# Patient Record
Sex: FEMALE | Race: BLACK OR AFRICAN AMERICAN | NOT HISPANIC OR LATINO | Employment: OTHER | URBAN - METROPOLITAN AREA
[De-identification: names, ages, dates, MRNs, and addresses within clinical notes are randomized per-mention and may not be internally consistent; named-entity substitution may affect disease eponyms.]

---

## 2017-07-13 PROBLEM — E78.5 HYPERLIPIDEMIA: Status: ACTIVE | Noted: 2017-07-13

## 2017-07-13 PROBLEM — Z87.09 HISTORY OF ASTHMA: Status: ACTIVE | Noted: 2017-07-13

## 2017-11-29 PROBLEM — I48.91 NEW ONSET ATRIAL FIBRILLATION: Status: ACTIVE | Noted: 2017-11-29

## 2017-12-01 PROBLEM — I48.91 NEW ONSET ATRIAL FIBRILLATION: Status: ACTIVE | Noted: 2017-12-01

## 2018-03-15 PROBLEM — D64.9 ANEMIA: Status: ACTIVE | Noted: 2018-03-15

## 2018-03-15 PROBLEM — K59.00 CONSTIPATION: Status: ACTIVE | Noted: 2018-03-15

## 2018-03-15 PROBLEM — R10.33 PERIUMBILICAL PAIN: Status: ACTIVE | Noted: 2018-03-15

## 2018-04-17 ENCOUNTER — TELEPHONE (OUTPATIENT)
Dept: ADMINISTRATIVE | Facility: HOSPITAL | Age: 53
End: 2018-04-17

## 2018-04-19 PROBLEM — F41.9 ANXIETY: Status: ACTIVE | Noted: 2018-04-19

## 2018-08-30 ENCOUNTER — TELEPHONE (OUTPATIENT)
Dept: ADMINISTRATIVE | Facility: OTHER | Age: 53
End: 2018-08-30

## 2018-08-30 NOTE — TELEPHONE ENCOUNTER
Results forwarded to Hipolito Mendenhall per patient's request.  Contacted patient to inform.  Verb understanding      ---- Message from Bree Limon sent at 8/30/2018  8:38 AM CDT -----  Contact: self   Pt states she is currently at Dr Sohan Mendenhall's office in [place] and would like to see of a copy of the left foot xray results can be faxed to [phone]  Pt contact [phone]

## 2018-11-10 ENCOUNTER — HOSPITAL ENCOUNTER (EMERGENCY)
Facility: HOSPITAL | Age: 53
Discharge: HOME | End: 2018-11-10
Attending: EMERGENCY MEDICINE
Payer: MEDICAID

## 2018-11-10 VITALS
TEMPERATURE: 98 F | OXYGEN SATURATION: 98 % | DIASTOLIC BLOOD PRESSURE: 86 MMHG | BODY MASS INDEX: 33.55 KG/M2 | WEIGHT: 208.75 LBS | SYSTOLIC BLOOD PRESSURE: 124 MMHG | RESPIRATION RATE: 18 BRPM | HEIGHT: 66 IN | HEART RATE: 18 BPM

## 2018-11-10 DIAGNOSIS — J02.9 PHARYNGITIS, UNSPECIFIED ETIOLOGY: Primary | ICD-10-CM

## 2018-11-10 DIAGNOSIS — M25.50 POLYARTHRALGIA: ICD-10-CM

## 2018-11-10 PROCEDURE — 99284 EMERGENCY DEPT VISIT MOD MDM: CPT | Mod: 25

## 2018-11-10 PROCEDURE — 96372 THER/PROPH/DIAG INJ SC/IM: CPT

## 2018-11-10 PROCEDURE — 63600175 PHARM REV CODE 636 W HCPCS: Performed by: PHYSICIAN ASSISTANT

## 2018-11-10 RX ORDER — DEXAMETHASONE SODIUM PHOSPHATE 4 MG/ML
12 INJECTION, SOLUTION INTRA-ARTICULAR; INTRALESIONAL; INTRAMUSCULAR; INTRAVENOUS; SOFT TISSUE
Status: COMPLETED | OUTPATIENT
Start: 2018-11-10 | End: 2018-11-10

## 2018-11-10 RX ORDER — AZITHROMYCIN 250 MG/1
250 TABLET, FILM COATED ORAL DAILY
Qty: 6 TABLET | Refills: 0 | Status: SHIPPED | OUTPATIENT
Start: 2018-11-10 | End: 2019-05-21

## 2018-11-10 RX ADMIN — DEXAMETHASONE SODIUM PHOSPHATE 12 MG: 4 INJECTION, SOLUTION INTRA-ARTICULAR; INTRALESIONAL; INTRAMUSCULAR; INTRAVENOUS; SOFT TISSUE at 11:11

## 2018-11-10 NOTE — ED PROVIDER NOTES
Encounter Date: 11/10/2018       History     Chief Complaint   Patient presents with    Lupus     shoulder, knee and hip pain, hx lupus    Sore Throat     Pt also c/o polyarthralgia secondary to lupus.  Pt denies CP/SOB, n/v/d, chills, fever.      The history is provided by the patient.   Sore Throat   This is a new problem. The current episode started more than 2 days ago. The problem occurs constantly. The problem has not changed since onset.Pertinent negatives include no chest pain, no abdominal pain, no headaches and no shortness of breath. The symptoms are aggravated by swallowing. Nothing relieves the symptoms. She has tried nothing for the symptoms.     Review of patient's allergies indicates:   Allergen Reactions    Penicillins Hives    Percocet [oxycodone-acetaminophen] Hives and Itching    Tramadol Hives     Past Medical History:   Diagnosis Date    Arthritis     knees    Asthma     Atrial fibrillation     Chronic constipation     Diabetes mellitus     Encounter for blood transfusion 10/2014    GERD (gastroesophageal reflux disease)     Gout     Hypertension     Lupus 2004    SLE    Renal disorder     SLE (systemic lupus erythematosus)      Past Surgical History:   Procedure Laterality Date    APPENDECTOMY      CHOLECYSTECTOMY      COLONOSCOPY N/A 4/19/2018    Procedure: COLONOSCOPY;  Surgeon: Minerva Porras MD;  Location: [facility];  Service: Endoscopy;  Laterality: N/A;    COLONOSCOPY N/A 4/19/2018    Performed by Minerva Porras MD at [facility]    ESOPHAGOGASTRODUODENOSCOPY (EGD) N/A 4/19/2018    Performed by Minerva Porras [facility] at [facility]    HYSTERECTOMY      JOINT REPLACEMENT Left 08/07/2015    Knee    LYMPH NODE BIOPSY Left 2014    MASTECTOMY      Left arm- NO BP    PARTIAL HYSTERECTOMY      REPLACEMENT-KNEE-TOTAL Right 8/7/2015    Performed by Neeraj Garza MD at [facility] OR     Family History   Problem Relation Age of Onset    Heart block Mother      Heart disease Father     COPD Neg Hx      Social History     Tobacco Use    Smoking status: Former Smoker     Packs/day: 0.25     Years: 15.00     Pack years: 3.75     Types: Cigarettes    Smokeless tobacco: Never Used   Substance Use Topics    Alcohol use: No    Drug use: No     Review of Systems   Constitutional: Negative for chills and fever.   HENT: Positive for sore throat.    Eyes: Negative for photophobia and redness.   Respiratory: Negative for cough and shortness of breath.    Cardiovascular: Negative for chest pain.   Gastrointestinal: Negative for abdominal pain, diarrhea and nausea.   Endocrine: Negative for polydipsia and polyphagia.   Genitourinary: Negative for dysuria.   Musculoskeletal: Positive for arthralgias. Negative for back pain and myalgias.   Skin: Negative for rash.   Neurological: Negative for weakness and headaches.   Hematological: Does not bruise/bleed easily.   Psychiatric/Behavioral: The patient is not nervous/anxious.    All other systems reviewed and are negative.      Physical Exam     Initial Vitals [11/10/18 1038]   BP Pulse Resp Temp SpO2   120/77 77 19 99 °F (37.2 °C) 98 %      MAP       --         Physical Exam    Nursing note and vitals reviewed.  Constitutional: Vital signs are normal. She appears well-developed and well-nourished. No distress.   HENT:   Head: Normocephalic and atraumatic.   Right Ear: External ear normal.   Left Ear: External ear normal.   Nose: Nose normal.   Mouth/Throat: Posterior oropharyngeal erythema present. No oropharyngeal exudate, posterior oropharyngeal edema or tonsillar abscesses.   Eyes: Conjunctivae, EOM and lids are normal. Pupils are equal, round, and reactive to light.   Neck: Normal range of motion and full passive range of motion without pain. Neck supple.   Cardiovascular: Normal rate, regular rhythm, S1 normal, S2 normal, normal heart sounds, intact distal pulses and normal pulses.   Pulmonary/Chest: Breath sounds normal. No  respiratory distress. She has no wheezes. She has no rales.   Abdominal: Soft. Normal appearance and bowel sounds are normal. She exhibits no distension. There is no tenderness.   Musculoskeletal: Normal range of motion.   Lymphadenopathy:     She has no cervical adenopathy.   Neurological: She is alert and oriented to person, place, and time. She has normal strength. No cranial nerve deficit or sensory deficit. Coordination and gait normal.   Skin: Skin is warm, dry and intact.   Psychiatric: She has a normal mood and affect. Her speech is normal and behavior is normal. Judgment and thought content normal. Cognition and memory are normal.         ED Course   Procedures  Labs Reviewed - No data to display       Imaging Results    None                               Clinical Impression:   The primary encounter diagnosis was Pharyngitis, unspecified etiology. A diagnosis of Polyarthralgia was also pertinent to this visit.      Disposition:   Disposition: Discharged  Condition: Stable                        JOE Mercado  11/10/18 1115       JOE Mercado  11/10/18 1115

## 2018-11-10 NOTE — ED NOTES
Pt in NAD, AAOx3, resp e/u; pt understood discharge instructions; pt ambulated with no difficulties.

## 2019-05-07 PROBLEM — E66.811 CLASS 1 OBESITY WITH SERIOUS COMORBIDITY AND BODY MASS INDEX (BMI) OF 32.0 TO 32.9 IN ADULT: Status: ACTIVE | Noted: 2019-05-07

## 2019-05-07 PROBLEM — E66.9 CLASS 1 OBESITY WITH SERIOUS COMORBIDITY AND BODY MASS INDEX (BMI) OF 32.0 TO 32.9 IN ADULT: Status: ACTIVE | Noted: 2019-05-07

## 2020-03-03 PROBLEM — N30.01 ACUTE CYSTITIS WITH HEMATURIA: Status: ACTIVE | Noted: 2020-03-03

## 2020-03-03 PROBLEM — N12 PYELONEPHRITIS: Status: ACTIVE | Noted: 2020-03-03

## 2020-03-03 PROBLEM — N17.9 AKI (ACUTE KIDNEY INJURY): Status: ACTIVE | Noted: 2020-03-03

## 2020-03-04 PROBLEM — E63.9 INADEQUATE DIETARY ENERGY INTAKE: Status: ACTIVE | Noted: 2020-03-04

## 2022-02-09 ENCOUNTER — HISTORICAL (OUTPATIENT)
Dept: ADMINISTRATIVE | Facility: HOSPITAL | Age: 57
End: 2022-02-09

## 2022-02-23 DIAGNOSIS — D84.9 IMMUNOSUPPRESSED STATUS: ICD-10-CM

## 2022-05-12 DIAGNOSIS — R10.84 GENERALIZED ABDOMINAL PAIN: Primary | ICD-10-CM

## 2022-05-12 DIAGNOSIS — K59.04 CHRONIC IDIOPATHIC CONSTIPATION: ICD-10-CM

## 2022-05-27 ENCOUNTER — HOSPITAL ENCOUNTER (OUTPATIENT)
Facility: HOSPITAL | Age: 57
Discharge: LEFT BEFORE DISCHARGE | DRG: 690 | End: 2022-05-28
Attending: STUDENT IN AN ORGANIZED HEALTH CARE EDUCATION/TRAINING PROGRAM | Admitting: INTERNAL MEDICINE
Payer: MEDICAID

## 2022-05-27 DIAGNOSIS — N17.9 AKI (ACUTE KIDNEY INJURY): ICD-10-CM

## 2022-05-27 DIAGNOSIS — R06.02 SOB (SHORTNESS OF BREATH): Primary | ICD-10-CM

## 2022-05-27 DIAGNOSIS — L03.90 CELLULITIS, UNSPECIFIED CELLULITIS SITE: ICD-10-CM

## 2022-05-27 DIAGNOSIS — N12 PYELONEPHRITIS: ICD-10-CM

## 2022-05-27 DIAGNOSIS — R11.0 NAUSEA: ICD-10-CM

## 2022-05-27 DIAGNOSIS — R10.9 ABDOMINAL PAIN, UNSPECIFIED ABDOMINAL LOCATION: ICD-10-CM

## 2022-05-27 DIAGNOSIS — R07.9 CHEST PAIN: ICD-10-CM

## 2022-05-27 LAB
ABS NEUT (OLG): 5.28 X10(3)/MCL (ref 2.1–9.2)
ALBUMIN SERPL-MCNC: 2.4 GM/DL (ref 3.5–5)
ALBUMIN/GLOB SERPL: 0.5 RATIO (ref 1.1–2)
ALP SERPL-CCNC: 97 UNIT/L (ref 40–150)
ALT SERPL-CCNC: 13 UNIT/L (ref 0–55)
APPEARANCE UR: ABNORMAL
AST SERPL-CCNC: 20 UNIT/L (ref 5–34)
BACTERIA #/AREA URNS AUTO: ABNORMAL /HPF
BILIRUB UR QL STRIP.AUTO: ABNORMAL MG/DL
BILIRUBIN DIRECT+TOT PNL SERPL-MCNC: 0.4 MG/DL
BUN SERPL-MCNC: 19.6 MG/DL (ref 9.8–20.1)
CALCIUM SERPL-MCNC: 8.2 MG/DL (ref 8.4–10.2)
CHLORIDE SERPL-SCNC: 101 MMOL/L (ref 98–107)
CO2 SERPL-SCNC: 22 MMOL/L (ref 22–29)
COLOR UR AUTO: ABNORMAL
CREAT SERPL-MCNC: 1.4 MG/DL (ref 0.55–1.02)
ERYTHROCYTE [DISTWIDTH] IN BLOOD BY AUTOMATED COUNT: 16.8 % (ref 11.5–17)
GLOBULIN SER-MCNC: 4.4 GM/DL (ref 2.4–3.5)
GLUCOSE SERPL-MCNC: 105 MG/DL (ref 70–110)
GLUCOSE SERPL-MCNC: 59 MG/DL (ref 74–100)
GLUCOSE UR QL STRIP.AUTO: NEGATIVE MG/DL
HCT VFR BLD AUTO: 30.3 % (ref 37–47)
HGB BLD-MCNC: 10.2 GM/DL (ref 12–16)
IMM GRANULOCYTES # BLD AUTO: 0.03 X10(3)/MCL (ref 0–0.02)
IMM GRANULOCYTES NFR BLD AUTO: 0.5 % (ref 0–0.43)
INSTRUMENT WBC (OLG): 6.6 X10(3)/MCL
KETONES UR QL STRIP.AUTO: ABNORMAL MG/DL
LEUKOCYTE ESTERASE UR QL STRIP.AUTO: ABNORMAL UNIT/L
LIPASE SERPL-CCNC: 10 U/L
LYMPHOCYTES NFR BLD MANUAL: 1.12 X10(3)/MCL
LYMPHOCYTES NFR BLD MANUAL: 17 %
MCH RBC QN AUTO: 28 PG (ref 27–31)
MCHC RBC AUTO-ENTMCNC: 33.7 MG/DL (ref 33–36)
MCV RBC AUTO: 83.2 FL (ref 80–94)
MONOCYTES NFR BLD MANUAL: 0.2 X10(3)/MCL (ref 0.1–1.3)
MONOCYTES NFR BLD MANUAL: 3 %
NEUTROPHILS NFR BLD MANUAL: 80 %
NITRITE UR QL STRIP.AUTO: NEGATIVE
NRBC BLD AUTO-RTO: 0 %
PH UR STRIP.AUTO: 5.5 [PH]
PLATELET # BLD AUTO: 436 X10(3)/MCL (ref 130–400)
PLATELET # BLD EST: ABNORMAL 10*3/UL
PMV BLD AUTO: 10 FL (ref 9.4–12.4)
POCT GLUCOSE: 105 MG/DL (ref 70–110)
POCT GLUCOSE: 174 MG/DL (ref 70–110)
POCT GLUCOSE: 21 MG/DL (ref 70–110)
POCT GLUCOSE: 23 MG/DL (ref 70–110)
POIKILOCYTOSIS BLD QL SMEAR: ABNORMAL
POTASSIUM SERPL-SCNC: 4.1 MMOL/L (ref 3.5–5.1)
PROT SERPL-MCNC: 6.8 GM/DL (ref 6.4–8.3)
PROT UR QL STRIP.AUTO: ABNORMAL MG/DL
RBC # BLD AUTO: 3.64 X10(6)/MCL (ref 4.2–5.4)
RBC #/AREA URNS AUTO: 17 /HPF
RBC UR QL AUTO: ABNORMAL UNIT/L
SODIUM SERPL-SCNC: 133 MMOL/L (ref 136–145)
SP GR UR STRIP.AUTO: 1.02 (ref 1–1.03)
SQUAMOUS #/AREA URNS AUTO: <4 /LPF
TARGETS BLD QL SMEAR: ABNORMAL
UROBILINOGEN UR STRIP-ACNC: 2 MG/DL
WBC # SPEC AUTO: 6.6 X10(3)/MCL (ref 4.5–11.5)
WBC #/AREA URNS AUTO: 317 /HPF

## 2022-05-27 PROCEDURE — 63600175 PHARM REV CODE 636 W HCPCS: Performed by: PHYSICIAN ASSISTANT

## 2022-05-27 PROCEDURE — 25000003 PHARM REV CODE 250: Performed by: INTERNAL MEDICINE

## 2022-05-27 PROCEDURE — 99285 EMERGENCY DEPT VISIT HI MDM: CPT | Mod: 25

## 2022-05-27 PROCEDURE — G0378 HOSPITAL OBSERVATION PER HR: HCPCS

## 2022-05-27 PROCEDURE — 81001 URINALYSIS AUTO W/SCOPE: CPT | Performed by: EMERGENCY MEDICINE

## 2022-05-27 PROCEDURE — 25000003 PHARM REV CODE 250: Performed by: PHYSICIAN ASSISTANT

## 2022-05-27 PROCEDURE — 11000001 HC ACUTE MED/SURG PRIVATE ROOM

## 2022-05-27 PROCEDURE — 96361 HYDRATE IV INFUSION ADD-ON: CPT

## 2022-05-27 PROCEDURE — 63600175 PHARM REV CODE 636 W HCPCS: Performed by: STUDENT IN AN ORGANIZED HEALTH CARE EDUCATION/TRAINING PROGRAM

## 2022-05-27 PROCEDURE — 85007 BL SMEAR W/DIFF WBC COUNT: CPT | Performed by: EMERGENCY MEDICINE

## 2022-05-27 PROCEDURE — 96365 THER/PROPH/DIAG IV INF INIT: CPT

## 2022-05-27 PROCEDURE — 96376 TX/PRO/DX INJ SAME DRUG ADON: CPT

## 2022-05-27 PROCEDURE — 96372 THER/PROPH/DIAG INJ SC/IM: CPT | Mod: 59 | Performed by: INTERNAL MEDICINE

## 2022-05-27 PROCEDURE — 93005 ELECTROCARDIOGRAM TRACING: CPT

## 2022-05-27 PROCEDURE — 96367 TX/PROPH/DG ADDL SEQ IV INF: CPT

## 2022-05-27 PROCEDURE — 96374 THER/PROPH/DIAG INJ IV PUSH: CPT

## 2022-05-27 PROCEDURE — 83690 ASSAY OF LIPASE: CPT | Performed by: EMERGENCY MEDICINE

## 2022-05-27 PROCEDURE — 36415 COLL VENOUS BLD VENIPUNCTURE: CPT | Performed by: EMERGENCY MEDICINE

## 2022-05-27 PROCEDURE — 85025 COMPLETE CBC W/AUTO DIFF WBC: CPT | Performed by: EMERGENCY MEDICINE

## 2022-05-27 PROCEDURE — 63600175 PHARM REV CODE 636 W HCPCS: Performed by: INTERNAL MEDICINE

## 2022-05-27 PROCEDURE — 94761 N-INVAS EAR/PLS OXIMETRY MLT: CPT

## 2022-05-27 PROCEDURE — 96375 TX/PRO/DX INJ NEW DRUG ADDON: CPT

## 2022-05-27 PROCEDURE — 80053 COMPREHEN METABOLIC PANEL: CPT | Performed by: EMERGENCY MEDICINE

## 2022-05-27 PROCEDURE — 82962 GLUCOSE BLOOD TEST: CPT

## 2022-05-27 PROCEDURE — 25000003 PHARM REV CODE 250: Performed by: STUDENT IN AN ORGANIZED HEALTH CARE EDUCATION/TRAINING PROGRAM

## 2022-05-27 RX ORDER — MORPHINE SULFATE 4 MG/ML
4 INJECTION, SOLUTION INTRAMUSCULAR; INTRAVENOUS
Status: COMPLETED | OUTPATIENT
Start: 2022-05-27 | End: 2022-05-27

## 2022-05-27 RX ORDER — CLINDAMYCIN PHOSPHATE 600 MG/50ML
600 INJECTION, SOLUTION INTRAVENOUS
Status: DISCONTINUED | OUTPATIENT
Start: 2022-05-27 | End: 2022-05-27

## 2022-05-27 RX ORDER — SODIUM CHLORIDE 0.9 % (FLUSH) 0.9 %
10 SYRINGE (ML) INJECTION
Status: DISCONTINUED | OUTPATIENT
Start: 2022-05-27 | End: 2022-05-28 | Stop reason: HOSPADM

## 2022-05-27 RX ORDER — GLUCAGON 1 MG
1 KIT INJECTION
Status: DISCONTINUED | OUTPATIENT
Start: 2022-05-27 | End: 2022-05-28 | Stop reason: HOSPADM

## 2022-05-27 RX ORDER — PROMETHAZINE HYDROCHLORIDE 25 MG/ML
12.5 INJECTION, SOLUTION INTRAMUSCULAR; INTRAVENOUS
Status: COMPLETED | OUTPATIENT
Start: 2022-05-27 | End: 2022-05-27

## 2022-05-27 RX ORDER — ERGOCALCIFEROL 1.25 MG/1
50000 CAPSULE ORAL
COMMUNITY
Start: 2022-05-10 | End: 2022-06-14 | Stop reason: SDUPTHER

## 2022-05-27 RX ORDER — HYDROXYCHLOROQUINE SULFATE 200 MG/1
200 TABLET, FILM COATED ORAL 2 TIMES DAILY
Status: DISCONTINUED | OUTPATIENT
Start: 2022-05-27 | End: 2022-05-28 | Stop reason: HOSPADM

## 2022-05-27 RX ORDER — PREDNISONE 10 MG/1
10 TABLET ORAL DAILY
Status: DISCONTINUED | OUTPATIENT
Start: 2022-05-27 | End: 2022-05-28 | Stop reason: HOSPADM

## 2022-05-27 RX ORDER — CIPROFLOXACIN 2 MG/ML
400 INJECTION, SOLUTION INTRAVENOUS
Status: DISCONTINUED | OUTPATIENT
Start: 2022-05-27 | End: 2022-05-28 | Stop reason: HOSPADM

## 2022-05-27 RX ORDER — ATORVASTATIN CALCIUM 10 MG/1
10 TABLET, FILM COATED ORAL DAILY
COMMUNITY
Start: 2022-05-12 | End: 2024-03-07 | Stop reason: SDUPTHER

## 2022-05-27 RX ORDER — IBUPROFEN 200 MG
16 TABLET ORAL
Status: DISCONTINUED | OUTPATIENT
Start: 2022-05-27 | End: 2022-05-28 | Stop reason: HOSPADM

## 2022-05-27 RX ORDER — IBUPROFEN 200 MG
24 TABLET ORAL
Status: DISCONTINUED | OUTPATIENT
Start: 2022-05-27 | End: 2022-05-28 | Stop reason: HOSPADM

## 2022-05-27 RX ORDER — METHYLPREDNISOLONE 4 MG/1
TABLET ORAL
Status: ON HOLD | COMMUNITY
Start: 2022-03-07 | End: 2022-06-03 | Stop reason: HOSPADM

## 2022-05-27 RX ORDER — SODIUM CHLORIDE 0.9 % (FLUSH) 0.9 %
10 SYRINGE (ML) INJECTION EVERY 12 HOURS PRN
Status: DISCONTINUED | OUTPATIENT
Start: 2022-05-27 | End: 2022-05-28 | Stop reason: HOSPADM

## 2022-05-27 RX ORDER — ONDANSETRON 2 MG/ML
4 INJECTION INTRAMUSCULAR; INTRAVENOUS
Status: COMPLETED | OUTPATIENT
Start: 2022-05-27 | End: 2022-05-27

## 2022-05-27 RX ORDER — HYDROMORPHONE HYDROCHLORIDE 2 MG/ML
0.5 INJECTION, SOLUTION INTRAMUSCULAR; INTRAVENOUS; SUBCUTANEOUS EVERY 6 HOURS PRN
Status: DISCONTINUED | OUTPATIENT
Start: 2022-05-27 | End: 2022-05-28 | Stop reason: HOSPADM

## 2022-05-27 RX ORDER — ATORVASTATIN CALCIUM 10 MG/1
20 TABLET, FILM COATED ORAL DAILY
Status: DISCONTINUED | OUTPATIENT
Start: 2022-05-27 | End: 2022-05-28 | Stop reason: HOSPADM

## 2022-05-27 RX ORDER — ONDANSETRON 2 MG/ML
4 INJECTION INTRAMUSCULAR; INTRAVENOUS EVERY 4 HOURS PRN
Status: DISCONTINUED | OUTPATIENT
Start: 2022-05-27 | End: 2022-05-28 | Stop reason: HOSPADM

## 2022-05-27 RX ORDER — ENOXAPARIN SODIUM 100 MG/ML
40 INJECTION SUBCUTANEOUS EVERY 24 HOURS
Status: DISCONTINUED | OUTPATIENT
Start: 2022-05-27 | End: 2022-05-28 | Stop reason: HOSPADM

## 2022-05-27 RX ORDER — SODIUM CHLORIDE 9 MG/ML
INJECTION, SOLUTION INTRAVENOUS CONTINUOUS
Status: DISCONTINUED | OUTPATIENT
Start: 2022-05-27 | End: 2022-05-28

## 2022-05-27 RX ADMIN — HYDROXYCHLOROQUINE SULFATE 200 MG: 200 TABLET, FILM COATED ORAL at 08:05

## 2022-05-27 RX ADMIN — PROMETHAZINE HYDROCHLORIDE 12.5 MG: 25 INJECTION, SOLUTION INTRAMUSCULAR; INTRAVENOUS at 10:05

## 2022-05-27 RX ADMIN — ENOXAPARIN SODIUM 40 MG: 40 INJECTION SUBCUTANEOUS at 04:05

## 2022-05-27 RX ADMIN — VANCOMYCIN HYDROCHLORIDE 1250 MG: 1.25 INJECTION, POWDER, LYOPHILIZED, FOR SOLUTION INTRAVENOUS at 06:05

## 2022-05-27 RX ADMIN — SODIUM CHLORIDE, POTASSIUM CHLORIDE, SODIUM LACTATE AND CALCIUM CHLORIDE 1000 ML: 600; 310; 30; 20 INJECTION, SOLUTION INTRAVENOUS at 06:05

## 2022-05-27 RX ADMIN — SODIUM CHLORIDE: 9 INJECTION, SOLUTION INTRAVENOUS at 02:05

## 2022-05-27 RX ADMIN — CEFTRIAXONE SODIUM 1 G: 1 INJECTION, POWDER, FOR SOLUTION INTRAMUSCULAR; INTRAVENOUS at 10:05

## 2022-05-27 RX ADMIN — HYDROMORPHONE HYDROCHLORIDE 0.5 MG: 2 INJECTION, SOLUTION INTRAMUSCULAR; INTRAVENOUS; SUBCUTANEOUS at 07:05

## 2022-05-27 RX ADMIN — ATORVASTATIN CALCIUM 20 MG: 10 TABLET, FILM COATED ORAL at 03:05

## 2022-05-27 RX ADMIN — ONDANSETRON 4 MG: 2 INJECTION INTRAMUSCULAR; INTRAVENOUS at 06:05

## 2022-05-27 RX ADMIN — DEXTROSE MONOHYDRATE 250 ML: 100 INJECTION, SOLUTION INTRAVENOUS at 05:05

## 2022-05-27 RX ADMIN — SODIUM CHLORIDE, POTASSIUM CHLORIDE, SODIUM LACTATE AND CALCIUM CHLORIDE 1000 ML: 600; 310; 30; 20 INJECTION, SOLUTION INTRAVENOUS at 08:05

## 2022-05-27 RX ADMIN — ONDANSETRON 4 MG: 2 INJECTION INTRAMUSCULAR; INTRAVENOUS at 02:05

## 2022-05-27 RX ADMIN — PREDNISONE 10 MG: 10 TABLET ORAL at 04:05

## 2022-05-27 RX ADMIN — MORPHINE SULFATE 4 MG: 4 INJECTION INTRAVENOUS at 06:05

## 2022-05-27 RX ADMIN — CIPROFLOXACIN 400 MG: 2 INJECTION, SOLUTION INTRAVENOUS at 02:05

## 2022-05-27 RX ADMIN — MORPHINE SULFATE 4 MG: 4 INJECTION INTRAVENOUS at 10:05

## 2022-05-27 NOTE — PROGRESS NOTES
Pharmacokinetic Initial Assessment: IV Vancomycin    Assessment/Plan:    Begin pt on a dosing regimen of 1250 mg q18h  Desired empiric serum trough concentration is 10 to 20 mcg/mL  Draw vancomycin trough level 60 min prior to third dose on 5/29 at approximately 0300  Pharmacy will continue to follow and monitor vancomycin.      Please contact pharmacy at extension [phone] with any questions regarding this assessment.     Thank you for the consult,   Adrian LeJeune, PharmD       Patient brief summary:  Vi Ulrich is a 56 y.o. female initiated on antimicrobial therapy with IV Vancomycin for treatment of suspected skin & soft tissue infection    Drug Allergies:   Review of patient's allergies indicates:   Allergen Reactions    Penicillins Hives    Percocet [oxycodone-acetaminophen] Hives and Itching    Tramadol Hives       Actual Body Weight:   95.3 kg    Renal Function:   Estimated Creatinine Clearance: 53.4 mL/min (A) (based on SCr of 1.4 mg/dL (H)).,     Dialysis Method (if applicable):  N/A    CBC (last 72 hours):  Recent Labs   Lab Result Units 05/27/22  0614   WBC x10(3)/mcL 6.6   Hgb gm/dL 10.2*   Hct % 30.3*   Platelet x10(3)/mcL 436*   Monocyte Man % 3       Metabolic Panel (last 72 hours):  Recent Labs   Lab Result Units 05/27/22  0614 05/27/22  0813   Sodium Level mmol/L 133*  --    Potassium Level mmol/L 4.1  --    Chloride mmol/L 101  --    Carbon Dioxide mmol/L 22  --    Glucose Level mg/dL 59*  --    Glucose, UA mg/dL  --  Negative   Blood Urea Nitrogen mg/dL 19.6  --    Creatinine mg/dL 1.40*  --    Albumin Level gm/dL 2.4*  --    Bilirubin Total mg/dL 0.4  --    Alkaline Phosphatase unit/L 97  --    Aspartate Aminotransferase unit/L 20  --    Alanine Aminotransferase unit/L 13  --        Drug levels (last 3 results):  No results for input(s): VANCOMYCINRA, VANCORANDOM, VANCOMYCINPE, VANCOPEAK, VANCOMYCINTR, VANCOTROUGH in the last 72 hours.    Microbiologic Results:  Microbiology Results (last 7 days)        Procedure Component Value Units Date/Time    Urine culture [[id number]] Collected: 05/27/22 0813    Order Status: Sent Specimen: Urine Updated: 05/27/22 1035

## 2022-05-27 NOTE — CONSULTS
Trauma/Acute Care Surgery   Consult Note     HD#0  POD#* No surgery found *    HPI  Patient is a 55yo F with PMHx of Afib on eliquis, HTN, DM, Lupus on steroids who was admitted to the hospital following complaints of abdominal pain and subsequent diagnosis of pyelonephritis. Patient started to complain of pain and swelling to her left clavicle. The pain and swelling has been present for the past week. Patient believes it has slightly improved without intervention. She denies fever, chills, nausea, vomiting, chest pain, SOB. Reports dysuria.     Scheduled Meds:   atorvastatin  20 mg Oral Daily    ciprofloxacin  400 mg Intravenous Q12H    enoxaparin  40 mg Subcutaneous Daily    hydrOXYchloroQUINE  200 mg Oral BID    predniSONE  10 mg Oral Daily       Continuous Infusions:   sodium chloride 0.9% 75 mL/hr at 05/27/22 1413       PRN Meds:dextrose 10%, dextrose 10%, glucagon (human recombinant), glucose, glucose, HYDROmorphone, ondansetron, sodium chloride 0.9%, sodium chloride 0.9%     Objective  Temp:  [99.7 °F (37.6 °C)] 99.7 °F (37.6 °C)  Pulse:  [] 78  Resp:  [16-18] 16  SpO2:  [96 %-100 %] 98 %  BP: (101-127)/(56-89) 125/83     No intake/output data recorded.  I/O this shift:  In: 0.5 [I.V.:0.5]  Out: -      GEN: NAD  NEURO: AAOx3  RESP: No increased WOB  CV: Irregular rate  ABD: Soft, NT, ND, no guarding/rebound  : Rm none  EXT: moving all spontaneously  MSK: swollen tender left supraclavicular lesion, erythematous, indurated, no fluctuance. Diffuse axillary lymphadenopathy, TTP.     Labs  Recent Labs     05/27/22  0614   WBC 6.6   HGB 10.2*   HCT 30.3*   *     Recent Labs     05/27/22  0614   *   K 4.1   CO2 22   BUN 19.6   CREATININE 1.40*   CALCIUM 8.2*   ALBUMIN 2.4*   BILITOT 0.4   AST 20   ALKPHOS 97   ALT 13       Imaging  CT Chest     1. Redemonstrated widespread bilateral axillary adenopathy, mildly worsened in comparison but remains of otherwise nonspecific CT  appearance.  2. Diffuse edema and fat stranding of the left supraclavicular and axillary regions fall, nonspecific and could reflect element of acute localized inflammatory/infectious process.  Associated complication or regional adenopathy is not excluded.  3. Mildly worsened ascending aortic aneurysm.  4. Additional chronic secondary details discussed above.    CT A/P  1. Bilateral prominent perinephric strandings combined with fluid.  Findings may be related pyelonephritis.  No renal calculi or acute obstructive uropathy.  2. Possible stone within contracted gallbladder.     Assessment/Plan  57yo F with above PMHx admitted for UTI/pyelonephritis with left supraclavicular swelling.    Patient is afebrile with normal WBC. Lesion in left supraclavicular region likely cellulitic with possible underlying reactive lymph node. Bilateral axillary lymphadenopathy which patient states is chronic.    - No surgical intervention at this time  - Recommend treatment of cellulitis with IV antibiotics  - Please contact surgery team with any further questions or concerns    Herson Guillermo MD  [employer] General Surgery      5/27/2022  2:21 PM

## 2022-05-27 NOTE — ED PROVIDER NOTES
Encounter Date: 5/27/2022    SCRIBE #1 NOTE: I, Allie James, am scribing for, and in the presence of,  Abel Mauricio MD. I have scribed the following portions of the note - Other sections scribed: HPI, ROS, PE.       History     Chief Complaint   Patient presents with    Abdominal Pain     C/o n/v/d for last 4 days and at some point got possible insect bite to lt trap area which is red and swollen.  States bilateral lower abd pains to mid abd     57 y/o female with hx of GERD, DM, and lupus presents to the ED complaining of generalized abdominal pain onset 4 days ago. Associated symptoms include nausea, vomiting and diarrhea. The pt notes she thinks she was bit by a bug and has pain and swelling to the left clavicle area. She states it hurts to swallow. Associated SOB and chest pain. Denies hx of abdominal sx.    The history is provided by the patient. No  was used.   Abdominal Pain  Illness onset: 4 days ago. The onset of the illness was abrupt. The problem has not changed since onset.The abdominal pain is generalized. The abdominal pain does not radiate. The other symptoms of the illness include shortness of breath, nausea, vomiting and diarrhea. The other symptoms of the illness do not include fever or dysuria.   Additional symptoms associated with the illness include chills. Significant associated medical issues include GERD and diabetes.     Review of patient's allergies indicates:   Allergen Reactions    Penicillins Hives    Percocet [oxycodone-acetaminophen] Hives and Itching    Tramadol Hives     Past Medical History:   Diagnosis Date    Arthritis     knees    Asthma     Atrial fibrillation     Chronic constipation     Diabetes mellitus     Encounter for blood transfusion 10/2014    GERD (gastroesophageal reflux disease)     Gout     Hypertension     Lupus 2004    SLE    Renal disorder     SLE (systemic lupus erythematosus)      Past Surgical History:   Procedure  Laterality Date    APPENDECTOMY      CHOLECYSTECTOMY      COLONOSCOPY N/A 4/19/2018    Procedure: COLONOSCOPY;  Surgeon: Minerva Porras MD;  Location: [facility];  Service: Endoscopy;  Laterality: N/A;    ESOPHAGOGASTRODUODENOSCOPY N/A 4/19/2018    Procedure: ESOPHAGOGASTRODUODENOSCOPY (EGD);  Surgeon: Minerva Porras MD;  Location: [facility];  Service: Endoscopy;  Laterality: N/A;    HYSTERECTOMY      JOINT REPLACEMENT Left 08/07/2015    Knee    LYMPH NODE BIOPSY Left 2014    MASTECTOMY      Left arm- NO BP    PARTIAL HYSTERECTOMY       Family History   Problem Relation Age of Onset    Heart block Mother     Heart disease Father      Social History     Tobacco Use    Smoking status: Current Every Day Smoker     Packs/day: 1.00     Years: 15.00     Pack years: 15.00     Types: Cigarettes    Smokeless tobacco: Never Used   Substance Use Topics    Alcohol use: No    Drug use: No     Review of Systems   Constitutional: Positive for chills. Negative for fever.   HENT: Negative for sore throat.    Eyes: Negative for visual disturbance.   Respiratory: Positive for shortness of breath.    Cardiovascular: Negative for chest pain.   Gastrointestinal: Positive for abdominal pain (generalized), diarrhea, nausea and vomiting.   Genitourinary: Negative for dysuria.   Musculoskeletal: Negative for joint swelling.        Pain and swelling to left clavicle area   Skin: Negative for rash.   Neurological: Negative for weakness.   Psychiatric/Behavioral: Negative for confusion.   All other systems reviewed and are negative.      Physical Exam     Initial Vitals [05/27/22 0545]   BP Pulse Resp Temp SpO2   106/83 102 16 99.7 °F (37.6 °C) 98 %      MAP       --         Physical Exam    Nursing note and vitals reviewed.  Constitutional: She appears well-developed and well-nourished.   HENT:   Head: Normocephalic and atraumatic.   Eyes: EOM are normal. Pupils are equal, round, and reactive to light.   Neck:    Normal range of motion.  Cardiovascular: Normal rate, regular rhythm, normal heart sounds and intact distal pulses.   No murmur heard.  Pulmonary/Chest: Breath sounds normal. No respiratory distress. She has no wheezes. She has no rales.   Abdominal: Abdomen is soft. She exhibits no distension. There is generalized abdominal tenderness (with voluntary guarding). There is no rebound.   Musculoskeletal:         General: No tenderness or edema. Normal range of motion.      Cervical back: Normal range of motion.     Neurological: She is alert. She has normal strength. No cranial nerve deficit. GCS score is 15. GCS eye subscore is 4. GCS verbal subscore is 5. GCS motor subscore is 6.   Skin: Skin is warm and dry. Capillary refill takes less than 2 seconds. No rash noted.   Erythema and edema to left shoulder overlying the clavicle   Psychiatric: She has a normal mood and affect.         ED Course   Procedures  Labs Reviewed   URINALYSIS, REFLEX TO URINE CULTURE - Abnormal; Notable for the following components:       Result Value    Color, UA Dark Yellow (*)     Appearance, UA Cloudy (*)     Protein, UA 3+ (*)     Ketones, UA 1+ (*)     Blood, UA 2+ (*)     Bilirubin, UA 2+ (*)     Urobilinogen, UA 2.0 (*)     Leukocyte Esterase, UA 3+ (*)     All other components within normal limits   COMPREHENSIVE METABOLIC PANEL - Abnormal; Notable for the following components:    Sodium Level 133 (*)     Glucose Level 59 (*)     Creatinine 1.40 (*)     Calcium Level Total 8.2 (*)     Albumin Level 2.4 (*)     Globulin 4.4 (*)     Albumin/Globulin Ratio 0.5 (*)     All other components within normal limits   CBC WITH DIFFERENTIAL - Abnormal; Notable for the following components:    RBC 3.64 (*)     Hgb 10.2 (*)     Hct 30.3 (*)     Platelet 436 (*)     IG# 0.03 (*)     IG% 0.5 (*)     All other components within normal limits   MANUAL DIFFERENTIAL - Abnormal; Notable for the following components:    Abs Lymp 1.122 (*)     Platelet  Est Increased (*)     Poik 2+ (*)     Target Cell 2+ (*)     All other components within normal limits   URINALYSIS, MICROSCOPIC - Abnormal; Notable for the following components:    RBC, UA 17 (*)     WBC,  (*)     All other components within normal limits   LIPASE - Normal   CULTURE, URINE   CBC W/ AUTO DIFFERENTIAL    Narrative:     The following orders were created for panel order CBC auto differential.  Procedure                               Abnormality         Status                     ---------                               -----------         ------                     CBC with Differential[[id number]]        Abnormal            Final result               Manual Differential[[id number]]          Abnormal            Final result                 Please view results for these tests on the individual orders.          Imaging Results          CT Chest Without Contrast (Final result)  Result time 05/27/22 10:20:40    Final result by Octaviano Michelle MD (05/27/22 10:20:40)                 Impression:        1. Redemonstrated widespread bilateral axillary adenopathy, mildly worsened in comparison but remains of otherwise nonspecific CT appearance.  2. Diffuse edema and fat stranding of the left supraclavicular and axillary regions fall, nonspecific and could reflect element of acute localized inflammatory/infectious process.  Associated complication or regional adenopathy is not excluded.  3. Mildly worsened ascending aortic aneurysm.  4. Additional chronic secondary details discussed above.      Electronically signed by: Octaviano Michelle  Date:    05/27/2022  Time:    10:20             Narrative:    EXAMINATION:  CT CHEST WITHOUT CONTRAST    CLINICAL HISTORY:  ; Lymphadenopathy, chest or axilla;Soft tissue mass, chest, US/xray nondiagnostic;Lymphadenopathy L supraclavicular, TTP, markedly edematous;    TECHNIQUE:  Helical-acquisition CT images were obtained without the intravenous administration of iodine-based  contrast media.  Multiplanar reformats were accomplished by a CT technologist at a separate workstation and pushed to PACS for physician review.    Automated tube current modulation, weight-based exposure dosing, and/or iterative reconstruction technique utilized to reach lowest reasonably achievable exposure rate.    DLP: 133 mGy*cm    COMPARISON:  29 November 2017    FINDINGS:  Images were reviewed in lung, soft tissue, and bone windows.    Exam quality: adequate for evaluation    Lines/tubes: none visualized    Cardiovascular: There is similar mild prominence of the cardiac chamber volumes, with no evidence of significant pericardial effusion.  Scattered vascular and/or pericardial calcification incidentally noted.  There are also areas of calcification involving the coronary vessels, visualized aorta, and the arch branches.  As before, the ascending aorta is of prominent caliber and measures approximately 4 cm x 3.9 cm (AP x Tx), previously 3.8 cm x 3.7 cm.    Lungs/Pleura: Central airways are widely patent. No acute airspace consolidation or suspicious focal lung lesion is identified.  Heterogeneous ground-glass attenuation throughout both lung bases and chronic parenchymal changes are similar to the prior.  No pleural thickening or significant fluid. No pneumothorax identified.    Lymph Nodes: The as before, there are widespread enlarged bilateral axillary lymph nodes, as well as appearance of left axillary surgical clips suggestive of prior excision.  Dominant left subpectoral node measures 1.4 cm short axis (series 2, image 24), with representative right axillary node measuring 1.3 cm (image 6); the nodes previously measured 1.1 cm and 1 cm, respectively.  No mediastinal, hilar, or or upper abdominal pathologic anam enlargement is appreciated.  There is no evidence of necrotic adenopathy.    Thyroid: Unremarkable.    Esophagus: No convincing focal mural irregularity.  Patulous appearance of the esophageal  lumen is appreciated throughout its course, similar to the prior study.    Abdomen: No acute or focal abnormality through the visualized upper portion. There is similar diffuse bilateral perinephric fat stranding and disorganized fluid, without evidence of drainable collection or other focal component.    Musculoskeletal: No evidence of acute osseous displacement or destructive skeletal lesion.  There is extensive edematous fluid and regional fat stranding through the left supraclavicular and axillary region, without evidence of discrete collection or other associated focal fluid-filled component.  The remaining regional thoracic wall subcutaneous tissues and underlying musculature are without evidence of acute or focal abnormality.                                 CT Abdomen Pelvis  Without Contrast (Final result)  Result time 05/27/22 06:38:51    Final result by Oscar Duncan MD (05/27/22 06:38:51)                 Impression:      1. Bilateral prominent perinephric strandings combined with fluid.  Findings may be related pyelonephritis.  No renal calculi or acute obstructive uropathy.    2. Possible stone within contracted gallbladder.      Electronically signed by: Oscar Duncan  Date:    05/27/2022  Time:    06:38             Narrative:    EXAMINATION:  CT ABDOMEN PELVIS WITHOUT CONTRAST    CLINICAL HISTORY:  Abdominal pain, acute, nonlocalized;    TECHNIQUE:  Multidetector axial images were obtained from the  diaphragms to below symphysis pubis without the administration of IV contrast. Oral contrast was not administered.    Dose length product of 258 mGycm. Automated exposure control was utilized to minimize radiation dose.    COMPARISON:  None available    FINDINGS:  Included lungs show ground-glass opacities which may be due to hypoinflation versus mild congestive process versus small airway disease.  No focally confluent airspace opacity or fluid within the pleural spaces.    Within limitations of  noncontrast technique, no acute findings of the liver, pancreas and spleen identified.  There is right upper quadrant small hypERdensity on image 25 series 2 which may be within the contracted gallbladder lumen adjacent to the cystic duct.  No apparent biliary dilation.    The adrenal glands noncontrast evaluation is unremarkable.  There are prominent perinephric standings combined with small amount of fluid.  There is no hydronephrosis or nephrolithiasis. The ureters appear normal in course and diameter without intra ureteral stone.    The stomach is decompressed.  Small bowel is normal in caliber. There is no free air or free fluid. The appendix is not identified.  Pericolonic fat is preserved without acute inflammatory strandings. There is no evidence for bowel obstruction.    Urinary bladder is decompressed.  No intravesical stone identified. There is no pelvic free fluid.                                 Medications   sodium chloride 0.9% flush 10 mL (has no administration in time range)   promethazine injection 12.5 mg (has no administration in time range)   cefTRIAXone (ROCEPHIN) 1 g in dextrose 5 % in water (D5W) 5 % 50 mL IVPB (MB+) (has no administration in time range)   morphine injection 4 mg (has no administration in time range)   lactated ringers bolus 1,000 mL (0 mLs Intravenous Stopped 5/27/22 0825)   morphine injection 4 mg (4 mg Intravenous Given 5/27/22 0615)   ondansetron injection 4 mg (4 mg Intravenous Given 5/27/22 0615)   lactated ringers bolus 1,000 mL (0 mLs Intravenous Stopped 5/27/22 0914)         Patient is a 55 y/o female presents for abdominal pain, N/V, left clavicular swelling.  See HPI/exam.  Initially refused imaging; ultimately agreed. Labs/imaging as noted.  Started on abx.  Cultures obtained.  Discussed with medicine for admission.           Scribe Attestation:   Scribe #1: I performed the above scribed service and the documentation accurately describes the services I performed.  I attest to the accuracy of the note.                    I, Abel Mauricio MD, personally performed the services described in the documentation as documented by the scribe in my presence and is both accurate and complete.       Clinical Impression:   Final diagnoses:  [R06.02] SOB (shortness of breath)  [R10.9] Abdominal pain, unspecified abdominal location (Primary)  [R11.0] Nausea  [N12] Pyelonephritis  [L03.90] Cellulitis, unspecified cellulitis site                 Abel Mauricio MD  06/13/22 1021

## 2022-05-27 NOTE — H&P
[facility] Medicine History & Physical Examination       Patient Name: Vi Ulrich  MRN: [id number]  Patient Class: IP- Inpatient   Admission Date: 5/27/2022   Admitting Physician: Rodolfo Silverman MD   Length of Stay: 0  Attending Physician: Rodolfo Silverman MD   Primary Care Provider: ANNAMARIA Palmer  Face-to-Face encounter date: 05/27/2022  Code Status: Full Code  Chief Complaint: Abdominal Pain (C/o n/v/d for last 4 days and at some point got possible insect bite to lt trap area which is red and swollen.  States bilateral lower abd pains to mid abd)        Patient information was obtained from patient, patient's family, past medical records and ER records.     HISTORY OF PRESENT ILLNESS:   Vi Ulrich is a 56 y.o. Black or  female with a past medical history of atrial fibrillation on Eliquis, hypertension, diabetes mellitus type 2, GERD, gout, lupus on Plaquenil and steroids. The patient presented to [facility] on 5/27/2022 with a primary complaint of abdominal pain, nausea, vomiting diarrhea for the last 4 days. She reports abdomdinal pain is generalized. She is having 3 episodes of bilious vomiting a day and 4 episodes of diarrhea a day resulting in her being unable to tolerate oral intake. In addition she complains of right flank pain and pain and swelling to the left clavicle region which has progressive worsened over the last 4 day. She denies fever, chest pain, shortness of breath, burning with urination, urinary frequency, bite/scratch to the clavicle region and exposure to sick contacts. She is a pack per day smoker. She does not have a cardiologist.    Upon presentation to the ED, temperature 99.7° F, tachycardic at 102, normotensive.  Labs notable for H&H 10.2/30.3, MCV 83, sodium 133, BUN/creatinine 19.6/1.40, glucose 59. UA dark yellow and cloudy with 317 WBC, 17 RBCs, 3+ leukocyte esterase, 2+ occult blood, 1+ ketones 3+ protein.   CT of the abdomen and pelvis revealed bilateral prominent perinephric stranding combined with fluid related to pyelonephritis, no renal stone or acute obstructive uropathy and possible stone within the contracted gallbladder.  CT of the chest revealed widespread bilateral axillary adenopathy mildly worsening, diffuse edema, nonspecific fat stranding of the left supraclavicular and axillary region reflecting inflammation/infectious process, mildly worsening ascending aortic aneurysm increasing to 4 cm x 3.9 cm from 3.8 cm x 3.7 cm.  In the ED patient received morphine, Zofran, Phenergan and Rocephin.  She is admitted to hospital medicine services for further medical management.      PAST MEDICAL HISTORY:     Past Medical History:   Diagnosis Date    Arthritis     knees    Asthma     Atrial fibrillation     Chronic constipation     Diabetes mellitus     Encounter for blood transfusion 10/2014    GERD (gastroesophageal reflux disease)     Gout     Hypertension     Lupus 2004    SLE    Renal disorder     SLE (systemic lupus erythematosus)        PAST SURGICAL HISTORY:     Past Surgical History:   Procedure Laterality Date    APPENDECTOMY      CHOLECYSTECTOMY      COLONOSCOPY N/A 4/19/2018    Procedure: COLONOSCOPY;  Surgeon: Minerva Porras MD;  Location: [facility];  Service: Endoscopy;  Laterality: N/A;    ESOPHAGOGASTRODUODENOSCOPY N/A 4/19/2018    Procedure: ESOPHAGOGASTRODUODENOSCOPY (EGD);  Surgeon: Minerva Porras MD;  Location: [facility];  Service: Endoscopy;  Laterality: N/A;    HYSTERECTOMY      JOINT REPLACEMENT Left 08/07/2015    Knee    LYMPH NODE BIOPSY Left 2014    MASTECTOMY      Left arm- NO BP    PARTIAL HYSTERECTOMY         ALLERGIES:   Penicillins, Percocet [oxycodone-acetaminophen], and Tramadol    FAMILY HISTORY:   Sister with Diabetes mellitus  Sister with cancer (type unknown)    SOCIAL HISTORY:     Social History     Tobacco Use    Smoking status: Current  Every Day Smoker     Packs/day: 1.00     Years: 15.00     Pack years: 15.00     Types: Cigarettes    Smokeless tobacco: Never Used   Substance Use Topics    Alcohol use: No        HOME MEDICATIONS:     Prior to Admission medications    Medication Sig Start Date End Date Taking? Authorizing Provider   albuterol sulfate (PROAIR RESPICLICK) 90 mcg/actuation inhaler Inhale 1 puff into the lungs every 4 (four) hours as needed for Wheezing. Rescue    Historical Provider   blood sugar diagnostic (ONETOUCH VERIO) Strp 1 each by Misc.(Non-Drug; Combo Route) route 4 (four) times daily. 12/15/17   Aurea High MD   blood sugar diagnostic Strp 1 each by Misc.(Non-Drug; Combo Route) route 4 (four) times daily. 12/15/17   Aurea High MD   blood-glucose meter (ONETOUCH VERIO SYNC) kit Use as instructed 12/15/17   Aurea High MD   ELIQUIS 5 mg Tab TAKE 1 TABLET BY MOUTH TWICE DAILY  Patient taking differently: Take 5 mg by mouth 2 (two) times daily.  12/31/18   Ekta Velarde MD   hydrocodone-acetaminophen (HYCET) solution 7.5-325 mg/15mL Take 10 to 15 ml every 4-6 hours as needed for pain and/or coughing. 6/22/21   Erlin Harley MD   HYDROcodone-acetaminophen (NORCO) 5-325 mg per tablet Take 1 tablet by mouth 2 (two) times daily as needed for Pain.    Historical Provider   hydrOXYchloroQUINE (PLAQUENIL) 200 mg tablet Take 1 tablet (200 mg total) by mouth 2 (two) times daily. 10/4/21   Reji Gutierrez MD   lancets 33 gauge Misc 1 lancet by Misc.(Non-Drug; Combo Route) route 4 (four) times daily. 12/15/17   Aurea High MD   predniSONE (DELTASONE) 10 MG tablet Take 1 tablet (10 mg total) by mouth once daily. 10/4/21   Reji Gutierrez MD   ARIPiprazole (ABILIFY) 2 MG Tab Take 2 mg by mouth once daily.  11/9/19 6/22/21  Historical Provider   atorvastatin (LIPITOR) 40 MG tablet Take 40 mg by mouth once daily.  6/22/21  Historical Provider   divalproex (DEPAKOTE) 250 MG EC tablet Take 250 mg by mouth.  10/22/19 6/22/21  Historical  Provider   escitalopram oxalate (LEXAPRO) 10 MG tablet Take 10 mg by mouth once daily.  6/22/21  Historical Provider   insulin glargine (LANTUS) 100 unit/mL injection Inject 80 Units into the skin every evening.  6/22/21  Historical Provider   losartan (COZAAR) 100 MG tablet Take 100 mg by mouth once daily.  6/22/21  Historical Provider   metoprolol tartrate (LOPRESSOR) 50 MG tablet Take 50 mg by mouth 2 (two) times daily.  6/22/21  Historical Provider       REVIEW OF SYSTEMS:   Except as documented, all other systems reviewed and negative     PHYSICAL EXAM:     VITAL SIGNS: 24 HRS MIN & MAX LAST   Temp  Min: 99.7 °F (37.6 °C)  Max: 99.7 °F (37.6 °C) 99.7 °F (37.6 °C)   BP  Min: 101/60  Max: 127/70 122/68   Pulse  Min: 77  Max: 102  77   Resp  Min: 16  Max: 18 16   SpO2  Min: 96 %  Max: 100 % 100 %       General appearance: Well-developed, well-nourished female in no apparent distress. Appearing ill. No family at bedside.  HEENT: Atraumatic head. Dry mucous membranes of oral cavity.  Lungs: Clear to auscultation bilaterally.   Heart: Regular rate and rhythm.   Abdomen: Obese, soft, non-distended. Bowel sounds are hypoactive. Generalized tenderness.   Extremities: No cyanosis, clubbing. No deformities.  Skin: Warm and dry. Tenderness, erythema and swelling to the left supraclavicular region  Neuro: Awake, alert and oriented. Motor and sensory exams grossly intact.  Psych/mental status: Appropriate mood and affect. Cooperative. Responds appropriately to questions.       LABS AND IMAGING:     Recent Labs   Lab 05/27/22  0614   WBC 6.6   RBC 3.64*   HGB 10.2*   HCT 30.3*   MCV 83.2   MCH 28.0   MCHC 33.7   RDW 16.8   *   MPV 10.0       Recent Labs   Lab 05/27/22 0614   *   K 4.1   CO2 22   BUN 19.6   CREATININE 1.40*   CALCIUM 8.2*   ALBUMIN 2.4*   ALKPHOS 97   ALT 13   AST 20   BILITOT 0.4       Microbiology Results (last 7 days)     Procedure Component Value Units Date/Time    Urine culture [[id number]]  Collected: 05/27/22 0813    Order Status: Sent Specimen: Urine Updated: 05/27/22 1035           CT Chest Without Contrast  Narrative: EXAMINATION:  CT CHEST WITHOUT CONTRAST    CLINICAL HISTORY:  ; Lymphadenopathy, chest or axilla;Soft tissue mass, chest, US/xray nondiagnostic;Lymphadenopathy L supraclavicular, TTP, markedly edematous;    TECHNIQUE:  Helical-acquisition CT images were obtained without the intravenous administration of iodine-based contrast media.  Multiplanar reformats were accomplished by a CT technologist at a separate workstation and pushed to PACS for physician review.    Automated tube current modulation, weight-based exposure dosing, and/or iterative reconstruction technique utilized to reach lowest reasonably achievable exposure rate.    DLP: 133 mGy*cm    COMPARISON:  29 November 2017    FINDINGS:  Images were reviewed in lung, soft tissue, and bone windows.    Exam quality: adequate for evaluation    Lines/tubes: none visualized    Cardiovascular: There is similar mild prominence of the cardiac chamber volumes, with no evidence of significant pericardial effusion.  Scattered vascular and/or pericardial calcification incidentally noted.  There are also areas of calcification involving the coronary vessels, visualized aorta, and the arch branches.  As before, the ascending aorta is of prominent caliber and measures approximately 4 cm x 3.9 cm (AP x Tx), previously 3.8 cm x 3.7 cm.    Lungs/Pleura: Central airways are widely patent. No acute airspace consolidation or suspicious focal lung lesion is identified.  Heterogeneous ground-glass attenuation throughout both lung bases and chronic parenchymal changes are similar to the prior.  No pleural thickening or significant fluid. No pneumothorax identified.    Lymph Nodes: The as before, there are widespread enlarged bilateral axillary lymph nodes, as well as appearance of left axillary surgical clips suggestive of prior excision.  Dominant left  subpectoral node measures 1.4 cm short axis (series 2, image 24), with representative right axillary node measuring 1.3 cm (image 6); the nodes previously measured 1.1 cm and 1 cm, respectively.  No mediastinal, hilar, or or upper abdominal pathologic anam enlargement is appreciated.  There is no evidence of necrotic adenopathy.    Thyroid: Unremarkable.    Esophagus: No convincing focal mural irregularity.  Patulous appearance of the esophageal lumen is appreciated throughout its course, similar to the prior study.    Abdomen: No acute or focal abnormality through the visualized upper portion. There is similar diffuse bilateral perinephric fat stranding and disorganized fluid, without evidence of drainable collection or other focal component.    Musculoskeletal: No evidence of acute osseous displacement or destructive skeletal lesion.  There is extensive edematous fluid and regional fat stranding through the left supraclavicular and axillary region, without evidence of discrete collection or other associated focal fluid-filled component.  The remaining regional thoracic wall subcutaneous tissues and underlying musculature are without evidence of acute or focal abnormality.  Impression: 1. Redemonstrated widespread bilateral axillary adenopathy, mildly worsened in comparison but remains of otherwise nonspecific CT appearance.  2. Diffuse edema and fat stranding of the left supraclavicular and axillary regions fall, nonspecific and could reflect element of acute localized inflammatory/infectious process.  Associated complication or regional adenopathy is not excluded.  3. Mildly worsened ascending aortic aneurysm.  4. Additional chronic secondary details discussed above.    Electronically signed by: Octaviano Michelle  Date:    05/27/2022  Time:    10:20  CT Abdomen Pelvis  Without Contrast  Narrative: EXAMINATION:  CT ABDOMEN PELVIS WITHOUT CONTRAST    CLINICAL HISTORY:  Abdominal pain, acute,  nonlocalized;    TECHNIQUE:  Multidetector axial images were obtained from the  diaphragms to below symphysis pubis without the administration of IV contrast. Oral contrast was not administered.    Dose length product of 258 mGycm. Automated exposure control was utilized to minimize radiation dose.    COMPARISON:  None available    FINDINGS:  Included lungs show ground-glass opacities which may be due to hypoinflation versus mild congestive process versus small airway disease.  No focally confluent airspace opacity or fluid within the pleural spaces.    Within limitations of noncontrast technique, no acute findings of the liver, pancreas and spleen identified.  There is right upper quadrant small hypERdensity on image 25 series 2 which may be within the contracted gallbladder lumen adjacent to the cystic duct.  No apparent biliary dilation.    The adrenal glands noncontrast evaluation is unremarkable.  There are prominent perinephric standings combined with small amount of fluid.  There is no hydronephrosis or nephrolithiasis. The ureters appear normal in course and diameter without intra ureteral stone.    The stomach is decompressed.  Small bowel is normal in caliber. There is no free air or free fluid. The appendix is not identified.  Pericolonic fat is preserved without acute inflammatory strandings. There is no evidence for bowel obstruction.    Urinary bladder is decompressed.  No intravesical stone identified. There is no pelvic free fluid.  Impression: 1. Bilateral prominent perinephric strandings combined with fluid.  Findings may be related pyelonephritis.  No renal calculi or acute obstructive uropathy.    2. Possible stone within contracted gallbladder.    Electronically signed by: Oscar Duncan  Date:    05/27/2022  Time:    06:38        ASSESSMENT & PLAN:   Assessment:  Acute gastroenteritis  Pyelonephritis Acute bacterial cystitis  Left supraclavicular painful adenopathy ? Infection vs Neoplasia    Chronic Abdominal aortic aneurysm - 4cm  Acute kidney injury  Essential hypertension  Atrial fibrillation on Eliquis  Diabetes mellitus type 2 insulin dependent  Lupus on Plaquenil and steroids  Tobacco Use    Plan:  - IVF hydration. Clear liquids   - Discontinue Rocephin and begin IV Ciprofloxacin 400mg BID and Vancomycin  - Consult placed to general surgery for evaluation of left supraclavicular swelling   - Accu-checks and sliding scale  - Resume appropriate home medications  - Labs in AM      VTE Prophylaxis: will be placed on SCD for DVT prophylaxis and will be advised to be as mobile as possible and sit in a chair as tolerated      __________________________________________________________________________  INPATIENT LIST OF MEDICATIONS     Current Facility-Administered Medications:     0.9%  NaCl infusion, , Intravenous, Continuous, Rose Walters PA-C    cefTRIAXone (ROCEPHIN) 1 g in dextrose 5 % in water (D5W) 5 % 50 mL IVPB (MB+), 1 g, Intravenous, ED 1 Time, Abel Mauricio MD    dextrose 10% bolus 125 mL, 12.5 g, Intravenous, PRN, Rose Walters PA-C    dextrose 10% bolus 250 mL, 25 g, Intravenous, PRN, Rose Walters PA-C    glucagon (human recombinant) injection 1 mg, 1 mg, Intramuscular, PRN, Rose Walters PA-C    glucose chewable tablet 16 g, 16 g, Oral, PRN, Rose Walters PA-C    glucose chewable tablet 24 g, 24 g, Oral, PRN, Rose Walters PA-C    morphine injection 4 mg, 4 mg, Intravenous, ED 1 Time, Abel Mauricio MD    ondansetron injection 4 mg, 4 mg, Intravenous, Q4H PRN, Rose Walters PA-C    promethazine injection 12.5 mg, 12.5 mg, Intramuscular, ED 1 Time, Abel Mauricio MD    sodium chloride 0.9% flush 10 mL, 10 mL, Intravenous, PRN, Gisele South MD    sodium chloride 0.9% flush 10 mL, 10 mL, Intravenous, Q12H PRN, Rose Walters PA-C    Current Outpatient Medications:     albuterol sulfate (PROAIR RESPICLICK) 90 mcg/actuation inhaler,  Inhale 1 puff into the lungs every 4 (four) hours as needed for Wheezing. Rescue, Disp: , Rfl:     blood sugar diagnostic (ONETOUCH VERIO) Strp, 1 each by Misc.(Non-Drug; Combo Route) route 4 (four) times daily., Disp: 50 each, Rfl: 11    blood sugar diagnostic Strp, 1 each by Misc.(Non-Drug; Combo Route) route 4 (four) times daily., Disp: 50 each, Rfl: 11    blood-glucose meter (ONETOUCH VERIO SYNC) kit, Use as instructed, Disp: 1 each, Rfl: 0    ELIQUIS 5 mg Tab, TAKE 1 TABLET BY MOUTH TWICE DAILY (Patient taking differently: Take 5 mg by mouth 2 (two) times daily. ), Disp: 60 tablet, Rfl: 0    hydrocodone-acetaminophen (HYCET) solution 7.5-325 mg/15mL, Take 10 to 15 ml every 4-6 hours as needed for pain and/or coughing., Disp: 120 mL, Rfl: 0    HYDROcodone-acetaminophen (NORCO) 5-325 mg per tablet, Take 1 tablet by mouth 2 (two) times daily as needed for Pain., Disp: , Rfl:     hydrOXYchloroQUINE (PLAQUENIL) 200 mg tablet, Take 1 tablet (200 mg total) by mouth 2 (two) times daily., Disp: 180 tablet, Rfl: 3    lancets 33 gauge Misc, 1 lancet by Misc.(Non-Drug; Combo Route) route 4 (four) times daily., Disp: 100 each, Rfl: 11    predniSONE (DELTASONE) 10 MG tablet, Take 1 tablet (10 mg total) by mouth once daily., Disp: 30 tablet, Rfl: 5      Scheduled Meds:   cefTRIAXone (ROCEPHIN) IVPB  1 g Intravenous ED 1 Time    morphine  4 mg Intravenous ED 1 Time    promethazine  12.5 mg Intramuscular ED 1 Time     Continuous Infusions:   sodium chloride 0.9%       PRN Meds:.dextrose 10%, dextrose 10%, glucagon (human recombinant), glucose, glucose, ondansetron, sodium chloride 0.9%, sodium chloride 0.9%      Discharge Planning and Disposition: Anticipated discharge to be determined.    I, JOE Mota, have reviewed and discussed the case with Dr. Rodolfo Silverman MD    Please see the following addendum for further assessment and plan from there attending MD.    Rose Walters,  PA-C  05/27/2022    ________________________________________________________________________________    MD Addendum:  I, Dr. Rodolfo Silverman MD assumed care of this patient today at 1 pm  For the patient encounter, I performed the substantive portion of the visit, I reviewed the NP/PA documentation, treatment plan, and medical decision making.  I had face to face time with this patient     A. History:  Patient came in with feeling weak. Having nausea, vomiting and loose stools past 4 days  Also noticed painful swelling in the left upper chest area for the same duration  She denies any fever, chills, cough or SOB  She is a current smoker    B. Physical exam:  Heart RRR  Lungs clear   + firm tender 4x4 cm swelling on the left supraclavicular area   Abdomen soft and + Tenderness on all quadrants   No FND     C. Medical decision making:  Pts labs, vitals and CT abdomen and chest results reviewed  She has gastroenteritis and is weak/dehydrated   Will cont iv fluids  Check stool WBC and cultures   Her left supra clavicular swelling is worrisome. Will get surgical hospitalist to evaluate it   Cont IV VAnc + cipro for now, f/u on blood cultures   Home meds reviewed and started   She will need close f/u with [facility] team   She will need a new PCP   Cont supportive care     Will hold home anticoagulation for now. If no biopsy of the left subclavicular note is recommended then will restart home OAC    Labs in am    Lovenox for DVT prophylaxis     All diagnosis and differential diagnosis have been reviewed; assessment and plan has been documented; I have personally reviewed the labs and test results that are presently available; I have reviewed the patients medication list; I have reviewed the consulting providers response and recommendations. I have reviewed or attempted to review medical records based upon their availability.    All of the patient and family questions have been addressed and answered. Patient's is agreeable  to the above stated plan. I will continue to monitor closely and make adjustments to medical management as needed.      Rodolfo Silverman MD  05/27/2022

## 2022-05-28 ENCOUNTER — HOSPITAL ENCOUNTER (INPATIENT)
Facility: HOSPITAL | Age: 57
LOS: 6 days | Discharge: HOME | DRG: 546 | End: 2022-06-03
Attending: STUDENT IN AN ORGANIZED HEALTH CARE EDUCATION/TRAINING PROGRAM | Admitting: INTERNAL MEDICINE
Payer: MEDICAID

## 2022-05-28 VITALS
OXYGEN SATURATION: 98 % | HEIGHT: 66 IN | SYSTOLIC BLOOD PRESSURE: 131 MMHG | HEART RATE: 62 BPM | WEIGHT: 209.88 LBS | TEMPERATURE: 97 F | DIASTOLIC BLOOD PRESSURE: 78 MMHG | RESPIRATION RATE: 18 BRPM | BODY MASS INDEX: 33.73 KG/M2

## 2022-05-28 DIAGNOSIS — N12 PYELONEPHRITIS: Primary | ICD-10-CM

## 2022-05-28 DIAGNOSIS — M32.9 LUPUS: ICD-10-CM

## 2022-05-28 DIAGNOSIS — K52.9 GASTROENTERITIS: ICD-10-CM

## 2022-05-28 DIAGNOSIS — R10.9 ABDOMINAL PAIN, UNSPECIFIED ABDOMINAL LOCATION: ICD-10-CM

## 2022-05-28 DIAGNOSIS — R10.9 ABDOMINAL PAIN: ICD-10-CM

## 2022-05-28 DIAGNOSIS — L03.114 CELLULITIS OF LEFT SHOULDER: ICD-10-CM

## 2022-05-28 LAB
ALBUMIN SERPL-MCNC: 2.1 GM/DL (ref 3.5–5)
ALBUMIN/GLOB SERPL: 0.6 RATIO (ref 1.1–2)
ALP SERPL-CCNC: 97 UNIT/L (ref 40–150)
ALT SERPL-CCNC: 9 UNIT/L (ref 0–55)
AST SERPL-CCNC: 16 UNIT/L (ref 5–34)
BASOPHILS # BLD AUTO: 0.01 X10(3)/MCL (ref 0–0.2)
BASOPHILS NFR BLD AUTO: 0.2 %
BILIRUBIN DIRECT+TOT PNL SERPL-MCNC: 0.2 MG/DL
BUN SERPL-MCNC: 13 MG/DL (ref 9.8–20.1)
CALCIUM SERPL-MCNC: 8.1 MG/DL (ref 8.4–10.2)
CHLORIDE SERPL-SCNC: 106 MMOL/L (ref 98–107)
CO2 SERPL-SCNC: 24 MMOL/L (ref 22–29)
CREAT SERPL-MCNC: 0.97 MG/DL (ref 0.55–1.02)
EOSINOPHIL # BLD AUTO: 0.02 X10(3)/MCL (ref 0–0.9)
EOSINOPHIL NFR BLD AUTO: 0.4 %
ERYTHROCYTE [DISTWIDTH] IN BLOOD BY AUTOMATED COUNT: 16.9 % (ref 11.5–17)
EST. AVERAGE GLUCOSE BLD GHB EST-MCNC: 128.4 MG/DL
GLOBULIN SER-MCNC: 3.8 GM/DL (ref 2.4–3.5)
GLUCOSE SERPL-MCNC: 133 MG/DL (ref 70–110)
GLUCOSE SERPL-MCNC: 91 MG/DL (ref 74–100)
HBA1C MFR BLD: 6.1 %
HCT VFR BLD AUTO: 27.2 % (ref 37–47)
HGB BLD-MCNC: 8.9 GM/DL (ref 12–16)
IMM GRANULOCYTES # BLD AUTO: 0.02 X10(3)/MCL (ref 0–0.02)
IMM GRANULOCYTES NFR BLD AUTO: 0.4 % (ref 0–0.43)
LYMPHOCYTES # BLD AUTO: 1.16 X10(3)/MCL (ref 0.6–4.6)
LYMPHOCYTES NFR BLD AUTO: 23.7 %
MCH RBC QN AUTO: 27.6 PG (ref 27–31)
MCHC RBC AUTO-ENTMCNC: 32.7 MG/DL (ref 33–36)
MCV RBC AUTO: 84.2 FL (ref 80–94)
MONOCYTES # BLD AUTO: 0.23 X10(3)/MCL (ref 0.1–1.3)
MONOCYTES NFR BLD AUTO: 4.7 %
NEUTROPHILS # BLD AUTO: 3.5 X10(3)/MCL (ref 2.1–9.2)
NEUTROPHILS NFR BLD AUTO: 70.6 %
NRBC BLD AUTO-RTO: 0 %
PLATELET # BLD AUTO: 373 X10(3)/MCL (ref 130–400)
PMV BLD AUTO: 9.7 FL (ref 9.4–12.4)
POCT GLUCOSE: 133 MG/DL (ref 70–110)
POCT GLUCOSE: 255 MG/DL (ref 70–110)
POTASSIUM SERPL-SCNC: 4.5 MMOL/L (ref 3.5–5.1)
PROT SERPL-MCNC: 5.9 GM/DL (ref 6.4–8.3)
RBC # BLD AUTO: 3.23 X10(6)/MCL (ref 4.2–5.4)
SODIUM SERPL-SCNC: 137 MMOL/L (ref 136–145)
WBC # SPEC AUTO: 4.9 X10(3)/MCL (ref 4.5–11.5)

## 2022-05-28 PROCEDURE — 80053 COMPREHEN METABOLIC PANEL: CPT | Performed by: PHYSICIAN ASSISTANT

## 2022-05-28 PROCEDURE — 96374 THER/PROPH/DIAG INJ IV PUSH: CPT | Mod: 59

## 2022-05-28 PROCEDURE — 99285 EMERGENCY DEPT VISIT HI MDM: CPT | Mod: 25

## 2022-05-28 PROCEDURE — G0378 HOSPITAL OBSERVATION PER HR: HCPCS

## 2022-05-28 PROCEDURE — 96366 THER/PROPH/DIAG IV INF ADDON: CPT

## 2022-05-28 PROCEDURE — 96375 TX/PRO/DX INJ NEW DRUG ADDON: CPT

## 2022-05-28 PROCEDURE — 83036 HEMOGLOBIN GLYCOSYLATED A1C: CPT | Performed by: INTERNAL MEDICINE

## 2022-05-28 PROCEDURE — 25000003 PHARM REV CODE 250: Performed by: INTERNAL MEDICINE

## 2022-05-28 PROCEDURE — 63600175 PHARM REV CODE 636 W HCPCS: Performed by: INTERNAL MEDICINE

## 2022-05-28 PROCEDURE — 85025 COMPLETE CBC W/AUTO DIFF WBC: CPT | Performed by: PHYSICIAN ASSISTANT

## 2022-05-28 PROCEDURE — 96361 HYDRATE IV INFUSION ADD-ON: CPT

## 2022-05-28 PROCEDURE — 96374 THER/PROPH/DIAG INJ IV PUSH: CPT

## 2022-05-28 PROCEDURE — 93005 ELECTROCARDIOGRAM TRACING: CPT

## 2022-05-28 PROCEDURE — 63600175 PHARM REV CODE 636 W HCPCS: Performed by: PHYSICIAN ASSISTANT

## 2022-05-28 PROCEDURE — 36415 COLL VENOUS BLD VENIPUNCTURE: CPT | Performed by: PHYSICIAN ASSISTANT

## 2022-05-28 PROCEDURE — 11000001 HC ACUTE MED/SURG PRIVATE ROOM

## 2022-05-28 PROCEDURE — 96372 THER/PROPH/DIAG INJ SC/IM: CPT | Performed by: INTERNAL MEDICINE

## 2022-05-28 RX ORDER — GLUCAGON 1 MG
1 KIT INJECTION
Status: DISCONTINUED | OUTPATIENT
Start: 2022-05-28 | End: 2022-05-28 | Stop reason: HOSPADM

## 2022-05-28 RX ORDER — IBUPROFEN 200 MG
16 TABLET ORAL
Status: DISCONTINUED | OUTPATIENT
Start: 2022-05-28 | End: 2022-05-28 | Stop reason: HOSPADM

## 2022-05-28 RX ORDER — IBUPROFEN 200 MG
24 TABLET ORAL
Status: DISCONTINUED | OUTPATIENT
Start: 2022-05-28 | End: 2022-05-28 | Stop reason: HOSPADM

## 2022-05-28 RX ORDER — DICYCLOMINE HYDROCHLORIDE 10 MG/1
10 CAPSULE ORAL 2 TIMES DAILY PRN
Status: DISCONTINUED | OUTPATIENT
Start: 2022-05-28 | End: 2022-05-28 | Stop reason: HOSPADM

## 2022-05-28 RX ORDER — INSULIN ASPART 100 [IU]/ML
1-10 INJECTION, SOLUTION INTRAVENOUS; SUBCUTANEOUS
Status: DISCONTINUED | OUTPATIENT
Start: 2022-05-28 | End: 2022-05-28 | Stop reason: HOSPADM

## 2022-05-28 RX ORDER — OMEPRAZOLE 20 MG/1
20 CAPSULE, DELAYED RELEASE ORAL DAILY
COMMUNITY
End: 2022-06-14 | Stop reason: SDUPTHER

## 2022-05-28 RX ADMIN — ENOXAPARIN SODIUM 40 MG: 40 INJECTION SUBCUTANEOUS at 04:05

## 2022-05-28 RX ADMIN — INSULIN ASPART 6 UNITS: 100 INJECTION, SOLUTION INTRAVENOUS; SUBCUTANEOUS at 05:05

## 2022-05-28 RX ADMIN — CIPROFLOXACIN 400 MG: 2 INJECTION, SOLUTION INTRAVENOUS at 02:05

## 2022-05-28 RX ADMIN — HYDROXYCHLOROQUINE SULFATE 200 MG: 200 TABLET, FILM COATED ORAL at 09:05

## 2022-05-28 RX ADMIN — HYDROMORPHONE HYDROCHLORIDE 0.5 MG: 2 INJECTION, SOLUTION INTRAMUSCULAR; INTRAVENOUS; SUBCUTANEOUS at 05:05

## 2022-05-28 RX ADMIN — PREDNISONE 10 MG: 10 TABLET ORAL at 09:05

## 2022-05-28 RX ADMIN — CIPROFLOXACIN 400 MG: 2 INJECTION, SOLUTION INTRAVENOUS at 04:05

## 2022-05-28 RX ADMIN — DICYCLOMINE HYDROCHLORIDE 10 MG: 10 CAPSULE ORAL at 01:05

## 2022-05-28 RX ADMIN — VANCOMYCIN HYDROCHLORIDE 1250 MG: 1.25 INJECTION, POWDER, LYOPHILIZED, FOR SOLUTION INTRAVENOUS at 03:05

## 2022-05-28 RX ADMIN — ATORVASTATIN CALCIUM 20 MG: 10 TABLET, FILM COATED ORAL at 09:05

## 2022-05-28 NOTE — PROGRESS NOTES
Pharmacokinetic Assessment Follow Up: IV Vancomycin      Vancomycin Regimen Plan:  Patient was on 1250 mg vancomycin every 18 hours, but has had significant renal improvement   Change regimen to Vancomycin 1250 mg IV every 12 hours with next serum trough concentration measured at 1000 prior to fourth dose on 5/29    Drug levels (last 3 results):  No results for input(s): VANCOMYCINRA, VANCORANDOM, VANCOMYCINPE, VANCOPEAK, VANCOMYCINTR, VANCOTROUGH in the last 72 hours.    Pharmacy will continue to follow and monitor vancomycin.    Please contact pharmacy at extension [phone] for questions regarding this assessment.    Thank you for the consult,   Nj Lema       Patient brief summary:  Vi Ulrich is a 56 y.o. female initiated on antimicrobial therapy with IV Vancomycin for treatment of urinary tract infection    The patient's current regimen is 1250 mg IV vancomycin every 12 hours    Drug Allergies:   Review of patient's allergies indicates:   Allergen Reactions    Ketorolac (pf) Swelling    Penicillins Hives    Percocet [oxycodone-acetaminophen] Hives and Itching    Tramadol Hives       Actual Body Weight:   95 kg    Renal Function:   Estimated Creatinine Clearance: 77.1 mL/min (based on SCr of 0.97 mg/dL).,     Dialysis Method (if applicable):  N/A    CBC (last 72 hours):  Recent Labs   Lab Result Units 05/27/22  0614 05/28/22  0812   WBC x10(3)/mcL 6.6 4.9   Hgb gm/dL 10.2* 8.9*   Hemoglobin A1c %  --  6.1   Hct % 30.3* 27.2*   Platelet x10(3)/mcL 436* 373   Mono % %  --  4.7   Monocyte Man % 3  --    Eos % %  --  0.4   Basophil % %  --  0.2       Metabolic Panel (last 72 hours):  Recent Labs   Lab Result Units 05/27/22  0614 05/27/22  0813 05/28/22  0812   Sodium Level mmol/L 133*  --  137   Potassium Level mmol/L 4.1  --  4.5   Chloride mmol/L 101  --  106   Carbon Dioxide mmol/L 22  --  24   Glucose Level mg/dL 59*  --  91   Glucose, UA mg/dL  --  Negative  --    Blood Urea Nitrogen mg/dL 19.6  --  13.0    Creatinine mg/dL 1.40*  --  0.97   Albumin Level gm/dL 2.4*  --  2.1*   Bilirubin Total mg/dL 0.4  --  0.2   Alkaline Phosphatase unit/L 97  --  97   Aspartate Aminotransferase unit/L 20  --  16   Alanine Aminotransferase unit/L 13  --  9       Vancomycin Administrations:  vancomycin given in the last 96 hours                     vancomycin 1.25 g in dextrose 5% 250 mL IVPB (ready to mix) (mg) 1,250 mg New Bag 05/27/22 1856                    Microbiologic Results:  Microbiology Results (last 7 days)       Procedure Component Value Units Date/Time    Urine culture [[id number]] Collected: 05/27/22 0813    Order Status: Completed Specimen: Urine Updated: 05/28/22 0633     Urine Culture No Growth At 24 Hours

## 2022-05-28 NOTE — PROGRESS NOTES
[facility] Hospital Medicine Progress Note        Chief Complaint: Inpatient Follow-up for Gastroenteritis     HPI:      Vi Ulrich is a 56 y.o. Black or  female with a past medical history of atrial fibrillation on Eliquis, hypertension, diabetes mellitus type 2, GERD, gout, lupus on Plaquenil and steroids. The patient presented to [facility] on 5/27/2022 with a primary complaint of abdominal pain, nausea, vomiting diarrhea for the last 4 days. She reports abdomdinal pain is generalized. She is having 3 episodes of bilious vomiting a day and 4 episodes of diarrhea a day resulting in her being unable to tolerate oral intake. In addition she complains of right flank pain and pain and swelling to the left clavicle region which has progressive worsened over the last 4 day. She denies fever, chest pain, shortness of breath, burning with urination, urinary frequency, bite/scratch to the clavicle region and exposure to sick contacts. She is a pack per day smoker. She does not have a cardiologist.     Upon presentation to the ED, temperature 99.7° F, tachycardic at 102, normotensive.  Labs notable for H&H 10.2/30.3, MCV 83, sodium 133, BUN/creatinine 19.6/1.40, glucose 59. UA dark yellow and cloudy with 317 WBC, 17 RBCs, 3+ leukocyte esterase, 2+ occult blood, 1+ ketones 3+ protein.  CT of the abdomen and pelvis revealed bilateral prominent perinephric stranding combined with fluid related to pyelonephritis, no renal stone or acute obstructive uropathy and possible stone within the contracted gallbladder.  CT of the chest revealed widespread bilateral axillary adenopathy mildly worsening, diffuse edema, nonspecific fat stranding of the left supraclavicular and axillary region reflecting inflammation/infectious process, mildly worsening ascending aortic aneurysm increasing to 4 cm x 3.9 cm from 3.8 cm x 3.7 cm.  In the ED patient received morphine, Zofran, Phenergan and Rocephin.   She is admitted to hospital medicine services for further medical management.    Interval Hx:     Patient today awake and comfortable, Has mild abdominal pain. But no nausea, vomiting or diarrhea. Her left neck swelling has also improved. She has been afebrile.   Tolerating po liquids.     Objective/physical exam:  General: In no acute distress, afebrile  Chest: Clear to auscultation bilaterally  Heart: RRR, +S1, S2, no appreciable murmur  Abdomen: Soft, Mild tenderness in all quadrants , BS +  MSK: Warm, no lower extremity edema, no clubbing or cyanosis. Left supraclavicular area + swelling with minimal tenderness   Neurologic: Alert and oriented x4, Cranial nerve II-XII intact, Strength 5/5 in all 4 extremities    VITAL SIGNS: 24 HRS MIN & MAX LAST   Temp  Min: 98 °F (36.7 °C)  Max: 98.7 °F (37.1 °C) 98.7 °F (37.1 °C)   BP  Min: 101/65  Max: 129/66 101/65   Pulse  Min: 62  Max: 77  67   Resp  Min: 16  Max: 18 16   SpO2  Min: 97 %  Max: 100 % 98 %       Recent Labs   Lab 05/27/22  0614 05/28/22  0812   WBC 6.6 4.9   RBC 3.64* 3.23*   HGB 10.2* 8.9*   HCT 30.3* 27.2*   MCV 83.2 84.2   MCH 28.0 27.6   MCHC 33.7 32.7*   RDW 16.8 16.9   * 373   MPV 10.0 9.7       Recent Labs   Lab 05/27/22  0614 05/28/22  0812   * 137   K 4.1 4.5   CO2 22 24   BUN 19.6 13.0   CREATININE 1.40* 0.97   CALCIUM 8.2* 8.1*   ALBUMIN 2.4* 2.1*   ALKPHOS 97 97   ALT 13 9   AST 20 16   BILITOT 0.4 0.2          Microbiology Results (last 7 days)     Procedure Component Value Units Date/Time    Urine culture [[id number]] Collected: 05/27/22 0813    Order Status: Completed Specimen: Urine Updated: 05/28/22 0633     Urine Culture No Growth At 24 Hours           See below for Radiology    Scheduled Med:   atorvastatin  20 mg Oral Daily    ciprofloxacin  400 mg Intravenous Q12H    enoxaparin  40 mg Subcutaneous Daily    hydrOXYchloroQUINE  200 mg Oral BID    predniSONE  10 mg Oral Daily    vancomycin (VANCOCIN) IVPB  1,250 mg  Intravenous Q12H        Continuous Infusions:   sodium chloride 0.9% 75 mL/hr at 05/27/22 1413        PRN Meds:  dextrose 10%, dextrose 10%, dextrose 10%, dextrose 10%, glucagon (human recombinant), glucagon (human recombinant), glucose, glucose, glucose, glucose, HYDROmorphone, ondansetron, sodium chloride 0.9%, sodium chloride 0.9%       Assessment/Plan:  Acute gastroenteritis  Pyelonephritis Acute bacterial cystitis  Left supraclavicular painful adenopathy ? Infection vs Neoplasia   Chronic Abdominal aortic aneurysm - 4cm  Acute kidney injury  Essential hypertension  Atrial fibrillation on Eliquis  Diabetes mellitus type 2 insulin dependent  Lupus on Plaquenil and steroids  Tobacco Use     Plan:  Patient doing well today. Will advance diet   Cont IV VAnc + Cipro for today   Her left neck swelling is now better, seen by surgery team and recommended to continue antibiotics   Renal function has improved   Cont supportive care   F/U on cultures     Labs in am    Patient condition:  Fair    Anticipated discharge and Disposition:      All diagnosis and differential diagnosis have been reviewed; assessment and plan has been documented; I have personally reviewed the labs and test results that are presently available; I have reviewed the patients medication list; I have reviewed the consulting providers response and recommendations. I have reviewed or attempted to review medical records based upon their availability    All of the patient's questions have been  addressed and answered. Patient's is agreeable to the above stated plan. I will continue to monitor closely and make adjustments to medical management as needed.  _____________________________________________________________________    Nutrition Status:    Radiology:  CT Chest Without Contrast  Narrative: EXAMINATION:  CT CHEST WITHOUT CONTRAST    CLINICAL HISTORY:  ; Lymphadenopathy, chest or axilla;Soft tissue mass, chest, US/xray nondiagnostic;Lymphadenopathy L  supraclavicular, TTP, markedly edematous;    TECHNIQUE:  Helical-acquisition CT images were obtained without the intravenous administration of iodine-based contrast media.  Multiplanar reformats were accomplished by a CT technologist at a separate workstation and pushed to PACS for physician review.    Automated tube current modulation, weight-based exposure dosing, and/or iterative reconstruction technique utilized to reach lowest reasonably achievable exposure rate.    DLP: 133 mGy*cm    COMPARISON:  29 November 2017    FINDINGS:  Images were reviewed in lung, soft tissue, and bone windows.    Exam quality: adequate for evaluation    Lines/tubes: none visualized    Cardiovascular: There is similar mild prominence of the cardiac chamber volumes, with no evidence of significant pericardial effusion.  Scattered vascular and/or pericardial calcification incidentally noted.  There are also areas of calcification involving the coronary vessels, visualized aorta, and the arch branches.  As before, the ascending aorta is of prominent caliber and measures approximately 4 cm x 3.9 cm (AP x Tx), previously 3.8 cm x 3.7 cm.    Lungs/Pleura: Central airways are widely patent. No acute airspace consolidation or suspicious focal lung lesion is identified.  Heterogeneous ground-glass attenuation throughout both lung bases and chronic parenchymal changes are similar to the prior.  No pleural thickening or significant fluid. No pneumothorax identified.    Lymph Nodes: The as before, there are widespread enlarged bilateral axillary lymph nodes, as well as appearance of left axillary surgical clips suggestive of prior excision.  Dominant left subpectoral node measures 1.4 cm short axis (series 2, image 24), with representative right axillary node measuring 1.3 cm (image 6); the nodes previously measured 1.1 cm and 1 cm, respectively.  No mediastinal, hilar, or or upper abdominal pathologic anam enlargement is appreciated.  There is  no evidence of necrotic adenopathy.    Thyroid: Unremarkable.    Esophagus: No convincing focal mural irregularity.  Patulous appearance of the esophageal lumen is appreciated throughout its course, similar to the prior study.    Abdomen: No acute or focal abnormality through the visualized upper portion. There is similar diffuse bilateral perinephric fat stranding and disorganized fluid, without evidence of drainable collection or other focal component.    Musculoskeletal: No evidence of acute osseous displacement or destructive skeletal lesion.  There is extensive edematous fluid and regional fat stranding through the left supraclavicular and axillary region, without evidence of discrete collection or other associated focal fluid-filled component.  The remaining regional thoracic wall subcutaneous tissues and underlying musculature are without evidence of acute or focal abnormality.  Impression: 1. Redemonstrated widespread bilateral axillary adenopathy, mildly worsened in comparison but remains of otherwise nonspecific CT appearance.  2. Diffuse edema and fat stranding of the left supraclavicular and axillary regions fall, nonspecific and could reflect element of acute localized inflammatory/infectious process.  Associated complication or regional adenopathy is not excluded.  3. Mildly worsened ascending aortic aneurysm.  4. Additional chronic secondary details discussed above.    Electronically signed by: Octaviano Michelle  Date:    05/27/2022  Time:    10:20  CT Abdomen Pelvis  Without Contrast  Narrative: EXAMINATION:  CT ABDOMEN PELVIS WITHOUT CONTRAST    CLINICAL HISTORY:  Abdominal pain, acute, nonlocalized;    TECHNIQUE:  Multidetector axial images were obtained from the  diaphragms to below symphysis pubis without the administration of IV contrast. Oral contrast was not administered.    Dose length product of 258 mGycm. Automated exposure control was utilized to minimize radiation dose.    COMPARISON:  None  available    FINDINGS:  Included lungs show ground-glass opacities which may be due to hypoinflation versus mild congestive process versus small airway disease.  No focally confluent airspace opacity or fluid within the pleural spaces.    Within limitations of noncontrast technique, no acute findings of the liver, pancreas and spleen identified.  There is right upper quadrant small hypERdensity on image 25 series 2 which may be within the contracted gallbladder lumen adjacent to the cystic duct.  No apparent biliary dilation.    The adrenal glands noncontrast evaluation is unremarkable.  There are prominent perinephric standings combined with small amount of fluid.  There is no hydronephrosis or nephrolithiasis. The ureters appear normal in course and diameter without intra ureteral stone.    The stomach is decompressed.  Small bowel is normal in caliber. There is no free air or free fluid. The appendix is not identified.  Pericolonic fat is preserved without acute inflammatory strandings. There is no evidence for bowel obstruction.    Urinary bladder is decompressed.  No intravesical stone identified. There is no pelvic free fluid.  Impression: 1. Bilateral prominent perinephric strandings combined with fluid.  Findings may be related pyelonephritis.  No renal calculi or acute obstructive uropathy.    2. Possible stone within contracted gallbladder.    Electronically signed by: Oscar Duncan  Date:    05/27/2022  Time:    06:38      Rodolfo Silverman MD   05/28/2022        Patient was counseled on smoking cessation for 6 minutes. Rx for nicotine patch was offered. Patient understands the tx plan.

## 2022-05-29 LAB
ALBUMIN SERPL-MCNC: 2.7 GM/DL (ref 3.5–5)
ALBUMIN SERPL-MCNC: 2.7 GM/DL (ref 3.5–5)
ALBUMIN/GLOB SERPL: 0.5 RATIO (ref 1.1–2)
ALBUMIN/GLOB SERPL: 0.5 RATIO (ref 1.1–2)
ALP SERPL-CCNC: 106 UNIT/L (ref 40–150)
ALP SERPL-CCNC: 108 UNIT/L (ref 40–150)
ALT SERPL-CCNC: 10 UNIT/L (ref 0–55)
ALT SERPL-CCNC: 11 UNIT/L (ref 0–55)
APPEARANCE UR: ABNORMAL
AST SERPL-CCNC: 21 UNIT/L (ref 5–34)
AST SERPL-CCNC: 24 UNIT/L (ref 5–34)
BACTERIA #/AREA URNS AUTO: ABNORMAL /HPF
BACTERIA UR CULT: NO GROWTH
BASOPHILS # BLD AUTO: 0.01 X10(3)/MCL (ref 0–0.2)
BASOPHILS # BLD AUTO: 0.01 X10(3)/MCL (ref 0–0.2)
BASOPHILS NFR BLD AUTO: 0.2 %
BASOPHILS NFR BLD AUTO: 0.2 %
BILIRUB UR QL STRIP.AUTO: NEGATIVE MG/DL
BILIRUBIN DIRECT+TOT PNL SERPL-MCNC: 0.2 MG/DL
BILIRUBIN DIRECT+TOT PNL SERPL-MCNC: 0.2 MG/DL
BUN SERPL-MCNC: 16.4 MG/DL (ref 9.8–20.1)
BUN SERPL-MCNC: 17.2 MG/DL (ref 9.8–20.1)
CALCIUM SERPL-MCNC: 8.8 MG/DL (ref 8.4–10.2)
CALCIUM SERPL-MCNC: 8.9 MG/DL (ref 8.4–10.2)
CHLORIDE SERPL-SCNC: 101 MMOL/L (ref 98–107)
CHLORIDE SERPL-SCNC: 103 MMOL/L (ref 98–107)
CO2 SERPL-SCNC: 21 MMOL/L (ref 22–29)
CO2 SERPL-SCNC: 27 MMOL/L (ref 22–29)
COLOR UR AUTO: YELLOW
CREAT SERPL-MCNC: 1.15 MG/DL (ref 0.55–1.02)
CREAT SERPL-MCNC: 1.22 MG/DL (ref 0.55–1.02)
EOSINOPHIL # BLD AUTO: 0.02 X10(3)/MCL (ref 0–0.9)
EOSINOPHIL # BLD AUTO: 0.05 X10(3)/MCL (ref 0–0.9)
EOSINOPHIL NFR BLD AUTO: 0.4 %
EOSINOPHIL NFR BLD AUTO: 1.2 %
ERYTHROCYTE [DISTWIDTH] IN BLOOD BY AUTOMATED COUNT: 16.9 % (ref 11.5–17)
ERYTHROCYTE [DISTWIDTH] IN BLOOD BY AUTOMATED COUNT: 16.9 % (ref 11.5–17)
EST. AVERAGE GLUCOSE BLD GHB EST-MCNC: 125.5 MG/DL
GLOBULIN SER-MCNC: 5.2 GM/DL (ref 2.4–3.5)
GLOBULIN SER-MCNC: 5.3 GM/DL (ref 2.4–3.5)
GLUCOSE SERPL-MCNC: 106 MG/DL (ref 70–110)
GLUCOSE SERPL-MCNC: 70 MG/DL (ref 74–100)
GLUCOSE SERPL-MCNC: 74 MG/DL (ref 74–100)
GLUCOSE UR QL STRIP.AUTO: NEGATIVE MG/DL
HBA1C MFR BLD: 6 %
HCT VFR BLD AUTO: 31.4 % (ref 37–47)
HCT VFR BLD AUTO: 32.6 % (ref 37–47)
HGB BLD-MCNC: 10.7 GM/DL (ref 12–16)
HGB BLD-MCNC: 9.8 GM/DL (ref 12–16)
IMM GRANULOCYTES # BLD AUTO: 0.03 X10(3)/MCL (ref 0–0.02)
IMM GRANULOCYTES # BLD AUTO: 0.03 X10(3)/MCL (ref 0–0.02)
IMM GRANULOCYTES NFR BLD AUTO: 0.6 % (ref 0–0.43)
IMM GRANULOCYTES NFR BLD AUTO: 0.7 % (ref 0–0.43)
KETONES UR QL STRIP.AUTO: NEGATIVE MG/DL
LEUKOCYTE ESTERASE UR QL STRIP.AUTO: ABNORMAL UNIT/L
LIPASE SERPL-CCNC: 15 U/L
LYMPHOCYTES # BLD AUTO: 0.85 X10(3)/MCL (ref 0.6–4.6)
LYMPHOCYTES # BLD AUTO: 1.3 X10(3)/MCL (ref 0.6–4.6)
LYMPHOCYTES NFR BLD AUTO: 18.2 %
LYMPHOCYTES NFR BLD AUTO: 31.6 %
MCH RBC QN AUTO: 27.3 PG (ref 27–31)
MCH RBC QN AUTO: 27.8 PG (ref 27–31)
MCHC RBC AUTO-ENTMCNC: 31.2 MG/DL (ref 33–36)
MCHC RBC AUTO-ENTMCNC: 32.8 MG/DL (ref 33–36)
MCV RBC AUTO: 84.7 FL (ref 80–94)
MCV RBC AUTO: 87.5 FL (ref 80–94)
MONOCYTES # BLD AUTO: 0.19 X10(3)/MCL (ref 0.1–1.3)
MONOCYTES # BLD AUTO: 0.21 X10(3)/MCL (ref 0.1–1.3)
MONOCYTES NFR BLD AUTO: 4.5 %
MONOCYTES NFR BLD AUTO: 4.6 %
NEUTROPHILS # BLD AUTO: 2.5 X10(3)/MCL (ref 2.1–9.2)
NEUTROPHILS # BLD AUTO: 3.6 X10(3)/MCL (ref 2.1–9.2)
NEUTROPHILS NFR BLD AUTO: 61.7 %
NEUTROPHILS NFR BLD AUTO: 76.1 %
NITRITE UR QL STRIP.AUTO: NEGATIVE
NRBC BLD AUTO-RTO: 0 %
NRBC BLD AUTO-RTO: 0 %
PH UR STRIP.AUTO: 5.5 [PH]
PLATELET # BLD AUTO: 381 X10(3)/MCL (ref 130–400)
PLATELET # BLD AUTO: 453 X10(3)/MCL (ref 130–400)
PMV BLD AUTO: 9.6 FL (ref 9.4–12.4)
PMV BLD AUTO: 9.9 FL (ref 9.4–12.4)
POCT GLUCOSE: 106 MG/DL (ref 70–110)
POCT GLUCOSE: 111 MG/DL (ref 70–110)
POCT GLUCOSE: 80 MG/DL (ref 70–110)
POTASSIUM SERPL-SCNC: 4.1 MMOL/L (ref 3.5–5.1)
POTASSIUM SERPL-SCNC: 4.2 MMOL/L (ref 3.5–5.1)
PROT SERPL-MCNC: 7.9 GM/DL (ref 6.4–8.3)
PROT SERPL-MCNC: 8 GM/DL (ref 6.4–8.3)
PROT UR QL STRIP.AUTO: ABNORMAL MG/DL
RBC # BLD AUTO: 3.59 X10(6)/MCL (ref 4.2–5.4)
RBC # BLD AUTO: 3.85 X10(6)/MCL (ref 4.2–5.4)
RBC #/AREA URNS AUTO: 6 /HPF
RBC UR QL AUTO: ABNORMAL UNIT/L
SODIUM SERPL-SCNC: 134 MMOL/L (ref 136–145)
SODIUM SERPL-SCNC: 135 MMOL/L (ref 136–145)
SP GR UR STRIP.AUTO: 1.02 (ref 1–1.03)
SQUAMOUS #/AREA URNS AUTO: <4 /LPF
TROPONIN I SERPL-MCNC: <0.01 NG/ML (ref 0–0.04)
UROBILINOGEN UR STRIP-ACNC: 1 MG/DL
WBC # SPEC AUTO: 4.1 X10(3)/MCL (ref 4.5–11.5)
WBC # SPEC AUTO: 4.7 X10(3)/MCL (ref 4.5–11.5)
WBC #/AREA URNS AUTO: 12 /HPF

## 2022-05-29 PROCEDURE — 63600175 PHARM REV CODE 636 W HCPCS: Performed by: STUDENT IN AN ORGANIZED HEALTH CARE EDUCATION/TRAINING PROGRAM

## 2022-05-29 PROCEDURE — 85025 COMPLETE CBC W/AUTO DIFF WBC: CPT | Performed by: STUDENT IN AN ORGANIZED HEALTH CARE EDUCATION/TRAINING PROGRAM

## 2022-05-29 PROCEDURE — 85025 COMPLETE CBC W/AUTO DIFF WBC: CPT | Performed by: INTERNAL MEDICINE

## 2022-05-29 PROCEDURE — 93005 ELECTROCARDIOGRAM TRACING: CPT

## 2022-05-29 PROCEDURE — 83036 HEMOGLOBIN GLYCOSYLATED A1C: CPT | Performed by: INTERNAL MEDICINE

## 2022-05-29 PROCEDURE — 63600175 PHARM REV CODE 636 W HCPCS: Performed by: INTERNAL MEDICINE

## 2022-05-29 PROCEDURE — 80053 COMPREHEN METABOLIC PANEL: CPT | Performed by: STUDENT IN AN ORGANIZED HEALTH CARE EDUCATION/TRAINING PROGRAM

## 2022-05-29 PROCEDURE — 83690 ASSAY OF LIPASE: CPT | Performed by: STUDENT IN AN ORGANIZED HEALTH CARE EDUCATION/TRAINING PROGRAM

## 2022-05-29 PROCEDURE — 36415 COLL VENOUS BLD VENIPUNCTURE: CPT | Performed by: STUDENT IN AN ORGANIZED HEALTH CARE EDUCATION/TRAINING PROGRAM

## 2022-05-29 PROCEDURE — 25000003 PHARM REV CODE 250: Performed by: INTERNAL MEDICINE

## 2022-05-29 PROCEDURE — 87040 BLOOD CULTURE FOR BACTERIA: CPT | Performed by: STUDENT IN AN ORGANIZED HEALTH CARE EDUCATION/TRAINING PROGRAM

## 2022-05-29 PROCEDURE — 81001 URINALYSIS AUTO W/SCOPE: CPT | Performed by: STUDENT IN AN ORGANIZED HEALTH CARE EDUCATION/TRAINING PROGRAM

## 2022-05-29 PROCEDURE — 80053 COMPREHEN METABOLIC PANEL: CPT | Performed by: INTERNAL MEDICINE

## 2022-05-29 PROCEDURE — 11000001 HC ACUTE MED/SURG PRIVATE ROOM

## 2022-05-29 PROCEDURE — 84484 ASSAY OF TROPONIN QUANT: CPT | Performed by: STUDENT IN AN ORGANIZED HEALTH CARE EDUCATION/TRAINING PROGRAM

## 2022-05-29 RX ORDER — SODIUM CHLORIDE 0.9 % (FLUSH) 0.9 %
10 SYRINGE (ML) INJECTION
Status: DISCONTINUED | OUTPATIENT
Start: 2022-05-29 | End: 2022-06-03 | Stop reason: HOSPADM

## 2022-05-29 RX ORDER — IBUPROFEN 200 MG
16 TABLET ORAL
Status: DISCONTINUED | OUTPATIENT
Start: 2022-05-29 | End: 2022-06-03 | Stop reason: HOSPADM

## 2022-05-29 RX ORDER — MORPHINE SULFATE 4 MG/ML
4 INJECTION, SOLUTION INTRAMUSCULAR; INTRAVENOUS
Status: COMPLETED | OUTPATIENT
Start: 2022-05-29 | End: 2022-05-29

## 2022-05-29 RX ORDER — HYDROXYCHLOROQUINE SULFATE 200 MG/1
200 TABLET, FILM COATED ORAL 2 TIMES DAILY
Status: DISCONTINUED | OUTPATIENT
Start: 2022-05-29 | End: 2022-06-03 | Stop reason: HOSPADM

## 2022-05-29 RX ORDER — PREDNISONE 10 MG/1
10 TABLET ORAL DAILY
Status: DISCONTINUED | OUTPATIENT
Start: 2022-05-29 | End: 2022-06-02

## 2022-05-29 RX ORDER — ATORVASTATIN CALCIUM 10 MG/1
10 TABLET, FILM COATED ORAL NIGHTLY
Status: DISCONTINUED | OUTPATIENT
Start: 2022-05-29 | End: 2022-05-31

## 2022-05-29 RX ORDER — ATORVASTATIN CALCIUM 10 MG/1
20 TABLET, FILM COATED ORAL DAILY
Status: DISCONTINUED | OUTPATIENT
Start: 2022-05-29 | End: 2022-06-03 | Stop reason: HOSPADM

## 2022-05-29 RX ORDER — GLUCAGON 1 MG
1 KIT INJECTION
Status: DISCONTINUED | OUTPATIENT
Start: 2022-05-29 | End: 2022-06-03 | Stop reason: HOSPADM

## 2022-05-29 RX ORDER — ACETAMINOPHEN 325 MG/1
650 TABLET ORAL EVERY 8 HOURS PRN
Status: DISCONTINUED | OUTPATIENT
Start: 2022-05-29 | End: 2022-06-03 | Stop reason: HOSPADM

## 2022-05-29 RX ORDER — TALC
6 POWDER (GRAM) TOPICAL NIGHTLY PRN
Status: DISCONTINUED | OUTPATIENT
Start: 2022-05-29 | End: 2022-06-03 | Stop reason: HOSPADM

## 2022-05-29 RX ORDER — PANTOPRAZOLE SODIUM 40 MG/1
40 TABLET, DELAYED RELEASE ORAL DAILY
Status: DISCONTINUED | OUTPATIENT
Start: 2022-05-29 | End: 2022-06-03 | Stop reason: HOSPADM

## 2022-05-29 RX ORDER — HYDROCODONE BITARTRATE AND ACETAMINOPHEN 5; 325 MG/1; MG/1
1 TABLET ORAL EVERY 4 HOURS PRN
Status: DISCONTINUED | OUTPATIENT
Start: 2022-05-29 | End: 2022-06-03 | Stop reason: HOSPADM

## 2022-05-29 RX ORDER — HYDROCODONE BITARTRATE AND ACETAMINOPHEN 5; 325 MG/1; MG/1
1 TABLET ORAL EVERY 6 HOURS PRN
Status: DISCONTINUED | OUTPATIENT
Start: 2022-05-29 | End: 2022-06-03 | Stop reason: HOSPADM

## 2022-05-29 RX ORDER — ALPRAZOLAM 0.5 MG/1
0.5 TABLET ORAL 3 TIMES DAILY PRN
Status: DISCONTINUED | OUTPATIENT
Start: 2022-05-29 | End: 2022-06-03 | Stop reason: HOSPADM

## 2022-05-29 RX ORDER — CIPROFLOXACIN 2 MG/ML
400 INJECTION, SOLUTION INTRAVENOUS
Status: DISCONTINUED | OUTPATIENT
Start: 2022-05-29 | End: 2022-05-30

## 2022-05-29 RX ORDER — HYDROMORPHONE HYDROCHLORIDE 2 MG/ML
0.5 INJECTION, SOLUTION INTRAMUSCULAR; INTRAVENOUS; SUBCUTANEOUS
Status: COMPLETED | OUTPATIENT
Start: 2022-05-29 | End: 2022-05-29

## 2022-05-29 RX ORDER — IBUPROFEN 200 MG
24 TABLET ORAL
Status: DISCONTINUED | OUTPATIENT
Start: 2022-05-29 | End: 2022-06-03 | Stop reason: HOSPADM

## 2022-05-29 RX ADMIN — HYDROXYCHLOROQUINE SULFATE 200 MG: 200 TABLET, FILM COATED ORAL at 08:05

## 2022-05-29 RX ADMIN — APIXABAN 5 MG: 5 TABLET, FILM COATED ORAL at 09:05

## 2022-05-29 RX ADMIN — CIPROFLOXACIN 400 MG: 2 INJECTION, SOLUTION INTRAVENOUS at 07:05

## 2022-05-29 RX ADMIN — HYDROXYCHLOROQUINE SULFATE 200 MG: 200 TABLET, FILM COATED ORAL at 09:05

## 2022-05-29 RX ADMIN — HYDROMORPHONE HYDROCHLORIDE 0.5 MG: 2 INJECTION INTRAMUSCULAR; INTRAVENOUS; SUBCUTANEOUS at 01:05

## 2022-05-29 RX ADMIN — MORPHINE SULFATE 4 MG: 4 INJECTION INTRAVENOUS at 12:05

## 2022-05-29 RX ADMIN — PREDNISONE 10 MG: 10 TABLET ORAL at 08:05

## 2022-05-29 RX ADMIN — MELATONIN TAB 3 MG 6 MG: 3 TAB at 09:05

## 2022-05-29 RX ADMIN — ATORVASTATIN CALCIUM 20 MG: 10 TABLET, FILM COATED ORAL at 08:05

## 2022-05-29 RX ADMIN — PANTOPRAZOLE SODIUM 40 MG: 40 TABLET, DELAYED RELEASE ORAL at 08:05

## 2022-05-29 RX ADMIN — CIPROFLOXACIN 400 MG: 2 INJECTION, SOLUTION INTRAVENOUS at 03:05

## 2022-05-29 RX ADMIN — ALPRAZOLAM 0.5 MG: 0.5 TABLET ORAL at 04:05

## 2022-05-29 RX ADMIN — VANCOMYCIN HYDROCHLORIDE 1250 MG: 1.25 INJECTION, POWDER, LYOPHILIZED, FOR SOLUTION INTRAVENOUS at 04:05

## 2022-05-29 RX ADMIN — CIPROFLOXACIN 400 MG: 2 INJECTION, SOLUTION INTRAVENOUS at 11:05

## 2022-05-29 RX ADMIN — APIXABAN 5 MG: 5 TABLET, FILM COATED ORAL at 08:05

## 2022-05-29 RX ADMIN — VANCOMYCIN HYDROCHLORIDE 1250 MG: 1.25 INJECTION, POWDER, LYOPHILIZED, FOR SOLUTION INTRAVENOUS at 06:05

## 2022-05-29 RX ADMIN — HYDROCODONE BITARTRATE AND ACETAMINOPHEN 1 TABLET: 5; 325 TABLET ORAL at 08:05

## 2022-05-29 NOTE — ED PROVIDER NOTES
Encounter Date: 5/28/2022    SCRIBE #1 NOTE: Amanda JESUS, am scribing for, and in the presence of, Frankie Moralez.       History     Chief Complaint   Patient presents with    Abdominal Pain    Shoulder Pain     Patient states left AMA from hospital to take care of autistic daughter.  Complaint of abdominal pain, left shoulder pain.      57 y/o female with history lupus on plaquenil, afib on elaquis presents to the ED due to abdominal pain and L shoulder pain. She reports that her abdominal pain has been worsening. She also has gastroesophageal reflux. Pt was previously admitted to the hospital due to pyelonephritis and cellulitis to her L shoulder - left against medical advice to take care of her daughter.     The history is provided by the patient.   Abdominal Pain  The current episode started several days ago. The onset of the illness was gradual. The problem has been gradually worsening. The abdominal pain is generalized. The abdominal pain does not radiate. The abdominal pain is relieved by nothing. The other symptoms of the illness include vomiting and diarrhea. The other symptoms of the illness do not include fever, shortness of breath, nausea, dysuria, vaginal discharge or vaginal bleeding.   Symptoms associated with the illness do not include chills or hematuria.     Review of patient's allergies indicates:   Allergen Reactions    Ketorolac (pf) Swelling    Penicillins Hives    Percocet [oxycodone-acetaminophen] Hives and Itching    Tramadol Hives     Past Medical History:   Diagnosis Date    Arthritis     knees    Asthma     Atrial fibrillation     Chronic constipation     Diabetes mellitus     Encounter for blood transfusion 10/2014    GERD (gastroesophageal reflux disease)     Gout     Hypertension     Lupus 2004    SLE    Renal disorder     SLE (systemic lupus erythematosus)      Past Surgical History:   Procedure Laterality Date    APPENDECTOMY      CHOLECYSTECTOMY       COLONOSCOPY N/A 4/19/2018    Procedure: COLONOSCOPY;  Surgeon: Minerva Porras MD;  Location: [facility];  Service: Endoscopy;  Laterality: N/A;    ESOPHAGOGASTRODUODENOSCOPY N/A 4/19/2018    Procedure: ESOPHAGOGASTRODUODENOSCOPY (EGD);  Surgeon: Minerva Porras MD;  Location: [facility];  Service: Endoscopy;  Laterality: N/A;    HYSTERECTOMY      JOINT REPLACEMENT Left 08/07/2015    Knee    LYMPH NODE BIOPSY Left 2014    MASTECTOMY      Left arm- NO BP    PARTIAL HYSTERECTOMY       Family History   Problem Relation Age of Onset    Heart block Mother     Heart disease Father      Social History     Tobacco Use    Smoking status: Current Every Day Smoker     Packs/day: 1.00     Years: 15.00     Pack years: 15.00     Types: Cigarettes    Smokeless tobacco: Never Used   Substance Use Topics    Alcohol use: No    Drug use: No     Review of Systems   Constitutional: Negative for chills and fever.   HENT: Negative for congestion, rhinorrhea and sore throat.    Eyes: Negative for visual disturbance.   Respiratory: Negative for cough and shortness of breath.    Cardiovascular: Negative for chest pain and leg swelling.   Gastrointestinal: Positive for abdominal pain, diarrhea and vomiting. Negative for nausea.        Acid reflux   Genitourinary: Negative for dysuria, hematuria, vaginal bleeding and vaginal discharge.   Musculoskeletal: Negative for joint swelling.        Left shoulder pain   Skin: Negative for rash.   Neurological: Negative for weakness.   Psychiatric/Behavioral: Negative for confusion.       Physical Exam     Initial Vitals [05/28/22 2346]   BP Pulse Resp Temp SpO2   136/67 71 18 98.8 °F (37.1 °C) 98 %      MAP       --         Physical Exam    Nursing note and vitals reviewed.  Constitutional: She is not diaphoretic. No distress.   Chronically ill appearing    HENT:   Head: Normocephalic and atraumatic.   Eyes: EOM are normal. Pupils are equal, round, and reactive to light.   Neck:  Neck supple.   Normal range of motion.  Cardiovascular: Normal rate and regular rhythm.   No murmur heard.  Pulmonary/Chest: Breath sounds normal. No respiratory distress. She has no wheezes. She has no rales.   Abdominal: Abdomen is soft. She exhibits no distension. There is abdominal tenderness.   Diffuse abdominal tenderness  Voluntary guarding   Musculoskeletal:         General: No edema. Normal range of motion.      Cervical back: Normal range of motion and neck supple.     Neurological: She is alert and oriented to person, place, and time. She has normal strength.   Skin: Skin is warm. No rash noted.   Mild L shoulder erythema with minimal swelling   Psychiatric: She has a normal mood and affect.         ED Course   Procedures  Labs Reviewed   COMPREHENSIVE METABOLIC PANEL - Abnormal; Notable for the following components:       Result Value    Sodium Level 134 (*)     Creatinine 1.15 (*)     Albumin Level 2.7 (*)     Globulin 5.2 (*)     Albumin/Globulin Ratio 0.5 (*)     All other components within normal limits   CBC WITH DIFFERENTIAL - Abnormal; Notable for the following components:    RBC 3.59 (*)     Hgb 9.8 (*)     Hct 31.4 (*)     MCHC 31.2 (*)     Platelet 453 (*)     IG# 0.03 (*)     IG% 0.6 (*)     All other components within normal limits   LIPASE - Normal   TROPONIN I - Normal   BLOOD CULTURE OLG   BLOOD CULTURE OLG   CBC W/ AUTO DIFFERENTIAL    Narrative:     The following orders were created for panel order CBC auto differential.  Procedure                               Abnormality         Status                     ---------                               -----------         ------                     CBC with Differential[[id number]]        Abnormal            Final result                 Please view results for these tests on the individual orders.   URINALYSIS, REFLEX TO URINE CULTURE   SARS-COV-2 RNA AMPLIFICATION, QUAL   URINALYSIS, MICROSCOPIC     EKG Readings: (Independently Interpreted)    Rhythm: Normal Sinus Rhythm. Heart Rate: 67. Ectopy: No Ectopy. Conduction: Normal. ST Segments: Normal ST Segments. T Waves: Normal. Clinical Impression: Normal Sinus Rhythm   0030         Imaging Results          CT Abdomen Pelvis  Without Contrast (Preliminary result)  Result time 05/29/22 01:04:30    Preliminary result by Interface, Rad Results In (05/29/22 01:04:30)                 Narrative:    START OF REPORT:  Technique: CT of the abdomen and pelvis was performed with axial images as well as sagittal and coronal reconstruction images without intravenous contrast.    Comparison: None available.    Clinical History: General abd pain all over.    Dosage Information: Automated Exposure Control was utilized.    Findings:  Thorax:  Lungs: The lung bases demonstrate heterogeneous attenuation. This may be due to small airways disease. The possibility of an atypical infectious component is not entirely excluded.  Pleura: No effusions or thickening.  Heart: Mild cardiomegaly is seen.  Abdomen:  Abdominal Wall: There is a subtle uncomplicated supraumbilical hernia which contains mesenteric fat (series 2; image 50).  Liver: The liver appears unremarkable.  Biliary System: No extrahepatic biliary duct dilatation is seen.  Gallbladder: The gallbladder appears surgically absent.  Pancreas: The pancreas appears unremarkable.  Spleen: The spleen appears unremarkable.  Adrenals: The adrenal glands appear unremarkable.  Kidneys: Moderate nonspecific perinephric fat stranding is noted bilaterally. An element of pyelonephritis or medical renal disease cannot be excluded. The kidneys otherwise appear unremarkable.  Aorta: There is mild calcification of the abdominal aorta.  IVC: Unremarkable.  Bowel:  Esophagus: The visualized esophagus appears unremarkable.  Stomach: The stomach appears unremarkable.  Duodenum: Unremarkable appearing duodenum.  Small Bowel: The small bowel appears unremarkable.  Colon: Multiple diverticula are  seen in the transverse through to the sigmoid colon.  Appendix: No appendix is identified and a suture line is seen at the base of the cecum consistent with history of appendectomy.  Peritoneum: No intraperitoneal free air or ascites is seen.    Pelvis:  Bladder: The bladder is nondistended and cannot be definitively evaluated.  Female:  Uterus: The uterus is not identified consistent with history of hysterectomy.  Ovaries: No adnexal masses are seen.    Bony structures:  Dorsal Spine: There is mild spondylosis of the visualized dorsal spine.      Impression:  1. Moderate nonspecific perinephric fat stranding is noted bilaterally. An element of pyelonephritis or medical renal disease cannot be excluded. The kidneys otherwise appear unremarkable. Correlate with clinical and laboratory findings and followup to full resolution.  2. The lung bases demonstrate heterogeneous attenuation. This may be due to small airways disease. The possibility of an atypical infectious component is not entirely excluded.  3. Details and other findings as discussed above.                      Preliminary result by John Davis MD (05/29/22 01:04:30)                 Narrative:    START OF REPORT:  Technique: CT of the abdomen and pelvis was performed with axial images as well as sagittal and coronal reconstruction images without intravenous contrast.    Comparison: None available.    Clinical History: General abd pain all over.    Dosage Information: Automated Exposure Control was utilized.    Findings:  Thorax:  Lungs: The lung bases demonstrate heterogeneous attenuation. This may be due to small airway disease.  Pleura: No effusions or thickening.  Heart: Mild cardiomegaly is seen.  Abdomen:  Abdominal Wall: There is a subtle uncomplicated supraumbilical hernia which contains mesenteric fat (series 2; image 50).  Liver: The liver appears unremarkable.  Biliary System: No extrahepatic biliary duct dilatation is seen.  Gallbladder: The  gallbladder appears surgically absent.  Pancreas: The pancreas appears unremarkable.  Spleen: The spleen appears unremarkable.  Adrenals: The adrenal glands appear unremarkable.  Kidneys: Moderate nonspecific perinephric fat stranding is noted bilaterally. An element of pyelonephritis or medical renal disease cannot be excluded. The kidneys otherwise appear unremarkable.  Aorta: There is mild calcification of the abdominal aorta.  IVC: Unremarkable.  Bowel:  Esophagus: The visualized esophagus appears unremarkable.  Stomach: The stomach appears unremarkable.  Duodenum: Unremarkable appearing duodenum.  Small Bowel: The small bowel appears unremarkable.  Colon: Multiple diverticula are seen in the transverse through to the sigmoid colon.  Appendix: No appendix is identified and a suture line is seen at the base of the cecum consistent with history of appendectomy.  Peritoneum: No intraperitoneal free air or ascites is seen.    Pelvis:  Bladder: The bladder is nondistended and cannot be definitively evaluated.  Female:  Uterus: The uterus is not identified consistent with history of hysterectomy.  Ovaries: No adnexal masses are seen.    Bony structures:  Dorsal Spine: There is mild spondylosis of the visualized dorsal spine.      Impression:  1. Mild cardiomegaly is seen.  2. Moderate nonspecific perinephric fat stranding is noted bilaterally. An element of pyelonephritis or medical renal disease cannot be excluded. The kidneys otherwise appear unremarkable. Correlate with clinical and laboratory findings and followup to full resolution.  3. The lung bases demonstrate heterogeneous attenuation. This may be due to small airway disease.  4. Details as above.                                 X-Ray Chest AP Portable (Preliminary result)  Result time 05/29/22 01:53:11    ED Interpretation by Frankie Moralez IV, MD (05/29/22 01:53:11, [facility] - Emergency Dept, Emergency Medicine)    Negative                  "                Medications   vancomycin - pharmacy to dose (has no administration in time range)   ciprofloxacin (CIPRO)400mg/200ml D5W IVPB 400 mg (has no administration in time range)   vancomycin (VANCOCIN) 750 mg in dextrose 5 % 250 mL IVPB (has no administration in time range)   morphine injection 4 mg (4 mg Intravenous Given 5/29/22 0032)   HYDROmorphone (PF) injection 0.5 mg (0.5 mg Intravenous Given 5/29/22 0135)     Medical Decision Making:   History:   Old Medical Records: I decided to obtain old medical records.  Old Records Summarized: records from previous admission(s).       <> Summary of Records: Admitted for gastroenteritis, L shoulder cellulitis, pyelonephritis   Initial Assessment:   56-year-old female history of lupus on Plaquenil diabetes AFib on Eliquis presenting for abdominal pain shoulder pain - patient was just admitted for pyelonephritis, L shoulder cellulitis but had to leave to take care of her daughter. C/o "acid reflux", diffuse abd pain here. Vitals wnl - patient with some diffuse abd tenderness. WIll obtain labs, CT abdomen pelvis, anticipate admission back to hospitalist service for continuation of treatment.   Differential Diagnosis:   Uti, pyelo, cellulitis, lupus flare  Independently Interpreted Test(s):   I have ordered and independently interpreted X-rays - see prior notes.  I have ordered and independently interpreted EKG Reading(s) - see prior notes  Clinical Tests:   Lab Tests: Ordered and Reviewed  Radiological Study: Ordered and Reviewed  ED Management:  IV abx   IV opioids              ED Course as of 05/29/22 0233   Sun May 29, 2022   0201 Admitted to Dr. Walters (hospitalist) [SS]   0202 Dr. Walters will admit  [AC]      ED Course User Index  [AC] Frankie Moralez IV, MD  [SS] Amanda Armenta             Clinical Impression:   Final diagnoses:  [R10.9] Abdominal pain  [M32.9] Lupus  [L03.114] Cellulitis of left shoulder  [R10.9] Abdominal pain, unspecified abdominal " location  [K52.9] Gastroenteritis  [N12] Pyelonephritis (Primary)          ED Disposition Condition    Admit       I, Frankie Moralez MD personally performed the history, PE, MDM, and procedures as documented above and agree with the scribe's documentation.           Frankie Moralez IV, MD  05/29/22 2485

## 2022-05-29 NOTE — H&P
[facility] Hospital Medicine History & Physical Examination       Patient Name: Vi Ulrich  MRN: [id number]  Patient Class: IP- Inpatient   Admission Date: 5/28/2022 11:49 PM  Length of Stay: 0  Attending Physician: Seven Walters MD   Primary Care Provider: ANNAMARIA Palmer  Chief Complaint: abdominal and shoulder pain     Patient information was obtained from patient, patient's family, past medical records and ER records.     HISTORY OF PRESENT ILLNESS:   Patient is a 56-year-old female with past medical history of AFib on Eliquis, HTN, dm 2, GERD, gout, lupus on Plaquenil and steroids he was just admitted to this facility from 05/27/2022 until yesterday, when she presented with complaints of abdominal pain nausea and vomiting as well as left clavicular swelling.  She had a CT that showed bilateral prominent perinephric stranding consistent with pyelonephritis, as well as bilateral axillary adenopathy and left  clavicular fat stranding.  An ascending aortic aneurysm was noted as well to be slightly larger now measuring 4 x 3.9 cm.  She was diagnosed with left supraclavicular region cellulitis and was seen by General surgery were recommendations for no need for biopsies or further intervention of her left axillary and supraclavicular lymphadenopathy, and was placed on Cipro and vancomycin for cellulitis and pyelonephritis.  She had to leave against medical advice yesterday evening to take care of her daughter so she presents back tonight to the ER for resumption of care.    PAST MEDICAL HISTORY:   Lupus on prednisone/plaquenil  Afib on Eliquis  HTN  DM2  GERD   Gout   Constipation, chronic     PAST SURGICAL HISTORY:   Appendectomy   Cholecystectomy   Hysterectomy   Mastectomy     ALLERGIES:   Ketorolac (pf), Penicillins, Percocet [oxycodone-acetaminophen], and Tramadol    FAMILY HISTORY:   Reviewed and non-contributory     SOCIAL HISTORY:   Smokes  tobacco  Denies drug use or EtOH use     HOME MEDICATIONS:     albuterol sulfate (PROAIR RESPICLICK) 90 mcg/actuation inhaler Inhale 1 puff into the lungs every 4 (four) hours as needed for Wheezing. Rescue    Historical Provider   atorvastatin (LIPITOR) 10 MG tablet  5/12/22   Historical Provider   blood sugar diagnostic (ONETOUCH VERIO) Strp 1 each by Misc.(Non-Drug; Combo Route) route 4 (four) times daily. 12/15/17   Aurea High MD   blood sugar diagnostic Strp 1 each by Misc.(Non-Drug; Combo Route) route 4 (four) times daily. 12/15/17   Aurea High MD   blood-glucose meter (ONETOUCH VERIO SYNC) kit Use as instructed 12/15/17   Aurea High MD   ELIQUIS 5 mg Tab TAKE 1 TABLET BY MOUTH TWICE DAILY  Patient taking differently: Take 5 mg by mouth 2 (two) times daily.  12/31/18   Ekta Velarde MD   ergocalciferol (ERGOCALCIFEROL) 50,000 unit Cap  5/10/22   Historical Provider   hydrocodone-acetaminophen (HYCET) solution 7.5-325 mg/15mL Take 10 to 15 ml every 4-6 hours as needed for pain and/or coughing. 6/22/21   Erlin Harley MD   HYDROcodone-acetaminophen (NORCO) 5-325 mg per tablet Take 1 tablet by mouth 2 (two) times daily as needed for Pain.    Historical Provider   hydrOXYchloroQUINE (PLAQUENIL) 200 mg tablet Take 1 tablet (200 mg total) by mouth 2 (two) times daily. 10/4/21   Reji Gutierrez MD   lancets 33 gauge Misc 1 lancet by Misc.(Non-Drug; Combo Route) route 4 (four) times daily. 12/15/17   Aurea High MD   methylPREDNISolone (MEDROL DOSEPACK) 4 mg tablet FOLLOW PACKAGE DIRECTIONS 3/7/22   Historical Provider   omeprazole (PRILOSEC) 20 MG capsule Take 20 mg by mouth once daily.    Historical Provider   predniSONE (DELTASONE) 10 MG tablet Take 1 tablet (10 mg total) by mouth once daily. 10/4/21   Reji Gutierrez MD   ARIPiprazole (ABILIFY) 2 MG Tab Take 2 mg by mouth once daily.  11/9/19 6/22/21  Historical Provider   divalproex (DEPAKOTE) 250 MG EC tablet Take 250 mg by mouth.  10/22/19 6/22/21   Historical Provider   escitalopram oxalate (LEXAPRO) 10 MG tablet Take 10 mg by mouth once daily.  6/22/21  Historical Provider   insulin glargine (LANTUS) 100 unit/mL injection Inject 80 Units into the skin every evening.  6/22/21  Historical Provider   losartan (COZAAR) 100 MG tablet Take 100 mg by mouth once daily.  6/22/21  Historical Provider   metoprolol tartrate (LOPRESSOR) 50 MG tablet Take 50 mg by mouth 2 (two) times daily.  6/22/21  Historical Provider       REVIEW OF SYSTEMS:   Except as documented, all other systems reviewed and negative     PHYSICAL EXAM:   T 98.8 °F (37.1 °C)   /64   P 70   RR 19   O2 97 %  GENERAL: awake, alert, oriented and in no acute distress, non-toxic appearing   HEENT: normocephalic atraumatic   NECK: supple   LUNGS: Clear bilaterally, no wheezing or rales, no accessory muscle use   CVS: Regular rate and rhythm, normal peripheral perfusion  ABD: Soft, non-tender, non-distended, bowel sounds present  EXTREMITIES: no clubbing or cyanosis  SKIN: Warm, dry. Mild erythema in the left anterior chest region   NEURO: alert and oriented, grossly without focal deficits   PSYCHIATRIC: Cooperative    LABS AND IMAGING:     Recent Labs   Lab 05/27/22  0614 05/28/22  0812 05/29/22  0018   WBC 6.6 4.9 4.7   RBC 3.64* 3.23* 3.59*   HGB 10.2* 8.9* 9.8*   HCT 30.3* 27.2* 31.4*   MCV 83.2 84.2 87.5   MCH 28.0 27.6 27.3   MCHC 33.7 32.7* 31.2*   RDW 16.8 16.9 16.9   * 373 453*   MPV 10.0 9.7 9.9     Recent Labs   Lab 05/27/22  0614 05/28/22  0812 05/29/22  0018   * 137 134*   K 4.1 4.5 4.1   CO2 22 24 27   BUN 19.6 13.0 17.2   CREATININE 1.40* 0.97 1.15*   CALCIUM 8.2* 8.1* 8.9   ALBUMIN 2.4* 2.1* 2.7*   ALKPHOS 97 97 106   ALT 13 9 11   AST 20 16 21   BILITOT 0.4 0.2 0.2     CT Abdomen Pelvis  Without Contrast  Impression:  1. Moderate nonspecific perinephric fat stranding is noted bilaterally. An element of pyelonephritis or medical renal disease cannot be excluded. The kidneys  otherwise appear unremarkable. Correlate with clinical and laboratory findings and followup to full resolution.  2. The lung bases demonstrate heterogeneous attenuation. This may be due to small airways disease. The possibility of an atypical infectious component is not entirely excluded.  3. Details and other findings as discussed above.      ASSESSMENT & PLAN:   Pyelonephritis and acute bacterial cystitis present on arrival  Axillary and left supraclavicular lymphadenopathy likely from cellulitis  Cellulitis of the left supraclavicular/anterior chest region  Lupus on Plaquenil and steroids  History of DM 2, AFib on Eliquis, HTN, thoracic aortic aneurysm    - resume IV vanc and cipro   - she is having improvement in shoulder swelling/erythema  - continue another 24 hours of IV abx, if she does well can switch to oral   - resume home meds as appropriate    DVT prophylaxis: continue Eliquis  Code status: full   Condition: Stable  Discharge disposition: To be determined in next 24 hours     If patient was admitted under observational status it is with my approval/permission.     At least 55 min was spent on this history and physical.  Time seen: 2 AM     Seven Walters,       Looks like Damion went AMA and then came back   She looks stable now  Has been afebrile  Will cont iv antibiotics for 24 hrs   Possible dc to home in am if stable     Govind Silverman

## 2022-05-29 NOTE — PROGRESS NOTES
"Nutrition   Progress Note      Recommendations:  - Continue diabetic diet as appropriate.   - Monitor labs, weights, appetite.       Reason for Evaluation:  Identified at risk by screening criteria    Diagnosis:    1. Pyelonephritis    2. Abdominal pain    3. Lupus    4. Cellulitis of left shoulder    5. Abdominal pain, unspecified abdominal location    6. Gastroenteritis        Relevant Medical History:    Past Medical History:   Diagnosis Date    Arthritis     knees    Asthma     Atrial fibrillation     Chronic constipation     Diabetes mellitus     Encounter for blood transfusion 10/2014    GERD (gastroesophageal reflux disease)     Gout     Hypertension     Lupus 2004    SLE    Renal disorder     SLE (systemic lupus erythematosus)          Nutrition Diet History:    Factors affecting nutritional intake: gastroenteritis    Food / Tenriism / Culture Preferences:      Nutrition Prescription Ordered:    Current Diet Order: Diabetic    Appetite:  good    PO intake: 75 - 100 %      Labs / Medications / Procedures:    Nutrition Related Medications: Prednisone, Protonix, vancomycin    Nutrition Related Labs:  5/29: Na 135, Creat 1.22      Anthropometrics:  Height: 5' 6.14" (1.68 m)  Admit Weight:  Weight: 81.6 kg (179 lb 14.3 oz)   Latest Weight:  81.6 kg (179 lb 14.3 oz)    Wt Readings from Last 5 Encounters:   05/29/22 81.6 kg (179 lb 14.3 oz)   05/27/22 95.2 kg (209 lb 14.1 oz)   08/23/21 92.3 kg (203 lb 6 oz)   05/25/21 97.1 kg (214 lb)   02/22/21 87.4 kg (192 lb 10.9 oz)     IBW: 130lb  %IBW: 137%  UBW: 209lb  %Weight Change: 11%  Body mass index is 28.91 kg/m².  BMI classification:  Overweight (BMI 25 - 29.9)      Nutrition Narrative:  5/29: Pt on the phone at time of visit- does not hang up; family at bedside. Pt seen for weight loss. Reports UBW of ~209#. Noted weight fluctuations per EMR documentation - did note ~11% wt loss. Observed 100% of lunch consumed on tray. Will monitor weight changes. " Offered ONS, pt not interested at this time.     Monitoring and Evaluation:    Nutrition Monitoring and Evaluation:  food and beverage intake and weight change    Nutrition Risk:  Level of Nutrition Risk:  Low  Frequency of Follow up:  Dietitian will f/up within 7 days.

## 2022-05-29 NOTE — PROGRESS NOTES
Pharmacokinetic Assessment Follow Up: IV Vancomycin    Vancomycin Regimen Plan:  Patient was discharged and came back the same night. Previous regimen was restarted.  Continue regimen to Vancomycin 1250 mg IV every 12 hours with next serum trough concentration measured at 1600 prior to 4th dose on 5/30    Drug levels (last 3 results):  No results for input(s): VANCOMYCINRA, VANCORANDOM, VANCOMYCINPE, VANCOPEAK, VANCOMYCINTR, VANCOTROUGH in the last 72 hours.    Pharmacy will continue to follow and monitor vancomycin.    Please contact pharmacy at extension [phone] for questions regarding this assessment.    Thank you for the consult,   Nj Lema       Patient brief summary:  Vi Ulrich is a 56 y.o. female initiated on antimicrobial therapy with IV Vancomycin for treatment of skin & soft tissue infection    The patient's current regimen is 1250 mg IV vancomycin every 12 hours    Drug Allergies:   Review of patient's allergies indicates:   Allergen Reactions    Ketorolac (pf) Swelling    Penicillins Hives    Percocet [oxycodone-acetaminophen] Hives and Itching    Tramadol Hives       Actual Body Weight:   95.3 kg    Renal Function:   Estimated Creatinine Clearance: 60.4 mL/min (A) (based on SCr of 1.15 mg/dL (H)).,     Dialysis Method (if applicable):  N/A    CBC (last 72 hours):  Recent Labs   Lab Result Units 05/27/22  0614 05/28/22  0812 05/29/22 0018 05/29/22  0247   WBC x10(3)/mcL 6.6 4.9 4.7  --    Hgb gm/dL 10.2* 8.9* 9.8*  --    Hemoglobin A1c %  --  6.1  --  6.0   Hct % 30.3* 27.2* 31.4*  --    Platelet x10(3)/mcL 436* 373 453*  --    Mono % %  --  4.7 4.5  --    Monocyte Man % 3  --   --   --    Eos % %  --  0.4 0.4  --    Basophil % %  --  0.2 0.2  --        Metabolic Panel (last 72 hours):  Recent Labs   Lab Result Units 05/27/22  0614 05/27/22  0813 05/28/22  0812 05/29/22  0018 05/29/22  0206   Sodium Level mmol/L 133*  --  137 134*  --    Potassium Level mmol/L 4.1  --  4.5 4.1  --    Chloride  mmol/L 101  --  106 101  --    Carbon Dioxide mmol/L 22  --  24 27  --    Glucose Level mg/dL 59*  --  91 74  --    Glucose, UA mg/dL  --  Negative  --   --  Negative   Blood Urea Nitrogen mg/dL 19.6  --  13.0 17.2  --    Creatinine mg/dL 1.40*  --  0.97 1.15*  --    Albumin Level gm/dL 2.4*  --  2.1* 2.7*  --    Bilirubin Total mg/dL 0.4  --  0.2 0.2  --    Alkaline Phosphatase unit/L 97  --  97 106  --    Aspartate Aminotransferase unit/L 20  --  16 21  --    Alanine Aminotransferase unit/L 13  --  9 11  --        Vancomycin Administrations:  vancomycin given in the last 96 hours                     vancomycin 1.25 g in dextrose 5% 250 mL IVPB (ready to mix) (mg) 1,250 mg New Bag 05/29/22 0430    vancomycin 1.25 g in dextrose 5% 250 mL IVPB (ready to mix) (mg) 1,250 mg New Bag 05/28/22 1501    vancomycin 1.25 g in dextrose 5% 250 mL IVPB (ready to mix) (mg) 1,250 mg New Bag 05/27/22 1851                    Microbiologic Results:  Microbiology Results (last 7 days)       Procedure Component Value Units Date/Time    Blood culture #2 **CANNOT BE ORDERED STAT** [[id number]] Collected: 05/29/22 0018    Order Status: Resulted Specimen: Blood Updated: 05/29/22 0225    Blood culture #1 **CANNOT BE ORDERED STAT** [[id number]] Collected: 05/29/22 0224    Order Status: Resulted Specimen: Blood Updated: 05/29/22 0224

## 2022-05-30 LAB
APPEARANCE UR: CLEAR
BACTERIA #/AREA URNS AUTO: NORMAL /HPF
BILIRUB UR QL STRIP.AUTO: NEGATIVE MG/DL
C3 SERPL-MCNC: 55 MG/DL (ref 80–173)
C4 SERPL-MCNC: 10.7 MG/DL (ref 13–46)
COLOR UR AUTO: YELLOW
GLUCOSE SERPL-MCNC: 219 MG/DL (ref 70–110)
GLUCOSE UR QL STRIP.AUTO: NEGATIVE MG/DL
KETONES UR QL STRIP.AUTO: NEGATIVE MG/DL
LEUKOCYTE ESTERASE UR QL STRIP.AUTO: ABNORMAL UNIT/L
NITRITE UR QL STRIP.AUTO: NEGATIVE
PH UR STRIP.AUTO: 6 [PH]
POCT GLUCOSE: 128 MG/DL (ref 70–110)
POCT GLUCOSE: 84 MG/DL (ref 70–110)
PROT UR QL STRIP.AUTO: ABNORMAL MG/DL
RBC #/AREA URNS AUTO: <5 /HPF
RBC UR QL AUTO: ABNORMAL UNIT/L
SP GR UR STRIP.AUTO: 1.01 (ref 1–1.03)
SQUAMOUS #/AREA URNS AUTO: <4 /LPF
UROBILINOGEN UR STRIP-ACNC: 1 MG/DL
WBC #/AREA URNS AUTO: <5 /HPF

## 2022-05-30 PROCEDURE — 63600175 PHARM REV CODE 636 W HCPCS: Performed by: INTERNAL MEDICINE

## 2022-05-30 PROCEDURE — 36415 COLL VENOUS BLD VENIPUNCTURE: CPT | Performed by: INTERNAL MEDICINE

## 2022-05-30 PROCEDURE — 11000001 HC ACUTE MED/SURG PRIVATE ROOM

## 2022-05-30 PROCEDURE — 86235 NUCLEAR ANTIGEN ANTIBODY: CPT | Performed by: INTERNAL MEDICINE

## 2022-05-30 PROCEDURE — 25000003 PHARM REV CODE 250: Performed by: INTERNAL MEDICINE

## 2022-05-30 PROCEDURE — 25000242 PHARM REV CODE 250 ALT 637 W/ HCPCS: Performed by: INTERNAL MEDICINE

## 2022-05-30 PROCEDURE — 86160 COMPLEMENT ANTIGEN: CPT | Performed by: INTERNAL MEDICINE

## 2022-05-30 PROCEDURE — 99223 PR INITIAL HOSPITAL CARE,LEVL III: ICD-10-PCS | Mod: ,,, | Performed by: INTERNAL MEDICINE

## 2022-05-30 PROCEDURE — 99223 1ST HOSP IP/OBS HIGH 75: CPT | Mod: ,,, | Performed by: INTERNAL MEDICINE

## 2022-05-30 PROCEDURE — 81001 URINALYSIS AUTO W/SCOPE: CPT | Performed by: INTERNAL MEDICINE

## 2022-05-30 PROCEDURE — 82570 ASSAY OF URINE CREATININE: CPT | Performed by: INTERNAL MEDICINE

## 2022-05-30 PROCEDURE — 96372 THER/PROPH/DIAG INJ SC/IM: CPT | Performed by: INTERNAL MEDICINE

## 2022-05-30 PROCEDURE — G0378 HOSPITAL OBSERVATION PER HR: HCPCS

## 2022-05-30 RX ORDER — CALCIUM CARBONATE 200(500)MG
1000 TABLET,CHEWABLE ORAL 3 TIMES DAILY PRN
Status: DISCONTINUED | OUTPATIENT
Start: 2022-05-30 | End: 2022-06-03 | Stop reason: HOSPADM

## 2022-05-30 RX ORDER — CIPROFLOXACIN 2 MG/ML
400 INJECTION, SOLUTION INTRAVENOUS
Status: DISCONTINUED | OUTPATIENT
Start: 2022-05-30 | End: 2022-06-01

## 2022-05-30 RX ORDER — INSULIN ASPART 100 [IU]/ML
1-10 INJECTION, SOLUTION INTRAVENOUS; SUBCUTANEOUS
Status: DISCONTINUED | OUTPATIENT
Start: 2022-05-30 | End: 2022-06-03 | Stop reason: HOSPADM

## 2022-05-30 RX ORDER — ALBUTEROL SULFATE 90 UG/1
2 AEROSOL, METERED RESPIRATORY (INHALATION) EVERY 4 HOURS PRN
Status: DISCONTINUED | OUTPATIENT
Start: 2022-05-30 | End: 2022-06-03 | Stop reason: HOSPADM

## 2022-05-30 RX ADMIN — CALCIUM CARBONATE (ANTACID) CHEW TAB 500 MG 1000 MG: 500 CHEW TAB at 04:05

## 2022-05-30 RX ADMIN — APIXABAN 5 MG: 5 TABLET, FILM COATED ORAL at 09:05

## 2022-05-30 RX ADMIN — PANTOPRAZOLE SODIUM 40 MG: 40 TABLET, DELAYED RELEASE ORAL at 09:05

## 2022-05-30 RX ADMIN — VANCOMYCIN HYDROCHLORIDE 1250 MG: 1.25 INJECTION, POWDER, LYOPHILIZED, FOR SOLUTION INTRAVENOUS at 05:05

## 2022-05-30 RX ADMIN — INSULIN ASPART 4 UNITS: 100 INJECTION, SOLUTION INTRAVENOUS; SUBCUTANEOUS at 05:05

## 2022-05-30 RX ADMIN — PREDNISONE 10 MG: 10 TABLET ORAL at 09:05

## 2022-05-30 RX ADMIN — HYDROXYCHLOROQUINE SULFATE 200 MG: 200 TABLET, FILM COATED ORAL at 09:05

## 2022-05-30 RX ADMIN — ATORVASTATIN CALCIUM 20 MG: 10 TABLET, FILM COATED ORAL at 09:05

## 2022-05-30 RX ADMIN — CIPROFLOXACIN 400 MG: 2 INJECTION, SOLUTION INTRAVENOUS at 04:05

## 2022-05-30 RX ADMIN — ALBUTEROL SULFATE 2 PUFF: 90 AEROSOL, METERED RESPIRATORY (INHALATION) at 12:05

## 2022-05-30 RX ADMIN — CIPROFLOXACIN 400 MG: 2 INJECTION, SOLUTION INTRAVENOUS at 02:05

## 2022-05-30 RX ADMIN — ATORVASTATIN CALCIUM 10 MG: 10 TABLET, FILM COATED ORAL at 09:05

## 2022-05-30 NOTE — CONSULTS
[facility] New Consult Note    HPI:    Vi Ulrich 56 y.o. female with a history of  has a past medical history of Arthritis, Asthma, Atrial fibrillation, Chronic constipation, Diabetes mellitus, Encounter for blood transfusion (10/2014), GERD (gastroesophageal reflux disease), Gout, Hypertension, Lupus (2004), Renal disorder, and SLE (systemic lupus erythematosus). presents for had concerns including Abdominal Pain and Shoulder Pain (Patient states left AMA from hospital to take care of autistic daughter.  Complaint of abdominal pain, left shoulder pain. ). on 5/28/2022.     Patient is a 56-year-old female with past medical history of AFib on Eliquis, HTN, dm 2, GERD, gout, lupus on Plaquenil and steroids he was just admitted to this facility from 05/27/2022 until yesterday, when she presented with complaints of abdominal pain nausea and vomiting as well as left clavicular swelling.  She had a CT that showed bilateral prominent perinephric stranding consistent with pyelonephritis, as well as bilateral axillary adenopathy and left  clavicular fat stranding.  An ascending aortic aneurysm was noted as well to be slightly larger now measuring 4 x 3.9 cm.  She was diagnosed with left supraclavicular region cellulitis and was seen by General surgery were recommendations for no need for biopsies or further intervention of her left axillary and supraclavicular lymphadenopathy, and was placed on Cipro and vancomycin for cellulitis and pyelonephritis.  She had to leave against medical advice yesterday evening to take care of her daughter so she presents back tonight to the ER for resumption of care.     Today  Continues to complain of lower abdominal pain both sides especially on the left.  She has moved to this area and does not see any rheumatologist yet.  She is again worried about her daughter who she states is very sick and wheezing with asthma but she was at the bedside and she noted to be comfortable,  daughter does have autism    We have been consulted for renal workup and management and to rule out lupus nephritis  Pt denies hematuria or foamy urine  No dysuria  No leg edema        Medical History:   Past Medical History:   Diagnosis Date    Arthritis     knees    Asthma     Atrial fibrillation     Chronic constipation     Diabetes mellitus     Encounter for blood transfusion 10/2014    GERD (gastroesophageal reflux disease)     Gout     Hypertension     Lupus 2004    SLE    Renal disorder     SLE (systemic lupus erythematosus)        Surgical History:   Past Surgical History:   Procedure Laterality Date    APPENDECTOMY      CHOLECYSTECTOMY      COLONOSCOPY N/A 4/19/2018    Procedure: COLONOSCOPY;  Surgeon: Minerva Porras MD;  Location: [facility];  Service: Endoscopy;  Laterality: N/A;    ESOPHAGOGASTRODUODENOSCOPY N/A 4/19/2018    Procedure: ESOPHAGOGASTRODUODENOSCOPY (EGD);  Surgeon: Minerva Porras MD;  Location: [facility];  Service: Endoscopy;  Laterality: N/A;    HYSTERECTOMY      JOINT REPLACEMENT Left 08/07/2015    Knee    LYMPH NODE BIOPSY Left 2014    MASTECTOMY      Left arm- NO BP    PARTIAL HYSTERECTOMY         Family History:   Family History   Problem Relation Age of Onset    Heart block Mother     Heart disease Father    .     Social History:   Social History     Tobacco Use    Smoking status: Current Every Day Smoker     Packs/day: 1.00     Years: 15.00     Pack years: 15.00     Types: Cigarettes    Smokeless tobacco: Never Used   Substance Use Topics    Alcohol use: No       Allergies:  Review of patient's allergies indicates:   Allergen Reactions    Ketorolac (pf) Swelling    Penicillins Hives    Percocet [oxycodone-acetaminophen] Hives and Itching    Tramadol Hives         Review of Systems:  Constitutional: ++generalized weakness, occ arthralgias  Skin: Denies wounds, rashes, no skin lesions or itching  EENT: Denies acute hearing/vision changes,  tinnitus, or dysphagia  Respiratory:  occ dyspnea and wheezing  Cardiovascular: Denies chest pain, palpitations, or swelling  Gastrointestional:intermittent  abdominal discomfort, no nausea, vomiting, diarrhea or constipation  Genitourinary: Denies dysuria, hematuria, or incontinence; able to empty bladder  Musculoskeletal: occ  Myalgias, occ back pain, no ROM or focal weakness  Neurological: Denies headaches, seizures, ++occ paresthesias L lower ext, occ dizziness no focal weakness  Hematological: Denies unusual bruising or bleeding  Psychiatric: Denies psychiatric concerns, hallucinations, or depression; no confusion    Medications:    Current Facility-Administered Medications:     acetaminophen tablet 650 mg, 650 mg, Oral, Q8H PRN, Seven Walters MD    albuterol inhaler 2 puff, 2 puff, Inhalation, Q4H PRN, Peewee Mauricio MD    ALPRAZolam tablet 0.5 mg, 0.5 mg, Oral, TID PRN, Seven Walters MD, 0.5 mg at 05/29/22 1634    apixaban tablet 5 mg, 5 mg, Oral, BID, Seven Walters MD, 5 mg at 05/30/22 0902    atorvastatin tablet 10 mg, 10 mg, Oral, QHS, Seven Walters MD    atorvastatin tablet 20 mg, 20 mg, Oral, Daily, Seven Walters MD, 20 mg at 05/30/22 0902    calcium carbonate 200 mg calcium (500 mg) chewable tablet 1,000 mg, 1,000 mg, Oral, TID PRN, Seven Walters MD, 1,000 mg at 05/30/22 0418    ciprofloxacin (CIPRO)400mg/200ml D5W IVPB 400 mg, 400 mg, Intravenous, Q12H, Peewee Mauricio MD    dextrose 10% bolus 125 mL, 12.5 g, Intravenous, PRN, Seven Walters MD    dextrose 10% bolus 250 mL, 25 g, Intravenous, PRN, Seven Walters MD    glucagon (human recombinant) injection 1 mg, 1 mg, Intramuscular, PRN, Seven Walters MD    glucose chewable tablet 16 g, 16 g, Oral, PRN, Seven Walters MD    glucose chewable tablet 24 g, 24 g, Oral, PRN, Seven Walters MD    HYDROcodone-acetaminophen 5-325 mg per tablet 1 tablet, 1 tablet, Oral,  "Q6H PRN, Seven Walters MD    HYDROcodone-acetaminophen 5-325 mg per tablet 1 tablet, 1 tablet, Oral, Q4H PRN, Seven Walters MD, 1 tablet at 05/29/22 0836    hydrOXYchloroQUINE tablet 200 mg, 200 mg, Oral, BID, Seven Walters MD, 200 mg at 05/30/22 0902    melatonin tablet 6 mg, 6 mg, Oral, Nightly PRN, Seven Walters MD, 6 mg at 05/29/22 2123    pantoprazole EC tablet 40 mg, 40 mg, Oral, Daily, Seven Walters MD, 40 mg at 05/30/22 0902    predniSONE tablet 10 mg, 10 mg, Oral, Daily, Seven Walters MD, 10 mg at 05/30/22 0902    sodium chloride 0.9% flush 10 mL, 10 mL, Intravenous, PRN, Seven Walters MD     Scheduled Meds:   apixaban  5 mg Oral BID    atorvastatin  10 mg Oral QHS    atorvastatin  20 mg Oral Daily    ciprofloxacin  400 mg Intravenous Q12H    hydrOXYchloroQUINE  200 mg Oral BID    pantoprazole  40 mg Oral Daily    predniSONE  10 mg Oral Daily     Continuous Infusions:      Objective:  BP (!) 145/91   Pulse 64   Temp 98.6 °F (37 °C)   Resp 20   Ht 5' 6.14" (1.68 m)   Wt 81.6 kg (179 lb 14.3 oz)   LMP  (LMP Unknown)   SpO2 100%   Breastfeeding No   BMI 28.91 kg/m²  Body mass index is 28.91 kg/m².    I/O last 3 completed shifts:  In: 720 [P.O.:720]  Out: -   No intake/output data recorded.        Physical Exam:  General: no acute distress, awake, alert, chronically ill, some alopecia  Eyes: PERRLA, EOMI, conjunctiva clear, eyelids without swelling  HENT: atraumatic, oropharynx and nasal mucosa patent, no difficulty hearing  Neck: full ROM, no JVD, no thyromegaly or lymphadenopathy  Respiratory: equal, unlabored, rhonchi and end exp wheezing  Cardiovascular: RRR without  rub; BL radial and pedal pulses felt  Edema: none  Gastrointestinal: soft, non-tender, non-distended; positive bowel sounds; no masses to palpation  Genitourinary: no CVA tenderness upon palpation  Musculoskeletal: full ROM without limitation or discomfort. +++L 3x4 " cm supraclavicular mass, not tender  Integumentary: warm, dry; no rashes, wounds, or skin lesions  Neurological: oriented, appropriate, no acute deficits       Labs:  Chemistries:   Recent Labs   Lab 05/28/22  0812 05/29/22 0018 05/29/22 0719    134* 135*   K 4.5 4.1 4.2   CO2 24 27 21*   BUN 13.0 17.2 16.4   CREATININE 0.97 1.15* 1.22*   CALCIUM 8.1* 8.9 8.8   BILITOT 0.2 0.2 0.2   ALKPHOS 97 106 108   ALT 9 11 10   AST 16 21 24   GLUCOSE 91 74 70*        CBC/Anemia Labs: Coags:    Recent Labs   Lab 05/28/22  0812 05/29/22 0018 05/29/22 0719   WBC 4.9 4.7 4.1*   HGB 8.9* 9.8* 10.7*   HCT 27.2* 31.4* 32.6*    453* 381   MCV 84.2 87.5 84.7   RDW 16.9 16.9 16.9    No results for input(s): PT, INR, APTT in the last 168 hours.     POCT Glucose: HbA1c:    Recent Labs   Lab 05/27/22  2025 05/28/22  0217 05/28/22  1651 05/29/22  0336 05/29/22  1643 05/29/22  2131   POCTGLUCOSE 105 133* 255* 106 111* 80    Hemoglobin A1C   Date Value Ref Range Status   03/04/2020 6.6 (H) 4.0 - 6.0 % Final   07/31/2018 6.5 (H) 4.0 - 5.6 % Final     Comment:     ADA Screening Guidelines:  5.7-6.4%  Consistent with prediabetes  >or=6.5%  Consistent with diabetes  High levels of fetal hemoglobin interfere with the HbA1C  assay. Heterozygous hemoglobin variants (HbS, HgC, etc)do  not significantly interfere with this assay.   However, presence of multiple variants may affect accuracy.     04/12/2018 7.2 (H) 4.5 - 6.2 % Final     Comment:     According to ADA guidelines, hemoglobin A1C <7.0% represents  optimal control in non-pregnant diabetic patients.  Different  metrics may apply to specific populations.   Standards of Medical Care in Diabetes - 2016.  For the purpose of screening for the presence of diabetes:  <5.7%     Consistent with the absence of diabetes  5.7-6.4%  Consistent with increasing risk for diabetes   (prediabetes)  >or=6.5%  Consistent with diabetes  Currently no consensus exists for use of hemoglobin A1C  for  diagnosis of diabetes for children.       Hemoglobin A1c   Date Value Ref Range Status   05/29/2022 6.0 <=7.0 % Final   05/28/2022 6.1 <=7.0 % Final        No results for input(s): COLORU, CLARITYU, SPECGRAV, PHUR, PROTEINUA, GLUCOSEU, BILIRUBINCON, BLOODU, WBCU, RBCU, BACTERIA, MUCUS, NITRITE, LEUKOCYTESUR, UROBILINOGEN, HYALINECASTS in the last 24 hours.      Impression:    Patient Active Problem List   Diagnosis    Primary osteoarthritis of left knee    Painful total knee replacement, right    Essential hypertension    Diabetes mellitus, type 2    Lupus (systemic lupus erythematosus)    Insomnia    Hyperlipidemia    History of asthma    New onset atrial fibrillation    Periumbilical pain    Constipation    Anemia    Anxiety    Class 1 obesity with serious comorbidity and body mass index (BMI) of 32.0 to 32.9 in adult    Acute cystitis with hematuria    STIVEN (acute kidney injury)    Pyelonephritis    Inadequate dietary energy intake     Patient with significant history of lupus  Agree with concern for lupus nephritis, but no suggestion of active progressive disease yet  Obviously best way to diagnose and stage is via renal biopsy, but due ?concern for bacteremia and her anticoagulation status, risk of biopsy outweighs benefit    Plan:  Serologies  Pt will benefit from rheum eval  Agree with antibiotics ( since blood cx suggests gm-ve rods)  Will also check urine  Will follow    Gabriel Ken

## 2022-05-30 NOTE — PROGRESS NOTES
"[facility]ICINE   PROGRESS NOTE      CHIEF COMPLAINT  Hospital follow up [facility]    HOSPITAL COURSE  Patient is a 56-year-old female with past medical history of AFib on Eliquis, HTN, dm 2, GERD, gout, lupus on Plaquenil and steroids he was just admitted to this facility from 05/27/2022 until yesterday, when she presented with complaints of abdominal pain nausea and vomiting as well as left clavicular swelling.  She had a CT that showed bilateral prominent perinephric stranding consistent with pyelonephritis, as well as bilateral axillary adenopathy and left  clavicular fat stranding.  An ascending aortic aneurysm was noted as well to be slightly larger now measuring 4 x 3.9 cm.  She was diagnosed with left supraclavicular region cellulitis and was seen by General surgery were recommendations for no need for biopsies or further intervention of her left axillary and supraclavicular lymphadenopathy, and was placed on Cipro and vancomycin for cellulitis and pyelonephritis.  She had to leave against medical advice yesterday evening to take care of her daughter so she presents back tonight to the ER for resumption of care.    Today  Continues to complain of lower abdominal pain both sides especially on the left.  She has moved to this area and does not see any rheumatologist yet.  She is again worried about her daughter who she states is very sick and wheezing with asthma but she was at the bedside and she noted to be comfortable, daughter does have autism.        OBJECTIVE/PHYSICAL EXAM  BP (!) 145/91 (BP Location: Right arm, Patient Position: Sitting)   Pulse 64   Temp 98.6 °F (37 °C)   Resp 20   Ht 5' 6.14" (1.68 m)   Wt 81.6 kg (179 lb 14.3 oz)   LMP  (LMP Unknown)   SpO2 100%   Breastfeeding No   BMI 28.91 kg/m²   General: In no acute distress, afebrile  Chest: Clear to auscultation bilaterally  Heart: S1, S2, no appreciable murmur  Abdomen: Soft, lower " abdominal tenderness bilaterally, BS +  MSK: Warm, no lower extremity edema, no clubbing or cyanosis  Neurologic: Alert and oriented x4, moving all extremities with good strength         ASSESSMENT/PLAN  Acute complicated urinary tract infection  Bilateral pyelonephritis  GNR bacteremia 1/2 blood cultures +  Acute kidney injury  Left upper anterior chest small focus cellulitis    History of:  SLE on Plaquenil and steroids, dm 2, atrial fibrillation on Eliquis, HTN, thoracic aortic aneurysm      Continue ciprofloxacin.  Follow blood cultures.  Discontinue vancomycin.  Left upper chest wall area of erythema streak is resolved suspect likely started from folliculitis.  Repeat UA and protein creatinine ratio.  Given chronic proteinuria with some RBC and WBC with negative urine culture, possible lupus nephritis?  Will ask Nephrology to evaluate.  Still waiting to see rheumatologist outpatient in [place].    DVT prophylaxis:  Eliquis    Anticipated discharge and disposition:   __________________________________________________________________________    LABS/MICROBIOLOGY/MEDICATIONS/DIAGNOSTICS    LABS  Recent Labs     05/29/22  0719   *   K 4.2   CHLORIDE 103   CO2 21*   BUN 16.4   CREATININE 1.22*   GLUCOSE 70*   CALCIUM 8.8   ALKPHOS 108   AST 24   ALT 10   ALBUMIN 2.7*     Recent Labs     05/29/22  0719   WBC 4.1*   RBC 3.85*   HCT 32.6*   MCV 84.7          MICROBIOLOGY  Microbiology Results (last 7 days)     Procedure Component Value Units Date/Time    Blood culture #1 **CANNOT BE ORDERED STAT** [[id number]]  (Normal) Collected: 05/29/22 0224    Order Status: Completed Specimen: Blood Updated: 05/30/22 0302     CULTURE, BLOOD ([facility]) No Growth At 24 Hours    Blood culture #2 **CANNOT BE ORDERED STAT** [[id number]]  (Abnormal) Collected: 05/29/22 0018    Order Status: Completed Specimen: Blood Updated: 05/29/22 1413     GRAM STAIN Gram Negative Rods      Seen in gram stain of broth only      1 of 2 Aerobic  bottles positive     Narrative:      Positive Blood Culture called to Fany Bowie 5/29/2022 @ 14:12          MEDICATIONS   apixaban  5 mg Oral BID    atorvastatin  10 mg Oral QHS    atorvastatin  20 mg Oral Daily    ciprofloxacin  400 mg Intravenous Q12H    hydrOXYchloroQUINE  200 mg Oral BID    pantoprazole  40 mg Oral Daily    predniSONE  10 mg Oral Daily    vancomycin (VANCOCIN) IVPB  1,250 mg Intravenous Q12H      INFUSIONS      DIAGNOSTIC TESTS  CT Abdomen Pelvis  Without Contrast   Final Result      1. No detrimental change compared to prior exam   2. Persistent nonspecific symmetric perinephric stranding.   3. Small fat containing supraumbilical hernia with mild capsular thickening.   4. Atelectasis and air trapping at the lung bases   5. Diverticulosis without evidence of acute diverticulitis   6.  The preliminary and final reports are concordant.         Electronically signed by: Katja Malik   Date:    05/29/2022   Time:    08:29      X-Ray Chest AP Portable   ED Interpretation   Negative        Final Result      No acute chest disease is identified.         Electronically signed by: Tavo Edwards   Date:    05/29/2022   Time:    08:41               All diagnosis and differential diagnosis have been reviewed; assessment and plan has been documented. I have personally reviewed the labs and test results that are presently available; I have reviewed the patients medication list. I have reviewed the consulting providers response and recommendations. I have reviewed or attempted to review medical records based upon their availability.  All of the patient's questions have been addressed and answered. Patient's is agreeable to the above stated plan. I will continue to monitor closely and make adjustments to medical management as needed.  This document was created using M*Modal Fluency Direct.  Transcription errors may have been made.  Please contact me if any questions may rise regarding  documentation to clarify verbiage.        Peewee Mauricio MD   05/30/2022   Internal Medicine

## 2022-05-31 PROBLEM — M79.7 FIBROMYALGIA: Status: ACTIVE | Noted: 2022-05-31

## 2022-05-31 PROBLEM — M32.9 SYSTEMIC LUPUS ERYTHEMATOSUS: Status: ACTIVE | Noted: 2022-05-31

## 2022-05-31 LAB
ALBUMIN SERPL-MCNC: 2.2 GM/DL (ref 3.5–5)
ALBUMIN/GLOB SERPL: 0.6 RATIO (ref 1.1–2)
ALP SERPL-CCNC: 82 UNIT/L (ref 40–150)
ALT SERPL-CCNC: 9 UNIT/L (ref 0–55)
AST SERPL-CCNC: 16 UNIT/L (ref 5–34)
BACTERIA BLD CULT: NO GROWTH
BASOPHILS # BLD AUTO: 0.02 X10(3)/MCL (ref 0–0.2)
BASOPHILS NFR BLD AUTO: 0.3 %
BILIRUBIN DIRECT+TOT PNL SERPL-MCNC: 0.2 MG/DL
BUN SERPL-MCNC: 17.7 MG/DL (ref 9.8–20.1)
CALCIUM SERPL-MCNC: 8.6 MG/DL (ref 8.4–10.2)
CHLORIDE SERPL-SCNC: 107 MMOL/L (ref 98–107)
CO2 SERPL-SCNC: 26 MMOL/L (ref 22–29)
CREAT SERPL-MCNC: 1.56 MG/DL (ref 0.55–1.02)
CREAT UR-MCNC: 45.6 MG/DL (ref 47–110)
EOSINOPHIL # BLD AUTO: 0.07 X10(3)/MCL (ref 0–0.9)
EOSINOPHIL NFR BLD AUTO: 1.2 %
ERYTHROCYTE [DISTWIDTH] IN BLOOD BY AUTOMATED COUNT: 17.2 % (ref 11.5–17)
GLOBULIN SER-MCNC: 3.6 GM/DL (ref 2.4–3.5)
GLUCOSE SERPL-MCNC: 94 MG/DL (ref 74–100)
HCT VFR BLD AUTO: 30 % (ref 37–47)
HGB BLD-MCNC: 9.3 GM/DL (ref 12–16)
IMM GRANULOCYTES # BLD AUTO: 0.05 X10(3)/MCL (ref 0–0.02)
IMM GRANULOCYTES NFR BLD AUTO: 0.8 % (ref 0–0.43)
LYMPHOCYTES # BLD AUTO: 1.25 X10(3)/MCL (ref 0.6–4.6)
LYMPHOCYTES NFR BLD AUTO: 20.8 %
MCH RBC QN AUTO: 27.7 PG (ref 27–31)
MCHC RBC AUTO-ENTMCNC: 31 MG/DL (ref 33–36)
MCV RBC AUTO: 89.3 FL (ref 80–94)
MONOCYTES # BLD AUTO: 0.34 X10(3)/MCL (ref 0.1–1.3)
MONOCYTES NFR BLD AUTO: 5.7 %
NEUTROPHILS # BLD AUTO: 4.3 X10(3)/MCL (ref 2.1–9.2)
NEUTROPHILS NFR BLD AUTO: 71.2 %
NRBC BLD AUTO-RTO: 0 %
PLATELET # BLD AUTO: 474 X10(3)/MCL (ref 130–400)
PMV BLD AUTO: 9.9 FL (ref 9.4–12.4)
POCT GLUCOSE: 108 MG/DL (ref 70–110)
POCT GLUCOSE: 126 MG/DL (ref 70–110)
POCT GLUCOSE: 219 MG/DL (ref 70–110)
POCT GLUCOSE: 231 MG/DL (ref 70–110)
POTASSIUM SERPL-SCNC: 4.5 MMOL/L (ref 3.5–5.1)
PROT SERPL-MCNC: 5.8 GM/DL (ref 6.4–8.3)
PROT UR STRIP-MCNC: 66.4 MG/DL
PROT/CREAT UR-RTO: 1456.1 MG/GM CR
RBC # BLD AUTO: 3.36 X10(6)/MCL (ref 4.2–5.4)
SODIUM SERPL-SCNC: 140 MMOL/L (ref 136–145)
WBC # SPEC AUTO: 6 X10(3)/MCL (ref 4.5–11.5)

## 2022-05-31 PROCEDURE — 63600175 PHARM REV CODE 636 W HCPCS: Performed by: INTERNAL MEDICINE

## 2022-05-31 PROCEDURE — 99223 PR INITIAL HOSPITAL CARE,LEVL III: ICD-10-PCS | Mod: ,,, | Performed by: INTERNAL MEDICINE

## 2022-05-31 PROCEDURE — 11000001 HC ACUTE MED/SURG PRIVATE ROOM

## 2022-05-31 PROCEDURE — 80053 COMPREHEN METABOLIC PANEL: CPT | Performed by: INTERNAL MEDICINE

## 2022-05-31 PROCEDURE — 99233 SBSQ HOSP IP/OBS HIGH 50: CPT | Mod: ,,, | Performed by: INTERNAL MEDICINE

## 2022-05-31 PROCEDURE — 36415 COLL VENOUS BLD VENIPUNCTURE: CPT | Performed by: INTERNAL MEDICINE

## 2022-05-31 PROCEDURE — 25000003 PHARM REV CODE 250: Performed by: INTERNAL MEDICINE

## 2022-05-31 PROCEDURE — 36410 VNPNXR 3YR/> PHY/QHP DX/THER: CPT

## 2022-05-31 PROCEDURE — 85025 COMPLETE CBC W/AUTO DIFF WBC: CPT | Performed by: INTERNAL MEDICINE

## 2022-05-31 PROCEDURE — C1751 CATH, INF, PER/CENT/MIDLINE: HCPCS

## 2022-05-31 PROCEDURE — 99233 PR SUBSEQUENT HOSPITAL CARE,LEVL III: ICD-10-PCS | Mod: ,,, | Performed by: INTERNAL MEDICINE

## 2022-05-31 PROCEDURE — 99223 1ST HOSP IP/OBS HIGH 75: CPT | Mod: ,,, | Performed by: INTERNAL MEDICINE

## 2022-05-31 PROCEDURE — 76937 US GUIDE VASCULAR ACCESS: CPT

## 2022-05-31 RX ORDER — MYCOPHENOLATE MOFETIL 250 MG/1
1000 CAPSULE ORAL
Status: DISCONTINUED | OUTPATIENT
Start: 2022-05-31 | End: 2022-06-03 | Stop reason: HOSPADM

## 2022-05-31 RX ORDER — TIZANIDINE 4 MG/1
4 TABLET ORAL NIGHTLY
Status: DISCONTINUED | OUTPATIENT
Start: 2022-05-31 | End: 2022-06-03 | Stop reason: HOSPADM

## 2022-05-31 RX ADMIN — ALPRAZOLAM 0.5 MG: 0.5 TABLET ORAL at 10:05

## 2022-05-31 RX ADMIN — MYCOPHENOLATE MOFETIL 1000 MG: 250 CAPSULE ORAL at 06:05

## 2022-05-31 RX ADMIN — MELATONIN TAB 3 MG 6 MG: 3 TAB at 10:05

## 2022-05-31 RX ADMIN — HYDROCODONE BITARTRATE AND ACETAMINOPHEN 1 TABLET: 5; 325 TABLET ORAL at 08:05

## 2022-05-31 RX ADMIN — PREDNISONE 10 MG: 10 TABLET ORAL at 08:05

## 2022-05-31 RX ADMIN — CIPROFLOXACIN 400 MG: 2 INJECTION, SOLUTION INTRAVENOUS at 10:05

## 2022-05-31 RX ADMIN — HYDROXYCHLOROQUINE SULFATE 200 MG: 200 TABLET, FILM COATED ORAL at 10:05

## 2022-05-31 RX ADMIN — APIXABAN 5 MG: 5 TABLET, FILM COATED ORAL at 08:05

## 2022-05-31 RX ADMIN — ATORVASTATIN CALCIUM 20 MG: 10 TABLET, FILM COATED ORAL at 08:05

## 2022-05-31 RX ADMIN — CIPROFLOXACIN 400 MG: 2 INJECTION, SOLUTION INTRAVENOUS at 02:05

## 2022-05-31 RX ADMIN — PANTOPRAZOLE SODIUM 40 MG: 40 TABLET, DELAYED RELEASE ORAL at 08:05

## 2022-05-31 RX ADMIN — CIPROFLOXACIN 400 MG: 2 INJECTION, SOLUTION INTRAVENOUS at 03:05

## 2022-05-31 RX ADMIN — HYDROXYCHLOROQUINE SULFATE 200 MG: 200 TABLET, FILM COATED ORAL at 08:05

## 2022-05-31 RX ADMIN — TIZANIDINE 4 MG: 4 TABLET ORAL at 10:05

## 2022-05-31 NOTE — CONSULTS
[facility] - 5th Floor Med Surg  Rheumatology  Consult Note    Patient Name: Vi Ulrich  MRN: [id number]  Admission Date: 5/28/2022  Hospital Length of Stay: 1 days  Code Status: Full Code   Attending Provider: Seven Walters MD  Primary Care Physician: ANNAMARIA Palmer  Principal Problem:Pyelonephritis    Consults  Subjective:     HPI: This is a 56-year-old lady who used to live in [facility], was diagnosed with lupus years ago but then lost her insurance and was not following with any rheumatologist.  She was kept on hydroxychloroquine.  She was recently admitted to another hospital with pyelonephritis and currently admitted with abdominal pain most likely secondary to pyelonephritis as well.  I am being asked if the patient has lupus nephritis but the urinalysis is in favor of urine infection but not in favor of  glomerular nephritis.  Pt  is complaining of joint pain involving his MCP PIP wrist elbow shoulders hips knees and ankles bilaterally.  Is 7/10 in intensity dull in quality and continuous.  It is associated with a morning stiffness lasting for more than 60 minutes. Pt reports having difficulty maintaining a good night of sleep and this has been associated with myalgia of 8/10 in intensity.  This pain is dull continuous and gets worse mainly at night.  It is associated with fatigue.  No fever no chills no others.        Past Medical History:   Diagnosis Date    Arthritis     knees    Asthma     Atrial fibrillation     Chronic constipation     Diabetes mellitus     Encounter for blood transfusion 10/2014    GERD (gastroesophageal reflux disease)     Gout     Hypertension     Lupus 2004    SLE    Renal disorder     SLE (systemic lupus erythematosus)        Past Surgical History:   Procedure Laterality Date    APPENDECTOMY      CHOLECYSTECTOMY      COLONOSCOPY N/A 4/19/2018    Procedure: COLONOSCOPY;  Surgeon: Minerva Porras MD;  Location: [facility];  Service:  Endoscopy;  Laterality: N/A;    ESOPHAGOGASTRODUODENOSCOPY N/A 4/19/2018    Procedure: ESOPHAGOGASTRODUODENOSCOPY (EGD);  Surgeon: Minerva Porras MD;  Location: [facility];  Service: Endoscopy;  Laterality: N/A;    HYSTERECTOMY      JOINT REPLACEMENT Left 08/07/2015    Knee    LYMPH NODE BIOPSY Left 2014    MASTECTOMY      Left arm- NO BP    PARTIAL HYSTERECTOMY         Immunization History   Administered Date(s) Administered    Influenza - Quadrivalent 01/30/2019    Td - PF (ADULT) 11/22/2016       Review of patient's allergies indicates:   Allergen Reactions    Ketorolac (pf) Swelling    Penicillins Hives    Percocet [oxycodone-acetaminophen] Hives and Itching    Tramadol Hives     Current Facility-Administered Medications   Medication Frequency    acetaminophen tablet 650 mg Q8H PRN    albuterol inhaler 2 puff Q4H PRN    ALPRAZolam tablet 0.5 mg TID PRN    apixaban tablet 5 mg BID    atorvastatin tablet 10 mg QHS    atorvastatin tablet 20 mg Daily    calcium carbonate 200 mg calcium (500 mg) chewable tablet 1,000 mg TID PRN    ciprofloxacin (CIPRO)400mg/200ml D5W IVPB 400 mg Q12H    dextrose 10% bolus 125 mL PRN    dextrose 10% bolus 250 mL PRN    glucagon (human recombinant) injection 1 mg PRN    glucose chewable tablet 16 g PRN    glucose chewable tablet 24 g PRN    HYDROcodone-acetaminophen 5-325 mg per tablet 1 tablet Q6H PRN    HYDROcodone-acetaminophen 5-325 mg per tablet 1 tablet Q4H PRN    hydrOXYchloroQUINE tablet 200 mg BID    insulin aspart U-100 injection 1-10 Units QID (AC + HS) PRN    melatonin tablet 6 mg Nightly PRN    mycophenolate capsule 1,000 mg After dinner    pantoprazole EC tablet 40 mg Daily    predniSONE tablet 10 mg Daily    sodium chloride 0.9% flush 10 mL PRN    tiZANidine tablet 4 mg QHS     Family History       Problem Relation (Age of Onset)    Heart block Mother    Heart disease Father          Tobacco Use    Smoking status: Current Every  Day Smoker     Packs/day: 1.00     Years: 15.00     Pack years: 15.00     Types: Cigarettes    Smokeless tobacco: Never Used   Substance and Sexual Activity    Alcohol use: No    Drug use: No    Sexual activity: Not Currently     Birth control/protection: See Surgical Hx     Review of Systems   Constitutional:  Positive for fatigue. Negative for fever.   HENT:  Positive for postnasal drip and rhinorrhea.    Eyes:  Negative for discharge and visual disturbance.   Respiratory:  Positive for cough. Negative for apnea, choking, chest tightness, shortness of breath, wheezing and stridor.    Cardiovascular:  Negative for chest pain, palpitations and leg swelling.   Gastrointestinal:  Positive for abdominal pain.   Endocrine: Negative.    Genitourinary:  Positive for dysuria.   Skin: Negative.    Neurological: Negative.    Hematological: Negative.    Psychiatric/Behavioral: Negative.     Objective:     Vital Signs (Most Recent):  Temp: 98.3 °F (36.8 °C) (05/31/22 0300)  Pulse: 75 (05/31/22 0300)  Resp: 18 (05/31/22 0300)  BP: (!) 161/83 (05/31/22 0300)  SpO2: 95 % (05/31/22 0300)  O2 Device (Oxygen Therapy): room air (05/28/22 2346)   Vital Signs (24h Range):  Temp:  [98.1 °F (36.7 °C)-98.6 °F (37 °C)] 98.3 °F (36.8 °C)  Pulse:  [64-83] 75  Resp:  [18-20] 18  SpO2:  [95 %-100 %] 95 %  BP: (113-161)/(64-91) 161/83     Weight: 81.6 kg (179 lb 14.3 oz) (05/29/22 1633)  Body mass index is 28.91 kg/m².  Body surface area is 1.95 meters squared.      Intake/Output Summary (Last 24 hours) at 5/31/2022 0756  Last data filed at 5/31/2022 0352  Gross per 24 hour   Intake 960 ml   Output --   Net 960 ml       Physical Exam   Constitutional: She is oriented to person, place, and time. She appears well-developed and well-nourished. No distress.   HENT:   Head: Normocephalic and atraumatic.   Right Ear: External ear normal.   Left Ear: External ear normal.   Eyes: Pupils are equal, round, and reactive to light.   Cardiovascular:  Normal rate, regular rhythm and normal heart sounds.   Pulmonary/Chest: Breath sounds normal.   Abdominal: Soft. There is no abdominal tenderness.   Musculoskeletal:      Right shoulder: Tenderness present.      Left shoulder: Tenderness present.      Right elbow: Tenderness present.      Left elbow: Tenderness present.      Right wrist: Tenderness present.      Left wrist: Tenderness present.      Cervical back: Neck supple.      Right hip: Tenderness present.      Left hip: Tenderness present.      Right knee: Tenderness present.      Left knee: Tenderness present.      Right ankle: Tenderness present.      Left ankle: Tenderness present.   Lymphadenopathy:     She has no cervical adenopathy.   Neurological: She is alert and oriented to person, place, and time. She displays normal reflexes. No cranial nerve deficit or sensory deficit. She exhibits normal muscle tone. Coordination normal.   Skin: No rash noted. No erythema.   Vitals reviewed.      Right Side Rheumatological Exam     The patient is tender to palpation of the shoulder, elbow, wrist, knee, 1st PIP, 1st MCP, 2nd PIP, 2nd MCP, 3rd PIP, 3rd MCP, 4th PIP, 4th MCP, 5th PIP, hip, ankle, 1st MTP, 2nd MTP, 3rd MTP, 4th MTP, 5th MTP, 1st toe IP, 2nd toe IP, 3rd toe IP, 4th toe IP and 5th toe IP    Left Side Rheumatological Exam     The patient is tender to palpation of the shoulder, elbow, wrist, knee, 1st PIP, 1st MCP, 2nd PIP, 2nd MCP, 3rd PIP, 3rd MCP, 4th PIP, 4th MCP, 5th PIP, 5th MCP, hip, ankle, 1st MTP, 2nd MTP, 3rd MTP, 4th MTP, 5th MTP, 1st toe IP, 2nd toe IP, 3rd toe IP, 4th toe IP and 5th toe IP.    Fibromyalgia points 18/18 tender bilaterally.    Significant Labs:  All pertinent lab results from the last 24 hours have been reviewed.    Significant Imaging:  Imaging results within the past 24 hours have been reviewed.    Assessment/Plan:   1. Systemic lupus erythematosus:  This condition has not been evaluated for more than a year.  It is under  treated with hydroxychloroquine.  Please continue hydroxychloroquine and start CellCept 1000 mg p.o. Q supper.    2. Pyelonephritis:  The patient's urinalysis is not in favor of glomerulonephritis but is in favor of pyelonephritis.  Continue antibiotics.    3. Fibromyalgia:  Start tizanidine 4 mg p.o. q.h.s.         Thank you for your consult. I will follow-up with patient. Please contact us if you have any additional questions.    Po Mcfarlane MD  Rheumatology  [facility] - 5th Floor Med Surg

## 2022-05-31 NOTE — CLINICAL REVIEW
56-year-old female with A. fib on Eliquis, hypertension, admitted mellitus type II, lupus on immunosuppressive therapy, recent admission for questionable pyelonephritis versus enteritis.  She had to leave AGAINST MEDICAL ADVICE, however, presented a day later for resumption of care.  The patient has been diagnosed with bilateral pyelonephritis.  She is now bacteremic with gram-negative rods.  She remains on broad-spectrum anti-infective therapy.  In the setting of bacteremia, immunosuppressed state, the patient is appropriate for an IP LOC    MD HORACIO  , Physician Advisor

## 2022-05-31 NOTE — ASSESSMENT & PLAN NOTE
The patient has systemic lupus has been left without evaluation for more than a year.  It is under treated with hydroxychloroquine.  I will add CellCept 1000 mg p.o. q.p.m.

## 2022-05-31 NOTE — HPI
THE PATIENT STATES THAT SHE IS STILL IN PAIN ALTHOUGH LESS THAN YESTERDAY.   SHE IS STILL COMPLAINING OF JOINT PAIN INVOLVING THE MCP PIP WRISTS ELBOWS SHOULDERS HIPS KNEES AND ANKLES BILATERALLY.  THE PAIN IS 5/10 IN INTENSITY DULL IN QUALITY AND CONTINUOUS.  IT IS ASSOCIATED WITH A MORNING STIFFNESS LASTING FOR ENTIRE DAY.  HER BIGGEST PAIN IS IN THE MUSCLES OF THE UPPER AND LOWER BACK.  THIS PAIN IS 8/10 INTENSITY DULL IN QUALITY AND CONTINUOUS

## 2022-05-31 NOTE — SUBJECTIVE & OBJECTIVE
Past Medical History:   Diagnosis Date    Arthritis     knees    Asthma     Atrial fibrillation     Chronic constipation     Diabetes mellitus     Encounter for blood transfusion 10/2014    GERD (gastroesophageal reflux disease)     Gout     Hypertension     Lupus 2004    SLE    Renal disorder     SLE (systemic lupus erythematosus)        Past Surgical History:   Procedure Laterality Date    APPENDECTOMY      CHOLECYSTECTOMY      COLONOSCOPY N/A 4/19/2018    Procedure: COLONOSCOPY;  Surgeon: Minerva Porras MD;  Location: [facility];  Service: Endoscopy;  Laterality: N/A;    ESOPHAGOGASTRODUODENOSCOPY N/A 4/19/2018    Procedure: ESOPHAGOGASTRODUODENOSCOPY (EGD);  Surgeon: Minerva Porras MD;  Location: [facility];  Service: Endoscopy;  Laterality: N/A;    HYSTERECTOMY      JOINT REPLACEMENT Left 08/07/2015    Knee    LYMPH NODE BIOPSY Left 2014    MASTECTOMY      Left arm- NO BP    PARTIAL HYSTERECTOMY         Immunization History   Administered Date(s) Administered    Influenza - Quadrivalent 01/30/2019    Td - PF (ADULT) 11/22/2016       Review of patient's allergies indicates:   Allergen Reactions    Ketorolac (pf) Swelling    Penicillins Hives    Percocet [oxycodone-acetaminophen] Hives and Itching    Tramadol Hives     Current Facility-Administered Medications   Medication Frequency    acetaminophen tablet 650 mg Q8H PRN    albuterol inhaler 2 puff Q4H PRN    ALPRAZolam tablet 0.5 mg TID PRN    apixaban tablet 5 mg BID    atorvastatin tablet 10 mg QHS    atorvastatin tablet 20 mg Daily    calcium carbonate 200 mg calcium (500 mg) chewable tablet 1,000 mg TID PRN    ciprofloxacin (CIPRO)400mg/200ml D5W IVPB 400 mg Q12H    dextrose 10% bolus 125 mL PRN    dextrose 10% bolus 250 mL PRN    glucagon (human recombinant) injection 1 mg PRN    glucose chewable tablet 16 g PRN    glucose chewable tablet 24 g PRN    HYDROcodone-acetaminophen 5-325 mg per tablet 1 tablet Q6H PRN    HYDROcodone-acetaminophen  5-325 mg per tablet 1 tablet Q4H PRN    hydrOXYchloroQUINE tablet 200 mg BID    insulin aspart U-100 injection 1-10 Units QID (AC + HS) PRN    melatonin tablet 6 mg Nightly PRN    mycophenolate capsule 1,000 mg After dinner    pantoprazole EC tablet 40 mg Daily    predniSONE tablet 10 mg Daily    sodium chloride 0.9% flush 10 mL PRN    tiZANidine tablet 4 mg QHS     Family History       Problem Relation (Age of Onset)    Heart block Mother    Heart disease Father          Tobacco Use    Smoking status: Current Every Day Smoker     Packs/day: 1.00     Years: 15.00     Pack years: 15.00     Types: Cigarettes    Smokeless tobacco: Never Used   Substance and Sexual Activity    Alcohol use: No    Drug use: No    Sexual activity: Not Currently     Birth control/protection: See Surgical Hx     Review of Systems   Constitutional:  Positive for fatigue. Negative for fever.   HENT:  Positive for postnasal drip and rhinorrhea.    Eyes:  Negative for discharge and visual disturbance.   Respiratory:  Positive for cough. Negative for apnea, choking, chest tightness, shortness of breath, wheezing and stridor.    Cardiovascular:  Negative for chest pain, palpitations and leg swelling.   Gastrointestinal:  Positive for abdominal pain.   Endocrine: Negative.    Genitourinary:  Positive for dysuria.   Skin: Negative.    Neurological: Negative.    Hematological: Negative.    Psychiatric/Behavioral: Negative.     Objective:     Vital Signs (Most Recent):  Temp: 98.3 °F (36.8 °C) (05/31/22 0300)  Pulse: 75 (05/31/22 0300)  Resp: 18 (05/31/22 0300)  BP: (!) 161/83 (05/31/22 0300)  SpO2: 95 % (05/31/22 0300)  O2 Device (Oxygen Therapy): room air (05/28/22 2346)   Vital Signs (24h Range):  Temp:  [98.1 °F (36.7 °C)-98.6 °F (37 °C)] 98.3 °F (36.8 °C)  Pulse:  [64-83] 75  Resp:  [18-20] 18  SpO2:  [95 %-100 %] 95 %  BP: (113-161)/(64-91) 161/83     Weight: 81.6 kg (179 lb 14.3 oz) (05/29/22 1633)  Body mass index is 28.91 kg/m².  Body surface  area is 1.95 meters squared.      Intake/Output Summary (Last 24 hours) at 5/31/2022 0756  Last data filed at 5/31/2022 0352  Gross per 24 hour   Intake 960 ml   Output --   Net 960 ml       Physical Exam   Constitutional: She is oriented to person, place, and time. She appears well-developed and well-nourished. No distress.   HENT:   Head: Normocephalic and atraumatic.   Right Ear: External ear normal.   Left Ear: External ear normal.   Eyes: Pupils are equal, round, and reactive to light.   Cardiovascular: Normal rate, regular rhythm and normal heart sounds.   Pulmonary/Chest: Breath sounds normal.   Abdominal: Soft. There is no abdominal tenderness.   Musculoskeletal:      Right shoulder: Tenderness present.      Left shoulder: Tenderness present.      Right elbow: Tenderness present.      Left elbow: Tenderness present.      Right wrist: Tenderness present.      Left wrist: Tenderness present.      Cervical back: Neck supple.      Right hip: Tenderness present.      Left hip: Tenderness present.      Right knee: Tenderness present.      Left knee: Tenderness present.      Right ankle: Tenderness present.      Left ankle: Tenderness present.   Lymphadenopathy:     She has no cervical adenopathy.   Neurological: She is alert and oriented to person, place, and time. She displays normal reflexes. No cranial nerve deficit or sensory deficit. She exhibits normal muscle tone. Coordination normal.   Skin: No rash noted. No erythema.   Vitals reviewed.      Right Side Rheumatological Exam     The patient is tender to palpation of the shoulder, elbow, wrist, knee, 1st PIP, 1st MCP, 2nd PIP, 2nd MCP, 3rd PIP, 3rd MCP, 4th PIP, 4th MCP, 5th PIP, hip, ankle, 1st MTP, 2nd MTP, 3rd MTP, 4th MTP, 5th MTP, 1st toe IP, 2nd toe IP, 3rd toe IP, 4th toe IP and 5th toe IP    Left Side Rheumatological Exam     The patient is tender to palpation of the shoulder, elbow, wrist, knee, 1st PIP, 1st MCP, 2nd PIP, 2nd MCP, 3rd PIP, 3rd MCP,  4th PIP, 4th MCP, 5th PIP, 5th MCP, hip, ankle, 1st MTP, 2nd MTP, 3rd MTP, 4th MTP, 5th MTP, 1st toe IP, 2nd toe IP, 3rd toe IP, 4th toe IP and 5th toe IP.    Fibromyalgia points 18/18 tender bilaterally.    Significant Labs:  All pertinent lab results from the last 24 hours have been reviewed.    Significant Imaging:  Imaging results within the past 24 hours have been reviewed.

## 2022-05-31 NOTE — PROGRESS NOTES
"[facility] MEDICINE   PROGRESS NOTE      CHIEF COMPLAINT  [facility]    HOSPITAL COURSE  Patient is a 56-year-old female with past medical history of AFib on Eliquis, HTN, dm 2, GERD, gout, lupus on Plaquenil and steroids he was just admitted to this facility from 05/27/2022 until yesterday, when she presented with complaints of abdominal pain nausea and vomiting as well as left clavicular swelling.  She had a CT that showed bilateral prominent perinephric stranding consistent with pyelonephritis, as well as bilateral axillary adenopathy and left  clavicular fat stranding.  An ascending aortic aneurysm was noted as well to be slightly larger now measuring 4 x 3.9 cm.  She was diagnosed with left supraclavicular region cellulitis and was seen by General surgery were recommendations for no need for biopsies or further intervention of her left axillary and supraclavicular lymphadenopathy, and was placed on Cipro and vancomycin for cellulitis and pyelonephritis.  She had to leave against medical advice yesterday evening to take care of her daughter so she presents back tonight to the ER for resumption of care.    Today  Complaining bilateral MCP joint pain today.  Still having lower abdominal pain.  Two family members at bedside.  Spoke with lab this morning and the plate is not having any growth, she will recheck to make sure g stain was showing GNR.        OBJECTIVE/PHYSICAL EXAM  /74 (BP Location: Right arm, Patient Position: Lying)   Pulse 76   Temp 99 °F (37.2 °C)   Resp 16   Ht 5' 6.14" (1.68 m)   Wt 81.6 kg (179 lb 14.3 oz)   LMP  (LMP Unknown)   SpO2 98%   Breastfeeding No   BMI 28.91 kg/m²   General: In no acute distress, afebrile  Chest: Clear to auscultation bilaterally  Heart: S1, S2, no appreciable murmur  Abdomen: Soft, lower abdominal tenderness bilaterally, BS +  MSK: Warm, no lower extremity edema, no clubbing or " cyanosis  Neurologic: Alert and oriented x4, moving all extremities with good strength         ASSESSMENT/PLAN  Acute complicated urinary tract infection   Bilateral pyelonephritis versus lupus nephritis  Acute kidney injury  Left upper anterior chest small focus cellulitis    History of:  SLE on Plaquenil and steroids, dm 2, atrial fibrillation on Eliquis, HTN, thoracic aortic aneurysm      Continue ciprofloxacin.  Follow blood cultures, urine culture NGTD.  Discontinue vancomycin.  Lab called and stated GNR was artifact.  Left upper chest wall area of erythema streak is resolved suspect likely started from folliculitis.  Nephrology following.  Rheumatology following.  Started on CellCept.  We can hold Eliquis if need to for kidney biopsy.    DVT prophylaxis:  Holding Eliquis in case biopsy is needed    Anticipated discharge and disposition:   __________________________________________________________________________    LABS/MICROBIOLOGY/MEDICATIONS/DIAGNOSTICS    LABS  Recent Labs     05/31/22  0402      K 4.5   CHLORIDE 107   CO2 26   BUN 17.7   CREATININE 1.56*   GLUCOSE 94   CALCIUM 8.6   ALKPHOS 82   AST 16   ALT 9   ALBUMIN 2.2*     Recent Labs     05/31/22  0402   WBC 6.0   RBC 3.36*   HCT 30.0*   MCV 89.3   *       MICROBIOLOGY  Microbiology Results (last 7 days)     Procedure Component Value Units Date/Time    Blood culture #1 **CANNOT BE ORDERED STAT** [[id number]]  (Normal) Collected: 05/29/22 0224    Order Status: Completed Specimen: Blood Updated: 05/31/22 0304     CULTURE, BLOOD ([facility]) No Growth At 48 Hours    Blood culture #2 **CANNOT BE ORDERED STAT** [[id number]]  (Abnormal) Collected: 05/29/22 0018    Order Status: Completed Specimen: Blood Updated: 05/29/22 1413     GRAM STAIN Gram Negative Rods      Seen in gram stain of broth only      1 of 2 Aerobic bottles positive     Narrative:      Positive Blood Culture called to Fany Bowie 5/29/2022 @ 14:12          MEDICATIONS   apixaban   5 mg Oral BID    atorvastatin  10 mg Oral QHS    atorvastatin  20 mg Oral Daily    ciprofloxacin  400 mg Intravenous Q12H    hydrOXYchloroQUINE  200 mg Oral BID    mycophenolate  1,000 mg Oral After dinner    pantoprazole  40 mg Oral Daily    predniSONE  10 mg Oral Daily    tiZANidine  4 mg Oral QHS      INFUSIONS      DIAGNOSTIC TESTS  CT Abdomen Pelvis  Without Contrast   Final Result      1. No detrimental change compared to prior exam   2. Persistent nonspecific symmetric perinephric stranding.   3. Small fat containing supraumbilical hernia with mild capsular thickening.   4. Atelectasis and air trapping at the lung bases   5. Diverticulosis without evidence of acute diverticulitis   6.  The preliminary and final reports are concordant.         Electronically signed by: Katja Malik   Date:    05/29/2022   Time:    08:29      X-Ray Chest AP Portable   ED Interpretation   Negative        Final Result      No acute chest disease is identified.         Electronically signed by: Tavo Edwards   Date:    05/29/2022   Time:    08:41               All diagnosis and differential diagnosis have been reviewed; assessment and plan has been documented. I have personally reviewed the labs and test results that are presently available; I have reviewed the patients medication list. I have reviewed the consulting providers response and recommendations. I have reviewed or attempted to review medical records based upon their availability.  All of the patient's questions have been addressed and answered. Patient's is agreeable to the above stated plan. I will continue to monitor closely and make adjustments to medical management as needed.  This document was created using M*Modal Fluency Direct.  Transcription errors may have been made.  Please contact me if any questions may rise regarding documentation to clarify verbiage.        Peewee Mauricio MD   05/31/2022   Internal Medicine

## 2022-05-31 NOTE — PROGRESS NOTES
[facility] [place] Nephrology Inpatient Progress Note      HPI:     Patient Name: Vi Ulrich  Admission Date: 5/28/2022  Hospital Length of Stay: 1 days  Code Status: Full Code   Attending Physician: Seven Walters MD   Primary Care Physician: ANNAMARIA Palmer  Principal Problem:Pyelonephritis  Pt with Hx of SLE  Hx of paroxysmal Afib  Had ??pyleo , all cultures negative  Main complaints related to R wrist pain  occ abdominal pain  No cough , sputum  No dysuria or fresh hematuria      Today pt seen and examined  Labs, events, imaging and meds reviewed for this hospital stay  Pt seen by rheum and started on cellcept      Review of Systems:   Constitutional: Denies  fever, fatigue, generalized weakness, night sweats, or acute weight change  Skin: Denies wounds, no rashes, no itching, no new skin lesions  HEENT: Denies acute change in hearing or vision, tinnitus, or dysphagia  Respiratory:  Denies cough, shortness of breath, or wheezing  Cardiovascular: Denies chest pain, palpitations, or swelling  Gastrointestional: ++ abdominal pain, no nausea, no vomiting, diarrhea, or constipation  Genitourinary: Denies dysuria, hematuria, or incontinence; reports able to empty bladder  Musculoskeletal: ++artharlgias, +R wrist pain  Neurological: Denies headaches, seizures, dizziness, paresthesias or weakness  Hematological: Denies unusual bruising or bleeding  Psychiatric: Denies hallucinations, depression, or confusion      Medications:   Scheduled Meds:   apixaban  5 mg Oral BID    atorvastatin  20 mg Oral Daily    ciprofloxacin  400 mg Intravenous Q12H    hydrOXYchloroQUINE  200 mg Oral BID    mycophenolate  1,000 mg Oral After dinner    pantoprazole  40 mg Oral Daily    predniSONE  10 mg Oral Daily    tiZANidine  4 mg Oral QHS     Continuous Infusions:      Vitals:     Vitals:    05/31/22 0700 05/31/22 0752 05/31/22 0836 05/31/22 1141   BP: 115/74 115/74  122/81   BP Location: Right arm   Right arm   Patient  Position: Lying   Lying   Pulse: 76 76  66   Resp: 18 18 16 20   Temp: 99 °F (37.2 °C) 98.9 °F (37.2 °C)  98.5 °F (36.9 °C)   TempSrc:  Oral  Oral   SpO2: 98%   99%   Weight:       Height:             I/O last 3 completed shifts:  In: 1680 [P.O.:1680]  Out: -     Intake/Output Summary (Last 24 hours) at 5/31/2022 1423  Last data filed at 5/31/2022 1400  Gross per 24 hour   Intake 840 ml   Output --   Net 840 ml       Physical Exam:   General: no acute distress, awake, alert  Eyes: PERRLA, EOMI, conjunctiva clear, eyelids without swelling  HENT: atraumatic, oropharynx and nasal mucosa patent  Neck: full ROM, no JVD, no thyromegaly or lymphadenopathy  Respiratory: equal, unlabored, clear to auscultation A/P  Cardiovascular: RRR without  rub; BL radial and pedal pulses felt  Edema: none  Gastrointestinal: soft, mild periumbilical tenderness,  non-distended; positive bowel sounds; no masses to palpation  Genitourinary: no CVA tenderness upon palpation  Musculoskeletal: ROM without major limitation or discomfort  Integumentary: warm, dry; no rashes, wounds, or skin lesions  Neurological: oriented, appropriate, no acute deficits        Labs:     Cardiac Enzymes: Ejection Fractions:    Recent Labs     05/29/22 0018   TROPONINI <0.010    No results found for: EF     Chemistries:   Recent Labs   Lab 05/29/22 0018 05/29/22  0719 05/31/22  0402   * 135* 140   K 4.1 4.2 4.5   CO2 27 21* 26   BUN 17.2 16.4 17.7   CREATININE 1.15* 1.22* 1.56*   CALCIUM 8.9 8.8 8.6   BILITOT 0.2 0.2 0.2   ALKPHOS 106 108 82   ALT 11 10 9   AST 21 24 16   GLUCOSE 74 70* 94        CBC/Anemia Labs: Coags:    Recent Labs   Lab 05/29/22 0018 05/29/22  0719 05/31/22  0402   WBC 4.7 4.1* 6.0   HGB 9.8* 10.7* 9.3*   HCT 31.4* 32.6* 30.0*   * 381 474*   MCV 87.5 84.7 89.3   RDW 16.9 16.9 17.2*    No results for input(s): PT, INR, APTT in the last 168 hours.     POCT Glucose: HbA1c:    Recent Labs   Lab 05/29/22  0336 05/29/22  1644  05/29/22  2131 05/30/22  1226 05/30/22  1635 05/30/22 2049   POCTGLUCOSE 106 111* 80 128* 219* 84    Hemoglobin A1C   Date Value Ref Range Status   03/04/2020 6.6 (H) 4.0 - 6.0 % Final     Hemoglobin A1c   Date Value Ref Range Status   05/29/2022 6.0 <=7.0 % Final          Impression:       Patient Active Problem List   Diagnosis    Essential hypertension    Diabetes mellitus, type 2    Lupus (systemic lupus erythematosus)    Insomnia    Hyperlipidemia    History of asthma    New onset atrial fibrillation    Periumbilical pain    Constipation    Anemia    Anxiety    Class 1 obesity with serious comorbidity and body mass index (BMI) of 32.0 to 32.9 in adult    Acute cystitis with hematuria    STIVEN (acute kidney injury)    Pyelonephritis    Inadequate dietary energy intake    Systemic lupus erythematosus    Fibromyalgia     Urine spot protein/creatine : 1.4 gm  All cultures negative  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

## 2022-05-31 NOTE — ASSESSMENT & PLAN NOTE
The patient's urinalysis is not in favor of glomerulonephritis but it is in favor of pyelonephritis.  I recommend continuing antibiotics.

## 2022-05-31 NOTE — PLAN OF CARE
Problem: Diabetes Comorbidity  Goal: Blood Glucose Level Within Targeted Range  Outcome: Ongoing, Progressing  Intervention: Monitor and Manage Glycemia  Flowsheets (Taken 5/31/2022 0033)  Glycemic Management:   blood glucose monitored   carbohydrate replacement provided     Problem: Fluid and Electrolyte Imbalance (Acute Kidney Injury/Impairment)  Goal: Fluid and Electrolyte Balance  Intervention: Monitor and Manage Fluid and Electrolyte Balance  Flowsheets (Taken 5/31/2022 0033)  Fluid/Electrolyte Management: fluids provided     Problem: Oral Intake Inadequate (Acute Kidney Injury/Impairment)  Goal: Optimal Nutrition Intake  Intervention: Promote and Optimize Nutrition  Flowsheets (Taken 5/31/2022 0033)  Oral Nutrition Promotion:   rest periods promoted   social interaction promoted   physical activity promoted     Problem: Renal Function Impairment (Acute Kidney Injury/Impairment)  Goal: Effective Renal Function  Intervention: Monitor and Support Renal Function  Flowsheets (Taken 5/31/2022 0033)  Medication Review/Management:   medications reviewed   dosing adjusted   high-risk medications identified     Problem: Fall Injury Risk  Goal: Absence of Fall and Fall-Related Injury  Intervention: Identify and Manage Contributors  Flowsheets (Taken 5/31/2022 0033)  Medication Review/Management:   medications reviewed   dosing adjusted   high-risk medications identified

## 2022-06-01 LAB
ANION GAP SERPL CALC-SCNC: 9 MEQ/L
APPEARANCE UR: CLEAR
BACTERIA #/AREA URNS AUTO: ABNORMAL /HPF
BASOPHILS # BLD AUTO: 0.02 X10(3)/MCL (ref 0–0.2)
BASOPHILS NFR BLD AUTO: 0.3 %
BILIRUB UR QL STRIP.AUTO: NEGATIVE MG/DL
BUN SERPL-MCNC: 20.2 MG/DL (ref 9.8–20.1)
CALCIUM SERPL-MCNC: 8.4 MG/DL (ref 8.4–10.2)
CHLORIDE SERPL-SCNC: 109 MMOL/L (ref 98–107)
CO2 SERPL-SCNC: 24 MMOL/L (ref 22–29)
COLOR UR AUTO: YELLOW
CREAT SERPL-MCNC: 1.7 MG/DL (ref 0.55–1.02)
CREAT/UREA NIT SERPL: 12
EOSINOPHIL # BLD AUTO: 0.08 X10(3)/MCL (ref 0–0.9)
EOSINOPHIL NFR BLD AUTO: 1.2 %
ERYTHROCYTE [DISTWIDTH] IN BLOOD BY AUTOMATED COUNT: 17 % (ref 11.5–17)
GLUCOSE SERPL-MCNC: 100 MG/DL (ref 74–100)
GLUCOSE UR QL STRIP.AUTO: NEGATIVE MG/DL
HCT VFR BLD AUTO: 29 % (ref 37–47)
HGB BLD-MCNC: 9.1 GM/DL (ref 12–16)
IMM GRANULOCYTES # BLD AUTO: 0.06 X10(3)/MCL (ref 0–0.02)
IMM GRANULOCYTES NFR BLD AUTO: 0.9 % (ref 0–0.43)
INR BLD: 1.22 (ref 0–1.3)
KETONES UR QL STRIP.AUTO: NEGATIVE MG/DL
LEUKOCYTE ESTERASE UR QL STRIP.AUTO: ABNORMAL UNIT/L
LYMPHOCYTES # BLD AUTO: 1.26 X10(3)/MCL (ref 0.6–4.6)
LYMPHOCYTES NFR BLD AUTO: 18.2 %
MCH RBC QN AUTO: 27.6 PG (ref 27–31)
MCHC RBC AUTO-ENTMCNC: 31.4 MG/DL (ref 33–36)
MCV RBC AUTO: 87.9 FL (ref 80–94)
MONOCYTES # BLD AUTO: 0.33 X10(3)/MCL (ref 0.1–1.3)
MONOCYTES NFR BLD AUTO: 4.8 %
NEUTROPHILS # BLD AUTO: 5.2 X10(3)/MCL (ref 2.1–9.2)
NEUTROPHILS NFR BLD AUTO: 74.6 %
NITRITE UR QL STRIP.AUTO: NEGATIVE
NRBC BLD AUTO-RTO: 0 %
PH UR STRIP.AUTO: 6.5 [PH]
PLATELET # BLD AUTO: 522 X10(3)/MCL (ref 130–400)
PMV BLD AUTO: 10 FL (ref 9.4–12.4)
POCT GLUCOSE: 259 MG/DL (ref 70–110)
POCT GLUCOSE: 90 MG/DL (ref 70–110)
POCT GLUCOSE: 99 MG/DL (ref 70–110)
POTASSIUM SERPL-SCNC: 4.3 MMOL/L (ref 3.5–5.1)
PROT UR QL STRIP.AUTO: ABNORMAL MG/DL
PROTHROMBIN TIME: 15.1 SECONDS (ref 12.5–14.5)
RBC # BLD AUTO: 3.3 X10(6)/MCL (ref 4.2–5.4)
RBC #/AREA URNS AUTO: <5 /HPF
RBC UR QL AUTO: NEGATIVE UNIT/L
SODIUM SERPL-SCNC: 142 MMOL/L (ref 136–145)
SP GR UR STRIP.AUTO: 1.01 (ref 1–1.03)
SQUAMOUS #/AREA URNS AUTO: <4 /LPF
UROBILINOGEN UR STRIP-ACNC: 1 MG/DL
WBC # SPEC AUTO: 6.9 X10(3)/MCL (ref 4.5–11.5)
WBC #/AREA URNS AUTO: 10 /HPF

## 2022-06-01 PROCEDURE — 63600175 PHARM REV CODE 636 W HCPCS: Performed by: INTERNAL MEDICINE

## 2022-06-01 PROCEDURE — 36415 COLL VENOUS BLD VENIPUNCTURE: CPT | Performed by: RADIOLOGY

## 2022-06-01 PROCEDURE — 81001 URINALYSIS AUTO W/SCOPE: CPT

## 2022-06-01 PROCEDURE — 85610 PROTHROMBIN TIME: CPT | Performed by: RADIOLOGY

## 2022-06-01 PROCEDURE — 11000001 HC ACUTE MED/SURG PRIVATE ROOM

## 2022-06-01 PROCEDURE — 36415 COLL VENOUS BLD VENIPUNCTURE: CPT | Performed by: INTERNAL MEDICINE

## 2022-06-01 PROCEDURE — 25000003 PHARM REV CODE 250: Performed by: INTERNAL MEDICINE

## 2022-06-01 PROCEDURE — 85025 COMPLETE CBC W/AUTO DIFF WBC: CPT | Performed by: INTERNAL MEDICINE

## 2022-06-01 PROCEDURE — 80048 BASIC METABOLIC PNL TOTAL CA: CPT | Performed by: INTERNAL MEDICINE

## 2022-06-01 PROCEDURE — 99232 SBSQ HOSP IP/OBS MODERATE 35: CPT | Mod: ,,, | Performed by: INTERNAL MEDICINE

## 2022-06-01 PROCEDURE — 99232 PR SUBSEQUENT HOSPITAL CARE,LEVL II: ICD-10-PCS | Mod: ,,, | Performed by: INTERNAL MEDICINE

## 2022-06-01 RX ADMIN — HYDROXYCHLOROQUINE SULFATE 200 MG: 200 TABLET, FILM COATED ORAL at 09:06

## 2022-06-01 RX ADMIN — MELATONIN TAB 3 MG 6 MG: 3 TAB at 10:06

## 2022-06-01 RX ADMIN — INSULIN ASPART 3 UNITS: 100 INJECTION, SOLUTION INTRAVENOUS; SUBCUTANEOUS at 09:06

## 2022-06-01 RX ADMIN — ATORVASTATIN CALCIUM 20 MG: 10 TABLET, FILM COATED ORAL at 09:06

## 2022-06-01 RX ADMIN — ALPRAZOLAM 0.5 MG: 0.5 TABLET ORAL at 10:06

## 2022-06-01 RX ADMIN — MYCOPHENOLATE MOFETIL 1000 MG: 250 CAPSULE ORAL at 06:06

## 2022-06-01 RX ADMIN — PANTOPRAZOLE SODIUM 40 MG: 40 TABLET, DELAYED RELEASE ORAL at 09:06

## 2022-06-01 RX ADMIN — PREDNISONE 10 MG: 10 TABLET ORAL at 09:06

## 2022-06-01 RX ADMIN — TIZANIDINE 4 MG: 4 TABLET ORAL at 09:06

## 2022-06-01 RX ADMIN — HYDROCODONE BITARTRATE AND ACETAMINOPHEN 1 TABLET: 5; 325 TABLET ORAL at 04:06

## 2022-06-01 NOTE — PROGRESS NOTES
[facility] [place] Nephrology Inpatient Progress Note      HPI:     Patient Name: Vi Ulrich  Admission Date: 5/28/2022  Hospital Length of Stay: 2 days  Code Status: Full Code   Attending Physician: Seven Walters MD   Primary Care Physician: ANNAMARIA Palmer  Principal Problem:Pyelonephritis  Pt with Hx of SLE. Proteinuria, for renal biopsy Friday  Was treated for presumed pyelonephritis  Pt seen and evaluated by rheum and started on cellcept and plaquenil  All cultures negative      Today pt seen and examined  Labs, events, imaging and meds reviewed for this hospital stay      Review of Systems:   Constitutional: Denies  fever, fatigue, generalized weakness, night sweats, or acute weight change  Skin: Denies wounds, no rashes, no itching, no new skin lesions  HEENT: Denies acute change in hearing or vision, tinnitus, or dysphagia  Respiratory:  Denies cough, shortness of breath, or wheezing  Cardiovascular: Denies chest pain, palpitations, or swelling  Gastrointestional: Denies abdominal pain, nausea, vomiting, diarrhea, or constipation  Genitourinary: Denies dysuria, hematuria, or incontinence; reports able to empty bladder  Musculoskeletal: wrist pain R, better  Neurological: Denies headaches, seizures, dizziness, paresthesias or weakness  Hematological: Denies unusual bruising or bleeding  Psychiatric: Denies hallucinations, depression, or confusion      Medications:   Scheduled Meds:   atorvastatin  20 mg Oral Daily    hydrOXYchloroQUINE  200 mg Oral BID    mycophenolate  1,000 mg Oral After dinner    pantoprazole  40 mg Oral Daily    predniSONE  10 mg Oral Daily    tiZANidine  4 mg Oral QHS     Continuous Infusions:      Vitals:     Vitals:    06/01/22 0343 06/01/22 0402 06/01/22 0700 06/01/22 1124   BP: (!) 146/83  126/84 125/77   BP Location:   Right arm Right arm   Patient Position:   Sitting Sitting   Pulse: 75  64 64   Resp: 17 18 18 18   Temp: 98.6 °F (37 °C)  98.2 °F (36.8 °C) 98.1 °F (36.7  °C)   TempSrc: Oral  Oral Oral   SpO2: 99%  100% 99%   Weight:       Height:             I/O last 3 completed shifts:  In: 1470 [P.O.:1470]  Out: 150 [Urine:150]    Intake/Output Summary (Last 24 hours) at 6/1/2022 1339  Last data filed at 6/1/2022 0600  Gross per 24 hour   Intake 870 ml   Output 150 ml   Net 720 ml       Physical Exam:   General: no acute distress, awake, alert  Eyes: PERRLA, EOMI, conjunctiva clear, eyelids without swelling  HENT: atraumatic, oropharynx and nasal mucosa patent  Neck: full ROM, no JVD, no thyromegaly or lymphadenopathy  Respiratory: equal, unlabored, clear to auscultation A/P  Cardiovascular: RRR without murmur or rub; BL radial and pedal pulses felt  Edema: none  Gastrointestinal: soft, non-tender, non-distended; positive bowel sounds; no masses to palpation  Genitourinary: no CVA tenderness upon palpation  Musculoskeletal: full ROM without limitation or discomfort  Integumentary: warm, dry; no rashes, wounds, or skin lesions  Neurological: oriented, appropriate, no acute deficits         Labs:     Cardiac Enzymes: Ejection Fractions:    No results for input(s): CPK, CPKMB, MB, TROPONINI in the last 72 hours. No results found for: EF     Chemistries:   Recent Labs   Lab 05/29/22 0018 05/29/22 0719 05/31/22 0402 06/01/22 0417   * 135* 140 142   K 4.1 4.2 4.5 4.3   CO2 27 21* 26 24   BUN 17.2 16.4 17.7 20.2*   CREATININE 1.15* 1.22* 1.56* 1.70*   CALCIUM 8.9 8.8 8.6 8.4   BILITOT 0.2 0.2 0.2  --    ALKPHOS 106 108 82  --    ALT 11 10 9  --    AST 21 24 16  --    GLUCOSE 74 70* 94 100        CBC/Anemia Labs: Coags:    Recent Labs   Lab 05/29/22 0719 05/31/22 0402 06/01/22  0417   WBC 4.1* 6.0 6.9   HGB 10.7* 9.3* 9.1*   HCT 32.6* 30.0* 29.0*    474* 522*   MCV 84.7 89.3 87.9   RDW 16.9 17.2* 17.0    Recent Labs   Lab 06/01/22  0832   INR 1.22        POCT Glucose: HbA1c:    Recent Labs   Lab 05/30/22  2049 05/31/22  0611 05/31/22  1104 05/31/22  1619 05/31/22  2214  06/01/22  0401   POCTGLUCOSE 84 90 126* 231* 108 99    Hemoglobin A1C   Date Value Ref Range Status   03/04/2020 6.6 (H) 4.0 - 6.0 % Final     Hemoglobin A1c   Date Value Ref Range Status   05/29/2022 6.0 <=7.0 % Final        I looked at urine sediment  A few WBC, scattered hyaline cast  No active cast or sediment  Impression:       Patient Active Problem List   Diagnosis    Essential hypertension    Diabetes mellitus, type 2    Lupus (systemic lupus erythematosus)    Insomnia    Hyperlipidemia    History of asthma    New onset atrial fibrillation    Periumbilical pain    Constipation    Anemia    Anxiety    Class 1 obesity with serious comorbidity and body mass index (BMI) of 32.0 to 32.9 in adult    Acute cystitis with hematuria    STIVEN (acute kidney injury)    Pyelonephritis    Inadequate dietary energy intake    Systemic lupus erythematosus    Fibromyalgia   1- possible stage 2-3 LN  2- STIVEN, no suggestion clinically or by urine Microscope of rapidly progressive GN , possible IN from antibiotics        Plan:     JASWANT cipro  If cr worsening in am, will discuss with rheum possible steroid pulse therapy  Pt for renal bx on friday       Gabriel Ken

## 2022-06-01 NOTE — PLAN OF CARE
Problem: Adult Inpatient Plan of Care  Goal: Plan of Care Review  Outcome: Ongoing, Progressing  Goal: Patient-Specific Goal (Individualized)  Outcome: Ongoing, Progressing  Goal: Absence of Hospital-Acquired Illness or Injury  Outcome: Ongoing, Progressing  Goal: Optimal Comfort and Wellbeing  Outcome: Ongoing, Progressing  Goal: Readiness for Transition of Care  Outcome: Ongoing, Progressing     Problem: Diabetes Comorbidity  Goal: Blood Glucose Level Within Targeted Range  Outcome: Ongoing, Progressing     Problem: Fluid and Electrolyte Imbalance (Acute Kidney Injury/Impairment)  Goal: Fluid and Electrolyte Balance  Outcome: Ongoing, Progressing     Problem: Oral Intake Inadequate (Acute Kidney Injury/Impairment)  Goal: Optimal Nutrition Intake  Outcome: Ongoing, Progressing     Problem: Renal Function Impairment (Acute Kidney Injury/Impairment)  Goal: Effective Renal Function  Outcome: Ongoing, Progressing     Problem: Fall Injury Risk  Goal: Absence of Fall and Fall-Related Injury  Outcome: Ongoing, Progressing     Problem: Infection  Goal: Absence of Infection Signs and Symptoms  Outcome: Ongoing, Progressing

## 2022-06-01 NOTE — PROGRESS NOTES
"[facility] MEDICINE   PROGRESS NOTE      CHIEF COMPLAINT  Hospital follow up pyo    HOSPITAL COURSE  Patient is a 56-year-old female with past medical history of AFib on Eliquis, HTN, dm 2, GERD, gout, lupus on Plaquenil and steroids he was just admitted to this facility from 05/27/2022 until yesterday, when she presented with complaints of abdominal pain nausea and vomiting as well as left clavicular swelling.  She had a CT that showed bilateral prominent perinephric stranding consistent with pyelonephritis, as well as bilateral axillary adenopathy and left  clavicular fat stranding.  An ascending aortic aneurysm was noted as well to be slightly larger now measuring 4 x 3.9 cm.  She was diagnosed with left supraclavicular region cellulitis and was seen by General surgery were recommendations for no need for biopsies or further intervention of her left axillary and supraclavicular lymphadenopathy, and was placed on Cipro and vancomycin for cellulitis and pyelonephritis.  She had to leave against medical advice yesterday evening to take care of her daughter so she presents back tonight to the ER for resumption of care.  Left supraclavicular cellulitis seemed to have quickly resolved with vancomycin and Cipro.  Initially blood cultures worse positive 1 out of the 2 for GNR however later lab called and stated that it was lab error and are actually negative.  Urine culture has also remained negative.  This is making diagnosis of pyelonephritis less likely and more so concerning for lupus nephritis and Nephrology as well as rheumatology had been consulted.    Today  She feels fine this morning.  No issues.  She is aware that nephrology is planning on biopsy possibly tomorrow.        OBJECTIVE/PHYSICAL EXAM  /84 (BP Location: Right arm, Patient Position: Sitting)   Pulse 64   Temp 98.2 °F (36.8 °C) (Oral)   Resp 18   Ht 5' 6.14" (1.68 m)   Wt 81.6 kg (179 lb " 14.3 oz)   LMP  (LMP Unknown)   SpO2 100%   Breastfeeding No   BMI 28.91 kg/m²   General: In no acute distress, afebrile  Chest: Clear to auscultation bilaterally  Heart: S1, S2, no appreciable murmur  Abdomen: Soft, lower abdominal tenderness bilaterally, BS +  MSK: Warm, no lower extremity edema, no clubbing or cyanosis, right MCP joints synovitis  Neurologic: Alert and oriented x4, moving all extremities with good strength         ASSESSMENT/PLAN  Bilateral kidney stranding-possible lupus nephritis  Acute kidney injury  Left upper anterior chest small focus cellulitis    History of:  SLE on Plaquenil and steroids, dm 2, atrial fibrillation on Eliquis, HTN, thoracic aortic aneurysm      Blood and urine culture remain negative.  Making infectious pyelonephritis very less likely.  Continue Cipro for now and discontinue post biopsy.  Left upper chest wall area of erythema streak is resolved suspect likely started from folliculitis.  Nephrology following.  Planning for biopsy probably tomorrow.  Eliquis on hold  Rheumatology following.  Started on CellCept.      DVT prophylaxis:  Holding Eliquis in case biopsy is needed    Anticipated discharge and disposition:   __________________________________________________________________________    LABS/MICROBIOLOGY/MEDICATIONS/DIAGNOSTICS    LABS  Recent Labs     05/31/22  0402 06/01/22  0417    142   K 4.5 4.3   CHLORIDE 107 109*   CO2 26 24   BUN 17.7 20.2*   CREATININE 1.56* 1.70*   GLUCOSE 94 100   CALCIUM 8.6 8.4   ALKPHOS 82  --    AST 16  --    ALT 9  --    ALBUMIN 2.2*  --      Recent Labs     06/01/22  0417   WBC 6.9   RBC 3.30*   HCT 29.0*   MCV 87.9   *       MICROBIOLOGY  Microbiology Results (last 7 days)     Procedure Component Value Units Date/Time    Blood culture #1 **CANNOT BE ORDERED STAT** [[id number]]  (Normal) Collected: 05/29/22 0224    Order Status: Completed Specimen: Blood Updated: 06/01/22 0305     CULTURE, BLOOD ([facility]) No Growth At  72 Hours    Blood culture #2 **CANNOT BE ORDERED STAT** [[id number]]  (Normal) Collected: 05/29/22 0018    Order Status: Completed Specimen: Blood Updated: 05/31/22 0925     CULTURE, BLOOD ([facility]) No Growth    Narrative:      Blood culture is negative. No gram negative rods as previously reported. Called Zelda Amador 5/31/22 @ 0925          MEDICATIONS   atorvastatin  20 mg Oral Daily    ciprofloxacin  400 mg Intravenous Q12H    hydrOXYchloroQUINE  200 mg Oral BID    mycophenolate  1,000 mg Oral After dinner    pantoprazole  40 mg Oral Daily    predniSONE  10 mg Oral Daily    tiZANidine  4 mg Oral QHS      INFUSIONS      DIAGNOSTIC TESTS  CT Abdomen Pelvis  Without Contrast   Final Result      1. No detrimental change compared to prior exam   2. Persistent nonspecific symmetric perinephric stranding.   3. Small fat containing supraumbilical hernia with mild capsular thickening.   4. Atelectasis and air trapping at the lung bases   5. Diverticulosis without evidence of acute diverticulitis   6.  The preliminary and final reports are concordant.         Electronically signed by: Katja Malik   Date:    05/29/2022   Time:    08:29      X-Ray Chest AP Portable   ED Interpretation   Negative        Final Result      No acute chest disease is identified.         Electronically signed by: Tavo Edwards   Date:    05/29/2022   Time:    08:41               All diagnosis and differential diagnosis have been reviewed; assessment and plan has been documented. I have personally reviewed the labs and test results that are presently available; I have reviewed the patients medication list. I have reviewed the consulting providers response and recommendations. I have reviewed or attempted to review medical records based upon their availability.  All of the patient's questions have been addressed and answered. Patient's is agreeable to the above stated plan. I will continue to monitor closely and make adjustments to  medical management as needed.  This document was created using [employer]*Net[employer]er Fluency Direct.  Transcription errors may have been made.  Please contact me if any questions may rise regarding documentation to clarify verbiage.        Peewee Mauricio MD   06/01/2022   Internal Medicine

## 2022-06-01 NOTE — PROCEDURES
"Vi Ulrich is a 56 y.o. female patient.    Temp: 98.4 °F (36.9 °C) (05/31/22 2043)  Pulse: 64 (05/31/22 2043)  Resp: 16 (05/31/22 1645)  BP: (!) 150/74 (05/31/22 2043)  SpO2: 100 % (05/31/22 2043)  Weight: 81.6 kg (179 lb 14.3 oz) (05/29/22 1633)  Height: 5' 6.14" (168 cm) (05/29/22 1633)    PICC  Date/Time: 5/31/2022 9:04 PM  Performed by: Ja Gonsalez RN  Consent Done: Yes  Time out: Immediately prior to procedure a time out was called to verify the correct patient, procedure, equipment, support staff and site/side marked as required  Indications: med administration and vascular access  Anesthesia: local infiltration  Local anesthetic: lidocaine 1% without epinephrine  Anesthetic Total (mL): 5  Preparation: skin prepped with ChloraPrep  Skin prep agent dried: skin prep agent completely dried prior to procedure  Sterile barriers: all five maximum sterile barriers used - cap, mask, sterile gown, sterile gloves, and large sterile sheet  Hand hygiene: hand hygiene performed prior to central venous catheter insertion  Location details: right basilic  Catheter type: single lumen  Catheter size: 4 Fr  Catheter Length: 16cm    Ultrasound guidance: yes  Vessel Caliber: medium and patent, compressibility normal  Needle advanced into vessel with real time Ultrasound guidance.  Image recorded and saved.  Sterile sheath used.  no esophageal manometryNumber of attempts: 1  Post-procedure: blood return through all ports, chlorhexidine patch and sterile dressing applied  Technical procedures used: modified seldinger    Assessment: successful placement          Ja Gonsalez RN  5/31/2022  "

## 2022-06-02 LAB
ALBUMIN SERPL-MCNC: 2.3 GM/DL (ref 3.5–5)
ALBUMIN/GLOB SERPL: 0.6 RATIO (ref 1.1–2)
ALP SERPL-CCNC: 75 UNIT/L (ref 40–150)
ALT SERPL-CCNC: 9 UNIT/L (ref 0–55)
APTT PPP: 32.9 SECONDS (ref 23.2–33.7)
AST SERPL-CCNC: 12 UNIT/L (ref 5–34)
BASOPHILS # BLD AUTO: 0.01 X10(3)/MCL (ref 0–0.2)
BASOPHILS NFR BLD AUTO: 0.2 %
BILIRUBIN DIRECT+TOT PNL SERPL-MCNC: 0.2 MG/DL
BUN SERPL-MCNC: 22.8 MG/DL (ref 9.8–20.1)
CALCIUM SERPL-MCNC: 8.1 MG/DL (ref 8.4–10.2)
CHLORIDE SERPL-SCNC: 106 MMOL/L (ref 98–107)
CO2 SERPL-SCNC: 24 MMOL/L (ref 22–29)
CREAT SERPL-MCNC: 1.5 MG/DL (ref 0.55–1.02)
EOSINOPHIL # BLD AUTO: 0.08 X10(3)/MCL (ref 0–0.9)
EOSINOPHIL NFR BLD AUTO: 1.2 %
ERYTHROCYTE [DISTWIDTH] IN BLOOD BY AUTOMATED COUNT: 17.1 % (ref 11.5–17)
GLOBULIN SER-MCNC: 3.9 GM/DL (ref 2.4–3.5)
GLUCOSE SERPL-MCNC: 88 MG/DL (ref 74–100)
HCT VFR BLD AUTO: 27.3 % (ref 37–47)
HGB BLD-MCNC: 9 GM/DL (ref 12–16)
IMM GRANULOCYTES # BLD AUTO: 0.04 X10(3)/MCL (ref 0–0.02)
IMM GRANULOCYTES NFR BLD AUTO: 0.6 % (ref 0–0.43)
INR BLD: 1.08 (ref 0–1.3)
LYMPHOCYTES # BLD AUTO: 1.21 X10(3)/MCL (ref 0.6–4.6)
LYMPHOCYTES NFR BLD AUTO: 18.6 %
MCH RBC QN AUTO: 28 PG (ref 27–31)
MCHC RBC AUTO-ENTMCNC: 33 MG/DL (ref 33–36)
MCV RBC AUTO: 85 FL (ref 80–94)
MONOCYTES # BLD AUTO: 0.3 X10(3)/MCL (ref 0.1–1.3)
MONOCYTES NFR BLD AUTO: 4.6 %
NEUTROPHILS # BLD AUTO: 4.9 X10(3)/MCL (ref 2.1–9.2)
NEUTROPHILS NFR BLD AUTO: 74.8 %
NRBC BLD AUTO-RTO: 0 %
PLATELET # BLD AUTO: 421 X10(3)/MCL (ref 130–400)
PMV BLD AUTO: 9.6 FL (ref 9.4–12.4)
POCT GLUCOSE: 283 MG/DL (ref 70–110)
POTASSIUM SERPL-SCNC: 4 MMOL/L (ref 3.5–5.1)
PROT SERPL-MCNC: 6.2 GM/DL (ref 6.4–8.3)
PROTHROMBIN TIME: 13.7 SECONDS (ref 12.5–14.5)
RBC # BLD AUTO: 3.21 X10(6)/MCL (ref 4.2–5.4)
SODIUM SERPL-SCNC: 139 MMOL/L (ref 136–145)
WBC # SPEC AUTO: 6.5 X10(3)/MCL (ref 4.5–11.5)

## 2022-06-02 PROCEDURE — 63600175 PHARM REV CODE 636 W HCPCS: Performed by: INTERNAL MEDICINE

## 2022-06-02 PROCEDURE — 99232 PR SUBSEQUENT HOSPITAL CARE,LEVL II: ICD-10-PCS | Mod: ,,, | Performed by: INTERNAL MEDICINE

## 2022-06-02 PROCEDURE — 25000003 PHARM REV CODE 250: Performed by: INTERNAL MEDICINE

## 2022-06-02 PROCEDURE — 11000001 HC ACUTE MED/SURG PRIVATE ROOM

## 2022-06-02 PROCEDURE — 99232 SBSQ HOSP IP/OBS MODERATE 35: CPT | Mod: ,,, | Performed by: INTERNAL MEDICINE

## 2022-06-02 PROCEDURE — 85610 PROTHROMBIN TIME: CPT | Performed by: INTERNAL MEDICINE

## 2022-06-02 PROCEDURE — 85025 COMPLETE CBC W/AUTO DIFF WBC: CPT | Performed by: INTERNAL MEDICINE

## 2022-06-02 PROCEDURE — 85730 THROMBOPLASTIN TIME PARTIAL: CPT | Performed by: INTERNAL MEDICINE

## 2022-06-02 PROCEDURE — 80053 COMPREHEN METABOLIC PANEL: CPT | Performed by: INTERNAL MEDICINE

## 2022-06-02 PROCEDURE — 84075 ASSAY ALKALINE PHOSPHATASE: CPT | Performed by: INTERNAL MEDICINE

## 2022-06-02 PROCEDURE — 36415 COLL VENOUS BLD VENIPUNCTURE: CPT | Performed by: INTERNAL MEDICINE

## 2022-06-02 RX ORDER — PREDNISONE 5 MG/1
5 TABLET ORAL 3 TIMES DAILY
Status: DISCONTINUED | OUTPATIENT
Start: 2022-06-02 | End: 2022-06-03 | Stop reason: HOSPADM

## 2022-06-02 RX ORDER — AMITRIPTYLINE HYDROCHLORIDE 25 MG/1
25 TABLET, FILM COATED ORAL NIGHTLY
Status: DISCONTINUED | OUTPATIENT
Start: 2022-06-02 | End: 2022-06-03 | Stop reason: HOSPADM

## 2022-06-02 RX ADMIN — MYCOPHENOLATE MOFETIL 1000 MG: 250 CAPSULE ORAL at 06:06

## 2022-06-02 RX ADMIN — PREDNISONE 5 MG: 5 TABLET ORAL at 03:06

## 2022-06-02 RX ADMIN — MELATONIN TAB 3 MG 6 MG: 3 TAB at 08:06

## 2022-06-02 RX ADMIN — PREDNISONE 5 MG: 5 TABLET ORAL at 08:06

## 2022-06-02 RX ADMIN — INSULIN ASPART 6 UNITS: 100 INJECTION, SOLUTION INTRAVENOUS; SUBCUTANEOUS at 08:06

## 2022-06-02 RX ADMIN — ALPRAZOLAM 0.5 MG: 0.5 TABLET ORAL at 08:06

## 2022-06-02 RX ADMIN — AMITRIPTYLINE HYDROCHLORIDE 25 MG: 25 TABLET, FILM COATED ORAL at 08:06

## 2022-06-02 RX ADMIN — TIZANIDINE 4 MG: 4 TABLET ORAL at 08:06

## 2022-06-02 RX ADMIN — PANTOPRAZOLE SODIUM 40 MG: 40 TABLET, DELAYED RELEASE ORAL at 09:06

## 2022-06-02 RX ADMIN — ATORVASTATIN CALCIUM 20 MG: 10 TABLET, FILM COATED ORAL at 09:06

## 2022-06-02 RX ADMIN — PREDNISONE 10 MG: 10 TABLET ORAL at 09:06

## 2022-06-02 RX ADMIN — HYDROXYCHLOROQUINE SULFATE 200 MG: 200 TABLET, FILM COATED ORAL at 08:06

## 2022-06-02 RX ADMIN — HYDROXYCHLOROQUINE SULFATE 200 MG: 200 TABLET, FILM COATED ORAL at 09:06

## 2022-06-02 RX ADMIN — HYDROCODONE BITARTRATE AND ACETAMINOPHEN 1 TABLET: 5; 325 TABLET ORAL at 03:06

## 2022-06-02 NOTE — PROGRESS NOTES
"[facility] MEDICINE   PROGRESS NOTE      CHIEF COMPLAINT  Hospital follow up pyo    HOSPITAL COURSE  Patient is a 56-year-old female with past medical history of AFib on Eliquis, HTN, dm 2, GERD, gout, lupus on Plaquenil and steroids he was just admitted to this facility from 05/27/2022 until yesterday, when she presented with complaints of abdominal pain nausea and vomiting as well as left clavicular swelling.  She had a CT that showed bilateral prominent perinephric stranding consistent with pyelonephritis, as well as bilateral axillary adenopathy and left  clavicular fat stranding.  An ascending aortic aneurysm was noted as well to be slightly larger now measuring 4 x 3.9 cm.  She was diagnosed with left supraclavicular region cellulitis and was seen by General surgery were recommendations for no need for biopsies or further intervention of her left axillary and supraclavicular lymphadenopathy, and was placed on Cipro and vancomycin for cellulitis and pyelonephritis.  She had to leave against medical advice yesterday evening to take care of her daughter so she presents back tonight to the ER for resumption of care.  Left supraclavicular cellulitis seemed to have quickly resolved with vancomycin and Cipro.  Initially blood cultures worse positive 1 out of the 2 for GNR however later lab called and stated that it was lab error and are actually negative.  Urine culture has also remained negative.  This is making diagnosis of pyelonephritis less likely and more so concerning for lupus nephritis and Nephrology as well as rheumatology had been consulted.    Today  Seen and examined the patient and her relative stable hemodynamically stable, no discomfort.  Holding Eliquis waiting for kidney biopsy      OBJECTIVE/PHYSICAL EXAM  /83 (BP Location: Right arm, Patient Position: Sitting)   Pulse 70   Temp 98.2 °F (36.8 °C) (Oral)   Resp 18   Ht 5' 6.14" (1.68 m)   " Wt 69.1 kg (152 lb 6.4 oz)   LMP  (LMP Unknown)   SpO2 100%   Breastfeeding No   BMI 24.49 kg/m²   General: In no acute distress, afebrile  Chest: Clear to auscultation bilaterally  Heart: S1, S2, no appreciable murmur  Abdomen: Soft, lower abdominal tenderness bilaterally, BS +  MSK: Warm, no lower extremity edema, no clubbing or cyanosis, right MCP joints synovitis  Neurologic: Alert and oriented x4, moving all extremities with good strength         ASSESSMENT/PLAN  Bilateral kidney stranding-possible lupus nephritis  Acute kidney injury  Left upper anterior chest small focus cellulitis    History of:  SLE on Plaquenil and steroids, dm 2, atrial fibrillation on Eliquis, HTN, thoracic aortic aneurysm    - cellulitis resolved.   Blood and urine culture remain negative.  Making infectious pyelonephritis very less likely.    Holding Eliquis for biopsy.  Rheumatology following.  Started on CellCept.    - possible DC after the biopsy.     DVT prophylaxis:  Holding Eliquis in case biopsy is needed    Anticipated discharge and disposition:   __________________________________________________________________________    LABS/MICROBIOLOGY/MEDICATIONS/DIAGNOSTICS    LABS  Recent Labs     06/02/22  0418      K 4.0   CHLORIDE 106   CO2 24   BUN 22.8*   CREATININE 1.50*   GLUCOSE 88   CALCIUM 8.1*   ALKPHOS 75   AST 12   ALT 9   ALBUMIN 2.3*     Recent Labs     06/02/22  0418   WBC 6.5   RBC 3.21*   HCT 27.3*   MCV 85.0   *       MICROBIOLOGY  Microbiology Results (last 7 days)     Procedure Component Value Units Date/Time    Blood culture #1 **CANNOT BE ORDERED STAT** [[id number]]  (Normal) Collected: 05/29/22 0224    Order Status: Completed Specimen: Blood Updated: 06/02/22 0305     CULTURE, BLOOD ([facility]) No Growth At 96 Hours    Blood culture #2 **CANNOT BE ORDERED STAT** [[id number]]  (Normal) Collected: 05/29/22 0018    Order Status: Completed Specimen: Blood Updated: 05/31/22 0925     CULTURE, BLOOD ([facility]) No  Growth    Narrative:      Blood culture is negative. No gram negative rods as previously reported. Called Zelda Amador 5/31/22 @ 9224          MEDICATIONS   amitriptyline  25 mg Oral QHS    atorvastatin  20 mg Oral Daily    hydrOXYchloroQUINE  200 mg Oral BID    mycophenolate  1,000 mg Oral After dinner    pantoprazole  40 mg Oral Daily    predniSONE  5 mg Oral TID    tiZANidine  4 mg Oral QHS      INFUSIONS      DIAGNOSTIC TESTS  CT Abdomen Pelvis  Without Contrast   Final Result      1. No detrimental change compared to prior exam   2. Persistent nonspecific symmetric perinephric stranding.   3. Small fat containing supraumbilical hernia with mild capsular thickening.   4. Atelectasis and air trapping at the lung bases   5. Diverticulosis without evidence of acute diverticulitis   6.  The preliminary and final reports are concordant.         Electronically signed by: Katja Malik   Date:    05/29/2022   Time:    08:29      X-Ray Chest AP Portable   ED Interpretation   Negative        Final Result      No acute chest disease is identified.         Electronically signed by: Tavo Edwards   Date:    05/29/2022   Time:    08:41      IR Biopsy Kidney    (Results Pending)            All diagnosis and differential diagnosis have been reviewed; assessment and plan has been documented. I have personally reviewed the labs and test results that are presently available; I have reviewed the patients medication list. I have reviewed the consulting providers response and recommendations. I have reviewed or attempted to review medical records based upon their availability.  All of the patient's questions have been addressed and answered. Patient's is agreeable to the above stated plan. I will continue to monitor closely and make adjustments to medical management as needed.  This document was created using M*Modal Fluency Direct.  Transcription errors may have been made.  Please contact me if any questions may rise  regarding documentation to clarify verbiage.        Chino Ribeiro MD   06/02/2022   Internal Medicine

## 2022-06-02 NOTE — SUBJECTIVE & OBJECTIVE
Interval History:    Current Facility-Administered Medications   Medication Frequency    acetaminophen tablet 650 mg Q8H PRN    albuterol inhaler 2 puff Q4H PRN    ALPRAZolam tablet 0.5 mg TID PRN    amitriptyline tablet 25 mg QHS    atorvastatin tablet 20 mg Daily    calcium carbonate 200 mg calcium (500 mg) chewable tablet 1,000 mg TID PRN    dextrose 10% bolus 125 mL PRN    dextrose 10% bolus 250 mL PRN    glucagon (human recombinant) injection 1 mg PRN    glucose chewable tablet 16 g PRN    glucose chewable tablet 24 g PRN    HYDROcodone-acetaminophen 5-325 mg per tablet 1 tablet Q6H PRN    HYDROcodone-acetaminophen 5-325 mg per tablet 1 tablet Q4H PRN    hydrOXYchloroQUINE tablet 200 mg BID    insulin aspart U-100 injection 1-10 Units QID (AC + HS) PRN    melatonin tablet 6 mg Nightly PRN    mycophenolate capsule 1,000 mg After dinner    pantoprazole EC tablet 40 mg Daily    predniSONE tablet 5 mg TID    sodium chloride 0.9% flush 10 mL PRN    tiZANidine tablet 4 mg QHS     Objective:     Vital Signs (Most Recent):  Temp: 98.2 °F (36.8 °C) (06/02/22 1121)  Pulse: 70 (06/02/22 1121)  Resp: 18 (06/02/22 1121)  BP: 130/83 (06/02/22 1121)  SpO2: 100 % (06/02/22 1121)  O2 Device (Oxygen Therapy): room air (06/01/22 0343) Vital Signs (24h Range):  Temp:  [97.6 °F (36.4 °C)-98.7 °F (37.1 °C)] 98.2 °F (36.8 °C)  Pulse:  [64-72] 70  Resp:  [17-20] 18  SpO2:  [98 %-100 %] 100 %  BP: (130-153)/(76-83) 130/83     Weight: 69.1 kg (152 lb 6.4 oz) (06/02/22 0600)  Body mass index is 24.49 kg/m².  Body surface area is 1.8 meters squared.      Intake/Output Summary (Last 24 hours) at 6/2/2022 1329  Last data filed at 6/1/2022 1900  Gross per 24 hour   Intake 840 ml   Output --   Net 840 ml       Physical Exam   Constitutional: She is oriented to person, place, and time. She appears well-developed and well-nourished. No distress.   HENT:   Head: Normocephalic and atraumatic.   Right Ear: External ear normal.   Left Ear: External  ear normal.   Eyes: Pupils are equal, round, and reactive to light.   Cardiovascular: Normal rate, regular rhythm and normal heart sounds.   Pulmonary/Chest: Breath sounds normal.   Abdominal: Soft. There is no abdominal tenderness.   Musculoskeletal:      Right shoulder: Tenderness present.      Left shoulder: Tenderness present.      Right elbow: Tenderness present.      Left elbow: Tenderness present.      Right wrist: Tenderness present.      Left wrist: Tenderness present.      Cervical back: Neck supple.      Right hip: Tenderness present.      Left hip: Tenderness present.      Right knee: Tenderness present.      Left knee: Tenderness present.      Right ankle: Tenderness present.      Left ankle: Tenderness present.   Lymphadenopathy:     She has no cervical adenopathy.   Neurological: She is alert and oriented to person, place, and time. She displays normal reflexes. No cranial nerve deficit or sensory deficit. She exhibits normal muscle tone. Coordination normal.   Skin: No rash noted. No erythema.   Vitals reviewed.      Right Side Rheumatological Exam     The patient is tender to palpation of the shoulder, elbow, wrist, knee, 1st PIP, 1st MCP, 2nd PIP, 2nd MCP, 3rd PIP, 3rd MCP, 4th PIP, 4th MCP, 5th PIP, hip, ankle, 1st MTP, 2nd MTP, 3rd MTP, 4th MTP, 5th MTP, 1st toe IP, 2nd toe IP, 3rd toe IP, 4th toe IP and 5th toe IP    Left Side Rheumatological Exam     The patient is tender to palpation of the shoulder, elbow, wrist, knee, 1st PIP, 1st MCP, 2nd PIP, 2nd MCP, 3rd PIP, 3rd MCP, 4th PIP, 4th MCP, 5th PIP, 5th MCP, hip, ankle, 1st MTP, 2nd MTP, 3rd MTP, 4th MTP, 5th MTP, 1st toe IP, 2nd toe IP, 3rd toe IP, 4th toe IP and 5th toe IP.        Significant Labs:  All pertinent lab results from the last 24 hours have been reviewed.    Significant Imaging:  Imaging results within the past 24 hours have been reviewed.

## 2022-06-02 NOTE — ASSESSMENT & PLAN NOTE
hydroxychloroquine  200 MG P.O. B.I.D,  CellCept 1000 mg p.o. q.p.m., PREDNISONE 5 MG P.O. T.I.D..  SHE WAS ON PREDNISONE 10 MG P.O. Q.A.M. AND SHE REPORTED THIS CAUSING POLYURIA.  SHE REQUESTED THAT WE SPLIT THE DOSE.

## 2022-06-02 NOTE — ASSESSMENT & PLAN NOTE
tizanidine 4 mg p.o. q.h.s., START AMITRIPTYLINE 25 MG P.O. Q.H.S. SINCE SHE IS ALSO REPORTING RECURRENT HEADACHES

## 2022-06-02 NOTE — PROGRESS NOTES
[facility] Nephrology Inpatient Progress Note      HPI:     Patient Name: Vi Ulrich  Admission Date: 5/28/2022  Hospital Length of Stay: 3 days  Code Status: Full Code   Attending Physician: Seven Walters MD   Primary Care Physician: ANNAMARIA Palmer  Principal Problem:Pyelonephritis  Feels ok  Occ cough  No dyspnea  Good UO    Today pt seen and examined  Labs, events, imaging and meds reviewed for this hospital stay      Review of Systems:   Constitutional: Denies  fever, fatigue, generalized weakness, night sweats, or acute weight change  Skin: Denies wounds, no rashes, no itching, no new skin lesions  HEENT: Denies acute change in hearing or vision, tinnitus, or dysphagia  Respiratory:  occ cough, no shortness of breath, or wheezing  Cardiovascular: Denies chest pain, palpitations, or swelling  Gastrointestional: Denies abdominal pain, nausea, vomiting, diarrhea, or constipation  Genitourinary: Denies dysuria, hematuria, or incontinence; reports able to empty bladder  Musculoskeletal: Denies myalgias, back pain, decreased ROM or focal weakness, swelling and pain R wrist better  Neurological: Denies headaches, seizures, dizziness, paresthesias or weakness  Hematological: Denies unusual bruising or bleeding  Psychiatric: Denies hallucinations, depression, or confusion      Medications:   Scheduled Meds:   atorvastatin  20 mg Oral Daily    hydrOXYchloroQUINE  200 mg Oral BID    mycophenolate  1,000 mg Oral After dinner    pantoprazole  40 mg Oral Daily    predniSONE  10 mg Oral Daily    tiZANidine  4 mg Oral QHS     Continuous Infusions:      Vitals:     Vitals:    06/02/22 0321 06/02/22 0400 06/02/22 0600 06/02/22 0752   BP:  (!) 153/77  134/83   BP Location:    Right arm   Patient Position:    Sitting   Pulse:  64  68   Resp: 18 18  18   Temp:  98.2 °F (36.8 °C)  98 °F (36.7 °C)   TempSrc:    Oral   SpO2:  98%  99%   Weight:   69.1 kg (152 lb 6.4 oz)    Height:             I/O last 3 completed  shifts:  In: 1470 [P.O.:1470]  Out: 150 [Urine:150]    Intake/Output Summary (Last 24 hours) at 6/2/2022 0957  Last data filed at 6/1/2022 1900  Gross per 24 hour   Intake 840 ml   Output --   Net 840 ml       Physical Exam:   General: no acute distress, awake, alert  Eyes: PERRLA, EOMI, conjunctiva clear, eyelids without swelling  HENT: atraumatic, oropharynx and nasal mucosa patent  Neck: full ROM, no JVD, no thyromegaly or lymphadenopathy  Respiratory: equal, unlabored, fine rhonchi at bases  Cardiovascular: RRR without murmur or rub; BL radial and pedal pulses felt  Edema: none  Gastrointestinal: soft, non-tender, non-distended; positive bowel sounds; no masses to palpation  Genitourinary: no CVA tenderness upon palpation  Musculoskeletal: full ROM without limitation or discomfort, R wrist swelling less  Integumentary: warm, dry; no rashes, wounds, or skin lesions  Neurological: oriented, appropriate, no acute deficits        Labs:     Cardiac Enzymes: Ejection Fractions:    No results for input(s): CPK, CPKMB, MB, TROPONINI in the last 72 hours. No results found for: EF     Chemistries:   Recent Labs   Lab 05/29/22  0719 05/31/22 0402 06/01/22 0417 06/02/22 0418   * 140 142 139   K 4.2 4.5 4.3 4.0   CO2 21* 26 24 24   BUN 16.4 17.7 20.2* 22.8*   CREATININE 1.22* 1.56* 1.70* 1.50*   CALCIUM 8.8 8.6 8.4 8.1*   BILITOT 0.2 0.2  --  0.2   ALKPHOS 108 82  --  75   ALT 10 9  --  9   AST 24 16  --  12   GLUCOSE 70* 94 100 88        CBC/Anemia Labs: Coags:    Recent Labs   Lab 05/31/22 0402 06/01/22 0417 06/02/22 0418   WBC 6.0 6.9 6.5   HGB 9.3* 9.1* 9.0*   HCT 30.0* 29.0* 27.3*   * 522* 421*   MCV 89.3 87.9 85.0   RDW 17.2* 17.0 17.1*    Recent Labs   Lab 06/01/22  0832 06/02/22  0418   INR 1.22 1.08        POCT Glucose: HbA1c:    Recent Labs   Lab 05/31/22  0611 05/31/22  1104 05/31/22  1619 05/31/22  2214 06/01/22  0401 06/01/22  2047   POCTGLUCOSE 90 126* 231* 108 99 259*    Hemoglobin A1C    Date Value Ref Range Status   03/04/2020 6.6 (H) 4.0 - 6.0 % Final     Hemoglobin A1c   Date Value Ref Range Status   05/29/2022 6.0 <=7.0 % Final          Impression:       Patient Active Problem List   Diagnosis    Essential hypertension    Diabetes mellitus, type 2    Lupus (systemic lupus erythematosus)    Insomnia    Hyperlipidemia    History of asthma    New onset atrial fibrillation    Periumbilical pain    Constipation    Anemia    Anxiety    Class 1 obesity with serious comorbidity and body mass index (BMI) of 32.0 to 32.9 in adult    Acute cystitis with hematuria    STIVEN (acute kidney injury)    Pyelonephritis    Inadequate dietary energy intake    Systemic lupus erythematosus    Fibromyalgia     STIVEN better since off cipro  Pt for renal biopsy in am      Plan:     Renal bx in am  If stable can FU as outpt within 1 -2 weeks of biopsy       Gabriel Ken

## 2022-06-03 VITALS
OXYGEN SATURATION: 98 % | WEIGHT: 152.38 LBS | DIASTOLIC BLOOD PRESSURE: 94 MMHG | HEIGHT: 66 IN | SYSTOLIC BLOOD PRESSURE: 155 MMHG | HEART RATE: 71 BPM | TEMPERATURE: 98 F | BODY MASS INDEX: 24.49 KG/M2 | RESPIRATION RATE: 18 BRPM

## 2022-06-03 LAB
ANION GAP SERPL CALC-SCNC: 10 MEQ/L
ANTINUCLEAR ANTIBODY SCREEN (OHS): POSITIVE
BACTERIA BLD CULT: NORMAL
BASOPHILS # BLD AUTO: 0.01 X10(3)/MCL (ref 0–0.2)
BASOPHILS NFR BLD AUTO: 0.1 %
BUN SERPL-MCNC: 21.6 MG/DL (ref 9.8–20.1)
CALCIUM SERPL-MCNC: 8.1 MG/DL (ref 8.4–10.2)
CENTROMERE PROTEIN ANTIBODY (OHS): NEGATIVE
CHLORIDE SERPL-SCNC: 107 MMOL/L (ref 98–107)
CO2 SERPL-SCNC: 23 MMOL/L (ref 22–29)
CREAT SERPL-MCNC: 1.32 MG/DL (ref 0.55–1.02)
CREAT/UREA NIT SERPL: 16
DSDNA ANTIBODY (OHS): POSITIVE
EOSINOPHIL # BLD AUTO: 0.04 X10(3)/MCL (ref 0–0.9)
EOSINOPHIL NFR BLD AUTO: 0.5 %
ERYTHROCYTE [DISTWIDTH] IN BLOOD BY AUTOMATED COUNT: 17.2 % (ref 11.5–17)
GLUCOSE SERPL-MCNC: 108 MG/DL (ref 70–110)
GLUCOSE SERPL-MCNC: 124 MG/DL (ref 74–100)
HCT VFR BLD AUTO: 32.1 % (ref 37–47)
HGB BLD-MCNC: 9.7 GM/DL (ref 12–16)
IMM GRANULOCYTES # BLD AUTO: 0.06 X10(3)/MCL (ref 0–0.02)
IMM GRANULOCYTES NFR BLD AUTO: 0.8 % (ref 0–0.43)
JO-1 ANTIBODY (OHS): NEGATIVE
LYMPHOCYTES # BLD AUTO: 1.19 X10(3)/MCL (ref 0.6–4.6)
LYMPHOCYTES NFR BLD AUTO: 15.4 %
MCH RBC QN AUTO: 27.1 PG (ref 27–31)
MCHC RBC AUTO-ENTMCNC: 30.2 MG/DL (ref 33–36)
MCV RBC AUTO: 89.7 FL (ref 80–94)
MONOCYTES # BLD AUTO: 0.37 X10(3)/MCL (ref 0.1–1.3)
MONOCYTES NFR BLD AUTO: 4.8 %
NEUTROPHILS # BLD AUTO: 6.1 X10(3)/MCL (ref 2.1–9.2)
NEUTROPHILS NFR BLD AUTO: 78.4 %
NRBC BLD AUTO-RTO: 0 %
PLATELET # BLD AUTO: 527 X10(3)/MCL (ref 130–400)
PMV BLD AUTO: 9.8 FL (ref 9.4–12.4)
POCT GLUCOSE: 108 MG/DL (ref 70–110)
POCT GLUCOSE: 150 MG/DL (ref 70–110)
POTASSIUM SERPL-SCNC: 4.8 MMOL/L (ref 3.5–5.1)
RBC # BLD AUTO: 3.58 X10(6)/MCL (ref 4.2–5.4)
RNP70 ANTIBODY (OHS): POSITIVE
SCLERODERMA (SCL-70S) ANTIBODY (OHS): NEGATIVE
SMITH DP IGG (OHS): POSITIVE
SODIUM SERPL-SCNC: 140 MMOL/L (ref 136–145)
SSA(RO) ANTIBODY (OHS): NEGATIVE
SSB(LA) ANTIBODY (OHS): NEGATIVE
U1RNP ANTIBODY (OHS): POSITIVE
WBC # SPEC AUTO: 7.8 X10(3)/MCL (ref 4.5–11.5)

## 2022-06-03 PROCEDURE — 25000003 PHARM REV CODE 250: Performed by: INTERNAL MEDICINE

## 2022-06-03 PROCEDURE — 85025 COMPLETE CBC W/AUTO DIFF WBC: CPT | Performed by: STUDENT IN AN ORGANIZED HEALTH CARE EDUCATION/TRAINING PROGRAM

## 2022-06-03 PROCEDURE — 63600175 PHARM REV CODE 636 W HCPCS: Performed by: RADIOLOGY

## 2022-06-03 PROCEDURE — 99231 PR SUBSEQUENT HOSPITAL CARE,LEVL I: ICD-10-PCS | Mod: ,,, | Performed by: INTERNAL MEDICINE

## 2022-06-03 PROCEDURE — 99233 PR SUBSEQUENT HOSPITAL CARE,LEVL III: ICD-10-PCS | Mod: ,,, | Performed by: INTERNAL MEDICINE

## 2022-06-03 PROCEDURE — 94761 N-INVAS EAR/PLS OXIMETRY MLT: CPT

## 2022-06-03 PROCEDURE — 36406 VNPNXR<3YRS PHY/QHP OTHER VN: CPT

## 2022-06-03 PROCEDURE — 25000003 PHARM REV CODE 250: Performed by: RADIOLOGY

## 2022-06-03 PROCEDURE — 80048 BASIC METABOLIC PNL TOTAL CA: CPT | Performed by: STUDENT IN AN ORGANIZED HEALTH CARE EDUCATION/TRAINING PROGRAM

## 2022-06-03 PROCEDURE — C1751 CATH, INF, PER/CENT/MIDLINE: HCPCS

## 2022-06-03 PROCEDURE — 63600175 PHARM REV CODE 636 W HCPCS: Performed by: INTERNAL MEDICINE

## 2022-06-03 PROCEDURE — 99231 SBSQ HOSP IP/OBS SF/LOW 25: CPT | Mod: ,,, | Performed by: INTERNAL MEDICINE

## 2022-06-03 PROCEDURE — 36415 COLL VENOUS BLD VENIPUNCTURE: CPT | Performed by: STUDENT IN AN ORGANIZED HEALTH CARE EDUCATION/TRAINING PROGRAM

## 2022-06-03 PROCEDURE — 99233 SBSQ HOSP IP/OBS HIGH 50: CPT | Mod: ,,, | Performed by: INTERNAL MEDICINE

## 2022-06-03 RX ORDER — CALCIUM CARBONATE 200(500)MG
1000 TABLET,CHEWABLE ORAL 3 TIMES DAILY
Qty: 180 TABLET | Refills: 0 | Status: SHIPPED | OUTPATIENT
Start: 2022-06-03 | End: 2022-06-14

## 2022-06-03 RX ORDER — MIDAZOLAM HYDROCHLORIDE 1 MG/ML
INJECTION INTRAMUSCULAR; INTRAVENOUS CODE/TRAUMA/SEDATION MEDICATION
Status: COMPLETED | OUTPATIENT
Start: 2022-06-03 | End: 2022-06-03

## 2022-06-03 RX ORDER — MIDAZOLAM HYDROCHLORIDE 1 MG/ML
INJECTION INTRAMUSCULAR; INTRAVENOUS
Status: DISCONTINUED
Start: 2022-06-03 | End: 2022-06-03 | Stop reason: HOSPADM

## 2022-06-03 RX ORDER — PREDNISONE 5 MG/1
5 TABLET ORAL 3 TIMES DAILY
Qty: 90 TABLET | Refills: 0 | Status: SHIPPED | OUTPATIENT
Start: 2022-06-03 | End: 2022-06-14 | Stop reason: DRUGHIGH

## 2022-06-03 RX ORDER — HYDRALAZINE HYDROCHLORIDE 20 MG/ML
INJECTION INTRAMUSCULAR; INTRAVENOUS
Status: DISCONTINUED
Start: 2022-06-03 | End: 2022-06-03 | Stop reason: HOSPADM

## 2022-06-03 RX ORDER — MYCOPHENOLATE MOFETIL 250 MG/1
1000 CAPSULE ORAL
Qty: 120 CAPSULE | Refills: 11 | Status: SHIPPED | OUTPATIENT
Start: 2022-06-03 | End: 2022-12-12

## 2022-06-03 RX ORDER — TIZANIDINE 4 MG/1
4 TABLET ORAL NIGHTLY
Qty: 10 TABLET | Refills: 0 | Status: SHIPPED | OUTPATIENT
Start: 2022-06-03 | End: 2022-06-14 | Stop reason: SDUPTHER

## 2022-06-03 RX ORDER — LIDOCAINE HYDROCHLORIDE 10 MG/ML
INJECTION INFILTRATION; PERINEURAL CODE/TRAUMA/SEDATION MEDICATION
Status: COMPLETED | OUTPATIENT
Start: 2022-06-03 | End: 2022-06-03

## 2022-06-03 RX ORDER — FENTANYL CITRATE 50 UG/ML
INJECTION, SOLUTION INTRAMUSCULAR; INTRAVENOUS CODE/TRAUMA/SEDATION MEDICATION
Status: COMPLETED | OUTPATIENT
Start: 2022-06-03 | End: 2022-06-03

## 2022-06-03 RX ORDER — FENTANYL CITRATE 50 UG/ML
INJECTION, SOLUTION INTRAMUSCULAR; INTRAVENOUS
Status: DISCONTINUED
Start: 2022-06-03 | End: 2022-06-03 | Stop reason: HOSPADM

## 2022-06-03 RX ORDER — HYDRALAZINE HYDROCHLORIDE 20 MG/ML
INJECTION INTRAMUSCULAR; INTRAVENOUS CODE/TRAUMA/SEDATION MEDICATION
Status: COMPLETED | OUTPATIENT
Start: 2022-06-03 | End: 2022-06-03

## 2022-06-03 RX ORDER — LIDOCAINE HYDROCHLORIDE 10 MG/ML
INJECTION INFILTRATION; PERINEURAL
Status: DISCONTINUED
Start: 2022-06-03 | End: 2022-06-03 | Stop reason: HOSPADM

## 2022-06-03 RX ADMIN — MIDAZOLAM HYDROCHLORIDE 1 MG: 1 INJECTION, SOLUTION INTRAMUSCULAR; INTRAVENOUS at 09:06

## 2022-06-03 RX ADMIN — PANTOPRAZOLE SODIUM 40 MG: 40 TABLET, DELAYED RELEASE ORAL at 10:06

## 2022-06-03 RX ADMIN — HYDRALAZINE HYDROCHLORIDE 10 MG: 20 INJECTION, SOLUTION INTRAMUSCULAR; INTRAVENOUS at 09:06

## 2022-06-03 RX ADMIN — PREDNISONE 5 MG: 5 TABLET ORAL at 10:06

## 2022-06-03 RX ADMIN — HYDROXYCHLOROQUINE SULFATE 200 MG: 200 TABLET, FILM COATED ORAL at 10:06

## 2022-06-03 RX ADMIN — LIDOCAINE HYDROCHLORIDE 5 ML: 10 INJECTION, SOLUTION INFILTRATION; PERINEURAL at 09:06

## 2022-06-03 RX ADMIN — FENTANYL CITRATE 50 MCG: 50 INJECTION, SOLUTION INTRAMUSCULAR; INTRAVENOUS at 09:06

## 2022-06-03 RX ADMIN — HYDROCODONE BITARTRATE AND ACETAMINOPHEN 1 TABLET: 5; 325 TABLET ORAL at 03:06

## 2022-06-03 RX ADMIN — ATORVASTATIN CALCIUM 20 MG: 10 TABLET, FILM COATED ORAL at 10:06

## 2022-06-03 NOTE — OP NOTE
Brief Operative Note     SUMMARY     Procedure Performed by: Jean Paul Gomez MD    Assistant:  None    Procedure:  CT-guided percutaneous random core renal biopsy    Pre-op Diagnosis:  Nephritis    Post-op Diagnosis:  Same as above    Findings:  See dictated procedure note in PACS    Estimated Blood Loss: minimal         Specimen/Tissue removed: Two 20G cores obtained and placed in formalin for send-out.      Date:  06/03/2022 Time: 9:44 AM

## 2022-06-03 NOTE — PROGRESS NOTES
Patient educated on admit diagnosis and provided printed educational material. Notified patient about follow up appointment. Notified patient about medications being called into preferred pharamacy. Removed DAMION midline no complicationssssssssssssssssssssssssss

## 2022-06-03 NOTE — PROGRESS NOTES
[facility] - 5th [facility] Med Surg  Rheumatology  Progress Note    Patient Name: Vi Ulrich  MRN: [id number]  Admission Date: 5/28/2022  Hospital Length of Stay: 4 days  Code Status: Full Code   Attending Provider: Seven Walters MD  Primary Care Physician: ANNAMARIA Palmer  Principal Problem: Pyelonephritis    Subjective:     HPI:    THE PATIENT STATES THAT SHE IS STILL IN PAIN ALTHOUGH LESS THAN YESTERDAY.   SHE IS STILL COMPLAINING OF JOINT PAIN INVOLVING THE MCP PIP WRISTS ELBOWS SHOULDERS HIPS KNEES AND ANKLES BILATERALLY.  THE PAIN IS 5/10 IN INTENSITY DULL IN QUALITY AND CONTINUOUS.  IT IS ASSOCIATED WITH A MORNING STIFFNESS LASTING FOR ENTIRE DAY.  HER BIGGEST PAIN IS IN THE MUSCLES OF THE UPPER AND LOWER BACK.  THIS PAIN IS 8/10 INTENSITY DULL IN QUALITY AND CONTINUOUS      Interval History:    Current Facility-Administered Medications   Medication Frequency    acetaminophen tablet 650 mg Q8H PRN    albuterol inhaler 2 puff Q4H PRN    ALPRAZolam tablet 0.5 mg TID PRN    amitriptyline tablet 25 mg QHS    atorvastatin tablet 20 mg Daily    calcium carbonate 200 mg calcium (500 mg) chewable tablet 1,000 mg TID PRN    dextrose 10% bolus 125 mL PRN    dextrose 10% bolus 250 mL PRN    glucagon (human recombinant) injection 1 mg PRN    glucose chewable tablet 16 g PRN    glucose chewable tablet 24 g PRN    HYDROcodone-acetaminophen 5-325 mg per tablet 1 tablet Q6H PRN    HYDROcodone-acetaminophen 5-325 mg per tablet 1 tablet Q4H PRN    hydrOXYchloroQUINE tablet 200 mg BID    insulin aspart U-100 injection 1-10 Units QID (AC + HS) PRN    melatonin tablet 6 mg Nightly PRN    mycophenolate capsule 1,000 mg After dinner    pantoprazole EC tablet 40 mg Daily    predniSONE tablet 5 mg TID    sodium chloride 0.9% flush 10 mL PRN    tiZANidine tablet 4 mg QHS     Objective:     Vital Signs (Most Recent):  Temp: 98.2 °F (36.8 °C) (06/02/22 1121)  Pulse: 70 (06/02/22  1121)  Resp: 18 (06/02/22 1121)  BP: 130/83 (06/02/22 1121)  SpO2: 100 % (06/02/22 1121)  O2 Device (Oxygen Therapy): room air (06/01/22 0343) Vital Signs (24h Range):  Temp:  [97.6 °F (36.4 °C)-98.7 °F (37.1 °C)] 98.2 °F (36.8 °C)  Pulse:  [64-72] 70  Resp:  [17-20] 18  SpO2:  [98 %-100 %] 100 %  BP: (130-153)/(76-83) 130/83     Weight: 69.1 kg (152 lb 6.4 oz) (06/02/22 0600)  Body mass index is 24.49 kg/m².  Body surface area is 1.8 meters squared.      Intake/Output Summary (Last 24 hours) at 6/2/2022 1329  Last data filed at 6/1/2022 1900  Gross per 24 hour   Intake 840 ml   Output --   Net 840 ml       Physical Exam   Constitutional: She is oriented to person, place, and time. She appears well-developed and well-nourished. No distress.   HENT:   Head: Normocephalic and atraumatic.   Right Ear: External ear normal.   Left Ear: External ear normal.   Eyes: Pupils are equal, round, and reactive to light.   Cardiovascular: Normal rate, regular rhythm and normal heart sounds.   Pulmonary/Chest: Breath sounds normal.   Abdominal: Soft. There is no abdominal tenderness.   Musculoskeletal:      Right shoulder: Tenderness present.      Left shoulder: Tenderness present.      Right elbow: Tenderness present.      Left elbow: Tenderness present.      Right wrist: Tenderness present.      Left wrist: Tenderness present.      Cervical back: Neck supple.      Right hip: Tenderness present.      Left hip: Tenderness present.      Right knee: Tenderness present.      Left knee: Tenderness present.      Right ankle: Tenderness present.      Left ankle: Tenderness present.   Lymphadenopathy:     She has no cervical adenopathy.   Neurological: She is alert and oriented to person, place, and time. She displays normal reflexes. No cranial nerve deficit or sensory deficit. She exhibits normal muscle tone. Coordination normal.   Skin: No rash noted. No erythema.   Vitals reviewed.      Right Side Rheumatological Exam     The patient  is tender to palpation of the shoulder, elbow, wrist, knee, 1st PIP, 1st MCP, 2nd PIP, 2nd MCP, 3rd PIP, 3rd MCP, 4th PIP, 4th MCP, 5th PIP, hip, ankle, 1st MTP, 2nd MTP, 3rd MTP, 4th MTP, 5th MTP, 1st toe IP, 2nd toe IP, 3rd toe IP, 4th toe IP and 5th toe IP    Left Side Rheumatological Exam     The patient is tender to palpation of the shoulder, elbow, wrist, knee, 1st PIP, 1st MCP, 2nd PIP, 2nd MCP, 3rd PIP, 3rd MCP, 4th PIP, 4th MCP, 5th PIP, 5th MCP, hip, ankle, 1st MTP, 2nd MTP, 3rd MTP, 4th MTP, 5th MTP, 1st toe IP, 2nd toe IP, 3rd toe IP, 4th toe IP and 5th toe IP.        Significant Labs:  All pertinent lab results from the last 24 hours have been reviewed.    Significant Imaging:  Imaging results within the past 24 hours have been reviewed.    Assessment/Plan:     * Pyelonephritis   antibiotics.    Fibromyalgia  tizanidine 4 mg p.o. q.h.s., START AMITRIPTYLINE 25 MG P.O. Q.H.S. SINCE SHE IS ALSO REPORTING RECURRENT HEADACHES    Lupus (systemic lupus erythematosus)   hydroxychloroquine  200 MG P.O. B.I.D,  CellCept 1000 mg p.o. q.p.m., PREDNISONE 5 MG P.O. T.I.D..  SHE WAS ON PREDNISONE 10 MG P.O. Q.A.M. AND SHE REPORTED THIS CAUSING POLYURIA.  SHE REQUESTED THAT WE SPLIT THE DOSE.          Po Mcfarlane MD  Rheumatology  [facility] - 5th Floor Med Surg

## 2022-06-03 NOTE — DISCHARGE SUMMARY
[facility] Hospital Medicine Discharge Summary    Admit Date: 5/28/2022  Discharge Date and Time: 6/3/698063:44 AM  Discharging Physician: Chino Ribeiro MD.  Primary Care Physician: ANNAMARIA Palmer  Consults: rheumatology, nephrology     Discharge Diagnoses:  Bilateral kidney stranding-possible lupus nephritis  Acute kidney injury  Left upper anterior chest small focus cellulitis     History of:  SLE on Plaquenil and steroids, dm 2, atrial fibrillation on Eliquis, HTN, thoracic aortic aneurysm         Hospital Course:   Patient is a 56-year-old female with past medical history of AFib on Eliquis, HTN, dm 2, GERD, gout, lupus on Plaquenil and steroids he was just admitted to this facility from 05/27/2022 until yesterday, when she presented with complaints of abdominal pain nausea and vomiting as well as left clavicular swelling.  She had a CT that showed bilateral prominent perinephric stranding consistent with pyelonephritis, as well as bilateral axillary adenopathy and left  clavicular fat stranding.  An ascending aortic aneurysm was noted as well to be slightly larger now measuring 4 x 3.9 cm.  She was diagnosed with left supraclavicular region cellulitis and was seen by General surgery were recommendations for no need for biopsies or further intervention of her left axillary and supraclavicular lymphadenopathy, and was placed on Cipro and vancomycin for cellulitis and pyelonephritis.  She had to leave against medical advice yesterday evening to take care of her daughter so she presents back tonight to the ER for resumption of care.  Left supraclavicular cellulitis seemed to have quickly resolved with vancomycin and Cipro.  Initially blood cultures worse positive 1 out of the 2 for GNR however later lab called and stated that it was lab error and are actually negative.  Urine culture has also remained negative.  This is making diagnosis of pyelonephritis less likely  and more so concerning for lupus nephritis and Nephrology as well as rheumatology had been consulted.     Cellulitis resolved.   Blood and urine culture remain negative.  Making infectious pyelonephritis very less likely.    Patient had kidney biopsy, nephrology will see the pt in 2 weeks.   Rheumatology following.  CellCept, will also continue to plaquenil and prednisone, follow with rheumatology in 1-2 weeks   Discharge today after biopsy done.      Vitals:  VITAL SIGNS: 24 HRS MIN & MAX LAST   Temp  Min: 97.9 °F (36.6 °C)  Max: 98.1 °F (36.7 °C) 97.9 °F (36.6 °C)   BP  Min: 131/71  Max: 195/92 (!) 155/94   Pulse  Min: 57  Max: 78  71   Resp  Min: 17  Max: 24 18   SpO2  Min: 97 %  Max: 99 % 98 %       Physical Exam:  General: In no acute distress, afebrile  Chest: Clear to auscultation bilaterally  Heart: S1, S2, no appreciable murmur  Abdomen: Soft, lower abdominal tenderness bilaterally, BS +  MSK: Warm, no lower extremity edema, no clubbing or cyanosis, right MCP joints synovitis  Neurologic: Alert and oriented x4, moving all extremities with good strength     Procedures Performed: No admission procedures for hospital encounter.     Significant Diagnostic Studies: See Full reports for all details    Recent Labs   Lab 06/01/22 0417 06/02/22 0418 06/03/22 0427   WBC 6.9 6.5 7.8   RBC 3.30* 3.21* 3.58*   HGB 9.1* 9.0* 9.7*   HCT 29.0* 27.3* 32.1*   MCV 87.9 85.0 89.7   MCH 27.6 28.0 27.1   MCHC 31.4* 33.0 30.2*   RDW 17.0 17.1* 17.2*   * 421* 527*   MPV 10.0 9.6 9.8       Recent Labs   Lab 05/29/22  0719 05/31/22  0402 06/01/22 0417 06/02/22 0418 06/03/22 0427   * 140 142 139 140   K 4.2 4.5 4.3 4.0 4.8   CO2 21* 26 24 24 23   BUN 16.4 17.7 20.2* 22.8* 21.6*   CREATININE 1.22* 1.56* 1.70* 1.50* 1.32*   CALCIUM 8.8 8.6 8.4 8.1* 8.1*   ALBUMIN 2.7* 2.2*  --  2.3*  --    ALKPHOS 108 82  --  75  --    ALT 10 9  --  9  --    AST 24 16  --  12  --    BILITOT 0.2 0.2  --  0.2  --         Microbiology  Results (last 7 days)     Procedure Component Value Units Date/Time    Blood culture #1 **CANNOT BE ORDERED STAT** [[id number]]  (Normal) Collected: 05/29/22 0224    Order Status: Completed Specimen: Blood Updated: 06/03/22 0305     CULTURE, BLOOD ([facility]) No Growth at 5 days    Blood culture #2 **CANNOT BE ORDERED STAT** [[id number]]  (Normal) Collected: 05/29/22 0018    Order Status: Completed Specimen: Blood Updated: 05/31/22 0925     CULTURE, BLOOD ([facility]) No Growth    Narrative:      Blood culture is negative. No gram negative rods as previously reported. Called Zelda Amador 5/31/22 @ 0925           X-Ray Chest AP Portable  Narrative: EXAMINATION:  XR CHEST AP PORTABLE    CLINICAL HISTORY:  Shoudler pain;, .    COMPARISON:  June 22, 2021    FINDINGS:  No alveolar consolidation, effusion, or pneumothorax is seen.   The thoracic aorta is ectatic and calcified, but otherwise the  cardiac silhouette, central pulmonary vessels and mediastinum are normal in size and are grossly unremarkable. Except for degenerative changes, the  visualized osseous structures are grossly unremarkable.  Impression: No acute chest disease is identified.    Electronically signed by: Tavo Edwards  Date:    05/29/2022  Time:    08:41  CT Abdomen Pelvis  Without Contrast  Narrative: EXAMINATION:  CT ABDOMEN PELVIS WITHOUT CONTRAST    CLINICAL HISTORY:  Abdominal pain, acute, nonlocalized;    TECHNIQUE:  Helically acquired axial images, sagittal and coronal reformations were obtained from the lung bases to the pubic symphysis without the IV administration of contrast.    Automated tube current modulation, weight-based exposure dosing, and/or iterative reconstruction technique utilized to reach lowest reasonably achievable exposure rate.    DLP: 694 mGy*cm    COMPARISON:  CT abdomen pelvis 05/27/2022    FINDINGS:  HEART: There are calcifications in the region the mitral valve annulus and pericardium.    LUNG BASES: Basilar atelectasis with air  trapping. Jennifer fissural nodule at the right middle lobe compatible with benign intrapulmonary lymph node, requiring no imaging follow-up per Fleischner criteria.    LIVER: Normal attenuation. No appreciable focal hepatic lesion.    BILIARY: Gallbladder is surgically absent.    PANCREAS: No inflammatory change.    SPLEEN: Normal in size    ADRENALS: No mass.    KIDNEYS/URETERS: No renal or ureteral calculus. No hydronephrosis.  Persistent symmetric nonspecific perinephric stranding.  No significant perinephric fluid collection.    GI TRACT/MESENTERY: Evaluation of the bowel is limited without contrast.  No evidence of bowel obstruction or inflammation. Suspect prior appendectomy.  There is what appears to be suture material at the base of the cecum.  Colonic diverticulosis without acute inflammatory change.    PERITONEUM: No free fluid.No free air.    LYMPH NODES: No enlarged lymph nodes by size criteria.    VASCULATURE: Aortic atherosclerosis.    BLADDER: The bladder is poorly distended which limits evaluation.    REPRODUCTIVE ORGANS: Uterus is surgically absent.    ABDOMINAL WALL: Foci of subcutaneous air with stranding most compatible with medicinal injection.  Tiny fat containing supraumbilical hernia to the right of midline (2, 50).  The capsule of the hernia appears mildly thickened.    BONES: No acute osseous abnormality.  Impression: 1. No detrimental change compared to prior exam  2. Persistent nonspecific symmetric perinephric stranding.  3. Small fat containing supraumbilical hernia with mild capsular thickening.  4. Atelectasis and air trapping at the lung bases  5. Diverticulosis without evidence of acute diverticulitis  6.  The preliminary and final reports are concordant.    Electronically signed by: Katja Malik  Date:    05/29/2022  Time:    08:29         Medication List      START taking these medications    calcium carbonate 200 mg calcium (500 mg) chewable tablet  Commonly known as: TUMS  Take  2 tablets (1,000 mg total) by mouth 3 (three) times daily.     mycophenolate 250 mg Cap  Commonly known as: CELLCEPT  Take 4 capsules (1,000 mg total) by mouth after dinner.     tiZANidine 4 MG tablet  Commonly known as: ZANAFLEX  Take 1 tablet (4 mg total) by mouth every evening. for 10 days        CHANGE how you take these medications    ELIQUIS 5 mg Tab  Generic drug: apixaban  TAKE 1 TABLET BY MOUTH TWICE DAILY  What changed: how much to take     HYDROcodone-acetaminophen 5-325 mg per tablet  Commonly known as: NORCO  What changed: Another medication with the same name was removed. Continue taking this medication, and follow the directions you see here.     predniSONE 5 MG tablet  Commonly known as: DELTASONE  Take 1 tablet (5 mg total) by mouth 3 (three) times daily.  What changed:   · medication strength  · how much to take  · when to take this        CONTINUE taking these medications    atorvastatin 10 MG tablet  Commonly known as: LIPITOR     * blood sugar diagnostic Strp  1 each by Misc.(Non-Drug; Combo Route) route 4 (four) times daily.     * blood sugar diagnostic Strp  Commonly known as: ONETOUCH VERIO TEST STRIPS  1 each by Misc.(Non-Drug; Combo Route) route 4 (four) times daily.     blood-glucose meter kit  Commonly known as: ONETOUCH VERIO SYNC  Use as instructed     ergocalciferol 50,000 unit Cap  Commonly known as: ERGOCALCIFEROL     hydrOXYchloroQUINE 200 mg tablet  Commonly known as: PLAQUENIL  Take 1 tablet (200 mg total) by mouth 2 (two) times daily.     lancets 33 gauge Misc  1 lancet by Misc.(Non-Drug; Combo Route) route 4 (four) times daily.     omeprazole 20 MG capsule  Commonly known as: PRILOSEC     PROAIR RESPICLICK 90 mcg/actuation inhaler  Generic drug: albuterol sulfate         * This list has 2 medication(s) that are the same as other medications prescribed for you. Read the directions carefully, and ask your doctor or other care provider to review them with you.            STOP  taking these medications    ARIPiprazole 2 MG Tab  Commonly known as: ABILIFY     divalproex 250 MG EC tablet  Commonly known as: DEPAKOTE     EScitalopram oxalate 10 MG tablet  Commonly known as: LEXAPRO     insulin glargine 100 unit/mL injection  Commonly known as: Lantus     losartan 100 MG tablet  Commonly known as: COZAAR     methylPREDNISolone 4 mg tablet  Commonly known as: MEDROL DOSEPACK     metoprolol tartrate 50 MG tablet  Commonly known as: LOPRESSOR           Where to Get Your Medications      These medications were sent to [id number] - [place] - [address] AT [place] [address] [place] [address]    Phone: [phone]   · calcium carbonate 200 mg calcium (500 mg) chewable tablet  · mycophenolate 250 mg Cap  · predniSONE 5 MG tablet  · tiZANidine 4 MG tablet          Explained in detail to the patient about the discharge plan, medications, and follow-up visits. Pt understands and agrees with the treatment plan  Discharge Disposition: Left Against Medical Advice   Discharged Condition: stable  Diet-   Dietary Orders (From admission, onward)     Start     Ordered    06/03/22 1024  Diet Adult Regular  Diet effective now         06/03/22 1024               Medications Per DC med rec  Activities as tolerated    For further questions contact hospitalist office    Discharge time 33 minutes    For worsening symptoms, chest pain, shortness of breath, increased abdominal pain, high grade fever, stroke or stroke like symptoms, immediately go to the nearest Emergency Room or call 911 as soon as possible.      Chino Maya M.D on 6/3/2022. at 11:44 AM.

## 2022-06-03 NOTE — CONSULTS
Interventional Radiology Consultation      SUBJECTIVE:     Chief Complaint: concern for lupus nephritis    History of Present Illness:  Vi Ulrich is a 56 y.o. female who presents for CT-guided random renal core biopsy. Initially thought to have pyelonephritis, now concern for lupus nephritis per nephrology, for which renal biopsy has been requested.    Past Medical History:   Diagnosis Date    Arthritis     knees    Asthma     Atrial fibrillation     Chronic constipation     Diabetes mellitus     Encounter for blood transfusion 10/2014    GERD (gastroesophageal reflux disease)     Gout     Hypertension     Lupus 2004    SLE    Renal disorder     SLE (systemic lupus erythematosus)      Past Surgical History:   Procedure Laterality Date    APPENDECTOMY      CHOLECYSTECTOMY      COLONOSCOPY N/A 4/19/2018    Procedure: COLONOSCOPY;  Surgeon: Minerva Porras MD;  Location: [facility];  Service: Endoscopy;  Laterality: N/A;    ESOPHAGOGASTRODUODENOSCOPY N/A 4/19/2018    Procedure: ESOPHAGOGASTRODUODENOSCOPY (EGD);  Surgeon: Minerva Porras MD;  Location: [facility];  Service: Endoscopy;  Laterality: N/A;    HYSTERECTOMY      JOINT REPLACEMENT Left 08/07/2015    Knee    LYMPH NODE BIOPSY Left 2014    MASTECTOMY      Left arm- NO BP    PARTIAL HYSTERECTOMY         Current Meds:    amitriptyline  25 mg Oral QHS    atorvastatin  20 mg Oral Daily    fentaNYL        hydrOXYchloroQUINE  200 mg Oral BID    LIDOcaine HCL 10 mg/ml (1%)        midazolam        mycophenolate  1,000 mg Oral After dinner    pantoprazole  40 mg Oral Daily    predniSONE  5 mg Oral TID    tiZANidine  4 mg Oral QHS       Infusion Meds:    Allergies:   Review of patient's allergies indicates:   Allergen Reactions    Ketorolac (pf) Swelling    Penicillins Hives    Percocet [oxycodone-acetaminophen] Hives and Itching    Tramadol Hives     Sedation History:  No adverse reacations    OBJECTIVE:     Vital Signs  (Most Recent)  Temp:  [97.9 °F (36.6 °C)-98.2 °F (36.8 °C)] 97.9 °F (36.6 °C)  Pulse:  [57-78] 63  Resp:  [17-24] 17  SpO2:  [97 %-100 %] 97 %  BP: (130-195)/(71-99) 131/71    Physical Exam:  ASA: 2    General: no acute distress  Chest: unlabored breathing  Heart: regular heart rate  Abdomen: no rebound/guarding    Laboratory  Lab Results   Component Value Date    INR 1.08 06/02/2022       Lab Results   Component Value Date    WBC 7.8 06/03/2022    HGB 9.7 (L) 06/03/2022    HCT 32.1 (L) 06/03/2022      Lab Results   Component Value Date     (H) 08/23/2021     06/03/2022    K 4.8 06/03/2022     08/23/2021    CO2 23 06/03/2022    BUN 21.6 (H) 06/03/2022    CREATININE 1.32 (H) 06/03/2022    CALCIUM 8.1 (L) 06/03/2022    MG 1.9 11/30/2017    ALT 9 06/02/2022    AST 12 06/02/2022    ALBUMIN 2.3 (L) 06/02/2022    BILITOT 0.2 06/02/2022    BILIDIR 0.1 03/28/2022       ASSESSMENT/PLAN:     Sedation Plan: Moderate sedation with fentanyl and versed  Patient will undergo CT-guided random core renal biopsy.    Jean Paul Gomez MD  06/03/2022 9:10 AM

## 2022-06-05 ENCOUNTER — PATIENT OUTREACH (OUTPATIENT)
Dept: ADMINISTRATIVE | Facility: CLINIC | Age: 57
End: 2022-06-05
Payer: MEDICAID

## 2022-06-05 NOTE — PROGRESS NOTES
C3 nurse spoke with Vi Ulrich for a TCC post hospital discharge follow up call. The patient does not have a scheduled HOSFU appointment with ANNAMARIA Palmer   within 7 days post hospital discharge date 6/4/22. C3 nurse was unable to schedule HOSFU appointment in [facility].  Patient states she will call on Monday to schedule an appt.

## 2022-06-07 ENCOUNTER — HOSPITAL ENCOUNTER (EMERGENCY)
Facility: HOSPITAL | Age: 57
Discharge: HOME | End: 2022-06-08
Attending: EMERGENCY MEDICINE
Payer: MEDICAID

## 2022-06-07 DIAGNOSIS — T78.40XA ALLERGIC REACTION, INITIAL ENCOUNTER: Primary | ICD-10-CM

## 2022-06-07 LAB
ALBUMIN SERPL-MCNC: 3 GM/DL (ref 3.5–5)
ALBUMIN/GLOB SERPL: 0.7 RATIO (ref 1.1–2)
ALP SERPL-CCNC: 80 UNIT/L (ref 40–150)
ALT SERPL-CCNC: 8 UNIT/L (ref 0–55)
AST SERPL-CCNC: 15 UNIT/L (ref 5–34)
BASOPHILS # BLD AUTO: 0.02 X10(3)/MCL (ref 0–0.2)
BASOPHILS NFR BLD AUTO: 0.4 %
BILIRUBIN DIRECT+TOT PNL SERPL-MCNC: 0.2 MG/DL
BUN SERPL-MCNC: 22 MG/DL (ref 9.8–20.1)
CALCIUM SERPL-MCNC: 9 MG/DL (ref 8.4–10.2)
CHLORIDE SERPL-SCNC: 108 MMOL/L (ref 98–107)
CO2 SERPL-SCNC: 25 MMOL/L (ref 22–29)
CREAT SERPL-MCNC: 1.92 MG/DL (ref 0.55–1.02)
EOSINOPHIL # BLD AUTO: 0.07 X10(3)/MCL (ref 0–0.9)
EOSINOPHIL NFR BLD AUTO: 1.5 %
ERYTHROCYTE [DISTWIDTH] IN BLOOD BY AUTOMATED COUNT: 17.4 % (ref 11.5–17)
GLOBULIN SER-MCNC: 4.4 GM/DL (ref 2.4–3.5)
GLUCOSE SERPL-MCNC: 93 MG/DL (ref 74–100)
HCT VFR BLD AUTO: 32.7 % (ref 37–47)
HGB BLD-MCNC: 10.2 GM/DL (ref 12–16)
IMM GRANULOCYTES # BLD AUTO: 0.02 X10(3)/MCL (ref 0–0.02)
IMM GRANULOCYTES NFR BLD AUTO: 0.4 % (ref 0–0.43)
LYMPHOCYTES # BLD AUTO: 1.55 X10(3)/MCL (ref 0.6–4.6)
LYMPHOCYTES NFR BLD AUTO: 33.4 %
MCH RBC QN AUTO: 27.3 PG (ref 27–31)
MCHC RBC AUTO-ENTMCNC: 31.2 MG/DL (ref 33–36)
MCV RBC AUTO: 87.7 FL (ref 80–94)
MONOCYTES # BLD AUTO: 0.11 X10(3)/MCL (ref 0.1–1.3)
MONOCYTES NFR BLD AUTO: 2.4 %
NEUTROPHILS # BLD AUTO: 2.9 X10(3)/MCL (ref 2.1–9.2)
NEUTROPHILS NFR BLD AUTO: 61.9 %
NRBC BLD AUTO-RTO: 0 %
PLATELET # BLD AUTO: 625 X10(3)/MCL (ref 130–400)
PMV BLD AUTO: 10 FL (ref 9.4–12.4)
POTASSIUM SERPL-SCNC: 4.2 MMOL/L (ref 3.5–5.1)
PROT SERPL-MCNC: 7.4 GM/DL (ref 6.4–8.3)
RBC # BLD AUTO: 3.73 X10(6)/MCL (ref 4.2–5.4)
SODIUM SERPL-SCNC: 142 MMOL/L (ref 136–145)
WBC # SPEC AUTO: 4.6 X10(3)/MCL (ref 4.5–11.5)

## 2022-06-07 PROCEDURE — 63600175 PHARM REV CODE 636 W HCPCS: Performed by: EMERGENCY MEDICINE

## 2022-06-07 PROCEDURE — 96375 TX/PRO/DX INJ NEW DRUG ADDON: CPT

## 2022-06-07 PROCEDURE — 25000003 PHARM REV CODE 250: Performed by: EMERGENCY MEDICINE

## 2022-06-07 PROCEDURE — 36415 COLL VENOUS BLD VENIPUNCTURE: CPT | Performed by: EMERGENCY MEDICINE

## 2022-06-07 PROCEDURE — 96374 THER/PROPH/DIAG INJ IV PUSH: CPT

## 2022-06-07 PROCEDURE — 85025 COMPLETE CBC W/AUTO DIFF WBC: CPT | Performed by: EMERGENCY MEDICINE

## 2022-06-07 PROCEDURE — 80053 COMPREHEN METABOLIC PANEL: CPT | Performed by: EMERGENCY MEDICINE

## 2022-06-07 PROCEDURE — 99284 EMERGENCY DEPT VISIT MOD MDM: CPT | Mod: 25

## 2022-06-07 RX ORDER — DIPHENHYDRAMINE HYDROCHLORIDE 50 MG/ML
25 INJECTION INTRAMUSCULAR; INTRAVENOUS
Status: COMPLETED | OUTPATIENT
Start: 2022-06-07 | End: 2022-06-07

## 2022-06-07 RX ORDER — METHYLPREDNISOLONE SOD SUCC 125 MG
125 VIAL (EA) INJECTION
Status: COMPLETED | OUTPATIENT
Start: 2022-06-07 | End: 2022-06-07

## 2022-06-07 RX ORDER — FAMOTIDINE 20 MG/1
20 TABLET, FILM COATED ORAL
Status: COMPLETED | OUTPATIENT
Start: 2022-06-07 | End: 2022-06-07

## 2022-06-07 RX ADMIN — METHYLPREDNISOLONE SODIUM SUCCINATE 125 MG: 125 INJECTION, POWDER, FOR SOLUTION INTRAMUSCULAR; INTRAVENOUS at 10:06

## 2022-06-07 RX ADMIN — DIPHENHYDRAMINE HYDROCHLORIDE 25 MG: 50 INJECTION, SOLUTION INTRAMUSCULAR; INTRAVENOUS at 11:06

## 2022-06-07 RX ADMIN — FAMOTIDINE 20 MG: 20 TABLET, FILM COATED ORAL at 10:06

## 2022-06-08 VITALS
RESPIRATION RATE: 15 BRPM | HEIGHT: 66 IN | BODY MASS INDEX: 24.43 KG/M2 | DIASTOLIC BLOOD PRESSURE: 83 MMHG | HEART RATE: 67 BPM | WEIGHT: 152 LBS | SYSTOLIC BLOOD PRESSURE: 151 MMHG | OXYGEN SATURATION: 96 % | TEMPERATURE: 98 F

## 2022-06-08 RX ORDER — PREDNISONE 20 MG/1
40 TABLET ORAL DAILY
Qty: 10 TABLET | Refills: 0 | Status: SHIPPED | OUTPATIENT
Start: 2022-06-08 | End: 2022-06-08 | Stop reason: SDUPTHER

## 2022-06-08 RX ORDER — PREDNISONE 20 MG/1
20 TABLET ORAL DAILY
Qty: 5 TABLET | Refills: 0 | Status: SHIPPED | OUTPATIENT
Start: 2022-06-08 | End: 2022-06-13

## 2022-06-08 NOTE — DISCHARGE INSTRUCTIONS
Follow-up with your primary care physician in the next few days if symptoms are not improving.  Stop Mycophenolate as discussed.

## 2022-06-08 NOTE — ED NOTES
Pt with burning and redness to IV site after injection of Benadryl. Line flushed prior to Benadryl administration and after admi. Pt unable to tolerate IV at this time, no swelling noted.

## 2022-06-08 NOTE — ED PROVIDER NOTES
Encounter Date: 6/7/2022    SCRIBE #1 NOTE: I, Haroldo Pleaez, am scribing for, and in the presence of,  Dr. Nair. I have scribed the entire note.       History     Chief Complaint   Patient presents with    Facial Swelling     STATES TOOK 1ST DOSE OF MYCOPHENOLATE TODAY, NOTICED FACIAL SWELLING NOT LONG AFTERWARDS. SWELLING TO LOWER LIP AND LOWER FACE NOTED. CHANGE IN SPEECH.      56 year old female with a hx of Lupus, DM, A fib, HTN, and asthma presents to the ED for facial swelling. Pt states she took her first dose of Mycophenolate today and approximately 1 hour after her first dose she began noticing symptoms of facial swelling. Pt states her throat started to feel dry, then experienced jaw tightness, and lastly noticed her lip began to swell. Pt states she is not experiencing any SOB. Pt was recently taken off of her blood thinner. Pt is currently prescribed Prednisone 5 mg 3x per day.     The history is provided by the patient. No  was used.   Allergic Reaction  The primary symptoms do not include wheezing, shortness of breath, cough, abdominal pain, nausea, vomiting, diarrhea, dizziness, palpitations or rash. The current episode started 2 to 3 hours ago. The problem has been gradually worsening. This is a new problem.   The onset of the reaction was associated with a new medication.     Review of patient's allergies indicates:   Allergen Reactions    Ketorolac (pf) Swelling    Mycophenolate mofetil Swelling    Penicillins Hives    Percocet [oxycodone-acetaminophen] Hives and Itching    Tramadol Hives     Past Medical History:   Diagnosis Date    Arthritis     knees    Asthma     Atrial fibrillation     Chronic constipation     Diabetes mellitus     Encounter for blood transfusion 10/2014    GERD (gastroesophageal reflux disease)     Gout     Hypertension     Lupus 2004    SLE    Renal disorder     SLE (systemic lupus erythematosus)      Past Surgical History:   Procedure  Laterality Date    APPENDECTOMY      CHOLECYSTECTOMY      COLONOSCOPY N/A 4/19/2018    Procedure: COLONOSCOPY;  Surgeon: Minerva Porras MD;  Location: [facility];  Service: Endoscopy;  Laterality: N/A;    ESOPHAGOGASTRODUODENOSCOPY N/A 4/19/2018    Procedure: ESOPHAGOGASTRODUODENOSCOPY (EGD);  Surgeon: Minerva Porras MD;  Location: [facility];  Service: Endoscopy;  Laterality: N/A;    HYSTERECTOMY      JOINT REPLACEMENT Left 08/07/2015    Knee    LYMPH NODE BIOPSY Left 2014    MASTECTOMY      Left arm- NO BP    PARTIAL HYSTERECTOMY       Family History   Problem Relation Age of Onset    Heart block Mother     Heart disease Father      Social History     Tobacco Use    Smoking status: Current Every Day Smoker     Packs/day: 1.00     Years: 15.00     Pack years: 15.00     Types: Cigarettes    Smokeless tobacco: Never Used   Substance Use Topics    Alcohol use: No    Drug use: No     Review of Systems   Constitutional: Negative for activity change, chills, diaphoresis, fatigue and fever.   HENT: Positive for facial swelling (lower lip). Negative for congestion, ear pain, sinus pain and sore throat.         Jaw tightness. Dry throat   Eyes: Negative for visual disturbance.   Respiratory: Negative for cough, shortness of breath, wheezing and stridor.    Cardiovascular: Negative for chest pain, palpitations and leg swelling.   Gastrointestinal: Negative for abdominal pain, constipation, diarrhea, nausea, rectal pain and vomiting.   Genitourinary: Negative for dysuria and hematuria.   Musculoskeletal: Negative for arthralgias, back pain and myalgias.   Skin: Negative for rash.   Neurological: Negative for dizziness, syncope, weakness, numbness and headaches.   All other systems reviewed and are negative.      Physical Exam     Initial Vitals [06/07/22 2127]   BP Pulse Resp Temp SpO2   139/76 91 20 98.4 °F (36.9 °C) 97 %      MAP       --         Physical Exam    Nursing note and vitals  reviewed.  Constitutional: No distress.   HENT:   Head: Normocephalic and atraumatic.   No tongue swelling. Moderate swelling to lower lip. Mild tenderness to palpation to bilateral mandible. No dysphonia.  There is no lip tenderness to palpation.  No fluctuance.  No facial cellulitis.   Eyes: EOM are normal.   Neck: Trachea normal. Neck supple.   Normal range of motion.  Cardiovascular: Normal rate and regular rhythm.   No murmur heard.  Pulmonary/Chest: Breath sounds normal. No respiratory distress.   No stridor   Abdominal: Abdomen is soft. Bowel sounds are normal. She exhibits no distension. There is no abdominal tenderness. There is no rebound and no guarding.   Musculoskeletal:         General: Normal range of motion.      Cervical back: Normal range of motion and neck supple.      Lumbar back: Normal range of motion.     Neurological: She is alert and oriented to person, place, and time.   Skin: Skin is warm and dry. No rash noted.   Psychiatric: She has a normal mood and affect.         ED Course   Procedures  Labs Reviewed   COMPREHENSIVE METABOLIC PANEL - Abnormal; Notable for the following components:       Result Value    Chloride 108 (*)     Blood Urea Nitrogen 22.0 (*)     Creatinine 1.92 (*)     Albumin Level 3.0 (*)     Globulin 4.4 (*)     Albumin/Globulin Ratio 0.7 (*)     All other components within normal limits   CBC WITH DIFFERENTIAL - Abnormal; Notable for the following components:    RBC 3.73 (*)     Hgb 10.2 (*)     Hct 32.7 (*)     MCHC 31.2 (*)     RDW 17.4 (*)     Platelet 625 (*)     IG# 0.02 (*)     All other components within normal limits   CBC W/ AUTO DIFFERENTIAL    Narrative:     The following orders were created for panel order CBC auto differential.  Procedure                               Abnormality         Status                     ---------                               -----------         ------                     CBC with Differential[[id number]]        Abnormal             Final result                 Please view results for these tests on the individual orders.          Imaging Results    None          Medications   methylPREDNISolone sodium succinate injection 125 mg (125 mg Intravenous Given 6/7/22 2247)   diphenhydrAMINE injection 25 mg (25 mg Intravenous Given 6/7/22 2309)   famotidine tablet 20 mg (20 mg Oral Given 6/7/22 2249)              Scribe Attestation:   Scribe #1: I performed the above scribed service and the documentation accurately describes the services I performed. I attest to the accuracy of the note.    Attending Attestation:           Physician Attestation for Scribe:  Physician Attestation Statement for Scribe #1: I, Dr. Nair, reviewed documentation, as scribed by Haroldo Pelaez in my presence, and it is both accurate and complete.             ED Course as of 06/08/22 0158 Wed Jun 08, 2022 0148 Patient is in no apparent distress.  Swelling to the face is slightly improved.  Patient states she wants go home.  She denies any shortness of breath.  No tachypnea.  Patient understands to follow up with her primary care physician in the next several days.  She understands to stop the mycophenolate.  She is on prednisone 5 mg p.o. daily for her lupus.  Will increase to 40 mg daily x5 days. [KG]   0158 Will change prescription for 40 mg prednisone daily to only 20 secondary to patient with history of diabetes. [KG]      ED Course User Index  [KG] Mele Nair MD             Clinical Impression:   Final diagnoses:  [T78.40XA] Allergic reaction, initial encounter (Primary)          ED Disposition Condition    Discharge Stable        ED Prescriptions     Medication Sig Dispense Start Date End Date Auth. Provider    predniSONE (DELTASONE) 20 MG tablet  (Status: Discontinued) Take 2 tablets (40 mg total) by mouth once daily. for 5 days 10 tablet 6/8/2022 6/8/2022 Mele Nair MD    predniSONE (DELTASONE) 20 MG tablet Take 1 tablet (20 mg total) by mouth once  daily. for 5 days 5 tablet 6/8/2022 6/13/2022 Mele Nair MD        Follow-up Information     Follow up With Specialties Details Why Contact Info    [facility] - Emergency Dept Emergency Medicine  As needed, If symptoms worsen [facility] [facility] [facility] [address]  [phone]           Mele Nair MD  06/08/22 0155       Mele Nair MD  06/08/22 0159

## 2022-06-08 NOTE — ED NOTES
Assumed care of this patient at this time c og rn. Pt ambulatory to bathroom, md to bedside to inspect face c swelling down. Iv removed prior to taking over dt pt pain. Pt connected to monitors. wctm

## 2022-06-08 NOTE — ED NOTES
Pt dc. Ambulatory steady gait to exit. Verb understanding of dc isntructions and nurse access to previous admission dt pt questions regarding followup. followups given to pt for internal med and nephrology. Pt verb understanding of f/u

## 2022-06-09 LAB
ESTROGEN SERPL-MCNC: NORMAL PG/ML
INSULIN SERPL-ACNC: NORMAL U[IU]/ML
LAB AP CLINICAL INFORMATION: NORMAL
LAB AP GROSS DESCRIPTION: NORMAL
LAB AP REPORT FOOTNOTES: NORMAL
T3RU NFR SERPL: NORMAL %

## 2022-06-13 ENCOUNTER — OFFICE VISIT (OUTPATIENT)
Dept: NEPHROLOGY | Facility: CLINIC | Age: 57
End: 2022-06-13
Payer: MEDICAID

## 2022-06-13 VITALS
HEART RATE: 92 BPM | TEMPERATURE: 99 F | RESPIRATION RATE: 20 BRPM | DIASTOLIC BLOOD PRESSURE: 66 MMHG | HEIGHT: 66 IN | OXYGEN SATURATION: 97 % | SYSTOLIC BLOOD PRESSURE: 110 MMHG | BODY MASS INDEX: 28.95 KG/M2 | WEIGHT: 180.13 LBS

## 2022-06-13 DIAGNOSIS — M32.14 LUPUS NEPHRITIS, ISN/RPS CLASS III: ICD-10-CM

## 2022-06-13 DIAGNOSIS — E11.8 DIABETES MELLITUS TYPE 2 WITH COMPLICATIONS: ICD-10-CM

## 2022-06-13 DIAGNOSIS — N17.9 AKI (ACUTE KIDNEY INJURY): Primary | ICD-10-CM

## 2022-06-13 PROCEDURE — 3074F PR MOST RECENT SYSTOLIC BLOOD PRESSURE < 130 MM HG: ICD-10-PCS | Mod: CPTII,,, | Performed by: INTERNAL MEDICINE

## 2022-06-13 PROCEDURE — 99999 PR PBB SHADOW E&M-EST. PATIENT-LVL V: ICD-10-PCS | Mod: PBBFAC,,, | Performed by: INTERNAL MEDICINE

## 2022-06-13 PROCEDURE — 99215 OFFICE O/P EST HI 40 MIN: CPT | Mod: PBBFAC | Performed by: INTERNAL MEDICINE

## 2022-06-13 PROCEDURE — 1159F MED LIST DOCD IN RCRD: CPT | Mod: CPTII,,, | Performed by: INTERNAL MEDICINE

## 2022-06-13 PROCEDURE — 1159F PR MEDICATION LIST DOCUMENTED IN MEDICAL RECORD: ICD-10-PCS | Mod: CPTII,,, | Performed by: INTERNAL MEDICINE

## 2022-06-13 PROCEDURE — 99214 OFFICE O/P EST MOD 30 MIN: CPT | Mod: S$PBB,,, | Performed by: INTERNAL MEDICINE

## 2022-06-13 PROCEDURE — 3066F PR DOCUMENTATION OF TREATMENT FOR NEPHROPATHY: ICD-10-PCS | Mod: CPTII,,, | Performed by: INTERNAL MEDICINE

## 2022-06-13 PROCEDURE — 1111F DSCHRG MED/CURRENT MED MERGE: CPT | Mod: CPTII,,, | Performed by: INTERNAL MEDICINE

## 2022-06-13 PROCEDURE — 4010F PR ACE/ARB THEARPY RXD/TAKEN: ICD-10-PCS | Mod: CPTII,,, | Performed by: INTERNAL MEDICINE

## 2022-06-13 PROCEDURE — 3066F NEPHROPATHY DOC TX: CPT | Mod: CPTII,,, | Performed by: INTERNAL MEDICINE

## 2022-06-13 PROCEDURE — 3008F BODY MASS INDEX DOCD: CPT | Mod: CPTII,,, | Performed by: INTERNAL MEDICINE

## 2022-06-13 PROCEDURE — 1111F PR DISCHARGE MEDS RECONCILED W/ CURRENT OUTPATIENT MED LIST: ICD-10-PCS | Mod: CPTII,,, | Performed by: INTERNAL MEDICINE

## 2022-06-13 PROCEDURE — 3078F PR MOST RECENT DIASTOLIC BLOOD PRESSURE < 80 MM HG: ICD-10-PCS | Mod: CPTII,,, | Performed by: INTERNAL MEDICINE

## 2022-06-13 PROCEDURE — 3008F PR BODY MASS INDEX (BMI) DOCUMENTED: ICD-10-PCS | Mod: CPTII,,, | Performed by: INTERNAL MEDICINE

## 2022-06-13 PROCEDURE — 3078F DIAST BP <80 MM HG: CPT | Mod: CPTII,,, | Performed by: INTERNAL MEDICINE

## 2022-06-13 PROCEDURE — 99214 PR OFFICE/OUTPT VISIT, EST, LEVL IV, 30-39 MIN: ICD-10-PCS | Mod: S$PBB,,, | Performed by: INTERNAL MEDICINE

## 2022-06-13 PROCEDURE — 3074F SYST BP LT 130 MM HG: CPT | Mod: CPTII,,, | Performed by: INTERNAL MEDICINE

## 2022-06-13 PROCEDURE — 99999 PR PBB SHADOW E&M-EST. PATIENT-LVL V: CPT | Mod: PBBFAC,,, | Performed by: INTERNAL MEDICINE

## 2022-06-13 PROCEDURE — 4010F ACE/ARB THERAPY RXD/TAKEN: CPT | Mod: CPTII,,, | Performed by: INTERNAL MEDICINE

## 2022-06-13 NOTE — PROGRESS NOTES
[facility] Ambulatory Progress Note      HPI  Vi Ulrich, 56 y.o. female, has a past medical history of Arthritis, Asthma, Atrial fibrillation, Chronic constipation, Diabetes mellitus, GERD, Gout, Hypertension, Renal disorder, and SLE (systemic lupus erythematosus) (dx 2004). Vi Ulrich presents to office for a hospital FU visit. She had a renal biopsy completed in the hospital.   Renal biopsy IMPRESSION:  FOCAL LUPUS NEPHRITIS, ISN/RPS CLASS III.   SEGMENTAL MEMBRANOUS LUPUS NEPHRITIS, ISN/RPS CLASS V.   FEATURES OF DIABETIC GLOMERULOPATHY, CLASS IIB.   MODERATE PLASMA CELL-RICH INTERSTITIAL INFLAMMATION.    She was placed on mycophenolate but had an allergic reaction to it and was seen in the ER. This has now been DC    She has been on IV cytoxan in the past, she is unsure of how many years ago (at least 6-8 years ago) or how many treatments/duration she received.      Medical History:   Past Medical History:   Diagnosis Date    Arthritis     knees    Asthma     Atrial fibrillation     Chronic constipation     Diabetes mellitus     Encounter for blood transfusion 10/2014    GERD (gastroesophageal reflux disease)     Gout     Hypertension     Lupus 2004    SLE    Renal disorder     SLE (systemic lupus erythematosus)        Surgical History:   Past Surgical History:   Procedure Laterality Date    APPENDECTOMY      CHOLECYSTECTOMY      COLONOSCOPY N/A 4/19/2018    Procedure: COLONOSCOPY;  Surgeon: Minerva Porras MD;  Location: [facility];  Service: Endoscopy;  Laterality: N/A;    ESOPHAGOGASTRODUODENOSCOPY N/A 4/19/2018    Procedure: ESOPHAGOGASTRODUODENOSCOPY (EGD);  Surgeon: Minerva Porras MD;  Location: [facility];  Service: Endoscopy;  Laterality: N/A;    HYSTERECTOMY      JOINT REPLACEMENT Left 08/07/2015    Knee    LYMPH NODE BIOPSY Left 2014    MASTECTOMY      Left arm- NO BP    PARTIAL HYSTERECTOMY         Family History:   Family History   Problem Relation Age of  Onset    Heart block Mother     Heart disease Father        Social History:   Social History     Tobacco Use    Smoking status: Former Smoker     Packs/day: 1.00     Years: 15.00     Pack years: 15.00     Types: Cigarettes    Smokeless tobacco: Never Used   Substance Use Topics    Alcohol use: No       Allergies:  Review of patient's allergies indicates:   Allergen Reactions    Ketorolac (pf) Swelling    Mycophenolate mofetil Swelling    Penicillins Hives    Percocet [oxycodone-acetaminophen] Hives and Itching    Tramadol Hives       Medications:    Current Outpatient Medications:     albuterol sulfate (PROAIR RESPICLICK) 90 mcg/actuation inhaler, Inhale 1 puff into the lungs every 4 (four) hours as needed for Wheezing. Rescue, Disp: , Rfl:     atorvastatin (LIPITOR) 10 MG tablet, Take 10 mg by mouth once daily., Disp: , Rfl:     ELIQUIS 5 mg Tab, TAKE 1 TABLET BY MOUTH TWICE DAILY (Patient taking differently: Take 5 mg by mouth 2 (two) times daily.), Disp: 60 tablet, Rfl: 0    ergocalciferol (ERGOCALCIFEROL) 50,000 unit Cap, 50,000 Units every 7 days., Disp: , Rfl:     HYDROcodone-acetaminophen (NORCO) 5-325 mg per tablet, Take 1 tablet by mouth 2 (two) times daily as needed for Pain., Disp: , Rfl:     hydrOXYchloroQUINE (PLAQUENIL) 200 mg tablet, Take 1 tablet (200 mg total) by mouth 2 (two) times daily., Disp: 180 tablet, Rfl: 3    omeprazole (PRILOSEC) 20 MG capsule, Take 20 mg by mouth once daily., Disp: , Rfl:     predniSONE (DELTASONE) 20 MG tablet, Take 1 tablet (20 mg total) by mouth once daily. for 5 days, Disp: 5 tablet, Rfl: 0    blood sugar diagnostic (ONETOUCH VERIO) Strp, 1 each by Misc.(Non-Drug; Combo Route) route 4 (four) times daily. (Patient not taking: No sig reported), Disp: 50 each, Rfl: 11    blood sugar diagnostic Strp, 1 each by Misc.(Non-Drug; Combo Route) route 4 (four) times daily. (Patient not taking: Reported on 6/13/2022), Disp: 50 each, Rfl: 11    blood-glucose  "meter (ONETOUCH VERIO SYNC) kit, Use as instructed (Patient not taking: Reported on 6/13/2022), Disp: 1 each, Rfl: 0    calcium carbonate (TUMS) 200 mg calcium (500 mg) chewable tablet, Take 2 tablets (1,000 mg total) by mouth 3 (three) times daily. (Patient not taking: No sig reported), Disp: 180 tablet, Rfl: 0    lancets 33 gauge Misc, 1 lancet by Misc.(Non-Drug; Combo Route) route 4 (four) times daily. (Patient not taking: Reported on 6/13/2022), Disp: 100 each, Rfl: 11    predniSONE (DELTASONE) 5 MG tablet, Take 1 tablet (5 mg total) by mouth 3 (three) times daily. (Patient not taking: No sig reported), Disp: 90 tablet, Rfl: 0    tiZANidine (ZANAFLEX) 4 MG tablet, Take 1 tablet (4 mg total) by mouth every evening. for 10 days (Patient not taking: No sig reported), Disp: 10 tablet, Rfl: 0       ROS:    Constitutional: Denies fever, fatigue, generalized weakness, night sweats, or acute weight change  Skin: Denies wounds, no rashes, no itching, no new skin lesions  HEENT: Denies acute change in hearing or vision, tinnitus, or dysphagia  Respiratory:  Denies cough, shortness of breath, or wheezing  Cardiovascular: Denies chest pain, palpitations, or swelling  Gastrointestional: Denies abdominal pain, nausea, vomiting, diarrhea, or constipation, occ decrease in appetite  Genitourinary: Denies dysuria, hematuria, or incontinence; reports able to empty bladder  Musculoskeletal: Denies myalgias, back pain, decreased ROM or focal weakness  Neurological: Denies headaches, seizures, dizziness, paresthesias or weakness  Hematological: Denies unusual bruising or bleeding  Psychiatric: Denies hallucinations, depression, or confusion      Vital Signs:  /66 (BP Location: Left arm, Patient Position: Sitting)   Pulse 92   Temp 99 °F (37.2 °C) (Oral)   Resp 20   Ht 5' 6" (1.676 m)   Wt 81.7 kg (180 lb 1.9 oz)   LMP  (LMP Unknown)   SpO2 97%   BMI 29.07 kg/m²   Body mass index is 29.07 kg/m².      Physical " Exam:    General: no acute distress, awake, alert  Eyes: PERRLA, EOMI, conjunctiva clear, eyelids without swelling  HENT: atraumatic, oropharynx and nasal mucosa patent  Neck: full ROM, no JVD, no thyromegaly or lymphadenopathy  Respiratory: equal, unlabored, clear to auscultation A/P  Cardiovascular: RRR without murmur or rub; radial and pedal pulses felt  Edema: none  Gastrointestinal: soft, non-tender, non-distended; positive bowel sounds; no masses to palpation  Genitourinary: no CVA tenderness upon palpation  Musculoskeletal: full ROM without limitation or discomfort, no signs of acute synovitis  Integumentary: warm, dry; no rashes, wounds, or skin lesions  Neurological: oriented, appropriate, no acute deficits      Labs:        Component Value Date/Time     06/07/2022 2321     06/03/2022 0427     08/23/2021 1142     06/22/2021 1222    K 4.2 06/07/2022 2321    K 4.8 06/03/2022 0427    K 4.4 08/23/2021 1142    K 4.3 06/22/2021 1222     08/23/2021 1142     06/22/2021 1222    CO2 25 06/07/2022 2321    CO2 23 06/03/2022 0427    CO2 21 (L) 08/23/2021 1142    CO2 24 06/22/2021 1222    BUN 22.0 (H) 06/07/2022 2321    BUN 21.6 (H) 06/03/2022 0427    BUN 21 (H) 08/23/2021 1142    BUN 25 (H) 06/22/2021 1222    CREATININE 1.92 (H) 06/07/2022 2321    CREATININE 1.32 (H) 06/03/2022 0427    CREATININE 1.0 08/23/2021 1142    CREATININE 0.82 06/22/2021 1222    CALCIUM 9.0 06/07/2022 2321    CALCIUM 8.1 (L) 06/03/2022 0427    CALCIUM 8.7 08/23/2021 1142    CALCIUM 8.9 06/22/2021 1222    PHOS 2.9 11/30/2017 0738            Component Value Date/Time    WBC 4.6 06/07/2022 2321    WBC 7.8 06/03/2022 0427    WBC 6.23 08/23/2021 1142    WBC 4.80 06/22/2021 1329    HGB 10.2 (L) 06/07/2022 2321    HGB 9.7 (L) 06/03/2022 0427    HGB 12.5 08/23/2021 1142    HGB 11.6 (L) 06/22/2021 1329    HCT 32.7 (L) 06/07/2022 2321    HCT 32.1 (L) 06/03/2022 0427    HCT 38.7 08/23/2021 1142    HCT 34.8 (L) 06/22/2021  1329     (H) 06/07/2022 2321     (H) 06/03/2022 0427     08/23/2021 1142     06/22/2021 1329       Impression:    Patient Active Problem List   Diagnosis    Essential hypertension    Diabetes mellitus, type 2    Lupus (systemic lupus erythematosus)    Insomnia    Hyperlipidemia    History of asthma    New onset atrial fibrillation    Periumbilical pain    Constipation    Anemia    Anxiety    Class 1 obesity with serious comorbidity and body mass index (BMI) of 32.0 to 32.9 in adult    Acute cystitis with hematuria    STIVEN (acute kidney injury)    Pyelonephritis    Inadequate dietary energy intake    Systemic lupus erythematosus    Fibromyalgia     1. STIVEN (acute kidney injury)     2. Lupus nephritis, ISN/RPS class III     3. Diabetes mellitus type 2 with complications       SLE class 3 with membranous features  Concerned with increase in Creatinine and poor tolerance to cellecept  We could do cytoxan again or consider other options ( voclosporin)          Plan:  Discussed case with patient's rheumatologist, Dr. Velazquez, who originally recommended IV cytoxan, but had not realized she had been on it in the past. He recommends considering either rituxamab or imuran. In this regard he will FU with her in his office tomorrow at 8 am to discuss treatment plan.  Pt will FU with me within 3 - 4 weeks and will monitor renal situation close           Gabriel Ken

## 2022-06-14 ENCOUNTER — OFFICE VISIT (OUTPATIENT)
Dept: RHEUMATOLOGY | Facility: CLINIC | Age: 57
End: 2022-06-14
Payer: MEDICAID

## 2022-06-14 VITALS
DIASTOLIC BLOOD PRESSURE: 71 MMHG | RESPIRATION RATE: 18 BRPM | SYSTOLIC BLOOD PRESSURE: 105 MMHG | TEMPERATURE: 99 F | HEART RATE: 70 BPM | BODY MASS INDEX: 29.22 KG/M2 | HEIGHT: 66 IN | WEIGHT: 181.81 LBS

## 2022-06-14 DIAGNOSIS — F51.01 PRIMARY INSOMNIA: ICD-10-CM

## 2022-06-14 DIAGNOSIS — M32.14 LUPUS NEPHRITIS, ISN/RPS CLASS III: ICD-10-CM

## 2022-06-14 DIAGNOSIS — M32.14 SYSTEMIC LUPUS ERYTHEMATOSUS WITH GLOMERULAR DISEASE, UNSPECIFIED SLE TYPE: ICD-10-CM

## 2022-06-14 DIAGNOSIS — I10 ESSENTIAL HYPERTENSION: ICD-10-CM

## 2022-06-14 DIAGNOSIS — M79.7 FIBROMYALGIA: ICD-10-CM

## 2022-06-14 DIAGNOSIS — M32.14 LUPUS NEPHRITIS: Primary | ICD-10-CM

## 2022-06-14 PROCEDURE — 3008F BODY MASS INDEX DOCD: CPT | Mod: CPTII,,, | Performed by: INTERNAL MEDICINE

## 2022-06-14 PROCEDURE — 3066F NEPHROPATHY DOC TX: CPT | Mod: CPTII,,, | Performed by: INTERNAL MEDICINE

## 2022-06-14 PROCEDURE — 1160F RVW MEDS BY RX/DR IN RCRD: CPT | Mod: CPTII,,, | Performed by: INTERNAL MEDICINE

## 2022-06-14 PROCEDURE — 4010F PR ACE/ARB THEARPY RXD/TAKEN: ICD-10-PCS | Mod: CPTII,,, | Performed by: INTERNAL MEDICINE

## 2022-06-14 PROCEDURE — 1111F PR DISCHARGE MEDS RECONCILED W/ CURRENT OUTPATIENT MED LIST: ICD-10-PCS | Mod: CPTII,,, | Performed by: INTERNAL MEDICINE

## 2022-06-14 PROCEDURE — 3078F PR MOST RECENT DIASTOLIC BLOOD PRESSURE < 80 MM HG: ICD-10-PCS | Mod: CPTII,,, | Performed by: INTERNAL MEDICINE

## 2022-06-14 PROCEDURE — 99214 PR OFFICE/OUTPT VISIT, EST, LEVL IV, 30-39 MIN: ICD-10-PCS | Mod: S$PBB,,, | Performed by: INTERNAL MEDICINE

## 2022-06-14 PROCEDURE — 1160F PR REVIEW ALL MEDS BY PRESCRIBER/CLIN PHARMACIST DOCUMENTED: ICD-10-PCS | Mod: CPTII,,, | Performed by: INTERNAL MEDICINE

## 2022-06-14 PROCEDURE — 99999 PR PBB SHADOW E&M-EST. PATIENT-LVL IV: ICD-10-PCS | Mod: PBBFAC,,, | Performed by: INTERNAL MEDICINE

## 2022-06-14 PROCEDURE — 3074F PR MOST RECENT SYSTOLIC BLOOD PRESSURE < 130 MM HG: ICD-10-PCS | Mod: CPTII,,, | Performed by: INTERNAL MEDICINE

## 2022-06-14 PROCEDURE — 3074F SYST BP LT 130 MM HG: CPT | Mod: CPTII,,, | Performed by: INTERNAL MEDICINE

## 2022-06-14 PROCEDURE — 3078F DIAST BP <80 MM HG: CPT | Mod: CPTII,,, | Performed by: INTERNAL MEDICINE

## 2022-06-14 PROCEDURE — 3008F PR BODY MASS INDEX (BMI) DOCUMENTED: ICD-10-PCS | Mod: CPTII,,, | Performed by: INTERNAL MEDICINE

## 2022-06-14 PROCEDURE — 99214 OFFICE O/P EST MOD 30 MIN: CPT | Mod: S$PBB,,, | Performed by: INTERNAL MEDICINE

## 2022-06-14 PROCEDURE — 99214 OFFICE O/P EST MOD 30 MIN: CPT | Mod: PBBFAC | Performed by: INTERNAL MEDICINE

## 2022-06-14 PROCEDURE — 1159F MED LIST DOCD IN RCRD: CPT | Mod: CPTII,,, | Performed by: INTERNAL MEDICINE

## 2022-06-14 PROCEDURE — 1159F PR MEDICATION LIST DOCUMENTED IN MEDICAL RECORD: ICD-10-PCS | Mod: CPTII,,, | Performed by: INTERNAL MEDICINE

## 2022-06-14 PROCEDURE — 3066F PR DOCUMENTATION OF TREATMENT FOR NEPHROPATHY: ICD-10-PCS | Mod: CPTII,,, | Performed by: INTERNAL MEDICINE

## 2022-06-14 PROCEDURE — 4010F ACE/ARB THERAPY RXD/TAKEN: CPT | Mod: CPTII,,, | Performed by: INTERNAL MEDICINE

## 2022-06-14 PROCEDURE — 1111F DSCHRG MED/CURRENT MED MERGE: CPT | Mod: CPTII,,, | Performed by: INTERNAL MEDICINE

## 2022-06-14 PROCEDURE — 99999 PR PBB SHADOW E&M-EST. PATIENT-LVL IV: CPT | Mod: PBBFAC,,, | Performed by: INTERNAL MEDICINE

## 2022-06-14 RX ORDER — HYDROXYCHLOROQUINE SULFATE 200 MG/1
200 TABLET, FILM COATED ORAL 2 TIMES DAILY
Qty: 180 TABLET | Refills: 3 | Status: SHIPPED | OUTPATIENT
Start: 2022-06-14 | End: 2022-12-12 | Stop reason: SDUPTHER

## 2022-06-14 RX ORDER — AZATHIOPRINE 50 MG/1
100 TABLET ORAL
Qty: 60 TABLET | Refills: 5 | Status: SHIPPED | OUTPATIENT
Start: 2022-06-14 | End: 2022-06-14

## 2022-06-14 RX ORDER — ERGOCALCIFEROL 1.25 MG/1
50000 CAPSULE ORAL
Qty: 5 CAPSULE | Refills: 5 | Status: SHIPPED | OUTPATIENT
Start: 2022-06-14 | End: 2022-12-12

## 2022-06-14 RX ORDER — AZATHIOPRINE 50 MG/1
100 TABLET ORAL
Qty: 60 TABLET | Refills: 5 | Status: SHIPPED | OUTPATIENT
Start: 2022-06-14 | End: 2023-01-12

## 2022-06-14 RX ORDER — FAMOTIDINE 10 MG/ML
20 INJECTION INTRAVENOUS
Status: CANCELLED | OUTPATIENT
Start: 2022-06-21

## 2022-06-14 RX ORDER — HEPARIN 100 UNIT/ML
500 SYRINGE INTRAVENOUS
Status: CANCELLED | OUTPATIENT
Start: 2022-06-21

## 2022-06-14 RX ORDER — SODIUM CHLORIDE 0.9 % (FLUSH) 0.9 %
10 SYRINGE (ML) INJECTION
Status: CANCELLED | OUTPATIENT
Start: 2022-06-21

## 2022-06-14 RX ORDER — OMEPRAZOLE 20 MG/1
20 CAPSULE, DELAYED RELEASE ORAL DAILY
Qty: 30 CAPSULE | Refills: 5 | Status: SHIPPED | OUTPATIENT
Start: 2022-06-14 | End: 2023-06-01 | Stop reason: SDUPTHER

## 2022-06-14 RX ORDER — TIZANIDINE 4 MG/1
4 TABLET ORAL NIGHTLY
Qty: 30 TABLET | Refills: 5 | Status: SHIPPED | OUTPATIENT
Start: 2022-06-14 | End: 2023-01-12

## 2022-06-14 RX ORDER — ACETAMINOPHEN 325 MG/1
650 TABLET ORAL
Status: CANCELLED | OUTPATIENT
Start: 2022-06-21

## 2022-06-14 NOTE — PROGRESS NOTES
"Subjective:       Patient ID: Vi Ulrich is a 56 y.o. female.    Chief Complaint: New Patient Referred Dr Ken (Lupus care)    The patient is complaining of joint pain involving the MCP PIP wrist elbow shoulders hips knees and ankles bilaterally.  The pain is 4/10 in intensity dull in quality and continuous.  That is associated with a morning stiffness lasting for more than 60 minutes.  Is also having difficulty maintaining a good night of sleep.  This has been associated with myalgias.  Muscle aches are 5/10 in intensity dull in quality and continuous.  They are associated with fatigue. Stage 3 lupus nephritis diagnosed by biopsy. She could not tolerate cellcept. She was on cytoxan in the past. I will get her on rituxan and imuran combination. Continue prednisone and plaquenil.     Review of Systems   Constitutional: Negative for appetite change, chills and fever.   HENT: Negative for congestion, ear pain, mouth sores, nosebleeds and trouble swallowing.    Eyes: Negative for photophobia and discharge.   Respiratory: Negative for chest tightness and shortness of breath.    Cardiovascular: Negative for chest pain.   Gastrointestinal: Negative for abdominal pain and vomiting.   Endocrine: Negative.    Genitourinary: Negative for hematuria.        Lupus nephritis   Musculoskeletal:        As per HPI   Skin: Negative for rash.   Neurological: Negative for weakness.         Objective:   /71 (BP Location: Right arm, Patient Position: Sitting, BP Method: Large (Automatic))   Pulse 70   Temp 98.8 °F (37.1 °C)   Resp 18   Ht 5' 6" (1.676 m)   Wt 82.5 kg (181 lb 12.8 oz)   LMP  (LMP Unknown)   BMI 29.34 kg/m²      Physical Exam   Constitutional: She is oriented to person, place, and time. She appears well-developed and well-nourished. No distress.   HENT:   Head: Normocephalic and atraumatic.   Right Ear: External ear normal.   Left Ear: External ear normal.   Eyes: Pupils are equal, round, and reactive to " light.   Cardiovascular: Normal rate, regular rhythm and normal heart sounds.   Pulmonary/Chest: Breath sounds normal.   Abdominal: Soft. There is no abdominal tenderness.   Musculoskeletal:      Right shoulder: Tenderness present.      Left shoulder: Tenderness present.      Right elbow: Tenderness present.      Left elbow: Tenderness present.      Right wrist: Tenderness present.      Left wrist: Tenderness present.      Cervical back: Neck supple.      Right hip: Tenderness present.      Left hip: Tenderness present.      Right knee: Tenderness present.      Left knee: Tenderness present.      Right ankle: Tenderness present.      Left ankle: Tenderness present.   Lymphadenopathy:     She has no cervical adenopathy.   Neurological: She is alert and oriented to person, place, and time. She displays normal reflexes. No cranial nerve deficit or sensory deficit. She exhibits normal muscle tone. Coordination normal.   Skin: No rash noted. No erythema.   Vitals reviewed.      Right Side Rheumatological Exam     The patient is tender to palpation of the shoulder, elbow, wrist, knee, 1st PIP, 1st MCP, 2nd PIP, 2nd MCP, 3rd PIP, 3rd MCP, 4th PIP, 4th MCP, 5th PIP, hip, ankle, 1st MTP, 2nd MTP, 3rd MTP, 4th MTP, 5th MTP, 1st toe IP, 2nd toe IP, 3rd toe IP, 4th toe IP and 5th toe IP    Left Side Rheumatological Exam     The patient is tender to palpation of the shoulder, elbow, wrist, knee, 1st PIP, 1st MCP, 2nd PIP, 2nd MCP, 3rd PIP, 3rd MCP, 4th PIP, 4th MCP, 5th PIP, 5th MCP, hip, ankle, 1st MTP, 2nd MTP, 3rd MTP, 4th MTP, 5th MTP, 1st toe IP, 2nd toe IP, 3rd toe IP, 4th toe IP and 5th toe IP.         Completed Fibromyalgia exam 18/18 tender points.  No data to display     Assessment:       1. Lupus nephritis    2. Systemic lupus erythematosus with glomerular disease, unspecified SLE type    3. Fibromyalgia    4. Primary insomnia    5. Essential hypertension    6. Lupus nephritis, ISN/RPS class III            Plan:        Problem List Items Addressed This Visit        Cardiac/Vascular    Essential hypertension    Relevant Medications    ergocalciferol (ERGOCALCIFEROL) 50,000 unit Cap    hydrOXYchloroQUINE (PLAQUENIL) 200 mg tablet    tiZANidine (ZANAFLEX) 4 MG tablet    omeprazole (PRILOSEC) 20 MG capsule    azaTHIOprine (IMURAN) 50 mg Tab       Renal/    Lupus nephritis, ISN/RPS class III    Relevant Medications    ergocalciferol (ERGOCALCIFEROL) 50,000 unit Cap    hydrOXYchloroQUINE (PLAQUENIL) 200 mg tablet    tiZANidine (ZANAFLEX) 4 MG tablet    omeprazole (PRILOSEC) 20 MG capsule    azaTHIOprine (IMURAN) 50 mg Tab       Immunology/Multi System    Systemic lupus erythematosus    Relevant Medications    ergocalciferol (ERGOCALCIFEROL) 50,000 unit Cap    hydrOXYchloroQUINE (PLAQUENIL) 200 mg tablet    tiZANidine (ZANAFLEX) 4 MG tablet    omeprazole (PRILOSEC) 20 MG capsule    azaTHIOprine (IMURAN) 50 mg Tab       Orthopedic    Fibromyalgia    Relevant Medications    ergocalciferol (ERGOCALCIFEROL) 50,000 unit Cap    hydrOXYchloroQUINE (PLAQUENIL) 200 mg tablet    tiZANidine (ZANAFLEX) 4 MG tablet    omeprazole (PRILOSEC) 20 MG capsule    azaTHIOprine (IMURAN) 50 mg Tab       Other    Insomnia    Relevant Medications    ergocalciferol (ERGOCALCIFEROL) 50,000 unit Cap    hydrOXYchloroQUINE (PLAQUENIL) 200 mg tablet    tiZANidine (ZANAFLEX) 4 MG tablet    omeprazole (PRILOSEC) 20 MG capsule    azaTHIOprine (IMURAN) 50 mg Tab      Other Visit Diagnoses     Lupus nephritis    -  Primary    Relevant Medications    ergocalciferol (ERGOCALCIFEROL) 50,000 unit Cap    hydrOXYchloroQUINE (PLAQUENIL) 200 mg tablet    tiZANidine (ZANAFLEX) 4 MG tablet    omeprazole (PRILOSEC) 20 MG capsule    azaTHIOprine (IMURAN) 50 mg Tab          chemotherapy orders written during this visit. counseling for chemotherapy provided during this visit.   labs and kidney biopsy checked and assessed during this visit

## 2022-06-20 RX ORDER — DIPHENHYDRAMINE HYDROCHLORIDE 50 MG/ML
25 INJECTION INTRAMUSCULAR; INTRAVENOUS
Status: CANCELLED
Start: 2022-06-21

## 2022-06-21 ENCOUNTER — INFUSION (OUTPATIENT)
Dept: INFUSION THERAPY | Facility: HOSPITAL | Age: 57
End: 2022-06-21
Attending: INTERNAL MEDICINE
Payer: MEDICAID

## 2022-06-21 VITALS
SYSTOLIC BLOOD PRESSURE: 148 MMHG | DIASTOLIC BLOOD PRESSURE: 82 MMHG | RESPIRATION RATE: 18 BRPM | WEIGHT: 181.81 LBS | BODY MASS INDEX: 29.22 KG/M2 | TEMPERATURE: 99 F | HEART RATE: 83 BPM | HEIGHT: 66 IN

## 2022-06-21 DIAGNOSIS — M32.14 LUPUS NEPHRITIS, ISN/RPS CLASS III: Primary | ICD-10-CM

## 2022-06-21 DIAGNOSIS — M32.14 SYSTEMIC LUPUS ERYTHEMATOSUS WITH GLOMERULAR DISEASE, UNSPECIFIED SLE TYPE: ICD-10-CM

## 2022-06-21 PROCEDURE — 96415 CHEMO IV INFUSION ADDL HR: CPT

## 2022-06-21 PROCEDURE — 96413 CHEMO IV INFUSION 1 HR: CPT

## 2022-06-21 PROCEDURE — 63600175 PHARM REV CODE 636 W HCPCS: Mod: JG | Performed by: INTERNAL MEDICINE

## 2022-06-21 PROCEDURE — 25000003 PHARM REV CODE 250: Performed by: INTERNAL MEDICINE

## 2022-06-21 PROCEDURE — 96375 TX/PRO/DX INJ NEW DRUG ADDON: CPT

## 2022-06-21 RX ORDER — SODIUM CHLORIDE 0.9 % (FLUSH) 0.9 %
10 SYRINGE (ML) INJECTION
Status: DISCONTINUED | OUTPATIENT
Start: 2022-06-21 | End: 2022-06-21 | Stop reason: HOSPADM

## 2022-06-21 RX ORDER — METHYLPREDNISOLONE SOD SUCC 125 MG
100 VIAL (EA) INJECTION ONCE
Status: DISCONTINUED | OUTPATIENT
Start: 2022-06-21 | End: 2022-06-21 | Stop reason: HOSPADM

## 2022-06-21 RX ORDER — FAMOTIDINE 10 MG/ML
20 INJECTION INTRAVENOUS
Status: CANCELLED | OUTPATIENT
Start: 2022-07-05

## 2022-06-21 RX ORDER — FAMOTIDINE 10 MG/ML
20 INJECTION INTRAVENOUS
Status: COMPLETED | OUTPATIENT
Start: 2022-06-21 | End: 2022-06-21

## 2022-06-21 RX ORDER — DIPHENHYDRAMINE HYDROCHLORIDE 50 MG/ML
25 INJECTION INTRAMUSCULAR; INTRAVENOUS
Status: COMPLETED | OUTPATIENT
Start: 2022-06-21 | End: 2022-06-21

## 2022-06-21 RX ORDER — DIPHENHYDRAMINE HYDROCHLORIDE 50 MG/ML
25 INJECTION INTRAMUSCULAR; INTRAVENOUS
Status: CANCELLED
Start: 2022-07-05

## 2022-06-21 RX ORDER — SODIUM CHLORIDE 0.9 % (FLUSH) 0.9 %
10 SYRINGE (ML) INJECTION
Status: CANCELLED | OUTPATIENT
Start: 2022-07-05

## 2022-06-21 RX ORDER — ACETAMINOPHEN 325 MG/1
650 TABLET ORAL
Status: CANCELLED | OUTPATIENT
Start: 2022-07-05

## 2022-06-21 RX ORDER — HEPARIN 100 UNIT/ML
500 SYRINGE INTRAVENOUS
Status: CANCELLED | OUTPATIENT
Start: 2022-07-05

## 2022-06-21 RX ORDER — ACETAMINOPHEN 325 MG/1
650 TABLET ORAL
Status: COMPLETED | OUTPATIENT
Start: 2022-06-21 | End: 2022-06-21

## 2022-06-21 RX ADMIN — FAMOTIDINE 20 MG: 10 INJECTION INTRAVENOUS at 08:06

## 2022-06-21 RX ADMIN — METHYLPREDNISOLONE SODIUM SUCCINATE 100 MG: 500 INJECTION, POWDER, FOR SOLUTION INTRAMUSCULAR; INTRAVENOUS at 08:06

## 2022-06-21 RX ADMIN — ACETAMINOPHEN 650 MG: 325 TABLET, FILM COATED ORAL at 08:06

## 2022-06-21 RX ADMIN — RITUXIMAB 1000 MG: 10 INJECTION, SOLUTION INTRAVENOUS at 08:06

## 2022-06-21 RX ADMIN — DIPHENHYDRAMINE HYDROCHLORIDE 25 MG: 50 INJECTION, SOLUTION INTRAMUSCULAR; INTRAVENOUS at 08:06

## 2022-06-27 ENCOUNTER — LAB VISIT (OUTPATIENT)
Dept: LAB | Facility: HOSPITAL | Age: 57
End: 2022-06-27
Attending: INTERNAL MEDICINE
Payer: MEDICAID

## 2022-06-27 ENCOUNTER — TELEPHONE (OUTPATIENT)
Dept: RHEUMATOLOGY | Facility: CLINIC | Age: 57
End: 2022-06-27
Payer: MEDICAID

## 2022-06-27 DIAGNOSIS — N17.9 AKI (ACUTE KIDNEY INJURY): ICD-10-CM

## 2022-06-27 DIAGNOSIS — M32.14 LUPUS NEPHRITIS, ISN/RPS CLASS III: ICD-10-CM

## 2022-06-27 LAB
ALBUMIN SERPL-MCNC: 2.9 GM/DL (ref 3.5–5)
ALBUMIN/GLOB SERPL: 0.9 RATIO (ref 1.1–2)
ALP SERPL-CCNC: 87 UNIT/L (ref 40–150)
ALT SERPL-CCNC: 6 UNIT/L (ref 0–55)
APPEARANCE UR: CLEAR
AST SERPL-CCNC: 12 UNIT/L (ref 5–34)
BACTERIA #/AREA URNS AUTO: ABNORMAL /HPF
BASOPHILS # BLD AUTO: 0.01 X10(3)/MCL (ref 0–0.2)
BASOPHILS NFR BLD AUTO: 0.2 %
BILIRUB UR QL STRIP.AUTO: NEGATIVE MG/DL
BILIRUBIN DIRECT+TOT PNL SERPL-MCNC: 0.2 MG/DL
BUN SERPL-MCNC: 15.8 MG/DL (ref 9.8–20.1)
C3 SERPL-MCNC: 60 MG/DL (ref 80–173)
C4 SERPL-MCNC: 11.3 MG/DL (ref 13–46)
CALCIUM SERPL-MCNC: 9.1 MG/DL (ref 8.4–10.2)
CHLORIDE SERPL-SCNC: 104 MMOL/L (ref 98–107)
CO2 SERPL-SCNC: 30 MMOL/L (ref 22–29)
COLOR UR AUTO: YELLOW
CREAT SERPL-MCNC: 0.98 MG/DL (ref 0.55–1.02)
CREAT UR-MCNC: 129.6 MG/DL (ref 47–110)
EOSINOPHIL # BLD AUTO: 0.07 X10(3)/MCL (ref 0–0.9)
EOSINOPHIL NFR BLD AUTO: 1.7 %
ERYTHROCYTE [DISTWIDTH] IN BLOOD BY AUTOMATED COUNT: 19 % (ref 11.5–17)
GLOBULIN SER-MCNC: 3.3 GM/DL (ref 2.4–3.5)
GLUCOSE SERPL-MCNC: 98 MG/DL (ref 74–100)
GLUCOSE UR QL STRIP.AUTO: NEGATIVE MG/DL
HCT VFR BLD AUTO: 29.7 % (ref 37–47)
HGB BLD-MCNC: 9.3 GM/DL (ref 12–16)
IMM GRANULOCYTES # BLD AUTO: 0.01 X10(3)/MCL (ref 0–0.02)
IMM GRANULOCYTES NFR BLD AUTO: 0.2 % (ref 0–0.43)
KETONES UR QL STRIP.AUTO: ABNORMAL MG/DL
LEUKOCYTE ESTERASE UR QL STRIP.AUTO: ABNORMAL UNIT/L
LYMPHOCYTES # BLD AUTO: 1.21 X10(3)/MCL (ref 0.6–4.6)
LYMPHOCYTES NFR BLD AUTO: 29.7 %
MCH RBC QN AUTO: 27.3 PG (ref 27–31)
MCHC RBC AUTO-ENTMCNC: 31.3 MG/DL (ref 33–36)
MCV RBC AUTO: 87.1 FL (ref 80–94)
MONOCYTES # BLD AUTO: 0.22 X10(3)/MCL (ref 0.1–1.3)
MONOCYTES NFR BLD AUTO: 5.4 %
NEUTROPHILS # BLD AUTO: 2.6 X10(3)/MCL (ref 2.1–9.2)
NEUTROPHILS NFR BLD AUTO: 62.8 %
NITRITE UR QL STRIP.AUTO: NEGATIVE
NRBC BLD AUTO-RTO: 0 %
PH UR STRIP.AUTO: 6 [PH]
PLATELET # BLD AUTO: 494 X10(3)/MCL (ref 130–400)
PMV BLD AUTO: 10.3 FL (ref 9.4–12.4)
POTASSIUM SERPL-SCNC: 4.4 MMOL/L (ref 3.5–5.1)
PROT SERPL-MCNC: 6.2 GM/DL (ref 6.4–8.3)
PROT UR QL STRIP.AUTO: ABNORMAL MG/DL
PROT UR STRIP-MCNC: 361 MG/DL
RBC # BLD AUTO: 3.41 X10(6)/MCL (ref 4.2–5.4)
RBC #/AREA URNS AUTO: ABNORMAL /HPF
RBC UR QL AUTO: NEGATIVE UNIT/L
SODIUM SERPL-SCNC: 141 MMOL/L (ref 136–145)
SP GR UR STRIP.AUTO: 1.02 (ref 1–1.03)
SQUAMOUS #/AREA URNS AUTO: <5 /HPF
UROBILINOGEN UR STRIP-ACNC: 0.2 MG/DL
WBC # SPEC AUTO: 4.1 X10(3)/MCL (ref 4.5–11.5)
WBC #/AREA URNS AUTO: 12 /HPF

## 2022-06-27 PROCEDURE — 36415 COLL VENOUS BLD VENIPUNCTURE: CPT

## 2022-06-27 PROCEDURE — 86160 COMPLEMENT ANTIGEN: CPT

## 2022-06-27 PROCEDURE — 80053 COMPREHEN METABOLIC PANEL: CPT

## 2022-06-27 PROCEDURE — 82570 ASSAY OF URINE CREATININE: CPT

## 2022-06-27 PROCEDURE — 81001 URINALYSIS AUTO W/SCOPE: CPT

## 2022-06-27 PROCEDURE — 85025 COMPLETE CBC W/AUTO DIFF WBC: CPT

## 2022-06-27 PROCEDURE — 82042 OTHER SOURCE ALBUMIN QUAN EA: CPT

## 2022-06-27 NOTE — TELEPHONE ENCOUNTER
Advised Mrs. Shipman to tell the patient that she would have to call the back of her insurance card for a list of primary care providers. Patient verbalized understanding       ----- Message from Ramonita Crawford sent at 6/27/2022  9:51 AM CDT -----  Regarding: Referral for PCP  Patient came in stating that on her visit of 06.14.2022 Dr. Mcfarlane said that he would refer her to a PCP. Informed patient that we did not see a statement in her chart, But would give Dr. Mcfarlane the message

## 2022-06-29 LAB — BACTERIA UR CULT: NO GROWTH

## 2022-07-05 ENCOUNTER — INFUSION (OUTPATIENT)
Dept: INFUSION THERAPY | Facility: HOSPITAL | Age: 57
End: 2022-07-05
Attending: INTERNAL MEDICINE
Payer: MEDICAID

## 2022-07-05 VITALS
DIASTOLIC BLOOD PRESSURE: 81 MMHG | BODY MASS INDEX: 28.42 KG/M2 | RESPIRATION RATE: 16 BRPM | HEIGHT: 66 IN | WEIGHT: 176.81 LBS | TEMPERATURE: 98 F | OXYGEN SATURATION: 100 % | HEART RATE: 66 BPM | SYSTOLIC BLOOD PRESSURE: 164 MMHG

## 2022-07-05 DIAGNOSIS — M32.14 SYSTEMIC LUPUS ERYTHEMATOSUS WITH GLOMERULAR DISEASE, UNSPECIFIED SLE TYPE: ICD-10-CM

## 2022-07-05 DIAGNOSIS — M32.14 LUPUS NEPHRITIS, ISN/RPS CLASS III: Primary | ICD-10-CM

## 2022-07-05 PROCEDURE — 96415 CHEMO IV INFUSION ADDL HR: CPT

## 2022-07-05 PROCEDURE — 96413 CHEMO IV INFUSION 1 HR: CPT

## 2022-07-05 PROCEDURE — 25000003 PHARM REV CODE 250: Performed by: INTERNAL MEDICINE

## 2022-07-05 PROCEDURE — 63600175 PHARM REV CODE 636 W HCPCS: Performed by: INTERNAL MEDICINE

## 2022-07-05 PROCEDURE — 96375 TX/PRO/DX INJ NEW DRUG ADDON: CPT

## 2022-07-05 RX ORDER — FAMOTIDINE 10 MG/ML
20 INJECTION INTRAVENOUS
Status: COMPLETED | OUTPATIENT
Start: 2022-07-05 | End: 2022-07-05

## 2022-07-05 RX ORDER — SODIUM CHLORIDE 0.9 % (FLUSH) 0.9 %
10 SYRINGE (ML) INJECTION
Status: DISCONTINUED | OUTPATIENT
Start: 2022-07-05 | End: 2022-07-05 | Stop reason: HOSPADM

## 2022-07-05 RX ORDER — ACETAMINOPHEN 325 MG/1
650 TABLET ORAL
Status: CANCELLED | OUTPATIENT
Start: 2022-07-05

## 2022-07-05 RX ORDER — ACETAMINOPHEN 325 MG/1
650 TABLET ORAL
Status: COMPLETED | OUTPATIENT
Start: 2022-07-05 | End: 2022-07-05

## 2022-07-05 RX ORDER — HEPARIN 100 UNIT/ML
500 SYRINGE INTRAVENOUS
Status: CANCELLED | OUTPATIENT
Start: 2022-07-05

## 2022-07-05 RX ORDER — DIPHENHYDRAMINE HYDROCHLORIDE 50 MG/ML
25 INJECTION INTRAMUSCULAR; INTRAVENOUS
Status: CANCELLED
Start: 2022-07-05

## 2022-07-05 RX ORDER — DIPHENHYDRAMINE HYDROCHLORIDE 50 MG/ML
25 INJECTION INTRAMUSCULAR; INTRAVENOUS
Status: COMPLETED | OUTPATIENT
Start: 2022-07-05 | End: 2022-07-05

## 2022-07-05 RX ORDER — SODIUM CHLORIDE 0.9 % (FLUSH) 0.9 %
10 SYRINGE (ML) INJECTION
Status: CANCELLED | OUTPATIENT
Start: 2022-07-05

## 2022-07-05 RX ORDER — FAMOTIDINE 10 MG/ML
20 INJECTION INTRAVENOUS
Status: CANCELLED | OUTPATIENT
Start: 2022-07-05

## 2022-07-05 RX ORDER — HEPARIN 100 UNIT/ML
500 SYRINGE INTRAVENOUS
Status: DISCONTINUED | OUTPATIENT
Start: 2022-07-05 | End: 2022-07-05 | Stop reason: HOSPADM

## 2022-07-05 RX ADMIN — DIPHENHYDRAMINE HYDROCHLORIDE 25 MG: 50 INJECTION, SOLUTION INTRAMUSCULAR; INTRAVENOUS at 09:07

## 2022-07-05 RX ADMIN — FAMOTIDINE 20 MG: 10 INJECTION, SOLUTION INTRAVENOUS at 09:07

## 2022-07-05 RX ADMIN — DEXTROSE 100 MG: 50 INJECTION, SOLUTION INTRAVENOUS at 09:07

## 2022-07-05 RX ADMIN — RITUXIMAB 1000 MG: 10 INJECTION, SOLUTION INTRAVENOUS at 09:07

## 2022-07-05 RX ADMIN — ACETAMINOPHEN 650 MG: 325 TABLET ORAL at 08:07

## 2022-07-07 ENCOUNTER — OFFICE VISIT (OUTPATIENT)
Dept: NEPHROLOGY | Facility: CLINIC | Age: 57
End: 2022-07-07
Payer: MEDICAID

## 2022-07-07 VITALS
HEIGHT: 66 IN | SYSTOLIC BLOOD PRESSURE: 118 MMHG | TEMPERATURE: 98 F | DIASTOLIC BLOOD PRESSURE: 70 MMHG | OXYGEN SATURATION: 82 % | WEIGHT: 177 LBS | BODY MASS INDEX: 28.45 KG/M2

## 2022-07-07 DIAGNOSIS — E11.8 DM (DIABETES MELLITUS) WITH COMPLICATIONS: ICD-10-CM

## 2022-07-07 DIAGNOSIS — N17.9 AKI (ACUTE KIDNEY INJURY): Primary | ICD-10-CM

## 2022-07-07 DIAGNOSIS — M32.14 LUPUS NEPHRITIS: ICD-10-CM

## 2022-07-07 PROCEDURE — 3074F SYST BP LT 130 MM HG: CPT | Mod: CPTII,,, | Performed by: INTERNAL MEDICINE

## 2022-07-07 PROCEDURE — 4010F ACE/ARB THERAPY RXD/TAKEN: CPT | Mod: CPTII,,, | Performed by: INTERNAL MEDICINE

## 2022-07-07 PROCEDURE — 3066F PR DOCUMENTATION OF TREATMENT FOR NEPHROPATHY: ICD-10-PCS | Mod: CPTII,,, | Performed by: INTERNAL MEDICINE

## 2022-07-07 PROCEDURE — 3078F DIAST BP <80 MM HG: CPT | Mod: CPTII,,, | Performed by: INTERNAL MEDICINE

## 2022-07-07 PROCEDURE — 99214 OFFICE O/P EST MOD 30 MIN: CPT | Mod: PBBFAC | Performed by: INTERNAL MEDICINE

## 2022-07-07 PROCEDURE — 3074F PR MOST RECENT SYSTOLIC BLOOD PRESSURE < 130 MM HG: ICD-10-PCS | Mod: CPTII,,, | Performed by: INTERNAL MEDICINE

## 2022-07-07 PROCEDURE — 99999 PR PBB SHADOW E&M-EST. PATIENT-LVL IV: CPT | Mod: PBBFAC,,, | Performed by: INTERNAL MEDICINE

## 2022-07-07 PROCEDURE — 3008F BODY MASS INDEX DOCD: CPT | Mod: CPTII,,, | Performed by: INTERNAL MEDICINE

## 2022-07-07 PROCEDURE — 1159F MED LIST DOCD IN RCRD: CPT | Mod: CPTII,,, | Performed by: INTERNAL MEDICINE

## 2022-07-07 PROCEDURE — 99999 PR PBB SHADOW E&M-EST. PATIENT-LVL IV: ICD-10-PCS | Mod: PBBFAC,,, | Performed by: INTERNAL MEDICINE

## 2022-07-07 PROCEDURE — 4010F PR ACE/ARB THEARPY RXD/TAKEN: ICD-10-PCS | Mod: CPTII,,, | Performed by: INTERNAL MEDICINE

## 2022-07-07 PROCEDURE — 3066F NEPHROPATHY DOC TX: CPT | Mod: CPTII,,, | Performed by: INTERNAL MEDICINE

## 2022-07-07 PROCEDURE — 1159F PR MEDICATION LIST DOCUMENTED IN MEDICAL RECORD: ICD-10-PCS | Mod: CPTII,,, | Performed by: INTERNAL MEDICINE

## 2022-07-07 PROCEDURE — 99214 OFFICE O/P EST MOD 30 MIN: CPT | Mod: S$PBB,,, | Performed by: INTERNAL MEDICINE

## 2022-07-07 PROCEDURE — 3078F PR MOST RECENT DIASTOLIC BLOOD PRESSURE < 80 MM HG: ICD-10-PCS | Mod: CPTII,,, | Performed by: INTERNAL MEDICINE

## 2022-07-07 PROCEDURE — 99214 PR OFFICE/OUTPT VISIT, EST, LEVL IV, 30-39 MIN: ICD-10-PCS | Mod: S$PBB,,, | Performed by: INTERNAL MEDICINE

## 2022-07-07 PROCEDURE — 3008F PR BODY MASS INDEX (BMI) DOCUMENTED: ICD-10-PCS | Mod: CPTII,,, | Performed by: INTERNAL MEDICINE

## 2022-07-07 NOTE — PROGRESS NOTES
[facility]      HPI  Vi Ulrich, 56 y.o. female,  has a past medical history of Arthritis, Asthma, Atrial fibrillation, Chronic constipation, Diabetes mellitus, Encounter for blood transfusion (10/2014), GERD (gastroesophageal reflux disease), Gout, Hypertension, Lupus (2004), Renal disorder, and SLE (systemic lupus erythematosus). Vi Ulrich presents to office for a follow up visit   Pt had a renal biopsy suggestive of combination of Focal stage 3 SLE nephritis and some membranous features  In view of the activity of the urine sediment and some deterioration in renal function and signs of activity, and since patient has been on on cyclophosphamide previously  After discussion with Rheumatology, she was started on IV rituximab she got 2 cycles by Rheumatology  Got tissues complaining of generalized body aches muscle aches but no fever or chills  No  symptoms no cough no dyspnea    Patient denies taking NSAIDs, new antibiotics or recreational drugs. Denies recent episode of dehydration, diarrhea, vomiting, acute illness, hospitalization, recent angiograms or exposure to IV radiocontrast.      Medical History:   Past Medical History:   Diagnosis Date    Arthritis     knees    Asthma     Atrial fibrillation     Chronic constipation     Diabetes mellitus     Encounter for blood transfusion 10/2014    GERD (gastroesophageal reflux disease)     Gout     Hypertension     Lupus 2004    SLE    Renal disorder     SLE (systemic lupus erythematosus)        Surgical History:   Past Surgical History:   Procedure Laterality Date    APPENDECTOMY      CHOLECYSTECTOMY      COLONOSCOPY N/A 4/19/2018    Procedure: COLONOSCOPY;  Surgeon: Minerva Porras MD;  Location: [facility];  Service: Endoscopy;  Laterality: N/A;    ESOPHAGOGASTRODUODENOSCOPY N/A 4/19/2018    Procedure: ESOPHAGOGASTRODUODENOSCOPY (EGD);  Surgeon: Minerva Porras MD;  Location: [facility];  Service:  Endoscopy;  Laterality: N/A;    HYSTERECTOMY      JOINT REPLACEMENT Left 08/07/2015    Knee    LYMPH NODE BIOPSY Left 2014    MASTECTOMY      Left arm- NO BP    PARTIAL HYSTERECTOMY         Family History:   Family History   Problem Relation Age of Onset    Heart block Mother     Heart disease Father        Social History:   Social History     Tobacco Use    Smoking status: Former Smoker     Packs/day: 1.00     Years: 15.00     Pack years: 15.00     Types: Cigarettes    Smokeless tobacco: Never Used   Substance Use Topics    Alcohol use: No       Allergies:  Review of patient's allergies indicates:   Allergen Reactions    Ketorolac (pf) Swelling    Mycophenolate mofetil Swelling    Penicillins Hives    Percocet [oxycodone-acetaminophen] Hives and Itching    Tramadol Hives       Medications:    Current Outpatient Medications:     albuterol sulfate (PROAIR RESPICLICK) 90 mcg/actuation inhaler, Inhale 1 puff into the lungs every 4 (four) hours as needed for Wheezing. Rescue, Disp: , Rfl:     atorvastatin (LIPITOR) 10 MG tablet, Take 10 mg by mouth once daily., Disp: , Rfl:     azaTHIOprine (IMURAN) 50 mg Tab, Take 2 tablets (100 mg total) by mouth daily with dinner or evening meal., Disp: 60 tablet, Rfl: 5    blood sugar diagnostic (ONETOUCH VERIO) Strp, 1 each by Misc.(Non-Drug; Combo Route) route 4 (four) times daily., Disp: 50 each, Rfl: 11    blood sugar diagnostic Strp, 1 each by Misc.(Non-Drug; Combo Route) route 4 (four) times daily., Disp: 50 each, Rfl: 11    blood-glucose meter (ONETOUCH VERIO SYNC) kit, Use as instructed, Disp: 1 each, Rfl: 0    hydrOXYchloroQUINE (PLAQUENIL) 200 mg tablet, Take 1 tablet (200 mg total) by mouth 2 (two) times daily., Disp: 180 tablet, Rfl: 3    lancets 33 gauge Misc, 1 lancet by Misc.(Non-Drug; Combo Route) route 4 (four) times daily., Disp: 100 each, Rfl: 11    omeprazole (PRILOSEC) 20 MG capsule, Take 1 capsule (20 mg total) by mouth once daily.,  "Disp: 30 capsule, Rfl: 5    tiZANidine (ZANAFLEX) 4 MG tablet, Take 1 tablet (4 mg total) by mouth every evening., Disp: 30 tablet, Rfl: 5    ELIQUIS 5 mg Tab, TAKE 1 TABLET BY MOUTH TWICE DAILY (Patient taking differently: Take 5 mg by mouth 2 (two) times daily.), Disp: 60 tablet, Rfl: 0    ergocalciferol (ERGOCALCIFEROL) 50,000 unit Cap, Take 1 capsule (50,000 Units total) by mouth every 7 days., Disp: 5 capsule, Rfl: 5    HYDROcodone-acetaminophen (NORCO) 5-325 mg per tablet, Take 1 tablet by mouth 2 (two) times daily as needed for Pain., Disp: , Rfl:        ROS:    Constitutional:  Generalized body aches muscle aches no fever no chills no cough, arthralgias the elbows knees and lower back  Skin: Denies wounds, no rashes, no itching, no new skin lesions  HEENT: Denies acute change in hearing or vision, tinnitus, or dysphagia  Respiratory:  Denies cough, shortness of breath, or wheezing  Cardiovascular: Denies chest pain, palpitations, or swelling  Gastrointestional: Denies abdominal pain, nausea, vomiting, diarrhea, or constipation  Genitourinary: Denies dysuria, hematuria, or incontinence; reports able to empty bladder  Musculoskeletal:  Myalgias arthralgias as previously described  Neurological: Denies headaches, seizures, dizziness, paresthesias or weakness  Hematological: Denies unusual bruising or bleeding  Psychiatric: Denies hallucinations, depression, or confusion      Vital Signs:  /70   Temp 98 °F (36.7 °C) (Oral)   Ht 5' 6" (1.676 m)   Wt 80.3 kg (177 lb 0.5 oz)   LMP  (LMP Unknown)   SpO2 (!) 82%   BMI 28.57 kg/m²   Body mass index is 28.57 kg/m².      Physical Exam:    General: no acute distress, awake, alert  Eyes: PERRLA, EOMI, conjunctiva clear, eyelids without swelling  HENT: atraumatic, oropharynx and nasal mucosa patent  Neck: full ROM, no JVD, no thyromegaly or lymphadenopathy  Respiratory: equal, unlabored, clear to auscultation A/P  Cardiovascular: RRR without  rub; radial " and pedal pulses felt  Edema: none  Gastrointestinal: soft, non-tender, non-distended; positive bowel sounds; no masses to palpation  Genitourinary: no CVA tenderness upon palpation  Musculoskeletal:  No signs of synovitis or joint tenderness  Integumentary: warm, dry; no rashes, wounds, or skin lesions  Neurological: oriented, appropriate, no acute deficits      Labs:        Component Value Date/Time     06/27/2022 0938     06/07/2022 2321     08/23/2021 1142     06/22/2021 1222    K 4.4 06/27/2022 0938    K 4.2 06/07/2022 2321    K 4.4 08/23/2021 1142    K 4.3 06/22/2021 1222     08/23/2021 1142     06/22/2021 1222    CO2 30 (H) 06/27/2022 0938    CO2 25 06/07/2022 2321    CO2 21 (L) 08/23/2021 1142    CO2 24 06/22/2021 1222    BUN 15.8 06/27/2022 0938    BUN 22.0 (H) 06/07/2022 2321    BUN 21 (H) 08/23/2021 1142    BUN 25 (H) 06/22/2021 1222    CREATININE 0.98 06/27/2022 0938    CREATININE 1.92 (H) 06/07/2022 2321    CREATININE 1.0 08/23/2021 1142    CREATININE 0.82 06/22/2021 1222    CALCIUM 9.1 06/27/2022 0938    CALCIUM 9.0 06/07/2022 2321    CALCIUM 8.7 08/23/2021 1142    CALCIUM 8.9 06/22/2021 1222    PHOS 2.9 11/30/2017 0738            Component Value Date/Time    WBC 4.1 (L) 06/27/2022 0938    WBC 4.6 06/07/2022 2321    WBC 6.23 08/23/2021 1142    WBC 4.80 06/22/2021 1329    HGB 9.3 (L) 06/27/2022 0938    HGB 10.2 (L) 06/07/2022 2321    HGB 12.5 08/23/2021 1142    HGB 11.6 (L) 06/22/2021 1329    HCT 29.7 (L) 06/27/2022 0938    HCT 32.7 (L) 06/07/2022 2321    HCT 38.7 08/23/2021 1142    HCT 34.8 (L) 06/22/2021 1329     (H) 06/27/2022 0938     (H) 06/07/2022 2321     08/23/2021 1142     06/22/2021 1329         Impression:    Patient Active Problem List   Diagnosis    Essential hypertension    Diabetes mellitus, type 2    Lupus (systemic lupus erythematosus)    Insomnia    Hyperlipidemia    History of asthma    New onset atrial  fibrillation    Periumbilical pain    Constipation    Anemia    Anxiety    Class 1 obesity with serious comorbidity and body mass index (BMI) of 32.0 to 32.9 in adult    Acute cystitis with hematuria    STIVEN (acute kidney injury)    Pyelonephritis    Inadequate dietary energy intake    Systemic lupus erythematosus    Fibromyalgia    Lupus nephritis, ISN/RPS class III     1. STIVEN (acute kidney injury)     2. Lupus nephritis     3. DM (diabetes mellitus) with complications       Lupus nephritis stage III with features of stage 5, status post 2 cycles of rituximab  Most recent renal function appears stable  Patient could have symptoms related to her recent rituximab could be or so viral prodrome, she is instructed to go to the emergency department if her symptoms worsen or persist or if she has fever, or if she has dyspnea and cough    Plan:  Renal wise and follow her back in a month and order labs await before the visit  Patient also followed by Rheumatology was managing her immunosuppressive regimen         Gabriel Ken

## 2022-08-01 ENCOUNTER — HOSPITAL ENCOUNTER (EMERGENCY)
Facility: HOSPITAL | Age: 57
Discharge: LEFT BEFORE DISCHARGE | End: 2022-08-01
Attending: EMERGENCY MEDICINE
Payer: MEDICAID

## 2022-08-01 VITALS
RESPIRATION RATE: 18 BRPM | HEART RATE: 70 BPM | SYSTOLIC BLOOD PRESSURE: 116 MMHG | TEMPERATURE: 99 F | HEIGHT: 66 IN | DIASTOLIC BLOOD PRESSURE: 68 MMHG | OXYGEN SATURATION: 100 % | WEIGHT: 185 LBS | BODY MASS INDEX: 29.73 KG/M2

## 2022-08-01 LAB
ALBUMIN SERPL-MCNC: 3.4 GM/DL (ref 3.5–5)
ALBUMIN/GLOB SERPL: 0.7 RATIO (ref 1.1–2)
ALP SERPL-CCNC: 122 UNIT/L (ref 40–150)
ALT SERPL-CCNC: <5 UNIT/L (ref 0–55)
APPEARANCE UR: ABNORMAL
AST SERPL-CCNC: 12 UNIT/L (ref 5–34)
BACTERIA #/AREA URNS AUTO: ABNORMAL /HPF
BASOPHILS # BLD AUTO: 0.02 X10(3)/MCL (ref 0–0.2)
BASOPHILS NFR BLD AUTO: 0.4 %
BILIRUB UR QL STRIP.AUTO: NEGATIVE MG/DL
BILIRUBIN DIRECT+TOT PNL SERPL-MCNC: 0.2 MG/DL
BUN SERPL-MCNC: 13.5 MG/DL (ref 9.8–20.1)
C TRACH DNA SPEC QL NAA+PROBE: NOT DETECTED
CALCIUM SERPL-MCNC: 9.3 MG/DL (ref 8.4–10.2)
CHLORIDE SERPL-SCNC: 108 MMOL/L (ref 98–107)
CO2 SERPL-SCNC: 22 MMOL/L (ref 22–29)
COLOR UR AUTO: ABNORMAL
CREAT SERPL-MCNC: 0.95 MG/DL (ref 0.55–1.02)
EOSINOPHIL # BLD AUTO: 0.03 X10(3)/MCL (ref 0–0.9)
EOSINOPHIL NFR BLD AUTO: 0.5 %
ERYTHROCYTE [DISTWIDTH] IN BLOOD BY AUTOMATED COUNT: 18.2 % (ref 11.5–17)
GLOBULIN SER-MCNC: 4.7 GM/DL (ref 2.4–3.5)
GLUCOSE SERPL-MCNC: 73 MG/DL (ref 74–100)
GLUCOSE UR QL STRIP.AUTO: NEGATIVE MG/DL
HCT VFR BLD AUTO: 39.2 % (ref 37–47)
HGB BLD-MCNC: 11.9 GM/DL (ref 12–16)
IMM GRANULOCYTES # BLD AUTO: 0.02 X10(3)/MCL (ref 0–0.04)
IMM GRANULOCYTES NFR BLD AUTO: 0.4 %
KETONES UR QL STRIP.AUTO: ABNORMAL MG/DL
LEUKOCYTE ESTERASE UR QL STRIP.AUTO: ABNORMAL UNIT/L
LIPASE SERPL-CCNC: 20 U/L
LYMPHOCYTES # BLD AUTO: 3.01 X10(3)/MCL (ref 0.6–4.6)
LYMPHOCYTES NFR BLD AUTO: 54.6 %
MCH RBC QN AUTO: 27.9 PG (ref 27–31)
MCHC RBC AUTO-ENTMCNC: 30.4 MG/DL (ref 33–36)
MCV RBC AUTO: 91.8 FL (ref 80–94)
MONOCYTES # BLD AUTO: 0.24 X10(3)/MCL (ref 0.1–1.3)
MONOCYTES NFR BLD AUTO: 4.4 %
N GONORRHOEA DNA SPEC QL NAA+PROBE: NOT DETECTED
NEUTROPHILS # BLD AUTO: 2.2 X10(3)/MCL (ref 2.1–9.2)
NEUTROPHILS NFR BLD AUTO: 39.7 %
NITRITE UR QL STRIP.AUTO: NEGATIVE
NRBC BLD AUTO-RTO: 0 %
PH UR STRIP.AUTO: 5.5 [PH]
PLATELET # BLD AUTO: 555 X10(3)/MCL (ref 130–400)
PMV BLD AUTO: 9.6 FL (ref 7.4–10.4)
POTASSIUM SERPL-SCNC: 4.8 MMOL/L (ref 3.5–5.1)
PROT SERPL-MCNC: 8.1 GM/DL (ref 6.4–8.3)
PROT UR QL STRIP.AUTO: ABNORMAL MG/DL
RBC # BLD AUTO: 4.27 X10(6)/MCL (ref 4.2–5.4)
RBC #/AREA URNS AUTO: <5 /HPF
RBC UR QL AUTO: NEGATIVE UNIT/L
SODIUM SERPL-SCNC: 140 MMOL/L (ref 136–145)
SP GR UR STRIP.AUTO: 1.02 (ref 1–1.03)
SQUAMOUS #/AREA URNS AUTO: <5 /HPF
UROBILINOGEN UR STRIP-ACNC: 1 MG/DL
WBC # SPEC AUTO: 5.5 X10(3)/MCL (ref 4.5–11.5)
WBC #/AREA URNS AUTO: 162 /HPF

## 2022-08-01 PROCEDURE — 80053 COMPREHEN METABOLIC PANEL: CPT | Performed by: PHYSICIAN ASSISTANT

## 2022-08-01 PROCEDURE — 87491 CHLMYD TRACH DNA AMP PROBE: CPT | Performed by: PHYSICIAN ASSISTANT

## 2022-08-01 PROCEDURE — 85025 COMPLETE CBC W/AUTO DIFF WBC: CPT | Performed by: PHYSICIAN ASSISTANT

## 2022-08-01 PROCEDURE — 87591 N.GONORRHOEAE DNA AMP PROB: CPT | Performed by: PHYSICIAN ASSISTANT

## 2022-08-01 PROCEDURE — 36415 COLL VENOUS BLD VENIPUNCTURE: CPT | Performed by: PHYSICIAN ASSISTANT

## 2022-08-01 PROCEDURE — 81001 URINALYSIS AUTO W/SCOPE: CPT | Performed by: PHYSICIAN ASSISTANT

## 2022-08-01 PROCEDURE — 83690 ASSAY OF LIPASE: CPT | Performed by: PHYSICIAN ASSISTANT

## 2022-08-01 PROCEDURE — 99283 EMERGENCY DEPT VISIT LOW MDM: CPT | Mod: 25

## 2022-08-01 NOTE — FIRST PROVIDER EVALUATION
"Medical screening exam completed.  I have conducted a focused provider triage encounter, findings are as follows:    Chief Complaint   Patient presents with    Abdominal Pain     C/o LT side abd pain and nausea. Also c/o dysuria and discharge x2 days. States she was just seen by PMD and told to come to ER. Hx lupus.     Brief history of present illness:  56 y.o. female presents to the ED with left-sided abdominal pain radaiting to flank onset 2 days ago with associated nausea and yellow vaginal discharge. Denies any new sexual partners. Notes she was sent here by PCP. Hx of Lupus    Vitals:    08/01/22 1000   BP: 120/69   Pulse: (!) 58   Resp: 18   Temp: 98.8 °F (37.1 °C)   TempSrc: Oral   SpO2: 99%   Weight: 83.9 kg (185 lb)   Height: 5' 6" (1.676 m)       Pertinent physical exam:  Awake, alert, ambulatory, non-labored respirations    Brief workup plan:  Labs, UA    Preliminary workup initiated; this workup will be continued and followed by the physician or advanced practice provider that is assigned to the patient when roomed.  "

## 2022-08-03 LAB — BACTERIA UR CULT: NO GROWTH

## 2022-08-04 ENCOUNTER — OFFICE VISIT (OUTPATIENT)
Dept: NEPHROLOGY | Facility: CLINIC | Age: 57
End: 2022-08-04
Payer: MEDICAID

## 2022-08-04 ENCOUNTER — HOSPITAL ENCOUNTER (EMERGENCY)
Facility: HOSPITAL | Age: 57
Discharge: HOME | End: 2022-08-04
Attending: STUDENT IN AN ORGANIZED HEALTH CARE EDUCATION/TRAINING PROGRAM
Payer: MEDICAID

## 2022-08-04 VITALS
HEIGHT: 66 IN | WEIGHT: 15 LBS | SYSTOLIC BLOOD PRESSURE: 138 MMHG | OXYGEN SATURATION: 99 % | HEART RATE: 67 BPM | BODY MASS INDEX: 2.41 KG/M2 | RESPIRATION RATE: 18 BRPM | DIASTOLIC BLOOD PRESSURE: 72 MMHG

## 2022-08-04 VITALS
WEIGHT: 175.25 LBS | HEIGHT: 66 IN | OXYGEN SATURATION: 95 % | BODY MASS INDEX: 28.16 KG/M2 | HEART RATE: 82 BPM | RESPIRATION RATE: 20 BRPM | DIASTOLIC BLOOD PRESSURE: 70 MMHG | TEMPERATURE: 99 F | SYSTOLIC BLOOD PRESSURE: 110 MMHG

## 2022-08-04 DIAGNOSIS — M32.14 LUPUS NEPHRITIS: ICD-10-CM

## 2022-08-04 DIAGNOSIS — E11.8 DM (DIABETES MELLITUS) WITH COMPLICATIONS: ICD-10-CM

## 2022-08-04 DIAGNOSIS — K52.9 ENTERITIS: ICD-10-CM

## 2022-08-04 DIAGNOSIS — R10.9 ABDOMINAL PAIN, UNSPECIFIED ABDOMINAL LOCATION: Primary | ICD-10-CM

## 2022-08-04 DIAGNOSIS — N17.9 AKI (ACUTE KIDNEY INJURY): Primary | ICD-10-CM

## 2022-08-04 LAB
ALBUMIN SERPL-MCNC: 3 GM/DL (ref 3.5–5)
ALBUMIN/GLOB SERPL: 0.7 RATIO (ref 1.1–2)
ALP SERPL-CCNC: 106 UNIT/L (ref 40–150)
ALT SERPL-CCNC: <5 UNIT/L (ref 0–55)
APPEARANCE UR: ABNORMAL
AST SERPL-CCNC: 10 UNIT/L (ref 5–34)
BACTERIA #/AREA URNS AUTO: ABNORMAL /HPF
BASOPHILS # BLD AUTO: 0.02 X10(3)/MCL (ref 0–0.2)
BASOPHILS NFR BLD AUTO: 0.4 %
BILIRUB UR QL STRIP.AUTO: NEGATIVE MG/DL
BILIRUBIN DIRECT+TOT PNL SERPL-MCNC: 0.2 MG/DL
BUN SERPL-MCNC: 18.5 MG/DL (ref 9.8–20.1)
CALCIUM SERPL-MCNC: 8.8 MG/DL (ref 8.4–10.2)
CHLORIDE SERPL-SCNC: 108 MMOL/L (ref 98–107)
CO2 SERPL-SCNC: 26 MMOL/L (ref 22–29)
COLOR UR AUTO: ABNORMAL
CREAT SERPL-MCNC: 1.25 MG/DL (ref 0.55–1.02)
EOSINOPHIL # BLD AUTO: 0.03 X10(3)/MCL (ref 0–0.9)
EOSINOPHIL NFR BLD AUTO: 0.5 %
ERYTHROCYTE [DISTWIDTH] IN BLOOD BY AUTOMATED COUNT: 17.9 % (ref 11.5–17)
GLOBULIN SER-MCNC: 4.2 GM/DL (ref 2.4–3.5)
GLUCOSE SERPL-MCNC: 79 MG/DL (ref 74–100)
GLUCOSE UR QL STRIP.AUTO: NEGATIVE MG/DL
HCT VFR BLD AUTO: 33.7 % (ref 37–47)
HGB BLD-MCNC: 10.5 GM/DL (ref 12–16)
HYALINE CASTS URNS QL MICRO: ABNORMAL /HPF
IMM GRANULOCYTES # BLD AUTO: 0.03 X10(3)/MCL (ref 0–0.04)
IMM GRANULOCYTES NFR BLD AUTO: 0.5 %
KETONES UR QL STRIP.AUTO: ABNORMAL MG/DL
LEUKOCYTE ESTERASE UR QL STRIP.AUTO: ABNORMAL UNIT/L
LIPASE SERPL-CCNC: 19 U/L
LYMPHOCYTES # BLD AUTO: 1.69 X10(3)/MCL (ref 0.6–4.6)
LYMPHOCYTES NFR BLD AUTO: 30.8 %
MCH RBC QN AUTO: 28.4 PG (ref 27–31)
MCHC RBC AUTO-ENTMCNC: 31.2 MG/DL (ref 33–36)
MCV RBC AUTO: 91.1 FL (ref 80–94)
MONOCYTES # BLD AUTO: 0.21 X10(3)/MCL (ref 0.1–1.3)
MONOCYTES NFR BLD AUTO: 3.8 %
NEUTROPHILS # BLD AUTO: 3.5 X10(3)/MCL (ref 2.1–9.2)
NEUTROPHILS NFR BLD AUTO: 64 %
NITRITE UR QL STRIP.AUTO: NEGATIVE
NRBC BLD AUTO-RTO: 0 %
PH UR STRIP.AUTO: 5 [PH]
PLATELET # BLD AUTO: 545 X10(3)/MCL (ref 130–400)
PMV BLD AUTO: 9.1 FL (ref 7.4–10.4)
POTASSIUM SERPL-SCNC: 4.1 MMOL/L (ref 3.5–5.1)
PROT SERPL-MCNC: 7.2 GM/DL (ref 6.4–8.3)
PROT UR QL STRIP.AUTO: ABNORMAL MG/DL
RBC # BLD AUTO: 3.7 X10(6)/MCL (ref 4.2–5.4)
RBC #/AREA URNS AUTO: <5 /HPF
RBC UR QL AUTO: NEGATIVE UNIT/L
SODIUM SERPL-SCNC: 142 MMOL/L (ref 136–145)
SP GR UR STRIP.AUTO: 1.03 (ref 1–1.03)
SQUAMOUS #/AREA URNS AUTO: ABNORMAL /HPF
UROBILINOGEN UR STRIP-ACNC: 1 MG/DL
WBC # SPEC AUTO: 5.5 X10(3)/MCL (ref 4.5–11.5)
WBC #/AREA URNS AUTO: ABNORMAL /HPF

## 2022-08-04 PROCEDURE — 99999 PR PBB SHADOW E&M-EST. PATIENT-LVL V: CPT | Mod: PBBFAC,,, | Performed by: INTERNAL MEDICINE

## 2022-08-04 PROCEDURE — 3008F PR BODY MASS INDEX (BMI) DOCUMENTED: ICD-10-PCS | Mod: CPTII,,, | Performed by: INTERNAL MEDICINE

## 2022-08-04 PROCEDURE — 80053 COMPREHEN METABOLIC PANEL: CPT | Performed by: PHYSICIAN ASSISTANT

## 2022-08-04 PROCEDURE — 3078F PR MOST RECENT DIASTOLIC BLOOD PRESSURE < 80 MM HG: ICD-10-PCS | Mod: CPTII,,, | Performed by: INTERNAL MEDICINE

## 2022-08-04 PROCEDURE — 99214 PR OFFICE/OUTPT VISIT, EST, LEVL IV, 30-39 MIN: ICD-10-PCS | Mod: S$PBB,,, | Performed by: INTERNAL MEDICINE

## 2022-08-04 PROCEDURE — 25000003 PHARM REV CODE 250: Performed by: STUDENT IN AN ORGANIZED HEALTH CARE EDUCATION/TRAINING PROGRAM

## 2022-08-04 PROCEDURE — 85025 COMPLETE CBC W/AUTO DIFF WBC: CPT | Performed by: PHYSICIAN ASSISTANT

## 2022-08-04 PROCEDURE — 4010F ACE/ARB THERAPY RXD/TAKEN: CPT | Mod: CPTII,,, | Performed by: INTERNAL MEDICINE

## 2022-08-04 PROCEDURE — 3008F BODY MASS INDEX DOCD: CPT | Mod: CPTII,,, | Performed by: INTERNAL MEDICINE

## 2022-08-04 PROCEDURE — 99214 OFFICE O/P EST MOD 30 MIN: CPT | Mod: S$PBB,,, | Performed by: INTERNAL MEDICINE

## 2022-08-04 PROCEDURE — 1159F MED LIST DOCD IN RCRD: CPT | Mod: CPTII,,, | Performed by: INTERNAL MEDICINE

## 2022-08-04 PROCEDURE — 99215 OFFICE O/P EST HI 40 MIN: CPT | Mod: PBBFAC | Performed by: INTERNAL MEDICINE

## 2022-08-04 PROCEDURE — 3066F NEPHROPATHY DOC TX: CPT | Mod: CPTII,,, | Performed by: INTERNAL MEDICINE

## 2022-08-04 PROCEDURE — 83690 ASSAY OF LIPASE: CPT | Performed by: PHYSICIAN ASSISTANT

## 2022-08-04 PROCEDURE — 1159F PR MEDICATION LIST DOCUMENTED IN MEDICAL RECORD: ICD-10-PCS | Mod: CPTII,,, | Performed by: INTERNAL MEDICINE

## 2022-08-04 PROCEDURE — 96365 THER/PROPH/DIAG IV INF INIT: CPT

## 2022-08-04 PROCEDURE — 3074F PR MOST RECENT SYSTOLIC BLOOD PRESSURE < 130 MM HG: ICD-10-PCS | Mod: CPTII,,, | Performed by: INTERNAL MEDICINE

## 2022-08-04 PROCEDURE — 99999 PR PBB SHADOW E&M-EST. PATIENT-LVL V: ICD-10-PCS | Mod: PBBFAC,,, | Performed by: INTERNAL MEDICINE

## 2022-08-04 PROCEDURE — 96375 TX/PRO/DX INJ NEW DRUG ADDON: CPT

## 2022-08-04 PROCEDURE — 63600175 PHARM REV CODE 636 W HCPCS: Performed by: STUDENT IN AN ORGANIZED HEALTH CARE EDUCATION/TRAINING PROGRAM

## 2022-08-04 PROCEDURE — 3074F SYST BP LT 130 MM HG: CPT | Mod: CPTII,,, | Performed by: INTERNAL MEDICINE

## 2022-08-04 PROCEDURE — 36415 COLL VENOUS BLD VENIPUNCTURE: CPT | Performed by: PHYSICIAN ASSISTANT

## 2022-08-04 PROCEDURE — 3066F PR DOCUMENTATION OF TREATMENT FOR NEPHROPATHY: ICD-10-PCS | Mod: CPTII,,, | Performed by: INTERNAL MEDICINE

## 2022-08-04 PROCEDURE — 99285 EMERGENCY DEPT VISIT HI MDM: CPT | Mod: 25,27

## 2022-08-04 PROCEDURE — 3078F DIAST BP <80 MM HG: CPT | Mod: CPTII,,, | Performed by: INTERNAL MEDICINE

## 2022-08-04 PROCEDURE — 81001 URINALYSIS AUTO W/SCOPE: CPT | Performed by: PHYSICIAN ASSISTANT

## 2022-08-04 PROCEDURE — 4010F PR ACE/ARB THEARPY RXD/TAKEN: ICD-10-PCS | Mod: CPTII,,, | Performed by: INTERNAL MEDICINE

## 2022-08-04 RX ORDER — MORPHINE SULFATE 4 MG/ML
4 INJECTION, SOLUTION INTRAMUSCULAR; INTRAVENOUS
Status: COMPLETED | OUTPATIENT
Start: 2022-08-04 | End: 2022-08-04

## 2022-08-04 RX ORDER — CIPROFLOXACIN 500 MG/1
500 TABLET ORAL 2 TIMES DAILY
Qty: 20 TABLET | Refills: 0 | Status: SHIPPED | OUTPATIENT
Start: 2022-08-04 | End: 2022-08-14

## 2022-08-04 RX ORDER — ONDANSETRON 2 MG/ML
4 INJECTION INTRAMUSCULAR; INTRAVENOUS
Status: COMPLETED | OUTPATIENT
Start: 2022-08-04 | End: 2022-08-04

## 2022-08-04 RX ORDER — ONDANSETRON 4 MG/1
4 TABLET, FILM COATED ORAL EVERY 6 HOURS PRN
Qty: 24 TABLET | Refills: 0 | Status: SHIPPED | OUTPATIENT
Start: 2022-08-04 | End: 2023-06-01 | Stop reason: SDUPTHER

## 2022-08-04 RX ORDER — FLUCONAZOLE 150 MG/1
150 TABLET ORAL DAILY
Qty: 1 TABLET | Refills: 0 | Status: SHIPPED | OUTPATIENT
Start: 2022-08-04 | End: 2022-08-05

## 2022-08-04 RX ORDER — HYDROCODONE BITARTRATE AND ACETAMINOPHEN 5; 325 MG/1; MG/1
1 TABLET ORAL EVERY 4 HOURS PRN
Qty: 8 TABLET | Refills: 0 | OUTPATIENT
Start: 2022-08-04 | End: 2022-10-13

## 2022-08-04 RX ADMIN — SODIUM CHLORIDE, POTASSIUM CHLORIDE, SODIUM LACTATE AND CALCIUM CHLORIDE 1000 ML: 600; 310; 30; 20 INJECTION, SOLUTION INTRAVENOUS at 04:08

## 2022-08-04 RX ADMIN — DEXTROSE MONOHYDRATE 1 G: 5 INJECTION INTRAVENOUS at 04:08

## 2022-08-04 RX ADMIN — MORPHINE SULFATE 4 MG: 4 INJECTION INTRAVENOUS at 04:08

## 2022-08-04 RX ADMIN — ONDANSETRON 4 MG: 2 INJECTION INTRAMUSCULAR; INTRAVENOUS at 04:08

## 2022-08-04 NOTE — FIRST PROVIDER EVALUATION
"Medical screening exam completed.  I have conducted a focused provider triage encounter, findings are as follows:    Brief history of present illness:  Ms. Ulrich presented to the ED with increased abdominal pain.  Reports hx ESRD state 3.     Vitals:    08/04/22 1014   Pulse: 79   Resp: 18   TempSrc: Oral   SpO2: 99%   Weight: 6.804 kg (15 lb)   Height: 5' 6.14" (1.68 m)       Pertinent physical exam:  Patient is not toxic.  Ambulatory with steady gait.      Brief workup plan:  Labs, Imaging    Preliminary workup initiated; this workup will be continued and followed by the physician or advanced practice provider that is assigned to the patient when roomed.  "

## 2022-08-04 NOTE — PROGRESS NOTES
[facility] Progress Note      HPI  Vi Ulrich, 56 y.o. female,  has a past medical history of Arthritis, Asthma, Atrial fibrillation, Chronic constipation, Diabetes mellitus, Encounter for blood transfusion (10/2014), GERD (gastroesophageal reflux disease), Gout, Hypertension, Lupus (2004), Renal disorder, and SLE (systemic lupus erythematosus). Vi Ulrich presents to office for a 4 week follow up visit for  renal biopsy suggestive of combination of Focal stage 3 SLE nephritis and some membranous features.    Hx of cyclophosphamide therapy; after discussion with Rheumatology, she was started on IV rituximab she completed 2 cycles.    She is on imuran   Complaining of severe abdominal pain, no fever there is a questionable vaginal discharge  She did go to the ED few days back and no imaging was done    Medical History:   Past Medical History:   Diagnosis Date    Arthritis     knees    Asthma     Atrial fibrillation     Chronic constipation     Diabetes mellitus     Encounter for blood transfusion 10/2014    GERD (gastroesophageal reflux disease)     Gout     Hypertension     Lupus 2004    SLE    Renal disorder     SLE (systemic lupus erythematosus)        Surgical History:   Past Surgical History:   Procedure Laterality Date    APPENDECTOMY      CHOLECYSTECTOMY      COLONOSCOPY N/A 4/19/2018    Procedure: COLONOSCOPY;  Surgeon: Minerva Porras MD;  Location: [facility];  Service: Endoscopy;  Laterality: N/A;    ESOPHAGOGASTRODUODENOSCOPY N/A 4/19/2018    Procedure: ESOPHAGOGASTRODUODENOSCOPY (EGD);  Surgeon: Minerva Porras MD;  Location: [facility];  Service: Endoscopy;  Laterality: N/A;    HYSTERECTOMY      JOINT REPLACEMENT Left 08/07/2015    Knee    LYMPH NODE BIOPSY Left 2014    MASTECTOMY      Left arm- NO BP    PARTIAL HYSTERECTOMY         Family History:   Family History   Problem Relation Age of Onset    Heart block Mother     Heart disease Father         Social History:   Social History     Tobacco Use    Smoking status: Former Smoker     Packs/day: 1.00     Years: 15.00     Pack years: 15.00     Types: Cigarettes    Smokeless tobacco: Never Used   Substance Use Topics    Alcohol use: No       Allergies:  Review of patient's allergies indicates:   Allergen Reactions    Ketorolac (pf) Swelling    Mycophenolate mofetil Swelling    Penicillins Hives    Percocet [oxycodone-acetaminophen] Hives and Itching    Tramadol Hives       Medications:    Current Outpatient Medications:     albuterol sulfate (PROAIR RESPICLICK) 90 mcg/actuation inhaler, Inhale 1 puff into the lungs every 4 (four) hours as needed for Wheezing. Rescue, Disp: , Rfl:     atorvastatin (LIPITOR) 10 MG tablet, Take 10 mg by mouth once daily., Disp: , Rfl:     azaTHIOprine (IMURAN) 50 mg Tab, Take 2 tablets (100 mg total) by mouth daily with dinner or evening meal., Disp: 60 tablet, Rfl: 5    blood sugar diagnostic (ONETOUCH VERIO) Strp, 1 each by Misc.(Non-Drug; Combo Route) route 4 (four) times daily., Disp: 50 each, Rfl: 11    blood sugar diagnostic Strp, 1 each by Misc.(Non-Drug; Combo Route) route 4 (four) times daily., Disp: 50 each, Rfl: 11    blood-glucose meter (ONETOUCH VERIO SYNC) kit, Use as instructed, Disp: 1 each, Rfl: 0    ergocalciferol (ERGOCALCIFEROL) 50,000 unit Cap, Take 1 capsule (50,000 Units total) by mouth every 7 days., Disp: 5 capsule, Rfl: 5    HYDROcodone-acetaminophen (NORCO) 5-325 mg per tablet, Take 1 tablet by mouth 2 (two) times daily as needed for Pain., Disp: , Rfl:     hydrOXYchloroQUINE (PLAQUENIL) 200 mg tablet, Take 1 tablet (200 mg total) by mouth 2 (two) times daily., Disp: 180 tablet, Rfl: 3    lancets 33 gauge Misc, 1 lancet by Misc.(Non-Drug; Combo Route) route 4 (four) times daily., Disp: 100 each, Rfl: 11    omeprazole (PRILOSEC) 20 MG capsule, Take 1 capsule (20 mg total) by mouth once daily., Disp: 30 capsule, Rfl: 5    tiZANidine  "(ZANAFLEX) 4 MG tablet, Take 1 tablet (4 mg total) by mouth every evening., Disp: 30 tablet, Rfl: 5       ROS:    Constitutional: Denies fever, fatigue, generalized weakness, night sweats, or acute weight change  Skin: Denies wounds, no rashes, no itching, no new skin lesions  HEENT: Denies acute change in hearing or vision, tinnitus, or dysphagia  Respiratory:  Denies cough, shortness of breath, or wheezing  Cardiovascular: Denies chest pain, palpitations, or swelling  Gastrointestional:  Worsening abdominal pain suprapubic area radiating to the left for past few days.  No vomiting bowels move okay  Genitourinary: Denies dysuria, hematuria, or incontinence; reports able to empty bladder  Musculoskeletal: Denies myalgias, back pain, decreased ROM or focal weakness  Neurological: Denies headaches, seizures, dizziness, paresthesias or weakness  Hematological: Denies unusual bruising or bleeding  Psychiatric: Denies hallucinations, depression, or confusion      Vital Signs:  /70 (BP Location: Right arm, Patient Position: Sitting)   Pulse 82   Temp 98.6 °F (37 °C) (Oral)   Resp 20   Ht 5' 6" (1.676 m)   Wt 79.5 kg (175 lb 4.3 oz)   LMP  (LMP Unknown)   SpO2 95%   BMI 28.29 kg/m²   Body mass index is 28.29 kg/m².      Physical Exam:    General: no acute distress, awake, alert  Eyes: PERRLA, EOMI, conjunctiva clear, eyelids without swelling  HENT: atraumatic, oropharynx and nasal mucosa patent  Neck: full ROM, no JVD, no thyromegaly or lymphadenopathy  Respiratory: equal, unlabored, clear to auscultation A/P  Cardiovascular: RRR without murmur or rub; radial and pedal pulses felt  Edema: none  Gastrointestinal:  Soft.  Generalized diffuse suprapubic left upper quadrant of lower quadrant tenderness.  Positive bowel sounds  Genitourinary: no CVA tenderness upon palpation  Musculoskeletal: full ROM without limitation or discomfort  Integumentary: warm, dry; no rashes, wounds, or skin lesions  Neurological: " oriented, appropriate, no acute deficits      Labs:        Component Value Date/Time     08/01/2022 1031     07/20/2022 0843     08/23/2021 1142     06/22/2021 1222    K 4.8 08/01/2022 1031    K 4.7 07/20/2022 0843    K 4.4 08/23/2021 1142    K 4.3 06/22/2021 1222     08/23/2021 1142     06/22/2021 1222    CO2 22 08/01/2022 1031    CO2 25 07/20/2022 0843    CO2 21 (L) 08/23/2021 1142    CO2 24 06/22/2021 1222    BUN 13.5 08/01/2022 1031    BUN 19.1 07/20/2022 0843    BUN 21 (H) 08/23/2021 1142    BUN 25 (H) 06/22/2021 1222    CREATININE 0.95 08/01/2022 1031    CREATININE 1.09 (H) 07/20/2022 0843    CREATININE 1.0 08/23/2021 1142    CREATININE 0.82 06/22/2021 1222    CALCIUM 9.3 08/01/2022 1031    CALCIUM 9.1 07/20/2022 0843    CALCIUM 8.7 08/23/2021 1142    CALCIUM 8.9 06/22/2021 1222    PHOS 4.1 07/20/2022 0843    PHOS 2.9 11/30/2017 0738            Component Value Date/Time    WBC 5.5 08/01/2022 1031    WBC 3.8 (L) 07/20/2022 0843    WBC 6.23 08/23/2021 1142    WBC 4.80 06/22/2021 1329    HGB 11.9 (L) 08/01/2022 1031    HGB 10.8 (L) 07/20/2022 0843    HGB 12.5 08/23/2021 1142    HGB 11.6 (L) 06/22/2021 1329    HCT 39.2 08/01/2022 1031    HCT 33.8 (L) 07/20/2022 0843    HCT 38.7 08/23/2021 1142    HCT 34.8 (L) 06/22/2021 1329     (H) 08/01/2022 1031     (H) 07/20/2022 0843     08/23/2021 1142     06/22/2021 1329         Impression:    Patient Active Problem List   Diagnosis    Essential hypertension    Diabetes mellitus, type 2    Lupus (systemic lupus erythematosus)    Insomnia    Hyperlipidemia    History of asthma    New onset atrial fibrillation    Periumbilical pain    Constipation    Anemia    Anxiety    Class 1 obesity with serious comorbidity and body mass index (BMI) of 32.0 to 32.9 in adult    Acute cystitis with hematuria    STIVEN (acute kidney injury)    Pyelonephritis    Inadequate dietary energy intake    Systemic lupus  erythematosus    Fibromyalgia    Lupus nephritis, ISN/RPS class III     1. STIVEN (acute kidney injury)     2. Lupus nephritis     3. DM (diabetes mellitus) with complications       Patient renal function recovered to baseline.  She still has some proteinuria she is maintained on Imuran per Rheumatology  Patient is status post 2 cycles of rituximab    Severe abdominal pain and tenderness although she does not appear toxic however in view of her immunosuppressed status is safer to set her up for stat labs and CT scan  Condition discussed with the ER physician    Plan:  Discussed condition with her and ER physician.  We sent her to emergency room for CT of the abdomen pelvis and stat blood work if everything stable we can follow her back in the office for her lupus nephritis in 3 months        Gabriel Ken

## 2022-08-04 NOTE — ED PROVIDER NOTES
Encounter Date: 8/4/2022    SCRIBE #1 NOTE: I, Charles Phoenix, am scribing for, and in the presence of,  Frankie Moralez IV, MD. I have scribed the following portions of the note - Other sections scribed: HPI, ROS, PE.       History     Chief Complaint   Patient presents with    Abdominal Pain     Chronic Diffuse abdominal pain worsened this past few days. Went to her nephrologist and was sent to er for ct scan . Pt on esrd stage 3 from lupus. On chemo every 2 weeks, last session July.      A 55 y/o female with a histoy of DM, HTN, atrial fibrillation, asthma, GERD, gout, current chemotherapy, ESRD, and lupus presents to [facility] ED with diffuse abdominal pain that has been worsening since 08/01/2022. Pt has had associated symptoms of migraines and nausea. Pt reports that she was recently diagnosed with a urine infection.   Pt denies fever, vomiting, diarrhea, constipation, and blood in stool.     The history is provided by the patient.   Abdominal Pain  Illness onset: 08/01/2022. The onset of the illness was gradual. The problem has been gradually worsening. The abdominal pain is generalized. The abdominal pain does not radiate. Pain scale: moderate. The abdominal pain is relieved by nothing. The other symptoms of the illness include nausea. The other symptoms of the illness do not include fever, shortness of breath, vomiting, diarrhea, dysuria, vaginal discharge or vaginal bleeding.   Onset: 08/01/2022.     Symptoms associated with the illness do not include chills, constipation or hematuria. Significant associated medical issues include GERD, inflammatory bowel disease and diabetes.     Review of patient's allergies indicates:   Allergen Reactions    Ketorolac (pf) Swelling    Mycophenolate mofetil Swelling    Penicillins Hives    Percocet [oxycodone-acetaminophen] Hives and Itching    Tramadol Hives     Past Medical History:   Diagnosis Date    Arthritis     knees    Asthma     Atrial fibrillation      Chronic constipation     Diabetes mellitus     Encounter for blood transfusion 10/2014    GERD (gastroesophageal reflux disease)     Gout     Hypertension     Lupus 2004    SLE    Renal disorder     SLE (systemic lupus erythematosus)      Past Surgical History:   Procedure Laterality Date    APPENDECTOMY      CHOLECYSTECTOMY      COLONOSCOPY N/A 4/19/2018    Procedure: COLONOSCOPY;  Surgeon: Minerva Porras MD;  Location: [facility];  Service: Endoscopy;  Laterality: N/A;    ESOPHAGOGASTRODUODENOSCOPY N/A 4/19/2018    Procedure: ESOPHAGOGASTRODUODENOSCOPY (EGD);  Surgeon: Minerva Porras MD;  Location: [facility];  Service: Endoscopy;  Laterality: N/A;    HYSTERECTOMY      JOINT REPLACEMENT Left 08/07/2015    Knee    LYMPH NODE BIOPSY Left 2014    MASTECTOMY      Left arm- NO BP    PARTIAL HYSTERECTOMY       Family History   Problem Relation Age of Onset    Heart block Mother     Heart disease Father      Social History     Tobacco Use    Smoking status: Former Smoker     Packs/day: 1.00     Years: 15.00     Pack years: 15.00     Types: Cigarettes    Smokeless tobacco: Never Used   Substance Use Topics    Alcohol use: No    Drug use: No     Review of Systems   Constitutional: Negative for chills and fever.   HENT: Negative for congestion, rhinorrhea and sore throat.    Eyes: Negative for visual disturbance.   Respiratory: Negative for cough and shortness of breath.    Cardiovascular: Negative for chest pain and leg swelling.   Gastrointestinal: Positive for abdominal pain and nausea. Negative for constipation, diarrhea and vomiting.   Genitourinary: Negative for dysuria, hematuria, vaginal bleeding and vaginal discharge.   Musculoskeletal: Negative for joint swelling.   Skin: Negative for rash.   Neurological: Positive for headaches. Negative for weakness.   Psychiatric/Behavioral: Negative for confusion.       Physical Exam     Initial Vitals   BP Pulse Resp Temp SpO2   08/04/22  1600 08/04/22 1014 08/04/22 1014 -- 08/04/22 1014   128/70 79 18  99 %      MAP       --                Physical Exam    Nursing note and vitals reviewed.  Constitutional: She is not diaphoretic. No distress.   HENT:   Head: Normocephalic and atraumatic.   Eyes: EOM are normal. Pupils are equal, round, and reactive to light.   Neck: Neck supple.   Normal range of motion.  Cardiovascular: Normal rate and regular rhythm.   No murmur heard.  Pulmonary/Chest: Breath sounds normal. No respiratory distress. She has no wheezes. She has no rales.   Abdominal: Abdomen is soft. She exhibits no distension. There is abdominal tenderness (mild diffuse).   Musculoskeletal:         General: Normal range of motion.      Cervical back: Normal range of motion and neck supple.     Neurological: She is alert and oriented to person, place, and time. She has normal strength.   Skin: Skin is warm. No rash noted.   Psychiatric: She has a normal mood and affect.         ED Course   Procedures  Labs Reviewed   COMPREHENSIVE METABOLIC PANEL - Abnormal; Notable for the following components:       Result Value    Chloride 108 (*)     Creatinine 1.25 (*)     Albumin Level 3.0 (*)     Globulin 4.2 (*)     Albumin/Globulin Ratio 0.7 (*)     All other components within normal limits   URINALYSIS, REFLEX TO URINE CULTURE - Abnormal; Notable for the following components:    Color, UA Dark Yellow (*)     Appearance, UA Cloudy (*)     Protein, UA 3+ (*)     Ketones, UA Trace (*)     Leukocyte Esterase, UA 2+ (*)     All other components within normal limits   CBC WITH DIFFERENTIAL - Abnormal; Notable for the following components:    RBC 3.70 (*)     Hgb 10.5 (*)     Hct 33.7 (*)     MCHC 31.2 (*)     RDW 17.9 (*)     Platelet 545 (*)     All other components within normal limits   URINALYSIS, MICROSCOPIC - Abnormal; Notable for the following components:    WBC, UA 21-50 (*)     Hyaline Casts, UA Few (*)     All other components within normal limits    LIPASE - Normal   CULTURE, URINE   CBC W/ AUTO DIFFERENTIAL    Narrative:     The following orders were created for panel order CBC W/ AUTO DIFFERENTIAL.  Procedure                               Abnormality         Status                     ---------                               -----------         ------                     CBC with Differential[[id number]]        Abnormal            Final result                 Please view results for these tests on the individual orders.          Imaging Results          CT Renal Stone Study ABD Pelvis WO (Final result)  Result time 08/04/22 13:52:46    Final result by Rishi Urban MD (08/04/22 13:52:46)                 Impression:      Interval development of some perihepatic fluid and some subhepatic fluid and a small amount of pelvic fluid seen.  There are some mildly dilated loops of small bowel and findings suggestive of small bowel wall thickening.  Findings may represent enteritis.    Persistent perinephric stranding around both kidneys    Small subcapsular fluid possibly representing old hematoma around the left kidney.  Findings are stable since the prior examination    Atelectasis and chronic lung changes in the lung bases bilaterally      Electronically signed by: Rishi Urban  Date:    08/04/2022  Time:    13:52             Narrative:    EXAMINATION:  CT RENAL STONE STUDY ABD PELVIS WO    CLINICAL HISTORY:  abdominal pain;    TECHNIQUE:  Low dose axial images, sagittal and coronal reformations were obtained from the lung bases to the pubic symphysis.  No contrast was administered.  Automatic exposure control is utilized to reduce patient radiation exposure.    COMPARISON:  None    FINDINGS:  There is mild atelectasis in the lung bases bilaterally..    The liver appears normal.  No obvious liver mass or lesion is seen.    The patient appears to be status post cholecystectomy..    The pancreas appears normal.  No inflammatory changes are seen in the  pancreatic region.    The spleen appears normal and adrenal glands appear normal.  No adrenal nodule is seen.    There is a small amount of subcapsular fluid seen around the left kidney which may represent a small subcapsular hematoma from a previous biopsy.  Similar areas were seen previously.  There is persistent perinephric stranding seen around both kidneys.  No hydronephrosis or hydroureter seen and no nephrolithiasis or ureterolithiasis is seen.    There has been interval development of some free fluid in the subhepatic region as well as the perihepatic region and some free fluid in the pelvis.  There are some dilated loops of small bowel and findings suggestive of small bowel wall thickening.  Findings may represent enteritis...    No colitis is seen.  No diverticulitis is seen.  The patient is status post appendectomy..    .  The urinary bladder appears normal.    Bones show no acute abnormality.                                 Medications   lactated ringers bolus 1,000 mL (0 mLs Intravenous Stopped 8/4/22 1704)   morphine injection 4 mg (4 mg Intravenous Given 8/4/22 1604)   ondansetron injection 4 mg (4 mg Intravenous Given 8/4/22 1604)   cefTRIAXone (ROCEPHIN) 1 g in dextrose 5 % in water (D5W) 5 % 50 mL IVPB (MB+) (0 g Intravenous Stopped 8/4/22 1634)     Medical Decision Making:   History:   Old Medical Records: I decided to obtain old medical records.  Initial Assessment:   56-year-old history lupus presenting for diffuse abdominal pain sent by her nephrologist was seen in clinic today.  Mild diffuse tenderness on exam normal vitals well-appearing.  CT shows nonspecific enteritis.  Treated her in the ER with IV fluids pain meds antiemetics and antibiotics.  Will be discharged with antibiotics.  He also reports symptoms of a vaginal yeast infection but refuses exam will treat empirically with a single dose of Diflucan.  Return precautions given instructed to follow-up with her primary care provider.  "  Clinical Tests:   Lab Tests: Reviewed and Ordered  Radiological Study: Ordered and Reviewed  ED Management:  IVF   IV morphine  IV zofran  IV rocephin           Scribe Attestation:   Scribe #1: I performed the above scribed service and the documentation accurately describes the services I performed. I attest to the accuracy of the note.    Attending Attestation:           Physician Attestation for Scribe:  Physician Attestation Statement for Scribe #1: I, Frankie Moralez IV, MD, reviewed documentation, as scribed by Charles Phoenix in my presence, and it is both accurate and complete.             ED Course as of 08/05/22 1530   Thu Aug 04, 2022   1742 CT showed enteritis. Pain improved. Other workup ok. Patient complaining of some vaginal discharge that "only happens when I drink a lot of sweet tea". Refuses vaginal exam. Will treat for enteritis, UTI, yeast infection with abx. Will give some PRN antiemetics and pain meds. Will discharge, return precautions given.  [AC]      ED Course User Index  [AC] Frankie Moralez IV, MD             Clinical Impression:   Final diagnoses:  [R10.9] Abdominal pain, unspecified abdominal location (Primary)  [K52.9] Enteritis          ED Disposition Condition    Discharge Stable        ED Prescriptions     Medication Sig Dispense Start Date End Date Auth. Provider    ciprofloxacin HCl (CIPRO) 500 MG tablet Take 1 tablet (500 mg total) by mouth 2 (two) times daily. for 10 days 20 tablet 8/4/2022 8/14/2022 Frankie Moralez IV, MD    ondansetron (ZOFRAN) 4 MG tablet Take 1 tablet (4 mg total) by mouth every 6 (six) hours as needed for Nausea. 24 tablet 8/4/2022  Frankie Moralez IV, MD    fluconazole (DIFLUCAN) 150 MG Tab (Expires today) Take 1 tablet (150 mg total) by mouth once daily. for 1 day 1 tablet 8/4/2022 8/5/2022 Frankie Moralez IV, MD    HYDROcodone-acetaminophen (NORCO) 5-325 mg per tablet Take 1 tablet by mouth every 4 (four) hours as needed for Pain. 8 tablet 8/4/2022  " Frankie Moralez IV, MD        Follow-up Information     Follow up With Specialties Details Why Contact Info    Agnieszkaloki Kessler MD Internal Medicine Go to  If symptoms worsen [address] [place], [place] [address] [address]  [phone]        IFrankie MD personally performed the history, PE, MDM, and procedures as documented above and agree with the scribe's documentation.        Frankie Moralez IV, MD  08/05/22 8003

## 2022-08-06 LAB — BACTERIA UR CULT: NO GROWTH

## 2022-10-13 ENCOUNTER — HOSPITAL ENCOUNTER (EMERGENCY)
Facility: HOSPITAL | Age: 57
Discharge: HOME | End: 2022-10-13
Attending: EMERGENCY MEDICINE
Payer: COMMERCIAL

## 2022-10-13 VITALS
OXYGEN SATURATION: 99 % | WEIGHT: 210 LBS | DIASTOLIC BLOOD PRESSURE: 66 MMHG | TEMPERATURE: 98 F | HEART RATE: 75 BPM | BODY MASS INDEX: 33.75 KG/M2 | RESPIRATION RATE: 16 BRPM | HEIGHT: 66 IN | SYSTOLIC BLOOD PRESSURE: 132 MMHG

## 2022-10-13 DIAGNOSIS — M54.50 ACUTE LOW BACK PAIN WITHOUT SCIATICA, UNSPECIFIED BACK PAIN LATERALITY: Primary | ICD-10-CM

## 2022-10-13 LAB
ALBUMIN SERPL-MCNC: 3 GM/DL (ref 3.5–5)
ALBUMIN/GLOB SERPL: 0.6 RATIO (ref 1.1–2)
ALP SERPL-CCNC: 136 UNIT/L (ref 40–150)
ALT SERPL-CCNC: 51 UNIT/L (ref 0–55)
AST SERPL-CCNC: 53 UNIT/L (ref 5–34)
BASOPHILS # BLD AUTO: 0.03 X10(3)/MCL (ref 0–0.2)
BASOPHILS NFR BLD AUTO: 0.4 %
BILIRUBIN DIRECT+TOT PNL SERPL-MCNC: 0.3 MG/DL
BUN SERPL-MCNC: 14.1 MG/DL (ref 9.8–20.1)
CALCIUM SERPL-MCNC: 9.2 MG/DL (ref 8.4–10.2)
CHLORIDE SERPL-SCNC: 106 MMOL/L (ref 98–107)
CO2 SERPL-SCNC: 25 MMOL/L (ref 22–29)
CREAT SERPL-MCNC: 1.02 MG/DL (ref 0.55–1.02)
EOSINOPHIL # BLD AUTO: 0.07 X10(3)/MCL (ref 0–0.9)
EOSINOPHIL NFR BLD AUTO: 1 %
ERYTHROCYTE [DISTWIDTH] IN BLOOD BY AUTOMATED COUNT: 16.2 % (ref 11.5–17)
GFR SERPLBLD CREATININE-BSD FMLA CKD-EPI: >60 MLS/MIN/1.73/M2
GLOBULIN SER-MCNC: 5.1 GM/DL (ref 2.4–3.5)
GLUCOSE SERPL-MCNC: 80 MG/DL (ref 74–100)
HCT VFR BLD AUTO: 34.9 % (ref 37–47)
HGB BLD-MCNC: 11.3 GM/DL (ref 12–16)
IMM GRANULOCYTES # BLD AUTO: 0.03 X10(3)/MCL (ref 0–0.04)
IMM GRANULOCYTES NFR BLD AUTO: 0.4 %
LYMPHOCYTES # BLD AUTO: 1.88 X10(3)/MCL (ref 0.6–4.6)
LYMPHOCYTES NFR BLD AUTO: 26.5 %
MCH RBC QN AUTO: 28 PG (ref 27–31)
MCHC RBC AUTO-ENTMCNC: 32.4 MG/DL (ref 33–36)
MCV RBC AUTO: 86.6 FL (ref 80–94)
MONOCYTES # BLD AUTO: 0.31 X10(3)/MCL (ref 0.1–1.3)
MONOCYTES NFR BLD AUTO: 4.4 %
NEUTROPHILS # BLD AUTO: 4.8 X10(3)/MCL (ref 2.1–9.2)
NEUTROPHILS NFR BLD AUTO: 67.3 %
NRBC BLD AUTO-RTO: 0 %
PLATELET # BLD AUTO: 464 X10(3)/MCL (ref 130–400)
PMV BLD AUTO: 9.4 FL (ref 7.4–10.4)
POTASSIUM SERPL-SCNC: 3.9 MMOL/L (ref 3.5–5.1)
PROT SERPL-MCNC: 8.1 GM/DL (ref 6.4–8.3)
RBC # BLD AUTO: 4.03 X10(6)/MCL (ref 4.2–5.4)
SODIUM SERPL-SCNC: 137 MMOL/L (ref 136–145)
WBC # SPEC AUTO: 7.1 X10(3)/MCL (ref 4.5–11.5)

## 2022-10-13 PROCEDURE — 85025 COMPLETE CBC W/AUTO DIFF WBC: CPT | Performed by: PHYSICIAN ASSISTANT

## 2022-10-13 PROCEDURE — 99284 EMERGENCY DEPT VISIT MOD MDM: CPT | Mod: 25

## 2022-10-13 PROCEDURE — 80053 COMPREHEN METABOLIC PANEL: CPT | Performed by: PHYSICIAN ASSISTANT

## 2022-10-13 PROCEDURE — 36415 COLL VENOUS BLD VENIPUNCTURE: CPT | Performed by: PHYSICIAN ASSISTANT

## 2022-10-13 RX ORDER — HYDROCODONE BITARTRATE AND ACETAMINOPHEN 5; 325 MG/1; MG/1
1 TABLET ORAL EVERY 6 HOURS PRN
Qty: 8 TABLET | Refills: 0 | Status: SHIPPED | OUTPATIENT
Start: 2022-10-13 | End: 2022-10-13 | Stop reason: SDUPTHER

## 2022-10-13 RX ORDER — CEFDINIR 300 MG/1
300 CAPSULE ORAL 2 TIMES DAILY
Qty: 14 CAPSULE | Refills: 0 | Status: SHIPPED | OUTPATIENT
Start: 2022-10-13 | End: 2022-10-20

## 2022-10-13 RX ORDER — HYDROCODONE BITARTRATE AND ACETAMINOPHEN 5; 325 MG/1; MG/1
1 TABLET ORAL EVERY 6 HOURS PRN
Qty: 8 TABLET | Refills: 0 | Status: SHIPPED | OUTPATIENT
Start: 2022-10-13 | End: 2022-12-12

## 2022-10-13 RX ORDER — CEFDINIR 300 MG/1
300 CAPSULE ORAL 2 TIMES DAILY
Qty: 14 CAPSULE | Refills: 0 | Status: SHIPPED | OUTPATIENT
Start: 2022-10-13 | End: 2022-10-13 | Stop reason: SDUPTHER

## 2022-10-13 NOTE — FIRST PROVIDER EVALUATION
"Medical screening examination initiated.  I have conducted a focused provider triage encounter, findings are as follows:    Chief Complaint   Patient presents with    Back Pain     Pt states fell down 15 stairs a week ago, states having severe lower abd pain.  States also has hx of lupus states is having flare up c/o pain to knees, fingers, and shoulders.  Pt is aaox4.      Brief history of present illness:  56 y.o. female presents to the ED with lower back pain s/t fall down 15 stairs 1 week ago. Notes hx of lupus as well with flare up - diffuse pains. Denies bowel or bladder incontinence.     Vitals:    10/13/22 1346   BP: 132/66   Pulse: 75   Resp: 16   Temp: 98.1 °F (36.7 °C)   SpO2: 99%   Weight: 95.3 kg (210 lb)   Height: 5' 6" (1.676 m)       Pertinent physical exam:  Awake, alert, ambulatory, non-labored respirations    Brief workup plan:  labs, UA, XR    Preliminary workup initiated; this workup will be continued and followed by the physician or advanced practice provider that is assigned to the patient when roomed.  "

## 2022-10-14 NOTE — ED PROVIDER NOTES
Encounter Date: 10/13/2022       History     Chief Complaint   Patient presents with    Back Pain     Pt states fell down 15 stairs a week ago, states having severe lower abd pain.  States also has hx of lupus states is having flare up c/o pain to knees, fingers, and shoulders.  Pt is aaox4.      56-year-old female with past history of lupus presents the ED with complaint of low back pain x1 week after a fall down stairs.  Patient denies any bladder or bowel incontinence.  She states that she also has had bilateral sinus issues and pain.  She denies any fever or chills.  She states this is been ongoing for about 1 week.  She states that she has not seen her primary care provider for either issue.  She denies any dysuria or hematuria.  She has a past medical history of lupus which she does feel it has flared this time. .    The history is provided by the patient. No  was used.   Back Pain   The current episode started several weeks ago. The problem occurs constantly. The problem has been unchanged. The pain is associated with falling. The pain is present in the lumbar spine. The quality of the pain is described as stabbing. The pain does not radiate. The pain is at a severity of 9/10. The pain is Worse during the day.   Review of patient's allergies indicates:   Allergen Reactions    Ketorolac (pf) Swelling    Mycophenolate mofetil Swelling    Penicillins Hives    Percocet [oxycodone-acetaminophen] Hives and Itching    Tramadol Hives     Past Medical History:   Diagnosis Date    Arthritis     knees    Asthma     Atrial fibrillation     Chronic constipation     Diabetes mellitus     Encounter for blood transfusion 10/2014    GERD (gastroesophageal reflux disease)     Gout     Hypertension     Lupus 2004    SLE    Renal disorder     SLE (systemic lupus erythematosus)      Past Surgical History:   Procedure Laterality Date    APPENDECTOMY      CHOLECYSTECTOMY      COLONOSCOPY N/A 4/19/2018     Procedure: COLONOSCOPY;  Surgeon: Minerva Porras MD;  Location: [facility];  Service: Endoscopy;  Laterality: N/A;    ESOPHAGOGASTRODUODENOSCOPY N/A 4/19/2018    Procedure: ESOPHAGOGASTRODUODENOSCOPY (EGD);  Surgeon: Minerva Porras MD;  Location: [facility];  Service: Endoscopy;  Laterality: N/A;    HYSTERECTOMY      JOINT REPLACEMENT Left 08/07/2015    Knee    LYMPH NODE BIOPSY Left 2014    MASTECTOMY      Left arm- NO BP    PARTIAL HYSTERECTOMY       Family History   Problem Relation Age of Onset    Heart block Mother     Heart disease Father      Social History     Tobacco Use    Smoking status: Former     Packs/day: 1.00     Years: 15.00     Pack years: 15.00     Types: Cigarettes    Smokeless tobacco: Never   Substance Use Topics    Alcohol use: No    Drug use: No     Review of Systems   Constitutional:  Negative for activity change.   HENT:  Positive for congestion.    Respiratory:  Negative for cough, chest tightness and shortness of breath.    Gastrointestinal:  Negative for diarrhea, nausea and rectal pain.   Genitourinary:  Negative for flank pain.   Musculoskeletal:  Positive for back pain.   All other systems reviewed and are negative.    Physical Exam     Initial Vitals [10/13/22 1346]   BP Pulse Resp Temp SpO2   132/66 75 16 98.1 °F (36.7 °C) 99 %      MAP       --         Physical Exam    Nursing note and vitals reviewed.  Constitutional: She appears well-developed and well-nourished.   HENT:   Head: Normocephalic and atraumatic.   Eyes: EOM are normal. Pupils are equal, round, and reactive to light.   Cardiovascular:  Normal rate, regular rhythm and normal heart sounds.           Pulmonary/Chest: Breath sounds normal.   Abdominal: Abdomen is soft. Bowel sounds are normal.   Musculoskeletal:         General: Normal range of motion.      Comments: Patient has tenderness to palpation of the paravertebral musculature of the lumbar spine.  No palpable deformity.     Neurological: She is  alert and oriented to person, place, and time.   Skin: Skin is warm.       ED Course   Procedures  Labs Reviewed   COMPREHENSIVE METABOLIC PANEL - Abnormal; Notable for the following components:       Result Value    Albumin Level 3.0 (*)     Globulin 5.1 (*)     Albumin/Globulin Ratio 0.6 (*)     Aspartate Aminotransferase 53 (*)     All other components within normal limits   CBC WITH DIFFERENTIAL - Abnormal; Notable for the following components:    RBC 4.03 (*)     Hgb 11.3 (*)     Hct 34.9 (*)     MCHC 32.4 (*)     Platelet 464 (*)     All other components within normal limits   CBC W/ AUTO DIFFERENTIAL    Narrative:     The following orders were created for panel order CBC auto differential.  Procedure                               Abnormality         Status                     ---------                               -----------         ------                     CBC with Differential[[id number]]        Abnormal            Final result                 Please view results for these tests on the individual orders.   URINALYSIS, REFLEX TO URINE CULTURE          Imaging Results              X-Ray Lumbar Spine Ap And Lateral (Final result)  Result time 10/13/22 14:39:35      Final result by Savannah Warren MD (10/13/22 14:39:35)                   Impression:      1. No acute abnormality identified.  2. Mild degenerative changes of the lumbar spine.      Electronically signed by: Savannah Warren  Date:    10/13/2022  Time:    14:39               Narrative:    EXAMINATION:  XR LUMBAR SPINE AP AND LATERAL    CLINICAL HISTORY:  fall;    COMPARISON:  X-rays dated 10/13/2021    FINDINGS:  There are 5 non-rib-bearing lumbar-type vertebrae vertebral bodies.  Alignment is normal without subluxation.  The vertebral body heights are preserved.  There is mild disc height loss at L2-3 with small marginal osteophytes.  Vascular calcifications are noted.                                       Medications - No data to display      Additional MDM:   Differential Diagnosis:   Other: The following diagnoses were also considered and will be evaluated: Lumbar back pain, SLE. Discussed with patient that her labs are normal.  Will review her  score.  She does not have a  to take her home and thus cannot be given any medications in the hospital.   score noted to be 460.  No medications given since August 2022.                        Clinical Impression:   Final diagnoses:  [M54.50] Acute low back pain without sciatica, unspecified back pain laterality (Primary)      ED Disposition Condition    Discharge Stable          ED Prescriptions       Medication Sig Dispense Start Date End Date Auth. Provider    HYDROcodone-acetaminophen (NORCO) 5-325 mg per tablet Take 1 tablet by mouth every 6 (six) hours as needed for Pain. 8 tablet 10/13/2022 -- Aileen Gay PA-C    cefdinir (OMNICEF) 300 MG capsule Take 1 capsule (300 mg total) by mouth 2 (two) times daily. for 7 days 14 capsule 10/13/2022 10/20/2022 Aileen Gay PA-C          Follow-up Information    None          Aileen Gay PA-C  10/13/22 2038

## 2022-10-14 NOTE — ED NOTES
"Pt here due to lupus "flare up."  Pt c/o lower back pain s/p falling down 15 stairs a week ago.  Pt refusing to answer questions about symptoms.  "

## 2022-10-14 NOTE — ED NOTES
"Discharge instructions given to pt.  Pt refuses to take Norco prescriptions.  States "that medication does not work for me."  Prescription placed in shred box.  Provider aware.  "

## 2022-10-27 ENCOUNTER — LAB VISIT (OUTPATIENT)
Dept: LAB | Facility: HOSPITAL | Age: 57
End: 2022-10-27
Attending: INTERNAL MEDICINE
Payer: MEDICAID

## 2022-10-27 DIAGNOSIS — E11.8 DM (DIABETES MELLITUS) WITH COMPLICATIONS: ICD-10-CM

## 2022-10-27 DIAGNOSIS — M32.14 LUPUS NEPHRITIS: ICD-10-CM

## 2022-10-27 DIAGNOSIS — N17.9 AKI (ACUTE KIDNEY INJURY): ICD-10-CM

## 2022-10-27 LAB
ALBUMIN SERPL-MCNC: 3 GM/DL (ref 3.5–5)
ALBUMIN/GLOB SERPL: 0.7 RATIO (ref 1.1–2)
ALP SERPL-CCNC: 119 UNIT/L (ref 40–150)
ALT SERPL-CCNC: 12 UNIT/L (ref 0–55)
APPEARANCE UR: CLEAR
AST SERPL-CCNC: 18 UNIT/L (ref 5–34)
BACTERIA #/AREA URNS AUTO: ABNORMAL /HPF
BASOPHILS # BLD AUTO: 0.01 X10(3)/MCL (ref 0–0.2)
BASOPHILS NFR BLD AUTO: 0.2 %
BILIRUB UR QL STRIP.AUTO: NEGATIVE MG/DL
BILIRUBIN DIRECT+TOT PNL SERPL-MCNC: 0.2 MG/DL
BUN SERPL-MCNC: 17.2 MG/DL (ref 9.8–20.1)
C3 SERPL-MCNC: 94 MG/DL (ref 80–173)
C4 SERPL-MCNC: 15 MG/DL (ref 13–46)
CALCIUM SERPL-MCNC: 9.2 MG/DL (ref 8.4–10.2)
CHLORIDE SERPL-SCNC: 109 MMOL/L (ref 98–107)
CO2 SERPL-SCNC: 20 MMOL/L (ref 22–29)
COLOR UR AUTO: YELLOW
CREAT SERPL-MCNC: 1.16 MG/DL (ref 0.55–1.02)
CREAT UR-MCNC: 196.3 MG/DL (ref 47–110)
CRP SERPL-MCNC: 43 MG/L
EOSINOPHIL # BLD AUTO: 0.05 X10(3)/MCL (ref 0–0.9)
EOSINOPHIL NFR BLD AUTO: 0.9 %
ERYTHROCYTE [DISTWIDTH] IN BLOOD BY AUTOMATED COUNT: 16.9 % (ref 11.5–17)
GFR SERPLBLD CREATININE-BSD FMLA CKD-EPI: 56 MLS/MIN/1.73/M2
GLOBULIN SER-MCNC: 4.3 GM/DL (ref 2.4–3.5)
GLUCOSE SERPL-MCNC: 103 MG/DL (ref 74–100)
GLUCOSE UR QL STRIP.AUTO: NEGATIVE MG/DL
HCT VFR BLD AUTO: 33.8 % (ref 37–47)
HGB BLD-MCNC: 10.6 GM/DL (ref 12–16)
IMM GRANULOCYTES # BLD AUTO: 0.03 X10(3)/MCL (ref 0–0.04)
IMM GRANULOCYTES NFR BLD AUTO: 0.6 %
KETONES UR QL STRIP.AUTO: NEGATIVE MG/DL
LEUKOCYTE ESTERASE UR QL STRIP.AUTO: ABNORMAL UNIT/L
LYMPHOCYTES # BLD AUTO: 1.62 X10(3)/MCL (ref 0.6–4.6)
LYMPHOCYTES NFR BLD AUTO: 30.4 %
MCH RBC QN AUTO: 28.1 PG (ref 27–31)
MCHC RBC AUTO-ENTMCNC: 31.4 MG/DL (ref 33–36)
MCV RBC AUTO: 89.7 FL (ref 80–94)
MONOCYTES # BLD AUTO: 0.22 X10(3)/MCL (ref 0.1–1.3)
MONOCYTES NFR BLD AUTO: 4.1 %
NEUTROPHILS # BLD AUTO: 3.4 X10(3)/MCL (ref 2.1–9.2)
NEUTROPHILS NFR BLD AUTO: 63.8 %
NITRITE UR QL STRIP.AUTO: NEGATIVE
NRBC BLD AUTO-RTO: 0 %
PH UR STRIP.AUTO: 5.5 [PH]
PLATELET # BLD AUTO: 534 X10(3)/MCL (ref 130–400)
PMV BLD AUTO: 9.5 FL (ref 7.4–10.4)
POTASSIUM SERPL-SCNC: 4.5 MMOL/L (ref 3.5–5.1)
PROT SERPL-MCNC: 7.3 GM/DL (ref 6.4–8.3)
PROT UR QL STRIP.AUTO: ABNORMAL MG/DL
PROT UR STRIP-MCNC: 195.2 MG/DL
PROT/CREAT UR-RTO: 994.4 MG/GM CR
RBC # BLD AUTO: 3.77 X10(6)/MCL (ref 4.2–5.4)
RBC #/AREA URNS AUTO: <5 /HPF
RBC UR QL AUTO: NEGATIVE UNIT/L
SODIUM SERPL-SCNC: 139 MMOL/L (ref 136–145)
SP GR UR STRIP.AUTO: 1.02 (ref 1–1.03)
SQUAMOUS #/AREA URNS AUTO: <5 /HPF
UROBILINOGEN UR STRIP-ACNC: 1 MG/DL
WBC # SPEC AUTO: 5.3 X10(3)/MCL (ref 4.5–11.5)
WBC #/AREA URNS AUTO: 28 /HPF

## 2022-10-27 PROCEDURE — 80053 COMPREHEN METABOLIC PANEL: CPT

## 2022-10-27 PROCEDURE — 87077 CULTURE AEROBIC IDENTIFY: CPT

## 2022-10-27 PROCEDURE — 36415 COLL VENOUS BLD VENIPUNCTURE: CPT

## 2022-10-27 PROCEDURE — 86160 COMPLEMENT ANTIGEN: CPT

## 2022-10-27 PROCEDURE — 82570 ASSAY OF URINE CREATININE: CPT

## 2022-10-27 PROCEDURE — 86140 C-REACTIVE PROTEIN: CPT

## 2022-10-27 PROCEDURE — 85025 COMPLETE CBC W/AUTO DIFF WBC: CPT

## 2022-10-27 PROCEDURE — 81001 URINALYSIS AUTO W/SCOPE: CPT

## 2022-10-29 LAB — BACTERIA UR CULT: ABNORMAL

## 2022-10-30 ENCOUNTER — HOSPITAL ENCOUNTER (EMERGENCY)
Facility: HOSPITAL | Age: 57
Discharge: HOME | End: 2022-10-31
Attending: STUDENT IN AN ORGANIZED HEALTH CARE EDUCATION/TRAINING PROGRAM
Payer: MEDICAID

## 2022-10-30 DIAGNOSIS — K08.89 PAIN, DENTAL: ICD-10-CM

## 2022-10-30 DIAGNOSIS — R50.9 FEVER: ICD-10-CM

## 2022-10-30 DIAGNOSIS — M25.512 ACUTE PAIN OF LEFT SHOULDER: Primary | ICD-10-CM

## 2022-10-30 LAB
ALBUMIN SERPL-MCNC: 2.9 GM/DL (ref 3.5–5)
ALBUMIN/GLOB SERPL: 0.5 RATIO (ref 1.1–2)
ALP SERPL-CCNC: 116 UNIT/L (ref 40–150)
ALT SERPL-CCNC: 13 UNIT/L (ref 0–55)
AST SERPL-CCNC: 25 UNIT/L (ref 5–34)
BASOPHILS # BLD AUTO: 0.02 X10(3)/MCL (ref 0–0.2)
BASOPHILS NFR BLD AUTO: 0.3 %
BILIRUBIN DIRECT+TOT PNL SERPL-MCNC: 0.2 MG/DL
BUN SERPL-MCNC: 23.1 MG/DL (ref 9.8–20.1)
CALCIUM SERPL-MCNC: 9.1 MG/DL (ref 8.4–10.2)
CHLORIDE SERPL-SCNC: 104 MMOL/L (ref 98–107)
CO2 SERPL-SCNC: 23 MMOL/L (ref 22–29)
CREAT SERPL-MCNC: 1.14 MG/DL (ref 0.55–1.02)
EOSINOPHIL # BLD AUTO: 0.06 X10(3)/MCL (ref 0–0.9)
EOSINOPHIL NFR BLD AUTO: 0.9 %
ERYTHROCYTE [DISTWIDTH] IN BLOOD BY AUTOMATED COUNT: 16.7 % (ref 11.5–17)
GFR SERPLBLD CREATININE-BSD FMLA CKD-EPI: 57 MLS/MIN/1.73/M2
GLOBULIN SER-MCNC: 5.5 GM/DL (ref 2.4–3.5)
GLUCOSE SERPL-MCNC: 94 MG/DL (ref 74–100)
HCT VFR BLD AUTO: 30.6 % (ref 37–47)
HGB BLD-MCNC: 10.1 GM/DL (ref 12–16)
IMM GRANULOCYTES # BLD AUTO: 0.02 X10(3)/MCL (ref 0–0.04)
IMM GRANULOCYTES NFR BLD AUTO: 0.3 %
LYMPHOCYTES # BLD AUTO: 1.83 X10(3)/MCL (ref 0.6–4.6)
LYMPHOCYTES NFR BLD AUTO: 28.2 %
MCH RBC QN AUTO: 28.3 PG (ref 27–31)
MCHC RBC AUTO-ENTMCNC: 33 MG/DL (ref 33–36)
MCV RBC AUTO: 85.7 FL (ref 80–94)
MONOCYTES # BLD AUTO: 0.34 X10(3)/MCL (ref 0.1–1.3)
MONOCYTES NFR BLD AUTO: 5.2 %
NEUTROPHILS # BLD AUTO: 4.2 X10(3)/MCL (ref 2.1–9.2)
NEUTROPHILS NFR BLD AUTO: 65.1 %
NRBC BLD AUTO-RTO: 0 %
PLATELET # BLD AUTO: 502 X10(3)/MCL (ref 130–400)
PMV BLD AUTO: 9.6 FL (ref 7.4–10.4)
POTASSIUM SERPL-SCNC: 5.5 MMOL/L (ref 3.5–5.1)
PROT SERPL-MCNC: 8.4 GM/DL (ref 6.4–8.3)
RBC # BLD AUTO: 3.57 X10(6)/MCL (ref 4.2–5.4)
SODIUM SERPL-SCNC: 136 MMOL/L (ref 136–145)
STREP A PCR (OHS): NOT DETECTED
WBC # SPEC AUTO: 6.5 X10(3)/MCL (ref 4.5–11.5)

## 2022-10-30 PROCEDURE — 99285 EMERGENCY DEPT VISIT HI MDM: CPT | Mod: 25

## 2022-10-30 PROCEDURE — 83605 ASSAY OF LACTIC ACID: CPT | Performed by: NURSE PRACTITIONER

## 2022-10-30 PROCEDURE — 0241U COVID/FLU A&B PCR: CPT | Performed by: NURSE PRACTITIONER

## 2022-10-30 PROCEDURE — 96374 THER/PROPH/DIAG INJ IV PUSH: CPT

## 2022-10-30 PROCEDURE — 36415 COLL VENOUS BLD VENIPUNCTURE: CPT | Performed by: NURSE PRACTITIONER

## 2022-10-30 PROCEDURE — 87040 BLOOD CULTURE FOR BACTERIA: CPT | Performed by: NURSE PRACTITIONER

## 2022-10-30 PROCEDURE — 80053 COMPREHEN METABOLIC PANEL: CPT | Performed by: NURSE PRACTITIONER

## 2022-10-30 PROCEDURE — 85025 COMPLETE CBC W/AUTO DIFF WBC: CPT | Performed by: NURSE PRACTITIONER

## 2022-10-30 PROCEDURE — 96375 TX/PRO/DX INJ NEW DRUG ADDON: CPT

## 2022-10-30 PROCEDURE — 87651 STREP A DNA AMP PROBE: CPT | Performed by: NURSE PRACTITIONER

## 2022-10-31 VITALS
HEART RATE: 78 BPM | RESPIRATION RATE: 16 BRPM | BODY MASS INDEX: 29.12 KG/M2 | SYSTOLIC BLOOD PRESSURE: 139 MMHG | TEMPERATURE: 99 F | DIASTOLIC BLOOD PRESSURE: 76 MMHG | OXYGEN SATURATION: 100 % | HEIGHT: 66 IN | WEIGHT: 181.19 LBS

## 2022-10-31 LAB
FLUAV AG UPPER RESP QL IA.RAPID: NOT DETECTED
FLUBV AG UPPER RESP QL IA.RAPID: NOT DETECTED
LACTATE SERPL-SCNC: 0.9 MMOL/L (ref 0.5–2.2)
SARS-COV-2 RNA RESP QL NAA+PROBE: NOT DETECTED

## 2022-10-31 PROCEDURE — 63600175 PHARM REV CODE 636 W HCPCS: Performed by: STUDENT IN AN ORGANIZED HEALTH CARE EDUCATION/TRAINING PROGRAM

## 2022-10-31 PROCEDURE — 25500020 PHARM REV CODE 255: Performed by: NURSE PRACTITIONER

## 2022-10-31 PROCEDURE — 25000003 PHARM REV CODE 250: Performed by: STUDENT IN AN ORGANIZED HEALTH CARE EDUCATION/TRAINING PROGRAM

## 2022-10-31 RX ORDER — NAPROXEN 500 MG/1
500 TABLET ORAL 2 TIMES DAILY PRN
Qty: 60 TABLET | Refills: 0 | Status: SHIPPED | OUTPATIENT
Start: 2022-10-31 | End: 2022-12-12

## 2022-10-31 RX ORDER — CLINDAMYCIN HYDROCHLORIDE 150 MG/1
300 CAPSULE ORAL 4 TIMES DAILY
Qty: 56 CAPSULE | Refills: 0 | Status: SHIPPED | OUTPATIENT
Start: 2022-10-31 | End: 2022-11-07

## 2022-10-31 RX ORDER — METHOCARBAMOL 500 MG/1
1000 TABLET, FILM COATED ORAL 3 TIMES DAILY PRN
Qty: 30 TABLET | Refills: 0 | Status: SHIPPED | OUTPATIENT
Start: 2022-10-31 | End: 2022-11-05

## 2022-10-31 RX ORDER — MORPHINE SULFATE 4 MG/ML
4 INJECTION, SOLUTION INTRAMUSCULAR; INTRAVENOUS
Status: COMPLETED | OUTPATIENT
Start: 2022-10-31 | End: 2022-10-31

## 2022-10-31 RX ORDER — MAG HYDROX/ALUMINUM HYD/SIMETH 200-200-20
30 SUSPENSION, ORAL (FINAL DOSE FORM) ORAL ONCE
Status: COMPLETED | OUTPATIENT
Start: 2022-10-31 | End: 2022-10-31

## 2022-10-31 RX ORDER — LIDOCAINE HYDROCHLORIDE 20 MG/ML
15 SOLUTION OROPHARYNGEAL ONCE
Status: COMPLETED | OUTPATIENT
Start: 2022-10-31 | End: 2022-10-31

## 2022-10-31 RX ORDER — ONDANSETRON 2 MG/ML
4 INJECTION INTRAMUSCULAR; INTRAVENOUS
Status: COMPLETED | OUTPATIENT
Start: 2022-10-31 | End: 2022-10-31

## 2022-10-31 RX ORDER — LIDOCAINE 50 MG/G
1 PATCH TOPICAL DAILY
Qty: 7 PATCH | Refills: 0 | Status: SHIPPED | OUTPATIENT
Start: 2022-10-31 | End: 2023-01-12

## 2022-10-31 RX ADMIN — MORPHINE SULFATE 4 MG: 4 INJECTION INTRAVENOUS at 02:10

## 2022-10-31 RX ADMIN — ONDANSETRON 4 MG: 2 INJECTION INTRAMUSCULAR; INTRAVENOUS at 02:10

## 2022-10-31 RX ADMIN — LIDOCAINE HYDROCHLORIDE 15 ML: 20 SOLUTION ORAL at 04:10

## 2022-10-31 RX ADMIN — IOPAMIDOL 100 ML: 755 INJECTION, SOLUTION INTRAVENOUS at 12:10

## 2022-10-31 RX ADMIN — ALUMINUM HYDROXIDE, MAGNESIUM HYDROXIDE, AND SIMETHICONE 30 ML: 200; 200; 20 SUSPENSION ORAL at 04:10

## 2022-10-31 NOTE — ED NOTES
Pt yelling out from room.  Pt states her stomach is burning.  Pt yelling at provider about her stomach burning while drinking soda.  Provider at  speaking to pt.  Pt informed of discharge.

## 2022-10-31 NOTE — ED NOTES
Pt here due to continued left shoulder pain that radiates into elbow.  Pt states sustained fall 10/13/22.  Pt was seen and discharged on the 13th for same.  Pt states was sent home on 5 pain pills, out of meds now.  Pt states followed up with PCP whom told her to take Tylenol for pain.  Pt states fired PCP due to not getting more pain medications.  Pt states bruising to left side of shoulder.  Provider and RN unable to visualize bruising.  Pt became upset due to staff being unable to visualize bruising.

## 2022-10-31 NOTE — ED NOTES
"Pt disconnected BP cuff and O2 sat monitors.  States "in my way."  Attempted to place pt back on monitor and pt states "I don't want them back on me and they are on the floor."  Explained to pt wires would be cleaned prior to connecting them to pt and again pt refused.  "

## 2022-10-31 NOTE — ED NOTES
Pt requesting to speak to charge nurse.  Angela, charge nurse notified of pts request.  Pt c/o pain.  States does not have ride until after 8am.  Provider notified.

## 2022-10-31 NOTE — ED PROVIDER NOTES
Encounter Date: 10/30/2022       History     Chief Complaint   Patient presents with    Shoulder Pain     Patient sustained a fall on 10/13, was treated, discharged.  States only received 5 pain pills.  Also complaint of painfull swelling below nose.      Patient is a 56-year-old female  that presents with fall that has been present 2 weeks ago. Associated symptoms left trapezius pain. Surrounding information is nothing. Exacerbated by nothing. Relieved by nothing. Patient treatment prior to arrival none. Risk factors include none. Other history pertaining to this complaint nothing.       The history is provided by the patient. No  was used.   Review of patient's allergies indicates:   Allergen Reactions    Ketorolac (pf) Swelling    Mycophenolate mofetil Swelling    Penicillins Hives    Percocet [oxycodone-acetaminophen] Hives and Itching    Tramadol Hives     Past Medical History:   Diagnosis Date    Arthritis     knees    Asthma     Atrial fibrillation     Chronic constipation     Diabetes mellitus     Encounter for blood transfusion 10/2014    GERD (gastroesophageal reflux disease)     Gout     Hypertension     Lupus 2004    SLE    Renal disorder     SLE (systemic lupus erythematosus)      Past Surgical History:   Procedure Laterality Date    APPENDECTOMY      CHOLECYSTECTOMY      COLONOSCOPY N/A 4/19/2018    Procedure: COLONOSCOPY;  Surgeon: Minerva Porras MD;  Location: [facility];  Service: Endoscopy;  Laterality: N/A;    ESOPHAGOGASTRODUODENOSCOPY N/A 4/19/2018    Procedure: ESOPHAGOGASTRODUODENOSCOPY (EGD);  Surgeon: Minerva Porras MD;  Location: [facility];  Service: Endoscopy;  Laterality: N/A;    HYSTERECTOMY      JOINT REPLACEMENT Left 08/07/2015    Knee    LYMPH NODE BIOPSY Left 2014    MASTECTOMY      Left arm- NO BP    PARTIAL HYSTERECTOMY       Family History   Problem Relation Age of Onset    Heart block Mother     Heart disease Father      Social History      Tobacco Use    Smoking status: Former     Packs/day: 1.00     Years: 15.00     Pack years: 15.00     Types: Cigarettes    Smokeless tobacco: Never   Substance Use Topics    Alcohol use: No    Drug use: No     Review of Systems   Constitutional:  Negative for fever.   HENT:  Positive for sinus pain.    Respiratory:  Negative for cough and shortness of breath.    Cardiovascular:  Negative for chest pain.   Gastrointestinal:  Negative for abdominal pain.   Genitourinary:  Negative for difficulty urinating and dysuria.   Musculoskeletal:  Positive for arthralgias. Negative for gait problem.   Skin:  Negative for color change.   Neurological:  Negative for dizziness, speech difficulty and headaches.   Psychiatric/Behavioral:  Negative for hallucinations and suicidal ideas.    All other systems reviewed and are negative.    Physical Exam     Initial Vitals [10/30/22 2208]   BP Pulse Resp Temp SpO2   121/75 81 20 (!) 100.4 °F (38 °C) 99 %      MAP       --         Physical Exam    Nursing note and vitals reviewed.  Constitutional: She appears well-developed and well-nourished.   HENT:   Head: Normocephalic.   Small pimple under R nare. No intraoral swelling, abscess, or other lesions. No trismus.    Eyes: EOM are normal.   Neck:   Normal range of motion.  Cardiovascular:  Normal rate, regular rhythm, normal heart sounds and intact distal pulses.           Pulmonary/Chest: Breath sounds normal. No respiratory distress.   Abdominal: Abdomen is soft. Bowel sounds are normal. There is no abdominal tenderness.   Musculoskeletal:         General: Normal range of motion.      Cervical back: Normal range of motion.      Comments: Tenderness to left trapezius     Neurological: She is alert and oriented to person, place, and time. She has normal strength.   Skin: Skin is warm and dry.   Psychiatric: She has a normal mood and affect. Her behavior is normal. Judgment and thought content normal.       ED Course   Procedures  Labs  Reviewed   COMPREHENSIVE METABOLIC PANEL - Abnormal; Notable for the following components:       Result Value    Potassium Level 5.5 (*)     Blood Urea Nitrogen 23.1 (*)     Creatinine 1.14 (*)     Protein Total 8.4 (*)     Albumin Level 2.9 (*)     Globulin 5.5 (*)     Albumin/Globulin Ratio 0.5 (*)     All other components within normal limits   CBC WITH DIFFERENTIAL - Abnormal; Notable for the following components:    RBC 3.57 (*)     Hgb 10.1 (*)     Hct 30.6 (*)     Platelet 502 (*)     All other components within normal limits   STREP GROUP A BY PCR - Normal    Narrative:     The Xpert Xpress Strep A test is a rapid, qualitative in vitro diagnostic test for the detection of Streptococcus pyogenes (Group A ß-hemolytic Streptococcus, Strep A) in throat swab specimens from patients with signs and symptoms of pharyngitis.     COVID/FLU A&B PCR - Normal    Narrative:     The Xpert Xpress SARS-CoV-2/FLU/RSV plus is a rapid, multiplexed real-time PCR test intended for the simultaneous qualitative detection and differentiation of SARS-CoV-2, Influenza A, Influenza B, and respiratory syncytial virus (RSV) viral RNA in either nasopharyngeal swab or nasal swab specimens.         LACTIC ACID, PLASMA - Normal   BLOOD CULTURE [facility]   BLOOD CULTURE [facility]   CBC W/ AUTO DIFFERENTIAL    Narrative:     The following orders were created for panel order CBC auto differential.  Procedure                               Abnormality         Status                     ---------                               -----------         ------                     CBC with Differential[[id number]]        Abnormal            Final result                 Please view results for these tests on the individual orders.          Imaging Results              X-Ray Chest AP Portable (Final result)  Result time 10/31/22 07:18:34      Final result by Alex Arellano MD (10/31/22 07:18:34)                   Impression:      No acute cardiopulmonary  process.      Electronically signed by: Alex Arellano  Date:    10/31/2022  Time:    07:18               Narrative:    EXAMINATION:  XR CHEST AP PORTABLE    CLINICAL HISTORY:  Fever, unspecified    TECHNIQUE:  Single view of the chest    COMPARISON:  05/29/2022    FINDINGS:  No focal opacification, pleural effusion, or pneumothorax.    The cardiomediastinal silhouette is within normal limits.    No acute osseous abnormality.                                       CT Maxillofacial With Contrast (Final result)  Result time 10/31/22 07:50:48      Final result by Oscar Duncan MD (10/31/22 07:50:48)                   Impression:    Impression:    No discrete drainable fluid collection or abscess is identified. No abnormal enhancement is seen.    No significant discrepancy with overnight report.      Electronically signed by: Oscar Duncan  Date:    10/31/2022  Time:    07:50               Narrative:      Technique:Maxillofacial CT was performed with intravenous contrast with axial as well as sagittal and coronal images.    Automated exposure control was utilized to minimize radiation dose.      Comparison:None.    Clinical history:Maxillary/facial abscess.    Findings:    Facial soft tissues:No discrete drainable fluid collection or abscess is identified. No abnormal enhancement is seen.    Orbital soft tissues:The orbital soft tissues appear unremarkable.    Bones:No osseous abnormality is seen.    TMJ:The mandibular condyles appear normally placed with respect to the mandibular fossa.    Skull:No acute linear or depressed fracture is identified in the visualized skull.    Paranasal sinuses:The visualized paranasal sinuses appear clear with no mucoperiosteal thickening or air fluid levels identified.    Mastoid air cells:The visualized mastoid air cells appear clear.    Miscellaneous:Both submandibular and parotid glands are symmetric and appear unremarkable. The thyroid gland is unremarkable. There is mild  atheromatous calcification of the right carotid bulb. The vessels of the neck are patent. No evidence of cervical lymphadenopathy as per CT size criteria.                        Preliminary result by Oscar Duncan MD (10/31/22 00:31:33)                   Narrative:    START OF REPORT:  Technique: Maxillofacial CT was performed with intravenous contrast with axial as well as sagittal and coronal images.    Comparison: None.    Clinical history: Maxillary/facial abscess.    Findings:  Facial soft tissues: No discrete drainable fluid collection or abscess is identified. No abnormal enhancement is seen.  Orbital soft tissues: The orbital soft tissues appear unremarkable.  Bones: No osseous abnormality is seen.  TMJ: The mandibular condyles appear normally placed with respect to the mandibular fossa.  Skull: No acute linear or depressed fracture is identified in the visualized skull.  Paranasal sinuses: The visualized paranasal sinuses appear clear with no mucoperiosteal thickening or air fluid levels identified.  Mastoid air cells: The visualized mastoid air cells appear clear.    Miscellaneous: Both submandibular and parotid glands are symmetric and appear unremarkable. The thyroid gland is unremarkable. There is mild atheromatous calcification of the right carotid bulb. The vessels of the neck are patent. No evidence of cervical lymphadenopathy as per CT size criteria.      Impression:  1. No discrete drainable fluid collection or abscess is identified. No abnormal enhancement is seen.  2. Unremarkable non-contrast maxillofacial CT. Details and other findings as noted above.                          Preliminary result by John Davis MD (10/31/22 00:31:33)                   Narrative:    START OF REPORT:  Technique: Maxillofacial CT was performed with intravenous contrast with axial as well as sagittal and coronal images.    Comparison: None.    Clinical history: Maxillary/facial abscess.    Findings:  Facial  soft tissues: No discrete drainable fluid collection or abscess is identified. No abnormal enhancement is seen.  Orbital soft tissues: The orbital soft tissues appear unremarkable.  Bones: No osseous abnormality is seen.  TMJ: The mandibular condyles appear normally placed with respect to the mandibular fossa.  Skull: No acute linear or depressed fracture is identified in the visualized skull.  Paranasal sinuses: The visualized paranasal sinuses appear clear with no mucoperiosteal thickening or air fluid levels identified.  Mastoid air cells: The visualized mastoid air cells appear clear.    Miscellaneous: Both submandibular and parotid glands are symmetric and appear unremarkable. The thyroid gland is unremarkable. There is mild atheromatous calcification of the right carotid bulb. The vessels of the neck are patent. No evidence of cervical lymphadenopathy as per CT size criteria.      Impression:  1. No discrete drainable fluid collection or abscess is identified. No abnormal enhancement is seen.  2. Unremarkable non-contrast maxillofacial CT. Details and other findings as noted above.                                         X-Ray Shoulder 2 or More Views Left (Final result)  Result time 10/31/22 10:00:30      Final result by Tavo Edwards MD (10/31/22 10:00:30)                   Impression:      Degenerative changes.      Electronically signed by: Tavo Edwards  Date:    10/31/2022  Time:    10:00               Narrative:    EXAMINATION:  XR SHOULDER COMPLETE 2 OR MORE VIEWS LEFT    CLINICAL HISTORY:  fall;    COMPARISON:  None.    FINDINGS:  No acute displaced fractures or dislocations.    There is some narrowing of the acromioclavicular joint with some narrowing of the glenohumeral joint some degenerative changes are also identified inferior to the acromion related to the subacromial bursa    No blastic or lytic lesions.    Soft tissues within normal limits.                        ED Interpretation by  ANNAMARIA Guadarrama (10/30/22 23:38:00, [facility] - Emergency Dept, Emergency Medicine)    No acute finding                                     Medications   iopamidoL (ISOVUE-370) injection 100 mL (100 mLs Intravenous Given 10/31/22 0032)   morphine injection 4 mg (4 mg Intravenous Given 10/31/22 0257)   ondansetron injection 4 mg (4 mg Intravenous Given 10/31/22 0256)   aluminum-magnesium hydroxide-simethicone 200-200-20 mg/5 mL suspension 30 mL (30 mLs Oral Given 10/31/22 0431)     And   LIDOcaine HCl 2% oral solution 15 mL (15 mLs Oral Given 10/31/22 0431)     Medical Decision Making:   History:   Old Medical Records: I decided to obtain old medical records.  Initial Assessment:   Took over care of patient from [facility]ello Good[facility] - asked to follow up CT max/face - history as above. Patient with some chronic L shoulder pain, small pimple/swelling under R nare. CT max/face negative, dishcarged with abx and pain meds. Well appearing throughout ED stay.   Clinical Tests:   Lab Tests: Ordered and Reviewed  Radiological Study: Ordered and Reviewed  ED Management:  Morphine  Zofran   Other:   I have discussed this case with another health care provider.       <> Summary of the Discussion: Tello Goodwin CHEN           ED Course as of 10/31/22 2300   [place] Oct 30, 2022   3912 Patient will need to see her PCP for pain needs and evaluation for PT and/or MRI of areas of pain.  [CL]   Mon Oct 31, 2022   1552 I was asked to take over care of patient pending CT max/face. Reportedly presenting for L shoulder pain, facial pain, fever. Patient now complaining of stomach burning, drinking soda on my exam. Showed me a small pimple underneath her left nostril, complaining of dental swelling (normal intraoral exam). Will discharge with brief abx, dental follow up, multimodal pain regimen for chronic pain.  [AC]      ED Course User Index  [AC] Frankie Moralez IV, MD  [CL] ANNAMARIA Guadarrama                    Clinical Impression:   Final diagnoses:  [R50.9] Fever  [M25.512] Acute pain of left shoulder (Primary)  [K08.89] Pain, dental      ED Disposition Condition    Discharge Stable          ED Prescriptions       Medication Sig Dispense Start Date End Date Auth. Provider    clindamycin (CLEOCIN) 150 MG capsule Take 2 capsules (300 mg total) by mouth 4 (four) times daily. for 7 days 56 capsule 10/31/2022 11/7/2022 Frankie Moralez IV, MD    LIDOcaine (LIDODERM) 5 % Place 1 patch onto the skin once daily. Remove & Discard patch within 12 hours or as directed by MD Garcai patch 10/31/2022 -- Frankie Moralez IV, MD    methocarbamoL (ROBAXIN) 500 MG Tab Take 2 tablets (1,000 mg total) by mouth 3 (three) times daily as needed. 30 tablet 10/31/2022 11/5/2022 Frankie Moralez IV, MD    naproxen (NAPROSYN) 500 MG tablet Take 1 tablet (500 mg total) by mouth 2 (two) times daily as needed. 60 tablet 10/31/2022 -- Frankie Moralez IV, MD          Follow-up Information       Follow up With Specialties Details Why Contact Info    [facility] - Emergency Dept Emergency Medicine Go to  If symptoms worsen [address] [facility] [id number] [phone]    Popeye Chatterjee MD Internal Medicine Schedule an appointment as soon as possible for a visit   [address] [place] [address].  [facility] [id number] [phone]          Frankie JESUS MD personally performed the history, PE, MDM, and procedures as documented above and agree with the scribe's documentation.        Frankie Moralez IV, MD  10/31/22 9703

## 2022-11-05 LAB
BACTERIA BLD CULT: NORMAL
BACTERIA BLD CULT: NORMAL

## 2022-11-10 ENCOUNTER — TELEPHONE (OUTPATIENT)
Dept: NEPHROLOGY | Facility: CLINIC | Age: 57
End: 2022-11-10
Payer: MEDICAID

## 2022-11-10 DIAGNOSIS — E11.8 DIABETES MELLITUS TYPE 2 WITH COMPLICATIONS: Primary | ICD-10-CM

## 2022-11-10 NOTE — TELEPHONE ENCOUNTER
Called patient regarding lab results, Dr Ken ordered to repeat CMP today or tomorrow before appt, patient stated she will go tomorrow before appt. Verbalized understanding.

## 2022-11-11 ENCOUNTER — OFFICE VISIT (OUTPATIENT)
Dept: NEPHROLOGY | Facility: CLINIC | Age: 57
End: 2022-11-11
Payer: MEDICAID

## 2022-11-11 VITALS
OXYGEN SATURATION: 85 % | HEART RATE: 64 BPM | HEIGHT: 66 IN | SYSTOLIC BLOOD PRESSURE: 138 MMHG | TEMPERATURE: 98 F | WEIGHT: 177 LBS | BODY MASS INDEX: 28.45 KG/M2 | DIASTOLIC BLOOD PRESSURE: 84 MMHG

## 2022-11-11 DIAGNOSIS — N17.9 AKI (ACUTE KIDNEY INJURY): Primary | ICD-10-CM

## 2022-11-11 DIAGNOSIS — E11.8 DM (DIABETES MELLITUS) WITH COMPLICATIONS: ICD-10-CM

## 2022-11-11 DIAGNOSIS — M32.14 LUPUS NEPHRITIS: ICD-10-CM

## 2022-11-11 PROCEDURE — 99214 PR OFFICE/OUTPT VISIT, EST, LEVL IV, 30-39 MIN: ICD-10-PCS | Mod: S$PBB,,, | Performed by: INTERNAL MEDICINE

## 2022-11-11 PROCEDURE — 3008F BODY MASS INDEX DOCD: CPT | Mod: CPTII,,, | Performed by: INTERNAL MEDICINE

## 2022-11-11 PROCEDURE — 3066F PR DOCUMENTATION OF TREATMENT FOR NEPHROPATHY: ICD-10-PCS | Mod: CPTII,,, | Performed by: INTERNAL MEDICINE

## 2022-11-11 PROCEDURE — 3079F DIAST BP 80-89 MM HG: CPT | Mod: CPTII,,, | Performed by: INTERNAL MEDICINE

## 2022-11-11 PROCEDURE — 1159F PR MEDICATION LIST DOCUMENTED IN MEDICAL RECORD: ICD-10-PCS | Mod: CPTII,,, | Performed by: INTERNAL MEDICINE

## 2022-11-11 PROCEDURE — 99214 OFFICE O/P EST MOD 30 MIN: CPT | Mod: PBBFAC | Performed by: INTERNAL MEDICINE

## 2022-11-11 PROCEDURE — 4010F PR ACE/ARB THEARPY RXD/TAKEN: ICD-10-PCS | Mod: CPTII,,, | Performed by: INTERNAL MEDICINE

## 2022-11-11 PROCEDURE — 3079F PR MOST RECENT DIASTOLIC BLOOD PRESSURE 80-89 MM HG: ICD-10-PCS | Mod: CPTII,,, | Performed by: INTERNAL MEDICINE

## 2022-11-11 PROCEDURE — 3008F PR BODY MASS INDEX (BMI) DOCUMENTED: ICD-10-PCS | Mod: CPTII,,, | Performed by: INTERNAL MEDICINE

## 2022-11-11 PROCEDURE — 3075F PR MOST RECENT SYSTOLIC BLOOD PRESS GE 130-139MM HG: ICD-10-PCS | Mod: CPTII,,, | Performed by: INTERNAL MEDICINE

## 2022-11-11 PROCEDURE — 3066F NEPHROPATHY DOC TX: CPT | Mod: CPTII,,, | Performed by: INTERNAL MEDICINE

## 2022-11-11 PROCEDURE — 1159F MED LIST DOCD IN RCRD: CPT | Mod: CPTII,,, | Performed by: INTERNAL MEDICINE

## 2022-11-11 PROCEDURE — 99999 PR PBB SHADOW E&M-EST. PATIENT-LVL IV: CPT | Mod: PBBFAC,,, | Performed by: INTERNAL MEDICINE

## 2022-11-11 PROCEDURE — 4010F ACE/ARB THERAPY RXD/TAKEN: CPT | Mod: CPTII,,, | Performed by: INTERNAL MEDICINE

## 2022-11-11 PROCEDURE — 3075F SYST BP GE 130 - 139MM HG: CPT | Mod: CPTII,,, | Performed by: INTERNAL MEDICINE

## 2022-11-11 PROCEDURE — 99214 OFFICE O/P EST MOD 30 MIN: CPT | Mod: S$PBB,,, | Performed by: INTERNAL MEDICINE

## 2022-11-11 PROCEDURE — 99999 PR PBB SHADOW E&M-EST. PATIENT-LVL IV: ICD-10-PCS | Mod: PBBFAC,,, | Performed by: INTERNAL MEDICINE

## 2022-11-11 NOTE — PROGRESS NOTES
[facility] Progress Note      HPI  Vi Ulrich, 57 y.o. female,  has a past medical history of Arthritis, Asthma, Atrial fibrillation, Chronic constipation, Diabetes mellitus, Encounter for blood transfusion (10/2014), GERD (gastroesophageal reflux disease), Gout, Hypertension, Lupus (2004), Renal disorder, and SLE (systemic lupus erythematosus). 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 She has completed two rounds of IV rituximab. Hx of cyclophosphamide therapy. She is now maintained on imuran by rheumatology. She isn't feeling well today (or the last 3 weeks). She had to go to ER 10/30 for continued pain, cough and SOB (negative covid and flu). She fell down 14 steps in the middle of October after tripping and was also seen in ER that day. Xrs negative. She does not have a PCP anymore. States they didn't get along.     ER placed on her naproxen.    Patient denies taking NSAIDs, new antibiotics or recreational drugs. Denies recent episode of dehydration, diarrhea, vomiting, acute illness, hospitalization, recent angiograms or exposure to IV radiocontrast.      Medical History:   Past Medical History:   Diagnosis Date    Arthritis     knees    Asthma     Atrial fibrillation     Chronic constipation     Diabetes mellitus     Encounter for blood transfusion 10/2014    GERD (gastroesophageal reflux disease)     Gout     Hypertension     Lupus 2004    SLE    Renal disorder     SLE (systemic lupus erythematosus)        Surgical History:   Past Surgical History:   Procedure Laterality Date    APPENDECTOMY      CHOLECYSTECTOMY      COLONOSCOPY N/A 4/19/2018    Procedure: COLONOSCOPY;  Surgeon: Minerva Porras MD;  Location: [facility];  Service: Endoscopy;  Laterality: N/A;    ESOPHAGOGASTRODUODENOSCOPY N/A 4/19/2018    Procedure: ESOPHAGOGASTRODUODENOSCOPY (EGD);  Surgeon:  Minerva Porras MD;  Location: [facility];  Service: Endoscopy;  Laterality: N/A;    HYSTERECTOMY      JOINT REPLACEMENT Left 08/07/2015    Knee    LYMPH NODE BIOPSY Left 2014    MASTECTOMY      Left arm- NO BP    PARTIAL HYSTERECTOMY         Family History:   Family History   Problem Relation Age of Onset    Heart block Mother     Heart disease Father        Social History:   Social History     Tobacco Use    Smoking status: Former     Packs/day: 1.00     Years: 15.00     Pack years: 15.00     Types: Cigarettes    Smokeless tobacco: Never   Substance Use Topics    Alcohol use: No       Allergies:  Review of patient's allergies indicates:   Allergen Reactions    Ketorolac (pf) Swelling    Mycophenolate mofetil Swelling    Penicillins Hives    Percocet [oxycodone-acetaminophen] Hives and Itching    Tramadol Hives       Medications:    Current Outpatient Medications:     albuterol sulfate (PROAIR RESPICLICK) 90 mcg/actuation inhaler, Inhale 1 puff into the lungs every 4 (four) hours as needed for Wheezing. Rescue, Disp: , Rfl:     atorvastatin (LIPITOR) 10 MG tablet, Take 10 mg by mouth once daily., Disp: , Rfl:     azaTHIOprine (IMURAN) 50 mg Tab, Take 2 tablets (100 mg total) by mouth daily with dinner or evening meal., Disp: 60 tablet, Rfl: 5    blood sugar diagnostic (ONETOUCH VERIO) Strp, 1 each by Misc.(Non-Drug; Combo Route) route 4 (four) times daily., Disp: 50 each, Rfl: 11    blood sugar diagnostic Strp, 1 each by Misc.(Non-Drug; Combo Route) route 4 (four) times daily., Disp: 50 each, Rfl: 11    blood-glucose meter (ONETOUCH VERIO SYNC) kit, Use as instructed, Disp: 1 each, Rfl: 0    hydrOXYchloroQUINE (PLAQUENIL) 200 mg tablet, Take 1 tablet (200 mg total) by mouth 2 (two) times daily., Disp: 180 tablet, Rfl: 3    lancets 33 gauge Misc, 1 lancet by Misc.(Non-Drug; Combo Route) route 4 (four) times daily., Disp: 100 each, Rfl: 11    LIDOcaine (LIDODERM) 5 %, Place 1 patch onto the skin once daily.  "Remove & Discard patch within 12 hours or as directed by MD, Disp: 7 patch, Rfl: 0    naproxen (NAPROSYN) 500 MG tablet, Take 1 tablet (500 mg total) by mouth 2 (two) times daily as needed., Disp: 60 tablet, Rfl: 0    omeprazole (PRILOSEC) 20 MG capsule, Take 1 capsule (20 mg total) by mouth once daily., Disp: 30 capsule, Rfl: 5    ondansetron (ZOFRAN) 4 MG tablet, Take 1 tablet (4 mg total) by mouth every 6 (six) hours as needed for Nausea., Disp: 24 tablet, Rfl: 0    tiZANidine (ZANAFLEX) 4 MG tablet, Take 1 tablet (4 mg total) by mouth every evening., Disp: 30 tablet, Rfl: 5    ergocalciferol (ERGOCALCIFEROL) 50,000 unit Cap, Take 1 capsule (50,000 Units total) by mouth every 7 days. (Patient not taking: Reported on 11/11/2022), Disp: 5 capsule, Rfl: 5    HYDROcodone-acetaminophen (NORCO) 5-325 mg per tablet, Take 1 tablet by mouth every 6 (six) hours as needed for Pain. (Patient not taking: Reported on 11/11/2022), Disp: 8 tablet, Rfl: 0       ROS:    Constitutional: Denies fever, fatigue, generalized weakness, night sweats, or acute weight change  Skin: Denies wounds, no rashes, no itching, no new skin lesions  HEENT: Denies acute change in hearing or vision, tinnitus, or dysphagia  Respiratory:  Denies wheezing; +SOB (getting better now) and +cough x 3 weeks; (negative covid and flu on 10/30)  Cardiovascular: Denies chest pain, palpitations, or swelling  Gastrointestional: Denies vomiting, diarrhea, or constipation; +nausea  Genitourinary: Denies dysuria, hematuria, or incontinence; reports able to empty bladder  Musculoskeletal: generalized pain  Neurological: Denies headaches, seizures, dizziness, paresthesias or weakness  Hematological: Denies unusual bruising or bleeding  Psychiatric: Denies hallucinations, depression, or confusion      Vital Signs:  /84 (BP Location: Right arm, Patient Position: Sitting)   Pulse 64   Temp 97.5 °F (36.4 °C)   Ht 5' 6" (1.676 m)   Wt 80.3 kg (177 lb 0.5 oz)   LMP "  (LMP Unknown)   SpO2 (!) 85%   BMI 28.57 kg/m²   Body mass index is 28.57 kg/m².    Repeat O2 is 91%      Physical Exam:    General: no acute distress, awake, alert  Eyes: PERRLA, EOMI, conjunctiva clear, eyelids without swelling  HENT: atraumatic, oropharynx and nasal mucosa patent  Neck: full ROM, no JVD, no thyromegaly or lymphadenopathy  Respiratory: equal, unlabored, diminished auscultation A/P  Cardiovascular: RRR without rub; radial and pedal pulses felt  Edema: none  Gastrointestinal: soft, non-tender, non-distended; positive bowel sounds; no masses to palpation  Genitourinary: no CVA tenderness upon palpation  Musculoskeletal: ROM without limitation  Integumentary: warm, dry; no rashes, wounds, or skin lesions  Neurological: oriented, appropriate, no acute deficits      Labs:        Component Value Date/Time     10/30/2022 2312     10/27/2022 0933     08/23/2021 1142     06/22/2021 1222    K 5.5 (H) 10/30/2022 2312    K 4.5 10/27/2022 0933    K 4.4 08/23/2021 1142    K 4.3 06/22/2021 1222     08/23/2021 1142     06/22/2021 1222    CO2 23 10/30/2022 2312    CO2 20 (L) 10/27/2022 0933    CO2 21 (L) 08/23/2021 1142    CO2 24 06/22/2021 1222    BUN 23.1 (H) 10/30/2022 2312    BUN 17.2 10/27/2022 0933    BUN 21 (H) 08/23/2021 1142    BUN 25 (H) 06/22/2021 1222    CREATININE 1.14 (H) 10/30/2022 2312    CREATININE 1.16 (H) 10/27/2022 0933    CREATININE 1.0 08/23/2021 1142    CREATININE 0.82 06/22/2021 1222    ESTGFRAFRICA >60.0 08/23/2021 1142    ESTGFRAFRICA >60 06/22/2021 1222    EGFRNONAA 59 03/28/2022 1658    EGFRNONAA 57 03/06/2022 0739    EGFRNONAA >60.0 08/23/2021 1142    EGFRNONAA >60 06/22/2021 1222    CALCIUM 9.1 10/30/2022 2312    CALCIUM 9.2 10/27/2022 0933    CALCIUM 8.7 08/23/2021 1142    CALCIUM 8.9 06/22/2021 1222    PHOS 4.1 07/20/2022 0843    PHOS 2.9 11/30/2017 0738    .3 (H) 07/20/2022 0843            Component Value Date/Time    WBC 6.5 10/30/2022  2312    WBC 5.3 10/27/2022 0933    WBC 6.23 08/23/2021 1142    WBC 4.80 06/22/2021 1329    HGB 10.1 (L) 10/30/2022 2312    HGB 10.6 (L) 10/27/2022 0933    HGB 12.5 08/23/2021 1142    HGB 11.6 (L) 06/22/2021 1329    HCT 30.6 (L) 10/30/2022 2312    HCT 33.8 (L) 10/27/2022 0933    HCT 38.7 08/23/2021 1142    HCT 34.8 (L) 06/22/2021 1329     (H) 10/30/2022 2312     (H) 10/27/2022 0933     08/23/2021 1142     06/22/2021 1329         Impression:    Patient Active Problem List   Diagnosis    Essential hypertension    Diabetes mellitus, type 2    Lupus (systemic lupus erythematosus)    Insomnia    Hyperlipidemia    History of asthma    New onset atrial fibrillation    Periumbilical pain    Constipation    Anemia    Anxiety    Class 1 obesity with serious comorbidity and body mass index (BMI) of 32.0 to 32.9 in adult    Acute cystitis with hematuria    STIVEN (acute kidney injury)    Pyelonephritis    Inadequate dietary energy intake    Systemic lupus erythematosus    Fibromyalgia    Lupus nephritis, ISN/RPS class III     1. STIVEN (acute kidney injury)        2. Lupus nephritis        3. DM (diabetes mellitus) with complications          Hyperkalemia: she admits to high potassium foods in diet; she repeated labs this AM; we will call her if we need to add any medications for abnormal labs  Lupus nephritis: renal function stable; maintained on  imuran  Proteinuria 0.9g/24 hours (trending down from July 1.5g/24 hrs)      Plan:  STOP TAKING NAPROXEN  Low potassium diet  FU in 1 month with labs 1 week before    Advised patient go to nearest emergency room right now for SOB, low 02 sat, and feeling ill; she states she will go to ER now      Avoid NSAIDs (Aleve, Mobic, Celebrex, Ibuprofen, Advil, Toradol and Diclofenac).       Jarad Bailey, ANNAMARIA

## 2022-12-02 ENCOUNTER — LAB VISIT (OUTPATIENT)
Dept: LAB | Facility: HOSPITAL | Age: 57
End: 2022-12-02
Payer: MEDICAID

## 2022-12-02 DIAGNOSIS — M32.14 LUPUS NEPHRITIS: ICD-10-CM

## 2022-12-02 DIAGNOSIS — E11.8 DM (DIABETES MELLITUS) WITH COMPLICATIONS: ICD-10-CM

## 2022-12-02 LAB
ALBUMIN SERPL-MCNC: 2.8 GM/DL (ref 3.5–5)
ALBUMIN/GLOB SERPL: 0.7 RATIO (ref 1.1–2)
ALP SERPL-CCNC: 133 UNIT/L (ref 40–150)
ALT SERPL-CCNC: 17 UNIT/L (ref 0–55)
APPEARANCE UR: CLEAR
AST SERPL-CCNC: 19 UNIT/L (ref 5–34)
BACTERIA #/AREA URNS AUTO: ABNORMAL /HPF
BASOPHILS # BLD AUTO: 0.02 X10(3)/MCL (ref 0–0.2)
BASOPHILS NFR BLD AUTO: 0.5 %
BILIRUB UR QL STRIP.AUTO: NEGATIVE MG/DL
BILIRUBIN DIRECT+TOT PNL SERPL-MCNC: 0.2 MG/DL
BUN SERPL-MCNC: 17.1 MG/DL (ref 9.8–20.1)
CALCIUM SERPL-MCNC: 8.5 MG/DL (ref 8.4–10.2)
CHLORIDE SERPL-SCNC: 105 MMOL/L (ref 98–107)
CO2 SERPL-SCNC: 25 MMOL/L (ref 22–29)
COLOR UR AUTO: ABNORMAL
CREAT SERPL-MCNC: 1.09 MG/DL (ref 0.55–1.02)
CREAT UR-MCNC: 178.9 MG/DL (ref 47–110)
EOSINOPHIL # BLD AUTO: 0.09 X10(3)/MCL (ref 0–0.9)
EOSINOPHIL NFR BLD AUTO: 2.1 %
ERYTHROCYTE [DISTWIDTH] IN BLOOD BY AUTOMATED COUNT: 18.1 % (ref 11.5–17)
GFR SERPLBLD CREATININE-BSD FMLA CKD-EPI: 60 MLS/MIN/1.73/M2
GLOBULIN SER-MCNC: 4 GM/DL (ref 2.4–3.5)
GLUCOSE SERPL-MCNC: 110 MG/DL (ref 74–100)
GLUCOSE UR QL STRIP.AUTO: NEGATIVE MG/DL
HCT VFR BLD AUTO: 30.9 % (ref 37–47)
HGB BLD-MCNC: 9.6 GM/DL (ref 12–16)
IMM GRANULOCYTES # BLD AUTO: 0.01 X10(3)/MCL (ref 0–0.04)
IMM GRANULOCYTES NFR BLD AUTO: 0.2 %
KETONES UR QL STRIP.AUTO: ABNORMAL MG/DL
LEUKOCYTE ESTERASE UR QL STRIP.AUTO: ABNORMAL UNIT/L
LYMPHOCYTES # BLD AUTO: 1.4 X10(3)/MCL (ref 0.6–4.6)
LYMPHOCYTES NFR BLD AUTO: 32.1 %
MCH RBC QN AUTO: 27.6 PG (ref 27–31)
MCHC RBC AUTO-ENTMCNC: 31.1 MG/DL (ref 33–36)
MCV RBC AUTO: 88.8 FL (ref 80–94)
MONOCYTES # BLD AUTO: 0.19 X10(3)/MCL (ref 0.1–1.3)
MONOCYTES NFR BLD AUTO: 4.4 %
NEUTROPHILS # BLD AUTO: 2.7 X10(3)/MCL (ref 2.1–9.2)
NEUTROPHILS NFR BLD AUTO: 60.7 %
NITRITE UR QL STRIP.AUTO: NEGATIVE
NRBC BLD AUTO-RTO: 0 %
PH UR STRIP.AUTO: 5.5 [PH]
PHOSPHATE SERPL-MCNC: 3.5 MG/DL (ref 2.3–4.7)
PLATELET # BLD AUTO: 463 X10(3)/MCL (ref 130–400)
PMV BLD AUTO: 10.2 FL (ref 7.4–10.4)
POTASSIUM SERPL-SCNC: 4.4 MMOL/L (ref 3.5–5.1)
PROT SERPL-MCNC: 6.8 GM/DL (ref 6.4–8.3)
PROT UR QL STRIP.AUTO: ABNORMAL MG/DL
PROT UR STRIP-MCNC: 184.2 MG/DL
PTH-INTACT SERPL-MCNC: 145.2 PG/ML (ref 8.7–77)
RBC # BLD AUTO: 3.48 X10(6)/MCL (ref 4.2–5.4)
RBC #/AREA URNS AUTO: <5 /HPF
RBC UR QL AUTO: ABNORMAL UNIT/L
SODIUM SERPL-SCNC: 137 MMOL/L (ref 136–145)
SP GR UR STRIP.AUTO: 1.02 (ref 1–1.03)
SQUAMOUS #/AREA URNS AUTO: 8 /HPF
UROBILINOGEN UR STRIP-ACNC: 1 MG/DL
WBC # SPEC AUTO: 4.4 X10(3)/MCL (ref 4.5–11.5)
WBC #/AREA URNS AUTO: 57 /HPF

## 2022-12-02 PROCEDURE — 87088 URINE BACTERIA CULTURE: CPT

## 2022-12-02 PROCEDURE — 81001 URINALYSIS AUTO W/SCOPE: CPT

## 2022-12-02 PROCEDURE — 80053 COMPREHEN METABOLIC PANEL: CPT

## 2022-12-02 PROCEDURE — 85025 COMPLETE CBC W/AUTO DIFF WBC: CPT

## 2022-12-02 PROCEDURE — 83970 ASSAY OF PARATHORMONE: CPT

## 2022-12-02 PROCEDURE — 36415 COLL VENOUS BLD VENIPUNCTURE: CPT

## 2022-12-02 PROCEDURE — 84100 ASSAY OF PHOSPHORUS: CPT

## 2022-12-02 PROCEDURE — 84156 ASSAY OF PROTEIN URINE: CPT

## 2022-12-02 PROCEDURE — 82570 ASSAY OF URINE CREATININE: CPT

## 2022-12-04 LAB
BACTERIA UR CULT: ABNORMAL

## 2022-12-12 ENCOUNTER — OFFICE VISIT (OUTPATIENT)
Dept: NEPHROLOGY | Facility: CLINIC | Age: 57
End: 2022-12-12
Payer: MEDICAID

## 2022-12-12 ENCOUNTER — TELEPHONE (OUTPATIENT)
Dept: NEPHROLOGY | Facility: CLINIC | Age: 57
End: 2022-12-12

## 2022-12-12 VITALS
TEMPERATURE: 97 F | RESPIRATION RATE: 20 BRPM | BODY MASS INDEX: 28.95 KG/M2 | OXYGEN SATURATION: 99 % | HEART RATE: 62 BPM | SYSTOLIC BLOOD PRESSURE: 118 MMHG | WEIGHT: 180.13 LBS | DIASTOLIC BLOOD PRESSURE: 68 MMHG | HEIGHT: 66 IN

## 2022-12-12 DIAGNOSIS — M32.14 SYSTEMIC LUPUS ERYTHEMATOSUS WITH GLOMERULAR DISEASE, UNSPECIFIED SLE TYPE: ICD-10-CM

## 2022-12-12 DIAGNOSIS — I10 ESSENTIAL HYPERTENSION: Primary | ICD-10-CM

## 2022-12-12 DIAGNOSIS — N17.9 AKI (ACUTE KIDNEY INJURY): Primary | ICD-10-CM

## 2022-12-12 DIAGNOSIS — M32.14 LUPUS NEPHRITIS: ICD-10-CM

## 2022-12-12 DIAGNOSIS — I10 ESSENTIAL HYPERTENSION: ICD-10-CM

## 2022-12-12 DIAGNOSIS — F51.01 PRIMARY INSOMNIA: ICD-10-CM

## 2022-12-12 DIAGNOSIS — E11.8 DM (DIABETES MELLITUS) WITH COMPLICATIONS: ICD-10-CM

## 2022-12-12 DIAGNOSIS — M79.7 FIBROMYALGIA: ICD-10-CM

## 2022-12-12 DIAGNOSIS — M32.14 LUPUS NEPHRITIS, ISN/RPS CLASS III: ICD-10-CM

## 2022-12-12 PROCEDURE — 3066F PR DOCUMENTATION OF TREATMENT FOR NEPHROPATHY: ICD-10-PCS | Mod: CPTII,,, | Performed by: INTERNAL MEDICINE

## 2022-12-12 PROCEDURE — 3078F PR MOST RECENT DIASTOLIC BLOOD PRESSURE < 80 MM HG: ICD-10-PCS | Mod: CPTII,,, | Performed by: INTERNAL MEDICINE

## 2022-12-12 PROCEDURE — 99214 OFFICE O/P EST MOD 30 MIN: CPT | Mod: S$PBB,,, | Performed by: INTERNAL MEDICINE

## 2022-12-12 PROCEDURE — 4010F ACE/ARB THERAPY RXD/TAKEN: CPT | Mod: CPTII,,, | Performed by: INTERNAL MEDICINE

## 2022-12-12 PROCEDURE — 1159F PR MEDICATION LIST DOCUMENTED IN MEDICAL RECORD: ICD-10-PCS | Mod: CPTII,,, | Performed by: INTERNAL MEDICINE

## 2022-12-12 PROCEDURE — 3008F PR BODY MASS INDEX (BMI) DOCUMENTED: ICD-10-PCS | Mod: CPTII,,, | Performed by: INTERNAL MEDICINE

## 2022-12-12 PROCEDURE — 99214 OFFICE O/P EST MOD 30 MIN: CPT | Mod: PBBFAC | Performed by: INTERNAL MEDICINE

## 2022-12-12 PROCEDURE — 3078F DIAST BP <80 MM HG: CPT | Mod: CPTII,,, | Performed by: INTERNAL MEDICINE

## 2022-12-12 PROCEDURE — 99214 PR OFFICE/OUTPT VISIT, EST, LEVL IV, 30-39 MIN: ICD-10-PCS | Mod: S$PBB,,, | Performed by: INTERNAL MEDICINE

## 2022-12-12 PROCEDURE — 3008F BODY MASS INDEX DOCD: CPT | Mod: CPTII,,, | Performed by: INTERNAL MEDICINE

## 2022-12-12 PROCEDURE — 99999 PR PBB SHADOW E&M-EST. PATIENT-LVL IV: CPT | Mod: PBBFAC,,, | Performed by: INTERNAL MEDICINE

## 2022-12-12 PROCEDURE — 3074F SYST BP LT 130 MM HG: CPT | Mod: CPTII,,, | Performed by: INTERNAL MEDICINE

## 2022-12-12 PROCEDURE — 3074F PR MOST RECENT SYSTOLIC BLOOD PRESSURE < 130 MM HG: ICD-10-PCS | Mod: CPTII,,, | Performed by: INTERNAL MEDICINE

## 2022-12-12 PROCEDURE — 1159F MED LIST DOCD IN RCRD: CPT | Mod: CPTII,,, | Performed by: INTERNAL MEDICINE

## 2022-12-12 PROCEDURE — 99999 PR PBB SHADOW E&M-EST. PATIENT-LVL IV: ICD-10-PCS | Mod: PBBFAC,,, | Performed by: INTERNAL MEDICINE

## 2022-12-12 PROCEDURE — 4010F PR ACE/ARB THEARPY RXD/TAKEN: ICD-10-PCS | Mod: CPTII,,, | Performed by: INTERNAL MEDICINE

## 2022-12-12 PROCEDURE — 3066F NEPHROPATHY DOC TX: CPT | Mod: CPTII,,, | Performed by: INTERNAL MEDICINE

## 2022-12-12 RX ORDER — PREDNISONE 5 MG/1
5 TABLET ORAL DAILY
Qty: 30 TABLET | Refills: 11 | Status: SHIPPED | OUTPATIENT
Start: 2022-12-12 | End: 2023-03-31 | Stop reason: SDUPTHER

## 2022-12-12 RX ORDER — HYDROXYCHLOROQUINE SULFATE 200 MG/1
200 TABLET, FILM COATED ORAL DAILY
Qty: 30 TABLET | Refills: 11 | Status: SHIPPED | OUTPATIENT
Start: 2022-12-12 | End: 2023-03-31 | Stop reason: SDUPTHER

## 2022-12-12 NOTE — TELEPHONE ENCOUNTER
Called Dr Mcfarlane to see patient sooner than March and Ramonita stated that patient missed appointments and that is soonest appointment available, they can call patient if someone cancels.

## 2022-12-12 NOTE — PROGRESS NOTES
[facility] Progress Note      HPI  Vi Ulrich, 57 y.o. female,  has a past medical history of Arthritis, Asthma, Atrial fibrillation, Chronic constipation, Diabetes mellitus, Encounter for blood transfusion (10/2014), GERD (gastroesophageal reflux disease), Gout, Hypertension, Lupus (2004), Renal disorder, and SLE (systemic lupus erythematosus). 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 She has completed two rounds of IV rituximab. Hx of cyclophosphamide therapy. She is now maintained on imuran by rheumatology.    She reports being off prednisone and plaquenil for about 2 months (missed her rheumatologist apmt in Oct)     Patient denies taking NSAIDs, new antibiotics or recreational drugs. Denies recent episode of dehydration, diarrhea, vomiting, acute illness, hospitalization, recent angiograms or exposure to IV radiocontrast.      Medical History:   Past Medical History:   Diagnosis Date    Arthritis     knees    Asthma     Atrial fibrillation     Chronic constipation     Diabetes mellitus     Encounter for blood transfusion 10/2014    GERD (gastroesophageal reflux disease)     Gout     Hypertension     Lupus 2004    SLE    Renal disorder     SLE (systemic lupus erythematosus)        Surgical History:   Past Surgical History:   Procedure Laterality Date    APPENDECTOMY      CHOLECYSTECTOMY      COLONOSCOPY N/A 4/19/2018    Procedure: COLONOSCOPY;  Surgeon: Minerva Porras MD;  Location: [facility];  Service: Endoscopy;  Laterality: N/A;    ESOPHAGOGASTRODUODENOSCOPY N/A 4/19/2018    Procedure: ESOPHAGOGASTRODUODENOSCOPY (EGD);  Surgeon: Minerva Porras MD;  Location: [facility];  Service: Endoscopy;  Laterality: N/A;    HYSTERECTOMY      JOINT REPLACEMENT Left 08/07/2015    Knee    LYMPH NODE BIOPSY Left 2014    MASTECTOMY      Left arm- NO BP    PARTIAL HYSTERECTOMY          Family History:   Family History   Problem Relation Age of Onset    Heart block Mother     Heart disease Father        Social History:   Social History     Tobacco Use    Smoking status: Former     Packs/day: 1.00     Years: 15.00     Pack years: 15.00     Types: Cigarettes    Smokeless tobacco: Never   Substance Use Topics    Alcohol use: No       Allergies:  Review of patient's allergies indicates:   Allergen Reactions    Ketorolac (pf) Swelling    Mycophenolate mofetil Swelling    Penicillins Hives    Percocet [oxycodone-acetaminophen] Hives and Itching    Tramadol Hives       Medications:    Current Outpatient Medications:     albuterol sulfate (PROAIR RESPICLICK) 90 mcg/actuation inhaler, Inhale 1 puff into the lungs every 4 (four) hours as needed for Wheezing. Rescue, Disp: , Rfl:     atorvastatin (LIPITOR) 10 MG tablet, Take 10 mg by mouth once daily., Disp: , Rfl:     azaTHIOprine (IMURAN) 50 mg Tab, Take 2 tablets (100 mg total) by mouth daily with dinner or evening meal., Disp: 60 tablet, Rfl: 5    blood sugar diagnostic (ONETOUCH VERIO) Strp, 1 each by Misc.(Non-Drug; Combo Route) route 4 (four) times daily., Disp: 50 each, Rfl: 11    blood sugar diagnostic Strp, 1 each by Misc.(Non-Drug; Combo Route) route 4 (four) times daily., Disp: 50 each, Rfl: 11    blood-glucose meter (ONETOUCH VERIO SYNC) kit, Use as instructed, Disp: 1 each, Rfl: 0    lancets 33 gauge Misc, 1 lancet by Misc.(Non-Drug; Combo Route) route 4 (four) times daily., Disp: 100 each, Rfl: 11    LIDOcaine (LIDODERM) 5 %, Place 1 patch onto the skin once daily. Remove & Discard patch within 12 hours or as directed by MD, Disp: 7 patch, Rfl: 0    naproxen (NAPROSYN) 500 MG tablet, Take 1 tablet (500 mg total) by mouth 2 (two) times daily as needed., Disp: 60 tablet, Rfl: 0    omeprazole (PRILOSEC) 20 MG capsule, Take 1 capsule (20 mg total) by mouth once daily., Disp: 30 capsule, Rfl: 5    ondansetron (ZOFRAN) 4 MG tablet, Take 1  "tablet (4 mg total) by mouth every 6 (six) hours as needed for Nausea., Disp: 24 tablet, Rfl: 0    tiZANidine (ZANAFLEX) 4 MG tablet, Take 1 tablet (4 mg total) by mouth every evening., Disp: 30 tablet, Rfl: 5    ergocalciferol (ERGOCALCIFEROL) 50,000 unit Cap, Take 1 capsule (50,000 Units total) by mouth every 7 days. (Patient not taking: Reported on 11/11/2022), Disp: 5 capsule, Rfl: 5    HYDROcodone-acetaminophen (NORCO) 5-325 mg per tablet, Take 1 tablet by mouth every 6 (six) hours as needed for Pain. (Patient not taking: Reported on 11/11/2022), Disp: 8 tablet, Rfl: 0    hydrOXYchloroQUINE (PLAQUENIL) 200 mg tablet, Take 1 tablet (200 mg total) by mouth 2 (two) times daily. (Patient not taking: Reported on 12/12/2022), Disp: 180 tablet, Rfl: 3       She is not taking naproxen.    ROS:    Constitutional: Denies fever, fatigue, generalized weakness, night sweats, or acute weight change  Skin: Denies wounds, no rashes, no itching, no new skin lesions  HEENT: Denies acute change in hearing or vision, tinnitus, or dysphagia  Respiratory:  Denies cough, shortness of breath, or wheezing  Cardiovascular: Denies chest pain, palpitations, or swelling  Gastrointestional: Denies abdominal pain, nausea, vomiting, diarrhea, or constipation  Genitourinary: Denies dysuria, hematuria, or incontinence; reports able to empty bladder  Musculoskeletal: Denies myalgias, back pain, decreased ROM or focal weakness, + occ toe pains  Neurological: Denies headaches, seizures, dizziness, paresthesias or weakness  Hematological: Denies unusual bruising or bleeding  Psychiatric: Denies hallucinations, depression, or confusion      Vital Signs:  /68 (BP Location: Right arm, Patient Position: Sitting)   Pulse 62   Temp 97.3 °F (36.3 °C) (Temporal)   Resp 20   Ht 5' 6" (1.676 m)   Wt 81.7 kg (180 lb 1.9 oz)   LMP  (LMP Unknown)   SpO2 99%   BMI 29.07 kg/m²   Body mass index is 29.07 kg/m².      Physical Exam:    General: no " acute distress, awake, alert  Eyes: PERRLA, EOMI, conjunctiva clear, eyelids without swelling  HENT: atraumatic, oropharynx and nasal mucosa patent  Neck: full ROM, no JVD, no thyromegaly or lymphadenopathy  Respiratory: equal, unlabored, clear to auscultation A/P  Cardiovascular: RRR without murmur or rub; radial and pedal pulses felt  Edema: none  Gastrointestinal: soft, non-tender, non-distended; positive bowel sounds; no masses to palpation  Genitourinary: no CVA tenderness upon palpation  Musculoskeletal: full ROM without limitation or discomfort, no signs of acute synovitis, R second toe, mild swelling by nail bed, no erythema or discharge  Integumentary: warm, dry; no rashes, wounds, or acute skin lesions  Neurological: oriented, appropriate, no acute deficits      Labs:        Component Value Date/Time     12/02/2022 1124     11/11/2022 0931     08/23/2021 1142     06/22/2021 1222    K 4.4 12/02/2022 1124    K 4.2 11/11/2022 0931    K 4.4 08/23/2021 1142    K 4.3 06/22/2021 1222     08/23/2021 1142     06/22/2021 1222    CO2 25 12/02/2022 1124    CO2 23 11/11/2022 0931    CO2 21 (L) 08/23/2021 1142    CO2 24 06/22/2021 1222    BUN 17.1 12/02/2022 1124    BUN 20.5 (H) 11/11/2022 0931    BUN 21 (H) 08/23/2021 1142    BUN 25 (H) 06/22/2021 1222    CREATININE 1.09 (H) 12/02/2022 1124    CREATININE 1.11 (H) 11/11/2022 0931    CREATININE 1.0 08/23/2021 1142    CREATININE 0.82 06/22/2021 1222    ESTGFRAFRICA >60.0 08/23/2021 1142    ESTGFRAFRICA >60 06/22/2021 1222    EGFRNONAA 59 03/28/2022 1658    EGFRNONAA 57 03/06/2022 0739    EGFRNONAA >60.0 08/23/2021 1142    EGFRNONAA >60 06/22/2021 1222    CALCIUM 8.5 12/02/2022 1124    CALCIUM 9.1 11/11/2022 0931    CALCIUM 8.7 08/23/2021 1142    CALCIUM 8.9 06/22/2021 1222    PHOS 3.5 12/02/2022 1124    PHOS 2.9 11/30/2017 0738    .2 (H) 12/02/2022 1124            Component Value Date/Time    WBC 4.4 (L) 12/02/2022 1124    WBC  6.5 10/30/2022 2312    WBC 6.23 08/23/2021 1142    WBC 4.80 06/22/2021 1329    HGB 9.6 (L) 12/02/2022 1124    HGB 10.1 (L) 10/30/2022 2312    HGB 12.5 08/23/2021 1142    HGB 11.6 (L) 06/22/2021 1329    HCT 30.9 (L) 12/02/2022 1124    HCT 30.6 (L) 10/30/2022 2312    HCT 38.7 08/23/2021 1142    HCT 34.8 (L) 06/22/2021 1329     (H) 12/02/2022 1124     (H) 10/30/2022 2312     08/23/2021 1142     06/22/2021 1329         Impression:    Patient Active Problem List   Diagnosis    Essential hypertension    Diabetes mellitus, type 2    Lupus (systemic lupus erythematosus)    Insomnia    Hyperlipidemia    History of asthma    New onset atrial fibrillation    Periumbilical pain    Constipation    Anemia    Anxiety    Class 1 obesity with serious comorbidity and body mass index (BMI) of 32.0 to 32.9 in adult    Acute cystitis with hematuria    STIVEN (acute kidney injury)    Pyelonephritis    Inadequate dietary energy intake    Systemic lupus erythematosus    Fibromyalgia    Lupus nephritis, ISN/RPS class III     Lupus nephritis with stable renal function  Proteinuria stable  Of note patient has been through multiple rounds of immune suppressant therapy including cyclophosphamide and rituximab  Currently she is maintained on azathioprine.  She should be on prednisone and Plaquenil however she ran out and she did miss her Rheumatology appointment      Plan:  FU in 3 months   Depending on the stability of renal process and proteinuria consideration for calcineurin therapy will be done as indicated    Rx: Plaquenil 200 mg daily  Rx: Prednisone 5 mg    Advised she call insurance and get a PCP  C/o issues with nail on right 2nd toe- needs PCP    We will see if we can set up patient's to see Rheumatology sooner than 3 months and we will try to set her up also to see primary care at [facility] moreover see if she would switch to Rheumatology at [facility] since they have a rheumatologist at this moment    Avoid NSAIDs  (Aleve, Mobic, Celebrex, Ibuprofen, Advil, Toradol and Diclofenac).         MD Hesham Ludwig

## 2023-01-12 ENCOUNTER — HOSPITAL ENCOUNTER (OUTPATIENT)
Dept: RADIOLOGY | Facility: HOSPITAL | Age: 58
Discharge: HOME | End: 2023-01-12
Attending: STUDENT IN AN ORGANIZED HEALTH CARE EDUCATION/TRAINING PROGRAM
Payer: MEDICAID

## 2023-01-12 ENCOUNTER — OFFICE VISIT (OUTPATIENT)
Dept: INTERNAL MEDICINE | Facility: CLINIC | Age: 58
End: 2023-01-12
Payer: MEDICAID

## 2023-01-12 ENCOUNTER — CLINICAL SUPPORT (OUTPATIENT)
Dept: INTERNAL MEDICINE | Facility: CLINIC | Age: 58
End: 2023-01-12
Attending: STUDENT IN AN ORGANIZED HEALTH CARE EDUCATION/TRAINING PROGRAM
Payer: MEDICAID

## 2023-01-12 VITALS
HEART RATE: 67 BPM | HEIGHT: 66 IN | SYSTOLIC BLOOD PRESSURE: 106 MMHG | BODY MASS INDEX: 28.77 KG/M2 | TEMPERATURE: 98 F | WEIGHT: 179 LBS | RESPIRATION RATE: 18 BRPM | DIASTOLIC BLOOD PRESSURE: 70 MMHG

## 2023-01-12 DIAGNOSIS — E78.5 HYPERLIPIDEMIA, UNSPECIFIED HYPERLIPIDEMIA TYPE: ICD-10-CM

## 2023-01-12 DIAGNOSIS — J45.909 ASTHMA, UNSPECIFIED ASTHMA SEVERITY, UNSPECIFIED WHETHER COMPLICATED, UNSPECIFIED WHETHER PERSISTENT: ICD-10-CM

## 2023-01-12 DIAGNOSIS — I10 ESSENTIAL HYPERTENSION: ICD-10-CM

## 2023-01-12 DIAGNOSIS — M32.9 SYSTEMIC LUPUS ERYTHEMATOSUS, UNSPECIFIED SLE TYPE, UNSPECIFIED ORGAN INVOLVEMENT STATUS: ICD-10-CM

## 2023-01-12 DIAGNOSIS — M79.674 TOE PAIN, RIGHT: ICD-10-CM

## 2023-01-12 DIAGNOSIS — M32.9 SYSTEMIC LUPUS ERYTHEMATOSUS, UNSPECIFIED SLE TYPE, UNSPECIFIED ORGAN INVOLVEMENT STATUS: Primary | ICD-10-CM

## 2023-01-12 DIAGNOSIS — Z00.00 HEALTHCARE MAINTENANCE: ICD-10-CM

## 2023-01-12 DIAGNOSIS — E11.8 DM (DIABETES MELLITUS) WITH COMPLICATIONS: ICD-10-CM

## 2023-01-12 DIAGNOSIS — M65.30 TRIGGER FINGER, UNSPECIFIED FINGER, UNSPECIFIED LATERALITY: ICD-10-CM

## 2023-01-12 LAB — HBA1C MFR BLD: 6 %

## 2023-01-12 PROCEDURE — 71046 X-RAY EXAM CHEST 2 VIEWS: CPT | Mod: TC

## 2023-01-12 PROCEDURE — 83036 HEMOGLOBIN GLYCOSYLATED A1C: CPT | Mod: PBBFAC

## 2023-01-12 PROCEDURE — 99215 OFFICE O/P EST HI 40 MIN: CPT | Mod: PBBFAC,25

## 2023-01-12 PROCEDURE — 92228 IMG RTA DETC/MNTR DS PHY/QHP: CPT | Mod: PBBFAC

## 2023-01-12 PROCEDURE — 73630 X-RAY EXAM OF FOOT: CPT | Mod: TC,RT

## 2023-01-12 RX ORDER — ALBUTEROL SULFATE 0.63 MG/3ML
0.63 SOLUTION RESPIRATORY (INHALATION) EVERY 4 HOURS PRN
Qty: 75 ML | Refills: 3 | Status: SHIPPED | OUTPATIENT
Start: 2023-01-12 | End: 2024-01-12

## 2023-01-12 RX ORDER — INSULIN GLARGINE 100 [IU]/ML
80 INJECTION, SOLUTION SUBCUTANEOUS NIGHTLY
Qty: 72 ML | Refills: 3 | Status: ON HOLD | OUTPATIENT
Start: 2023-01-12 | End: 2023-10-14 | Stop reason: SDUPTHER

## 2023-01-12 RX ORDER — ALBUTEROL SULFATE 0.63 MG/3ML
0.63 SOLUTION RESPIRATORY (INHALATION) EVERY 4 HOURS PRN
COMMUNITY
End: 2023-01-12 | Stop reason: SDUPTHER

## 2023-01-12 NOTE — PROGRESS NOTES
"[facility] Internal Medicine Clinic Visit    Chief Complaint:      Nausea (vomiting) and Fatigue     Subjective:     HPI:  Vi Ulrich is a 57 y.o. female with has a past medical history of Arthritis, Asthma, Atrial fibrillation, Chronic constipation, Diabetes mellitus, Encounter for blood transfusion, GERD (gastroesophageal reflux disease), Gout, Hypertension, Lupus, Renal disorder, and SLE (systemic lupus erythematosus). She presents to clinic today for follow-up of chronic medical conditions.     Today, she states she has not been evaluated for her lupus and has an appointment coming up in March with Dr. Velazquez.  Informed patient to keep this appointment.  She is been getting her meds from her nephrologist that she follows at [facility].  Have refilled all her medications at this visit and ordered multiple labs as well as imaging, all referrals have been made for screening.  Will follow patient up in 2 months for completion of initial workup after patient has seen Marcus.    Patient noted to have bilateral 3rd digit trigger fingers has been referred to ortho.  For all details done at this visit please see plan below.  She denies recent falls however has been moving slower lately patient has been ordered a Rollator at this time.  She denies chest pain, shortness breast, and recent falls at this visit.    Of note patient did not endorse nausea and vomiting with me.      Review of Systems  A comprehensive 12 point review of systems was completed.  Please see above for pertinent positives and negatives.     Objective:   Last 24 Hour Vital Signs:  Vitals  BP: 106/70  Temp: 97.9 °F (36.6 °C)  Temp src: Oral  Pulse: 67  Resp: 18  Height: 5' 6" (167.6 cm)  Weight: 81.2 kg (179 lb)    Physical Examination:  General: Awake, alert, & oriented to person, place & time. No acute distress  Psychiatric: Mood and affect normal  HEENT: Normocephalic, atraumatic. Face symmetric.  Cardiovascular: Regular rate & rhythm. Normal S1 & S2 w/out " murmurs, rubs or gallops.  Pulmonary: Bilateral symmetric chest rise. Non-labored, no wheezes, rhonchi or crackles are appreciated.  Bilateral upper lobe diminished breath sounds are noted  Abdominal:  Nondistended. Bowel sounds present  Extremities: No clubbing, cyanosis or edema.  Patient does have bilateral 3rd digit trigger fingers  Skin:  Exposed skin is warm & dry.  Neuro:   Patient moves all extremities equally. Sensation intact bilateraly.     Assessment & Plan:     Problem List Items Addressed This Visit          Cardiac/Vascular    Essential hypertension    Hyperlipidemia    Relevant Orders    Lipid Panel (Completed)       Immunology/Multi System    Lupus (systemic lupus erythematosus) - Primary    Relevant Orders    Urinalysis    Antinuclear Antibody (MARTA), HEp-2, IgG    C-Reactive Protein (Completed)    Sedimentation rate (Completed)    X-Ray Chest PA And Lateral     Other Visit Diagnoses       DM (diabetes mellitus) with complications        Relevant Medications    insulin (LANTUS SOLOSTAR U-100 INSULIN) glargine 100 units/mL SubQ pen    Other Relevant Orders    POCT HEMOGLOBIN A1C (Completed)    Diabetic Eye Screening Photo (Completed)    Urinalysis    Healthcare maintenance        Relevant Orders    Comprehensive Metabolic Panel (Completed)    CBC Auto Differential    TSH    T4, Free    Iron and TIBC (Completed)    Ferritin    Transferrin    Vitamin D    Vitamin B12    Folate    Ambulatory referral/consult to Obstetrics / Gynecology    Asthma, unspecified asthma severity, unspecified whether complicated, unspecified whether persistent        Relevant Medications    albuterol (ACCUNEB) 0.63 mg/3 mL Nebu    Other Relevant Orders    Complete PFT w/ bronchodilator    Trigger finger, unspecified finger, unspecified laterality        Relevant Orders    Ambulatory referral/consult to Orthopedics    Toe pain, right        Relevant Orders    X-Ray Foot Complete Right            Will attempt to get records from  [facility] for health screening and cancer screening, have put in a referral for Gynecology at this time.  Pain all patient's immunizations are up-to-date at this time    To be addressed at next follow-up  -follow up above albs and ref  -rheum referal  -cbc    Follow-ups  -Follow-up medicine clinic in 2 months      Octaviano Barrios MD  Internal Medicine - PGY-2

## 2023-01-13 NOTE — PROGRESS NOTES
Vi Ulrich is a 57 y.o. female here for a diabetic eye screening with non-dilated fundus photos per Dr. Octaviano Barrios.    Patient cooperative?: Yes  Small pupils?: No  Last eye exam: unknown    For exam results, see Encounter Report.

## 2023-01-22 ENCOUNTER — HOSPITAL ENCOUNTER (EMERGENCY)
Facility: HOSPITAL | Age: 58
Discharge: HOME | End: 2023-01-22
Attending: STUDENT IN AN ORGANIZED HEALTH CARE EDUCATION/TRAINING PROGRAM
Payer: MEDICAID

## 2023-01-22 VITALS
TEMPERATURE: 99 F | HEART RATE: 68 BPM | DIASTOLIC BLOOD PRESSURE: 62 MMHG | SYSTOLIC BLOOD PRESSURE: 122 MMHG | OXYGEN SATURATION: 100 % | RESPIRATION RATE: 18 BRPM

## 2023-01-22 DIAGNOSIS — M79.10 MYALGIA: ICD-10-CM

## 2023-01-22 DIAGNOSIS — R31.9 URINARY TRACT INFECTION WITH HEMATURIA, SITE UNSPECIFIED: ICD-10-CM

## 2023-01-22 DIAGNOSIS — N39.0 URINARY TRACT INFECTION WITH HEMATURIA, SITE UNSPECIFIED: ICD-10-CM

## 2023-01-22 DIAGNOSIS — N12 PYELONEPHRITIS: ICD-10-CM

## 2023-01-22 DIAGNOSIS — R10.9 LEFT FLANK PAIN: Primary | ICD-10-CM

## 2023-01-22 LAB
ALBUMIN SERPL-MCNC: 3 G/DL (ref 3.5–5)
ALBUMIN/GLOB SERPL: 0.6 RATIO (ref 1.1–2)
ALP SERPL-CCNC: 96 UNIT/L (ref 40–150)
ALT SERPL-CCNC: 6 UNIT/L (ref 0–55)
APPEARANCE UR: ABNORMAL
AST SERPL-CCNC: 17 UNIT/L (ref 5–34)
BACTERIA #/AREA URNS AUTO: ABNORMAL /HPF
BASOPHILS # BLD AUTO: 0.01 X10(3)/MCL (ref 0–0.2)
BASOPHILS NFR BLD AUTO: 0.2 %
BILIRUB UR QL STRIP.AUTO: NEGATIVE MG/DL
BILIRUBIN DIRECT+TOT PNL SERPL-MCNC: 0.3 MG/DL
BUN SERPL-MCNC: 19.9 MG/DL (ref 9.8–20.1)
CALCIUM SERPL-MCNC: 8.9 MG/DL (ref 8.4–10.2)
CHLORIDE SERPL-SCNC: 104 MMOL/L (ref 98–107)
CK SERPL-CCNC: 86 U/L (ref 29–168)
CO2 SERPL-SCNC: 23 MMOL/L (ref 22–29)
COLOR UR AUTO: ABNORMAL
CREAT SERPL-MCNC: 1.22 MG/DL (ref 0.55–1.02)
EOSINOPHIL # BLD AUTO: 0.04 X10(3)/MCL (ref 0–0.9)
EOSINOPHIL NFR BLD AUTO: 0.9 %
ERYTHROCYTE [DISTWIDTH] IN BLOOD BY AUTOMATED COUNT: 16 % (ref 11.5–17)
GFR SERPLBLD CREATININE-BSD FMLA CKD-EPI: 53 MLS/MIN/1.73/M2
GLOBULIN SER-MCNC: 5.1 GM/DL (ref 2.4–3.5)
GLUCOSE SERPL-MCNC: 88 MG/DL (ref 74–100)
GLUCOSE UR QL STRIP.AUTO: NEGATIVE MG/DL
HCT VFR BLD AUTO: 30.9 % (ref 37–47)
HGB BLD-MCNC: 9.9 GM/DL (ref 12–16)
IMM GRANULOCYTES # BLD AUTO: 0.03 X10(3)/MCL (ref 0–0.04)
IMM GRANULOCYTES NFR BLD AUTO: 0.7 %
KETONES UR QL STRIP.AUTO: ABNORMAL MG/DL
LEUKOCYTE ESTERASE UR QL STRIP.AUTO: ABNORMAL UNIT/L
LIPASE SERPL-CCNC: 23 U/L
LYMPHOCYTES # BLD AUTO: 1.15 X10(3)/MCL (ref 0.6–4.6)
LYMPHOCYTES NFR BLD AUTO: 25.5 %
MCH RBC QN AUTO: 27.4 PG
MCHC RBC AUTO-ENTMCNC: 32 MG/DL (ref 33–36)
MCV RBC AUTO: 85.6 FL (ref 80–94)
MONOCYTES # BLD AUTO: 0.12 X10(3)/MCL (ref 0.1–1.3)
MONOCYTES NFR BLD AUTO: 2.7 %
NEUTROPHILS # BLD AUTO: 3.16 X10(3)/MCL (ref 2.1–9.2)
NEUTROPHILS NFR BLD AUTO: 70 %
NITRITE UR QL STRIP.AUTO: NEGATIVE
NRBC BLD AUTO-RTO: 0 %
PH UR STRIP.AUTO: 5 [PH]
PLATELET # BLD AUTO: 435 X10(3)/MCL (ref 130–400)
PMV BLD AUTO: 9.5 FL (ref 7.4–10.4)
POTASSIUM SERPL-SCNC: 4 MMOL/L (ref 3.5–5.1)
PROT SERPL-MCNC: 8.1 GM/DL (ref 6.4–8.3)
PROT UR QL STRIP.AUTO: ABNORMAL MG/DL
RBC # BLD AUTO: 3.61 X10(6)/MCL (ref 4.2–5.4)
RBC #/AREA URNS AUTO: 6 /HPF
RBC UR QL AUTO: ABNORMAL UNIT/L
SODIUM SERPL-SCNC: 137 MMOL/L (ref 136–145)
SP GR UR STRIP.AUTO: 1.02 (ref 1–1.03)
SQUAMOUS #/AREA URNS AUTO: <5 /HPF
UROBILINOGEN UR STRIP-ACNC: 1 MG/DL
WBC # SPEC AUTO: 4.5 X10(3)/MCL (ref 4.5–11.5)
WBC #/AREA URNS AUTO: 314 /HPF

## 2023-01-22 PROCEDURE — 81001 URINALYSIS AUTO W/SCOPE: CPT | Performed by: PHYSICIAN ASSISTANT

## 2023-01-22 PROCEDURE — 99284 EMERGENCY DEPT VISIT MOD MDM: CPT

## 2023-01-22 PROCEDURE — 87088 URINE BACTERIA CULTURE: CPT | Performed by: PHYSICIAN ASSISTANT

## 2023-01-22 PROCEDURE — 85025 COMPLETE CBC W/AUTO DIFF WBC: CPT | Performed by: PHYSICIAN ASSISTANT

## 2023-01-22 PROCEDURE — 82550 ASSAY OF CK (CPK): CPT | Performed by: PHYSICIAN ASSISTANT

## 2023-01-22 PROCEDURE — 80053 COMPREHEN METABOLIC PANEL: CPT | Performed by: PHYSICIAN ASSISTANT

## 2023-01-22 PROCEDURE — 83690 ASSAY OF LIPASE: CPT | Performed by: PHYSICIAN ASSISTANT

## 2023-01-22 RX ORDER — ONDANSETRON 4 MG/1
4 TABLET, ORALLY DISINTEGRATING ORAL EVERY 12 HOURS PRN
Qty: 10 TABLET | Refills: 0 | Status: SHIPPED | OUTPATIENT
Start: 2023-01-22 | End: 2023-01-27

## 2023-01-22 RX ORDER — HYDROCODONE BITARTRATE AND ACETAMINOPHEN 5; 325 MG/1; MG/1
1 TABLET ORAL EVERY 12 HOURS PRN
Qty: 10 TABLET | Refills: 0 | Status: SHIPPED | OUTPATIENT
Start: 2023-01-22 | End: 2023-01-27

## 2023-01-22 RX ORDER — CEFDINIR 300 MG/1
300 CAPSULE ORAL 2 TIMES DAILY
Qty: 20 CAPSULE | Refills: 0 | Status: SHIPPED | OUTPATIENT
Start: 2023-01-22 | End: 2023-02-01

## 2023-01-22 NOTE — FIRST PROVIDER EVALUATION
"Medical screening examination initiated.  I have conducted a focused provider triage encounter, findings are as follows:    Chief Complaint   Patient presents with    Flank Pain     Pt. C/o left flank pain x1 week. Sees kidney doctor reluctant to answer questions about kidney issues.. also reports hx of lupus     Brief history of present illness:  57 y.o. female presents to the ED with left flank pain for the last week. Says she has been laying in bed for the last week. Notes generalized pain "everywhere" stating "I'm locking up." Notes dark urine. Denies dysuria, hematuria. Hx of Lupus and CKD.     Vitals:    01/22/23 1449   BP: 131/78   Pulse: 81   Resp: 18   Temp: 99.3 °F (37.4 °C)   SpO2: 100%       Pertinent physical exam:  Awake, alert, ambulatory, non-labored respirations    Brief workup plan:  labs, UA    Preliminary workup initiated; this workup will be continued and followed by the physician or advanced practice provider that is assigned to the patient when roomed.  "

## 2023-01-23 NOTE — ED PROVIDER NOTES
Encounter Date: 1/22/2023    SCRIBE #1 NOTE: I, Nehemiah Knutson, am scribing for, and in the presence of,  Abel Mauricio MD. I have scribed the following portions of the note - Other sections scribed: HPI, ROS, PE.     History     Chief Complaint   Patient presents with    Flank Pain     Pt. C/o left flank pain x1 week. Sees kidney doctor reluctant to answer questions about kidney issues.. also reports hx of lupus     57 year old female with past medical history of lupus, a-fib, HTN, DM, and renal disorder presents to ED c/o left sided flank pain onset 1 week ago. Pt reports that sleeping with a heating pad helps relieve pain. Pt reports that she is having orange/red urine. Pt states that she has additional symptoms of myalgias, fever of 99.4 F, joint pain, nausea, vomiting, diarrhea, and decreased appetite. Pt states that she has difficulty ambulating due to pain in her back. Pt states that she gets frequent UTIs. Pt reports chronic swelling and pain to her right hand. Pt states that she does not follow an  pain management doctor. Pt states that she takes 5 mg prednisone and plaquenil.    PCP: Octaviano Barrios MD    The history is provided by the patient and medical records. A  was used.   Flank Pain  This is a new problem. Episode onset: 1 week ago. The problem occurs constantly. The problem has been gradually worsening. Pertinent negatives include no chest pain, no abdominal pain and no shortness of breath. Nothing aggravates the symptoms. Nothing relieves the symptoms. Treatments tried: heating pad. The treatment provided mild relief.         Review of patient's allergies indicates:   Allergen Reactions    Ketorolac (pf) Swelling    Mycophenolate mofetil Swelling    Penicillins Hives    Percocet [oxycodone-acetaminophen] Hives and Itching    Tramadol Hives     Past Medical History:   Diagnosis Date    Arthritis     knees    Asthma     Atrial fibrillation     Chronic constipation      Diabetes mellitus     Encounter for blood transfusion 10/2014    GERD (gastroesophageal reflux disease)     Gout     Hypertension     Lupus 2004    SLE    Renal disorder     SLE (systemic lupus erythematosus)      Past Surgical History:   Procedure Laterality Date    APPENDECTOMY      CHOLECYSTECTOMY      COLONOSCOPY N/A 4/19/2018    Procedure: COLONOSCOPY;  Surgeon: Minerva Porras MD;  Location: [facility];  Service: Endoscopy;  Laterality: N/A;    ESOPHAGOGASTRODUODENOSCOPY N/A 4/19/2018    Procedure: ESOPHAGOGASTRODUODENOSCOPY (EGD);  Surgeon: Minerva Porras MD;  Location: [facility];  Service: Endoscopy;  Laterality: N/A;    HYSTERECTOMY      JOINT REPLACEMENT Left 08/07/2015    Knee    LYMPH NODE BIOPSY Left 2014    MASTECTOMY      Left arm- NO BP    PARTIAL HYSTERECTOMY       Family History   Problem Relation Age of Onset    Heart block Mother     Heart disease Father      Social History     Tobacco Use    Smoking status: Former     Packs/day: 1.00     Years: 15.00     Pack years: 15.00     Types: Cigarettes    Smokeless tobacco: Never   Substance Use Topics    Alcohol use: No    Drug use: No     Review of Systems   Constitutional:  Positive for appetite change and fever.   HENT:  Negative for sore throat.    Eyes:  Negative for visual disturbance.   Respiratory:  Negative for shortness of breath.    Cardiovascular:  Negative for chest pain.   Gastrointestinal:  Positive for diarrhea, nausea and vomiting. Negative for abdominal pain.   Genitourinary:  Positive for flank pain. Negative for dysuria.   Musculoskeletal:  Positive for myalgias. Negative for joint swelling.   Skin:  Negative for rash.   Neurological:  Negative for weakness.   Psychiatric/Behavioral:  Negative for confusion.      Physical Exam     Initial Vitals [01/22/23 1449]   BP Pulse Resp Temp SpO2   131/78 81 18 99.3 °F (37.4 °C) 100 %      MAP       --         Physical Exam    Nursing note and vitals reviewed.  Constitutional:  She appears well-developed and well-nourished.   HENT:   Head: Normocephalic and atraumatic.   Eyes: EOM are normal. Pupils are equal, round, and reactive to light.   Neck:   Normal range of motion.  Cardiovascular:  Normal rate, regular rhythm, normal heart sounds and intact distal pulses.           No murmur heard.  Pulmonary/Chest: Breath sounds normal. No respiratory distress. She has no wheezes. She has no rales.   Abdominal: Abdomen is soft. She exhibits no distension. There is no abdominal tenderness.   There is left CVA tenderness (to palpation). There is no rebound.   Musculoskeletal:         General: No tenderness or edema. Normal range of motion.      Cervical back: Normal range of motion.     Neurological: She is alert. She has normal strength. No cranial nerve deficit. GCS score is 15. GCS eye subscore is 4. GCS verbal subscore is 5. GCS motor subscore is 6.   Skin: Skin is warm and dry. Capillary refill takes less than 2 seconds. No rash noted. No erythema.   Psychiatric: She has a normal mood and affect.       ED Course   Procedures  Labs Reviewed   COMPREHENSIVE METABOLIC PANEL - Abnormal; Notable for the following components:       Result Value    Creatinine 1.22 (*)     Albumin Level 3.0 (*)     Globulin 5.1 (*)     Albumin/Globulin Ratio 0.6 (*)     All other components within normal limits   URINALYSIS, REFLEX TO URINE CULTURE - Abnormal; Notable for the following components:    Appearance, UA Cloudy (*)     Protein, UA 3+ (*)     Ketones, UA Trace (*)     Blood, UA Trace (*)     Leukocyte Esterase, UA 3+ (*)     All other components within normal limits   CBC WITH DIFFERENTIAL - Abnormal; Notable for the following components:    RBC 3.61 (*)     Hgb 9.9 (*)     Hct 30.9 (*)     MCHC 32.0 (*)     Platelet 435 (*)     All other components within normal limits   URINALYSIS, MICROSCOPIC - Abnormal; Notable for the following components:    RBC, UA 6 (*)     WBC,  (*)     All other components  within normal limits   LIPASE - Normal   CK - Normal   CULTURE, URINE   CBC W/ AUTO DIFFERENTIAL    Narrative:     The following orders were created for panel order CBC auto differential.  Procedure                               Abnormality         Status                     ---------                               -----------         ------                     CBC with Differential[[id number]]        Abnormal            Final result                 Please view results for these tests on the individual orders.          Imaging Results    None          Medications - No data to display  Medical Decision Making:   History:   Old Medical Records: I decided to obtain old medical records.  Old Records Summarized: records from clinic visits and records from previous admission(s).       <> Summary of Records: Reviewed previous records. Hx of frequent UTIs.   Initial Assessment:   Left flank pain  Differential Diagnosis:   UTI, Pyelo, Myalgias, MSK strain  Clinical Tests:   Lab Tests: Ordered and Reviewed  ED Management:  Patient is a 57-year-old female who presents to the emergency department for left-sided flank pain.  See HPI.  See physical exam.  Urinary tract infection suspected, concern for possible pyelo given the patient's symptoms.  Left CVA tenderness to palpation.  Started on cefdinir.  All results discussed.  Answered all questions time.  Patient also has a history of lupus and has diffuse myalgias.  Discussed need for follow-up with primary care physician.  Discussed return precautions.  Hemodynamically stable for continued outpatient management strict return precautions.  Patient verbalized understanding agreed to plan.        Scribe Attestation:   Scribe #1: I performed the above scribed service and the documentation accurately describes the services I performed. I attest to the accuracy of the note.    Attending Attestation:           Physician Attestation for Scribe:  Physician Attestation Statement for Teresita  #1: I, Abel Mauricio MD, reviewed documentation, as scribed by Nehemiah Knutson in my presence, and it is both accurate and complete.                        Clinical Impression:   Final diagnoses:  [R10.9] Left flank pain (Primary)  [N39.0, R31.9] Urinary tract infection with hematuria, site unspecified  [N12] Pyelonephritis  [M79.10] Myalgia        ED Disposition Condition    Discharge Stable          ED Prescriptions       Medication Sig Dispense Start Date End Date Auth. Provider    cefdinir (OMNICEF) 300 MG capsule () Take 1 capsule (300 mg total) by mouth 2 (two) times daily. for 10 days 20 capsule 2023 Abel Mauricio MD    HYDROcodone-acetaminophen (NORCO) 5-325 mg per tablet () Take 1 tablet by mouth every 12 (twelve) hours as needed for Pain. 10 tablet 2023 Abel Mauricio MD    ondansetron (ZOFRAN-ODT) 4 MG TbDL () Take 1 tablet (4 mg total) by mouth every 12 (twelve) hours as needed (Nausea/vomiting). 10 tablet 2023 Abel Mauricio MD          Follow-up Information       Follow up With Specialties Details Why Contact Info    Your primary care physician.        Octaviano Barrios MD Internal Medicine   [address] [address]  [phone]               Abel Mauricio MD  [phone]

## 2023-01-23 NOTE — DISCHARGE INSTRUCTIONS
Take antibiotic as prescribed for urinary tract infection/kidney infection.     You may take Norco as needed for pain.  Do not drive or operate machinery while taking this medication as it can make you drowsy.     Follow up with your primary care physician. Follow up with your kidney doctor.     Return to the emergency department if any fever, worsening pain, nausea, vomiting, difficulty breathing, or any other symptoms.

## 2023-01-24 LAB — BACTERIA UR CULT: NORMAL

## 2023-02-03 ENCOUNTER — HOSPITAL ENCOUNTER (OUTPATIENT)
Dept: PULMONOLOGY | Facility: HOSPITAL | Age: 58
Discharge: HOME | End: 2023-02-03
Attending: STUDENT IN AN ORGANIZED HEALTH CARE EDUCATION/TRAINING PROGRAM
Payer: MEDICAID

## 2023-02-03 DIAGNOSIS — J45.909 ASTHMA, UNSPECIFIED ASTHMA SEVERITY, UNSPECIFIED WHETHER COMPLICATED, UNSPECIFIED WHETHER PERSISTENT: ICD-10-CM

## 2023-02-03 LAB
FEF 25 75 LLN: 1.05
FEF 25 75 PRE REF: 61.4 %
FEF 25 75 REF: 2.29
FEV1 FVC LLN: 69
FEV1 FVC PRE REF: 98.9 %
FEV1 FVC REF: 80
FEV1 LLN: 1.8
FEV1 PRE REF: 57.1 %
FEV1 REF: 2.42
FVC LLN: 2.28
FVC PRE REF: 57.4 %
FVC REF: 3.04
MVV LLN: 90
MVV PRE REF: 47.3 %
MVV REF: 106
PEF LLN: 4.12
PEF PRE REF: 41.8 %
PEF REF: 6.25
PRE FEF 25 75: 1.41 L/S (ref 0.28–1.08)
PRE FET 100: 6.91 SEC
PRE FEV1 FVC: 79.18 % (ref 76.18–99.06)
PRE FEV1: 1.38 L (ref 0.25–0.42)
PRE FVC: 1.75 L (ref 0.28–0.46)
PRE MVV: 50.05 L/MIN (ref -46.51–-62.93)
PRE PEF: 2.62 L/S (ref 1.23–1.89)
VC LLN: 2.28
VC PRE REF: 65.1 %
VC PRE: 1.98 L (ref 0.28–0.46)
VC REF: 3.04

## 2023-02-03 PROCEDURE — 94010 BREATHING CAPACITY TEST: CPT

## 2023-02-06 ENCOUNTER — TELEPHONE (OUTPATIENT)
Dept: INTERNAL MEDICINE | Facility: CLINIC | Age: 58
End: 2023-02-06

## 2023-02-06 NOTE — TELEPHONE ENCOUNTER
Pt calling stating that she has been sick for a while. She was recently seen in Pulmonology. States that she has SOB and can barely walk. She is requesting a call and possibly a HH referral. Please advise. Thanks!

## 2023-02-13 NOTE — TELEPHONE ENCOUNTER
"Pt  verified. Pt states she is "always short of breath and I feel so Bad. I have been to the emergency room several times but they make you wait for hours . I have an Autistic daughter who I can't leave by herself . Just want to see if I have fluid build up because I feel so bad. The doctor said he was going to get me a commode and other things because I can barely get around"  She reports taking all  meds  denies chest pain but reiterates the above several times. She is unwilling to go to ED for her concerns at this time. Explained that her doctor is not in clinic this week but that a message would be sent concerning the above.   Pt  unable to identify what makes it worse or better. Primarily concerned with a referral for Home health at this time. Please advise. Next appointment with PCP 3/7/2023   "

## 2023-03-03 ENCOUNTER — LAB VISIT (OUTPATIENT)
Dept: LAB | Facility: HOSPITAL | Age: 58
End: 2023-03-03
Attending: INTERNAL MEDICINE
Payer: MEDICAID

## 2023-03-03 DIAGNOSIS — E11.8 DM (DIABETES MELLITUS) WITH COMPLICATIONS: ICD-10-CM

## 2023-03-03 DIAGNOSIS — N17.9 AKI (ACUTE KIDNEY INJURY): ICD-10-CM

## 2023-03-03 DIAGNOSIS — I10 ESSENTIAL HYPERTENSION: ICD-10-CM

## 2023-03-03 DIAGNOSIS — N17.9 AKI (ACUTE KIDNEY INJURY): Primary | ICD-10-CM

## 2023-03-03 LAB
ABS NEUT (OLG): 2.29 X10(3)/MCL (ref 2.1–9.2)
ALBUMIN SERPL-MCNC: 2.9 G/DL (ref 3.5–5)
ALBUMIN/GLOB SERPL: 0.6 RATIO (ref 1.1–2)
ALP SERPL-CCNC: 146 UNIT/L (ref 40–150)
ALT SERPL-CCNC: 113 UNIT/L (ref 0–55)
ANISOCYTOSIS BLD QL SMEAR: ABNORMAL
AST SERPL-CCNC: 146 UNIT/L (ref 5–34)
BILIRUBIN DIRECT+TOT PNL SERPL-MCNC: 0.7 MG/DL
BUN SERPL-MCNC: 24.1 MG/DL (ref 9.8–20.1)
BURR CELLS (OLG): ABNORMAL
C3 SERPL-MCNC: 57 MG/DL (ref 80–173)
C4 SERPL-MCNC: 9.7 MG/DL (ref 13–46)
CALCIUM SERPL-MCNC: 8.5 MG/DL (ref 8.4–10.2)
CHLORIDE SERPL-SCNC: 104 MMOL/L (ref 98–107)
CO2 SERPL-SCNC: 18 MMOL/L (ref 22–29)
CREAT SERPL-MCNC: 1.17 MG/DL (ref 0.55–1.02)
ERYTHROCYTE [DISTWIDTH] IN BLOOD BY AUTOMATED COUNT: 18.1 % (ref 11.5–17)
GFR SERPLBLD CREATININE-BSD FMLA CKD-EPI: 55 MLS/MIN/1.73/M2
GLOBULIN SER-MCNC: 4.6 GM/DL (ref 2.4–3.5)
GLUCOSE SERPL-MCNC: 53 MG/DL (ref 74–100)
HCT VFR BLD AUTO: 30.2 % (ref 37–47)
HGB BLD-MCNC: 9.4 G/DL (ref 12–16)
IMM GRANULOCYTES # BLD AUTO: 0.02 X10(3)/MCL (ref 0–0.04)
IMM GRANULOCYTES NFR BLD AUTO: 0.6 %
INSTRUMENT WBC (OLG): 3.1 X10(3)/MCL
LYMPHOCYTES NFR BLD MANUAL: 0.78 X10(3)/MCL
LYMPHOCYTES NFR BLD MANUAL: 25 %
MACROCYTES BLD QL SMEAR: ABNORMAL
MCH RBC QN AUTO: 26.6 PG
MCHC RBC AUTO-ENTMCNC: 31.1 G/DL (ref 33–36)
MCV RBC AUTO: 85.6 FL (ref 80–94)
MONOCYTES NFR BLD MANUAL: 0.06 X10(3)/MCL (ref 0.1–1.3)
MONOCYTES NFR BLD MANUAL: 2 %
NEUTROPHILS NFR BLD MANUAL: 74 %
NRBC BLD AUTO-RTO: 0 %
PHOSPHATE SERPL-MCNC: 3.7 MG/DL (ref 2.3–4.7)
PLATELET # BLD AUTO: 382 X10(3)/MCL (ref 130–400)
PLATELET # BLD EST: ABNORMAL 10*3/UL
PMV BLD AUTO: 10.1 FL (ref 7.4–10.4)
POIKILOCYTOSIS BLD QL SMEAR: ABNORMAL
POTASSIUM SERPL-SCNC: 4.4 MMOL/L (ref 3.5–5.1)
PROT SERPL-MCNC: 7.5 GM/DL (ref 6.4–8.3)
PTH-INTACT SERPL-MCNC: 117.7 PG/ML (ref 8.7–77)
RBC # BLD AUTO: 3.53 X10(6)/MCL (ref 4.2–5.4)
RBC MORPH BLD: ABNORMAL
SCHISTOCYTE (OLG): ABNORMAL
SODIUM SERPL-SCNC: 136 MMOL/L (ref 136–145)
TEAR DROP CELL (OLG): ABNORMAL
WBC # SPEC AUTO: 3.1 X10(3)/MCL (ref 4.5–11.5)

## 2023-03-03 PROCEDURE — 80053 COMPREHEN METABOLIC PANEL: CPT

## 2023-03-03 PROCEDURE — 36415 COLL VENOUS BLD VENIPUNCTURE: CPT

## 2023-03-03 PROCEDURE — 86160 COMPLEMENT ANTIGEN: CPT

## 2023-03-03 PROCEDURE — 83970 ASSAY OF PARATHORMONE: CPT

## 2023-03-03 PROCEDURE — 85025 COMPLETE CBC W/AUTO DIFF WBC: CPT

## 2023-03-03 PROCEDURE — 84100 ASSAY OF PHOSPHORUS: CPT

## 2023-03-03 PROCEDURE — 85027 COMPLETE CBC AUTOMATED: CPT

## 2023-03-06 ENCOUNTER — TELEPHONE (OUTPATIENT)
Dept: NEPHROLOGY | Facility: CLINIC | Age: 58
End: 2023-03-06
Payer: MEDICAID

## 2023-03-06 DIAGNOSIS — N17.9 AKI (ACUTE KIDNEY INJURY): Primary | ICD-10-CM

## 2023-03-06 NOTE — TELEPHONE ENCOUNTER
Patient left message with answering service regarding appt on 3-6-23 she wanted a phone visit with Dr Ken. Patient was called back and stated she wanted a phone visit because she could not walk I explained Dr Ken did not do phone visits, patient stated well I just go there for results anyway. I explained to patient that I could reschedule her appt, she stated ok, asked patient when she wanted appt and she stated in the morning. I told patient I had a 10 am appt on 3-14-23 she stated that we see patients earlier than that according to the , I explained that was incorrect and our first appt time is 10 am, I asked if she wanted that appt time and she stated yes, call was ended.

## 2023-03-09 ENCOUNTER — OFFICE VISIT (OUTPATIENT)
Dept: INTERNAL MEDICINE | Facility: CLINIC | Age: 58
DRG: 546 | End: 2023-03-09
Payer: MEDICAID

## 2023-03-09 ENCOUNTER — HOSPITAL ENCOUNTER (INPATIENT)
Facility: HOSPITAL | Age: 58
LOS: 4 days | Discharge: HOME | DRG: 546 | End: 2023-03-13
Attending: FAMILY MEDICINE | Admitting: INTERNAL MEDICINE
Payer: MEDICAID

## 2023-03-09 VITALS
SYSTOLIC BLOOD PRESSURE: 95 MMHG | DIASTOLIC BLOOD PRESSURE: 60 MMHG | RESPIRATION RATE: 18 BRPM | WEIGHT: 179 LBS | BODY MASS INDEX: 28.77 KG/M2 | HEIGHT: 66 IN | HEART RATE: 74 BPM | TEMPERATURE: 100 F

## 2023-03-09 DIAGNOSIS — E11.8 DM (DIABETES MELLITUS) WITH COMPLICATIONS: Primary | ICD-10-CM

## 2023-03-09 DIAGNOSIS — I48.91 NEW ONSET ATRIAL FIBRILLATION: ICD-10-CM

## 2023-03-09 DIAGNOSIS — M32.14 LUPUS NEPHRITIS, ISN/RPS CLASS III: Primary | ICD-10-CM

## 2023-03-09 DIAGNOSIS — R07.9 CHEST PAIN: ICD-10-CM

## 2023-03-09 DIAGNOSIS — M32.9 SYSTEMIC LUPUS ERYTHEMATOSUS, UNSPECIFIED SLE TYPE, UNSPECIFIED ORGAN INVOLVEMENT STATUS: ICD-10-CM

## 2023-03-09 DIAGNOSIS — J45.909 ASTHMA, UNSPECIFIED ASTHMA SEVERITY, UNSPECIFIED WHETHER COMPLICATED, UNSPECIFIED WHETHER PERSISTENT: ICD-10-CM

## 2023-03-09 DIAGNOSIS — M32.14 SYSTEMIC LUPUS ERYTHEMATOSUS WITH GLOMERULAR DISEASE, UNSPECIFIED SLE TYPE: ICD-10-CM

## 2023-03-09 DIAGNOSIS — R53.1 WEAKNESS: ICD-10-CM

## 2023-03-09 DIAGNOSIS — M32.9 LUPUS (SYSTEMIC LUPUS ERYTHEMATOSUS): ICD-10-CM

## 2023-03-09 DIAGNOSIS — E78.5 HYPERLIPIDEMIA, UNSPECIFIED HYPERLIPIDEMIA TYPE: ICD-10-CM

## 2023-03-09 LAB
ABS NEUT CALC (OHS): 2.52 X10(3)/MCL (ref 2.1–9.2)
ALBUMIN SERPL-MCNC: 2.9 G/DL (ref 3.5–5)
ALBUMIN/GLOB SERPL: 0.5 RATIO (ref 1.1–2)
ALP SERPL-CCNC: 108 UNIT/L (ref 40–150)
ALT SERPL-CCNC: 18 UNIT/L (ref 0–55)
ANISOCYTOSIS BLD QL SMEAR: SLIGHT
AST SERPL-CCNC: 27 UNIT/L (ref 5–34)
BILIRUBIN DIRECT+TOT PNL SERPL-MCNC: 0.5 MG/DL
BUN SERPL-MCNC: 15.3 MG/DL (ref 9.8–20.1)
C3 SERPL-MCNC: 57 MG/DL (ref 80–173)
C4 SERPL-MCNC: 9.9 MG/DL (ref 13–46)
CALCIUM SERPL-MCNC: 8.8 MG/DL (ref 8.4–10.2)
CHLORIDE SERPL-SCNC: 105 MMOL/L (ref 98–107)
CK SERPL-CCNC: 127 U/L (ref 29–168)
CO2 SERPL-SCNC: 22 MMOL/L (ref 22–29)
CREAT SERPL-MCNC: 1.19 MG/DL (ref 0.55–1.02)
CRP SERPL-MCNC: 88.2 MG/L
ERYTHROCYTE [DISTWIDTH] IN BLOOD BY AUTOMATED COUNT: 17 % (ref 11.5–17)
ERYTHROCYTE [SEDIMENTATION RATE] IN BLOOD: 73 MM/HR (ref 0–20)
GFR SERPLBLD CREATININE-BSD FMLA CKD-EPI: 54 MLS/MIN/1.73/M2
GLOBULIN SER-MCNC: 5.3 GM/DL (ref 2.4–3.5)
GLUCOSE SERPL-MCNC: 77 MG/DL (ref 74–100)
HAV IGM SERPL QL IA: NONREACTIVE
HBV CORE IGM SERPL QL IA: NONREACTIVE
HBV SURFACE AG SERPL QL IA: NONREACTIVE
HCT VFR BLD AUTO: 29.2 % (ref 37–47)
HCV AB SERPL QL IA: NONREACTIVE
HGB BLD-MCNC: 9.7 G/DL (ref 12–16)
HOLD SPECIMEN: NORMAL
HYPOCHROMIA BLD QL SMEAR: ABNORMAL
IGA SERPL-MCNC: 646 MG/DL (ref 65–421)
IGG SERPL-MCNC: 2849 MG/DL (ref 522–1631)
IGM SERPL-MCNC: 28 MG/DL (ref 33–293)
LYMPHOCYTES NFR BLD MANUAL: 0.88 X10(3)/MCL
LYMPHOCYTES NFR BLD MANUAL: 25 % (ref 13–40)
MCH RBC QN AUTO: 27.2 PG
MCHC RBC AUTO-ENTMCNC: 33.2 G/DL (ref 33–36)
MCV RBC AUTO: 81.8 FL (ref 80–94)
MONOCYTES NFR BLD MANUAL: 0.1 X10(3)/MCL (ref 0.1–1.3)
MONOCYTES NFR BLD MANUAL: 3 % (ref 2–11)
NEUTROPHILS NFR BLD MANUAL: 72 % (ref 47–80)
NRBC BLD AUTO-RTO: 0 %
PLATELET # BLD AUTO: 410 X10(3)/MCL (ref 130–400)
PLATELET # BLD EST: ADEQUATE 10*3/UL
PMV BLD AUTO: 10.9 FL (ref 7.4–10.4)
POCT GLUCOSE: 107 MG/DL (ref 70–110)
POTASSIUM SERPL-SCNC: 4.4 MMOL/L (ref 3.5–5.1)
PROT SERPL-MCNC: 8.2 GM/DL (ref 6.4–8.3)
RBC # BLD AUTO: 3.57 X10(6)/MCL (ref 4.2–5.4)
SODIUM SERPL-SCNC: 135 MMOL/L (ref 136–145)
TROPONIN I SERPL-MCNC: <0.01 NG/ML (ref 0–0.04)
TSH SERPL-ACNC: 2.37 UIU/ML (ref 0.35–4.94)
WBC # SPEC AUTO: 3.5 X10(3)/MCL (ref 4.5–11.5)

## 2023-03-09 PROCEDURE — 86160 COMPLEMENT ANTIGEN: CPT | Performed by: STUDENT IN AN ORGANIZED HEALTH CARE EDUCATION/TRAINING PROGRAM

## 2023-03-09 PROCEDURE — 11000001 HC ACUTE MED/SURG PRIVATE ROOM

## 2023-03-09 PROCEDURE — 25000003 PHARM REV CODE 250

## 2023-03-09 PROCEDURE — 99214 OFFICE O/P EST MOD 30 MIN: CPT | Mod: PBBFAC,25

## 2023-03-09 PROCEDURE — 82550 ASSAY OF CK (CPK): CPT | Performed by: INTERNAL MEDICINE

## 2023-03-09 PROCEDURE — 84443 ASSAY THYROID STIM HORMONE: CPT | Performed by: STUDENT IN AN ORGANIZED HEALTH CARE EDUCATION/TRAINING PROGRAM

## 2023-03-09 PROCEDURE — 85651 RBC SED RATE NONAUTOMATED: CPT | Performed by: STUDENT IN AN ORGANIZED HEALTH CARE EDUCATION/TRAINING PROGRAM

## 2023-03-09 PROCEDURE — 99223 1ST HOSP IP/OBS HIGH 75: CPT | Mod: ,,, | Performed by: INTERNAL MEDICINE

## 2023-03-09 PROCEDURE — 80053 COMPREHEN METABOLIC PANEL: CPT | Performed by: STUDENT IN AN ORGANIZED HEALTH CARE EDUCATION/TRAINING PROGRAM

## 2023-03-09 PROCEDURE — 82784 ASSAY IGA/IGD/IGG/IGM EACH: CPT | Performed by: INTERNAL MEDICINE

## 2023-03-09 PROCEDURE — 85610 PROTHROMBIN TIME: CPT

## 2023-03-09 PROCEDURE — 99223 PR INITIAL HOSPITAL CARE,LEVL III: ICD-10-PCS | Mod: ,,, | Performed by: INTERNAL MEDICINE

## 2023-03-09 PROCEDURE — 93005 ELECTROCARDIOGRAM TRACING: CPT

## 2023-03-09 PROCEDURE — 99285 EMERGENCY DEPT VISIT HI MDM: CPT | Mod: 25,27

## 2023-03-09 PROCEDURE — 84484 ASSAY OF TROPONIN QUANT: CPT | Performed by: FAMILY MEDICINE

## 2023-03-09 PROCEDURE — 63600175 PHARM REV CODE 636 W HCPCS

## 2023-03-09 PROCEDURE — 86140 C-REACTIVE PROTEIN: CPT | Performed by: STUDENT IN AN ORGANIZED HEALTH CARE EDUCATION/TRAINING PROGRAM

## 2023-03-09 PROCEDURE — 85027 COMPLETE CBC AUTOMATED: CPT | Performed by: STUDENT IN AN ORGANIZED HEALTH CARE EDUCATION/TRAINING PROGRAM

## 2023-03-09 PROCEDURE — 86225 DNA ANTIBODY NATIVE: CPT | Performed by: STUDENT IN AN ORGANIZED HEALTH CARE EDUCATION/TRAINING PROGRAM

## 2023-03-09 PROCEDURE — 27000207 HC ISOLATION

## 2023-03-09 PROCEDURE — 80074 ACUTE HEPATITIS PANEL: CPT | Performed by: INTERNAL MEDICINE

## 2023-03-09 PROCEDURE — 87040 BLOOD CULTURE FOR BACTERIA: CPT

## 2023-03-09 RX ORDER — HYDROCODONE BITARTRATE AND ACETAMINOPHEN 5; 325 MG/1; MG/1
1 TABLET ORAL EVERY 6 HOURS PRN
Status: DISCONTINUED | OUTPATIENT
Start: 2023-03-09 | End: 2023-03-10

## 2023-03-09 RX ORDER — HYDROXYCHLOROQUINE SULFATE 200 MG/1
200 TABLET, FILM COATED ORAL DAILY
Status: DISCONTINUED | OUTPATIENT
Start: 2023-03-09 | End: 2023-03-13 | Stop reason: HOSPADM

## 2023-03-09 RX ORDER — IBUPROFEN 200 MG
24 TABLET ORAL
Status: DISCONTINUED | OUTPATIENT
Start: 2023-03-09 | End: 2023-03-13 | Stop reason: HOSPADM

## 2023-03-09 RX ORDER — ENOXAPARIN SODIUM 100 MG/ML
40 INJECTION SUBCUTANEOUS EVERY 24 HOURS
Status: DISCONTINUED | OUTPATIENT
Start: 2023-03-09 | End: 2023-03-13 | Stop reason: HOSPADM

## 2023-03-09 RX ORDER — METHYLPREDNISOLONE SOD SUCC 125 MG
60 VIAL (EA) INJECTION ONCE
Status: COMPLETED | OUTPATIENT
Start: 2023-03-09 | End: 2023-03-09

## 2023-03-09 RX ORDER — NALOXONE HCL 0.4 MG/ML
0.02 VIAL (ML) INJECTION
Status: DISCONTINUED | OUTPATIENT
Start: 2023-03-09 | End: 2023-03-13 | Stop reason: HOSPADM

## 2023-03-09 RX ORDER — SUCRALFATE 1 G/10ML
1 SUSPENSION ORAL ONCE
Status: COMPLETED | OUTPATIENT
Start: 2023-03-09 | End: 2023-03-09

## 2023-03-09 RX ORDER — ATORVASTATIN CALCIUM 10 MG/1
10 TABLET, FILM COATED ORAL DAILY
Status: DISCONTINUED | OUTPATIENT
Start: 2023-03-09 | End: 2023-03-13 | Stop reason: HOSPADM

## 2023-03-09 RX ORDER — PANTOPRAZOLE SODIUM 40 MG/1
40 TABLET, DELAYED RELEASE ORAL DAILY
Status: DISCONTINUED | OUTPATIENT
Start: 2023-03-09 | End: 2023-03-13 | Stop reason: HOSPADM

## 2023-03-09 RX ORDER — SUCRALFATE 1 G/10ML
1 SUSPENSION ORAL EVERY 6 HOURS
Status: DISCONTINUED | OUTPATIENT
Start: 2023-03-09 | End: 2023-03-09

## 2023-03-09 RX ORDER — METHYLPREDNISOLONE SOD SUCC 125 MG
40 VIAL (EA) INJECTION ONCE
Status: COMPLETED | OUTPATIENT
Start: 2023-03-09 | End: 2023-03-09

## 2023-03-09 RX ORDER — DEXTROSE MONOHYDRATE 100 MG/ML
12.5 INJECTION, SOLUTION INTRAVENOUS
Status: DISCONTINUED | OUTPATIENT
Start: 2023-03-09 | End: 2023-03-13 | Stop reason: HOSPADM

## 2023-03-09 RX ORDER — IBUPROFEN 200 MG
16 TABLET ORAL
Status: DISCONTINUED | OUTPATIENT
Start: 2023-03-09 | End: 2023-03-13 | Stop reason: HOSPADM

## 2023-03-09 RX ORDER — GLUCAGON 1 MG
1 KIT INJECTION
Status: DISCONTINUED | OUTPATIENT
Start: 2023-03-09 | End: 2023-03-13 | Stop reason: HOSPADM

## 2023-03-09 RX ORDER — SODIUM CHLORIDE 0.9 % (FLUSH) 0.9 %
10 SYRINGE (ML) INJECTION EVERY 12 HOURS PRN
Status: DISCONTINUED | OUTPATIENT
Start: 2023-03-09 | End: 2023-03-13 | Stop reason: HOSPADM

## 2023-03-09 RX ORDER — DEXTROSE MONOHYDRATE 100 MG/ML
25 INJECTION, SOLUTION INTRAVENOUS
Status: DISCONTINUED | OUTPATIENT
Start: 2023-03-09 | End: 2023-03-13 | Stop reason: HOSPADM

## 2023-03-09 RX ORDER — ACETAMINOPHEN 325 MG/1
650 TABLET ORAL EVERY 6 HOURS PRN
Status: DISCONTINUED | OUTPATIENT
Start: 2023-03-09 | End: 2023-03-13 | Stop reason: HOSPADM

## 2023-03-09 RX ORDER — METHYLPREDNISOLONE SOD SUCC 125 MG
60 VIAL (EA) INJECTION DAILY
Status: DISCONTINUED | OUTPATIENT
Start: 2023-03-09 | End: 2023-03-09

## 2023-03-09 RX ORDER — INSULIN ASPART 100 [IU]/ML
0-5 INJECTION, SOLUTION INTRAVENOUS; SUBCUTANEOUS
Status: DISCONTINUED | OUTPATIENT
Start: 2023-03-09 | End: 2023-03-13 | Stop reason: HOSPADM

## 2023-03-09 RX ORDER — METHYLPREDNISOLONE SOD SUCC 125 MG
100 VIAL (EA) INJECTION DAILY
Status: COMPLETED | OUTPATIENT
Start: 2023-03-10 | End: 2023-03-11

## 2023-03-09 RX ADMIN — METHYLPREDNISOLONE SODIUM SUCCINATE 40 MG: 125 INJECTION, POWDER, FOR SOLUTION INTRAMUSCULAR; INTRAVENOUS at 05:03

## 2023-03-09 RX ADMIN — PANTOPRAZOLE SODIUM 40 MG: 40 TABLET, DELAYED RELEASE ORAL at 03:03

## 2023-03-09 RX ADMIN — METHYLPREDNISOLONE SODIUM SUCCINATE 60 MG: 125 INJECTION, POWDER, FOR SOLUTION INTRAMUSCULAR; INTRAVENOUS at 03:03

## 2023-03-09 RX ADMIN — HYDROXYCHLOROQUINE SULFATE 200 MG: 200 TABLET ORAL at 03:03

## 2023-03-09 RX ADMIN — ATORVASTATIN CALCIUM 10 MG: 10 TABLET, FILM COATED ORAL at 03:03

## 2023-03-09 RX ADMIN — SUCRALFATE 1 G: 1 SUSPENSION ORAL at 03:03

## 2023-03-09 RX ADMIN — SODIUM CHLORIDE 1000 ML: 9 INJECTION, SOLUTION INTRAVENOUS at 03:03

## 2023-03-09 RX ADMIN — ENOXAPARIN SODIUM 40 MG: 40 INJECTION SUBCUTANEOUS at 05:03

## 2023-03-09 NOTE — CONSULTS
[facility]  Consult Note    Patient Name: Vi Ulrich  : 1965  MRN: [id number]  Admission Date: 3/9/2023  Attending Provider: Peewee Askew MD  Primary Care Physician: Octaviano Barrios MD    Date of Consultation: 3/9/23    Consultation Requested By: Dr Peewee Askew    Reason for Consultation: lupus falre    Subjective:     HPI: Ms Vi Ulrich is a 57 y.o. Black or  female with past medical history of asthma, type 2 diabetes, atrial fibrillation, lupus, lupus nephritis, osteoarthritis, total knee arthroplasty of right knee was admitted directly from Internal Medicine Clinic for severe fatigue and joint pain.    She is having worsening symptoms for the last 2 months.  Has been bedridden for the last 2 months.  Complaining of pain in all her muscles and bones.  Admits shortness of breath with activities.  Denies chest pain or cough.  Admits pain and swelling in all her joints.  Complaining of abdominal pain and bloating.    History of oral ulcers but none recently.    Poor historian, medication noncompliance and noncompliance to previous rheumatological appointments.  She used to live-in [facility], she had seen Dr Reji Gutierrez in the past.  For lupus nephritis she was treated with Plaquenil, prednisone.  Benlysta infusions were recommended but patient never took the infusions.  She had moved to [place] in beginning of .  She was admitted at [facility] in 2022 for acute pyelonephritis.  During that admission she had renal biopsy which showed lupus nephritis.  Later she had seen Dr. Baca and has received 2 doses of rituximab.  She had allergic reaction to CellCept with hives needing to go to ER.  She is supposed to be taking prednisone, Plaquenil and Imuran 50 mg daily.  Complained of abdominal upset with Imuran, stop taking it.  Most of the history is obtained from the chart review.    Denies fevers, rashes, oral and nasal ulcers, history of DVT or PE,  history of stroke or seizures, history of MI, history of malignancies, Raynaud's phenomenon, history of inflammatory eye diseases, history of inflammatory bowel disease.  Smoking:  Quit smoking in beginning of 2022.     Patient is allergic to ketorolac (pf), mycophenolate mofetil, penicillins, percocet [oxycodone-acetaminophen], and tramadol.     Past Medical History:  has a past medical history of Arthritis, Asthma, Atrial fibrillation, Chronic constipation, Diabetes mellitus, Encounter for blood transfusion, GERD (gastroesophageal reflux disease), Gout, Hypertension, Lupus, Renal disorder, and SLE (systemic lupus erythematosus).    Procedure History:  has a past surgical history that includes Cholecystectomy; Appendectomy; Partial hysterectomy; Lymph node biopsy (Left, 2014); Hysterectomy; Joint replacement (Left, 08/07/2015); Mastectomy; Esophagogastroduodenoscopy (N/A, 4/19/2018); and Colonoscopy (N/A, 4/19/2018).    Family History: family history includes Heart block in her mother; Heart disease in her father.    Social History:  reports that she has quit smoking. Her smoking use included cigarettes. She has a 15.00 pack-year smoking history. She has never used smokeless tobacco. She reports that she does not drink alcohol and does not use drugs.    Review of systems:   CONSTITUTIONAL:  Admits fatigue and low-grade fevers.   SKIN: negative with no recent rashes  EYES: negative with no complaints of dry irritated eyes  ENT: negative with no mouth dryness or sores  ENDO: negative with no hypothyroid symptoms.  Admit diabetes.  HEME: negative, with no history of DVT.  History of anemia requiring blood transfusion in the past  CV: negative without exertional chest pain, palpitations, or edema  RESP:  Admits shortness of breath.  GI:  Admits to abdominal pain  NEURO: negative, with no imbalance and no numbness or tingling of the hands or feet.  Admits weakness.  MUSCULOSKELETAL: as per HPI    Inpatient Medications:      Current Facility-Administered Medications:     acetaminophen tablet 650 mg, 650 mg, Oral, Q6H PRN, Fabiola Minaya MD    atorvastatin tablet 10 mg, 10 mg, Oral, Daily, Fabiola Minaya MD, 10 mg at 03/09/23 1525    dextrose 10 % infusion, 12.5 g, Intravenous, PRN, Fabiola Minaya MD    dextrose 10 % infusion, 25 g, Intravenous, PRN, Fabiola Minaya MD    enoxaparin injection 40 mg, 40 mg, Subcutaneous, Daily, Fabiola Minaya MD, 40 mg at 03/09/23 1743    glucagon (human recombinant) injection 1 mg, 1 mg, Intramuscular, PRN, Fabiola Minaya MD    glucose chewable tablet 16 g, 16 g, Oral, PRN, Fabiola Minaya MD    glucose chewable tablet 24 g, 24 g, Oral, PRN, Fabiola Minaya MD    HYDROcodone-acetaminophen 5-325 mg per tablet 1 tablet, 1 tablet, Oral, Q6H PRN, Fabiola Minaya MD    hydrOXYchloroQUINE tablet 200 mg, 200 mg, Oral, Daily, Fabiola Minaya MD, 200 mg at 03/09/23 1525    insulin aspart U-100 injection 0-5 Units, 0-5 Units, Subcutaneous, QID (AC + HS) PRN, Fabiola Minaya MD    [START ON 3/10/2023] methylPREDNISolone sodium succinate injection 100 mg, 100 mg, Intravenous, Daily, Fabiola Minaya MD    naloxone 0.4 mg/mL injection 0.02 mg, 0.02 mg, Intravenous, PRN, Fabiola Minaya MD    pantoprazole EC tablet 40 mg, 40 mg, Oral, Daily, Fabiola Minaya MD, 40 mg at 03/09/23 1525    sodium chloride 0.9% flush 10 mL, 10 mL, Intravenous, Q12H PRN, Fabiola Minaya MD    Current Outpatient Medications:     albuterol (ACCUNEB) 0.63 mg/3 mL Nebu, Take 3 mLs (0.63 mg total) by nebulization every 4 (four) hours as needed (wheezing). Rescue, Disp: 75 mL, Rfl: 3    albuterol sulfate (PROAIR RESPICLICK) 90 mcg/actuation inhaler, Inhale 1 puff into the lungs every 4 (four) hours as needed for Wheezing. Rescue, Disp: , Rfl:     atorvastatin (LIPITOR) 10 MG tablet, Take 10 mg by mouth once daily., Disp: , Rfl:     blood sugar diagnostic (ONETOUCH VERIO) Strp, 1 each by Misc.(Non-Drug; Combo Route) route 4 (four) times  daily., Disp: 50 each, Rfl: 11    blood sugar diagnostic Strp, 1 each by Misc.(Non-Drug; Combo Route) route 4 (four) times daily., Disp: 50 each, Rfl: 11    blood-glucose meter (ONETOUCH VERIO SYNC) kit, Use as instructed, Disp: 1 each, Rfl: 0    hydrOXYchloroQUINE (PLAQUENIL) 200 mg tablet, Take 1 tablet (200 mg total) by mouth once daily., Disp: 30 tablet, Rfl: 11    insulin (LANTUS SOLOSTAR U-100 INSULIN) glargine 100 units/mL SubQ pen, Inject 80 Units into the skin every evening., Disp: 72 mL, Rfl: 3    lancets 33 gauge Misc, 1 lancet by Misc.(Non-Drug; Combo Route) route 4 (four) times daily., Disp: 100 each, Rfl: 11    omeprazole (PRILOSEC) 20 MG capsule, Take 1 capsule (20 mg total) by mouth once daily., Disp: 30 capsule, Rfl: 5    ondansetron (ZOFRAN) 4 MG tablet, Take 1 tablet (4 mg total) by mouth every 6 (six) hours as needed for Nausea., Disp: 24 tablet, Rfl: 0    predniSONE (DELTASONE) 5 MG tablet, Take 1 tablet (5 mg total) by mouth once daily., Disp: 30 tablet, Rfl: 11    walker Misc, Please dispense 1 Rollator, Disp: 1 each, Rfl: 0     Objective:     Vital Signs (Most Recent):  Temp: 98.8 °F (37.1 °C) (03/09/23 1202)  Pulse: 83 (03/09/23 1538)  Resp: 18 (03/09/23 1538)  BP: 126/70 (03/09/23 1538)  SpO2: 100 % (03/09/23 1538)   Vital Signs (24h Range):  Temp:  [98.8 °F (37.1 °C)-99.5 °F (37.5 °C)] 98.8 °F (37.1 °C)  Pulse:  [74-83] 83  Resp:  [18] 18  SpO2:  [98 %-100 %] 100 %  BP: ()/(60-70) 126/70     Weight: 81.6 kg (180 lb) (03/09/23 1202)  Body mass index is 35.15 kg/m².  Body surface area is 1.86 meters squared.    No intake or output data in the 24 hours ending 03/09/23 3060    Physical Exam:   General Appearance:  Mild distress, tachypneic on exam.  Cooperative.  Skin: Skin color, texture, turgor normal. No rashes or lesions.  Eyes: conjunctivae/corneas clear. PERRL, EOM's intact.   ENT: No oral or nasal ulcers.  Neck:  Neck supple. No adenopathy.   Lungs:  Fine crackles in lung bases,  clear throughout.  Tachypneic on exam.  Heart: RRR w/o S3, S4, or murmurs.  Tachycardia present.  Abdomen: Soft, non-tender, no masses, no rebound or guarding.  Neuro: CN II-XII GI, decreased strength in bilateral proximal upper and lower extremities.  Patient is not giving any resistance.  Strength 3/5 in proximal upper and lower extremities.  4+ out of 5 in distal upper and lower extremities.  Musculoskeletal:  Puffy hands.  Tenderness everywhere I palpate over joints and in between joints.  Surgical scar on left knee.  No warmth or redness or swelling in any joints.  Tenderness with range of motion of all her joints.    Significant Labs:      08/2021:  Positive MARTA, 1:1280.  High titer dsDNA.  Myositis panel showed high titer U1 RNP, 156, moderate titer MDA 5 antibody.  Other antibodies negative in myositis panel.    5/2022: Negative SSA, SSB, centromere.  Positive Mello, RNP, dsDNA.  low complements.    Blood Culture: No results for input(s): LABBLOO in the last 24 hours.  CBC:   Recent Labs   Lab 03/09/23  1324   WBC 3.5*   HGB 9.7*   HCT 29.2*   *       CMP:   Recent Labs   Lab 03/09/23  1325   CALCIUM 8.8   ALBUMIN 2.9*   *   K 4.4   CO2 22   BUN 15.3   CREATININE 1.19*   ALKPHOS 108   ALT 18   AST 27   BILITOT 0.5       CRP:   Recent Labs   Lab 03/09/23  1325   CRP 88.20*       ESR:   Recent Labs   Lab 03/09/23  1324   SEDRATE 73*         All pertinent lab results from the last 24 hours have been reviewed.    Significant Imaging:  Imaging results within the past 24 hours have been reviewed.  CT Chest Without Contrast   Final Result      Persistent ground-glass interstitial traits in the lung bases bilaterally as well as the right middle lobe.  Findings are unchanged since the prior examination      No evidence of interstitial fibrosis         Electronically signed by: Rishi Urban   Date:    03/09/2023   Time:    17:18      X-Ray Chest 1 View   Final Result      Suspected mild congestive  changes.         Electronically signed by: Oscar Duncan   Date:    03/09/2023   Time:    15:05        Renal biopsy 06/07/2022 showed focal lupus nephritis, class 3, class 5 membranous lupus nephritis.  Features of diabetic glomerulopathy, class 2B.  Moderate plasma cell Rich interstitial inflammation.  Activity score 4/24, chronicity score 5/12.  Minority less than 10% of plasma cell stain with IgG 4 was positive which is not diagnostic of IgG4 induced interstitial nephritis.  Moderate interstitial fibrosis and moderate tubular atrophy present.    PFTs:  02/03/2023 showed FVC 57%, FEV1 57%, ratio 79, unable to perform lung volumes and DLCO.  08/01/2017:  FVC 83%, FEV1 87%, ratio 104, total lung capacity 75%, residual volume 77%, DLCO 59%, DLCO 71%.    Assessment/Plan:       Ms Vi Ulrich is a 57 y.o. Black or  female with past medical history of asthma, gout, type 2 diabetes, atrial fibrillation, lupus-2004, lupus nephritis, osteoarthritis, total knee arthroplasty of right knee was admitted directly from Internal Medicine Clinic for severe fatigue and joint pain.    1. Lupus/lupus nephritis:  Renal biopsy in June 2022 showed class 3, class 5 nephritis with diabetic glomerulopathy features and few plasma cells stain positive for IgG 4.  She was treated with prednisone, Plaquenil and few doses of rituximab in June 21, 2022 and July 5, 2022 in the past. Hx of cytoxan use in the past, documented in chart, unclear of dose and dates. She has anemia, leukopenia/intermittent neutropenia, interstitial lung disease, chronic proteinuria.  Complements are low.  High titer MARTA, dsDNA, Mello.  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 CT chest on 03/09/2023 showed ground-glass opacities in lung bases bilaterally as well as in right middle lobe.  Similar changes compared to May 2022.  No lymphadenopathy, axillary lymphadenopathy there was seen on May 2022 CT chest but no mention  of axillary lymphadenopathy in current CT chest.  Allergic reaction to CellCept in the past, did not tolerate Imuran.    - check CPK and aldolase.  Check infection workup.  Check immunoglobulin levels and IgG 4 levels.  - currently in lupus flare with severe arthralgia, complements are low and proteinuria.  Check urine protein creatinine ratio.  - unclear why she is in so much pain, not giving resistance to check her muscle strength. No overt synovitis. Muscle enzymes were normal in the past in 2020.  Unclear if there is psychosomatic component.  - start Solu-Medrol 100 mg IV for 3 days followed by prednisone 60 mg daily.  - after test results we will consider another course of Rituximab.  - complaining of urinary incontinence, if symptoms persist would recommend MRI of spine to rule out transverse myelitis.  - start PPI as she is complaining of abdominal pain and also because she is on high-dose steroids.  - recommend PJP prophylaxis with Bactrim double-strength 3 times a week.  - recommend swallow eval, patient complaining of dysphagia.  - Please do work up for anemia with next blood draw, alessio test, haptoglobin, iron, TIBC, LDH, ferritin, reticulocyte count.    Plan discussed with primary team.     Thank you for your consult. Please call with any questions.     Ye Last MD  Rheumatology

## 2023-03-09 NOTE — ED PROVIDER NOTES
"Encounter Date: 3/9/2023       History     Chief Complaint   Patient presents with    Fatigue     PT FROM  CLINIC STATES NEEDS TO BE ADMITTED.  CO WEAKNESS W ABD PAIN NVD X 3 WKS.  REPORTS HX OF LUPUS. EKG OBTAINED.      56 y/o F with hx of lupus presents with complaint of N.V. abdominal pain, weakness. Pt reports she saw her PCP today who told her to come to the ED to be admitted. Pt reports that she has been having difficulty walking due to the pain, " I hurt all over". Pt AAOx4, GCS 15.     Spoke with Resident- Dr. PARKER Soni who reports pt to be admitted for lupus flare and further evaluation.      The history is provided by the patient. No  was used.   Review of patient's allergies indicates:   Allergen Reactions    Ketorolac (pf) Swelling    Mycophenolate mofetil Swelling    Penicillins Hives    Percocet [oxycodone-acetaminophen] Hives and Itching    Tramadol Hives     Past Medical History:   Diagnosis Date    Arthritis     knees    Asthma     Atrial fibrillation     Chronic constipation     Diabetes mellitus     Encounter for blood transfusion 10/2014    GERD (gastroesophageal reflux disease)     Gout     Hypertension     Lupus 2004    SLE    Renal disorder     SLE (systemic lupus erythematosus)      Past Surgical History:   Procedure Laterality Date    APPENDECTOMY      CHOLECYSTECTOMY      COLONOSCOPY N/A 4/19/2018    Procedure: COLONOSCOPY;  Surgeon: Minerva Porras MD;  Location: [facility];  Service: Endoscopy;  Laterality: N/A;    ESOPHAGOGASTRODUODENOSCOPY N/A 4/19/2018    Procedure: ESOPHAGOGASTRODUODENOSCOPY (EGD);  Surgeon: Minerva Porras MD;  Location: [facility];  Service: Endoscopy;  Laterality: N/A;    HYSTERECTOMY      JOINT REPLACEMENT Left 08/07/2015    Knee    LYMPH NODE BIOPSY Left 2014    MASTECTOMY      Left arm- NO BP    PARTIAL HYSTERECTOMY       Family History   Problem Relation Age of Onset    Heart block Mother     Heart disease Father      Social " History     Tobacco Use    Smoking status: Former     Packs/day: 1.00     Years: 15.00     Pack years: 15.00     Types: Cigarettes    Smokeless tobacco: Never   Substance Use Topics    Alcohol use: No    Drug use: No     Review of Systems   Constitutional:  Negative for fever.   Respiratory:  Negative for cough and shortness of breath.    Gastrointestinal:  Positive for abdominal pain, nausea and vomiting.   Musculoskeletal:  Positive for joint swelling and myalgias. Negative for neck pain and neck stiffness.   Skin:  Negative for wound.   Neurological:  Positive for weakness.     Physical Exam     Initial Vitals [03/09/23 1202]   BP Pulse Resp Temp SpO2   106/70 81 18 98.8 °F (37.1 °C) 98 %      MAP       --         Physical Exam    Nursing note and vitals reviewed.  Constitutional: She appears well-developed and well-nourished. No distress.   HENT:   Head: Normocephalic and atraumatic.   Eyes: Conjunctivae are normal.   Cardiovascular:  Normal heart sounds and intact distal pulses.           Pulmonary/Chest: Breath sounds normal.   Abdominal: Abdomen is soft. Bowel sounds are normal. There is no abdominal tenderness. There is no rebound and no guarding.   Musculoskeletal:         General: Normal range of motion.     Neurological: She is alert and oriented to person, place, and time. Gait normal. GCS score is 15. GCS eye subscore is 4. GCS verbal subscore is 5. GCS motor subscore is 6.   Bilaterally upper and lower weakness. Symmetrical , pt can walk with assistance.    Skin: Skin is warm and dry. Capillary refill takes less than 2 seconds.   Psychiatric: She has a normal mood and affect. Her behavior is normal. Judgment and thought content normal.       ED Course   Procedures  Labs Reviewed   CBC W/ AUTO DIFFERENTIAL    Narrative:     The following orders were created for panel order CBC Auto Differential.  Procedure                               Abnormality         Status                     ---------                                -----------         ------                     CBC with Differential[[id number]]                                                         Please view results for these tests on the individual orders.   COMPREHENSIVE METABOLIC PANEL   SEDIMENTATION RATE, AUTOMATED   C-REACTIVE PROTEIN   URINALYSIS, REFLEX TO URINE CULTURE   PROTEIN / CREATININE RATIO, URINE   CREATININE, URINE, RANDOM   PROTEIN, QUANTITATIVE, URINE RANDOM   C3 COMPLEMENT   C4 COMPLEMENT   DSDNA   HCG QUALITATIVE URINE   TSH   CBC WITH DIFFERENTIAL   TROPONIN I     EKG Readings: (Independently Interpreted)   Initial Reading: No STEMI. Rhythm: Normal Sinus Rhythm. Heart Rate: 82. Ectopy: No Ectopy. ST Segments: Normal ST Segments. T Waves: Normal.   ECG Results              EKG 12-lead (Weakness) Age > 50 (In process)  Result time 03/09/23 12:11:20      In process by Interface, Lab In [facility] (03/09/23 12:11:20)                   Narrative:    Test Reason : R53.1,    Vent. Rate : 082 BPM     Atrial Rate : 082 BPM     P-R Int : 162 ms          QRS Dur : 078 ms      QT Int : 362 ms       P-R-T Axes : 069 076 061 degrees     QTc Int : 422 ms    Normal sinus rhythm  Cannot rule out Anterior infarct (cited on or before 29-MAY-2022)  Abnormal ECG  When compared with ECG of 29-MAY-2022 00:30,  Questionable change in The axis  Nonspecific T wave abnormality now evident in Lateral leads    Referred By:             Confirmed By:                                   Imaging Results    None          Medications - No data to display  Medical Decision Making:   Discussed case with Resident- Dr. PARKER Soni who reports he will put the labs in and admit patient. Pt verbalized understanding.                          Clinical Impression:   Final diagnoses:  [R53.1] Weakness (Primary)        ED Disposition Condition    Observation Stable                Joesph Monge MD  03/09/23 [id number]

## 2023-03-09 NOTE — PROGRESS NOTES
[facility] Visit    Chief Complaint:      No chief complaint on file.     Subjective:     HPI:  Vi Ulrich is a 57 y.o. female with has a past medical history of Arthritis, Asthma, Atrial fibrillation, Chronic constipation, Diabetes mellitus, Encounter for blood transfusion, GERD (gastroesophageal reflux disease), Gout, Hypertension, Lupus, Renal disorder, and SLE (systemic lupus erythematosus). She presents to clinic today for follow-up of chronic medical conditions.     Today, she states she is unable to lift her arms and can not handle NPO, with patients lupus will direct admit so patient can get rheum eval.      Review of Systems  A comprehensive 12 point review of systems was completed.  Please see above for pertinent positives and negatives.     Objective:   Last 24 Hour Vital Signs:       Physical Examination:  General: Awake, alert, & oriented to person, place & time. No acute distress  Psychiatric: Mood and affect normal  HEENT: Normocephalic, atraumatic. Face symmetric.  Cardiovascular: Regular rate & rhythm. Normal S1 & S2 w/out murmurs, rubs or gallops.  Pulmonary: Bilateral symmetric chest rise. Non-labored, no wheezes, rhonchi or crackles are appreciated.  Bilateral upper lobe diminished breath sounds are noted  Abdominal:  Nondistended. Bowel sounds present  Extremities: No clubbing, cyanosis or edema.  Patient does have bilateral 3rd digit trigger fingers  Skin:  Exposed skin is warm & dry.  Neuro:   weak BL upper and LE     Assessment & Plan:     Problem List Items Addressed This Visit    None     Essential hypertension     Hyperlipidemia     Relevant Orders     Lipid Panel (Completed)          Immunology/Multi System     Lupus (systemic lupus erythematosus) - Primary     Relevant Orders     Urinalysis     Antinuclear Antibody (MARTA), HEp-2, IgG     C-Reactive Protein (Completed)     Sedimentation rate (Completed)     X-Ray Chest PA And Lateral      Other Visit Diagnoses          DM (diabetes mellitus) with complications         Relevant Medications     insulin (LANTUS SOLOSTAR U-100 INSULIN) glargine 100 units/mL SubQ pen     Other Relevant Orders     POCT HEMOGLOBIN A1C (Completed)     Diabetic Eye Screening Photo (Completed)     Urinalysis     Healthcare maintenance         Relevant Orders     Comprehensive Metabolic Panel (Completed)     CBC Auto Differential     TSH     T4, Free     Iron and TIBC (Completed)     Ferritin     Transferrin     Vitamin D     Vitamin B12     Folate     Ambulatory referral/consult to Obstetrics / Gynecology     Asthma, unspecified asthma severity, unspecified whether complicated, unspecified whether persistent         Relevant Medications     albuterol (ACCUNEB) 0.63 mg/3 mL Nebu     Other Relevant Orders     Complete PFT w/ bronchodilator     Trigger finger, unspecified finger, unspecified laterality         Relevant Orders     Ambulatory referral/consult to Orthopedics     Toe pain, right         Relevant Orders     X-Ray Foot Complete Right     Wanted to attempt direct admit however patient had to bring her intellectually disabled daughter home 1st.  Patient left while I was not in the room, was told patient would come back to ED.    To be addressed at next follow-up  -follow up above albs and ref  -rheum referal  -cbc    Follow-ups  -Follow-up medicine clinic in 2 months      Octaviano Barrios MD  Internal Medicine - PGY-2

## 2023-03-09 NOTE — H&P
[facility] Medicine Wards   History & Physical Note     Resident Team: [facility] Medicine List 3  Attending Physician: Peewee Askew MD  Resident: Jose Armando   Intern: Rei      Date of Admit: 3/9/2023    Chief Complaint:     Fatigue (PT FROM IM CLINIC STATES NEEDS TO BE ADMITTED.  CO WEAKNESS W ABD PAIN NVD X 3 WKS.  REPORTS HX OF LUPUS. EKG OBTAINED. )     Subjective:      History of Present Illness:  Vi Ulrich is a 57 y.o. female with a history of Asthma, A fib, DM, GERD, gout, HTN, and SLE w/ lupus nephritis who presented to [facility] ED on 3/9/2023 after being sent directly from internal medicine clinic with complaints of generalized weakness, dysphagia, N w/o V, F/C, joint pain, shortness of breath, dyspnea on exertion, urinary incontinence, and weight loss.  She states she has been feeling fatigued and weak over the last several months but her symptoms began to worsen over the last 2 weeks.  She is endorsing significant weakness in upper extremities which is believed to be secondary to pain.  Patient is unable to lift her left arm at all and states this began a few days ago.  She also states she has lost a significant amount of weight over the last 2 weeks and has not been able to tolerate PO intake secondary to both nausea and dysphagia.  She states it is painful to swallow and she feels like food gets stuck in her throat when she attempts to eat.  She denies any chest pain but does state she has been experiencing shortness of breath at rest as well as dyspnea on exertion.  She states she is not able to walk at all secondary to both SOB and pain.  She is endorsing significant pain in all joints and states her joints swell episodically.  She is also endorsing urinary incontinence and states she is not able to control when she pees.  This may also be secondary to pain as patient states she struggles with walking to the bathroom and cannot make it in time.  She endorses possible episodes of fever while at home and states  she often wakes up from a nap in a sweat.  She denies any tobacco, alcohol, or recreational drug use and states she is compliant with medication regimen.  She has been taking prednisone 5mg and Plaquenil since 2004 for hx of lupus.  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      In the ED, patients vitals were stable, satting well on room air.  Labs were remarkable for neutropenia with wbc of 3.5 and normocytic anemia with H/H of 9.7/29.2.  Sodium was mildly low at 135 and other electrolytes wnl.  Creatinine was mildly elevated at 1.19.  CRP was elevated at 88.2 and sed rate was elevated at 73.  Chest x-ray was performed showing mild congestive changes.  Patient was admitted to medicine secondary to concern for lupus flare.      Past Medical History:   has a past medical history of Arthritis, Asthma, Atrial fibrillation, Chronic constipation, Diabetes mellitus, Encounter for blood transfusion (10/2014), GERD (gastroesophageal reflux disease), Gout, Hypertension, Lupus (2004), Renal disorder, and SLE (systemic lupus erythematosus).     Past Surgical History:   has a past surgical history that includes Cholecystectomy; Appendectomy; Partial hysterectomy; Lymph node biopsy (Left, 2014); Hysterectomy; Joint replacement (Left, 08/07/2015); Mastectomy; Esophagogastroduodenoscopy (N/A, 4/19/2018); and Colonoscopy (N/A, 4/19/2018).     Family History:  family history includes Heart block in her mother; Heart disease in her father.     Social History:   reports that she has quit smoking. Her smoking use included cigarettes. She has a 15.00 pack-year smoking history. She has never used smokeless tobacco. She reports that she does not drink alcohol and does not use drugs.     Allergies:  is allergic to ketorolac (pf), mycophenolate mofetil, penicillins, percocet [oxycodone-acetaminophen], and tramadol.      Home Medications:  Prior to Admission medications    Medication Sig Start Date End Date Taking? Authorizing Provider   albuterol (ACCUNEB) 0.63 mg/3 mL Nebu Take 3 mLs (0.63 mg total) by nebulization every 4 (four) hours as needed (wheezing). Rescue 1/12/23 1/12/24  Octaviano Barrios MD   albuterol sulfate (PROAIR RESPICLICK) 90 mcg/actuation inhaler Inhale 1 puff into the lungs every 4 (four) hours as needed for Wheezing. Rescue    Historical Provider   atorvastatin (LIPITOR) 10 MG tablet Take 10 mg by mouth once daily. 5/12/22   Historical Provider   blood sugar diagnostic (ONETOUCH VERIO) Strp 1 each by Misc.(Non-Drug; Combo Route) route 4 (four) times daily. 12/15/17   Aurea High MD   blood sugar diagnostic Strp 1 each by Misc.(Non-Drug; Combo Route) route 4 (four) times daily. 12/15/17   Aurea High MD   blood-glucose meter (ONETOUCH VERIO SYNC) kit Use as instructed 12/15/17   Aurea High MD   hydrOXYchloroQUINE (PLAQUENIL) 200 mg tablet Take 1 tablet (200 mg total) by mouth once daily. 12/12/22 12/12/23  ANNAMARIA Hong   insulin (LANTUS SOLOSTAR U-100 INSULIN) glargine 100 units/mL SubQ pen Inject 80 Units into the skin every evening. 1/12/23 1/12/24  Octaviano Barrios MD   lancets 33 gauge Misc 1 lancet by Misc.(Non-Drug; Combo Route) route 4 (four) times daily. 12/15/17   Aurea High MD   omeprazole (PRILOSEC) 20 MG capsule Take 1 capsule (20 mg total) by mouth once daily. 6/14/22   Po Mcfarlane MD   ondansetron (ZOFRAN) 4 MG tablet Take 1 tablet (4 mg total) by mouth every 6 (six) hours as needed for Nausea. 8/4/22   Frankie Moralez IV, MD   predniSONE (DELTASONE) 5 MG tablet Take 1 tablet (5 mg total) by mouth once daily. 12/12/22 12/12/23  ANNAMARIA Hong Misc Please dispense 1 Rollator 1/12/23   Octaviano Barrios MD   ARIPiprazole (ABILIFY) 2 MG Tab Take 2 mg by mouth once daily.  11/9/19 6/22/21  Historical Provider   divalproex (DEPAKOTE) 250 MG EC tablet Take 250 mg by  mouth.  10/22/19 6/22/21  Historical Provider   escitalopram oxalate (LEXAPRO) 10 MG tablet Take 10 mg by mouth once daily.  6/22/21  Historical Provider     ROS is negative unless stated above     Objective:     Vital Signs (Most Recent):  Temp: 98.8 °F (37.1 °C) (03/09/23 1202)  Pulse: 83 (03/09/23 1538)  Resp: 18 (03/09/23 1538)  BP: 126/70 (03/09/23 1538)  SpO2: 100 % (03/09/23 1538) Vital Signs (24h Range):  Temp:  [98.8 °F (37.1 °C)-99.5 °F (37.5 °C)] 98.8 °F (37.1 °C)  Pulse:  [74-83] 83  Resp:  [18] 18  SpO2:  [98 %-100 %] 100 %  BP: ()/(60-70) 126/70     Physical Examination:  General: Patient resting comfortably in bed, in mild distress  Eye: PERRLA, EOMI, clear conjunctiva, eyelids normal  HENT: Head-normocephalic and atraumatic  Neck: full range of motion, no thyromegaly or lymphadenopathy, trachea midline, supple, no palpable thyroid nodules  Respiratory: clear to auscultation bilaterally without wheezes, rales, rhonchi  Cardiovascular: regular rate and rhythm without murmurs.  No gallops or rubs no JVD.  Capillary refill within normal limits.  Gastrointestinal: soft, non-tender, non-distended with normal bowel sounds, without masses to palpation  Genitourinary: no CVA tenderness to palpation  Musculoskeletal: tenderness to palpation in all extremities and joints.  Exam limited secondary to pain.  Patient unable to lift left arm secondary to pain.   Integumentary: no rashes or skin lesions present  Neurologic: no signs of peripheral neurological deficit although patient unable to lift left arm (I suspect secondary to pain), motor/sensory function intact, exam limited secondary to pain.   Psychiatric:  alert and oriented, cognitive function intact, cooperative with exam, good eye contact, judgement and insight intact, mood and affect full range.     Laboratory:  Most Recent Data:  Recent Lab Results         03/09/23  1645   03/09/23  1605   03/09/23  1325   03/09/23  1324        Albumin/Globulin  Ratio     0.5         Abs Neut calc       2.52       Albumin     2.9         Alkaline Phosphatase     108         ALT     18         Aniso       Slight       AST     27         BILIRUBIN TOTAL     0.5         BUN     15.3         Calcium     8.8         Chloride     105         CO2     22         Creatinine     1.19         CRP     88.20         eGFR     54         Globulin, Total     5.3         Glucose     77         Hematocrit       29.2       Hemoglobin       9.7       Hep A IgM     Nonreactive         Hep B C IgM     Nonreactive         Hepatitis C Ab     Nonreactive         Hepatitis B Surface Antigen     Nonreactive         Extra Tube Hold for add-ons.  Comment: Auto resulted.                Hypo       1+       INR   1.14           Lymph #       0.875       Lymph Man       25       MCH       27.2       MCHC       33.2       MCV       81.8       Mono #       0.105       Mono %       3       MPV       10.9       Neutrophils Relative       72       nRBC       0.0       Platelet Estimate       Adequate       Platelets       410       Potassium     4.4         PROTEIN TOTAL     8.2         Protime   14.4           RBC       3.57       RDW       17.0       Sed Rate       73       Sodium     135         Thyroid Stimulating Hormone     2.366         WBC       3.5                 Microbiology Data:  Blood cultures ordered     Other Results:  EKG (my interpretation): normal EKG, normal sinus rhythm, unchanged from previous tracings    Radiology:  Imaging Results              CT Chest Without Contrast (Final result)  Result time 03/09/23 17:18:56      Final result by Rishi Urban MD (03/09/23 17:18:56)                   Impression:      Persistent ground-glass interstitial traits in the lung bases bilaterally as well as the right middle lobe.  Findings are unchanged since the prior examination    No evidence of interstitial fibrosis      Electronically signed by: Rishi  Rajni  Date:    03/09/2023  Time:    17:18               Narrative:    EXAMINATION:  CT CHEST WITHOUT CONTRAST    CLINICAL HISTORY:  Interstitial lung disease;    TECHNIQUE:  Low dose axial images, sagittal and coronal reformations were obtained from the thoracic inlet to the lung bases. Contrast was not administered.  Automatic exposure control is utilized to reduce patient radiation exposure.    COMPARISON:  05/27/2022    FINDINGS:  The lungs are adequately aerated.  There are some ground-glass interstitial infiltrates in the lung bases bilaterally as well as the right middle lobe.  Similar changes were seen on the prior examination.  No changes of interstitial fibrosis are seen.  No significant bronchiectasis is seen.  No nodularity is seen.  No pleural effusion is seen.    No abnormal mediastinal lymphadenopathy is seen.  Thoracic aorta appears normal.  The heart is normal in size.  There are multiple coronary artery calcification is seen.    Upper abdomen shows no acute abnormality.    Bones appear normal..                                       X-Ray Chest 1 View (Final result)  Result time 03/09/23 15:05:11      Final result by Oscar Duncan MD (03/09/23 15:05:11)                   Impression:      Suspected mild congestive changes.      Electronically signed by: Oscar Duncan  Date:    03/09/2023  Time:    15:05               Narrative:    EXAMINATION:  XR CHEST 1 VIEW    CLINICAL HISTORY:  shortness of breath;    TECHNIQUE:  One view    COMPARISON:  January 12, 2023..    FINDINGS:  Cardiopericardial silhouette is within normal limits.  There is slight prominence of lungs interstitial markings suspected for mild congestive process.  There is no focally dense consolidation or fluid within the pleural spaces.  Thoracolumbar compound scoliosis.                                    Lines/Drains/Airways       Peripheral Intravenous Line  Duration                  Peripheral IV - Single Lumen 03/09/23 1540 22 G  Posterior;Right Hand <1 day                  Assessment & Plan:     SLE flare vs. Myositis   Lupus Nephritis   -MARTA 1:2560, dsDNA positive   -home regimen includes prednisone 5mg and HCLQ   -rheumatologic labs ordered for further workup including dsDNA, C3, C4, TSH, hepatitis panel, Quantiferon gold, CK, myoglobin, UDS, urinalysis, CK, and aldolase.   -CRP and sed rate elevated on admission   -CXR showing mild congestive changes, CT chest ordered for further workup   -EKG showing normal sinus rhythm  -case discussed with rheumatology, per recs will begin patient on 3 day course of IV solumedrol 100mg BID w/ plan for prednisone taper, celcept was considered but patient has allergy to medication.  Will consider addition of other DMARD therapy s/p workup.   -continue hydroxychloroquine per home regimen   -tylenol and norco for pain PRN   -given 1L NS bolus for fluid resuscitation   -will continue to monitor     Dysphagia   GERD  -SLP consulted for further workup   -continue protonix per home regimen    Hyponatremia  -likely 2/2 dehydration   -given 1L NS bolus for fluid resuscitation     T2DM  -home regimen includes lantus 80 units nightly  -will hold insulin regimen for now secondary to dysphagia and lack of PO intake.   -low dose sliding scale   -will continue to monitor blood sugars and adjust medication regimen as needed.      HLD  -continue atorvastatin 10mg per home regimen     Code status: Full  DVT prophylaxis: Lovenox   Diet: Diabetic   Oxygenation: Room air   GI prophylaxis: pantoprazole   Lines/drains: PIV   Consults: discussed case w/ rheumatology, PT/OT/SLP     Disposition: Day 0 of admission for SLE flare vs. Myositis.  Starting high dose steroids, will continue inpatient management at this time.     Fabiola Minaya MD  [facility], -1

## 2023-03-10 LAB
ABS NEUT CALC (OHS): 2.12 X10(3)/MCL (ref 2.1–9.2)
ALBUMIN SERPL-MCNC: 2.5 G/DL (ref 3.5–5)
ALBUMIN/GLOB SERPL: 0.5 RATIO (ref 1.1–2)
ALP SERPL-CCNC: 102 UNIT/L (ref 40–150)
ALT SERPL-CCNC: 14 UNIT/L (ref 0–55)
AMPHET UR QL SCN: NEGATIVE
ANISOCYTOSIS BLD QL SMEAR: ABNORMAL
AORTIC ROOT ANNULUS: 3.4 CM
APPEARANCE UR: CLEAR
ASCENDING AORTA: 3.1 CM
AST SERPL-CCNC: 22 UNIT/L (ref 5–34)
AV INDEX (PROSTH): 0.84
AV MEAN GRADIENT: 5 MMHG
AV PEAK GRADIENT: 9 MMHG
AV REGURGITATION PRESSURE HALF TIME: 650.81 MS
AV VALVE AREA: 2.66 CM2
AV VELOCITY RATIO: 0.69
B-HCG SERPL QL: NEGATIVE
BACTERIA #/AREA URNS AUTO: ABNORMAL /HPF
BARBITURATE SCN PRESENT UR: NEGATIVE
BENZODIAZ UR QL SCN: NEGATIVE
BILIRUB UR QL STRIP.AUTO: NEGATIVE MG/DL
BILIRUBIN DIRECT+TOT PNL SERPL-MCNC: 0.4 MG/DL
BSA FOR ECHO PROCEDURE: 1.77 M2
BUN SERPL-MCNC: 17.2 MG/DL (ref 9.8–20.1)
CALCIUM SERPL-MCNC: 8.6 MG/DL (ref 8.4–10.2)
CANNABINOIDS UR QL SCN: NEGATIVE
CASTS URNS MICRO: ABNORMAL /LPF
CHLORIDE SERPL-SCNC: 108 MMOL/L (ref 98–107)
CO2 SERPL-SCNC: 20 MMOL/L (ref 22–29)
COCAINE UR QL SCN: NEGATIVE
COLOR UR AUTO: ABNORMAL
CREAT SERPL-MCNC: 1.04 MG/DL (ref 0.55–1.02)
CREAT UR-MCNC: 102.3 MG/DL (ref 47–110)
CREAT UR-MCNC: 102.4 MG/DL (ref 47–110)
CV ECHO LV RWT: 0.47 CM
DIRECT COOMBS (DAT): NORMAL
DOP CALC AO PEAK VEL: 1.51 M/S
DOP CALC AO VTI: 32.7 CM
DOP CALC LVOT AREA: 3.2 CM2
DOP CALC LVOT DIAMETER: 2.01 CM
DOP CALC LVOT PEAK VEL: 1.04 M/S
DOP CALC LVOT STROKE VOLUME: 86.9 CM3
DOP CALC MV VTI: 35.9 CM
DOP CALCLVOT PEAK VEL VTI: 27.4 CM
E WAVE DECELERATION TIME: 232.41 MSEC
E/A RATIO: 1.29
E/E' RATIO: 8.48 M/S
ECHO LV POSTERIOR WALL: 1.04 CM (ref 0.6–1.1)
EJECTION FRACTION: 65 %
ERYTHROCYTE [DISTWIDTH] IN BLOOD BY AUTOMATED COUNT: 17.2 % (ref 11.5–17)
FENTANYL UR QL SCN: NEGATIVE
FERRITIN SERPL-MCNC: 335.26 NG/ML (ref 4.63–204)
FRACTIONAL SHORTENING: 36 % (ref 28–44)
GFR SERPLBLD CREATININE-BSD FMLA CKD-EPI: >60 MLS/MIN/1.73/M2
GLOBULIN SER-MCNC: 5.2 GM/DL (ref 2.4–3.5)
GLUCOSE SERPL-MCNC: 149 MG/DL (ref 74–100)
GLUCOSE UR QL STRIP.AUTO: NORMAL MG/DL
HAPTOGLOB SERPL-MCNC: 249 MG/DL (ref 35–250)
HCT VFR BLD AUTO: 27.2 % (ref 37–47)
HGB BLD-MCNC: 9.2 G/DL (ref 12–16)
HR MV ECHO: 55 BPM
HYALINE CASTS #/AREA URNS LPF: ABNORMAL /LPF
HYPOCHROMIA BLD QL SMEAR: ABNORMAL
INTERVENTRICULAR SEPTUM: 1.13 CM (ref 0.6–1.1)
IRON SATN MFR SERPL: 10 % (ref 20–50)
IRON SERPL-MCNC: 16 UG/DL (ref 50–170)
KETONES UR QL STRIP.AUTO: ABNORMAL MG/DL
LDH SERPL-CCNC: 269 U/L (ref 125–220)
LEFT ATRIUM SIZE: 2.97 CM
LEFT ATRIUM VOLUME INDEX MOD: 35.5 ML/M2
LEFT ATRIUM VOLUME MOD: 61 CM3
LEFT INTERNAL DIMENSION IN SYSTOLE: 2.85 CM (ref 2.1–4)
LEFT VENTRICLE DIASTOLIC VOLUME INDEX: 52.06 ML/M2
LEFT VENTRICLE DIASTOLIC VOLUME: 89.55 ML
LEFT VENTRICLE MASS INDEX: 98 G/M2
LEFT VENTRICLE SYSTOLIC VOLUME INDEX: 17.9 ML/M2
LEFT VENTRICLE SYSTOLIC VOLUME: 30.85 ML
LEFT VENTRICULAR INTERNAL DIMENSION IN DIASTOLE: 4.44 CM (ref 3.5–6)
LEFT VENTRICULAR MASS: 168.06 G
LEUKOCYTE ESTERASE UR QL STRIP.AUTO: 75 UNIT/L
LV LATERAL E/E' RATIO: 6.85 M/S
LV SEPTAL E/E' RATIO: 11.13 M/S
LVOT MG: 2.2 MMHG
LVOT MV: 0.7 CM/S
LYMPHOCYTES NFR BLD MANUAL: 0.18 X10(3)/MCL
LYMPHOCYTES NFR BLD MANUAL: 8 % (ref 13–40)
MAGNESIUM SERPL-MCNC: 2.1 MG/DL (ref 1.6–2.6)
MCH RBC QN AUTO: 27.2 PG
MCHC RBC AUTO-ENTMCNC: 33.8 G/DL (ref 33–36)
MCV RBC AUTO: 80.5 FL (ref 80–94)
MDMA UR QL SCN: NEGATIVE
MUCOUS THREADS URNS QL MICRO: ABNORMAL /LPF
MV MEAN GRADIENT: 1 MMHG
MV PEAK A VEL: 0.69 M/S
MV PEAK E VEL: 0.89 M/S
MV PEAK GRADIENT: 4 MMHG
MV VALVE AREA BY CONTINUITY EQUATION: 2.42 CM2
NEUTROPHILS NFR BLD MANUAL: 92 % (ref 47–80)
NITRITE UR QL STRIP.AUTO: NEGATIVE
NRBC BLD AUTO-RTO: 0 %
OPIATES UR QL SCN: NEGATIVE
PCP UR QL: NEGATIVE
PH UR STRIP.AUTO: 5.5 [PH]
PH UR: 5.5 [PH] (ref 3–11)
PHOSPHATE SERPL-MCNC: 4.3 MG/DL (ref 2.3–4.7)
PISA AR MAX VEL: 4.93 M/S
PISA MRMAX VEL: 5.51 M/S
PISA TR MAX VEL: 2.52 M/S
PLATELET # BLD AUTO: 403 X10(3)/MCL (ref 130–400)
PLATELET # BLD EST: ABNORMAL 10*3/UL
PMV BLD AUTO: 10.6 FL (ref 7.4–10.4)
POCT GLUCOSE: 223 MG/DL (ref 70–110)
POCT GLUCOSE: 268 MG/DL (ref 70–110)
POCT GLUCOSE: 281 MG/DL (ref 70–110)
POIKILOCYTOSIS BLD QL SMEAR: ABNORMAL
POLYCHROMASIA BLD QL SMEAR: ABNORMAL
POTASSIUM SERPL-SCNC: 4.7 MMOL/L (ref 3.5–5.1)
PROT SERPL-MCNC: 7.7 GM/DL (ref 6.4–8.3)
PROT UR QL STRIP.AUTO: ABNORMAL MG/DL
PROT UR STRIP-MCNC: 146.7 MG/DL
PROT UR STRIP-MCNC: 150.2 MG/DL
RA PRESSURE: 3 MMHG
RA WIDTH: 3.7 CM
RBC # BLD AUTO: 3.38 X10(6)/MCL (ref 4.2–5.4)
RBC #/AREA URNS AUTO: ABNORMAL /HPF
RBC MORPH BLD: ABNORMAL
RBC UR QL AUTO: ABNORMAL UNIT/L
RET# (OHS): 0.03 (ref 0.02–0.08)
RETICULOCYTE COUNT AUTOMATED (OLG): 0.82 % (ref 1.1–2.1)
SCHISTOCYTE (OLG): ABNORMAL
SODIUM SERPL-SCNC: 136 MMOL/L (ref 136–145)
SP GR UR STRIP.AUTO: 1.01
SPECIFIC GRAVITY, URINE AUTO (.000) (OHS): 1.01 (ref 1–1.03)
SQUAMOUS #/AREA URNS LPF: ABNORMAL /HPF
TDI LATERAL: 0.13 M/S
TDI SEPTAL: 0.08 M/S
TDI: 0.11 M/S
TIBC SERPL-MCNC: 143 UG/DL (ref 70–310)
TIBC SERPL-MCNC: 159 UG/DL (ref 250–450)
TR MAX PG: 25 MMHG
TRANSFERRIN SERPL-MCNC: 130 MG/DL (ref 180–382)
TRICUSPID ANNULAR PLANE SYSTOLIC EXCURSION: 2.09 CM
TV REST PULMONARY ARTERY PRESSURE: 28 MMHG
URINE PROTEIN/CREATININE RATIO (OHS): 1.4
UROBILINOGEN UR STRIP-ACNC: NORMAL MG/DL
WBC # SPEC AUTO: 2.3 X10(3)/MCL (ref 4.5–11.5)
WBC #/AREA URNS AUTO: ABNORMAL /HPF

## 2023-03-10 PROCEDURE — 86235 NUCLEAR ANTIGEN ANTIBODY: CPT | Mod: 90 | Performed by: INTERNAL MEDICINE

## 2023-03-10 PROCEDURE — 25000003 PHARM REV CODE 250

## 2023-03-10 PROCEDURE — 11000001 HC ACUTE MED/SURG PRIVATE ROOM

## 2023-03-10 PROCEDURE — 83735 ASSAY OF MAGNESIUM: CPT

## 2023-03-10 PROCEDURE — 81001 URINALYSIS AUTO W/SCOPE: CPT | Performed by: STUDENT IN AN ORGANIZED HEALTH CARE EDUCATION/TRAINING PROGRAM

## 2023-03-10 PROCEDURE — 85027 COMPLETE CBC AUTOMATED: CPT

## 2023-03-10 PROCEDURE — 82085 ASSAY OF ALDOLASE: CPT | Mod: 90 | Performed by: INTERNAL MEDICINE

## 2023-03-10 PROCEDURE — 82570 ASSAY OF URINE CREATININE: CPT | Performed by: STUDENT IN AN ORGANIZED HEALTH CARE EDUCATION/TRAINING PROGRAM

## 2023-03-10 PROCEDURE — 83010 ASSAY OF HAPTOGLOBIN QUANT: CPT | Performed by: STUDENT IN AN ORGANIZED HEALTH CARE EDUCATION/TRAINING PROGRAM

## 2023-03-10 PROCEDURE — 83550 IRON BINDING TEST: CPT | Performed by: STUDENT IN AN ORGANIZED HEALTH CARE EDUCATION/TRAINING PROGRAM

## 2023-03-10 PROCEDURE — 86480 TB TEST CELL IMMUN MEASURE: CPT | Mod: 90 | Performed by: INTERNAL MEDICINE

## 2023-03-10 PROCEDURE — 81025 URINE PREGNANCY TEST: CPT | Performed by: STUDENT IN AN ORGANIZED HEALTH CARE EDUCATION/TRAINING PROGRAM

## 2023-03-10 PROCEDURE — 97163 PT EVAL HIGH COMPLEX 45 MIN: CPT

## 2023-03-10 PROCEDURE — 97167 OT EVAL HIGH COMPLEX 60 MIN: CPT

## 2023-03-10 PROCEDURE — 83516 IMMUNOASSAY NONANTIBODY: CPT | Mod: 90 | Performed by: INTERNAL MEDICINE

## 2023-03-10 PROCEDURE — 83615 LACTATE (LD) (LDH) ENZYME: CPT | Performed by: STUDENT IN AN ORGANIZED HEALTH CARE EDUCATION/TRAINING PROGRAM

## 2023-03-10 PROCEDURE — 92610 EVALUATE SWALLOWING FUNCTION: CPT

## 2023-03-10 PROCEDURE — 63600175 PHARM REV CODE 636 W HCPCS

## 2023-03-10 PROCEDURE — 84156 ASSAY OF PROTEIN URINE: CPT | Performed by: STUDENT IN AN ORGANIZED HEALTH CARE EDUCATION/TRAINING PROGRAM

## 2023-03-10 PROCEDURE — 82728 ASSAY OF FERRITIN: CPT | Performed by: STUDENT IN AN ORGANIZED HEALTH CARE EDUCATION/TRAINING PROGRAM

## 2023-03-10 PROCEDURE — 25000003 PHARM REV CODE 250: Performed by: STUDENT IN AN ORGANIZED HEALTH CARE EDUCATION/TRAINING PROGRAM

## 2023-03-10 PROCEDURE — 85045 AUTOMATED RETICULOCYTE COUNT: CPT | Performed by: STUDENT IN AN ORGANIZED HEALTH CARE EDUCATION/TRAINING PROGRAM

## 2023-03-10 PROCEDURE — 84100 ASSAY OF PHOSPHORUS: CPT

## 2023-03-10 PROCEDURE — 80307 DRUG TEST PRSMV CHEM ANLYZR: CPT

## 2023-03-10 PROCEDURE — 82787 IGG 1 2 3 OR 4 EACH: CPT | Mod: 90 | Performed by: INTERNAL MEDICINE

## 2023-03-10 PROCEDURE — 86880 COOMBS TEST DIRECT: CPT | Performed by: STUDENT IN AN ORGANIZED HEALTH CARE EDUCATION/TRAINING PROGRAM

## 2023-03-10 PROCEDURE — 80053 COMPREHEN METABOLIC PANEL: CPT

## 2023-03-10 RX ORDER — HYDROCODONE BITARTRATE AND ACETAMINOPHEN 7.5; 325 MG/1; MG/1
1 TABLET ORAL EVERY 6 HOURS PRN
Status: DISCONTINUED | OUTPATIENT
Start: 2023-03-10 | End: 2023-03-13 | Stop reason: HOSPADM

## 2023-03-10 RX ORDER — SULFAMETHOXAZOLE AND TRIMETHOPRIM 800; 160 MG/1; MG/1
1 TABLET ORAL EVERY OTHER DAY
Status: DISCONTINUED | OUTPATIENT
Start: 2023-03-10 | End: 2023-03-13 | Stop reason: HOSPADM

## 2023-03-10 RX ADMIN — PANTOPRAZOLE SODIUM 40 MG: 40 TABLET, DELAYED RELEASE ORAL at 09:03

## 2023-03-10 RX ADMIN — HYDROXYCHLOROQUINE SULFATE 200 MG: 200 TABLET ORAL at 09:03

## 2023-03-10 RX ADMIN — SULFAMETHOXAZOLE AND TRIMETHOPRIM 1 TABLET: 800; 160 TABLET ORAL at 09:03

## 2023-03-10 RX ADMIN — ATORVASTATIN CALCIUM 10 MG: 10 TABLET, FILM COATED ORAL at 09:03

## 2023-03-10 RX ADMIN — HYDROCODONE BITARTRATE AND ACETAMINOPHEN 1 TABLET: 7.5; 325 TABLET ORAL at 08:03

## 2023-03-10 RX ADMIN — ENOXAPARIN SODIUM 40 MG: 40 INJECTION SUBCUTANEOUS at 08:03

## 2023-03-10 RX ADMIN — INSULIN ASPART 1 UNITS: 100 INJECTION, SOLUTION INTRAVENOUS; SUBCUTANEOUS at 08:03

## 2023-03-10 RX ADMIN — HYDROCODONE BITARTRATE AND ACETAMINOPHEN 1 TABLET: 5; 325 TABLET ORAL at 09:03

## 2023-03-10 RX ADMIN — METHYLPREDNISOLONE SODIUM SUCCINATE 100 MG: 125 INJECTION, POWDER, FOR SOLUTION INTRAMUSCULAR; INTRAVENOUS at 09:03

## 2023-03-10 RX ADMIN — INSULIN DETEMIR 40 UNITS: 100 INJECTION, SOLUTION SUBCUTANEOUS at 08:03

## 2023-03-10 RX ADMIN — INSULIN ASPART 3 UNITS: 100 INJECTION, SOLUTION INTRAVENOUS; SUBCUTANEOUS at 01:03
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
## 2023-03-10 NOTE — PROGRESS NOTES
[facility] Medicine Wards   Progress Note     Resident Team: [facility] Medicine List 3  Attending Physician: Peewee Askew MD  Resident: Jose Armando   Intern: [facility] Length of Stay: 1 days    Subjective:      Brief HPI:  Vi Ulrich is a 57 y.o. female with a history of Asthma, A fib, DM, GERD, gout, HTN, and SLE w/ lupus nephritis who presented to [facility] ED on 3/9/2023 after being sent directly from internal medicine clinic with complaints of generalized weakness, dysphagia, N w/o V, F/C, joint pain, shortness of breath, dyspnea on exertion, urinary incontinence, and weight loss.  She states she has been feeling fatigued and weak over the last several months but her symptoms began to worsen over the last 2 weeks.  She is endorsing significant weakness in upper extremities which is believed to be secondary to pain.  Patient is unable to lift her left arm at all and states this began a few days ago.  She also states she has lost a significant amount of weight over the last 2 weeks and has not been able to tolerate PO intake secondary to both nausea and dysphagia.  She states it is painful to swallow and she feels like food gets stuck in her throat when she attempts to eat.  She denies any chest pain but does state she has been experiencing shortness of breath at rest as well as dyspnea on exertion.  She states she is not able to walk at all secondary to both SOB and pain.  She is endorsing significant pain in all joints and states her joints swell episodically.  She is also endorsing urinary incontinence and states she is not able to control when she pees.  This may also be secondary to pain as patient states she struggles with walking to the bathroom and cannot make it in time.  She endorses possible episodes of fever while at home and states she often wakes up from a nap in a sweat.  She denies any tobacco, alcohol, or recreational drug use and states she is compliant with medication regimen.  She has been taking  prednisone 5mg and Plaquenil since 2004 for hx of lupus.  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       In the ED, patients vitals were stable, satting well on room air.  Labs were remarkable for neutropenia with wbc of 3.5 and normocytic anemia with H/H of 9.7/29.2.  Sodium was mildly low at 135 and other electrolytes wnl.  Creatinine was mildly elevated at 1.19.  CRP was elevated at 88.2 and sed rate was elevated at 73.  Chest x-ray was performed showing mild congestive changes.  Patient was admitted to medicine secondary to concern for lupus flare.      Interval History: No acute events overnight.  Patient continues to complain of generalized pain this morning, states she has not received any pain medications.  Was eating jello upon interview, states she is attempting to eat with no trouble swallowing.   Denies any N/V, diarrhea, constipation, or dysuria.  Rheumatology consulted, appreciate recommendations, Will continue high-dose steroids and continue to monitor patients symptoms.      ROS negative unless stated above.    Objective:     Vital Signs (Most Recent):  Temp: 97.7 °F (36.5 °C) (03/10/23 0831)  Pulse: (!) 53 (03/10/23 0831)  Resp: 20 (03/09/23 2154)  BP: (!) 153/80 (03/10/23 0831)  SpO2: 100 % (03/10/23 0831)   Vital Signs (24h Range):  Temp:  [97.5 °F (36.4 °C)-99.3 °F (37.4 °C)] 97.7 °F (36.5 °C)  Pulse:  [53-84] 53  Resp:  [18-23] 20  SpO2:  [98 %-100 %] 100 %  BP: (106-153)/(67-82) 153/80     Physical Exam  General: Patient resting comfortably in bed, in no acute distress   Eye: PERRLA, EOMI, clear conjunctiva, eyelids normal  HENT: Head-normocephalic and atraumatic  Neck: full range of motion, no thyromegaly or lymphadenopathy, trachea midline, supple, no palpable thyroid nodules  Respiratory: clear to auscultation bilaterally without wheezes, rales,  rhonchi  Cardiovascular: regular rate and rhythm without murmurs.  No gallops or rubs no JVD.  Capillary refill within normal limits.  Gastrointestinal: soft, non-tender, non-distended with normal bowel sounds, without masses to palpation  Genitourinary: no CVA tenderness to palpation  Musculoskeletal: full range of motion of all extremities/spine without limitation or discomfort  Integumentary: no rashes or skin lesions present  Neurologic: no signs of peripheral neurological deficit, motor/sensory function intact  Psychiatric:  alert and oriented, cognitive function intact, cooperative with exam, good eye contact, judgement and insight intact, mood and affect full range.     Laboratory:  Most Recent Data:  Recent Lab Results  (Last 5 results in the past 24 hours)        03/10/23  0833   03/10/23  0640   03/10/23  0451   03/10/23  0450   03/10/23  0312        Phencyclidine         Negative       Albumin/Globulin Ratio     0.5           Abs Neut calc       2.116         Albumin     2.5           Alkaline Phosphatase     102           ALT     14           Amphetamine Screen, Ur         Negative       Aniso       2+         Appearance, UA         Clear       AST     22           Bacteria, UA         Trace       Barbiturate Screen, Ur         Negative       Benzodiazepine Screen, Urine         Negative       BILIRUBIN TOTAL     0.4           Bilirubin, UA         Negative       BUN     17.2           Calcium     8.6           Cannabinoids, Urine         Negative       Chloride     108           CO2     20           Cocaine (Metab.)         Negative       Color, UA         Light-Yellow       Creatinine     1.04           Creatinine, Urine         102.3                102.4       Direct Kraig (LEATHA)   NEG             eGFR     >60           Fentanyl, Urine         Negative       Ferritin     335.26           Globulin, Total     5.2           Glucose     149           Glucose, UA         Normal       Hematocrit        27.2         Hemoglobin       9.2         Hyaline Casts, UA         3-5       Hypo       1+         Iron     16           Iron Binding Capacity Unsaturated     143           Iron Saturation     10           Ketones, UA         1+       LD     269           Leukocytes, UA         75       Lymph #       0.184         Lymph Man       8         Magnesium     2.10           MCH       27.2         MCHC       33.8         MCV       80.5         MDMA, Urine         Negative       MPV       10.6         Mucous, UA         Trace       Neutrophils Relative       92         NITRITE UA         Negative       nRBC       0.0         Occult Blood UA         1+       Opiate Scrn, Ur         Negative       pH, UA         5.5       pH, Urine         5.5       Phosphorus     4.3           Platelet Estimate       Increased         Platelets       403         POCT Glucose 223               Poikilocytosis       1+         Poly       1+         Potassium     4.7           Preg Test, Ur         Negative       PROTEIN TOTAL     7.7           Protein, UA         2+       Protein, Urine Random         146.7                150.2       RBC       3.38         RBC Morph       Abnormal         RBC, UA         0-5       RDW       17.2         Retic   0.82             Retic Count Abs   0.0280             Schistocytes       1+         Sodium     136           Specific Gravity,UA         1.015       Specific Gravity, Urine Auto         1.015       Squamous Epithelial Cells, UA         Trace       TIBC     159           Transferrin     130           Unclassified Cast, UA         0-2       URINE PROTEIN/CREATININE RATIO ([facility])         1.4       Urobilinogen, UA         Normal       WBC, UA         6-10       WBC       2.3                                  Microbiology Data Reviewed: yes  Pertinent Findings:  Blood cultures pending     Other Results:  EKG (my interpretation): normal EKG, normal sinus rhythm, unchanged from previous  tracings.    Radiology:  Imaging Results              CT Chest Without Contrast (Final result)  Result time 03/09/23 17:18:56      Final result by Rishi Urban MD (03/09/23 17:18:56)                   Impression:      Persistent ground-glass interstitial traits in the lung bases bilaterally as well as the right middle lobe.  Findings are unchanged since the prior examination    No evidence of interstitial fibrosis      Electronically signed by: Rishi Urban  Date:    03/09/2023  Time:    17:18               Narrative:    EXAMINATION:  CT CHEST WITHOUT CONTRAST    CLINICAL HISTORY:  Interstitial lung disease;    TECHNIQUE:  Low dose axial images, sagittal and coronal reformations were obtained from the thoracic inlet to the lung bases. Contrast was not administered.  Automatic exposure control is utilized to reduce patient radiation exposure.    COMPARISON:  05/27/2022    FINDINGS:  The lungs are adequately aerated.  There are some ground-glass interstitial infiltrates in the lung bases bilaterally as well as the right middle lobe.  Similar changes were seen on the prior examination.  No changes of interstitial fibrosis are seen.  No significant bronchiectasis is seen.  No nodularity is seen.  No pleural effusion is seen.    No abnormal mediastinal lymphadenopathy is seen.  Thoracic aorta appears normal.  The heart is normal in size.  There are multiple coronary artery calcification is seen.    Upper abdomen shows no acute abnormality.    Bones appear normal..                                       X-Ray Chest 1 View (Final result)  Result time 03/09/23 15:05:11      Final result by Oscar Duncan MD (03/09/23 15:05:11)                   Impression:      Suspected mild congestive changes.      Electronically signed by: Oscar Duncan  Date:    03/09/2023  Time:    15:05               Narrative:    EXAMINATION:  XR CHEST 1 VIEW    CLINICAL HISTORY:  shortness of breath;    TECHNIQUE:  One  view    COMPARISON:  January 12, 2023..    FINDINGS:  Cardiopericardial silhouette is within normal limits.  There is slight prominence of lungs interstitial markings suspected for mild congestive process.  There is no focally dense consolidation or fluid within the pleural spaces.  Thoracolumbar compound scoliosis.                                      Current Medications:     Infusions:       Scheduled:   atorvastatin  10 mg Oral Daily    enoxaparin  40 mg Subcutaneous Daily    hydrOXYchloroQUINE  200 mg Oral Daily    methylPREDNISolone sodium succinate injection  100 mg Intravenous Daily    pantoprazole  40 mg Oral Daily    sulfamethoxazole-trimethoprim 800-160mg  1 tablet Oral Every other day        PRN:  acetaminophen, dextrose 10 % in water (D10W), dextrose 10 % in water (D10W), glucagon (human recombinant), glucose, glucose, HYDROcodone-acetaminophen, insulin aspart U-100, naloxone, sodium chloride 0.9%    Antibiotics and Day Number of Therapy:  Bactrim day 1       Intake/Output Summary (Last 24 hours) at 3/10/2023 0909  Last data filed at 3/10/2023 0333  Gross per 24 hour   Intake 60 ml   Output 350 ml   Net -290 ml       Lines/Drains/Airways       Peripheral Intravenous Line  Duration                  Peripheral IV - Single Lumen 03/09/23 1540 22 G Posterior;Right Hand <1 day                    Assessment & Plan:     SLE flare vs. Myositis   Lupus Nephritis   Fibromyalgia   -MARTA 1:2560, dsDNA positive in past   -home regimen includes prednisone 5mg and HCLQ   -rheumatologic labs ordered, C3 and C4 decreased, CK wnl hepatitis panel negative, TSH wnl, UDS negative, Immunoglobulins elevated (particularly IgG), dsDNA, Quantiferon gold,  myoglobin,  aldolase, haptoglobin, and MyoMarker pending.    -CRP and sed rate elevated on admission   -CXR showing mild congestive changes, CT chest showing persistent ground-glass interstitial traits in the lung bases bilaterally as well as the right middle lobe. No evidence of  interstitial fibrosis.   -EKG showing normal sinus rhythm  -case discussed with rheumatology, per recs will begin patient on 3 day course of IV solumedrol 100mg BID w/ plan for prednisone taper, celcept was considered but patient has allergy to medication.  Will consider addition of other DMARD therapy (possibly rituxumab) s/p complete workup. Will start Bactrim double strength 3x per week for PJP prophylaxis.  Will also performed workup of anemia.   -urinalysis w/ 2+ protein, 1+ ketones, and 1+ occult blood with few WBC's and trace bacteria, urine studies wnl   -continue hydroxychloroquine per home regimen   -tylenol and norco for pain PRN   -given 1L NS bolus for fluid resuscitation   -will continue to monitor      Dysphagia   GERD  -SLP consulted for further workup   -continue protonix per home regimen     Hyponatremia - resolved  -likely 2/2 dehydration   -given 1L NS bolus for fluid resuscitation     T2DM  -home regimen includes lantus 80 units nightly  -starting detemir 40 units nightly for now as patient is eating less and complaining of dysphagia.   -low dose sliding scale for correction   -will continue to monitor blood sugars and adjust medication regimen as needed.       HLD  -continue atorvastatin 10mg per home regimen      Code status: Full  DVT prophylaxis: Lovenox   Diet: Diabetic   Oxygenation: Room air   GI prophylaxis: pantoprazole   Lines/drains: PIV   Consults: discussed case w/ rheumatology, PT/OT/SLP      Disposition: Day 1 of admission for SLE flare vs. Myositis.  Continue high dose steroids, rheumatology on board, will continue inpatient management at this time.     Fabiola Minaya MD  [facility], HO[facility]

## 2023-03-10 NOTE — NURSING
"Yelling heard coming from the room, patient is on the phone with family members. I attempted to speak to patient to notify her that CT is ready for her but she dismissed me and said " come back later"  "

## 2023-03-10 NOTE — NURSING
"While getting patient ready to be taken down to CT via bed, she accused me of talking about her when I had never spoken except for asking her if she was ready to go and again stated " I told you not to come back in here, I see what what you are doing" I informed the charge nurse that I have tried with her but I will go back into her room again.  Transporter for CT also reported that patient was rude to the staff downstairs and " talkng ugly" to them  "

## 2023-03-10 NOTE — NURSING
Patient has been asking staff to order her food from waitr, send her money through [employer] or to buy her products from [employer]. She called for assistance but is dissatisfied with every staff member to goes to assist her.

## 2023-03-10 NOTE — PT/OT/SLP EVAL
[facility]  Cranial Nerve Exam and Clinical Swallow Exam  Initial        Name: Vi Ulrich   MRN: [id number]    Therapy Diagnosis: suspected pharyngeal dysphagia    Physician: Fabiola Minaya MD    Date of Evaluation:  03/10/2023      Speech Start Time:  1200  Speech Stop Time:  1215     Speech Total Time (min):  15 min    Billable Minutes: Eval CSE       Procedure Min.   Swallow and Oral Function Evaluation   15     Chief Complaint: dysphagia    Active Ambulatory Problems     Diagnosis Date Noted    Essential hypertension 10/20/2016    Diabetes mellitus, type 2 10/20/2016    Lupus (systemic lupus erythematosus) 10/20/2016    Insomnia 10/20/2016    Hyperlipidemia 07/13/2017    History of asthma 07/13/2017    New onset atrial fibrillation 12/01/2017    Periumbilical pain 03/15/2018    Constipation 03/15/2018    Anemia 03/15/2018    Anxiety 04/19/2018    Class 1 obesity with serious comorbidity and body mass index (BMI) of 32.0 to 32.9 in adult 05/07/2019    Acute cystitis with hematuria 03/03/2020    STIVEN (acute kidney injury) 03/03/2020    Pyelonephritis 03/03/2020    Inadequate dietary energy intake 03/04/2020    Systemic lupus erythematosus 05/31/2022    Fibromyalgia 05/31/2022    Lupus nephritis, ISN/RPS class III 06/14/2022    Asthma 02/03/2023     Resolved Ambulatory Problems     Diagnosis Date Noted    Primary osteoarthritis of left knee 08/07/2015    Gait difficulty 10/08/2015    Painful total knee replacement, right 10/08/2015    Joint stiffness of knee 10/08/2015    Left leg weakness 10/08/2015    Decreased range of motion of left knee 10/08/2015     Past Medical History:   Diagnosis Date    Arthritis     Atrial fibrillation     Chronic constipation     Diabetes mellitus     Encounter for blood transfusion 10/2014    GERD (gastroesophageal reflux disease)     Gout     Hypertension     Lupus 2004    Renal disorder     SLE (systemic lupus erythematosus)       Per medical record:  ""Vi Ulrich is a 57 y.o. female with a history of Asthma, A fib, DM, GERD, gout, HTN, and SLE w/ lupus nephritis who presented to [facility] ED on 3/9/2023 after being sent directly from internal medicine clinic with complaints of generalized weakness, dysphagia, N w/o V, F/C, joint pain, shortness of breath, dyspnea on exertion, urinary incontinence, and weight loss.  She states she has been feeling fatigued and weak over the last several months but her symptoms began to worsen over the last 2 weeks." Speech pathology consulted to assess swallow via clinical swallow evaluation.      Imaging:  CT: 3/10/23  Impression:     Focal area of infarct seen in the right occipital lobe stable since prior examination otherwise unremarkable    CXR: 3/9/23  Impression:     Persistent ground-glass interstitial traits in the lung bases bilaterally as well as the right middle lobe.  Findings are unchanged since the prior examination     No evidence of interstitial fibrosis        General Information:  Affect: Alert  Cooperative  Oxygen:  room air  Hearing: WFL  Orientation: Ox4    Objective:       Cranial Nerve 5: Trigeminal Nerve  Motor Jaw Posture at rest: Closed  Mandible Elevation/Depression: WFL  Mandible lateralization: Unable to lateralize right  Abnormal movement: absent Interpretation:   intact   Sensory Forehead: WFL  Cheek: WFL  Jaw: WFL  Facial Pain: None noted Interpretation:   intact     Cranial Nerve 7: Facial Nerve  Motor Facial Symmetry: WNL  Wrinkle Forehead: WFL  Close eyes tightly: WFL  Labial Protrusion: WFL  Labial Retraction: WFL  Buccal Strength with Labial Seal: WFL  Abnormal movement: absent Interpretation:   intact   Sensory Formal testing not completed. Patient denied any changes in taste      Cranial Nerves IX and X: Glossopharyngeal and Vagus Nerves  Motor Palatal Symmetry (Rest): WNL  Palatal Symmetry (Movement): WNL  Cough: Perceptually strong  Voice Prior to PO intake: Clear  Resonance: Normal  Abnormal " "movement: absent Interpretation:   intact     Cranial Nerve XII: Hypoglossal Nerve  Motor Tongue at rest: WNL  Lingual Protrusion: WNL  Lingual Protrusion against Resistance: WNL  Lingual Lateralization: WNL  Abnormal movement: absent Interpretation:   intact     Other information:  Volitional Swallow: Able to palpate laryngeal rise  Mucosal Quality: No abnormal findings  Secretion Management: Secretion Mgmt: adequate  Dentition: Good condition for speech and mastication     Predisposing dysphagia risk factors: weakness  Clinical signs of possible chronic dysphagia: facial grimaces, expectorated soft solids  Precipitating dysphagia risk factors: weakness    Pain:   4/10  Pain Location / Description: throat    Assessment :       PO Trials:   Patient presented with:   ICE CHIPS: self administered  THIN:-self regulated thin liquid via straw  PUREE:   MINCED AND MOIST: self administered  SOFT AND BITE SIZED: self administered  SOLID:         Limitations: n/a    IMPRESSION:   Patient's oral swallow appears WFL. Pharyngeal phase remarkable for suspected pharyngeal weakness 2/2 to patient expectorating soft solids +facial grimaces. Patient reports "food gets stuck" which is new to her. No other overt s/sx of airway invasion appreciated. Given results of this assessment and recent CXR, patient may benefit from an objective assessment (MBSS) for further pharyngeal analysis. Continue current PO diet with strict aspiration precautions and as tolerated. SLP to follow up for dysphagia management.         Goals:     LONG TERM GOAL    1.iV Ulrich will tolerate IDDSI 6 (soft/bite size) consistency diet with IDDSI 0 (thin) liquids without overt s/sx of aspiration to maintain appropriate nutrition and hydration.  Initial   2. Vi Ulrich will participate in and MBSS to assess swallow effectiveness, ID/rule out penetration and aspiration, make appropriate recommendations regarding safest diet consistency, effective compensatory " strategies and safe eating environment. Initial     SHORT TERM GOAL    2. Vi Ulrich will consume ice chips to maintain/improve adequate swallow  function.   Initial   3. Vi Ulrich and/or family will be educated on the recommended aspiration precautions and swallowing strategies.  Initial     Recommendations:     Consistency Recommendations:  Thin liquids (IDDSI 0) and Soft/bite size consistencies (IDDSI 6).  Medications should be taken Whole in thin liquids and crushed (when possible) in puree.   Precautions:supervision recommended, sit as upright as possible, upright 30 minutes after meals, alternate food and liquid, decrease bite/drink size, effortful swallow, swallow 2 times, and frequent oral care  Risk Management: use good oral hygiene , sit upright for all PO intake, and increase physical mobility as tolerated  Specialist Referrals: GI  Ancillary Tests:   Therapy: Dysphagia therapy is recommended including effortful swallow.  Frequency:  1-5 days a week  Follow-up exam: Follow up swallow study is not indicated at this time.    The Functional Oral Intake Scale (FOIS) is an ordinal scale that is used to assess the current status and meaningful change in the oral intake. FOIS levels include:        TUBE DEPENDENT   (Levels 1-3) 1. No oral intake    2. Tube dependent with minimal/inconsistent oral intake    3. Tube supplements with consistent oral intake          TOTAL ORAL INTAKE (Levels 4-7) 4. Total oral intake of a single consistency    5. Total oral intake of multiple consistencies requiring special preparation    6. Total oral intake with no special preparation, but must avoid specific foods or liquid items    7. Total oral intake with no restrictions   Patient is currently judged to be at FOIS Level 6         *If this is the last documented treatment note, then it will signify discharge from acute care prior to discharge from speech services and will serve as the discharge summary.*          Education:  Aspiration precautions and Recommendations     Learners: Patient RN (Yimi), were educated on the results and recommendations of this evaluation. All expressed understanding. Patient will benefit from ongoing education.    Barriers to Learning: co-morbidities    Teaching Method: Verbal        Reference:   American Speech-Language-Hearing Association. (n.d.b). Adult dysphagia. (Practice Portal). https://www.elida.org/practice-portal/clinical-topics/adult-dysphagia/  MICHAEL Shannon. (2018). Use of modified diets to prevent aspiration in oropharyngeal dysphagia: Is current practice justified?. [employer] Geriatrics, 18(1), 1-10.  MICHAEL Ceron., OPAL Manning, SNOW Agee, & JUSTIN Rey. (2002). Predictors of aspiration pneumonia in nursing home residents.  Dysphagia, 17(4), 298-307.  OPAL Quintero (2016). Best practices for dehydration prevention. Perspectives of the [employer] Special Interest Groups - SIG 13, 1(2), 72- 80.            Wilbur Gordon M.S. [facility]-SLP  [facility]

## 2023-03-10 NOTE — PROGRESS NOTES
Inpatient Nutrition Assessment    Admit Date: 3/9/2023   Total duration of encounter: 1 day     Nutrition Recommendation/Prescription     Continue Diabetic diet as ordered  Boost Glucose Control (provides 250 kcal, 14 g protein per serving) TID in strawberry or vanilla  Suggest daily MVI  Consider appetite stimulant if medically appropriate  ? Dysphagia -- SLP consulted for swallow eval  Monitor Weights Biweekly     Communication of Recommendations: reviewed with patient/caregiver and reviewed with nurse    Nutrition Assessment     Malnutrition Assessment/Nutrition-Focused Physical Exam    Malnutrition in the context of chronic illness  Degree of Malnutrition: non-severe (moderate) malnutrition  Energy Intake: </= 75% of estimated energy requirement for >/= 1 month  Interpretation of Weight Loss: does not meet criteria  Body Fat: does not meet criteria  Area of Body Fat Loss: does not meet criteria  Muscle Mass Loss: mild depletion  Area of Muscle Mass Loss: temple region - temporalis muscle  Fluid Accumulation: does not meet criteria  Edema: no edema present  Reduced  Strength: unable to obtain  A minimum of two characteristics is recommended for diagnosis of either severe or non-severe malnutrition.    Chart Review    Reason Seen: malnutrition screening tool (MST) and physician consult for wt loss and poor appetite    Malnutrition Screening Tool Results   Have you recently lost weight without trying?: Unsure  Have you been eating poorly because of a decreased appetite?: Yes   MST Score: 3     Diagnosis:  Lupus Nephritis, SLE flare vs Myositis, Dysphagia, GERD, Hyponatremia, DM, HLD    Relevant Medical History: Asthma, DM, Afib, Lupus, Lupus Nephritis, OA    Nutrition-Related Medications: Atorvastatin, Solumedrol, Protonix, ISS  Calorie Containing IV Medications: no significant kcals from medications at this time    Nutrition-Related Labs:  3/10/23 -- Glu 149 H, K 4.7, BUN 17, Cr 1    Diet/PN Order: Diet  "diabetic  Oral Supplement Order: none  Tube Feeding Order: none  Appetite/Oral Intake: fair/50-75% of meals  Factors Affecting Nutritional Intake: abdominal pain, impaired cognitive status/motor control, and decreased appetite  Food/Moravian/Cultural Preferences:  No Shrimp, Banana, tomato or milk: Likes Grapes & Strawberries; Prefers Strawberry ONS  Food Allergies: none reported       Wound(s):   skin intact    Comments    3/10/23 -- Per H&P pt with 2 month h/o fatigue, joint pain, bedridden; pt reports decreased appetite, son on phone also reports pt is a picky eater, encouraged pt to voice preferences to enhance po intake; food preferences communicated to kitchen; encouraged pt to snack between meals to obtain adequate nutrition intake, pt willing to drink Boost in strawberry or vanilla flavors; May consider appetite stimulant if medically appropriate; ? Dysphagia reported, no issues swallowing meds this am per nsing, SLP consulted for further evaluation; Glu (H) - most likely steroid induced, continue diabetic diet as ordered; pt reports UBW of 180 lb ~ 3 months ago, per EMR, last weighing ~ 2 months ago indicating ~6% wt loss in 2 months, not significant -- will continue to monitor    Anthropometrics    Height: 5' 1" (154.9 cm) Height Method: Stated  Last Weight: 76.3 kg (168 lb 3.4 oz) (03/10/23 1100) Weight Method: Bed Scale  BMI (Calculated): 31.8  BMI Classification: obese grade I (BMI 30-34.9)     Ideal Body Weight (IBW), Female: 105 lb     % Ideal Body Weight, Female (lb): 153.33 %                             Usual Weight Provided By: patient and EMR weight history    Wt Readings from Last 5 Encounters:   03/10/23 76.3 kg (168 lb 3.4 oz)   03/09/23 81.2 kg (179 lb)   01/12/23 81.2 kg (179 lb)   12/12/22 81.7 kg (180 lb 1.9 oz)   11/11/22 80.3 kg (177 lb 0.5 oz)     Weight Change(s) Since Admission:  Admit Weight: 81.6 kg (180 lb) (03/09/23 1202) stated wt  03/09/23 -- 74.5 kg  3/10/23 -- 76.3 kg, bed wt " obtained during visit    Estimated Needs    Weight Used For Calorie Calculations: 75.4 kg (166 lb 3.6 oz) (avg of last 2 weights since admit)  Energy Calorie Requirements (kcal): 3390-3187 kcal (25 - 28 kcal/kg)  Energy Need Method: Kcal/kg  Weight Used For Protein Calculations: 75.4 kg (166 lb 3.6 oz)  Protein Requirements: 75-90 gm (1 - 1.2 gm/kg) monitor renal fxn  Fluid Requirements (mL): 6498-8895 ml (1ml/kcal)  Temp: 97.7 °F (36.5 °C)       Enteral Nutrition    Patient not receiving enteral nutrition at this time.    Parenteral Nutrition    Patient not receiving parenteral nutrition support at this time.    Evaluation of Received Nutrient Intake    Calories: not meeting estimated needs  Protein: not meeting estimated needs    Patient Education    Not applicable.    Nutrition Diagnosis     PES: Malnutrition related to SLE as evidenced by <75% nutrition intake > 1 month, mild temple wasting, 6% wt loss x 2 months. (new)    Interventions/Goals     Intervention(s): modified composition of meals/snacks, commercial beverage, multivitamin/mineral supplement therapy, prescription medication, and collaboration with other providers  Goal: Meet greater than 75% of nutritional needs by follow-up. (new)    Monitoring & Evaluation     Dietitian will monitor energy intake, weight change, electrolyte/renal panel, glucose/endocrine profile, and gastrointestinal profile.  Nutrition Risk/Follow-Up: moderate (follow-up in 3-5 days)   Please consult if re-assessment needed sooner.

## 2023-03-10 NOTE — PLAN OF CARE
03/10/23 1423   Discharge Assessment   Assessment Type Discharge Planning Assessment   Confirmed/corrected address, phone number and insurance Yes   Confirmed Demographics Correct on Facesheet   Source of Information patient   When was your last doctors appointment?   (PCP: Octaviano Barrios)   Does patient/caregiver understand observation status   (Inpatient)   Communicated EWELINA with patient/caregiver Date not available/Unable to determine   Reason For Admission Lupus   People in Home child(adiel), dependent;parent(s)   Facility Arrived From: Home   Do you expect to return to your current living situation? Yes   Do you have help at home or someone to help you manage your care at home? Yes   Who are your caregiver(s) and their phone number(s)? Mother   Prior to hospitilization cognitive status: Alert/Oriented;No Deficits   Current cognitive status: Alert/Oriented;No Deficits   Home Accessibility wheelchair accessible   Home Layout Able to live on 1st floor   Equipment Currently Used at Home none   Readmission within 30 days? No   Patient currently being followed by outpatient case management? Yes   If yes, name of outpatient case management following: [facility] outpatient case management   Do you currently have service(s) that help you manage your care at home? No   Do you take prescription medications? Yes  ([facility] [address])   Do you have prescription coverage? Yes   Coverage [facility]   Do you have any problems affording any of your prescribed medications? No   Is the patient taking medications as prescribed? yes   Who is going to help you get home at discharge? Self   How do you get to doctors appointments? car, drives self   Are you on dialysis? No   Do you take coumadin? No   Discharge Plan A Home with family   DME Needed Upon Discharge  rollator   Discharge Plan discussed with: Patient   Discharge Barriers Identified None   OTHER   Name(s) of People in Home Special needs daughter; Mother      New consult for home health noted. Patient is in agreement with no preference. Referrals sent to all agencies within 30 miles of patient's home. Will follow.

## 2023-03-10 NOTE — PLAN OF CARE
Problem: Occupational Therapy  Goal: Occupational Therapy Goal  Description: Goals to be met by: d/c     Patient will increase functional independence with ADLs by performing:    LE Dressing with Stand-by Assistance.  Grooming while standing at sink with Stand-by Assistance.  Sitting at edge of bed x 30 minutes for meals with Stand-by Assistance and no c/o fatigue.  Rolling to Bilateral with Stand-by Assistance.   Supine to sit with Stand-by Assistance.  Step transfer with Stand-by Assistance with A device as needed.    Outcome: Ongoing, Progressing
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
## 2023-03-10 NOTE — PLAN OF CARE
Problem: Adult Inpatient Plan of Care  Goal: Plan of Care Review  3/10/2023 0437 by Stephanie Gomez RN  Outcome: Ongoing, Progressing  3/10/2023 0436 by Stephanie Gomez RN  Flowsheets (Taken 3/10/2023 0436)  Plan of Care Reviewed With: patient  Goal: Patient-Specific Goal (Individualized)  Outcome: Ongoing, Progressing  Goal: Absence of Hospital-Acquired Illness or Injury  Outcome: Ongoing, Progressing  Goal: Optimal Comfort and Wellbeing  Outcome: Ongoing, Progressing  Goal: Readiness for Transition of Care  Outcome: Ongoing, Progressing

## 2023-03-10 NOTE — PLAN OF CARE
Patient stated that her medicaid paid for a rolling walker for her approximately 4 years ago through [facility] in [place], P: [phone]. She stated that she never picked it up because she wanted a rollator instead which would have cost her $40 out of pocket. She still wants the rollator and asked me to call them, which was done. They stated that their DME person is out due to a death in the family until probably Thursday. They have been provided with patient's name and number and will call her with more information once they have it. Patients are only eligible to have a walker paid for by insurance once every 5 years.

## 2023-03-11 LAB
ALBUMIN SERPL-MCNC: 2.7 G/DL (ref 3.5–5)
ALBUMIN/GLOB SERPL: 0.5 RATIO (ref 1.1–2)
ALDOLASE SERPL-CCNC: 4.5 U/L
ALP SERPL-CCNC: 103 UNIT/L (ref 40–150)
ALT SERPL-CCNC: 12 UNIT/L (ref 0–55)
AST SERPL-CCNC: 16 UNIT/L (ref 5–34)
BASOPHILS # BLD AUTO: 0 X10(3)/MCL (ref 0–0.2)
BASOPHILS NFR BLD AUTO: 0 %
BILIRUBIN DIRECT+TOT PNL SERPL-MCNC: 0.2 MG/DL
BUN SERPL-MCNC: 32.6 MG/DL (ref 9.8–20.1)
CALCIUM SERPL-MCNC: 8.9 MG/DL (ref 8.4–10.2)
CHLORIDE SERPL-SCNC: 106 MMOL/L (ref 98–107)
CO2 SERPL-SCNC: 23 MMOL/L (ref 22–29)
CREAT SERPL-MCNC: 1.27 MG/DL (ref 0.55–1.02)
EOSINOPHIL # BLD AUTO: 0 X10(3)/MCL (ref 0–0.9)
EOSINOPHIL NFR BLD AUTO: 0 %
ERYTHROCYTE [DISTWIDTH] IN BLOOD BY AUTOMATED COUNT: 17.3 % (ref 11.5–17)
GFR SERPLBLD CREATININE-BSD FMLA CKD-EPI: 50 MLS/MIN/1.73/M2
GLOBULIN SER-MCNC: 5.2 GM/DL (ref 2.4–3.5)
GLUCOSE SERPL-MCNC: 138 MG/DL (ref 74–100)
HCT VFR BLD AUTO: 30.3 % (ref 37–47)
HGB BLD-MCNC: 9.8 G/DL (ref 12–16)
IMM GRANULOCYTES # BLD AUTO: 0.02 X10(3)/MCL (ref 0–0.04)
IMM GRANULOCYTES NFR BLD AUTO: 0.5 %
LYMPHOCYTES # BLD AUTO: 0.76 X10(3)/MCL (ref 0.6–4.6)
LYMPHOCYTES NFR BLD AUTO: 18.9 %
MAGNESIUM SERPL-MCNC: 2.3 MG/DL (ref 1.6–2.6)
MCH RBC QN AUTO: 26.6 PG
MCHC RBC AUTO-ENTMCNC: 32.3 G/DL (ref 33–36)
MCV RBC AUTO: 82.3 FL (ref 80–94)
MONOCYTES # BLD AUTO: 0.3 X10(3)/MCL (ref 0.1–1.3)
MONOCYTES NFR BLD AUTO: 7.5 %
NEUTROPHILS # BLD AUTO: 2.94 X10(3)/MCL (ref 2.1–9.2)
NEUTROPHILS NFR BLD AUTO: 73.1 %
NRBC BLD AUTO-RTO: 0 %
PHOSPHATE SERPL-MCNC: 3 MG/DL (ref 2.3–4.7)
PLATELET # BLD AUTO: 445 X10(3)/MCL (ref 130–400)
PMV BLD AUTO: 11.2 FL (ref 7.4–10.4)
POCT GLUCOSE: 155 MG/DL (ref 70–110)
POCT GLUCOSE: 172 MG/DL (ref 70–110)
POCT GLUCOSE: 209 MG/DL (ref 70–110)
POCT GLUCOSE: 221 MG/DL (ref 70–110)
POTASSIUM SERPL-SCNC: 4.8 MMOL/L (ref 3.5–5.1)
PROT SERPL-MCNC: 7.9 GM/DL (ref 6.4–8.3)
RBC # BLD AUTO: 3.68 X10(6)/MCL (ref 4.2–5.4)
SODIUM SERPL-SCNC: 136 MMOL/L (ref 136–145)
WBC # SPEC AUTO: 4 X10(3)/MCL (ref 4.5–11.5)

## 2023-03-11 PROCEDURE — 85025 COMPLETE CBC W/AUTO DIFF WBC: CPT

## 2023-03-11 PROCEDURE — 63600175 PHARM REV CODE 636 W HCPCS

## 2023-03-11 PROCEDURE — 83735 ASSAY OF MAGNESIUM: CPT

## 2023-03-11 PROCEDURE — 11000001 HC ACUTE MED/SURG PRIVATE ROOM

## 2023-03-11 PROCEDURE — 84100 ASSAY OF PHOSPHORUS: CPT

## 2023-03-11 PROCEDURE — 25000003 PHARM REV CODE 250

## 2023-03-11 PROCEDURE — 80053 COMPREHEN METABOLIC PANEL: CPT

## 2023-03-11 RX ORDER — LANOLIN ALCOHOL/MO/W.PET/CERES
1 CREAM (GRAM) TOPICAL DAILY
Status: DISCONTINUED | OUTPATIENT
Start: 2023-03-11 | End: 2023-03-13

## 2023-03-11 RX ORDER — PREDNISONE 20 MG/1
60 TABLET ORAL DAILY
Status: DISCONTINUED | OUTPATIENT
Start: 2023-03-12 | End: 2023-03-13 | Stop reason: HOSPADM

## 2023-03-11 RX ADMIN — HYDROXYCHLOROQUINE SULFATE 200 MG: 200 TABLET ORAL at 09:03

## 2023-03-11 RX ADMIN — INSULIN ASPART 2 UNITS: 100 INJECTION, SOLUTION INTRAVENOUS; SUBCUTANEOUS at 12:03

## 2023-03-11 RX ADMIN — HYDROCODONE BITARTRATE AND ACETAMINOPHEN 1 TABLET: 7.5; 325 TABLET ORAL at 08:03

## 2023-03-11 RX ADMIN — ATORVASTATIN CALCIUM 10 MG: 10 TABLET, FILM COATED ORAL at 09:03

## 2023-03-11 RX ADMIN — PANTOPRAZOLE SODIUM 40 MG: 40 TABLET, DELAYED RELEASE ORAL at 09:03

## 2023-03-11 RX ADMIN — HYDROCODONE BITARTRATE AND ACETAMINOPHEN 1 TABLET: 7.5; 325 TABLET ORAL at 09:03

## 2023-03-11 RX ADMIN — INSULIN DETEMIR 40 UNITS: 100 INJECTION, SOLUTION SUBCUTANEOUS at 08:03

## 2023-03-11 RX ADMIN — ENOXAPARIN SODIUM 40 MG: 40 INJECTION SUBCUTANEOUS at 05:03

## 2023-03-11 RX ADMIN — METHYLPREDNISOLONE SODIUM SUCCINATE 100 MG: 125 INJECTION, POWDER, FOR SOLUTION INTRAMUSCULAR; INTRAVENOUS at 09:03

## 2023-03-11 RX ADMIN — INSULIN ASPART 2 UNITS: 100 INJECTION, SOLUTION INTRAVENOUS; SUBCUTANEOUS at 05:03

## 2023-03-11 RX ADMIN — FERROUS SULFATE TAB EC 325 MG (65 MG FE EQUIVALENT) 1 EACH: 325 (65 FE) TABLET DELAYED RESPONSE at 09:03

## 2023-03-11 NOTE — PROGRESS NOTES
[facility] Medicine Wards   Progress Note     Resident Team: [facility] Medicine List 3  Attending Physician: Peewee Askew MD  Resident: Jose Armando   Intern: [facility] Length of Stay: 2 days    Subjective:      Brief HPI:  Vi Ulrich is a 57 y.o. female with a history of Asthma, A fib, DM, GERD, gout, HTN, and SLE w/ lupus nephritis who presented to [facility] ED on 3/9/2023 after being sent directly from internal medicine clinic with complaints of generalized weakness, dysphagia, N w/o V, F/C, joint pain, shortness of breath, dyspnea on exertion, urinary incontinence, and weight loss.  She states she has been feeling fatigued and weak over the last several months but her symptoms began to worsen over the last 2 weeks.  She is endorsing significant weakness in upper extremities which is believed to be secondary to pain.  Patient is unable to lift her left arm at all and states this began a few days ago.  She also states she has lost a significant amount of weight over the last 2 weeks and has not been able to tolerate PO intake secondary to both nausea and dysphagia.  She states it is painful to swallow and she feels like food gets stuck in her throat when she attempts to eat.  She denies any chest pain but does state she has been experiencing shortness of breath at rest as well as dyspnea on exertion.  She states she is not able to walk at all secondary to both SOB and pain.  She is endorsing significant pain in all joints and states her joints swell episodically.  She is also endorsing urinary incontinence and states she is not able to control when she pees.  This may also be secondary to pain as patient states she struggles with walking to the bathroom and cannot make it in time.  She endorses possible episodes of fever while at home and states she often wakes up from a nap in a sweat.  She denies any tobacco, alcohol, or recreational drug use and states she is compliant with medication regimen.  She has been taking  prednisone 5mg and Plaquenil since 2004 for hx of lupus.  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       In the ED, patients vitals were stable, satting well on room air.  Labs were remarkable for neutropenia with wbc of 3.5 and normocytic anemia with H/H of 9.7/29.2.  Sodium was mildly low at 135 and other electrolytes wnl.  Creatinine was mildly elevated at 1.19.  CRP was elevated at 88.2 and sed rate was elevated at 73.  Chest x-ray was performed showing mild congestive changes.  Patient was admitted to medicine secondary to concern for lupus flare.      Interval History: No acute events overnight.  Patient continuing to complain of pain but states it has improved since admission.  Denies any N/V, diarrhea, constipation, abdominal pain, dysuria.  Tolerating PO diet.  Today is the last day for solumedrol 100mg, will deescalate to prednisone 60mg starting tomorrow.  Continue pain management PRN.  Will discuss case further with rheumatology on Monday regarding DMARD therapy.     ROS negative unless stated above.    Objective:     Vital Signs (Most Recent):  Temp: 98.2 °F (36.8 °C) (03/11/23 1219)  Pulse: 65 (03/11/23 1219)  Resp: 18 (03/11/23 1219)  BP: 125/70 (03/11/23 1219)  SpO2: 100 % (03/11/23 1219)   Vital Signs (24h Range):  Temp:  [97.5 °F (36.4 °C)-98.2 °F (36.8 °C)] 98.2 °F (36.8 °C)  Pulse:  [52-65] 65  Resp:  [15-20] 18  SpO2:  [99 %-100 %] 100 %  BP: (125-154)/(70-80) 125/70     Physical Exam  General: Patient resting comfortably in bed, in no acute distress   Eye: PERRLA, EOMI, clear conjunctiva, eyelids normal  HENT: Head-normocephalic and atraumatic  Neck: full range of motion, no thyromegaly or lymphadenopathy, trachea midline, supple, no palpable thyroid nodules  Respiratory: clear to auscultation bilaterally without wheezes, rales, rhonchi  Cardiovascular: regular rate and  rhythm without murmurs.  No gallops or rubs no JVD.  Capillary refill within normal limits.  Gastrointestinal: soft, non-tender, non-distended with normal bowel sounds, without masses to palpation  Genitourinary: no CVA tenderness to palpation  Musculoskeletal: full range of motion of all extremities/spine without limitation or discomfort  Integumentary: no rashes or skin lesions present  Neurologic: no signs of peripheral neurological deficit, motor/sensory function intact  Psychiatric:  alert and oriented, cognitive function intact, cooperative with exam, good eye contact, judgement and insight intact, mood and affect full range.     Laboratory:  Most Recent Data:  Recent Lab Results         03/11/23  1220   03/11/23  0738   03/11/23  0346   03/10/23  1948        Albumin/Globulin Ratio     0.5         Albumin     2.7         Alkaline Phosphatase     103         ALT     12         AST     16         Baso #     0.00         Basophil %     0.0         BILIRUBIN TOTAL     0.2         BUN     32.6         Calcium     8.9         Chloride     106         CO2     23         Creatinine     1.27         eGFR     50         Eos #     0.00         Eosinophil %     0.0         Globulin, Total     5.2         Glucose     138         Hematocrit     30.3         Hemoglobin     9.8         Immature Grans (Abs)     0.02         Immature Granulocytes     0.5         Lymph #     0.76         LYMPH %     18.9         Magnesium     2.30         MCH     26.6         MCHC     32.3         MCV     82.3         Mono #     0.30         Mono %     7.5         MPV     11.2         Neut #     2.94         Neut %     73.1         nRBC     0.0         Phosphorus     3.0         Platelets     445         POCT Glucose 209   155     281       Potassium     4.8         PROTEIN TOTAL     7.9         RBC     3.68         RDW     17.3         Sodium     136         WBC     4.0                 Microbiology Data Reviewed: yes  Pertinent Findings:  Blood  cultures pending     Other Results:  EKG (my interpretation): normal EKG, normal sinus rhythm, unchanged from previous tracings.    Radiology:  Imaging Results              CT Chest Without Contrast (Final result)  Result time 03/09/23 17:18:56      Final result by Rishi Urban MD (03/09/23 17:18:56)                   Impression:      Persistent ground-glass interstitial traits in the lung bases bilaterally as well as the right middle lobe.  Findings are unchanged since the prior examination    No evidence of interstitial fibrosis      Electronically signed by: Rishi Urban  Date:    03/09/2023  Time:    17:18               Narrative:    EXAMINATION:  CT CHEST WITHOUT CONTRAST    CLINICAL HISTORY:  Interstitial lung disease;    TECHNIQUE:  Low dose axial images, sagittal and coronal reformations were obtained from the thoracic inlet to the lung bases. Contrast was not administered.  Automatic exposure control is utilized to reduce patient radiation exposure.    COMPARISON:  05/27/2022    FINDINGS:  The lungs are adequately aerated.  There are some ground-glass interstitial infiltrates in the lung bases bilaterally as well as the right middle lobe.  Similar changes were seen on the prior examination.  No changes of interstitial fibrosis are seen.  No significant bronchiectasis is seen.  No nodularity is seen.  No pleural effusion is seen.    No abnormal mediastinal lymphadenopathy is seen.  Thoracic aorta appears normal.  The heart is normal in size.  There are multiple coronary artery calcification is seen.    Upper abdomen shows no acute abnormality.    Bones appear normal..                                       X-Ray Chest 1 View (Final result)  Result time 03/09/23 15:05:11      Final result by Oscar Duncan MD (03/09/23 15:05:11)                   Impression:      Suspected mild congestive changes.      Electronically signed by: Oscar Duncan  Date:    03/09/2023  Time:    15:05                Narrative:    EXAMINATION:  XR CHEST 1 VIEW    CLINICAL HISTORY:  shortness of breath;    TECHNIQUE:  One view    COMPARISON:  January 12, 2023..    FINDINGS:  Cardiopericardial silhouette is within normal limits.  There is slight prominence of lungs interstitial markings suspected for mild congestive process.  There is no focally dense consolidation or fluid within the pleural spaces.  Thoracolumbar compound scoliosis.                                    Current Medications:     Infusions:       Scheduled:   atorvastatin  10 mg Oral Daily    enoxaparin  40 mg Subcutaneous Daily    ferrous sulfate  1 tablet Oral Daily    hydrOXYchloroQUINE  200 mg Oral Daily    insulin detemir U-100  40 Units Subcutaneous QHS    pantoprazole  40 mg Oral Daily    sulfamethoxazole-trimethoprim 800-160mg  1 tablet Oral Every other day        PRN:  acetaminophen, dextrose 10 % in water (D10W), dextrose 10 % in water (D10W), glucagon (human recombinant), glucose, glucose, HYDROcodone-acetaminophen, insulin aspart U-100, naloxone, sodium chloride 0.9%    No intake or output data in the 24 hours ending 03/11/23 1313    Lines/Drains/Airways       Peripheral Intravenous Line  Duration                  Peripheral IV - Single Lumen 03/09/23 1540 22 G Posterior;Right Hand 1 day                  Assessment & Plan:     SLE flare vs. Myositis   Lupus Nephritis   Fibromyalgia   -MARTA 1:2560, dsDNA positive in past   -home regimen includes prednisone 5mg and HCLQ   -rheumatologic labs ordered, C3 and C4 decreased, CK wnl hepatitis panel negative, TSH wnl, UDS negative, Immunoglobulins elevated (particularly IgG), dsDNA, Quantiferon gold,  myoglobin,  aldolase, haptoglobin, and MyoMarker pending.    -CRP and sed rate elevated on admission   -CXR showing mild congestive changes, CT chest showing persistent ground-glass interstitial traits in the lung bases bilaterally as well as the right middle lobe. No evidence of interstitial fibrosis.   -EKG showing  normal sinus rhythm  -case discussed with rheumatology, per recs completed 3 day course of solumedrol, prednisone 50mg tomorrow, celcept was considered but patient has allergy to medication.  Will consider addition of other DMARD therapy (possibly rituxumab) s/p complete workup. Continue Bactrim double strength 3x per week for PJP prophylaxis.   -workup of anemia performed, consistent with anemia of chronic disease.  Starting on ferrous sulfate today for iron supplementation.   -urinalysis w/ 2+ protein, 1+ ketones, and 1+ occult blood with few WBC's and trace bacteria, urine studies wnl   -continue hydroxychloroquine per home regimen   -tylenol and norco for pain PRN   -will continue to monitor      Dysphagia   GERD  -SLP consulted for further workup, recommending continued PO diet w/ strict aspiration precautions.  Per recs, continue to monitor for dysphagia management.   -continue protonix per home regimen    T2DM  -home regimen includes lantus 80 units nightly  -continue detemir 40 units nightly   -low dose sliding scale for correction   -will continue to monitor blood sugars and adjust medication regimen as needed.       HLD  -continue atorvastatin 10mg per home regimen      Code status: Full  DVT prophylaxis: Lovenox   Diet: Diabetic   Oxygenation: Room air   GI prophylaxis: pantoprazole   Lines/drains: PIV   Consults: discussed case w/ rheumatology, PT/OT/SLP      Disposition: admission for SLE flare vs. Myositis.  Continue high dose steroids, rheumatology on board, pending workup, will continue inpatient management at this time.     Fabiola Minaya MD  [facility] Internal Medicine, HO-1

## 2023-03-11 NOTE — NURSING
CNA reports making rounds and pt is up in the restroom. Entered room to find pt is indeed on the toilet unassisted. Reviewed safety with pt. Voices aware to call for assist with all needs.

## 2023-03-12 LAB
ABS NEUT CALC (OHS): 2.16 X10(3)/MCL (ref 2.1–9.2)
ALBUMIN SERPL-MCNC: 2.3 G/DL (ref 3.5–5)
ALBUMIN/GLOB SERPL: 0.6 RATIO (ref 1.1–2)
ALP SERPL-CCNC: 84 UNIT/L (ref 40–150)
ALT SERPL-CCNC: 12 UNIT/L (ref 0–55)
ANISOCYTOSIS BLD QL SMEAR: ABNORMAL
AST SERPL-CCNC: 18 UNIT/L (ref 5–34)
BILIRUBIN DIRECT+TOT PNL SERPL-MCNC: 0.2 MG/DL
BUN SERPL-MCNC: 40.7 MG/DL (ref 9.8–20.1)
CALCIUM SERPL-MCNC: 8.4 MG/DL (ref 8.4–10.2)
CHLORIDE SERPL-SCNC: 110 MMOL/L (ref 98–107)
CO2 SERPL-SCNC: 20 MMOL/L (ref 22–29)
CREAT SERPL-MCNC: 1.18 MG/DL (ref 0.55–1.02)
ERYTHROCYTE [DISTWIDTH] IN BLOOD BY AUTOMATED COUNT: 17.5 % (ref 11.5–17)
GFR SERPLBLD CREATININE-BSD FMLA CKD-EPI: 55 MLS/MIN/1.73/M2
GLOBULIN SER-MCNC: 4.1 GM/DL (ref 2.4–3.5)
GLUCOSE SERPL-MCNC: 170 MG/DL (ref 74–100)
HCT VFR BLD AUTO: 27.1 % (ref 37–47)
HGB BLD-MCNC: 9 G/DL (ref 12–16)
HYPOCHROMIA BLD QL SMEAR: SLIGHT
LYMPH ABN # BLD MANUAL: 2 %
LYMPHOCYTES NFR BLD MANUAL: 0.45 X10(3)/MCL
LYMPHOCYTES NFR BLD MANUAL: 16 % (ref 13–40)
MACROCYTES BLD QL SMEAR: ABNORMAL
MAGNESIUM SERPL-MCNC: 2.3 MG/DL (ref 1.6–2.6)
MCH RBC QN AUTO: 26.9 PG
MCHC RBC AUTO-ENTMCNC: 33.2 G/DL (ref 33–36)
MCV RBC AUTO: 80.9 FL (ref 80–94)
MICROCYTES BLD QL SMEAR: ABNORMAL
MONOCYTES NFR BLD MANUAL: 0.14 X10(3)/MCL (ref 0.1–1.3)
MONOCYTES NFR BLD MANUAL: 5 % (ref 2–11)
NEUTROPHILS NFR BLD MANUAL: 77 % (ref 47–80)
NRBC BLD AUTO-RTO: 0 %
OVALOCYTES (OLG): ABNORMAL
PHOSPHATE SERPL-MCNC: 2.3 MG/DL (ref 2.3–4.7)
PLATELET # BLD AUTO: 420 X10(3)/MCL (ref 130–400)
PLATELET # BLD EST: ABNORMAL 10*3/UL
PMV BLD AUTO: 11.3 FL (ref 7.4–10.4)
POCT GLUCOSE: 135 MG/DL (ref 70–110)
POCT GLUCOSE: 157 MG/DL (ref 70–110)
POCT GLUCOSE: 167 MG/DL (ref 70–110)
POCT GLUCOSE: 189 MG/DL (ref 70–110)
POIKILOCYTOSIS BLD QL SMEAR: ABNORMAL
POTASSIUM SERPL-SCNC: 4.5 MMOL/L (ref 3.5–5.1)
PROT SERPL-MCNC: 6.4 GM/DL (ref 6.4–8.3)
RBC # BLD AUTO: 3.35 X10(6)/MCL (ref 4.2–5.4)
RBC MORPH BLD: ABNORMAL
SCHISTOCYTE (OLG): SLIGHT
SODIUM SERPL-SCNC: 137 MMOL/L (ref 136–145)
TARGETS BLD QL SMEAR: SLIGHT
TEAR DROP CELL (OLG): SLIGHT
WBC # SPEC AUTO: 2.8 X10(3)/MCL (ref 4.5–11.5)

## 2023-03-12 PROCEDURE — 63600175 PHARM REV CODE 636 W HCPCS

## 2023-03-12 PROCEDURE — 25000003 PHARM REV CODE 250

## 2023-03-12 PROCEDURE — 80053 COMPREHEN METABOLIC PANEL: CPT

## 2023-03-12 PROCEDURE — 85027 COMPLETE CBC AUTOMATED: CPT

## 2023-03-12 PROCEDURE — 11000001 HC ACUTE MED/SURG PRIVATE ROOM

## 2023-03-12 PROCEDURE — 83735 ASSAY OF MAGNESIUM: CPT

## 2023-03-12 PROCEDURE — 25000003 PHARM REV CODE 250: Performed by: STUDENT IN AN ORGANIZED HEALTH CARE EDUCATION/TRAINING PROGRAM

## 2023-03-12 PROCEDURE — 85025 COMPLETE CBC W/AUTO DIFF WBC: CPT

## 2023-03-12 PROCEDURE — 84100 ASSAY OF PHOSPHORUS: CPT

## 2023-03-12 RX ORDER — ONDANSETRON 2 MG/ML
4 INJECTION INTRAMUSCULAR; INTRAVENOUS ONCE
Status: COMPLETED | OUTPATIENT
Start: 2023-03-12 | End: 2023-03-12

## 2023-03-12 RX ADMIN — ENOXAPARIN SODIUM 40 MG: 40 INJECTION SUBCUTANEOUS at 04:03

## 2023-03-12 RX ADMIN — HYDROXYCHLOROQUINE SULFATE 200 MG: 200 TABLET ORAL at 09:03

## 2023-03-12 RX ADMIN — INSULIN DETEMIR 45 UNITS: 100 INJECTION, SOLUTION SUBCUTANEOUS at 08:03

## 2023-03-12 RX ADMIN — ONDANSETRON 4 MG: 2 INJECTION INTRAMUSCULAR; INTRAVENOUS at 09:03

## 2023-03-12 RX ADMIN — PANTOPRAZOLE SODIUM 40 MG: 40 TABLET, DELAYED RELEASE ORAL at 09:03

## 2023-03-12 RX ADMIN — HYDROCODONE BITARTRATE AND ACETAMINOPHEN 1 TABLET: 7.5; 325 TABLET ORAL at 03:03

## 2023-03-12 RX ADMIN — FERROUS SULFATE TAB EC 325 MG (65 MG FE EQUIVALENT) 1 EACH: 325 (65 FE) TABLET DELAYED RESPONSE at 09:03

## 2023-03-12 RX ADMIN — PREDNISONE 60 MG: 20 TABLET ORAL at 09:03

## 2023-03-12 RX ADMIN — SULFAMETHOXAZOLE AND TRIMETHOPRIM 1 TABLET: 800; 160 TABLET ORAL at 09:03

## 2023-03-12 RX ADMIN — ATORVASTATIN CALCIUM 10 MG: 10 TABLET, FILM COATED ORAL at 09:03

## 2023-03-12 NOTE — PROGRESS NOTES
[facility] Medicine Wards   Progress Note     Resident Team: [facility] Medicine List 3  Attending Physician: Peewee Askew MD  Resident: Jose Armando   Intern: [facility] Length of Stay: 3 days    Subjective:      Brief HPI:  Vi Ulrich is a 57 y.o. female with a history of Asthma, A fib, DM, GERD, gout, HTN, and SLE w/ lupus nephritis who presented to [facility] ED on 3/9/2023 after being sent directly from internal medicine clinic with complaints of generalized weakness, dysphagia, N w/o V, F/C, joint pain, shortness of breath, dyspnea on exertion, urinary incontinence, and weight loss.  She states she has been feeling fatigued and weak over the last several months but her symptoms began to worsen over the last 2 weeks.  She is endorsing significant weakness in upper extremities which is believed to be secondary to pain.  Patient is unable to lift her left arm at all and states this began a few days ago.  She also states she has lost a significant amount of weight over the last 2 weeks and has not been able to tolerate PO intake secondary to both nausea and dysphagia.  She states it is painful to swallow and she feels like food gets stuck in her throat when she attempts to eat.  She denies any chest pain but does state she has been experiencing shortness of breath at rest as well as dyspnea on exertion.  She states she is not able to walk at all secondary to both SOB and pain.  She is endorsing significant pain in all joints and states her joints swell episodically.  She is also endorsing urinary incontinence and states she is not able to control when she pees.  This may also be secondary to pain as patient states she struggles with walking to the bathroom and cannot make it in time.  She endorses possible episodes of fever while at home and states she often wakes up from a nap in a sweat.  She denies any tobacco, alcohol, or recreational drug use and states she is compliant with medication regimen.  She has been taking  prednisone 5mg and Plaquenil since 2004 for hx of lupus.  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       In the ED, patients vitals were stable, satting well on room air.  Labs were remarkable for neutropenia with wbc of 3.5 and normocytic anemia with H/H of 9.7/29.2.  Sodium was mildly low at 135 and other electrolytes wnl.  Creatinine was mildly elevated at 1.19.  CRP was elevated at 88.2 and sed rate was elevated at 73.  Chest x-ray was performed showing mild congestive changes.  Patient was admitted to medicine secondary to concern for lupus flare.      Interval History: No acute events overnight.  Patient continuing to complain of pain but states it has improved since admission.  States she has significantly more strength in legs today compared to presentation and appears to be in better spirits.  Continuing to complain of shoulder pain but states it is improved and pain well controlled on current regimen. Denies any N/V, diarrhea, constipation, abdominal pain, dysuria.  Tolerating PO diet.  Completed 3 days of solumedrol 100mg, will deescalate to prednisone 60mg starting today.  Continue pain management PRN.  Will discuss case further with rheumatology on Monday regarding DMARD therapy.     ROS negative unless stated above.    Objective:     Vital Signs (Most Recent):  Temp: 97.8 °F (36.6 °C) (03/12/23 0757)  Pulse: (!) 51 (03/12/23 0757)  Resp: 18 (03/12/23 0757)  BP: (!) 155/83 (03/12/23 0757)  SpO2: 100 % (03/12/23 0757)   Vital Signs (24h Range):  Temp:  [97.6 °F (36.4 °C)-98.2 °F (36.8 °C)] 97.8 °F (36.6 °C)  Pulse:  [51-65] 51  Resp:  [18] 18  SpO2:  [95 %-100 %] 100 %  BP: (117-155)/(63-83) 155/83     Physical Exam  General: Patient resting comfortably in bed, in no acute distress   Eye: PERRLA, EOMI, clear conjunctiva, eyelids normal  HENT: Head-normocephalic and  atraumatic  Neck: full range of motion, no thyromegaly or lymphadenopathy, trachea midline, supple, no palpable thyroid nodules  Respiratory: clear to auscultation bilaterally without wheezes, rales, rhonchi  Cardiovascular: regular rate and rhythm without murmurs.  No gallops or rubs no JVD.  Capillary refill within normal limits.  Gastrointestinal: soft, non-tender, non-distended with normal bowel sounds, without masses to palpation  Genitourinary: no CVA tenderness to palpation  Musculoskeletal: limited range of motion of shoulders bilaterally secondary to pain, patient able to ambulate slowly.  Weakness noted in all extremities secondary to pain, upper more than lower.  Reflexes and sensation intact.    Integumentary: no rashes or skin lesions present  Neurologic: no signs of peripheral neurological deficit, motor/sensory function intact  Psychiatric:  alert and oriented, cognitive function intact, cooperative with exam, good eye contact, judgement and insight intact, mood and affect full range.     Laboratory:  Most Recent Data:  Recent Lab Results  (Last 5 results in the past 24 hours)        03/12/23  0756   03/12/23  0520   03/11/23  2027   03/11/23  1739   03/11/23  1220        Albumin/Globulin Ratio   0.6             Abn Lymph Man   2             Abs Neut calc   2.156             Albumin   2.3             Alkaline Phosphatase   84             ALT   12             Aniso   2+             AST   18             BILIRUBIN TOTAL   0.2             BUN   40.7             Calcium   8.4             Chloride   110             CO2   20             Creatinine   1.18             eGFR   55             Globulin, Total   4.1             Glucose   170             Hematocrit   27.1             Hemoglobin   9.0             Hypo   Slight             Lymph #   0.448             Lymph Man   16             Macrocyte   1+             Magnesium   2.30             MCH   26.9             MCHC   33.2             MCV   80.9              Microcyte   1+             Mono #   0.14             Mono %   5             MPV   11.3             Neutrophils Relative   77             nRBC   0.0             Ovalocytes   1+             Phosphorus   2.3             Platelet Estimate   Increased             Platelets   420             POCT Glucose 135     172   221   209       Poikilocytosis   2+             Potassium   4.5             PROTEIN TOTAL   6.4             RBC   3.35             RBC Morph   Abnormal             RDW   17.5             Schistocytes   Slight             Sodium   137             Target Cells   Slight             Teardrop Cells   Slight             WBC   2.8                                    Microbiology Data Reviewed: yes  Pertinent Findings:  Blood cultures NGTD    Other Results:  EKG (my interpretation): normal EKG, normal sinus rhythm, unchanged from previous tracings.    Radiology:  Imaging Results              CT Chest Without Contrast (Final result)  Result time 03/09/23 17:18:56      Final result by Rishi Urban MD (03/09/23 17:18:56)                   Impression:      Persistent ground-glass interstitial traits in the lung bases bilaterally as well as the right middle lobe.  Findings are unchanged since the prior examination    No evidence of interstitial fibrosis      Electronically signed by: Rishi Urban  Date:    03/09/2023  Time:    17:18               Narrative:    EXAMINATION:  CT CHEST WITHOUT CONTRAST    CLINICAL HISTORY:  Interstitial lung disease;    TECHNIQUE:  Low dose axial images, sagittal and coronal reformations were obtained from the thoracic inlet to the lung bases. Contrast was not administered.  Automatic exposure control is utilized to reduce patient radiation exposure.    COMPARISON:  05/27/2022    FINDINGS:  The lungs are adequately aerated.  There are some ground-glass interstitial infiltrates in the lung bases bilaterally as well as the right middle lobe.  Similar changes were seen on the  prior examination.  No changes of interstitial fibrosis are seen.  No significant bronchiectasis is seen.  No nodularity is seen.  No pleural effusion is seen.    No abnormal mediastinal lymphadenopathy is seen.  Thoracic aorta appears normal.  The heart is normal in size.  There are multiple coronary artery calcification is seen.    Upper abdomen shows no acute abnormality.    Bones appear normal..                                       X-Ray Chest 1 View (Final result)  Result time 03/09/23 15:05:11      Final result by Oscar Duncan MD (03/09/23 15:05:11)                   Impression:      Suspected mild congestive changes.      Electronically signed by: Oscar Duncan  Date:    03/09/2023  Time:    15:05               Narrative:    EXAMINATION:  XR CHEST 1 VIEW    CLINICAL HISTORY:  shortness of breath;    TECHNIQUE:  One view    COMPARISON:  January 12, 2023..    FINDINGS:  Cardiopericardial silhouette is within normal limits.  There is slight prominence of lungs interstitial markings suspected for mild congestive process.  There is no focally dense consolidation or fluid within the pleural spaces.  Thoracolumbar compound scoliosis.                                    Current Medications:     Infusions:       Scheduled:   atorvastatin  10 mg Oral Daily    enoxaparin  40 mg Subcutaneous Daily    ferrous sulfate  1 tablet Oral Daily    hydrOXYchloroQUINE  200 mg Oral Daily    insulin detemir U-100  45 Units Subcutaneous QHS    pantoprazole  40 mg Oral Daily    predniSONE  60 mg Oral Daily    sulfamethoxazole-trimethoprim 800-160mg  1 tablet Oral Every other day        PRN:  acetaminophen, dextrose 10 % in water (D10W), dextrose 10 % in water (D10W), glucagon (human recombinant), glucose, glucose, HYDROcodone-acetaminophen, insulin aspart U-100, naloxone, sodium chloride 0.9%      Intake/Output Summary (Last 24 hours) at 3/12/2023 1124  Last data filed at 3/11/2023 1746  Gross per 24 hour   Intake 480 ml   Output  500 ml   Net -20 ml       Lines/Drains/Airways       Peripheral Intravenous Line  Duration                  Peripheral IV - Single Lumen 03/11/23 2100 Posterior;Right Wrist <1 day                  Assessment & Plan:     SLE flare vs. Myositis   Lupus Nephritis   Fibromyalgia   -MARTA 1:2560, dsDNA positive in past   -home regimen includes prednisone 5mg and HCLQ   -rheumatologic labs ordered, C3 and C4 decreased, CK wnl hepatitis panel negative, TSH wnl, UDS negative, Immunoglobulins elevated (particularly IgG), aldolase wnl, Haptoglobin wnl, dsDNA, Quantiferon gold, myoglobin, and MyoMarker pending.    -CRP and sed rate elevated on admission   -CXR showing mild congestive changes, CT chest showing persistent ground-glass interstitial traits in the lung bases bilaterally as well as the right middle lobe. No evidence of interstitial fibrosis.   -EKG showing normal sinus rhythm  -case discussed with rheumatology, per recs completed 3 day course of solumedrol 3/11, prednisone 60mg started today, celcept was considered but patient has allergy to medication.  Will consider addition of other DMARD therapy (possibly rituxumab) s/p complete workup. Continue Bactrim double strength 3x per week for PJP prophylaxis.   -workup of anemia performed, consistent with anemia of chronic disease.  Continue ferrous sulfate for iron supplementation.   -urinalysis w/ 2+ protein, 1+ ketones, and 1+ occult blood with few WBC's and trace bacteria, urine studies wnl   -continue hydroxychloroquine per home regimen   -tylenol and norco for pain PRN   -will continue to monitor      Dysphagia   GERD  -SLP consulted for further workup, recommending continued PO diet w/ strict aspiration precautions.  Per recs, continue to monitor for dysphagia management.   -continue protonix per home regimen    Urinary Incontinence - improved   -likely secondary to pain as patient has a hard time walking to the bathroom  -will consider MRI spine per rheumatology  recs if symptoms continue     T2DM  -home regimen includes lantus 80 units nightly  -increased detemir to 45 units nightly   -low dose sliding scale for correction   -will continue to monitor blood sugars and adjust medication regimen as needed.       HLD  -continue atorvastatin 10mg per home regimen      Code status: Full  DVT prophylaxis: Lovenox   Diet: Diabetic   Oxygenation: Room air   GI prophylaxis: pantoprazole   Lines/drains: PIV   Consults: discussed case w/ rheumatology, PT/OT/SLP      Disposition: admission for SLE flare vs. Myositis.  Continue high dose steroids, rheumatology on board, pending workup, will continue inpatient management at this time.     Fabiola Minaya MD  [facility] Internal Medicine, -1

## 2023-03-13 VITALS
SYSTOLIC BLOOD PRESSURE: 125 MMHG | OXYGEN SATURATION: 100 % | BODY MASS INDEX: 31.75 KG/M2 | HEART RATE: 57 BPM | HEIGHT: 61 IN | WEIGHT: 168.19 LBS | RESPIRATION RATE: 18 BRPM | DIASTOLIC BLOOD PRESSURE: 77 MMHG | TEMPERATURE: 98 F

## 2023-03-13 LAB
ALBUMIN SERPL-MCNC: 2.4 G/DL (ref 3.5–5)
ALBUMIN/GLOB SERPL: 0.6 RATIO (ref 1.1–2)
ALP SERPL-CCNC: 79 UNIT/L (ref 40–150)
ALT SERPL-CCNC: 16 UNIT/L (ref 0–55)
AST SERPL-CCNC: 20 UNIT/L (ref 5–34)
BASOPHILS # BLD AUTO: 0 X10(3)/MCL (ref 0–0.2)
BASOPHILS NFR BLD AUTO: 0 %
BILIRUBIN DIRECT+TOT PNL SERPL-MCNC: 0.2 MG/DL
BUN SERPL-MCNC: 38.4 MG/DL (ref 9.8–20.1)
CALCIUM SERPL-MCNC: 8.5 MG/DL (ref 8.4–10.2)
CHLORIDE SERPL-SCNC: 109 MMOL/L (ref 98–107)
CO2 SERPL-SCNC: 21 MMOL/L (ref 22–29)
CREAT SERPL-MCNC: 1.24 MG/DL (ref 0.55–1.02)
EOSINOPHIL # BLD AUTO: 0 X10(3)/MCL (ref 0–0.9)
EOSINOPHIL NFR BLD AUTO: 0 %
ERYTHROCYTE [DISTWIDTH] IN BLOOD BY AUTOMATED COUNT: 17.7 % (ref 11.5–17)
GFR SERPLBLD CREATININE-BSD FMLA CKD-EPI: 52 MLS/MIN/1.73/M2
GLOBULIN SER-MCNC: 4.2 GM/DL (ref 2.4–3.5)
GLUCOSE SERPL-MCNC: 129 MG/DL (ref 74–100)
HCT VFR BLD AUTO: 29 % (ref 37–47)
HGB BLD-MCNC: 9.2 G/DL (ref 12–16)
IGG4 SER-MCNC: 107 MG/DL (ref 2.4–121)
IMM GRANULOCYTES # BLD AUTO: 0.01 X10(3)/MCL (ref 0–0.04)
IMM GRANULOCYTES NFR BLD AUTO: 0.3 %
LYMPHOCYTES # BLD AUTO: 0.57 X10(3)/MCL (ref 0.6–4.6)
LYMPHOCYTES NFR BLD AUTO: 19.7 %
MAGNESIUM SERPL-MCNC: 2.3 MG/DL (ref 1.6–2.6)
MCH RBC QN AUTO: 26.4 PG
MCHC RBC AUTO-ENTMCNC: 31.7 G/DL (ref 33–36)
MCV RBC AUTO: 83.3 FL (ref 80–94)
MONOCYTES # BLD AUTO: 0.37 X10(3)/MCL (ref 0.1–1.3)
MONOCYTES NFR BLD AUTO: 12.8 %
NEUTROPHILS # BLD AUTO: 1.95 X10(3)/MCL (ref 2.1–9.2)
NEUTROPHILS NFR BLD AUTO: 67.2 %
NRBC BLD AUTO-RTO: 0 %
PATH REV: NORMAL
PHOSPHATE SERPL-MCNC: 2 MG/DL (ref 2.3–4.7)
PLATELET # BLD AUTO: 426 X10(3)/MCL (ref 130–400)
PMV BLD AUTO: 11.3 FL (ref 7.4–10.4)
POCT GLUCOSE: 110 MG/DL (ref 70–110)
POCT GLUCOSE: 62 MG/DL (ref 70–110)
POTASSIUM SERPL-SCNC: 5.2 MMOL/L (ref 3.5–5.1)
PROT SERPL-MCNC: 6.6 GM/DL (ref 6.4–8.3)
RBC # BLD AUTO: 3.48 X10(6)/MCL (ref 4.2–5.4)
SODIUM SERPL-SCNC: 135 MMOL/L (ref 136–145)
WBC # SPEC AUTO: 2.9 X10(3)/MCL (ref 4.5–11.5)

## 2023-03-13 PROCEDURE — 80053 COMPREHEN METABOLIC PANEL: CPT

## 2023-03-13 PROCEDURE — 97535 SELF CARE MNGMENT TRAINING: CPT

## 2023-03-13 PROCEDURE — 63600175 PHARM REV CODE 636 W HCPCS

## 2023-03-13 PROCEDURE — 97530 THERAPEUTIC ACTIVITIES: CPT

## 2023-03-13 PROCEDURE — 84100 ASSAY OF PHOSPHORUS: CPT

## 2023-03-13 PROCEDURE — 85025 COMPLETE CBC W/AUTO DIFF WBC: CPT

## 2023-03-13 PROCEDURE — 83735 ASSAY OF MAGNESIUM: CPT

## 2023-03-13 PROCEDURE — 25000003 PHARM REV CODE 250

## 2023-03-13 RX ORDER — LANOLIN ALCOHOL/MO/W.PET/CERES
1 CREAM (GRAM) TOPICAL EVERY OTHER DAY
Status: DISCONTINUED | OUTPATIENT
Start: 2023-03-15 | End: 2023-03-13 | Stop reason: HOSPADM

## 2023-03-13 RX ORDER — HYDROCODONE BITARTRATE AND ACETAMINOPHEN 7.5; 325 MG/1; MG/1
1 TABLET ORAL EVERY 8 HOURS PRN
Qty: 21 TABLET | Refills: 0 | Status: SHIPPED | OUTPATIENT
Start: 2023-03-13 | End: 2023-03-15

## 2023-03-13 RX ORDER — INSULIN PUMP SYRINGE, 3 ML
EACH MISCELLANEOUS
Qty: 1 EACH | Refills: 0 | Status: SHIPPED | OUTPATIENT
Start: 2023-03-13 | End: 2023-03-15

## 2023-03-13 RX ORDER — SULFAMETHOXAZOLE AND TRIMETHOPRIM 800; 160 MG/1; MG/1
1 TABLET ORAL EVERY OTHER DAY
Qty: 45 TABLET | Refills: 0 | Status: SHIPPED | OUTPATIENT
Start: 2023-03-14 | End: 2023-06-06

## 2023-03-13 RX ADMIN — FERROUS SULFATE TAB EC 325 MG (65 MG FE EQUIVALENT) 1 EACH: 325 (65 FE) TABLET DELAYED RESPONSE at 08:03

## 2023-03-13 RX ADMIN — PREDNISONE 60 MG: 20 TABLET ORAL at 08:03

## 2023-03-13 RX ADMIN — ATORVASTATIN CALCIUM 10 MG: 10 TABLET, FILM COATED ORAL at 08:03

## 2023-03-13 RX ADMIN — PANTOPRAZOLE SODIUM 40 MG: 40 TABLET, DELAYED RELEASE ORAL at 08:03

## 2023-03-13 RX ADMIN — HYDROXYCHLOROQUINE SULFATE 200 MG: 200 TABLET ORAL at 08:03

## 2023-03-13 RX ADMIN — ACETAMINOPHEN 325MG 650 MG: 325 TABLET ORAL at 04:03

## 2023-03-13 NOTE — PT/OT/SLP PROGRESS
[facility]  Speech Language Pathology -   Swallowing Follow-up Note/Discharge         Chart reviewed.?Patient was previously seen for clinical swallowing evaluation and recommendations were for  IDDSI 6 (soft/bite size) with IDDSI 0 (thin) liquids.      Patient was seen for training on swallowing strategies, and education/training for maximizing safety and diet tolerance.      Patient was seen alert, cooperative, and seated up in edge of the bed.     Pain:   0/10  Pain Location / Description: no pain reported        HISTORY & PHYSICAL  Active Ambulatory Problems     Diagnosis Date Noted    Essential hypertension 10/20/2016    Diabetes mellitus, type 2 10/20/2016    Lupus (systemic lupus erythematosus) 10/20/2016    Insomnia 10/20/2016    Hyperlipidemia 07/13/2017    History of asthma 07/13/2017    New onset atrial fibrillation 12/01/2017    Periumbilical pain 03/15/2018    Constipation 03/15/2018    Anemia 03/15/2018    Anxiety 04/19/2018    Class 1 obesity with serious comorbidity and body mass index (BMI) of 32.0 to 32.9 in adult 05/07/2019    Acute cystitis with hematuria 03/03/2020    STIVEN (acute kidney injury) 03/03/2020    Pyelonephritis 03/03/2020    Inadequate dietary energy intake 03/04/2020    Systemic lupus erythematosus 05/31/2022    Fibromyalgia 05/31/2022    Lupus nephritis, ISN/RPS class III 06/14/2022    Asthma 02/03/2023     Resolved Ambulatory Problems     Diagnosis Date Noted    Primary osteoarthritis of left knee 08/07/2015    Gait difficulty 10/08/2015    Painful total knee replacement, right 10/08/2015    Joint stiffness of knee 10/08/2015    Left leg weakness 10/08/2015    Decreased range of motion of left knee 10/08/2015     Past Medical History:   Diagnosis Date    Arthritis     Atrial fibrillation     Chronic constipation     Diabetes mellitus     Encounter for blood transfusion 10/2014    GERD (gastroesophageal reflux disease)     Gout     Hypertension     Lupus  "2004    Renal disorder     SLE (systemic lupus erythematosus)          Per medical record: "Vi Ulrich is a 57 y.o. female with a history of Asthma, A fib, DM, GERD, gout, HTN, and SLE w/ lupus nephritis who presented to [facility] ED on 3/9/2023 after being sent directly from internal medicine clinic with complaints of generalized weakness, dysphagia, N w/o V, F/C, joint pain, shortness of breath, dyspnea on exertion, urinary incontinence, and weight loss.  No acute events overnight.  Patient reports no complaints. She feels well and is ready for discharge. Reports that she is ready to try DMARD medication if it is available. She denies fever, chills, nausea, vomiting, diarrhea, chest pain, abdominal pain, numbness, weakness, tingling, and a rash." Speech pathology to follow up for diet tolerance and education.        Impression:  Patient's oropharyngeal swallow appear within functional limits. No clinical s/sx of airway invasion appreciated during this assessment. Education on aspiration precautions reviewed. Verbal understanding given. Based on this assessment, patient appears safe to continue a PO diet with general swallowing precautions. SLP to follow up as indicated.        GOALS:     Long Term Goals:     Consume least restrictive diet safely    Provide individualized education to the patient and family regarding disorder and management     Short Term Goals:    Consume the current diet with no clinical s/s of aspiration using facilitative swallowing strategies independently. Goal met 3/13  Patient and family will be educated on the recommended aspiration precautions and swallowing strategies. Goal met 3/13             Recommendations:      Continue IDDSI 6 (soft/bite size) with IDDSI 0 (thin). May advance to regular as tolerated.  Medication presentation: Whole with liquid wash  Oral Care: Minimum of regular toothbrush use 2-3 times/day    Please offer tray set up with intermittent supervision   Adhere to these " aspiration precautions and swallowing strategies (feed only when alert, upright 90 degrees, small bites/sips, slow rate, alternate solids/liquids, remain upright 30 minutes after meals)   Will sign off at this time. Please feel free to reconsult should new swallowing difficulties arise. Thank you          *If this is the last documented treatment note, then it will signify discharge from acute care prior to discharge from speech services and will serve as the discharge summary.*          Education: Patient, RN (Fidelina), } were educated on the results and recommendations of this evaluation. All expressed understanding.          Wilbur Gordon M.S. CCC-SLP  [facility]

## 2023-03-13 NOTE — DISCHARGE SUMMARY
[facility] Discharge Summary    Admitting Physician: Peewee Askew MD  Attending Physician: Peewee Askew MD  Date of Admit: 3/9/2023  Date of Discharge: 3/13/2023    Condition: Good  Outcome: Condition has improved and patient is now ready for discharge.  DISPOSITION: Home or Self Care        Discharge Diagnoses     Patient Active Problem List   Diagnosis    Essential hypertension    Diabetes mellitus, type 2    Insomnia    Hyperlipidemia    History of asthma    New onset atrial fibrillation    Periumbilical pain    Constipation    Anemia    Anxiety    Class 1 obesity with serious comorbidity and body mass index (BMI) of 32.0 to 32.9 in adult    Acute cystitis with hematuria    STIVEN (acute kidney injury)    Pyelonephritis    Inadequate dietary energy intake    SLE (systemic lupus erythematosus)    Fibromyalgia    Lupus nephritis, ISN/RPS class III    Asthma       Principal Problem:  SLE (systemic lupus erythematosus)    Consultants and Procedures     Consultants:  IP CONSULT TO INTERNAL MEDICINE  IP CONSULT TO REGISTERED DIETITIAN/NUTRITIONIST  IP CONSULT TO SOCIAL WORK/CASE MANAGEMENT    Procedures:   * No surgery found *     Brief Admission History      Vi Ulrich is a 57 y.o. female with a history of Asthma, A fib, DM, GERD, gout, HTN, and SLE w/ lupus nephritis who presented to [facility] ED on 3/9/2023 after being sent directly from internal medicine clinic with complaints of generalized weakness, dysphagia, N w/o V, F/C, joint pain, shortness of breath, dyspnea on exertion, urinary incontinence, and weight loss.  She states she has been feeling fatigued and weak over the last several months but her symptoms began to worsen over the last 2 weeks.  She is endorsing significant weakness in upper extremities which is believed to be secondary to pain.  Patient is unable to lift her left arm at all and states this began a few days ago.  She also states she has lost a significant amount of weight over  the last 2 weeks and has not been able to tolerate PO intake secondary to both nausea and dysphagia.  She states it is painful to swallow and she feels like food gets stuck in her throat when she attempts to eat.  She denies any chest pain but does state she has been experiencing shortness of breath at rest as well as dyspnea on exertion.  She states she is not able to walk at all secondary to both SOB and pain.  She is endorsing significant pain in all joints and states her joints swell episodically.  She is also endorsing urinary incontinence and states she is not able to control when she pees.  This may also be secondary to pain as patient states she struggles with walking to the bathroom and cannot make it in time.  She endorses possible episodes of fever while at home and states she often wakes up from a nap in a sweat.  She denies any tobacco, alcohol, or recreational drug use and states she is compliant with medication regimen.  She has been taking prednisone 5mg and Plaquenil since 2004 for hx of lupus.  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       In the ED, patients vitals were stable, satting well on room air.  Labs were remarkable for neutropenia with wbc of 3.5 and normocytic anemia with H/H of 9.7/29.2.  Sodium was mildly low at 135 and other electrolytes wnl.  Creatinine was mildly elevated at 1.19.  CRP was elevated at 88.2 and sed rate was elevated at 73.  Chest x-ray was performed showing mild congestive changes.  Patient was admitted to medicine secondary to concern for lupus flare.      Hospital Course with Pertinent Findings     Patient was admitted to internal medicine from clinic on 03/09/2023 for complaints of generalized weakness, dysphagia, nausea, fever, chills, shortness a breath, weight loss, and urinary incontinence.  Patient with a history of SLE reports  "similar previous episodes and symptoms in the past.  CRP and sed rate were elevated upon admission.  Previous MARTA/double-stranded DNA positive in the past.  Patient was found to be persistently lymphopenic and iron deficient.  H&H remained stable at 9 and 29.  Steroid treatment was initiated with complete symptom relief.  Rheumatology was consulted and patient was placed on PJP prophylaxis and conversation was had about starting patient on rituximab for continued treatment of rheumatologic complications.  CellCept was considered, however, patient is allergic.  Full workup pending with rheumatology follow-up.  Patient reports that she is feeling well and would like to be discharged today.  Rheumatology, Orthopedic, and Gynecology follow-up all scheduled.    Discharge physical exam:  Vitals  BP: 125/77  Temp: 98 °F (36.7 °C)  Temp Source: Oral  Pulse: (!) 57  Resp: 18  SpO2: 100 %  Height: 5' 1" (154.9 cm)  Weight: 76.3 kg (168 lb 3.4 oz)    General: Awake, alert, & oriented to person, place & time. No acute distress  Psychiatric: Mood and affect normal  HEENT: Normocephalic, atraumatic. Face symmetric.  Cardiovascular: Regular rate & rhythm, Normal S1 & S2 w/out murmurs, rubs or gallops, No JVD appreciated, 2+ pulses throughout  Pulmonary: Bilateral symmetric chest rise, Non-labored, no wheezes, rhonchi or crackles are appreciated  Abdominal:  Soft nontender nondistended Bowel sounds present  Extremities: No clubbing, cyanosis or edema.  Skin:  Exposed skin is warm & dry.  Neuro:   Patient moves all extremities equally. Sensation intact bilateraly.    TIME SPENT ON DISCHARGE: 60 minutes    Discharge Medications        Medication List        START taking these medications      HYDROcodone-acetaminophen 7.5-325 mg per tablet  Commonly known as: NORCO  Take 1 tablet by mouth every 8 (eight) hours as needed for Pain.     sulfamethoxazole-trimethoprim 800-160mg 800-160 mg Tab  Commonly known as: BACTRIM DS  Take 1 tablet " by mouth every other day.  Start taking on: March 14, 2023            CONTINUE taking these medications      atorvastatin 10 MG tablet  Commonly known as: LIPITOR     * blood sugar diagnostic Strp  1 each by Misc.(Non-Drug; Combo Route) route 4 (four) times daily.     * blood sugar diagnostic Strp  Commonly known as: ONETOUCH VERIO TEST STRIPS  1 each by Misc.(Non-Drug; Combo Route) route 4 (four) times daily.     blood-glucose meter kit  Commonly known as: ONETOUCH VERIO SYNC  Use as instructed     hydrOXYchloroQUINE 200 mg tablet  Commonly known as: PLAQUENIL  Take 1 tablet (200 mg total) by mouth once daily.     insulin glargine 100 units/mL SubQ pen  Commonly known as: LANTUS SOLOSTAR U-100 INSULIN  Inject 80 Units into the skin every evening.     lancets 33 gauge Misc  1 lancet by Misc.(Non-Drug; Combo Route) route 4 (four) times daily.     omeprazole 20 MG capsule  Commonly known as: PRILOSEC  Take 1 capsule (20 mg total) by mouth once daily.     ondansetron 4 MG tablet  Commonly known as: ZOFRAN  Take 1 tablet (4 mg total) by mouth every 6 (six) hours as needed for Nausea.     predniSONE 5 MG tablet  Commonly known as: DELTASONE  Take 1 tablet (5 mg total) by mouth once daily.     * PROAIR RESPICLICK 90 mcg/actuation inhaler  Generic drug: albuterol sulfate     * albuterol 0.63 mg/3 mL Nebu  Commonly known as: ACCUNEB  Take 3 mLs (0.63 mg total) by nebulization every 4 (four) hours as needed (wheezing). Rescue     walker Misc  Please dispense 1 Rollator           * This list has 4 medication(s) that are the same as other medications prescribed for you. Read the directions carefully, and ask your doctor or other care provider to review them with you.                   Where to Get Your Medications        These medications were sent to [facility] [address] [place], [place] [address] [facility] [address]      Phone: [phone]   sulfamethoxazole-trimethoprim  800-160mg 800-160 mg Tab       Information about where to get these medications is not yet available    Ask your nurse or doctor about these medications  HYDROcodone-acetaminophen 7.5-325 mg per tablet         Discharge Information:     Patient reports feeling much better and completely asymptomatic today.  Patient was educated about her condition and informed to return to the hospital/ED for any new or worsening symptoms.  Patient is to continue taking Bactrim for PJP prophylaxis per Rheumatology.  Rheumatology appointment scheduled for 03/31/2023 for consideration of beginning a DMARD therapy.  Patient is to continue long-term steroid therapy.  Patient prescribed Norco 7.5 for the next 7 days for treatment of pain as needed.  Please continue to take all medications as directed.    Jules Reaves MD  Internal Medicine - PGY-1

## 2023-03-13 NOTE — PT/OT/SLP PROGRESS
"Physical Therapy Treatment    Patient Name:  Vi Ulrich   MRN:  [id number]    Recommendations:     Discharge Recommendations: outpatient PT (for R shoulder pain)  Discharge Equipment Recommendations: bedside commode, walker, rolling  Barriers to discharge: None    Assessment:     Vi Ulrich is a 57 y.o. female admitted with a medical diagnosis of:    Patient Active Problem List   Diagnosis    Essential hypertension    Diabetes mellitus, type 2    Lupus (systemic lupus erythematosus)    Insomnia    Hyperlipidemia    History of asthma    New onset atrial fibrillation    Periumbilical pain    Constipation    Anemia    Anxiety    Class 1 obesity with serious comorbidity and body mass index (BMI) of 32.0 to 32.9 in adult    Acute cystitis with hematuria    STIVEN (acute kidney injury)    Pyelonephritis    Inadequate dietary energy intake    Systemic lupus erythematosus    Fibromyalgia    Lupus nephritis, ISN/RPS class III    Asthma     She presents with the following impairments/functional limitations: weakness, pain, decreased ROM. Pt met all goals for PT and has returned to PLOF, however PT would recommend pt to outpatient PT services due to pain and limited ROM in R shoulder.     Rehab Prognosis: Good; patient would benefit from acute skilled PT services to address these deficits and reach maximum level of function.    Recent Surgery: * No surgery found *      Plan:     During this hospitalization, patient to be seen  (3-5 times per week) to address the identified rehab impairments via gait training, therapeutic activities, therapeutic exercises, neuromuscular re-education and progress toward the following goals:    Plan of Care Expires:  04/07/23    Subjective     Chief Complaint: "Pain in my R shoulder when I move it."  Patient/Family Comments/goals: "I am ready to go home today."  Pain/Comfort:  Pain Rating 1: 7/10  Location - Side 1: Right  Location 1: shoulder  Pain Addressed 1: Reposition, Distraction, Nurse " notified  Pain Rating Post-Intervention 1: 7/10      Objective:     Communicated with RN prior to session.  Patient found HOB elevated with peripheral IV, telemetry upon PT entry to room.     General Precautions: Standard, fall  Orthopedic Precautions: N/A  Braces: N/A  Respiratory Status: Room air     Functional Mobility:  Bed Mobility:     Supine to Sit: modified independence  Sit to Supine: modified independence  Transfers:     Sit to Stand:  modified independence with no AD  Gait: 260ft with No AD at MI (normal gait speed, step through gait pattern, mild decreased arm swing, decreased R knee flexion)  Stairs:  Pt ascended/descended 20 stair(s) with No Assistive Device with left handrail with Stand-by Assistance. (Step to gait pattern)      Treatment & Education:  Pt educated about continuing to perform exercises and ambulation at home in order to continue to increase strength and activity tolerance.     Patient left sitting edge of bed with all lines intact, call button in reach, and RN notified..    GOALS:   Multidisciplinary Problems       Physical Therapy Goals          Problem: Physical Therapy    Goal Priority Disciplines Outcome Goal Variances Interventions   Physical Therapy Goal     PT, PT/OT Adequate for Care Transition     Description: Goals to be met by: Discharge     Patient will increase functional independence with mobility by performing:    -. Supine to sit with CGA Assistance (MET 03/13/23)  -. Sit to supine with Contact Guard Assistance MET (03/13/23)  -. Sit to stand transfer with Stand-by Assistance MET (03/13/23)  -. Gait  x 130 feet with Stand-by Assistance using Rolling Walker. MET (03/13/23)                         Time Tracking:     PT Received On: 03/13/23  PT Start Time: 1049     PT Stop Time: 1103  PT Total Time (min): 14 min     Billable Minutes: Therapeutic Activity 14    Treatment Type: Treatment  PT/PTA: PT           03/13/2023

## 2023-03-13 NOTE — PLAN OF CARE
Problem: Fluid and Electrolyte Imbalance (Acute Kidney Injury/Impairment)  Goal: Fluid and Electrolyte Balance  3/13/2023 1329 by Fidelina Friend RN  Outcome: Met  3/13/2023 1329 by Fidelina Friend RN  Outcome: Ongoing, Progressing     Problem: Oral Intake Inadequate (Acute Kidney Injury/Impairment)  Goal: Optimal Nutrition Intake  3/13/2023 1329 by Fidelina Friend RN  Outcome: Met  3/13/2023 1329 by Fidelina Friend RN  Outcome: Ongoing, Progressing     Problem: Renal Function Impairment (Acute Kidney Injury/Impairment)  Goal: Effective Renal Function  3/13/2023 1329 by Fidelina Friend RN  Outcome: Met  3/13/2023 1329 by Fidelina Friend RN  Outcome: Ongoing, Progressing     Problem: Skin Injury Risk Increased  Goal: Skin Health and Integrity  3/13/2023 1329 by Fidelina Friend RN  Outcome: Met  3/13/2023 1329 by Fidelina Friend RN  Outcome: Ongoing, Progressing     Problem: Adult Inpatient Plan of Care  Goal: Plan of Care Review  3/13/2023 1329 by Fidelina Friend RN  Outcome: Met  3/13/2023 1329 by Fidelina Friend RN  Outcome: Ongoing, Progressing  Goal: Patient-Specific Goal (Individualized)  3/13/2023 1329 by Fidelina Friend RN  Outcome: Met  3/13/2023 1329 by Fidelina Friend RN  Outcome: Ongoing, Progressing  Goal: Absence of Hospital-Acquired Illness or Injury  3/13/2023 1329 by Fidelina Friend RN  Outcome: Met  3/13/2023 1329 by Fidelina Friend RN  Outcome: Ongoing, Progressing  Goal: Optimal Comfort and Wellbeing  3/13/2023 1329 by Fidelina Friend RN  Outcome: Met  3/13/2023 1329 by Fidelina Friend RN  Outcome: Ongoing, Progressing  Goal: Readiness for Transition of Care  3/13/2023 1329 by Fidelina Friend RN  Outcome: Met  3/13/2023 1329 by Fidelina Friend RN  Outcome: Ongoing, Progressing     Problem: Diabetes Comorbidity  Goal: Blood Glucose Level Within Targeted Range  3/13/2023 1329 by Fidelina Friend RN  Outcome: Met  3/13/2023 1329 by Fidelina Friend RN  Outcome:  Ongoing, Progressing     Problem: Fall Injury Risk  Goal: Absence of Fall and Fall-Related Injury  3/13/2023 1329 by Fidelina Friend RN  Outcome: Met  3/13/2023 1329 by Fidelina Friend RN  Outcome: Ongoing, Progressing     Problem: Physical Therapy  Goal: Physical Therapy Goal  Description: Goals to be met by: Discharge     Patient will increase functional independence with mobility by performing:    -. Supine to sit with CGA Assistance (MET 03/13/23)  -. Sit to supine with Contact Guard Assistance MET (03/13/23)  -. Sit to stand transfer with Stand-by Assistance MET (03/13/23)  -. Gait  x 130 feet with Stand-by Assistance using Rolling Walker. MET (03/13/23)    Outcome: Met     Problem: Occupational Therapy  Goal: Occupational Therapy Goal  Description: Goals to be met by: d/c     Patient will increase functional independence with ADLs by performing:    LE Dressing with Stand-by Assistance.  Grooming while standing at sink with Stand-by Assistance.  Sitting at edge of bed x 30 minutes for meals with Stand-by Assistance and no c/o fatigue.  Rolling to Bilateral with Stand-by Assistance.   Supine to sit with Stand-by Assistance.  Step transfer with Stand-by Assistance with A device as needed.    Outcome: Met     Problem: Skin Injury Risk Increased  Goal: Skin Health and Integrity  3/13/2023 1329 by Fidelina Friend RN  Outcome: Met  3/13/2023 1329 by Fidelina Friend RN  Outcome: Ongoing, Progressing

## 2023-03-13 NOTE — PROGRESS NOTES
[facility] Medicine Wards   Progress Note     Resident Team: [facility] Medicine List 3  Attending Physician: Peewee Askew MD  Resident: Jose Armando   Intern: [facility] Length of Stay: 4 days    Subjective:      Brief HPI:  Vi Ulrich is a 57 y.o. female with a history of Asthma, A fib, DM, GERD, gout, HTN, and SLE w/ lupus nephritis who presented to [facility] ED on 3/9/2023 after being sent directly from internal medicine clinic with complaints of generalized weakness, dysphagia, N w/o V, F/C, joint pain, shortness of breath, dyspnea on exertion, urinary incontinence, and weight loss.  She states she has been feeling fatigued and weak over the last several months but her symptoms began to worsen over the last 2 weeks.  She is endorsing significant weakness in upper extremities which is believed to be secondary to pain.  Patient is unable to lift her left arm at all and states this began a few days ago.  She also states she has lost a significant amount of weight over the last 2 weeks and has not been able to tolerate PO intake secondary to both nausea and dysphagia.  She states it is painful to swallow and she feels like food gets stuck in her throat when she attempts to eat.  She denies any chest pain but does state she has been experiencing shortness of breath at rest as well as dyspnea on exertion.  She states she is not able to walk at all secondary to both SOB and pain.  She is endorsing significant pain in all joints and states her joints swell episodically.  She is also endorsing urinary incontinence and states she is not able to control when she pees.  This may also be secondary to pain as patient states she struggles with walking to the bathroom and cannot make it in time.  She endorses possible episodes of fever while at home and states she often wakes up from a nap in a sweat.  She denies any tobacco, alcohol, or recreational drug use and states she is compliant with medication regimen.  She has been taking  prednisone 5mg and Plaquenil since 2004 for hx of lupus.  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       In the ED, patients vitals were stable, satting well on room air.  Labs were remarkable for neutropenia with wbc of 3.5 and normocytic anemia with H/H of 9.7/29.2.  Sodium was mildly low at 135 and other electrolytes wnl.  Creatinine was mildly elevated at 1.19.  CRP was elevated at 88.2 and sed rate was elevated at 73.  Chest x-ray was performed showing mild congestive changes.  Patient was admitted to medicine secondary to concern for lupus flare.      Interval History: No acute events overnight.  Patient reports no complaints. She feels well and is ready for discharge. Reports that she is ready to try DMARD medication if it is available. She denies fever, chills, nausea, vomiting, diarrhea, chest pain, abdominal pain, numbness, weakness, tingling, and a rash.    ROS negative unless stated above.    Objective:     Vital Signs (Most Recent):  Temp: 98 °F (36.7 °C) (03/13/23 0735)  Pulse: (!) 52 (03/13/23 0735)  Resp: 18 (03/12/23 2000)  BP: 135/75 (03/13/23 0735)  SpO2: 100 % (03/13/23 0735)   Vital Signs (24h Range):  Temp:  [97.4 °F (36.3 °C)-98 °F (36.7 °C)] 98 °F (36.7 °C)  Pulse:  [51-59] 52  Resp:  [18-20] 18  SpO2:  [89 %-100 %] 100 %  BP: (122-177)/(71-82) 135/75     Physical Exam  General: Patient resting comfortably in bed, in no acute distress   Eye: PERRLA, EOMI, clear conjunctiva, eyelids normal  HENT: Head-normocephalic and atraumatic  Neck: full range of motion, no thyromegaly or lymphadenopathy, trachea midline, supple, no palpable thyroid nodules  Respiratory: clear to auscultation bilaterally without wheezes, rales, rhonchi  Cardiovascular: regular rate and rhythm without murmurs.  No gallops or rubs no JVD.  Capillary refill within normal limits.  Gastrointestinal: soft,  non-tender, non-distended with normal bowel sounds, without masses to palpation  Genitourinary: no CVA tenderness to palpation  Musculoskeletal: limited range of motion of shoulders bilaterally secondary to pain, patient able to ambulate slowly.  Weakness noted in all extremities secondary to pain, upper more than lower.  Reflexes and sensation intact.    Integumentary: no rashes or skin lesions present  Neurologic: no signs of peripheral neurological deficit, motor/sensory function intact  Psychiatric:  alert and oriented, cognitive function intact, cooperative with exam, good eye contact, judgement and insight intact, mood and affect full range.     Laboratory:  Most Recent Data:  Recent Lab Results  (Last 5 results in the past 24 hours)        03/13/23  0759   03/13/23  0457   03/12/23  2029   03/12/23  1622   03/12/23  1133        Albumin/Globulin Ratio   0.6             Albumin   2.4             Alkaline Phosphatase   79             ALT   16             AST   20             Baso #   0.00             Basophil %   0.0             BILIRUBIN TOTAL   0.2             BUN   38.4             Calcium   8.5             Chloride   109             CO2   21             Creatinine   1.24             eGFR   52             Eos #   0.00             Eosinophil %   0.0             Globulin, Total   4.2             Glucose   129             Hematocrit   29.0             Hemoglobin   9.2             Immature Grans (Abs)   0.01             Immature Granulocytes   0.3             Lymph #   0.57             LYMPH %   19.7             Magnesium   2.30             MCH   26.4             MCHC   31.7             MCV   83.3             Mono #   0.37             Mono %   12.8             MPV   11.3             Neut #   1.95             Neut %   67.2             nRBC   0.0             Phosphorus   2.0             Platelets   426             POCT Glucose 62     189   157   167       Potassium   5.2             PROTEIN TOTAL   6.6              RBC   3.48             RDW   17.7             Sodium   135             WBC   2.9                                    Microbiology Data Reviewed: yes  Pertinent Findings:  Blood cultures NGTD    Other Results:  EKG (my interpretation): normal EKG, normal sinus rhythm, unchanged from previous tracings.    Radiology:  Imaging Results              CT Chest Without Contrast (Final result)  Result time 03/09/23 17:18:56      Final result by Rishi Urban MD (03/09/23 17:18:56)                   Impression:      Persistent ground-glass interstitial traits in the lung bases bilaterally as well as the right middle lobe.  Findings are unchanged since the prior examination    No evidence of interstitial fibrosis      Electronically signed by: Rishi Urban  Date:    03/09/2023  Time:    17:18               Narrative:    EXAMINATION:  CT CHEST WITHOUT CONTRAST    CLINICAL HISTORY:  Interstitial lung disease;    TECHNIQUE:  Low dose axial images, sagittal and coronal reformations were obtained from the thoracic inlet to the lung bases. Contrast was not administered.  Automatic exposure control is utilized to reduce patient radiation exposure.    COMPARISON:  05/27/2022    FINDINGS:  The lungs are adequately aerated.  There are some ground-glass interstitial infiltrates in the lung bases bilaterally as well as the right middle lobe.  Similar changes were seen on the prior examination.  No changes of interstitial fibrosis are seen.  No significant bronchiectasis is seen.  No nodularity is seen.  No pleural effusion is seen.    No abnormal mediastinal lymphadenopathy is seen.  Thoracic aorta appears normal.  The heart is normal in size.  There are multiple coronary artery calcification is seen.    Upper abdomen shows no acute abnormality.    Bones appear normal..                                       X-Ray Chest 1 View (Final result)  Result time 03/09/23 15:05:11      Final result by Oscar Duncan MD (03/09/23  15:05:11)                   Impression:      Suspected mild congestive changes.      Electronically signed by: Oscar Duncan  Date:    03/09/2023  Time:    15:05               Narrative:    EXAMINATION:  XR CHEST 1 VIEW    CLINICAL HISTORY:  shortness of breath;    TECHNIQUE:  One view    COMPARISON:  January 12, 2023..    FINDINGS:  Cardiopericardial silhouette is within normal limits.  There is slight prominence of lungs interstitial markings suspected for mild congestive process.  There is no focally dense consolidation or fluid within the pleural spaces.  Thoracolumbar compound scoliosis.                                    Current Medications:     Infusions:       Scheduled:   atorvastatin  10 mg Oral Daily    enoxaparin  40 mg Subcutaneous Daily    ferrous sulfate  1 tablet Oral Daily    hydrOXYchloroQUINE  200 mg Oral Daily    insulin detemir U-100  45 Units Subcutaneous QHS    pantoprazole  40 mg Oral Daily    predniSONE  60 mg Oral Daily    sulfamethoxazole-trimethoprim 800-160mg  1 tablet Oral Every other day        PRN:  acetaminophen, dextrose 10 % in water (D10W), dextrose 10 % in water (D10W), glucagon (human recombinant), glucose, glucose, HYDROcodone-acetaminophen, insulin aspart U-100, naloxone, sodium chloride 0.9%      Intake/Output Summary (Last 24 hours) at 3/13/2023 0907  Last data filed at 3/13/2023 0746  Gross per 24 hour   Intake 288 ml   Output 2640 ml   Net -2352 ml       Lines/Drains/Airways       Peripheral Intravenous Line  Duration                  Peripheral IV - Single Lumen 03/11/23 2100 Posterior;Right Wrist 1 day                  Assessment & Plan:     SLE flare vs. Myositis   Lupus Nephritis   Fibromyalgia   -MARTA 1:2560, dsDNA positive in past   -home regimen includes prednisone 5mg and HCLQ   -rheumatologic labs ordered, C3 and C4 decreased, CK wnl hepatitis panel negative, TSH wnl, UDS negative, Immunoglobulins elevated (particularly IgG), aldolase wnl, Haptoglobin wnl, dsDNA,  Quantiferon gold, myoglobin, and MyoMarker pending.    -CRP and sed rate elevated on admission   -CXR showing mild congestive changes, CT chest showing persistent ground-glass interstitial traits in the lung bases bilaterally as well as the right middle lobe. No evidence of interstitial fibrosis.   -EKG showing normal sinus rhythm  -case discussed with rheumatology, per recs completed 3 day course of solumedrol 3/11, prednisone 60mg started yesterday, celcept was considered but patient has allergy to medication.  Will consider addition of other DMARD therapy (possibly rituxumab) s/p complete workup. Continue Bactrim double strength 3x per week for PJP prophylaxis.   -workup of anemia performed, consistent with anemia of chronic disease.  Continue ferrous sulfate for iron supplementation.   -urinalysis w/ 2+ protein, 1+ ketones, and 1+ occult blood with few WBC's and trace bacteria, urine studies wnl   -continue hydroxychloroquine per home regimen   -tylenol and norco for pain PRN   -will continue to monitor      Dysphagia   GERD  -SLP consulted for further workup, recommending continued PO diet w/ strict aspiration precautions.  Per recs, continue to monitor for dysphagia management.   -continue protonix per home regimen    Urinary Incontinence - improved   -likely secondary to pain as patient has a hard time walking to the bathroom  -will consider MRI spine per rheumatology recs if symptoms continue     T2DM  -home regimen includes lantus 80 units nightly  -increased detemir to 45 units nightly   -low dose sliding scale for correction   -will continue to monitor blood sugars and adjust medication regimen as needed.       HLD  -continue atorvastatin 10mg per home regimen      Code status: Full  DVT prophylaxis: Lovenox   Diet: Diabetic   Oxygenation: Room air   GI prophylaxis: pantoprazole   Lines/drains: PIV   Consults: discussed case w/ rheumatology, PT/OT/SLP      Disposition: admission for SLE flare vs.  Myositis.  Continue high dose steroids, rheumatology on board, pending workup, will continue inpatient management at this time.     Jules Reaves MD  [facility] Internal Medicine, HO-1

## 2023-03-13 NOTE — PT/OT/SLP DISCHARGE
Physical Therapy Discharge Summary    Name: iV Ulrich  MRN: [id number]   Principal Problem: <principal problem not specified>     Patient Discharged from acute Physical Therapy on 03/13/23.  Please refer to prior PT noted date on 03/13/23 for functional status.     Assessment:     Patient has met all goals and is not appropriate for therapy.    Objective:     GOALS:   Multidisciplinary Problems       Physical Therapy Goals          Problem: Physical Therapy    Goal Priority Disciplines Outcome Goal Variances Interventions   Physical Therapy Goal     PT, PT/OT Adequate for Care Transition     Description: Goals to be met by: Discharge     Patient will increase functional independence with mobility by performing:    -. Supine to sit with CGA Assistance (MET 03/13/23)  -. Sit to supine with Contact Guard Assistance MET (03/13/23)  -. Sit to stand transfer with Stand-by Assistance MET (03/13/23)  -. Gait  x 130 feet with Stand-by Assistance using Rolling Walker. MET (03/13/23)                         Reasons for Discontinuation of Therapy Services  Pt has met all acute care PT goals at this time. Will be good candidate for outpatient PT services due to limited ROM in R shoulder and increased pain levels.        Plan:     Patient Discharged to: Outpatient Therapy Services.      3/13/2023

## 2023-03-13 NOTE — PT/OT/SLP PROGRESS
Physical Therapy- Missed treatment      Patient Name:  Vi Ulrich   MRN:  [id number]    Patient not seen today secondary to Patient unwilling to participate. Pt notes that she is on the phone with Medicare and that she needs to switch her insurance before working with PT. Will follow-up as scheduled and appropriate.

## 2023-03-14 ENCOUNTER — PATIENT OUTREACH (OUTPATIENT)
Dept: ADMINISTRATIVE | Facility: CLINIC | Age: 58
End: 2023-03-14
Payer: MEDICAID

## 2023-03-14 LAB
BACTERIA BLD CULT: NORMAL
BACTERIA BLD CULT: NORMAL
DSDNA ANTIBODY (OHS): POSITIVE
GAMMA INTERFERON BACKGROUND BLD IA-ACNC: 0.54 IU/ML
M TB IFN-G BLD-IMP: NEGATIVE
M TB IFN-G CD4+ BCKGRND COR BLD-ACNC: -0.13 IU/ML
M TB IFN-G CD4+CD8+ BCKGRND COR BLD-ACNC: -0.01 IU/ML
MITOGEN IGNF BCKGRD COR BLD-ACNC: 0.77 IU/ML

## 2023-03-14 NOTE — PROGRESS NOTES
C3 nurse attempted to contact Vi Ulrich for a TCC post hospital discharge follow up call. No answer. Left voicemail with callback information. The patient has a scheduled HOS appointment with Po Mcfarlane MD [facility] Rhe[facility] 3/31/23.

## 2023-03-14 NOTE — PT/OT/SLP DISCHARGE
Occupational Therapy Discharge Summary    Vi Ulrich  MRN: [id number]   Principal Problem: SLE (systemic lupus erythematosus)   Patient Active Problem List   Diagnosis    Essential hypertension    Diabetes mellitus, type 2    Insomnia    Hyperlipidemia    History of asthma    New onset atrial fibrillation    Periumbilical pain    Constipation    Anemia    Anxiety    Class 1 obesity with serious comorbidity and body mass index (BMI) of 32.0 to 32.9 in adult    Acute cystitis with hematuria    STIVEN (acute kidney injury)    Pyelonephritis    Inadequate dietary energy intake    SLE (systemic lupus erythematosus)    Fibromyalgia    Lupus nephritis, ISN/RPS class III    Asthma          Patient Discharged from acute Occupational Therapy.  Please refer to prior OT note for functional status.    Assessment:      Patient has not met goals.    Objective:     GOALS:   Multidisciplinary Problems       Occupational Therapy Goals       Not on file              Multidisciplinary Problems (Resolved)          Problem: Occupational Therapy    Goal Priority Disciplines Outcome Interventions   Occupational Therapy Goal   (Resolved)     OT, PT/OT Met    Description: Goals to be met by: d/c     Patient will increase functional independence with ADLs by performing:    LE Dressing with Stand-by Assistance.  Grooming while standing at sink with Stand-by Assistance.  Sitting at edge of bed x 30 minutes for meals with Stand-by Assistance and no c/o fatigue.  Rolling to Bilateral with Stand-by Assistance.   Supine to sit with Stand-by Assistance.  Step transfer with Stand-by Assistance with A device as needed.                         Reasons for Discontinuation of Therapy Services  Transfer to alternate level of care.      Plan:     Patient Discharged to: Home with Home Health Service    3/14/2023

## 2023-03-15 DIAGNOSIS — E11.00 TYPE 2 DIABETES MELLITUS WITH HYPEROSMOLARITY WITHOUT COMA, WITH LONG-TERM CURRENT USE OF INSULIN: Primary | ICD-10-CM

## 2023-03-15 DIAGNOSIS — Z79.4 TYPE 2 DIABETES MELLITUS WITH HYPEROSMOLARITY WITHOUT COMA, WITH LONG-TERM CURRENT USE OF INSULIN: Primary | ICD-10-CM

## 2023-03-15 RX ORDER — LANCETS 28 GAUGE
100 EACH MISCELLANEOUS 3 TIMES DAILY
Qty: 100 EACH | Refills: 0 | Status: ON HOLD | OUTPATIENT
Start: 2023-03-15 | End: 2023-10-14 | Stop reason: SDUPTHER

## 2023-03-15 RX ORDER — INSULIN PUMP SYRINGE, 3 ML
EACH MISCELLANEOUS
Qty: 1 EACH | Refills: 0 | Status: ON HOLD | OUTPATIENT
Start: 2023-03-15 | End: 2023-10-14 | Stop reason: HOSPADM

## 2023-03-15 RX ORDER — HYDROCODONE BITARTRATE AND ACETAMINOPHEN 7.5; 325 MG/1; MG/1
1 TABLET ORAL EVERY 8 HOURS PRN
Qty: 21 TABLET | Refills: 0 | Status: SHIPPED | OUTPATIENT
Start: 2023-03-15 | End: 2023-03-15

## 2023-03-15 RX ORDER — HYDROCODONE BITARTRATE AND ACETAMINOPHEN 7.5; 325 MG/1; MG/1
1 TABLET ORAL EVERY 8 HOURS PRN
Qty: 21 TABLET | Refills: 0 | Status: SHIPPED | OUTPATIENT
Start: 2023-03-15 | End: 2023-03-22

## 2023-03-15 NOTE — PROGRESS NOTES
C3 nurse spoke with patient.  Patient stated she was unable to fill her pain medication d/t an error with the prescribing DO number.  C3 nurse attempted to secure chat prescribing doctor but doctor is offline.  C3 nurse tried calling Dr. Barrios's office with no answer and also tried calling [facility] pharmacy and unable to reach also.  Advised patient to continue calling Dr. Barrios's office but patient stated she was in too much pain and was on her way to the ED and hung up.

## 2023-03-20 ENCOUNTER — TELEPHONE (OUTPATIENT)
Dept: INTERNAL MEDICINE | Facility: CLINIC | Age: 58
End: 2023-03-20

## 2023-03-20 NOTE — TELEPHONE ENCOUNTER
Pt called stating her forms that restrict her from having an upstairs apartment has  and she need another form filled out stating she can not climb stairs and need a down stairs apartment for the housing company. Please Advise.

## 2023-03-21 DIAGNOSIS — M32.14 SYSTEMIC LUPUS ERYTHEMATOSUS WITH GLOMERULAR DISEASE, UNSPECIFIED SLE TYPE: Primary | ICD-10-CM

## 2023-03-27 NOTE — PROGRESS NOTES
I have reviewed and concur with the resident's history, physical, assessment, and plan.  I have discussed with him all issues related to the diagnosis, workup and treatment plan. Care provided as reasonable and necessary.     Peewee Askew MD  [facility]

## 2023-03-31 ENCOUNTER — TELEPHONE (OUTPATIENT)
Dept: RHEUMATOLOGY | Facility: CLINIC | Age: 58
End: 2023-03-31
Payer: MEDICAID

## 2023-03-31 ENCOUNTER — OFFICE VISIT (OUTPATIENT)
Dept: RHEUMATOLOGY | Facility: CLINIC | Age: 58
End: 2023-03-31
Payer: MEDICAID

## 2023-03-31 VITALS
SYSTOLIC BLOOD PRESSURE: 129 MMHG | HEART RATE: 72 BPM | TEMPERATURE: 99 F | BODY MASS INDEX: 26.17 KG/M2 | OXYGEN SATURATION: 98 % | WEIGHT: 162.81 LBS | HEIGHT: 66 IN | DIASTOLIC BLOOD PRESSURE: 76 MMHG

## 2023-03-31 DIAGNOSIS — F51.01 PRIMARY INSOMNIA: ICD-10-CM

## 2023-03-31 DIAGNOSIS — N17.9 AKI (ACUTE KIDNEY INJURY): Primary | ICD-10-CM

## 2023-03-31 DIAGNOSIS — I10 ESSENTIAL HYPERTENSION: ICD-10-CM

## 2023-03-31 DIAGNOSIS — M32.14 SYSTEMIC LUPUS ERYTHEMATOSUS WITH GLOMERULAR DISEASE, UNSPECIFIED SLE TYPE: Primary | ICD-10-CM

## 2023-03-31 DIAGNOSIS — M32.14 LUPUS NEPHRITIS, ISN/RPS CLASS III: ICD-10-CM

## 2023-03-31 DIAGNOSIS — M79.7 FIBROMYALGIA: ICD-10-CM

## 2023-03-31 LAB
EJ AB SER QL: NEGATIVE
ENA JO1 AB SER IA-ACNC: <20 UNITS
ENA PM/SCL AB SER-ACNC: <20 UNITS
ENA SS-A 52KD IGG SER IA-ACNC: <20 UNITS
FIBRILLARIN AB SER QL: NEGATIVE
KU AB SER QL: ABNORMAL
MDA5 AB SER LINE BLOT-ACNC: 47 UNITS
MI2 AB SER QL: NEGATIVE
MJ AB SER LINE BLOT-ACNC: <20 UNITS
OJ AB SER QL: NEGATIVE
PL12 AB SER QL: NEGATIVE
PL7 AB SER QL: NEGATIVE
SRP AB SERPL QL: NEGATIVE
TIF1-GAMMA AB SER LINE BLOT-ACNC: <20 UNITS
U1 SNRNP AB SER IA-ACNC: 153 UNITS
U2 SNRNP AB SER QL: ABNORMAL

## 2023-03-31 PROCEDURE — 99214 OFFICE O/P EST MOD 30 MIN: CPT | Mod: PBBFAC | Performed by: INTERNAL MEDICINE

## 2023-03-31 PROCEDURE — 99214 PR OFFICE/OUTPT VISIT, EST, LEVL IV, 30-39 MIN: ICD-10-PCS | Mod: S$PBB,,, | Performed by: INTERNAL MEDICINE

## 2023-03-31 PROCEDURE — 99999 PR PBB SHADOW E&M-EST. PATIENT-LVL IV: CPT | Mod: PBBFAC,,, | Performed by: INTERNAL MEDICINE

## 2023-03-31 PROCEDURE — 99999 PR PBB SHADOW E&M-EST. PATIENT-LVL IV: ICD-10-PCS | Mod: PBBFAC,,, | Performed by: INTERNAL MEDICINE

## 2023-03-31 PROCEDURE — 99214 OFFICE O/P EST MOD 30 MIN: CPT | Mod: S$PBB,,, | Performed by: INTERNAL MEDICINE

## 2023-03-31 RX ORDER — HYDROXYCHLOROQUINE SULFATE 200 MG/1
200 TABLET, FILM COATED ORAL DAILY
Qty: 30 TABLET | Refills: 11 | Status: SHIPPED | OUTPATIENT
Start: 2023-03-31 | End: 2023-06-27

## 2023-03-31 RX ORDER — MYCOPHENOLATE MOFETIL 500 MG/1
1000 TABLET ORAL
Qty: 60 TABLET | Refills: 11 | Status: SHIPPED | OUTPATIENT
Start: 2023-03-31 | End: 2023-04-04 | Stop reason: SDUPTHER

## 2023-03-31 RX ORDER — PREDNISONE 5 MG/1
5 TABLET ORAL DAILY
Qty: 30 TABLET | Refills: 11 | Status: SHIPPED | OUTPATIENT
Start: 2023-03-31 | End: 2023-06-27 | Stop reason: SDUPTHER

## 2023-03-31 RX ORDER — BUTALBITAL, ACETAMINOPHEN AND CAFFEINE 50; 325; 40 MG/1; MG/1; MG/1
1 TABLET ORAL EVERY 4 HOURS PRN
Qty: 90 TABLET | Refills: 5 | Status: SHIPPED | OUTPATIENT
Start: 2023-03-31 | End: 2023-06-06

## 2023-03-31 RX ORDER — HYDROCODONE BITARTRATE AND ACETAMINOPHEN 7.5; 325 MG/1; MG/1
1 TABLET ORAL EVERY 8 HOURS PRN
COMMUNITY
End: 2023-04-04

## 2023-03-31 NOTE — PROGRESS NOTES
"Subjective:       Patient ID: Vi Ulrich is a 57 y.o. female.    Chief Complaint: Follow-up (Follow up. Patient states that her shoulders hurt she did have a fall and not sure if it is because of the fall but they hurt all the time.she has no strength in her arm and pain in the upper and lower back and right side. Excessive fatigue and nausea all the time but the nausea is being treated for it. Patient states that she is also having a lot of migraines.Pain 6/10)    The patient was recently admitted to [facility] for pyelonephritis.  She is currently on Bactrim.  Today the patient was frustrated that she is not feeling great.  I gave her a big and long counseling concerning her condition and concerning adding an extra medicine called CellCept that helps her condition and does not affect her infection.  I also instructed her in case her condition worsens to had to the hospital for evaluation and treatment.  She stated understanding.    Review of Systems   Constitutional:  Negative for appetite change, chills and fever.   HENT:  Negative for congestion, ear pain, mouth sores, nosebleeds and trouble swallowing.    Eyes:  Negative for photophobia and discharge.   Respiratory:  Negative for chest tightness and shortness of breath.    Cardiovascular:  Negative for chest pain.   Gastrointestinal:  Negative for abdominal pain and vomiting.   Endocrine: Negative.    Genitourinary:  Negative for dysuria, hematuria and urgency.   Musculoskeletal:         As per HPI   Skin:  Negative for rash.   Neurological:  Negative for weakness.       Objective:   /76 (BP Location: Left arm, Patient Position: Sitting, BP Method: Medium (Automatic))   Pulse 72   Temp 98.6 °F (37 °C) (Oral)   Ht 5' 6" (1.676 m)   Wt 73.8 kg (162 lb 12.8 oz)   LMP  (LMP Unknown)   SpO2 98%   BMI 26.28 kg/m²      Physical Exam   Constitutional: She is oriented to person, place, and time. She appears well-developed and well-nourished. No distress. "   HENT:   Head: Normocephalic and atraumatic.   Right Ear: External ear normal.   Left Ear: External ear normal.   Eyes: Pupils are equal, round, and reactive to light.   Cardiovascular: Normal rate, regular rhythm and normal heart sounds.   Pulmonary/Chest: Breath sounds normal.   Abdominal: Soft. There is no abdominal tenderness.   Musculoskeletal:      Right shoulder: Tenderness present.      Left shoulder: Tenderness present.      Right elbow: Tenderness present.      Left elbow: Tenderness present.      Right wrist: Tenderness present.      Left wrist: Tenderness present.      Cervical back: Neck supple.      Right hip: Tenderness present.      Left hip: Tenderness present.      Right knee: Tenderness present.      Left knee: Tenderness present.      Right ankle: Tenderness present.      Left ankle: Tenderness present.   Lymphadenopathy:     She has no cervical adenopathy.   Neurological: She is alert and oriented to person, place, and time. She displays normal reflexes. No cranial nerve deficit or sensory deficit. She exhibits normal muscle tone. Coordination normal.   Skin: No rash noted. No erythema.   Vitals reviewed.      Right Side Rheumatological Exam     The patient is tender to palpation of the shoulder, elbow, wrist, knee, 1st PIP, 1st MCP, 2nd PIP, 2nd MCP, 3rd PIP, 3rd MCP, 4th PIP, 4th MCP, 5th PIP, hip, ankle, 1st MTP, 2nd MTP, 3rd MTP, 4th MTP, 5th MTP, 1st toe IP, 2nd toe IP, 3rd toe IP, 4th toe IP and 5th toe IP    Left Side Rheumatological Exam     The patient is tender to palpation of the shoulder, elbow, wrist, knee, 1st PIP, 1st MCP, 2nd PIP, 2nd MCP, 3rd PIP, 3rd MCP, 4th PIP, 4th MCP, 5th PIP, 5th MCP, hip, ankle, 1st MTP, 2nd MTP, 3rd MTP, 4th MTP, 5th MTP, 1st toe IP, 2nd toe IP, 3rd toe IP, 4th toe IP and 5th toe IP.       Completed Fibromyalgia exam 18/18 tender points.  No data to display     Assessment:       1. Systemic lupus erythematosus with glomerular disease, unspecified SLE  type    2. Fibromyalgia    3. Primary insomnia    4. Essential hypertension    5. Lupus nephritis, ISN/RPS class III            Medication List with Changes/Refills   New Medications    BUTALBITAL-ACETAMINOPHEN-CAFFEINE -40 MG (FIORICET, ESGIC) -40 MG PER TABLET    Take 1 tablet by mouth every 4 (four) hours as needed for Pain.       Start Date: 3/31/2023 End Date: --    MYCOPHENOLATE (CELLCEPT) 500 MG TAB    Take 2 tablets (1,000 mg total) by mouth daily with dinner or evening meal.       Start Date: 3/31/2023 End Date: 3/30/2024   Current Medications    ALBUTEROL (ACCUNEB) 0.63 MG/3 ML NEBU    Take 3 mLs (0.63 mg total) by nebulization every 4 (four) hours as needed (wheezing). Rescue       Start Date: 1/12/2023 End Date: 1/12/2024    ALBUTEROL SULFATE (PROAIR RESPICLICK) 90 MCG/ACTUATION INHALER    Inhale 1 puff into the lungs every 4 (four) hours as needed for Wheezing. Rescue       Start Date: --        End Date: --    ATORVASTATIN (LIPITOR) 10 MG TABLET    Take 10 mg by mouth once daily.       Start Date: 5/12/2022 End Date: --    BLOOD SUGAR DIAGNOSTIC (ONETOUCH VERIO) STRP    1 each by Misc.(Non-Drug; Combo Route) route 4 (four) times daily.       Start Date: 12/15/2017End Date: --    BLOOD SUGAR DIAGNOSTIC STRP    1 each by Misc.(Non-Drug; Combo Route) route 4 (four) times daily.       Start Date: 12/15/2017End Date: --    BLOOD SUGAR DIAGNOSTIC STRP    100 each by Misc.(Non-Drug; Combo Route) route 3 (three) times daily.       Start Date: 3/15/2023 End Date: --    BLOOD-GLUCOSE METER (FREESTYLE SYSTEM KIT) KIT    Use as instructed       Start Date: 3/15/2023 End Date: 3/14/2024    HYDROCODONE-ACETAMINOPHEN (NORCO) 7.5-325 MG PER TABLET    Take 1 tablet by mouth every 8 (eight) hours as needed for Pain.       Start Date: --        End Date: --    INSULIN (LANTUS SOLOSTAR U-100 INSULIN) GLARGINE 100 UNITS/ML SUBQ PEN    Inject 80 Units into the skin every evening.       Start Date: 1/12/2023 End  Date: 1/12/2024    LANCETS 28 GAUGE MISC    100 lancets by Misc.(Non-Drug; Combo Route) route 3 (three) times daily.       Start Date: 3/15/2023 End Date: --    LANCETS 33 GAUGE MISC    1 lancet by Misc.(Non-Drug; Combo Route) route 4 (four) times daily.       Start Date: 12/15/2017End Date: --    OMEPRAZOLE (PRILOSEC) 20 MG CAPSULE    Take 1 capsule (20 mg total) by mouth once daily.       Start Date: 6/14/2022 End Date: --    ONDANSETRON (ZOFRAN) 4 MG TABLET    Take 1 tablet (4 mg total) by mouth every 6 (six) hours as needed for Nausea.       Start Date: 8/4/2022  End Date: --    SULFAMETHOXAZOLE-TRIMETHOPRIM 800-160MG (BACTRIM DS) 800-160 MG TAB    Take 1 tablet by mouth every other day.       Start Date: 3/14/2023 End Date: 6/12/2023    WALKER MISC    Please dispense 1 Rollator       Start Date: 1/12/2023 End Date: --   Changed and/or Refilled Medications    Modified Medication Previous Medication    HYDROXYCHLOROQUINE (PLAQUENIL) 200 MG TABLET hydrOXYchloroQUINE (PLAQUENIL) 200 mg tablet       Take 1 tablet (200 mg total) by mouth once daily.    Take 1 tablet (200 mg total) by mouth once daily.       Start Date: 3/31/2023 End Date: 3/30/2024    Start Date: 12/12/2022End Date: 3/31/2023    PREDNISONE (DELTASONE) 5 MG TABLET predniSONE (DELTASONE) 5 MG tablet       Take 1 tablet (5 mg total) by mouth once daily.    Take 1 tablet (5 mg total) by mouth once daily.       Start Date: 3/31/2023 End Date: 3/30/2024    Start Date: 12/12/2022End Date: 3/31/2023         Plan:         Problem List Items Addressed This Visit          Cardiac/Vascular    Essential hypertension    Relevant Medications    mycophenolate (CELLCEPT) 500 mg Tab    hydrOXYchloroQUINE (PLAQUENIL) 200 mg tablet    predniSONE (DELTASONE) 5 MG tablet    butalbital-acetaminophen-caffeine -40 mg (FIORICET, ESGIC) -40 mg per tablet       Renal/    Lupus nephritis, ISN/RPS class III    Relevant Medications    mycophenolate (CELLCEPT) 500 mg  Tab    hydrOXYchloroQUINE (PLAQUENIL) 200 mg tablet    predniSONE (DELTASONE) 5 MG tablet    butalbital-acetaminophen-caffeine -40 mg (FIORICET, ESGIC) -40 mg per tablet       Immunology/Multi System    SLE (systemic lupus erythematosus) - Primary    Relevant Medications    mycophenolate (CELLCEPT) 500 mg Tab    hydrOXYchloroQUINE (PLAQUENIL) 200 mg tablet    predniSONE (DELTASONE) 5 MG tablet    butalbital-acetaminophen-caffeine -40 mg (FIORICET, ESGIC) -40 mg per tablet       Orthopedic    Fibromyalgia    Relevant Medications    mycophenolate (CELLCEPT) 500 mg Tab    hydrOXYchloroQUINE (PLAQUENIL) 200 mg tablet    predniSONE (DELTASONE) 5 MG tablet    butalbital-acetaminophen-caffeine -40 mg (FIORICET, ESGIC) -40 mg per tablet       Other    Insomnia    Relevant Medications    mycophenolate (CELLCEPT) 500 mg Tab    hydrOXYchloroQUINE (PLAQUENIL) 200 mg tablet    predniSONE (DELTASONE) 5 MG tablet    butalbital-acetaminophen-caffeine -40 mg (FIORICET, ESGIC) -40 mg per tablet

## 2023-03-31 NOTE — TELEPHONE ENCOUNTER
I saw her in hospital when she was admitted at [facility]. I did not see her in rheumatology clinic. Did some blood work and serologies for myositis came back positive.  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  She needs treatment for that.  She does follow-up with Dr. Mcfarlane, ask her to follow-up with him sooner and talk to him about the results of these tests and further management.

## 2023-04-03 ENCOUNTER — TELEPHONE (OUTPATIENT)
Dept: RHEUMATOLOGY | Facility: CLINIC | Age: 58
End: 2023-04-03
Payer: MEDICAID

## 2023-04-03 NOTE — TELEPHONE ENCOUNTER
This is already been taking care of, forms have been signed and completed for patient's home health.    Octaviano Barrios MD  Internal Medicine - PGY-2

## 2023-04-03 NOTE — TELEPHONE ENCOUNTER
Patient was already seen in clinic by me since this transpired and sent to the ED for further workup and this has been completed and I have established patient would rheumatology.

## 2023-04-03 NOTE — TELEPHONE ENCOUNTER
Patient:   ADELE WILSON            MRN: [id number]            FIN: [id number]              Age:   52 years     Sex:  FEMALE     :  70   Associated Diagnoses:   None   Author:   BERNARD MACHUCA     Postoperative Information     Postoperative Information   Postoperative Information:          Preoperative Diagnosis: GERD, screening.         Postoperative Diagnosis: Same As Pre-Op Dx.         Performed by: BERNARD MACHUCA.         Assistant: none.         Surgical Tasks Performed by Assistant: handling endoscopic accesories..         Procedures Performed: Colonoscopy, EGD.         Findings: Internal hemorrhoids.         Specimens Removed: None.         Estimated Blood Loss: 0  ml.         Blood Administered: No.         Complications: None.         Grafts/Implants: None.    Anesthetic utilized:  Monitored anesthesia care.    will call patient to set up an appointment

## 2023-04-03 NOTE — TELEPHONE ENCOUNTER
L/M for Ms Ulrich to contact Dr Mcfarlane's office to follow up on results from inpatient stay at [facility] per Dr Ochoa.    Was last seen on 3/31/2023 by Dr Mcfarlane--not sure if results were available. Per appointment tab, next appointment with Dr Mcfarlane is 7/3/2023

## 2023-04-04 ENCOUNTER — HOSPITAL ENCOUNTER (OUTPATIENT)
Dept: RADIOLOGY | Facility: HOSPITAL | Age: 58
Discharge: HOME | End: 2023-04-04
Attending: STUDENT IN AN ORGANIZED HEALTH CARE EDUCATION/TRAINING PROGRAM
Payer: COMMERCIAL

## 2023-04-04 ENCOUNTER — OFFICE VISIT (OUTPATIENT)
Dept: RHEUMATOLOGY | Facility: CLINIC | Age: 58
End: 2023-04-04
Payer: MEDICAID

## 2023-04-04 ENCOUNTER — TELEPHONE (OUTPATIENT)
Dept: RHEUMATOLOGY | Facility: CLINIC | Age: 58
End: 2023-04-04

## 2023-04-04 VITALS
DIASTOLIC BLOOD PRESSURE: 72 MMHG | SYSTOLIC BLOOD PRESSURE: 116 MMHG | HEART RATE: 73 BPM | OXYGEN SATURATION: 98 % | HEIGHT: 66 IN | BODY MASS INDEX: 26.36 KG/M2 | WEIGHT: 164 LBS | TEMPERATURE: 99 F

## 2023-04-04 DIAGNOSIS — I10 ESSENTIAL HYPERTENSION: ICD-10-CM

## 2023-04-04 DIAGNOSIS — M79.7 FIBROMYALGIA: ICD-10-CM

## 2023-04-04 DIAGNOSIS — F51.01 PRIMARY INSOMNIA: ICD-10-CM

## 2023-04-04 DIAGNOSIS — M32.14 SYSTEMIC LUPUS ERYTHEMATOSUS WITH GLOMERULAR DISEASE, UNSPECIFIED SLE TYPE: ICD-10-CM

## 2023-04-04 DIAGNOSIS — M32.14 LUPUS NEPHRITIS, ISN/RPS CLASS III: ICD-10-CM

## 2023-04-04 DIAGNOSIS — Z12.31 BREAST CANCER SCREENING BY MAMMOGRAM: ICD-10-CM

## 2023-04-04 PROCEDURE — 77063 MAMMO DIGITAL SCREENING BILAT WITH TOMO: ICD-10-PCS | Mod: 26,,, | Performed by: RADIOLOGY

## 2023-04-04 PROCEDURE — 77067 SCR MAMMO BI INCL CAD: CPT | Mod: 26,,, | Performed by: RADIOLOGY

## 2023-04-04 PROCEDURE — 99214 OFFICE O/P EST MOD 30 MIN: CPT | Mod: PBBFAC | Performed by: INTERNAL MEDICINE

## 2023-04-04 PROCEDURE — 77067 SCR MAMMO BI INCL CAD: CPT | Mod: TC

## 2023-04-04 PROCEDURE — 99999 PR PBB SHADOW E&M-EST. PATIENT-LVL IV: CPT | Mod: PBBFAC,,, | Performed by: INTERNAL MEDICINE

## 2023-04-04 PROCEDURE — 77063 BREAST TOMOSYNTHESIS BI: CPT | Mod: 26,,, | Performed by: RADIOLOGY

## 2023-04-04 PROCEDURE — 77067 MAMMO DIGITAL SCREENING BILAT WITH TOMO: ICD-10-PCS | Mod: 26,,, | Performed by: RADIOLOGY

## 2023-04-04 PROCEDURE — 99214 OFFICE O/P EST MOD 30 MIN: CPT | Mod: S$PBB,,, | Performed by: INTERNAL MEDICINE

## 2023-04-04 PROCEDURE — 99999 PR PBB SHADOW E&M-EST. PATIENT-LVL IV: ICD-10-PCS | Mod: PBBFAC,,, | Performed by: INTERNAL MEDICINE

## 2023-04-04 PROCEDURE — 99214 PR OFFICE/OUTPT VISIT, EST, LEVL IV, 30-39 MIN: ICD-10-PCS | Mod: S$PBB,,, | Performed by: INTERNAL MEDICINE

## 2023-04-04 RX ORDER — DIPHENHYDRAMINE HYDROCHLORIDE 50 MG/ML
50 INJECTION INTRAMUSCULAR; INTRAVENOUS ONCE AS NEEDED
Status: CANCELLED | OUTPATIENT
Start: 2023-04-04

## 2023-04-04 RX ORDER — HYDROCODONE BITARTRATE AND ACETAMINOPHEN 10; 325 MG/1; MG/1
1 TABLET ORAL 3 TIMES DAILY PRN
Qty: 90 TABLET | Refills: 0 | Status: SHIPPED | OUTPATIENT
Start: 2023-04-04 | End: 2023-04-04 | Stop reason: SDUPTHER

## 2023-04-04 RX ORDER — FAMOTIDINE 10 MG/ML
20 INJECTION INTRAVENOUS
Status: CANCELLED | OUTPATIENT
Start: 2023-04-04

## 2023-04-04 RX ORDER — MYCOPHENOLATE MOFETIL 500 MG/1
1000 TABLET ORAL 2 TIMES DAILY
Qty: 120 TABLET | Refills: 11 | Status: SHIPPED | OUTPATIENT
Start: 2023-04-04 | End: 2023-06-27

## 2023-04-04 RX ORDER — MYCOPHENOLATE MOFETIL 500 MG/1
1000 TABLET ORAL 2 TIMES DAILY
Qty: 120 TABLET | Refills: 11 | Status: SHIPPED | OUTPATIENT
Start: 2023-04-04 | End: 2023-06-27 | Stop reason: SDUPTHER

## 2023-04-04 RX ORDER — HYDROCODONE BITARTRATE AND ACETAMINOPHEN 10; 325 MG/1; MG/1
1 TABLET ORAL 3 TIMES DAILY PRN
Qty: 90 TABLET | Refills: 0 | Status: SHIPPED | OUTPATIENT
Start: 2023-04-04 | End: 2023-05-04

## 2023-04-04 RX ORDER — ACETAMINOPHEN 325 MG/1
650 TABLET ORAL
Status: CANCELLED | OUTPATIENT
Start: 2023-04-04

## 2023-04-04 RX ORDER — MEPERIDINE HYDROCHLORIDE 50 MG/ML
25 INJECTION INTRAMUSCULAR; INTRAVENOUS; SUBCUTANEOUS
Status: CANCELLED | OUTPATIENT
Start: 2023-04-04 | End: 2023-04-05

## 2023-04-04 RX ORDER — SODIUM CHLORIDE 0.9 % (FLUSH) 0.9 %
10 SYRINGE (ML) INJECTION
Status: CANCELLED | OUTPATIENT
Start: 2023-04-04

## 2023-04-04 RX ORDER — METHYLPREDNISOLONE SOD SUCC 125 MG
100 VIAL (EA) INJECTION
Status: CANCELLED | OUTPATIENT
Start: 2023-04-04

## 2023-04-04 RX ORDER — MYCOPHENOLATE MOFETIL 500 MG/1
1000 TABLET ORAL 2 TIMES DAILY
Qty: 120 TABLET | Refills: 11 | Status: SHIPPED | OUTPATIENT
Start: 2023-04-04 | End: 2023-04-04 | Stop reason: SDUPTHER

## 2023-04-04 RX ORDER — HEPARIN 100 UNIT/ML
500 SYRINGE INTRAVENOUS
Status: CANCELLED | OUTPATIENT
Start: 2023-04-04

## 2023-04-04 RX ORDER — EPINEPHRINE 0.3 MG/.3ML
0.3 INJECTION SUBCUTANEOUS ONCE AS NEEDED
Status: CANCELLED | OUTPATIENT
Start: 2023-04-04

## 2023-04-04 NOTE — TELEPHONE ENCOUNTER
----- Message from Ramonita Crawford sent at 4/4/2023 10:31 AM CDT -----  Regarding: Medication  Patient was in for an office visit this morning and states medication should have been sent to [facility] on [address] and NOT [facility] on [address], whom do not accept her insurance.

## 2023-04-04 NOTE — PROGRESS NOTES
"Subjective:       Patient ID: Vi Ulrich is a 57 y.o. female.    Chief Complaint: Follow-up (Follow up. Patient here as per  request for patient to come in for visit. )    This patient went to [facility] for joint pain, and fatigue.  She was found out to have MDA 5 antibody positive.  A message was sent to me through [employer] stating that MDA 5 antibody is positive and if I can treat this patient according to this finding.  The patient was already on rituximab infusions and those infusions orders were in her chart.  Her last infusion was months ago.  She did not receive rituximab while she was at [facility] as an inpatient.  As soon as I receive the message I asked the patient to come to my clinic.  I saw her in my clinic I ordered rituximab infusions and I spoke to my manager to try to get her the infusion authorized as soon as possible so she gets better.  I also increase the CellCept dose to 1000 twice daily instead of once daily.  The patient was very appreciative.    Review of Systems   Constitutional:  Negative for appetite change, chills and fever.   HENT:  Negative for congestion, ear pain, mouth sores, nosebleeds and trouble swallowing.    Eyes:  Negative for photophobia and discharge.   Respiratory:  Negative for chest tightness and shortness of breath.    Cardiovascular:  Negative for chest pain.   Gastrointestinal:  Negative for abdominal pain and vomiting.   Endocrine: Negative.    Genitourinary:  Negative for hematuria.   Musculoskeletal:         As per HPI   Skin:  Negative for rash.   Neurological:  Negative for weakness.       Objective:   /72 (BP Location: Right arm, Patient Position: Sitting, BP Method: Medium (Automatic))   Pulse 73   Temp 98.6 °F (37 °C) (Oral)   Ht 5' 6" (1.676 m)   Wt 74.4 kg (164 lb)   LMP  (LMP Unknown)   SpO2 98%   BMI 26.47 kg/m²      Physical Exam   Constitutional: She is oriented to person, place, and time. She appears well-developed and well-nourished. No " distress.   HENT:   Head: Normocephalic and atraumatic.   Right Ear: External ear normal.   Left Ear: External ear normal.   Eyes: Pupils are equal, round, and reactive to light.   Cardiovascular: Normal rate, regular rhythm and normal heart sounds.   Pulmonary/Chest: Breath sounds normal.   Abdominal: Soft. There is no abdominal tenderness.   Musculoskeletal:      Right shoulder: Tenderness present.      Left shoulder: Tenderness present.      Right elbow: Tenderness present.      Left elbow: Tenderness present.      Right wrist: Tenderness present.      Left wrist: Tenderness present.      Cervical back: Neck supple.      Right hip: Tenderness present.      Left hip: Tenderness present.      Right knee: Tenderness present.      Left knee: Tenderness present.      Right ankle: Tenderness present.      Left ankle: Tenderness present.   Lymphadenopathy:     She has no cervical adenopathy.   Neurological: She is alert and oriented to person, place, and time. She displays normal reflexes. No cranial nerve deficit or sensory deficit. She exhibits normal muscle tone. Coordination normal.   Skin: No rash noted. No erythema.   Vitals reviewed.      Right Side Rheumatological Exam     The patient is tender to palpation of the shoulder, elbow, wrist, knee, 1st PIP, 1st MCP, 2nd PIP, 2nd MCP, 3rd PIP, 3rd MCP, 4th PIP, 4th MCP, 5th PIP, hip, ankle, 1st MTP, 2nd MTP, 3rd MTP, 4th MTP, 5th MTP, 1st toe IP, 2nd toe IP, 3rd toe IP, 4th toe IP and 5th toe IP    Left Side Rheumatological Exam     The patient is tender to palpation of the shoulder, elbow, wrist, knee, 1st PIP, 1st MCP, 2nd PIP, 2nd MCP, 3rd PIP, 3rd MCP, 4th PIP, 4th MCP, 5th PIP, 5th MCP, hip, ankle, 1st MTP, 2nd MTP, 3rd MTP, 4th MTP, 5th MTP, 1st toe IP, 2nd toe IP, 3rd toe IP, 4th toe IP and 5th toe IP.       Completed Fibromyalgia exam 18/18 tender points.  No data to display     Assessment:       1. Systemic lupus erythematosus with glomerular disease,  unspecified SLE type    2. Fibromyalgia    3. Primary insomnia    4. Essential hypertension    5. Lupus nephritis, ISN/RPS class III          Medication List with Changes/Refills   New Medications    HYDROCODONE-ACETAMINOPHEN (NORCO)  MG PER TABLET    Take 1 tablet by mouth 3 (three) times daily as needed for Pain.       Start Date: 4/4/2023  End Date: 5/4/2023   Current Medications    ALBUTEROL (ACCUNEB) 0.63 MG/3 ML NEBU    Take 3 mLs (0.63 mg total) by nebulization every 4 (four) hours as needed (wheezing). Rescue       Start Date: 1/12/2023 End Date: 1/12/2024    ALBUTEROL SULFATE (PROAIR RESPICLICK) 90 MCG/ACTUATION INHALER    Inhale 1 puff into the lungs every 4 (four) hours as needed for Wheezing. Rescue       Start Date: --        End Date: --    ATORVASTATIN (LIPITOR) 10 MG TABLET    Take 10 mg by mouth once daily.       Start Date: 5/12/2022 End Date: --    BLOOD SUGAR DIAGNOSTIC (ONETOUCH VERIO) STRP    1 each by Misc.(Non-Drug; Combo Route) route 4 (four) times daily.       Start Date: 12/15/2017End Date: --    BLOOD SUGAR DIAGNOSTIC STRP    1 each by Misc.(Non-Drug; Combo Route) route 4 (four) times daily.       Start Date: 12/15/2017End Date: --    BLOOD SUGAR DIAGNOSTIC STRP    100 each by Misc.(Non-Drug; Combo Route) route 3 (three) times daily.       Start Date: 3/15/2023 End Date: --    BLOOD-GLUCOSE METER (FREESTYLE SYSTEM KIT) KIT    Use as instructed       Start Date: 3/15/2023 End Date: 3/14/2024    BUTALBITAL-ACETAMINOPHEN-CAFFEINE -40 MG (FIORICET, ESGIC) -40 MG PER TABLET    Take 1 tablet by mouth every 4 (four) hours as needed for Pain.       Start Date: 3/31/2023 End Date: --    HYDROXYCHLOROQUINE (PLAQUENIL) 200 MG TABLET    Take 1 tablet (200 mg total) by mouth once daily.       Start Date: 3/31/2023 End Date: 3/30/2024    INSULIN (LANTUS SOLOSTAR U-100 INSULIN) GLARGINE 100 UNITS/ML SUBQ PEN    Inject 80 Units into the skin every evening.       Start Date: 1/12/2023  End Date: 1/12/2024    LANCETS 28 GAUGE MISC    100 lancets by Misc.(Non-Drug; Combo Route) route 3 (three) times daily.       Start Date: 3/15/2023 End Date: --    LANCETS 33 GAUGE MISC    1 lancet by Misc.(Non-Drug; Combo Route) route 4 (four) times daily.       Start Date: 12/15/2017End Date: --    OMEPRAZOLE (PRILOSEC) 20 MG CAPSULE    Take 1 capsule (20 mg total) by mouth once daily.       Start Date: 6/14/2022 End Date: --    ONDANSETRON (ZOFRAN) 4 MG TABLET    Take 1 tablet (4 mg total) by mouth every 6 (six) hours as needed for Nausea.       Start Date: 8/4/2022  End Date: --    PREDNISONE (DELTASONE) 5 MG TABLET    Take 1 tablet (5 mg total) by mouth once daily.       Start Date: 3/31/2023 End Date: 3/30/2024    SULFAMETHOXAZOLE-TRIMETHOPRIM 800-160MG (BACTRIM DS) 800-160 MG TAB    Take 1 tablet by mouth every other day.       Start Date: 3/14/2023 End Date: 6/12/2023    WALKER MISC    Please dispense 1 Rollator       Start Date: 1/12/2023 End Date: --   Changed and/or Refilled Medications    Modified Medication Previous Medication    MYCOPHENOLATE (CELLCEPT) 500 MG TAB mycophenolate (CELLCEPT) 500 mg Tab       Take 2 tablets (1,000 mg total) by mouth 2 (two) times daily.    Take 2 tablets (1,000 mg total) by mouth daily with dinner or evening meal.       Start Date: 4/4/2023  End Date: 4/3/2024    Start Date: 3/31/2023 End Date: 4/4/2023    MYCOPHENOLATE (CELLCEPT) 500 MG TAB mycophenolate (CELLCEPT) 500 mg Tab       Take 2 tablets (1,000 mg total) by mouth 2 (two) times daily.    Take 2 tablets (1,000 mg total) by mouth daily with dinner or evening meal.       Start Date: 4/4/2023  End Date: 4/3/2024    Start Date: 3/31/2023 End Date: 4/4/2023   Discontinued Medications    HYDROCODONE-ACETAMINOPHEN (NORCO) 7.5-325 MG PER TABLET    Take 1 tablet by mouth every 8 (eight) hours as needed for Pain.       Start Date: --        End Date: 4/4/2023         Plan:         Problem List Items Addressed This Visit           Cardiac/Vascular    Essential hypertension    Relevant Medications    HYDROcodone-acetaminophen (NORCO)  mg per tablet    mycophenolate (CELLCEPT) 500 mg Tab       Renal/    Lupus nephritis, ISN/RPS class III    Relevant Medications    HYDROcodone-acetaminophen (NORCO)  mg per tablet    mycophenolate (CELLCEPT) 500 mg Tab       Immunology/Multi System    SLE (systemic lupus erythematosus)    Relevant Medications    HYDROcodone-acetaminophen (NORCO)  mg per tablet    mycophenolate (CELLCEPT) 500 mg Tab       Orthopedic    Fibromyalgia    Relevant Medications    HYDROcodone-acetaminophen (NORCO)  mg per tablet    mycophenolate (CELLCEPT) 500 mg Tab       Other    Insomnia    Relevant Medications    HYDROcodone-acetaminophen (NORCO)  mg per tablet    mycophenolate (CELLCEPT) 500 mg Tab       Rituximab chemotherapy infusion orders written during this visit.  I also spoke to my manager concerning getting these infusions done as soon as possible.

## 2023-04-11 ENCOUNTER — HOSPITAL ENCOUNTER (OUTPATIENT)
Dept: RADIOLOGY | Facility: HOSPITAL | Age: 58
Discharge: HOME | End: 2023-04-11
Attending: NURSE PRACTITIONER
Payer: MEDICAID

## 2023-04-11 ENCOUNTER — OFFICE VISIT (OUTPATIENT)
Dept: ORTHOPEDICS | Facility: CLINIC | Age: 58
End: 2023-04-11
Payer: MEDICAID

## 2023-04-11 ENCOUNTER — TELEPHONE (OUTPATIENT)
Dept: RHEUMATOLOGY | Facility: CLINIC | Age: 58
End: 2023-04-11
Payer: MEDICAID

## 2023-04-11 VITALS — BODY MASS INDEX: 26.26 KG/M2 | HEIGHT: 66 IN | WEIGHT: 163.38 LBS

## 2023-04-11 DIAGNOSIS — M65.321 TRIGGER INDEX FINGER OF RIGHT HAND: Primary | ICD-10-CM

## 2023-04-11 DIAGNOSIS — M65.30 TRIGGER FINGER, UNSPECIFIED FINGER, UNSPECIFIED LATERALITY: ICD-10-CM

## 2023-04-11 DIAGNOSIS — M65.331 TRIGGER MIDDLE FINGER OF RIGHT HAND: ICD-10-CM

## 2023-04-11 PROBLEM — M65.322 TRIGGER INDEX FINGER OF LEFT HAND: Status: ACTIVE | Noted: 2023-04-11

## 2023-04-11 PROCEDURE — 20550 NJX 1 TENDON SHEATH/LIGAMENT: CPT | Mod: PBBFAC,LT | Performed by: NURSE PRACTITIONER

## 2023-04-11 PROCEDURE — 99215 PR OFFICE/OUTPT VISIT, EST, LEVL V, 40-54 MIN: ICD-10-PCS | Mod: 25,S$PBB,, | Performed by: NURSE PRACTITIONER

## 2023-04-11 PROCEDURE — 73130 X-RAY EXAM OF HAND: CPT | Mod: TC,RT

## 2023-04-11 PROCEDURE — 99215 OFFICE O/P EST HI 40 MIN: CPT | Mod: PBBFAC,25 | Performed by: NURSE PRACTITIONER

## 2023-04-11 PROCEDURE — 20550 TENDON SHEATH: ICD-10-PCS | Mod: S$PBB,RT,, | Performed by: NURSE PRACTITIONER

## 2023-04-11 PROCEDURE — 99215 OFFICE O/P EST HI 40 MIN: CPT | Mod: 25,S$PBB,, | Performed by: NURSE PRACTITIONER

## 2023-04-11 RX ORDER — TRIAMCINOLONE ACETONIDE 40 MG/ML
40 INJECTION, SUSPENSION INTRA-ARTICULAR; INTRAMUSCULAR
Status: COMPLETED | OUTPATIENT
Start: 2023-04-11 | End: 2023-04-11

## 2023-04-11 RX ADMIN — TRIAMCINOLONE ACETONIDE 40 MG: 40 INJECTION, SUSPENSION INTRA-ARTICULAR; INTRAMUSCULAR at 07:04

## 2023-04-11 NOTE — LETTER
April 11, 2023    Vi Ulrich  [address]  [address]  [place] [address]             [facility]  [address], [address]  [place] [address]  Phone: [phone] To Whom It May Concern:    In response for your request:    Evidence supporting the use of Rituximab for Lupus Nephritis    Joyce FLORES, Jovana HERNANDEZ, Dakota A, et al. American College of Rheumatology Guidelines for Screening, Treatment, and Management of Lupus Nephritis. Arthritis Care & Research. Vol. 64, No. 6, June 2012, pp 797-493.  Rituximab package insert.    Coreen F, Tiago AN, Brennon RB, Mello KG, Kiki DW. Long-term efficacy and safety of rituximab in refractory and relapsing systemic lupus erythematosus. Nephrol Dial Transplant. 2010 Nov;25(11):3586-92. doi: 10.1093/ndt/hqj840. Epub 2010 May 11. PMID: 69945616.    Loly MB, Je D, Kelsey RJ, Greyson HT, Wilman HANSEL, Audrey M, Cookie GLORIA, Prasanth L. Prospective observational single-centre cohort study to evaluate the effectiveness of treating lupus nephritis with rituximab and mycophenolate mofetil but no oral steroids. [facility] Rheum Dis. 2013 Aug;72(8):1280-6. doi: 10.1136/bxfctsyxule-[id number]. Epub 2013 Jun 5. PMID: 75312060.    If you have any questions or concerns, please don't hesitate to call.    Sincerely,      MD Rosario Stokes FNP

## 2023-04-11 NOTE — TELEPHONE ENCOUNTER
Received notification requesting additional information for the prior authorization to use rituximab for lupus nephritis.  Additional information given in can be found under letters tab. Tamika will fax information.

## 2023-04-11 NOTE — PROGRESS NOTES
Subjective:       New patient   Patient ID: Vi Ulrich is a 57 y.o. female. Non-smoker. Employment HX: , currently disability.    Seen [facility] ortho for same DX since n/a.   Chief Complaint: Pain of the Right Hand and Finger Pain (Rt. Middle Finger)    HPI:    Right shooting finger index, middle  hand pain. Left dominate  Injury: no known injury  Onset: several years ago worsening over time  Modifying Factors: worse with activity, improved with rest, radiates to wrist, and radiates into forearm  Associated Symptoms: popping, decreased  strength, decreased ROM, and weakness with dropping items  Activity: sedentary with light activity and pain moderately interferes with ADLs .   Previous Treatments: RX NSAIDs without significant relief.  PMH:  DX lupus  Family History: + OA    Note: New referral with right index and middle finger locking, limited conservative treatments, worsening over time. Affecting ADLs.    Current Outpatient Medications:     albuterol (ACCUNEB) 0.63 mg/3 mL Nebu, Take 3 mLs (0.63 mg total) by nebulization every 4 (four) hours as needed (wheezing). Rescue, Disp: 75 mL, Rfl: 3    albuterol sulfate (PROAIR RESPICLICK) 90 mcg/actuation inhaler, Inhale 1 puff into the lungs every 4 (four) hours as needed for Wheezing. Rescue, Disp: , Rfl:     atorvastatin (LIPITOR) 10 MG tablet, Take 10 mg by mouth once daily., Disp: , Rfl:     blood sugar diagnostic (ONETOUCH VERIO) Strp, 1 each by Misc.(Non-Drug; Combo Route) route 4 (four) times daily., Disp: 50 each, Rfl: 11    blood sugar diagnostic Strp, 1 each by Misc.(Non-Drug; Combo Route) route 4 (four) times daily., Disp: 50 each, Rfl: 11    blood sugar diagnostic Strp, 100 each by Misc.(Non-Drug; Combo Route) route 3 (three) times daily., Disp: 100 each, Rfl: 0    blood-glucose meter (FREESTYLE SYSTEM KIT) kit, Use as instructed, Disp: 1 each, Rfl: 0    butalbital-acetaminophen-caffeine -40 mg (FIORICET, ESGIC) -40 mg per tablet,  Take 1 tablet by mouth every 4 (four) hours as needed for Pain., Disp: 90 tablet, Rfl: 5    HYDROcodone-acetaminophen (NORCO)  mg per tablet, Take 1 tablet by mouth 3 (three) times daily as needed for Pain., Disp: 90 tablet, Rfl: 0    hydrOXYchloroQUINE (PLAQUENIL) 200 mg tablet, Take 1 tablet (200 mg total) by mouth once daily., Disp: 30 tablet, Rfl: 11    insulin (LANTUS SOLOSTAR U-100 INSULIN) glargine 100 units/mL SubQ pen, Inject 80 Units into the skin every evening., Disp: 72 mL, Rfl: 3    lancets 28 gauge Misc, 100 lancets by Misc.(Non-Drug; Combo Route) route 3 (three) times daily., Disp: 100 each, Rfl: 0    lancets 33 gauge Misc, 1 lancet by Misc.(Non-Drug; Combo Route) route 4 (four) times daily., Disp: 100 each, Rfl: 11    mycophenolate (CELLCEPT) 500 mg Tab, Take 2 tablets (1,000 mg total) by mouth 2 (two) times daily., Disp: 120 tablet, Rfl: 11    mycophenolate (CELLCEPT) 500 mg Tab, Take 2 tablets (1,000 mg total) by mouth 2 (two) times daily., Disp: 120 tablet, Rfl: 11    omeprazole (PRILOSEC) 20 MG capsule, Take 1 capsule (20 mg total) by mouth once daily., Disp: 30 capsule, Rfl: 5    ondansetron (ZOFRAN) 4 MG tablet, Take 1 tablet (4 mg total) by mouth every 6 (six) hours as needed for Nausea., Disp: 24 tablet, Rfl: 0    predniSONE (DELTASONE) 5 MG tablet, Take 1 tablet (5 mg total) by mouth once daily., Disp: 30 tablet, Rfl: 11    sulfamethoxazole-trimethoprim 800-160mg (BACTRIM DS) 800-160 mg Tab, Take 1 tablet by mouth every other day., Disp: 45 tablet, Rfl: 0    walker Misc, Please dispense 1 Rollator, Disp: 1 each, Rfl: 0  Review of patient's allergies indicates:   Allergen Reactions    Ketorolac (pf) Swelling    Mycophenolate mofetil Swelling    Penicillins Hives    Percocet [oxycodone-acetaminophen] Hives and Itching    Tramadol Hives     Hemoglobin A1C   Date Value Ref Range Status   03/04/2020 6.6 (H) 4.0 - 6.0 % Final   07/31/2018 6.5 (H) 4.0 - 5.6 % Final     Comment:     ADA  "Screening Guidelines:  5.7-6.4%  Consistent with prediabetes  >or=6.5%  Consistent with diabetes  High levels of fetal hemoglobin interfere with the HbA1C  assay. Heterozygous hemoglobin variants (HbS, HgC, etc)do  not significantly interfere with this assay.   However, presence of multiple variants may affect accuracy.     04/12/2018 7.2 (H) 4.5 - 6.2 % Final     Comment:     According to ADA guidelines, hemoglobin A1C <7.0% represents  optimal control in non-pregnant diabetic patients.  Different  metrics may apply to specific populations.   Standards of Medical Care in Diabetes - 2016.  For the purpose of screening for the presence of diabetes:  <5.7%     Consistent with the absence of diabetes  5.7-6.4%  Consistent with increasing risk for diabetes   (prediabetes)  >or=6.5%  Consistent with diabetes  Currently no consensus exists for use of hemoglobin A1C  for diagnosis of diabetes for children.       Hemoglobin A1c   Date Value Ref Range Status   05/29/2022 6.0 <=7.0 % Final   05/28/2022 6.1 <=7.0 % Final      Body mass index is 26.37 kg/m².   Vitals:    04/11/23 0744   Weight: 74.1 kg (163 lb 6.4 oz)   Height: 5' 6" (1.676 m)      Review of Systems:  A ten-point review of systems was performed and is negative, except as mentioned above.   Objective:   MSK Bilateral Hand/ Wrist  General:  no apparent distress, no pain indicators, well nourished  Inspection: no masses/cyst/nodules, no swelling, no erythema, no contusion, no deconditioning, no deformity, no contracture, no scars  Palpation:  tenderness to RIGHT hand flexor tendon: index, middle finger, radial pulse equal 2+  ROM:    MCP/ PIP/ DIP Flexion  catching/ locking of index, middle finger painful (R)  Smooth movement without limitation non-painful (L)  MCP/ PIP/ DIP Extension  catching/ locking of index, middle finger painful (R)  Smooth movement without limitation non-painful (L)  Strength:        4 / 5 (R) 5 / 5 (L)  Special Testing:  Trigger " Finger   + index, middle finger (R)  -- (L)  Phalen    -- (R)  -- (L)  Jose Carpal Compression -- (R)  -- (L)  Tinel Test    -- (R)  -- (L)  Finkelstein Test   -- (R)  -- (L)  Neurovascular: Intact to light touch  Neuro/ Psych: Awake, alert, oriented, normal mood and affect  Lymphatic: No LAD  Skin/ Soft Tissue: no rash, skin intact  Assessment:       Imaging: X-ray 3 views of right hand ordered, reviewed and independently interpreted by me. Discussed with patient. No acute findings , with middle digit in flexion noted.  Awaiting radiologist findings.     EMR Review: completed with noted Referral documentation reviewed    1. Trigger index finger of right hand    2. Trigger middle finger of right hand    3. BMI 26.0-26.9,adult      Plan:       Orders Placed This Encounter    Tendon Sheath    X-Ray Hand Complete Right    triamcinolone acetonide injection 40 mg     Ongoing education about DX and treatment recommendations including conservative treatments of daily HEP and OTC NSAID as needed according to label instructions if able to tolerate.   Treatment plan: Trigger Finger middle right hand, index finger right hand Recommend daily HEP until RTC appt. and CSI today for symptom relief.  If inadequate at f/u will discuss referral to ortho res.    Procedure: CSI discussed, s/t failed conservative treatments patient consents to CSI today  RX Medications: continue medications as RX per PCP    RTC: 3 months, telemedicine visit    Pily Vaughn FNP    Tendon Sheath    Date/Time: 4/11/2023 7:40 AM  Performed by: Pily Vaughn NP  Authorized by: Pily Vaughn NP     Location:  Long finger  Site:  R long flexor tendon sheath  Medications:  40 mg Kenalog 40 mg    Additional Comments: Ortho Procedure Note   Procedure: Right  Index, Middle Finger Flexor Tendon CSI     Indications: Therapeutic Indication - Decrease pain, Increase range of motion and improve quality of life:   Risks:  Possible complications  with the injection include bleeding, infection (.01%), tendon rupture, steroid flare, fat pad or soft tissue atrophy, skin depigmentation, allergic reaction to medications and vasovagal response. (steroid flare treatment is rest, ice, NSAIDs and resolves in 24-36 hours)  Consent:  Procedure, risks, benefits, and alternatives explained the patient, who voiced understanding and agreed to proceed with procedure.  Consent signed and scanned into the medical record.   No absolute contraindications (cellulitis overlying joint, infection, lack of informed consent, allergy to injection medication or history of steroid flare) or relative contraindications (uncontrolled DM2 A1c>10, coagulopathy, INR > 3.5, previous joint replacement or history of AVN).        Staff: Pily YIN  Description:  Time-out performed.  The patient was prepped in normal sterile fashion use of chlorhexidine scrub and the appropriate and anatomic landmarks were identified.  Sterile 25 gauge 5/8 inch needle  was used. Topical ethyl chloride was applied. Contents of syringe included:    RIGHT INDEX FINGER: 20 mg of Kenalog  RIGHT MIDDLE FINGER: 20 mg of Kenalog      Complications: None   EBL: None   Post Procedure: Patient alert, and moving all extremities. ROM improved, pain decreased.  Good peripheral pulses, no signs of vascular compromise and range of motion intact.  Aftercare instructions were given to patient at time of discharge.  Relative rest for 3 days-avoiding excess activity.  Place ice on the area for 15 minutes every 4-6 hours. Patient may take Tylenol a 1000 mg b.i.d. or ibuprofen 600 mg t.i.d. for the next 3-4 days if not on medication already and safe to take pending co-morbidities.  Protect the area for the next 1-8 hours if anesthetic was used.  Avoid excessive activity for the next 3-4 weeks.  ER precautions given for fever, severe joint pain or allergic reaction or other new symptoms related to the joint injection.         Timed Billing Note  Total Time Spent with Patient: 40 minutes Excludes Time Spent Performing Procedure  Visit Start Time: 0800  10 minutes spent prior to visit reviewing EMR, prior labs and x-rays.  20 minutes spent in visit with patient face-to-face time completing exam, obtaining history, educating on DX and treatment plan.  10 minutes spent after visit completing EMR documentation.   Visit End Time: 0840

## 2023-04-14 ENCOUNTER — INFUSION (OUTPATIENT)
Dept: INFUSION THERAPY | Facility: HOSPITAL | Age: 58
End: 2023-04-14
Attending: INTERNAL MEDICINE
Payer: MEDICAID

## 2023-04-14 VITALS
SYSTOLIC BLOOD PRESSURE: 129 MMHG | RESPIRATION RATE: 18 BRPM | HEIGHT: 66 IN | BODY MASS INDEX: 26.68 KG/M2 | HEART RATE: 64 BPM | TEMPERATURE: 99 F | WEIGHT: 166 LBS | DIASTOLIC BLOOD PRESSURE: 63 MMHG

## 2023-04-14 DIAGNOSIS — M32.14 LUPUS NEPHRITIS, ISN/RPS CLASS III: Primary | ICD-10-CM

## 2023-04-14 DIAGNOSIS — M32.14 SYSTEMIC LUPUS ERYTHEMATOSUS WITH GLOMERULAR DISEASE, UNSPECIFIED SLE TYPE: ICD-10-CM

## 2023-04-14 PROCEDURE — 25000003 PHARM REV CODE 250: Performed by: INTERNAL MEDICINE

## 2023-04-14 PROCEDURE — 63600175 PHARM REV CODE 636 W HCPCS: Performed by: INTERNAL MEDICINE

## 2023-04-14 PROCEDURE — 96415 CHEMO IV INFUSION ADDL HR: CPT

## 2023-04-14 PROCEDURE — 96413 CHEMO IV INFUSION 1 HR: CPT

## 2023-04-14 RX ORDER — ACETAMINOPHEN 325 MG/1
650 TABLET ORAL
Status: CANCELLED | OUTPATIENT
Start: 2023-04-28

## 2023-04-14 RX ORDER — HEPARIN 100 UNIT/ML
500 SYRINGE INTRAVENOUS
Status: DISCONTINUED | OUTPATIENT
Start: 2023-04-14 | End: 2023-04-14 | Stop reason: HOSPADM

## 2023-04-14 RX ORDER — FAMOTIDINE 10 MG/ML
20 INJECTION INTRAVENOUS
Status: COMPLETED | OUTPATIENT
Start: 2023-04-14 | End: 2023-04-14

## 2023-04-14 RX ORDER — MEPERIDINE HYDROCHLORIDE 50 MG/ML
25 INJECTION INTRAMUSCULAR; INTRAVENOUS; SUBCUTANEOUS
Status: DISCONTINUED | OUTPATIENT
Start: 2023-04-14 | End: 2023-04-14 | Stop reason: HOSPADM

## 2023-04-14 RX ORDER — ACETAMINOPHEN 325 MG/1
650 TABLET ORAL
Status: COMPLETED | OUTPATIENT
Start: 2023-04-14 | End: 2023-04-14

## 2023-04-14 RX ORDER — DIPHENHYDRAMINE HYDROCHLORIDE 50 MG/ML
50 INJECTION INTRAMUSCULAR; INTRAVENOUS ONCE AS NEEDED
Status: DISCONTINUED | OUTPATIENT
Start: 2023-04-14 | End: 2023-04-14 | Stop reason: HOSPADM

## 2023-04-14 RX ORDER — EPINEPHRINE 0.3 MG/.3ML
0.3 INJECTION SUBCUTANEOUS ONCE AS NEEDED
Status: CANCELLED | OUTPATIENT
Start: 2023-04-28

## 2023-04-14 RX ORDER — HEPARIN 100 UNIT/ML
500 SYRINGE INTRAVENOUS
Status: CANCELLED | OUTPATIENT
Start: 2023-04-28

## 2023-04-14 RX ORDER — DIPHENHYDRAMINE HYDROCHLORIDE 50 MG/ML
50 INJECTION INTRAMUSCULAR; INTRAVENOUS ONCE AS NEEDED
Status: CANCELLED | OUTPATIENT
Start: 2023-04-28

## 2023-04-14 RX ORDER — METHYLPREDNISOLONE SOD SUCC 125 MG
100 VIAL (EA) INJECTION
Status: COMPLETED | OUTPATIENT
Start: 2023-04-14 | End: 2023-04-14

## 2023-04-14 RX ORDER — MEPERIDINE HYDROCHLORIDE 50 MG/ML
25 INJECTION INTRAMUSCULAR; INTRAVENOUS; SUBCUTANEOUS
Status: CANCELLED | OUTPATIENT
Start: 2023-04-28 | End: 2023-04-29

## 2023-04-14 RX ORDER — SODIUM CHLORIDE 0.9 % (FLUSH) 0.9 %
10 SYRINGE (ML) INJECTION
Status: CANCELLED | OUTPATIENT
Start: 2023-04-28

## 2023-04-14 RX ORDER — METHYLPREDNISOLONE SOD SUCC 125 MG
100 VIAL (EA) INJECTION
Status: CANCELLED | OUTPATIENT
Start: 2023-04-28

## 2023-04-14 RX ORDER — DIPHENHYDRAMINE HYDROCHLORIDE 50 MG/ML
50 INJECTION INTRAMUSCULAR; INTRAVENOUS ONCE
Status: COMPLETED | OUTPATIENT
Start: 2023-04-14 | End: 2023-04-14

## 2023-04-14 RX ORDER — SODIUM CHLORIDE 0.9 % (FLUSH) 0.9 %
10 SYRINGE (ML) INJECTION
Status: DISCONTINUED | OUTPATIENT
Start: 2023-04-14 | End: 2023-04-14 | Stop reason: HOSPADM

## 2023-04-14 RX ORDER — FAMOTIDINE 10 MG/ML
20 INJECTION INTRAVENOUS
Status: CANCELLED | OUTPATIENT
Start: 2023-04-28

## 2023-04-14 RX ORDER — EPINEPHRINE 0.3 MG/.3ML
0.3 INJECTION SUBCUTANEOUS ONCE AS NEEDED
Status: DISCONTINUED | OUTPATIENT
Start: 2023-04-14 | End: 2023-04-14 | Stop reason: HOSPADM

## 2023-04-14 RX ADMIN — SODIUM CHLORIDE: 9 INJECTION, SOLUTION INTRAVENOUS at 08:04

## 2023-04-14 RX ADMIN — ACETAMINOPHEN 650 MG: 325 TABLET ORAL at 08:04

## 2023-04-14 RX ADMIN — FAMOTIDINE 20 MG: 10 INJECTION, SOLUTION INTRAVENOUS at 08:04

## 2023-04-14 RX ADMIN — DIPHENHYDRAMINE HYDROCHLORIDE 50 MG: 50 INJECTION, SOLUTION INTRAMUSCULAR; INTRAVENOUS at 08:04

## 2023-04-14 RX ADMIN — METHYLPREDNISOLONE SODIUM SUCCINATE 100 MG: 125 INJECTION INTRAMUSCULAR; INTRAVENOUS at 08:04

## 2023-04-14 RX ADMIN — SODIUM CHLORIDE 1000 MG: 9 INJECTION, SOLUTION INTRAVENOUS at 09:04

## 2023-04-28 ENCOUNTER — INFUSION (OUTPATIENT)
Dept: INFUSION THERAPY | Facility: HOSPITAL | Age: 58
End: 2023-04-28
Attending: INTERNAL MEDICINE
Payer: MEDICAID

## 2023-04-28 VITALS
WEIGHT: 165 LBS | RESPIRATION RATE: 16 BRPM | BODY MASS INDEX: 26.52 KG/M2 | TEMPERATURE: 99 F | DIASTOLIC BLOOD PRESSURE: 69 MMHG | HEIGHT: 66 IN | OXYGEN SATURATION: 100 % | HEART RATE: 67 BPM | SYSTOLIC BLOOD PRESSURE: 139 MMHG

## 2023-04-28 DIAGNOSIS — M32.14 SYSTEMIC LUPUS ERYTHEMATOSUS WITH GLOMERULAR DISEASE, UNSPECIFIED SLE TYPE: ICD-10-CM

## 2023-04-28 DIAGNOSIS — M32.14 LUPUS NEPHRITIS, ISN/RPS CLASS III: Primary | ICD-10-CM

## 2023-04-28 PROCEDURE — 96375 TX/PRO/DX INJ NEW DRUG ADDON: CPT

## 2023-04-28 PROCEDURE — 25000003 PHARM REV CODE 250: Performed by: INTERNAL MEDICINE

## 2023-04-28 PROCEDURE — 96413 CHEMO IV INFUSION 1 HR: CPT

## 2023-04-28 PROCEDURE — 63600175 PHARM REV CODE 636 W HCPCS: Mod: JZ,TB | Performed by: INTERNAL MEDICINE

## 2023-04-28 PROCEDURE — 96415 CHEMO IV INFUSION ADDL HR: CPT

## 2023-04-28 RX ORDER — HEPARIN 100 UNIT/ML
500 SYRINGE INTRAVENOUS
Status: CANCELLED | OUTPATIENT
Start: 2023-04-28

## 2023-04-28 RX ORDER — DIPHENHYDRAMINE HYDROCHLORIDE 50 MG/ML
50 INJECTION INTRAMUSCULAR; INTRAVENOUS ONCE
Status: COMPLETED | OUTPATIENT
Start: 2023-04-28 | End: 2023-04-28

## 2023-04-28 RX ORDER — METHYLPREDNISOLONE SOD SUCC 125 MG
100 VIAL (EA) INJECTION
Status: CANCELLED | OUTPATIENT
Start: 2023-04-28

## 2023-04-28 RX ORDER — FAMOTIDINE 10 MG/ML
20 INJECTION INTRAVENOUS
Status: CANCELLED | OUTPATIENT
Start: 2023-04-28

## 2023-04-28 RX ORDER — ACETAMINOPHEN 325 MG/1
650 TABLET ORAL
Status: COMPLETED | OUTPATIENT
Start: 2023-04-28 | End: 2023-04-28

## 2023-04-28 RX ORDER — FAMOTIDINE 10 MG/ML
20 INJECTION INTRAVENOUS
Status: COMPLETED | OUTPATIENT
Start: 2023-04-28 | End: 2023-04-28

## 2023-04-28 RX ORDER — MEPERIDINE HYDROCHLORIDE 50 MG/ML
25 INJECTION INTRAMUSCULAR; INTRAVENOUS; SUBCUTANEOUS
Status: CANCELLED | OUTPATIENT
Start: 2023-04-28 | End: 2023-04-29

## 2023-04-28 RX ORDER — DIPHENHYDRAMINE HYDROCHLORIDE 50 MG/ML
50 INJECTION INTRAMUSCULAR; INTRAVENOUS ONCE AS NEEDED
Status: CANCELLED | OUTPATIENT
Start: 2023-04-28

## 2023-04-28 RX ORDER — SODIUM CHLORIDE 0.9 % (FLUSH) 0.9 %
10 SYRINGE (ML) INJECTION
Status: CANCELLED | OUTPATIENT
Start: 2023-04-28

## 2023-04-28 RX ORDER — ACETAMINOPHEN 325 MG/1
650 TABLET ORAL
Status: CANCELLED | OUTPATIENT
Start: 2023-04-28

## 2023-04-28 RX ORDER — METHYLPREDNISOLONE SOD SUCC 125 MG
100 VIAL (EA) INJECTION
Status: COMPLETED | OUTPATIENT
Start: 2023-04-28 | End: 2023-04-28

## 2023-04-28 RX ORDER — EPINEPHRINE 0.3 MG/.3ML
0.3 INJECTION SUBCUTANEOUS ONCE AS NEEDED
Status: CANCELLED | OUTPATIENT
Start: 2023-04-28

## 2023-04-28 RX ADMIN — ACETAMINOPHEN 650 MG: 325 TABLET ORAL at 08:04

## 2023-04-28 RX ADMIN — SODIUM CHLORIDE 1000 MG: 9 INJECTION, SOLUTION INTRAVENOUS at 08:04

## 2023-04-28 RX ADMIN — FAMOTIDINE 20 MG: 10 INJECTION, SOLUTION INTRAVENOUS at 08:04

## 2023-04-28 RX ADMIN — METHYLPREDNISOLONE SODIUM SUCCINATE 100 MG: 125 INJECTION INTRAMUSCULAR; INTRAVENOUS at 08:04

## 2023-04-28 RX ADMIN — DIPHENHYDRAMINE HYDROCHLORIDE 50 MG: 50 INJECTION INTRAMUSCULAR; INTRAVENOUS at 08:04

## 2023-05-04 ENCOUNTER — DOCUMENTATION ONLY (OUTPATIENT)
Dept: NEPHROLOGY | Facility: CLINIC | Age: 58
End: 2023-05-04
Payer: MEDICAID

## 2023-05-04 NOTE — PROGRESS NOTES
Patient was no-show to clinic on 5/3/23. If patient calls back to reschedule, please schedule within 2-3 months.  Thank you

## 2023-05-31 ENCOUNTER — LAB VISIT (OUTPATIENT)
Dept: LAB | Facility: HOSPITAL | Age: 58
End: 2023-05-31
Attending: NURSE PRACTITIONER
Payer: MEDICAID

## 2023-05-31 DIAGNOSIS — N17.9 AKI (ACUTE KIDNEY INJURY): ICD-10-CM

## 2023-05-31 LAB
ALBUMIN SERPL-MCNC: 3 G/DL (ref 3.5–5)
ALBUMIN/GLOB SERPL: 0.7 RATIO (ref 1.1–2)
ALP SERPL-CCNC: 103 UNIT/L (ref 40–150)
ALT SERPL-CCNC: 5 UNIT/L (ref 0–55)
APPEARANCE UR: CLEAR
AST SERPL-CCNC: 13 UNIT/L (ref 5–34)
BACTERIA #/AREA URNS AUTO: ABNORMAL /HPF
BASOPHILS # BLD AUTO: 0.01 X10(3)/MCL
BASOPHILS NFR BLD AUTO: 0.2 %
BILIRUB UR QL STRIP.AUTO: NEGATIVE MG/DL
BILIRUBIN DIRECT+TOT PNL SERPL-MCNC: 0.2 MG/DL
BUN SERPL-MCNC: 21.5 MG/DL (ref 9.8–20.1)
CALCIUM SERPL-MCNC: 8.7 MG/DL (ref 8.4–10.2)
CHLORIDE SERPL-SCNC: 108 MMOL/L (ref 98–107)
CO2 SERPL-SCNC: 24 MMOL/L (ref 22–29)
COLOR UR: ABNORMAL
CREAT SERPL-MCNC: 1 MG/DL (ref 0.55–1.02)
CREAT UR-MCNC: 158.6 MG/DL (ref 47–110)
EOSINOPHIL # BLD AUTO: 0 X10(3)/MCL (ref 0–0.9)
EOSINOPHIL NFR BLD AUTO: 0 %
ERYTHROCYTE [DISTWIDTH] IN BLOOD BY AUTOMATED COUNT: 17.3 % (ref 11.5–17)
GFR SERPLBLD CREATININE-BSD FMLA CKD-EPI: >60 MLS/MIN/1.73/M2
GLOBULIN SER-MCNC: 4.2 GM/DL (ref 2.4–3.5)
GLUCOSE SERPL-MCNC: 161 MG/DL (ref 74–100)
GLUCOSE UR QL STRIP.AUTO: NORMAL MG/DL
HCT VFR BLD AUTO: 31.4 % (ref 37–47)
HGB BLD-MCNC: 10.1 G/DL (ref 12–16)
HYALINE CASTS #/AREA URNS LPF: ABNORMAL /LPF
IMM GRANULOCYTES # BLD AUTO: 0.01 X10(3)/MCL (ref 0–0.04)
IMM GRANULOCYTES NFR BLD AUTO: 0.2 %
KETONES UR QL STRIP.AUTO: NEGATIVE MG/DL
LEUKOCYTE ESTERASE UR QL STRIP.AUTO: 500 UNIT/L
LYMPHOCYTES # BLD AUTO: 0.96 X10(3)/MCL (ref 0.6–4.6)
LYMPHOCYTES NFR BLD AUTO: 19.6 %
MCH RBC QN AUTO: 27.7 PG (ref 27–31)
MCHC RBC AUTO-ENTMCNC: 32.2 G/DL (ref 33–36)
MCV RBC AUTO: 86.3 FL (ref 80–94)
MONOCYTES # BLD AUTO: 0.11 X10(3)/MCL (ref 0.1–1.3)
MONOCYTES NFR BLD AUTO: 2.2 %
MUCOUS THREADS URNS QL MICRO: ABNORMAL /LPF
NEUTROPHILS # BLD AUTO: 3.8 X10(3)/MCL (ref 2.1–9.2)
NEUTROPHILS NFR BLD AUTO: 77.8 %
NITRITE UR QL STRIP.AUTO: NEGATIVE
NRBC BLD AUTO-RTO: 0 %
PH UR STRIP.AUTO: 6 [PH]
PLATELET # BLD AUTO: 488 X10(3)/MCL (ref 130–400)
PMV BLD AUTO: 9.6 FL (ref 7.4–10.4)
POTASSIUM SERPL-SCNC: 4.8 MMOL/L (ref 3.5–5.1)
PROT SERPL-MCNC: 7.2 GM/DL (ref 6.4–8.3)
PROT UR QL STRIP.AUTO: ABNORMAL MG/DL
PROT UR STRIP-MCNC: 334 MG/DL
RBC # BLD AUTO: 3.64 X10(6)/MCL (ref 4.2–5.4)
RBC #/AREA URNS AUTO: ABNORMAL /HPF
RBC UR QL AUTO: ABNORMAL UNIT/L
SODIUM SERPL-SCNC: 138 MMOL/L (ref 136–145)
SP GR UR STRIP.AUTO: 1.03
SQUAMOUS #/AREA URNS LPF: ABNORMAL /HPF
URINE PROTEIN/CREATININE RATIO (OHS): 2.1
UROBILINOGEN UR STRIP-ACNC: ABNORMAL MG/DL
WBC # SPEC AUTO: 4.89 X10(3)/MCL (ref 4.5–11.5)
WBC #/AREA URNS AUTO: ABNORMAL /HPF

## 2023-05-31 PROCEDURE — 87088 URINE BACTERIA CULTURE: CPT

## 2023-05-31 PROCEDURE — 81001 URINALYSIS AUTO W/SCOPE: CPT

## 2023-05-31 PROCEDURE — 85025 COMPLETE CBC W/AUTO DIFF WBC: CPT

## 2023-05-31 PROCEDURE — 80053 COMPREHEN METABOLIC PANEL: CPT

## 2023-05-31 PROCEDURE — 36415 COLL VENOUS BLD VENIPUNCTURE: CPT

## 2023-05-31 PROCEDURE — 82570 ASSAY OF URINE CREATININE: CPT

## 2023-06-01 ENCOUNTER — OFFICE VISIT (OUTPATIENT)
Dept: NEPHROLOGY | Facility: CLINIC | Age: 58
End: 2023-06-01
Payer: MEDICAID

## 2023-06-01 VITALS
HEIGHT: 65 IN | DIASTOLIC BLOOD PRESSURE: 74 MMHG | OXYGEN SATURATION: 94 % | HEART RATE: 80 BPM | TEMPERATURE: 99 F | RESPIRATION RATE: 18 BRPM | WEIGHT: 162.38 LBS | SYSTOLIC BLOOD PRESSURE: 124 MMHG | BODY MASS INDEX: 27.05 KG/M2

## 2023-06-01 DIAGNOSIS — M32.14 LUPUS NEPHRITIS, ISN/RPS CLASS III: ICD-10-CM

## 2023-06-01 DIAGNOSIS — M79.7 FIBROMYALGIA: ICD-10-CM

## 2023-06-01 DIAGNOSIS — N18.2 CKD STAGE G2/A3, GFR 60-89 AND ALBUMIN CREATININE RATIO >300 MG/G: Primary | ICD-10-CM

## 2023-06-01 DIAGNOSIS — R53.1 WEAKNESS: ICD-10-CM

## 2023-06-01 DIAGNOSIS — D50.9 IRON DEFICIENCY ANEMIA, UNSPECIFIED IRON DEFICIENCY ANEMIA TYPE: ICD-10-CM

## 2023-06-01 DIAGNOSIS — M32.14 SYSTEMIC LUPUS ERYTHEMATOSUS WITH GLOMERULAR DISEASE, UNSPECIFIED SLE TYPE: Primary | ICD-10-CM

## 2023-06-01 DIAGNOSIS — I10 ESSENTIAL HYPERTENSION: ICD-10-CM

## 2023-06-01 PROBLEM — J44.9 CHRONIC OBSTRUCTIVE PULMONARY DISEASE: Status: ACTIVE | Noted: 2023-06-01

## 2023-06-01 PROBLEM — I73.00 RAYNAUD'S DISEASE: Status: ACTIVE | Noted: 2023-06-01

## 2023-06-01 PROBLEM — G43.109 MIGRAINE AURA WITHOUT HEADACHE: Status: ACTIVE | Noted: 2023-06-01

## 2023-06-01 PROBLEM — F39 MOOD DISORDER: Status: ACTIVE | Noted: 2023-06-01

## 2023-06-01 PROBLEM — Z79.4 LONG TERM CURRENT USE OF INSULIN: Status: ACTIVE | Noted: 2023-06-01

## 2023-06-01 PROBLEM — E55.9 VITAMIN D DEFICIENCY: Status: ACTIVE | Noted: 2023-06-01

## 2023-06-01 PROBLEM — E06.9 THYROIDITIS: Status: ACTIVE | Noted: 2023-06-01

## 2023-06-01 PROBLEM — F33.1 MODERATE RECURRENT MAJOR DEPRESSION: Status: ACTIVE | Noted: 2023-06-01

## 2023-06-01 PROCEDURE — 3074F PR MOST RECENT SYSTOLIC BLOOD PRESSURE < 130 MM HG: ICD-10-PCS | Mod: CPTII,,, | Performed by: NURSE PRACTITIONER

## 2023-06-01 PROCEDURE — 3078F PR MOST RECENT DIASTOLIC BLOOD PRESSURE < 80 MM HG: ICD-10-PCS | Mod: CPTII,,, | Performed by: NURSE PRACTITIONER

## 2023-06-01 PROCEDURE — 99214 OFFICE O/P EST MOD 30 MIN: CPT | Mod: S$PBB,,, | Performed by: NURSE PRACTITIONER

## 2023-06-01 PROCEDURE — 3066F PR DOCUMENTATION OF TREATMENT FOR NEPHROPATHY: ICD-10-PCS | Mod: CPTII,,, | Performed by: NURSE PRACTITIONER

## 2023-06-01 PROCEDURE — 3066F NEPHROPATHY DOC TX: CPT | Mod: CPTII,,, | Performed by: NURSE PRACTITIONER

## 2023-06-01 PROCEDURE — 99215 OFFICE O/P EST HI 40 MIN: CPT | Mod: PBBFAC | Performed by: NURSE PRACTITIONER

## 2023-06-01 PROCEDURE — 3078F DIAST BP <80 MM HG: CPT | Mod: CPTII,,, | Performed by: NURSE PRACTITIONER

## 2023-06-01 PROCEDURE — 99214 PR OFFICE/OUTPT VISIT, EST, LEVL IV, 30-39 MIN: ICD-10-PCS | Mod: S$PBB,,, | Performed by: NURSE PRACTITIONER

## 2023-06-01 PROCEDURE — 1159F MED LIST DOCD IN RCRD: CPT | Mod: CPTII,,, | Performed by: NURSE PRACTITIONER

## 2023-06-01 PROCEDURE — 1159F PR MEDICATION LIST DOCUMENTED IN MEDICAL RECORD: ICD-10-PCS | Mod: CPTII,,, | Performed by: NURSE PRACTITIONER

## 2023-06-01 PROCEDURE — 3044F HG A1C LEVEL LT 7.0%: CPT | Mod: CPTII,,, | Performed by: NURSE PRACTITIONER

## 2023-06-01 PROCEDURE — 3008F BODY MASS INDEX DOCD: CPT | Mod: CPTII,,, | Performed by: NURSE PRACTITIONER

## 2023-06-01 PROCEDURE — 3074F SYST BP LT 130 MM HG: CPT | Mod: CPTII,,, | Performed by: NURSE PRACTITIONER

## 2023-06-01 PROCEDURE — 3008F PR BODY MASS INDEX (BMI) DOCUMENTED: ICD-10-PCS | Mod: CPTII,,, | Performed by: NURSE PRACTITIONER

## 2023-06-01 PROCEDURE — 3044F PR MOST RECENT HEMOGLOBIN A1C LEVEL <7.0%: ICD-10-PCS | Mod: CPTII,,, | Performed by: NURSE PRACTITIONER

## 2023-06-01 RX ORDER — OMEPRAZOLE 20 MG/1
20 CAPSULE, DELAYED RELEASE ORAL DAILY
Qty: 30 CAPSULE | Refills: 5 | Status: SHIPPED | OUTPATIENT
Start: 2023-06-01 | End: 2023-06-27 | Stop reason: SDUPTHER

## 2023-06-01 RX ORDER — CYPROHEPTADINE HYDROCHLORIDE 4 MG/1
4 TABLET ORAL NIGHTLY
Qty: 90 TABLET | Refills: 0 | Status: SHIPPED | OUTPATIENT
Start: 2023-06-01 | End: 2023-08-08 | Stop reason: ALTCHOICE

## 2023-06-01 RX ORDER — HYDROCODONE BITARTRATE AND ACETAMINOPHEN 10; 325 MG/1; MG/1
1 TABLET ORAL EVERY 6 HOURS PRN
COMMUNITY
End: 2023-06-01 | Stop reason: SDUPTHER

## 2023-06-01 RX ORDER — ONDANSETRON 4 MG/1
4 TABLET, FILM COATED ORAL EVERY 6 HOURS PRN
Qty: 24 TABLET | Refills: 0 | Status: SHIPPED | OUTPATIENT
Start: 2023-06-01 | End: 2023-07-05

## 2023-06-01 RX ORDER — HYDROCODONE BITARTRATE AND ACETAMINOPHEN 10; 325 MG/1; MG/1
1 TABLET ORAL EVERY 6 HOURS PRN
Qty: 90 TABLET | Refills: 0 | Status: SHIPPED | OUTPATIENT
Start: 2023-06-01 | End: 2023-07-05 | Stop reason: SDUPTHER

## 2023-06-01 NOTE — PROGRESS NOTES
[facility]  Nephrology Clinic Note    Chief complaint: Chronic Kidney Disease (New pt, referred, took meds, c/o pain all over, poor appetite-gets choked)    History of present illness:   Vi Ulrich is a 57 y.o. 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  Patient presents to establish care in Nephrology Clinic, has multiple complaints which include fatigue, poor appetite, and generalized weakness.    Review of Systems  12 point review of systems conducted, negative except as stated in the history of present illness.    Allergies: Patient is allergic to ketorolac (pf), mycophenolate mofetil, penicillins, percocet [oxycodone-acetaminophen], and tramadol.     Past Medical History:  has a past medical history of Arthritis, Asthma, Atrial fibrillation, Chronic constipation, Diabetes mellitus, Encounter for blood transfusion, GERD (gastroesophageal reflux disease), Gout, Hypertension, Lupus, Renal disorder, and SLE (systemic lupus erythematosus).    Procedure History:  has a past surgical history that includes Cholecystectomy; Appendectomy; Partial hysterectomy; Lymph node biopsy (Left, 2014); Hysterectomy; Joint replacement (Left, 08/07/2015); Mastectomy; Esophagogastroduodenoscopy (N/A, 4/19/2018); and Colonoscopy (N/A, 4/19/2018).    Family History: family history includes Arthritis in her mother; Asthma in her mother; Diabetes in her mother; Heart block in her mother; Heart disease in her father.    Social History:  reports that she quit smoking about 12 months ago. Her smoking use  "included cigarettes. She started smoking about 42 years ago. She has never used smokeless tobacco. She reports that she does not drink alcohol and does not use drugs.    Physical exam  /74 (BP Location: Right arm, Patient Position: Sitting, BP Method: Medium (Automatic))   Pulse 80   Temp 98.5 °F (36.9 °C) (Oral)   Resp 18   Ht 5' 4.96" (1.65 m)   Wt 73.7 kg (162 lb 6.4 oz)   LMP  (LMP Unknown)   SpO2 (!) 94%   BMI 27.06 kg/m²   General appearance:  Chronically ill-appearing female in no acute distress  HEENT: PERRLA, EOMI, no scleral icterus, no JVD. Neck is supple.  Chest: Respirations are unlabored. Lungs sounds are clear.   Heart: S1, S2.   Abdomen: Benign.  : Deferred.  Extremities: No edema, peripheral pulses are palpable.   Neuro: No focal deficits.     Home Medications:    Current Outpatient Medications:     albuterol (ACCUNEB) 0.63 mg/3 mL Nebu, Take 3 mLs (0.63 mg total) by nebulization every 4 (four) hours as needed (wheezing). Rescue, Disp: 75 mL, Rfl: 3    albuterol sulfate (PROAIR RESPICLICK) 90 mcg/actuation inhaler, Inhale 1 puff into the lungs every 4 (four) hours as needed for Wheezing. Rescue, Disp: , Rfl:     atorvastatin (LIPITOR) 10 MG tablet, Take 10 mg by mouth once daily., Disp: , Rfl:     blood sugar diagnostic (ONETOUCH VERIO) Strp, 1 each by Misc.(Non-Drug; Combo Route) route 4 (four) times daily., Disp: 50 each, Rfl: 11    blood sugar diagnostic Strp, 1 each by Misc.(Non-Drug; Combo Route) route 4 (four) times daily., Disp: 50 each, Rfl: 11    blood sugar diagnostic Strp, 100 each by Misc.(Non-Drug; Combo Route) route 3 (three) times daily., Disp: 100 each, Rfl: 0    blood-glucose meter (FREESTYLE SYSTEM KIT) kit, Use as instructed, Disp: 1 each, Rfl: 0    hydrOXYchloroQUINE (PLAQUENIL) 200 mg tablet, Take 1 tablet (200 mg total) by mouth once daily., Disp: 30 tablet, Rfl: 11    insulin (LANTUS SOLOSTAR U-100 INSULIN) glargine 100 units/mL SubQ pen, Inject 80 Units into " the skin every evening., Disp: 72 mL, Rfl: 3    lancets 28 gauge Misc, 100 lancets by Misc.(Non-Drug; Combo Route) route 3 (three) times daily., Disp: 100 each, Rfl: 0    lancets 33 gauge Misc, 1 lancet by Misc.(Non-Drug; Combo Route) route 4 (four) times daily., Disp: 100 each, Rfl: 11    predniSONE (DELTASONE) 5 MG tablet, Take 1 tablet (5 mg total) by mouth once daily., Disp: 30 tablet, Rfl: 11    walker Misc, Please dispense 1 Rollator, Disp: 1 each, Rfl: 0    butalbital-acetaminophen-caffeine -40 mg (FIORICET, ESGIC) -40 mg per tablet, Take 1 tablet by mouth every 4 (four) hours as needed for Pain. (Patient not taking: Reported on 6/1/2023), Disp: 90 tablet, Rfl: 5    cyproheptadine (PERIACTIN) 4 mg tablet, Take 1 tablet (4 mg total) by mouth every evening., Disp: 90 tablet, Rfl: 0    HYDROcodone-acetaminophen (NORCO)  mg per tablet, Take 1 tablet by mouth every 6 (six) hours as needed for Pain., Disp: 90 tablet, Rfl: 0    mycophenolate (CELLCEPT) 500 mg Tab, Take 2 tablets (1,000 mg total) by mouth 2 (two) times daily. (Patient not taking: Reported on 6/1/2023), Disp: 120 tablet, Rfl: 11    mycophenolate (CELLCEPT) 500 mg Tab, Take 2 tablets (1,000 mg total) by mouth 2 (two) times daily. (Patient not taking: Reported on 6/1/2023), Disp: 120 tablet, Rfl: 11    omeprazole (PRILOSEC) 20 MG capsule, Take 1 capsule (20 mg total) by mouth once daily., Disp: 30 capsule, Rfl: 5    ondansetron (ZOFRAN) 4 MG tablet, Take 1 tablet (4 mg total) by mouth every 6 (six) hours as needed for Nausea., Disp: 24 tablet, Rfl: 0    sulfamethoxazole-trimethoprim 800-160mg (BACTRIM DS) 800-160 mg Tab, Take 1 tablet by mouth every other day. (Patient not taking: Reported on 6/1/2023), Disp: 45 tablet, Rfl: 0    Laboratory data    Serum  Lab Results   Component Value Date    WBC 4.89 05/31/2023    HGB 10.1 (L) 05/31/2023    HCT 31.4 (L) 05/31/2023     (H) 05/31/2023    IRON 16 (L) 03/10/2023    TIBC 159 (L)  03/10/2023    TRANS 130 (L) 03/10/2023    LABIRON 10 (L) 03/10/2023    FERRITIN 335.26 (H) 03/10/2023    FOLATE 5.3 (L) 01/12/2023    BXPNYIOI14 674 01/12/2023    HAPTO 249 03/10/2023     (H) 03/10/2023     05/31/2023    K 4.8 05/31/2023    CHLORIDE 108 (H) 05/31/2023    CO2 24 05/31/2023    BUN 21.5 (H) 05/31/2023    CREATININE 1.00 05/31/2023    EGFRNORACEVR >60 05/31/2023    GLUCOSE 161 (H) 05/31/2023    CALCIUM 8.7 05/31/2023    ALKPHOS 103 05/31/2023    LABPROT 7.2 05/31/2023    ALBUMIN 3.0 (L) 05/31/2023    BILIDIR 0.1 03/28/2022    IBILI 0.10 03/28/2022    AST 13 05/31/2023    ALT 5 05/31/2023    MG 2.30 03/13/2023    PHOS 2.0 (L) 03/13/2023      Lab Results   Component Value Date    HGBA1C 6.0 05/29/2022    .7 (H) 03/03/2023    MDXRQXVU23YI 19.0 (L) 01/12/2023    HEPCAB Nonreactive 03/09/2023    HEPBSURFAG Nonreactive 03/09/2023    HEPBSAB Negative 11/17/2020     Urine:  Lab Results   Component Value Date    COLORUA Light-Yellow 05/31/2023    APPEARANCEUA Clear 05/31/2023    SGUA 1.026 05/31/2023    PHUA 6.0 05/31/2023    PROTEINUA 3+ (A) 05/31/2023    GLUCOSEUA Normal 05/31/2023    KETONESUA Negative 05/31/2023    BLOODUA 1+ (A) 05/31/2023    NITRITESUA Negative 05/31/2023    LEUKOCYTESUR 500 (A) 05/31/2023    RBCUA 11-20 (A) 05/31/2023    WBCUA 21-50 (A) 05/31/2023    BACTERIA None Seen 05/31/2023    SQEPUA Moderate (A) 05/31/2023    HYALINECASTS None Seen 05/31/2023    CREATRANDUR 158.6 (H) 05/31/2023    PROTEINURINE 334.0 05/31/2023    UPROTCREA 2.1 05/31/2023      Renal biopsy 06/07/2022 showed focal lupus nephritis, class 3, class 5 membranous lupus nephritis.  Features of diabetic glomerulopathy, class 2B.  Moderate plasma cell Rich interstitial inflammation.  Activity score 4/24, chronicity score 5/12.  Minority less than 10% of plasma cell stain with IgG 4 was positive which is not diagnostic of IgG4 induced interstitial nephritis.  Moderate interstitial fibrosis and moderate  tubular atrophy present.     Imaging  CT Renal Stone Study ABD Pelvis WO 08/04/2022     There is mild atelectasis in the lung bases bilaterally.  The liver appears normal.  No obvious liver mass or lesion is seen.  The patient appears to be status post cholecystectomy.  The pancreas appears normal.  No inflammatory changes are seen in the pancreatic region.  The spleen appears normal and adrenal glands appear normal.  No adrenal nodule is seen.  There is a small amount of subcapsular fluid seen around the left kidney which may represent a small subcapsular hematoma from a previous biopsy.  Similar areas were seen previously.  There is persistent perinephric stranding seen around both kidneys.  No hydronephrosis or hydroureter seen and no nephrolithiasis or ureterolithiasis is seen.  There has been interval development of some free fluid in the subhepatic region as well as the perihepatic region and some free fluid in the pelvis.  There are some dilated loops of small bowel and findings suggestive of small bowel wall thickening.  Findings may represent enteritis.  No colitis is seen.  No diverticulitis is seen.  The patient is status post appendectomy. The urinary bladder appears normal.  Bones show no acute abnormality.    Impression  Interval development of some perihepatic fluid and some subhepatic fluid and a small amount of pelvic fluid seen.  There are some mildly dilated loops of small bowel and findings suggestive of small bowel wall thickening.  Findings may represent enteritis.  Persistent perinephric stranding around both kidneys  Small subcapsular fluid possibly representing old hematoma around the left kidney.  Findings are stable since the prior examination  Atelectasis and chronic lung changes in the lung bases bilaterally    Electronically signed by: Rishi Urban  Date:    08/04/2022  Time:    13:52        Impression and plan     CKD stage G2/A3, GFR 60-89 and albumin creatinine ratio >300  mg/g  In March of 2023, MARTA double-stranded DNA were positive with dsDNA titer of greater than 1:5120, SSA, SSB negative, CCP Ab 4 C3 57, C4 9.9.  Patient previously had elevated titers of U1-RNP Ab, MDA-5 Ab, Ku, RNP 70 and U2 snRNP.  Patient was received rituximab on 04/14/2023 and 04/28/2023.  Renal indices have stabilized, however, proteinuria remains significant.  It is unclear whether patient is, in fact, taking MMF, but recalls taking hydroxychloroquine and prednisone as prescribed.  She has an appointment with her rheumatologist on 07/03/2023.  If proteinuria remains significant, can consider adding a CNI to immunosuppressive regimen.   Continue renal sparing activities:  -2 g a day dietary sodium restriction  -optimize glycemic control (goal A1c is less than 7%)   -control high blood pressure (goal blood pressure is less than 130/80, patient was advised to check blood pressure once or twice a week and bring blood pressure logs to next office visit)  -exercise at least 30 minutes a day, 5 days a week  -maintain healthy weight  -stay well hydrated (drink water only, avoid juices, sweet tea, and sodas)  -ask about staying up-to-date on vaccinations (flu vaccine, pneumonia vaccine, hepatitis B vaccine)  -avoid excessive use of NSAIDs (ibuprofen, naproxen, Aleve, Advil, Toradol, Mobic), take Tylenol as needed for headache or mild pain  -take cholesterol lowering medications if prescribed (LDL goal less than 100)  Follow up in about 8 weeks (around 7/27/2023).     Lupus nephritis, ISN/RPS class III  In March of 2023, MARTA double-stranded DNA were positive with dsDNA titer of greater than 1:5120, SSA, SSB negative, CCP Ab 4 C3 57, C4 9.9.  Patient previously had elevated titers of U1-RNP Ab, MDA-5 Ab, Ku, RNP 70 and U2 snRNP.  Patient was received rituximab on 04/14/2023 and 04/28/2023.  Renal indices have stabilized, however, proteinuria remains significant.  It is unclear whether patient is, in fact, taking MMF,  but recalls taking hydroxychloroquine and prednisone as prescribed.  She has an appointment with her rheumatologist on 07/03/2023.  If proteinuria remains significant, can consider adding a CNI to immunosuppressive regimen.     Essential hypertension  Blood pressure reading is at goal.  Can consider adding an PAULY blocker for antiproteinuric effect if serum creatinine remains stable.    Iron deficiency anemia, unspecified iron deficiency anemia type  Patient has not tolerated oral iron replacement in the past due to severe gastrointestinal side effects.  Will start intravenous iron infusions.    Weakness  Will place a consult for CM for resources re: patient care assistant.     BMI 27.0-27.9,adult  Lifestyle and dietary interventions discussed, patient counseled on weight loss using portion control, non- sedentary lifestyle, low-carbohydrate/low fat diet.    Other orders  -     ondansetron (ZOFRAN) 4 MG tablet; Take 1 tablet (4 mg total) by mouth every 6 (six) hours as needed for Nausea.  Dispense: 24 tablet; Refill: 0  -     cyproheptadine (PERIACTIN) 4 mg tablet; Take 1 tablet (4 mg total) by mouth every evening.  Dispense: 90 tablet; Refill: 0        -     omeprazole (PRILOSEC) 20 MG capsule; Take 1 capsule (20 mg total) by mouth once daily.      Dispense: 30 capsule; Refill: 5      6/1/2023  Rachel rBoderick NP  [facility] Nephrology

## 2023-06-02 LAB — BACTERIA UR CULT: NORMAL

## 2023-06-06 PROBLEM — D50.9 IRON DEFICIENCY ANEMIA: Status: ACTIVE | Noted: 2018-03-15

## 2023-06-06 RX ORDER — EPINEPHRINE 0.3 MG/.3ML
0.3 INJECTION SUBCUTANEOUS ONCE AS NEEDED
Status: CANCELLED | OUTPATIENT
Start: 2023-06-06

## 2023-06-06 RX ORDER — METHYLPREDNISOLONE SOD SUCC 125 MG
125 VIAL (EA) INJECTION ONCE AS NEEDED
Status: CANCELLED | OUTPATIENT
Start: 2023-06-06

## 2023-06-06 RX ORDER — HEPARIN 100 UNIT/ML
500 SYRINGE INTRAVENOUS
Status: CANCELLED | OUTPATIENT
Start: 2023-06-06

## 2023-06-06 RX ORDER — DIPHENHYDRAMINE HYDROCHLORIDE 50 MG/ML
50 INJECTION INTRAMUSCULAR; INTRAVENOUS ONCE AS NEEDED
Status: CANCELLED | OUTPATIENT
Start: 2023-06-06

## 2023-06-06 RX ORDER — SODIUM CHLORIDE 0.9 % (FLUSH) 0.9 %
10 SYRINGE (ML) INJECTION
Status: CANCELLED | OUTPATIENT
Start: 2023-06-06

## 2023-06-08 PROCEDURE — G0180 PR HOME HEALTH MD CERTIFICATION: ICD-10-PCS | Mod: ,,, | Performed by: NURSE PRACTITIONER

## 2023-06-08 PROCEDURE — G0180 MD CERTIFICATION HHA PATIENT: HCPCS | Mod: ,,, | Performed by: NURSE PRACTITIONER

## 2023-06-16 ENCOUNTER — EXTERNAL HOME HEALTH (OUTPATIENT)
Dept: HOME HEALTH SERVICES | Facility: HOSPITAL | Age: 58
End: 2023-06-16
Payer: MEDICAID

## 2023-06-23 ENCOUNTER — INFUSION (OUTPATIENT)
Dept: INFUSION THERAPY | Facility: HOSPITAL | Age: 58
End: 2023-06-23
Attending: INTERNAL MEDICINE
Payer: MEDICAID

## 2023-06-23 VITALS
TEMPERATURE: 99 F | DIASTOLIC BLOOD PRESSURE: 79 MMHG | WEIGHT: 161.63 LBS | SYSTOLIC BLOOD PRESSURE: 164 MMHG | RESPIRATION RATE: 20 BRPM | HEART RATE: 69 BPM | BODY MASS INDEX: 26.92 KG/M2

## 2023-06-23 DIAGNOSIS — D50.9 IRON DEFICIENCY ANEMIA, UNSPECIFIED IRON DEFICIENCY ANEMIA TYPE: Primary | ICD-10-CM

## 2023-06-23 PROCEDURE — 63600175 PHARM REV CODE 636 W HCPCS: Performed by: NURSE PRACTITIONER

## 2023-06-23 PROCEDURE — 25000003 PHARM REV CODE 250: Performed by: NURSE PRACTITIONER

## 2023-06-23 PROCEDURE — 96365 THER/PROPH/DIAG IV INF INIT: CPT

## 2023-06-23 RX ORDER — SODIUM CHLORIDE 0.9 % (FLUSH) 0.9 %
10 SYRINGE (ML) INJECTION
Status: CANCELLED | OUTPATIENT
Start: 2023-06-30

## 2023-06-23 RX ORDER — SODIUM CHLORIDE 0.9 % (FLUSH) 0.9 %
10 SYRINGE (ML) INJECTION
Status: DISCONTINUED | OUTPATIENT
Start: 2023-06-23 | End: 2023-06-23 | Stop reason: HOSPADM

## 2023-06-23 RX ORDER — EPINEPHRINE 0.3 MG/.3ML
0.3 INJECTION SUBCUTANEOUS ONCE AS NEEDED
Status: CANCELLED | OUTPATIENT
Start: 2023-06-30

## 2023-06-23 RX ORDER — METHYLPREDNISOLONE SOD SUCC 125 MG
125 VIAL (EA) INJECTION ONCE AS NEEDED
Status: CANCELLED | OUTPATIENT
Start: 2023-06-30

## 2023-06-23 RX ORDER — HEPARIN 100 UNIT/ML
500 SYRINGE INTRAVENOUS
Status: CANCELLED | OUTPATIENT
Start: 2023-06-30

## 2023-06-23 RX ORDER — DIPHENHYDRAMINE HYDROCHLORIDE 50 MG/ML
50 INJECTION INTRAMUSCULAR; INTRAVENOUS ONCE AS NEEDED
Status: CANCELLED | OUTPATIENT
Start: 2023-06-30

## 2023-06-23 RX ADMIN — SODIUM CHLORIDE: 9 INJECTION, SOLUTION INTRAVENOUS at 09:06

## 2023-06-23 RX ADMIN — SODIUM CHLORIDE 125 MG: 9 INJECTION, SOLUTION INTRAVENOUS at 08:06

## 2023-06-23 NOTE — NURSING
0720 Patient is here for Ferrlecit #1 of 4. Voices no c/o.   0910 Patient tolerated infusion without incident.

## 2023-06-27 ENCOUNTER — TELEPHONE (OUTPATIENT)
Dept: RHEUMATOLOGY | Facility: CLINIC | Age: 58
End: 2023-06-27

## 2023-06-27 ENCOUNTER — OFFICE VISIT (OUTPATIENT)
Dept: RHEUMATOLOGY | Facility: CLINIC | Age: 58
End: 2023-06-27
Payer: MEDICAID

## 2023-06-27 VITALS
HEART RATE: 73 BPM | HEIGHT: 66 IN | SYSTOLIC BLOOD PRESSURE: 138 MMHG | TEMPERATURE: 100 F | OXYGEN SATURATION: 98 % | BODY MASS INDEX: 26.23 KG/M2 | WEIGHT: 163.19 LBS | DIASTOLIC BLOOD PRESSURE: 73 MMHG

## 2023-06-27 DIAGNOSIS — N30.01 ACUTE CYSTITIS WITH HEMATURIA: ICD-10-CM

## 2023-06-27 DIAGNOSIS — F33.1 MODERATE RECURRENT MAJOR DEPRESSION: ICD-10-CM

## 2023-06-27 DIAGNOSIS — F51.01 PRIMARY INSOMNIA: ICD-10-CM

## 2023-06-27 DIAGNOSIS — R53.1 WEAKNESS: ICD-10-CM

## 2023-06-27 DIAGNOSIS — M32.14 LUPUS NEPHRITIS, ISN/RPS CLASS III: ICD-10-CM

## 2023-06-27 DIAGNOSIS — G43.109 MIGRAINE AURA WITHOUT HEADACHE: ICD-10-CM

## 2023-06-27 DIAGNOSIS — D50.9 IRON DEFICIENCY ANEMIA, UNSPECIFIED IRON DEFICIENCY ANEMIA TYPE: ICD-10-CM

## 2023-06-27 DIAGNOSIS — M32.14 OTHER SYSTEMIC LUPUS ERYTHEMATOSUS WITH GLOMERULAR DISEASE: Primary | ICD-10-CM

## 2023-06-27 DIAGNOSIS — M79.7 FIBROMYALGIA: ICD-10-CM

## 2023-06-27 DIAGNOSIS — I10 ESSENTIAL HYPERTENSION: ICD-10-CM

## 2023-06-27 PROCEDURE — 3008F PR BODY MASS INDEX (BMI) DOCUMENTED: ICD-10-PCS | Mod: CPTII,,, | Performed by: INTERNAL MEDICINE

## 2023-06-27 PROCEDURE — 1159F MED LIST DOCD IN RCRD: CPT | Mod: CPTII,,, | Performed by: INTERNAL MEDICINE

## 2023-06-27 PROCEDURE — 99999 PR PBB SHADOW E&M-EST. PATIENT-LVL IV: CPT | Mod: PBBFAC,,, | Performed by: INTERNAL MEDICINE

## 2023-06-27 PROCEDURE — 3078F DIAST BP <80 MM HG: CPT | Mod: CPTII,,, | Performed by: INTERNAL MEDICINE

## 2023-06-27 PROCEDURE — 3075F PR MOST RECENT SYSTOLIC BLOOD PRESS GE 130-139MM HG: ICD-10-PCS | Mod: CPTII,,, | Performed by: INTERNAL MEDICINE

## 2023-06-27 PROCEDURE — 99214 PR OFFICE/OUTPT VISIT, EST, LEVL IV, 30-39 MIN: ICD-10-PCS | Mod: S$PBB,,, | Performed by: INTERNAL MEDICINE

## 2023-06-27 PROCEDURE — 99214 OFFICE O/P EST MOD 30 MIN: CPT | Mod: S$PBB,,, | Performed by: INTERNAL MEDICINE

## 2023-06-27 PROCEDURE — 99214 OFFICE O/P EST MOD 30 MIN: CPT | Mod: PBBFAC | Performed by: INTERNAL MEDICINE

## 2023-06-27 PROCEDURE — 3078F PR MOST RECENT DIASTOLIC BLOOD PRESSURE < 80 MM HG: ICD-10-PCS | Mod: CPTII,,, | Performed by: INTERNAL MEDICINE

## 2023-06-27 PROCEDURE — 3008F BODY MASS INDEX DOCD: CPT | Mod: CPTII,,, | Performed by: INTERNAL MEDICINE

## 2023-06-27 PROCEDURE — 3044F HG A1C LEVEL LT 7.0%: CPT | Mod: CPTII,,, | Performed by: INTERNAL MEDICINE

## 2023-06-27 PROCEDURE — 3075F SYST BP GE 130 - 139MM HG: CPT | Mod: CPTII,,, | Performed by: INTERNAL MEDICINE

## 2023-06-27 PROCEDURE — 99999 PR PBB SHADOW E&M-EST. PATIENT-LVL IV: ICD-10-PCS | Mod: PBBFAC,,, | Performed by: INTERNAL MEDICINE

## 2023-06-27 PROCEDURE — 3044F PR MOST RECENT HEMOGLOBIN A1C LEVEL <7.0%: ICD-10-PCS | Mod: CPTII,,, | Performed by: INTERNAL MEDICINE

## 2023-06-27 PROCEDURE — 3066F NEPHROPATHY DOC TX: CPT | Mod: CPTII,,, | Performed by: INTERNAL MEDICINE

## 2023-06-27 PROCEDURE — 3066F PR DOCUMENTATION OF TREATMENT FOR NEPHROPATHY: ICD-10-PCS | Mod: CPTII,,, | Performed by: INTERNAL MEDICINE

## 2023-06-27 PROCEDURE — 1159F PR MEDICATION LIST DOCUMENTED IN MEDICAL RECORD: ICD-10-PCS | Mod: CPTII,,, | Performed by: INTERNAL MEDICINE

## 2023-06-27 RX ORDER — PREDNISONE 5 MG/1
5 TABLET ORAL DAILY
Qty: 30 TABLET | Refills: 11 | Status: ON HOLD | OUTPATIENT
Start: 2023-06-27 | End: 2023-10-14 | Stop reason: HOSPADM

## 2023-06-27 RX ORDER — ESCITALOPRAM OXALATE 10 MG/1
10 TABLET ORAL DAILY
Qty: 30 TABLET | Refills: 5 | Status: ON HOLD | OUTPATIENT
Start: 2023-06-27 | End: 2023-10-26 | Stop reason: HOSPADM

## 2023-06-27 RX ORDER — MYCOPHENOLATE MOFETIL 500 MG/1
1000 TABLET ORAL 2 TIMES DAILY
Qty: 120 TABLET | Refills: 11 | Status: ON HOLD | OUTPATIENT
Start: 2023-06-27 | End: 2023-10-13 | Stop reason: HOSPADM

## 2023-06-27 RX ORDER — OMEPRAZOLE 20 MG/1
20 CAPSULE, DELAYED RELEASE ORAL DAILY
Qty: 30 CAPSULE | Refills: 5 | Status: SHIPPED | OUTPATIENT
Start: 2023-06-27 | End: 2023-08-22

## 2023-06-27 RX ORDER — CIPROFLOXACIN 500 MG/1
500 TABLET ORAL 2 TIMES DAILY
Qty: 10 TABLET | Refills: 0 | Status: SHIPPED | OUTPATIENT
Start: 2023-06-27 | End: 2023-08-08 | Stop reason: ALTCHOICE

## 2023-06-27 RX ORDER — ELETRIPTAN HYDROBROMIDE 40 MG/1
40 TABLET, FILM COATED ORAL
Qty: 9 TABLET | Refills: 5 | Status: SHIPPED | OUTPATIENT
Start: 2023-06-27 | End: 2023-12-13 | Stop reason: SDUPTHER

## 2023-06-27 RX ORDER — TIZANIDINE 4 MG/1
4 TABLET ORAL NIGHTLY
Qty: 30 TABLET | Refills: 5 | Status: SHIPPED | OUTPATIENT
Start: 2023-06-27 | End: 2023-08-08

## 2023-06-27 NOTE — PROGRESS NOTES
"Subjective:       Patient ID: Vi Ulrich is a 57 y.o. female.    Chief Complaint: Follow-up (Follow up. Patient states that the migraine medicine is not working. She complains of migraines, back, shoulder, right side and generalized joint pain. Pain 8/10.)    The patient is complaining of joint pain involving the MCP PIP wrist elbow shoulders hips knees and ankles bilaterally.  The pain is 5/10 in intensity dull in quality and continuous.  That is associated with a morning stiffness lasting for more than 60 minutes.  Is also having difficulty maintaining a good night of sleep.  This has been associated with myalgias.  Muscle aches are 5/10 in intensity dull in quality and continuous.  They are associated with fatigue.  No fever no chills no others.  Labs checked during this visit   SHE IS HAVING A MIGRAINE ATTACK TODAY BUT SHE IS ALLERGIC TO TORADOL SO I COULD NOT GIVE HER A TORADOL SHOT.  I PRESCRIBED HER SOME MIGRAINE MEDICINES TO TAKE IT HOME.  R[facility]UX[facility] CHEMO INFUSIONS WILL BE ORDERED IN 3 MONTHS DURING NEXT VISIT.    Review of Systems   Constitutional:  Negative for appetite change, chills and fever.   HENT:  Negative for congestion, ear pain, mouth sores, nosebleeds and trouble swallowing.    Eyes:  Negative for photophobia and discharge.   Respiratory:  Negative for chest tightness and shortness of breath.    Cardiovascular:  Negative for chest pain.   Gastrointestinal:  Negative for abdominal pain and vomiting.   Endocrine: Negative.    Genitourinary:  Negative for hematuria.   Musculoskeletal:         As per HPI   Skin:  Negative for rash.   Neurological:  Positive for headaches. Negative for weakness.       Objective:   /73 (BP Location: Right arm, Patient Position: Sitting, BP Method: Medium (Automatic))   Pulse 73   Temp 99.7 °F (37.6 °C) (Oral)   Ht 5' 6" (1.676 m)   Wt 74 kg (163 lb 3.2 oz)   LMP  (LMP Unknown)   SpO2 98%   BMI 26.34 kg/m²      Physical Exam   Constitutional: She is " oriented to person, place, and time. She appears well-developed and well-nourished. No distress.   HENT:   Head: Normocephalic and atraumatic.   Right Ear: External ear normal.   Left Ear: External ear normal.   Eyes: Pupils are equal, round, and reactive to light.   Cardiovascular: Normal rate, regular rhythm and normal heart sounds.   Pulmonary/Chest: Breath sounds normal.   Abdominal: Soft. There is no abdominal tenderness.   Musculoskeletal:      Right shoulder: Tenderness present.      Left shoulder: Tenderness present.      Right elbow: Tenderness present.      Left elbow: Tenderness present.      Right wrist: Tenderness present.      Left wrist: Tenderness present.      Cervical back: Neck supple.      Right hip: Tenderness present.      Left hip: Tenderness present.      Right knee: Tenderness present.      Left knee: Tenderness present.      Right ankle: Tenderness present.      Left ankle: Tenderness present.   Lymphadenopathy:     She has no cervical adenopathy.   Neurological: She is alert and oriented to person, place, and time. She displays normal reflexes. No cranial nerve deficit or sensory deficit. She exhibits normal muscle tone. Coordination normal.   Skin: No rash noted. No erythema.   Vitals reviewed.      Right Side Rheumatological Exam     The patient is tender to palpation of the shoulder, elbow, wrist, knee, 1st PIP, 1st MCP, 2nd PIP, 2nd MCP, 3rd PIP, 3rd MCP, 4th PIP, 4th MCP, 5th PIP, hip, ankle, 1st MTP, 2nd MTP, 3rd MTP, 4th MTP, 5th MTP, 1st toe IP, 2nd toe IP, 3rd toe IP, 4th toe IP and 5th toe IP    Left Side Rheumatological Exam     The patient is tender to palpation of the shoulder, elbow, wrist, knee, 1st PIP, 1st MCP, 2nd PIP, 2nd MCP, 3rd PIP, 3rd MCP, 4th PIP, 4th MCP, 5th PIP, 5th MCP, hip, ankle, 1st MTP, 2nd MTP, 3rd MTP, 4th MTP, 5th MTP, 1st toe IP, 2nd toe IP, 3rd toe IP, 4th toe IP and 5th toe IP.       Completed Fibromyalgia exam 18/18 tender points.  No data to  display     Assessment:       1. Other systemic lupus erythematosus with glomerular disease    2. Migraine aura without headache    3. Moderate recurrent major depression    4. Lupus nephritis, ISN/RPS class III    5. Fibromyalgia    6. Primary insomnia    7. Acute cystitis with hematuria    8. Essential hypertension    9. Iron deficiency anemia, unspecified iron deficiency anemia type    10. Weakness          Medication List with Changes/Refills   New Medications    CIPROFLOXACIN HCL (CIPRO) 500 MG TABLET    Take 1 tablet (500 mg total) by mouth 2 (two) times a day.       Start Date: 6/27/2023 End Date: --    ELETRIPTAN (RELPAX) 40 MG TABLET    Take 1 tablet (40 mg total) by mouth as needed (take one at the beginning of migraine, may repeat in 2 h. max 2 in 24h). may repeat in 2 hours if necessary       Start Date: 6/27/2023 End Date: 7/27/2023    ESCITALOPRAM OXALATE (LEXAPRO) 10 MG TABLET    Take 1 tablet (10 mg total) by mouth once daily.       Start Date: 6/27/2023 End Date: 6/26/2024    TIZANIDINE (ZANAFLEX) 4 MG TABLET    Take 1 tablet (4 mg total) by mouth nightly.       Start Date: 6/27/2023 End Date: 12/24/2023   Current Medications    ALBUTEROL (ACCUNEB) 0.63 MG/3 ML NEBU    Take 3 mLs (0.63 mg total) by nebulization every 4 (four) hours as needed (wheezing). Rescue       Start Date: 1/12/2023 End Date: 1/12/2024    ALBUTEROL SULFATE (PROAIR RESPICLICK) 90 MCG/ACTUATION INHALER    Inhale 1 puff into the lungs every 4 (four) hours as needed for Wheezing. Rescue       Start Date: --        End Date: --    ATORVASTATIN (LIPITOR) 10 MG TABLET    Take 10 mg by mouth once daily.       Start Date: 5/12/2022 End Date: --    BLOOD SUGAR DIAGNOSTIC (ONETOUCH VERIO) STRP    1 each by Misc.(Non-Drug; Combo Route) route 4 (four) times daily.       Start Date: 12/15/2017End Date: --    BLOOD SUGAR DIAGNOSTIC STRP    1 each by Misc.(Non-Drug; Combo Route) route 4 (four) times daily.       Start Date: 12/15/2017End Date:  --    BLOOD SUGAR DIAGNOSTIC STRP    100 each by Misc.(Non-Drug; Combo Route) route 3 (three) times daily.       Start Date: 3/15/2023 End Date: --    BLOOD-GLUCOSE METER (FREESTYLE SYSTEM KIT) KIT    Use as instructed       Start Date: 3/15/2023 End Date: 3/14/2024    CYPROHEPTADINE (PERIACTIN) 4 MG TABLET    Take 1 tablet (4 mg total) by mouth every evening.       Start Date: 6/1/2023  End Date: 8/30/2023    HYDROCODONE-ACETAMINOPHEN (NORCO)  MG PER TABLET    Take 1 tablet by mouth every 6 (six) hours as needed for Pain.       Start Date: 6/1/2023  End Date: --    INSULIN (LANTUS SOLOSTAR U-100 INSULIN) GLARGINE 100 UNITS/ML SUBQ PEN    Inject 80 Units into the skin every evening.       Start Date: 1/12/2023 End Date: 1/12/2024    LANCETS 28 GAUGE MISC    100 lancets by Misc.(Non-Drug; Combo Route) route 3 (three) times daily.       Start Date: 3/15/2023 End Date: --    LANCETS 33 GAUGE MISC    1 lancet by Misc.(Non-Drug; Combo Route) route 4 (four) times daily.       Start Date: 12/15/2017End Date: --    WALKER MISC    Please dispense 1 Rollator       Start Date: 1/12/2023 End Date: --   Changed and/or Refilled Medications    Modified Medication Previous Medication    MYCOPHENOLATE (CELLCEPT) 500 MG TAB mycophenolate (CELLCEPT) 500 mg Tab       Take 2 tablets (1,000 mg total) by mouth 2 (two) times daily.    Take 2 tablets (1,000 mg total) by mouth 2 (two) times daily.       Start Date: 6/27/2023 End Date: 6/26/2024    Start Date: 4/4/2023  End Date: 6/27/2023    OMEPRAZOLE (PRILOSEC) 20 MG CAPSULE omeprazole (PRILOSEC) 20 MG capsule       Take 1 capsule (20 mg total) by mouth once daily.    Take 1 capsule (20 mg total) by mouth once daily.       Start Date: 6/27/2023 End Date: --    Start Date: 6/1/2023  End Date: 6/27/2023    PREDNISONE (DELTASONE) 5 MG TABLET predniSONE (DELTASONE) 5 MG tablet       Take 1 tablet (5 mg total) by mouth once daily.    Take 1 tablet (5 mg total) by mouth once daily.        Start Date: 6/27/2023 End Date: 6/26/2024    Start Date: 3/31/2023 End Date: 6/27/2023   Discontinued Medications    HYDROXYCHLOROQUINE (PLAQUENIL) 200 MG TABLET    Take 1 tablet (200 mg total) by mouth once daily.       Start Date: 3/31/2023 End Date: 6/27/2023    MYCOPHENOLATE (CELLCEPT) 500 MG TAB    Take 2 tablets (1,000 mg total) by mouth 2 (two) times daily.       Start Date: 4/4/2023  End Date: 6/27/2023         Plan:         Problem List Items Addressed This Visit          Neuro    Migraine aura without headache    Relevant Medications    mycophenolate (CELLCEPT) 500 mg Tab    omeprazole (PRILOSEC) 20 MG capsule    predniSONE (DELTASONE) 5 MG tablet    eletriptan (RELPAX) 40 MG tablet    tiZANidine (ZANAFLEX) 4 MG tablet    EScitalopram oxalate (LEXAPRO) 10 MG tablet    ciprofloxacin HCl (CIPRO) 500 MG tablet       Psychiatric    Moderate recurrent major depression    Relevant Medications    mycophenolate (CELLCEPT) 500 mg Tab    omeprazole (PRILOSEC) 20 MG capsule    predniSONE (DELTASONE) 5 MG tablet    eletriptan (RELPAX) 40 MG tablet    tiZANidine (ZANAFLEX) 4 MG tablet    EScitalopram oxalate (LEXAPRO) 10 MG tablet    ciprofloxacin HCl (CIPRO) 500 MG tablet       Cardiac/Vascular    Essential hypertension    Relevant Medications    mycophenolate (CELLCEPT) 500 mg Tab    omeprazole (PRILOSEC) 20 MG capsule    predniSONE (DELTASONE) 5 MG tablet    eletriptan (RELPAX) 40 MG tablet    tiZANidine (ZANAFLEX) 4 MG tablet    EScitalopram oxalate (LEXAPRO) 10 MG tablet    ciprofloxacin HCl (CIPRO) 500 MG tablet       Renal/    Acute cystitis with hematuria    Relevant Medications    mycophenolate (CELLCEPT) 500 mg Tab    omeprazole (PRILOSEC) 20 MG capsule    predniSONE (DELTASONE) 5 MG tablet    eletriptan (RELPAX) 40 MG tablet    tiZANidine (ZANAFLEX) 4 MG tablet    EScitalopram oxalate (LEXAPRO) 10 MG tablet    ciprofloxacin HCl (CIPRO) 500 MG tablet    Lupus nephritis, ISN/RPS class III    Relevant  Medications    mycophenolate (CELLCEPT) 500 mg Tab    omeprazole (PRILOSEC) 20 MG capsule    predniSONE (DELTASONE) 5 MG tablet    eletriptan (RELPAX) 40 MG tablet    tiZANidine (ZANAFLEX) 4 MG tablet    EScitalopram oxalate (LEXAPRO) 10 MG tablet    ciprofloxacin HCl (CIPRO) 500 MG tablet       Immunology/Multi System    SLE (systemic lupus erythematosus) - Primary    Relevant Medications    mycophenolate (CELLCEPT) 500 mg Tab    omeprazole (PRILOSEC) 20 MG capsule    predniSONE (DELTASONE) 5 MG tablet    eletriptan (RELPAX) 40 MG tablet    tiZANidine (ZANAFLEX) 4 MG tablet    EScitalopram oxalate (LEXAPRO) 10 MG tablet    ciprofloxacin HCl (CIPRO) 500 MG tablet       Oncology    Iron deficiency anemia    Relevant Medications    mycophenolate (CELLCEPT) 500 mg Tab    omeprazole (PRILOSEC) 20 MG capsule    predniSONE (DELTASONE) 5 MG tablet    eletriptan (RELPAX) 40 MG tablet    tiZANidine (ZANAFLEX) 4 MG tablet    EScitalopram oxalate (LEXAPRO) 10 MG tablet    ciprofloxacin HCl (CIPRO) 500 MG tablet       Orthopedic    Fibromyalgia    Relevant Medications    mycophenolate (CELLCEPT) 500 mg Tab    omeprazole (PRILOSEC) 20 MG capsule    predniSONE (DELTASONE) 5 MG tablet    eletriptan (RELPAX) 40 MG tablet    tiZANidine (ZANAFLEX) 4 MG tablet    EScitalopram oxalate (LEXAPRO) 10 MG tablet    ciprofloxacin HCl (CIPRO) 500 MG tablet       Other    Insomnia    Relevant Medications    mycophenolate (CELLCEPT) 500 mg Tab    omeprazole (PRILOSEC) 20 MG capsule    predniSONE (DELTASONE) 5 MG tablet    eletriptan (RELPAX) 40 MG tablet    tiZANidine (ZANAFLEX) 4 MG tablet    EScitalopram oxalate (LEXAPRO) 10 MG tablet    ciprofloxacin HCl (CIPRO) 500 MG tablet     Other Visit Diagnoses       Weakness        Relevant Medications    mycophenolate (CELLCEPT) 500 mg Tab    omeprazole (PRILOSEC) 20 MG capsule    predniSONE (DELTASONE) 5 MG tablet    eletriptan (RELPAX) 40 MG tablet    tiZANidine (ZANAFLEX) 4 MG tablet     EScitalopram oxalate (LEXAPRO) 10 MG tablet    ciprofloxacin HCl (CIPRO) 500 MG tablet

## 2023-06-29 ENCOUNTER — TELEPHONE (OUTPATIENT)
Dept: NEPHROLOGY | Facility: CLINIC | Age: 58
End: 2023-06-29
Payer: MEDICAID

## 2023-06-29 NOTE — TELEPHONE ENCOUNTER
----- Message from Regi Donahue sent at 6/29/2023  9:07 AM CDT -----  [facility] left message with after hours answering service (6/28 @ 4:27pm )needing a verbal order to continue physical therapy for pt.  [phone]

## 2023-06-29 NOTE — TELEPHONE ENCOUNTER
Spoke with Adrienne, physical therapist:  informed there is an active order for physical therapy.

## 2023-06-30 ENCOUNTER — DOCUMENTATION ONLY (OUTPATIENT)
Dept: INFUSION THERAPY | Facility: HOSPITAL | Age: 58
End: 2023-06-30
Payer: MEDICAID

## 2023-06-30 NOTE — PROGRESS NOTES
Called pt at 0850 and left voice mail that pt missed her 0730 Ferrlecit infusion this am & if pt arrives by noon today she can receive Ferrlecit today, left call back number to clinic.

## 2023-07-05 DIAGNOSIS — M79.7 FIBROMYALGIA: ICD-10-CM

## 2023-07-05 DIAGNOSIS — M32.14 SYSTEMIC LUPUS ERYTHEMATOSUS WITH GLOMERULAR DISEASE, UNSPECIFIED SLE TYPE: ICD-10-CM

## 2023-07-05 RX ORDER — HYDROCODONE BITARTRATE AND ACETAMINOPHEN 10; 325 MG/1; MG/1
1 TABLET ORAL EVERY 6 HOURS PRN
Qty: 90 TABLET | Refills: 0 | Status: SHIPPED | OUTPATIENT
Start: 2023-07-05 | End: 2023-08-08 | Stop reason: SDUPTHER

## 2023-07-05 RX ORDER — ONDANSETRON 4 MG/1
TABLET, FILM COATED ORAL
Qty: 24 TABLET | Refills: 0 | Status: SHIPPED | OUTPATIENT
Start: 2023-07-05 | End: 2024-03-05 | Stop reason: SDUPTHER

## 2023-07-07 ENCOUNTER — INFUSION (OUTPATIENT)
Dept: INFUSION THERAPY | Facility: HOSPITAL | Age: 58
End: 2023-07-07
Attending: INTERNAL MEDICINE
Payer: MEDICAID

## 2023-07-07 VITALS
SYSTOLIC BLOOD PRESSURE: 139 MMHG | DIASTOLIC BLOOD PRESSURE: 76 MMHG | RESPIRATION RATE: 20 BRPM | BODY MASS INDEX: 26.72 KG/M2 | HEART RATE: 63 BPM | WEIGHT: 165.56 LBS | TEMPERATURE: 98 F

## 2023-07-07 DIAGNOSIS — D50.9 IRON DEFICIENCY ANEMIA, UNSPECIFIED IRON DEFICIENCY ANEMIA TYPE: Primary | ICD-10-CM

## 2023-07-07 PROCEDURE — 25000003 PHARM REV CODE 250: Performed by: NURSE PRACTITIONER

## 2023-07-07 PROCEDURE — 63600175 PHARM REV CODE 636 W HCPCS: Performed by: NURSE PRACTITIONER

## 2023-07-07 PROCEDURE — 96365 THER/PROPH/DIAG IV INF INIT: CPT

## 2023-07-07 RX ORDER — HEPARIN 100 UNIT/ML
500 SYRINGE INTRAVENOUS
Status: CANCELLED | OUTPATIENT
Start: 2023-07-14

## 2023-07-07 RX ORDER — EPINEPHRINE 0.3 MG/.3ML
0.3 INJECTION SUBCUTANEOUS ONCE AS NEEDED
Status: CANCELLED | OUTPATIENT
Start: 2023-07-07

## 2023-07-07 RX ORDER — METHYLPREDNISOLONE SOD SUCC 125 MG
125 VIAL (EA) INJECTION ONCE AS NEEDED
Status: CANCELLED | OUTPATIENT
Start: 2023-07-14

## 2023-07-07 RX ORDER — SODIUM CHLORIDE 0.9 % (FLUSH) 0.9 %
10 SYRINGE (ML) INJECTION
Status: DISCONTINUED | OUTPATIENT
Start: 2023-07-07 | End: 2023-07-07 | Stop reason: HOSPADM

## 2023-07-07 RX ORDER — DIPHENHYDRAMINE HYDROCHLORIDE 50 MG/ML
50 INJECTION INTRAMUSCULAR; INTRAVENOUS ONCE AS NEEDED
Status: CANCELLED | OUTPATIENT
Start: 2023-07-14

## 2023-07-07 RX ORDER — METHYLPREDNISOLONE SOD SUCC 125 MG
125 VIAL (EA) INJECTION ONCE AS NEEDED
Status: DISCONTINUED | OUTPATIENT
Start: 2023-07-07 | End: 2023-07-07 | Stop reason: HOSPADM

## 2023-07-07 RX ORDER — DIPHENHYDRAMINE HYDROCHLORIDE 50 MG/ML
50 INJECTION INTRAMUSCULAR; INTRAVENOUS ONCE AS NEEDED
Status: DISCONTINUED | OUTPATIENT
Start: 2023-07-07 | End: 2023-07-07 | Stop reason: HOSPADM

## 2023-07-07 RX ORDER — HEPARIN 100 UNIT/ML
500 SYRINGE INTRAVENOUS
Status: DISCONTINUED | OUTPATIENT
Start: 2023-07-07 | End: 2023-07-07 | Stop reason: HOSPADM

## 2023-07-07 RX ORDER — EPINEPHRINE 0.3 MG/.3ML
0.3 INJECTION SUBCUTANEOUS ONCE AS NEEDED
Status: CANCELLED | OUTPATIENT
Start: 2023-07-14

## 2023-07-07 RX ORDER — SODIUM CHLORIDE 0.9 % (FLUSH) 0.9 %
10 SYRINGE (ML) INJECTION
Status: CANCELLED | OUTPATIENT
Start: 2023-07-14

## 2023-07-07 RX ADMIN — SODIUM CHLORIDE 125 MG: 9 INJECTION, SOLUTION INTRAVENOUS at 08:07

## 2023-07-07 NOTE — NURSING
0725 Patient is here for Ferrlecit #2/4   Report given to oncoming nurse, Giovanna Das RN. Report included SBAR, MAR and ED summary.

## 2023-07-14 ENCOUNTER — INFUSION (OUTPATIENT)
Dept: INFUSION THERAPY | Facility: HOSPITAL | Age: 58
End: 2023-07-14
Attending: INTERNAL MEDICINE
Payer: MEDICAID

## 2023-07-14 VITALS
TEMPERATURE: 99 F | DIASTOLIC BLOOD PRESSURE: 72 MMHG | BODY MASS INDEX: 26.15 KG/M2 | OXYGEN SATURATION: 99 % | RESPIRATION RATE: 20 BRPM | HEART RATE: 66 BPM | WEIGHT: 162 LBS | SYSTOLIC BLOOD PRESSURE: 153 MMHG

## 2023-07-14 DIAGNOSIS — D50.9 IRON DEFICIENCY ANEMIA, UNSPECIFIED IRON DEFICIENCY ANEMIA TYPE: Primary | ICD-10-CM

## 2023-07-14 PROCEDURE — 25000003 PHARM REV CODE 250: Performed by: NURSE PRACTITIONER

## 2023-07-14 PROCEDURE — 63600175 PHARM REV CODE 636 W HCPCS: Performed by: NURSE PRACTITIONER

## 2023-07-14 PROCEDURE — 96367 TX/PROPH/DG ADDL SEQ IV INF: CPT

## 2023-07-14 RX ORDER — DIPHENHYDRAMINE HYDROCHLORIDE 50 MG/ML
50 INJECTION INTRAMUSCULAR; INTRAVENOUS ONCE AS NEEDED
Status: CANCELLED | OUTPATIENT
Start: 2023-07-21

## 2023-07-14 RX ORDER — HEPARIN 100 UNIT/ML
500 SYRINGE INTRAVENOUS
Status: CANCELLED | OUTPATIENT
Start: 2023-07-21

## 2023-07-14 RX ORDER — SODIUM CHLORIDE 0.9 % (FLUSH) 0.9 %
10 SYRINGE (ML) INJECTION
Status: CANCELLED | OUTPATIENT
Start: 2023-07-21

## 2023-07-14 RX ORDER — SODIUM CHLORIDE 0.9 % (FLUSH) 0.9 %
10 SYRINGE (ML) INJECTION
Status: DISCONTINUED | OUTPATIENT
Start: 2023-07-14 | End: 2023-07-14 | Stop reason: HOSPADM

## 2023-07-14 RX ORDER — METHYLPREDNISOLONE SOD SUCC 125 MG
125 VIAL (EA) INJECTION ONCE AS NEEDED
Status: CANCELLED | OUTPATIENT
Start: 2023-07-21

## 2023-07-14 RX ORDER — EPINEPHRINE 0.3 MG/.3ML
0.3 INJECTION SUBCUTANEOUS ONCE AS NEEDED
Status: CANCELLED | OUTPATIENT
Start: 2023-07-21

## 2023-07-14 RX ADMIN — SODIUM CHLORIDE 125 MG: 9 INJECTION, SOLUTION INTRAVENOUS at 08:07

## 2023-07-21 ENCOUNTER — INFUSION (OUTPATIENT)
Dept: INFUSION THERAPY | Facility: HOSPITAL | Age: 58
End: 2023-07-21
Attending: INTERNAL MEDICINE
Payer: MEDICAID

## 2023-07-21 VITALS
HEART RATE: 72 BPM | RESPIRATION RATE: 20 BRPM | BODY MASS INDEX: 26.12 KG/M2 | TEMPERATURE: 98 F | WEIGHT: 162.5 LBS | SYSTOLIC BLOOD PRESSURE: 133 MMHG | HEIGHT: 66 IN | OXYGEN SATURATION: 98 % | DIASTOLIC BLOOD PRESSURE: 69 MMHG

## 2023-07-21 DIAGNOSIS — D50.9 IRON DEFICIENCY ANEMIA, UNSPECIFIED IRON DEFICIENCY ANEMIA TYPE: Primary | ICD-10-CM

## 2023-07-21 PROCEDURE — 63600175 PHARM REV CODE 636 W HCPCS: Performed by: NURSE PRACTITIONER

## 2023-07-21 PROCEDURE — 25000003 PHARM REV CODE 250: Performed by: NURSE PRACTITIONER

## 2023-07-21 RX ORDER — DIPHENHYDRAMINE HYDROCHLORIDE 50 MG/ML
50 INJECTION INTRAMUSCULAR; INTRAVENOUS ONCE AS NEEDED
Status: CANCELLED | OUTPATIENT
Start: 2023-07-28

## 2023-07-21 RX ORDER — SODIUM CHLORIDE 0.9 % (FLUSH) 0.9 %
10 SYRINGE (ML) INJECTION
Status: DISCONTINUED | OUTPATIENT
Start: 2023-07-21 | End: 2023-08-08

## 2023-07-21 RX ORDER — SODIUM CHLORIDE 0.9 % (FLUSH) 0.9 %
10 SYRINGE (ML) INJECTION
Status: CANCELLED | OUTPATIENT
Start: 2023-07-28

## 2023-07-21 RX ORDER — EPINEPHRINE 0.3 MG/.3ML
0.3 INJECTION SUBCUTANEOUS ONCE AS NEEDED
Status: DISCONTINUED | OUTPATIENT
Start: 2023-07-21 | End: 2023-08-08

## 2023-07-21 RX ORDER — METHYLPREDNISOLONE SOD SUCC 125 MG
125 VIAL (EA) INJECTION ONCE AS NEEDED
Status: DISCONTINUED | OUTPATIENT
Start: 2023-07-21 | End: 2023-08-08

## 2023-07-21 RX ORDER — DIPHENHYDRAMINE HYDROCHLORIDE 50 MG/ML
50 INJECTION INTRAMUSCULAR; INTRAVENOUS ONCE AS NEEDED
Status: DISCONTINUED | OUTPATIENT
Start: 2023-07-21 | End: 2023-08-08

## 2023-07-21 RX ORDER — EPINEPHRINE 0.3 MG/.3ML
0.3 INJECTION SUBCUTANEOUS ONCE AS NEEDED
Status: CANCELLED | OUTPATIENT
Start: 2023-07-28

## 2023-07-21 RX ORDER — METHYLPREDNISOLONE SOD SUCC 125 MG
125 VIAL (EA) INJECTION ONCE AS NEEDED
Status: CANCELLED | OUTPATIENT
Start: 2023-07-28

## 2023-07-21 RX ORDER — HEPARIN 100 UNIT/ML
500 SYRINGE INTRAVENOUS
Status: CANCELLED | OUTPATIENT
Start: 2023-07-28

## 2023-07-21 RX ADMIN — SODIUM CHLORIDE: 9 INJECTION, SOLUTION INTRAVENOUS at 08:07

## 2023-07-21 NOTE — NURSING
0840-Patient in for Ferrlecit. States has abdominal burning-took her usual meds. States she has Right arm muscle pain x 3 1/2 days, codyt MD is aware, states no swelling noted, no throbbing-patient not receptive to teaching regarding if worsens, or swells.   0807-Normal Saline up-no issues with Right posterior wrist IV- + blood return.  0840-saline infusing at 25cc/hr. Hanging Ferrlecit-Patient states IV burning. Flushes easily with + blood return. Patient states still burns.  0850-IV attempted by Aria Sanders RN, Manager-patient dehydrated-not feeling well. Wants to reschedule to next week-will hydrate prior to coming in.   0920-Patient Discharged to home stable with next visit scheduled 7/28/2023.

## 2023-07-28 ENCOUNTER — INFUSION (OUTPATIENT)
Dept: INFUSION THERAPY | Facility: HOSPITAL | Age: 58
End: 2023-07-28
Attending: INTERNAL MEDICINE
Payer: MEDICAID

## 2023-07-28 VITALS
BODY MASS INDEX: 26.05 KG/M2 | RESPIRATION RATE: 20 BRPM | HEART RATE: 64 BPM | OXYGEN SATURATION: 100 % | DIASTOLIC BLOOD PRESSURE: 74 MMHG | TEMPERATURE: 98 F | SYSTOLIC BLOOD PRESSURE: 138 MMHG | WEIGHT: 161.38 LBS

## 2023-07-28 DIAGNOSIS — D50.9 IRON DEFICIENCY ANEMIA, UNSPECIFIED IRON DEFICIENCY ANEMIA TYPE: Primary | ICD-10-CM

## 2023-07-28 PROCEDURE — 63600175 PHARM REV CODE 636 W HCPCS: Performed by: NURSE PRACTITIONER

## 2023-07-28 PROCEDURE — 25000003 PHARM REV CODE 250: Performed by: NURSE PRACTITIONER

## 2023-07-28 PROCEDURE — 96365 THER/PROPH/DIAG IV INF INIT: CPT

## 2023-07-28 PROCEDURE — A4216 STERILE WATER/SALINE, 10 ML: HCPCS | Performed by: NURSE PRACTITIONER

## 2023-07-28 RX ORDER — SODIUM CHLORIDE 0.9 % (FLUSH) 0.9 %
10 SYRINGE (ML) INJECTION
Status: CANCELLED | OUTPATIENT
Start: 2023-08-04

## 2023-07-28 RX ORDER — SODIUM CHLORIDE 0.9 % (FLUSH) 0.9 %
10 SYRINGE (ML) INJECTION
Status: DISCONTINUED | OUTPATIENT
Start: 2023-07-28 | End: 2023-07-28 | Stop reason: HOSPADM

## 2023-07-28 RX ORDER — DIPHENHYDRAMINE HYDROCHLORIDE 50 MG/ML
50 INJECTION INTRAMUSCULAR; INTRAVENOUS ONCE AS NEEDED
Status: CANCELLED | OUTPATIENT
Start: 2023-08-04

## 2023-07-28 RX ORDER — HEPARIN 100 UNIT/ML
500 SYRINGE INTRAVENOUS
Status: CANCELLED | OUTPATIENT
Start: 2023-08-04

## 2023-07-28 RX ORDER — METHYLPREDNISOLONE SOD SUCC 125 MG
125 VIAL (EA) INJECTION ONCE AS NEEDED
Status: CANCELLED | OUTPATIENT
Start: 2023-08-04

## 2023-07-28 RX ORDER — EPINEPHRINE 0.3 MG/.3ML
0.3 INJECTION SUBCUTANEOUS ONCE AS NEEDED
Status: CANCELLED | OUTPATIENT
Start: 2023-08-04

## 2023-07-28 RX ADMIN — SODIUM CHLORIDE: 9 INJECTION, SOLUTION INTRAVENOUS at 08:07

## 2023-07-28 RX ADMIN — Medication 10 ML: at 09:07

## 2023-07-28 RX ADMIN — SODIUM CHLORIDE 125 MG: 9 INJECTION, SOLUTION INTRAVENOUS at 08:07

## 2023-08-04 ENCOUNTER — INFUSION (OUTPATIENT)
Dept: INFUSION THERAPY | Facility: HOSPITAL | Age: 58
End: 2023-08-04
Attending: INTERNAL MEDICINE
Payer: MEDICAID

## 2023-08-04 VITALS
WEIGHT: 160.06 LBS | BODY MASS INDEX: 25.72 KG/M2 | TEMPERATURE: 99 F | HEART RATE: 76 BPM | RESPIRATION RATE: 20 BRPM | SYSTOLIC BLOOD PRESSURE: 116 MMHG | HEIGHT: 66 IN | DIASTOLIC BLOOD PRESSURE: 69 MMHG

## 2023-08-04 DIAGNOSIS — D50.9 IRON DEFICIENCY ANEMIA, UNSPECIFIED IRON DEFICIENCY ANEMIA TYPE: Primary | ICD-10-CM

## 2023-08-04 PROCEDURE — 63600175 PHARM REV CODE 636 W HCPCS: Performed by: NURSE PRACTITIONER

## 2023-08-04 PROCEDURE — 25000003 PHARM REV CODE 250: Performed by: NURSE PRACTITIONER

## 2023-08-04 PROCEDURE — 96365 THER/PROPH/DIAG IV INF INIT: CPT

## 2023-08-04 PROCEDURE — A4216 STERILE WATER/SALINE, 10 ML: HCPCS | Performed by: NURSE PRACTITIONER

## 2023-08-04 RX ORDER — HEPARIN 100 UNIT/ML
500 SYRINGE INTRAVENOUS
Status: CANCELLED | OUTPATIENT
Start: 2023-08-11

## 2023-08-04 RX ORDER — SODIUM CHLORIDE 0.9 % (FLUSH) 0.9 %
10 SYRINGE (ML) INJECTION
Status: CANCELLED | OUTPATIENT
Start: 2023-08-11

## 2023-08-04 RX ORDER — SODIUM CHLORIDE 0.9 % (FLUSH) 0.9 %
10 SYRINGE (ML) INJECTION
Status: DISCONTINUED | OUTPATIENT
Start: 2023-08-04 | End: 2023-08-04 | Stop reason: HOSPADM

## 2023-08-04 RX ORDER — EPINEPHRINE 0.3 MG/.3ML
0.3 INJECTION SUBCUTANEOUS ONCE AS NEEDED
Status: CANCELLED | OUTPATIENT
Start: 2023-08-11

## 2023-08-04 RX ORDER — DIPHENHYDRAMINE HYDROCHLORIDE 50 MG/ML
50 INJECTION INTRAMUSCULAR; INTRAVENOUS ONCE AS NEEDED
Status: CANCELLED | OUTPATIENT
Start: 2023-08-11

## 2023-08-04 RX ORDER — METHYLPREDNISOLONE SOD SUCC 125 MG
125 VIAL (EA) INJECTION ONCE AS NEEDED
Status: CANCELLED | OUTPATIENT
Start: 2023-08-11

## 2023-08-04 RX ADMIN — Medication 10 ML: at 08:08

## 2023-08-04 RX ADMIN — SODIUM CHLORIDE 125 MG: 9 INJECTION, SOLUTION INTRAVENOUS at 09:08

## 2023-08-04 NOTE — NURSING
07:55 Arrive for Ferrlecit q wk has use all treatments however the patient did not complete her treatment that was scheduled for 7/21/23 and requested to be rescheduled Dr Broderick approved for the patient to receive a Ferrlecit infusion today. C/o 's of pain to right arm LOP 7/10. Given print of all return scheduled appointments.

## 2023-08-08 ENCOUNTER — HOSPITAL ENCOUNTER (OUTPATIENT)
Dept: RADIOLOGY | Facility: HOSPITAL | Age: 58
Discharge: HOME | End: 2023-08-08
Attending: NURSE PRACTITIONER
Payer: MEDICAID

## 2023-08-08 ENCOUNTER — OFFICE VISIT (OUTPATIENT)
Dept: NEPHROLOGY | Facility: CLINIC | Age: 58
End: 2023-08-08
Payer: MEDICAID

## 2023-08-08 VITALS
BODY MASS INDEX: 25.66 KG/M2 | RESPIRATION RATE: 18 BRPM | SYSTOLIC BLOOD PRESSURE: 110 MMHG | HEIGHT: 66 IN | WEIGHT: 159.63 LBS | OXYGEN SATURATION: 100 % | DIASTOLIC BLOOD PRESSURE: 71 MMHG | HEART RATE: 67 BPM | TEMPERATURE: 98 F

## 2023-08-08 DIAGNOSIS — I73.9 CLAUDICATION OF UPPER EXTREMITY: ICD-10-CM

## 2023-08-08 DIAGNOSIS — M25.512 CHRONIC PAIN OF BOTH SHOULDERS: ICD-10-CM

## 2023-08-08 DIAGNOSIS — M65.4 DE QUERVAIN'S DISEASE (TENOSYNOVITIS): ICD-10-CM

## 2023-08-08 DIAGNOSIS — M25.511 CHRONIC PAIN OF BOTH SHOULDERS: ICD-10-CM

## 2023-08-08 DIAGNOSIS — N18.31 CKD STAGE G3A/A3, GFR 45-59 AND ALBUMIN CREATININE RATIO >300 MG/G: Primary | ICD-10-CM

## 2023-08-08 DIAGNOSIS — G89.29 CHRONIC PAIN OF BOTH SHOULDERS: ICD-10-CM

## 2023-08-08 DIAGNOSIS — M32.14 LUPUS NEPHRITIS: ICD-10-CM

## 2023-08-08 DIAGNOSIS — D50.9 IRON DEFICIENCY ANEMIA, UNSPECIFIED IRON DEFICIENCY ANEMIA TYPE: ICD-10-CM

## 2023-08-08 DIAGNOSIS — E55.9 HYPOVITAMINOSIS D: ICD-10-CM

## 2023-08-08 DIAGNOSIS — M32.14 SYSTEMIC LUPUS ERYTHEMATOSUS WITH GLOMERULAR DISEASE, UNSPECIFIED SLE TYPE: ICD-10-CM

## 2023-08-08 DIAGNOSIS — I10 ESSENTIAL HYPERTENSION: ICD-10-CM

## 2023-08-08 DIAGNOSIS — M79.7 FIBROMYALGIA: ICD-10-CM

## 2023-08-08 PROCEDURE — 3044F PR MOST RECENT HEMOGLOBIN A1C LEVEL <7.0%: ICD-10-PCS | Mod: CPTII,,, | Performed by: NURSE PRACTITIONER

## 2023-08-08 PROCEDURE — 1159F PR MEDICATION LIST DOCUMENTED IN MEDICAL RECORD: ICD-10-PCS | Mod: CPTII,,, | Performed by: NURSE PRACTITIONER

## 2023-08-08 PROCEDURE — 3066F PR DOCUMENTATION OF TREATMENT FOR NEPHROPATHY: ICD-10-PCS | Mod: CPTII,,, | Performed by: NURSE PRACTITIONER

## 2023-08-08 PROCEDURE — 3008F BODY MASS INDEX DOCD: CPT | Mod: CPTII,,, | Performed by: NURSE PRACTITIONER

## 2023-08-08 PROCEDURE — 3074F SYST BP LT 130 MM HG: CPT | Mod: CPTII,,, | Performed by: NURSE PRACTITIONER

## 2023-08-08 PROCEDURE — 73030 X-RAY EXAM OF SHOULDER: CPT | Mod: TC,RT

## 2023-08-08 PROCEDURE — 3044F HG A1C LEVEL LT 7.0%: CPT | Mod: CPTII,,, | Performed by: NURSE PRACTITIONER

## 2023-08-08 PROCEDURE — 99214 PR OFFICE/OUTPT VISIT, EST, LEVL IV, 30-39 MIN: ICD-10-PCS | Mod: S$PBB,,, | Performed by: NURSE PRACTITIONER

## 2023-08-08 PROCEDURE — 3066F NEPHROPATHY DOC TX: CPT | Mod: CPTII,,, | Performed by: NURSE PRACTITIONER

## 2023-08-08 PROCEDURE — 99215 OFFICE O/P EST HI 40 MIN: CPT | Mod: PBBFAC | Performed by: NURSE PRACTITIONER

## 2023-08-08 PROCEDURE — 1159F MED LIST DOCD IN RCRD: CPT | Mod: CPTII,,, | Performed by: NURSE PRACTITIONER

## 2023-08-08 PROCEDURE — 73030 X-RAY EXAM OF SHOULDER: CPT | Mod: TC,LT

## 2023-08-08 PROCEDURE — 3078F DIAST BP <80 MM HG: CPT | Mod: CPTII,,, | Performed by: NURSE PRACTITIONER

## 2023-08-08 PROCEDURE — 3078F PR MOST RECENT DIASTOLIC BLOOD PRESSURE < 80 MM HG: ICD-10-PCS | Mod: CPTII,,, | Performed by: NURSE PRACTITIONER

## 2023-08-08 PROCEDURE — 99214 OFFICE O/P EST MOD 30 MIN: CPT | Mod: S$PBB,,, | Performed by: NURSE PRACTITIONER

## 2023-08-08 PROCEDURE — 3008F PR BODY MASS INDEX (BMI) DOCUMENTED: ICD-10-PCS | Mod: CPTII,,, | Performed by: NURSE PRACTITIONER

## 2023-08-08 PROCEDURE — 3074F PR MOST RECENT SYSTOLIC BLOOD PRESSURE < 130 MM HG: ICD-10-PCS | Mod: CPTII,,, | Performed by: NURSE PRACTITIONER

## 2023-08-08 RX ORDER — HYDROCODONE BITARTRATE AND ACETAMINOPHEN 10; 325 MG/1; MG/1
1 TABLET ORAL EVERY 6 HOURS PRN
Qty: 90 TABLET | Refills: 0 | Status: SHIPPED | OUTPATIENT
Start: 2023-08-08 | End: 2023-09-11 | Stop reason: SDUPTHER

## 2023-08-08 RX ORDER — ERGOCALCIFEROL 1.25 MG/1
50000 CAPSULE ORAL
Qty: 12 CAPSULE | Refills: 0 | Status: ON HOLD | OUTPATIENT
Start: 2023-08-08 | End: 2023-10-26 | Stop reason: HOSPADM

## 2023-08-08 RX ORDER — VOCLOSPORIN 7.9 MG/1
23.7 CAPSULE ORAL 2 TIMES DAILY
Qty: 180 CAPSULE | Refills: 11
Start: 2023-08-08 | End: 2023-09-07

## 2023-08-08 NOTE — TELEPHONE ENCOUNTER
----- Message from Ramonita Crawford sent at 8/8/2023  9:26 AM CDT -----  Regarding: Medication refill  Patient called requesting a refill for Prednisone and [place]. [facility] on [address].

## 2023-08-08 NOTE — TELEPHONE ENCOUNTER
Patient has additional refills of the prednisone and I did send the request for norco to be signed by Dr. Mcfarlane .

## 2023-08-08 NOTE — PROGRESS NOTES
[facility]  Nephrology Clinic Note    Chief complaint: Chronic Kidney Disease (RTC, did not take meds, decreased strength in right arm after falling last month)    History of present illness:   Vi Ulrich is a 57 y.o. 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  Presents for follow up appointment in nephrology clinic. Complains of bilateral shoulder pain, also reports weakness and pain to right arm along with dusky color to fingernails on the right.     Review of Systems  12 point review of systems conducted, negative except as stated in the history of present illness.    Allergies: Patient is allergic to ketorolac (pf), mycophenolate mofetil, penicillins, percocet [oxycodone-acetaminophen], and tramadol.     Past Medical History:  has a past medical history of Arthritis, Asthma, Atrial fibrillation, Chronic constipation, Diabetes mellitus, Encounter for blood transfusion, GERD (gastroesophageal reflux disease), Gout, Hypertension, Lupus, Renal disorder, and SLE (systemic lupus erythematosus).    Procedure History:  has a past surgical history that includes Cholecystectomy; Appendectomy; Partial hysterectomy; Lymph node biopsy (Left, 2014); Hysterectomy; Joint replacement (Left, 08/07/2015); Mastectomy; Esophagogastroduodenoscopy (N/A, 4/19/2018); and Colonoscopy (N/A, 4/19/2018).    Family History: family history includes Arthritis in her mother; Asthma in her mother; Diabetes in her mother and sister; Heart block in her mother; Heart disease in her father and sister; Kidney disease in  "her sister.    Social History:  reports that she quit smoking about 14 months ago. Her smoking use included cigarettes. She started smoking about 42 years ago. She has never used smokeless tobacco. She reports that she does not drink alcohol and does not use drugs.    Physical exam  /71 (BP Location: Right arm, Patient Position: Sitting, BP Method: Medium (Automatic))   Pulse 67   Temp 98.2 °F (36.8 °C) (Oral)   Resp 18   Ht 5' 5.75" (1.67 m)   Wt 72.4 kg (159 lb 9.6 oz)   LMP  (LMP Unknown)   SpO2 100%   BMI 25.96 kg/m²   General appearance: Patient is in no acute distress.  Skin: No rashes or wounds.  HEENT: PERRLA, EOMI, no scleral icterus, no JVD. Neck is supple.  Chest: Respirations are unlabored. Lungs sounds are clear.   Heart: S1, S2.   Abdomen: Benign.  : Deferred.  Extremities: No edema, peripheral pulses are palpable.   Neuro: No focal deficits.     Home Medications:    Current Outpatient Medications:     albuterol (ACCUNEB) 0.63 mg/3 mL Nebu, Take 3 mLs (0.63 mg total) by nebulization every 4 (four) hours as needed (wheezing). Rescue, Disp: 75 mL, Rfl: 3    atorvastatin (LIPITOR) 10 MG tablet, Take 10 mg by mouth once daily., Disp: , Rfl:     blood sugar diagnostic (ONETOUCH VERIO) Strp, 1 each by Misc.(Non-Drug; Combo Route) route 4 (four) times daily., Disp: 50 each, Rfl: 11    blood sugar diagnostic Strp, 1 each by Misc.(Non-Drug; Combo Route) route 4 (four) times daily., Disp: 50 each, Rfl: 11    blood-glucose meter (FREESTYLE SYSTEM KIT) kit, Use as instructed, Disp: 1 each, Rfl: 0    eletriptan (RELPAX) 40 MG tablet, Take 1 tablet (40 mg total) by mouth as needed (take one at the beginning of migraine, may repeat in 2 h. max 2 in 24h). may repeat in 2 hours if necessary, Disp: 9 tablet, Rfl: 5    EScitalopram oxalate (LEXAPRO) 10 MG tablet, Take 1 tablet (10 mg total) by mouth once daily., Disp: 30 tablet, Rfl: 5    insulin (LANTUS SOLOSTAR U-100 INSULIN) glargine 100 units/mL SubQ " pen, Inject 80 Units into the skin every evening., Disp: 72 mL, Rfl: 3    lancets 28 gauge Misc, 100 lancets by Misc.(Non-Drug; Combo Route) route 3 (three) times daily., Disp: 100 each, Rfl: 0    lancets 33 gauge Misc, 1 lancet by Misc.(Non-Drug; Combo Route) route 4 (four) times daily., Disp: 100 each, Rfl: 11    omeprazole (PRILOSEC) 20 MG capsule, Take 1 capsule (20 mg total) by mouth once daily., Disp: 30 capsule, Rfl: 5    ondansetron (ZOFRAN) 4 MG tablet, TAKE 1 TABLET(4 MG) BY MOUTH EVERY 6 HOURS AS NEEDED FOR NAUSEA, Disp: 24 tablet, Rfl: 0    predniSONE (DELTASONE) 5 MG tablet, Take 1 tablet (5 mg total) by mouth once daily., Disp: 30 tablet, Rfl: 11    walker Misc, Please dispense 1 Rollator, Disp: 1 each, Rfl: 0    albuterol sulfate (PROAIR RESPICLICK) 90 mcg/actuation inhaler, Inhale 1 puff into the lungs every 4 (four) hours as needed for Wheezing. Rescue, Disp: , Rfl:     ergocalciferol (ERGOCALCIFEROL) 50,000 unit Cap, Take 1 capsule (50,000 Units total) by mouth every 7 days. for 12 doses, Disp: 12 capsule, Rfl: 0    HYDROcodone-acetaminophen (NORCO)  mg per tablet, Take 1 tablet by mouth every 6 (six) hours as needed for Pain., Disp: 90 tablet, Rfl: 0    mycophenolate (CELLCEPT) 500 mg Tab, Take 2 tablets (1,000 mg total) by mouth 2 (two) times daily., Disp: 120 tablet, Rfl: 11    voclosporin (LUPKYNIS) 7.9 mg Cap, Take 23.7 mg by mouth 2 (two) times daily., Disp: 180 capsule, Rfl: 11  No current facility-administered medications for this visit.    Laboratory data    Serum  Lab Results   Component Value Date    WBC 4.92 08/04/2023    HGB 11.0 (L) 08/04/2023    HCT 34.5 (L) 08/04/2023     (H) 08/04/2023    IRON 219 (H) 08/04/2023    TIBC 284 08/04/2023    TRANS 152 (L) 08/04/2023    LABIRON 77 (H) 08/04/2023    FERRITIN 398.91 (H) 08/04/2023    FOLATE 5.3 (L) 01/12/2023    YXRUYEFK10 674 01/12/2023    HAPTO 249 03/10/2023     (H) 03/10/2023     08/04/2023    K 4.7  08/04/2023    CHLORIDE 105 08/04/2023    CO2 28 08/04/2023    BUN 18.7 08/04/2023    CREATININE 1.27 (H) 08/04/2023    EGFRNORACEVR 50 08/04/2023    GLUCOSE 93 08/04/2023    CALCIUM 9.0 08/04/2023    ALKPHOS 108 08/04/2023    LABPROT 7.4 08/04/2023    ALBUMIN 2.9 (L) 08/04/2023    BILIDIR 0.1 03/28/2022    IBILI 0.10 03/28/2022    AST 14 08/04/2023    ALT 6 08/04/2023    MG 2.30 03/13/2023    PHOS 2.0 (L) 03/13/2023      Lab Results   Component Value Date    HGBA1C 6.0 05/29/2022    .7 (H) 03/03/2023    HSFCNAGM06QR 15.0 (L) 08/04/2023    HEPCAB Nonreactive 03/09/2023    HEPBSURFAG Nonreactive 03/09/2023    HEPBSAB Negative 11/17/2020     Urine:  Lab Results   Component Value Date    COLORUA Light-Yellow 05/31/2023    APPEARANCEUA Clear 05/31/2023    SGUA 1.026 05/31/2023    PHUA 6.0 05/31/2023    PROTEINUA 3+ (A) 05/31/2023    GLUCOSEUA Normal 05/31/2023    KETONESUA Negative 05/31/2023    BLOODUA 1+ (A) 05/31/2023    NITRITESUA Negative 05/31/2023    LEUKOCYTESUR 500 (A) 05/31/2023    RBCUA 11-20 (A) 05/31/2023    WBCUA 21-50 (A) 05/31/2023    BACTERIA None Seen 05/31/2023    SQEPUA Moderate (A) 05/31/2023    HYALINECASTS None Seen 05/31/2023    CREATRANDUR 179.2 (H) 08/04/2023    PROTEINURINE 194.7 08/04/2023    UPROTCREA 1.1 08/04/2023         Imaging  CT Renal Stone Study ABD Pelvis WO 08/04/2022    There is mild atelectasis in the lung bases bilaterally..  The liver appears normal.  No obvious liver mass or lesion is seen.  The patient appears to be status post cholecystectomy..  The pancreas appears normal.  No inflammatory changes are seen in the pancreatic region.  The spleen appears normal and adrenal glands appear normal.  No adrenal nodule is seen.  There is a small amount of subcapsular fluid seen around the left kidney which may represent a small subcapsular hematoma from a previous biopsy.  Similar areas were seen previously.  There is persistent perinephric stranding seen around both kidneys.   No hydronephrosis or hydroureter seen and no nephrolithiasis or ureterolithiasis is seen.  There has been interval development of some free fluid in the subhepatic region as well as the perihepatic region and some free fluid in the pelvis.  There are some dilated loops of small bowel and findings suggestive of small bowel wall thickening.  Findings may represent enteritis.  No colitis is seen.  No diverticulitis is seen.  The patient is status post appendectomy.  The urinary bladder appears normal.  Bones show no acute abnormality.    Impression  Interval development of some perihepatic fluid and some subhepatic fluid and a small amount of pelvic fluid seen.  There are some mildly dilated loops of small bowel and findings suggestive of small bowel wall thickening.  Findings may represent enteritis.  Persistent perinephric stranding around both kidneys  Small subcapsular fluid possibly representing old hematoma around the left kidney.  Findings are stable since the prior examination  Atelectasis and chronic lung changes in the lung bases bilaterally  Electronically signed by: Rishi Urban  Date:    08/04/2022  Time:    13:52    Surgical Pathology   REPORT FROM [facility]:   PATHOLOGY NO:  [id number]   SPECIMEN SUBMITTED:  BY SONI CHOU MD  FOR KIDNEY, BIOPSY   DIAGNOSIS:   FOCAL LUPUS NEPHRITIS, ISN/RPS CLASS III.   SEGMENTAL MEMBRANOUS LUPUS NEPHRITIS, ISN/RPS CLASS V.   FEATURES OF DIABETIC GLOMERULOPATHY, CLASS IIB.   MODERATE PLASMA CELL-RICH INTERSTITIAL INFLAMMATION.  SEE COMMENT.  COMMENT:  THE MODIFIED NIH LUPUS NEPHRITIS ACTIVITY SCORE IS 4/24 AND CHRONICITY SCORE IS 5/12.  OF NOTE THERE IS FOCAL MODERATE TUBULOINTERSTITIAL INFLAMMATION, WITH PLASMA CELLS.  TUBULOINTERSTITIAL INFLAMMATION IS A KNOWN COMPONENT OF LUPUS NEPHRITIS HOWEVER OTHER CAUSES INCLUDING ALLERGIC DRUG REACTION MAY BE EXCLUDED AS APPROPRIATE.  A MINORITY (~10%) OF THE PLASMA CELLS STAIN WITH IGG4, WHICH IS NOT DIAGNOSTIC  OF AN IGG4 INDUCED INTERSTITIAL NEPHRITIS.  SEE TABLE BELOW FOR SUMMARY OF CHRONICITY FINDINGS:     CHRONICITY SUMMARY  TOTAL GLOMERULI-                           14  GLOBAL GLOMERULOSCLEROSIS-   3  SEGMENTAL SCLEROSIS-                 PRESENT  INTERSTITIAL FIBROSIS-                   MODERATE   TUBULAR ATROPHY-                          MODERATE  ARTERIAL INTIMAL FIBROSIS-           ABSENT  ARTERIOLAR HYALINOSIS-                MILD    FINAL DIAGNOSIS PERFORMED BY   CAROLANN CERVANTES M.D.     Impression and plan     CKD stage G3a/A3, GFR 45-59 and albumin creatinine ratio >300 mg/g  -     Comprehensive Metabolic Panel; Future; Expected date: 11/10/2023  -     CBC Auto Differential; Future; Expected date: 11/10/2023  -     Protein/Creatinine Ratio, Urine; Future; Expected date: 11/10/2023  -     Urinalysis, Reflex to Urine Culture; Future; Expected date: 11/10/2023  In March of 2023, MARTA double-stranded DNA were positive with dsDNA titer of greater than 1:5120, SSA, SSB negative, CCP Ab 4 C3 57, C4 9.9.  Patient previously had elevated titers of U1-RNP Ab, MDA-5 Ab, Ku, RNP 70 and U2 snRNP.  Patient was received rituximab on 04/14/2023 and 04/28/2023.  Renal indices have stabilized, however, proteinuria remains significant.  Patient is currently taking hydroxychloroquine, prednisone.  She has been on prednisone since 2004.  Patient benefit from Voclosporin therapy to improve renal response/decrease proteinuria.  Will start Voclosporin 23.7 mg BID.       Continue renal sparing activities:  -2 g a day dietary sodium restriction  -optimize glycemic control (goal A1c is less than 7%)   -control high blood pressure (goal blood pressure is less than 130/80, patient was advised to check blood pressure once or twice a week and bring blood pressure logs to next office visit)  -exercise at least 30 minutes a day, 5 days a week  -maintain healthy weight  -stay well hydrated (drink water only, avoid juices, sweet tea, and  sodas)  -ask about staying up-to-date on vaccinations (flu vaccine, pneumonia vaccine, hepatitis B vaccine)  -avoid excessive use of NSAIDs (ibuprofen, naproxen, Aleve, Advil, Toradol, Mobic), take Tylenol as needed for headache or mild pain  -take cholesterol lowering medications if prescribed (LDL goal less than 100)    Lupus nephritis  In March of 2023, MARTA double-stranded DNA were positive with dsDNA titer of greater than 1:5120, SSA, SSB negative, CCP Ab 4 C3 57, C4 9.9.  Patient previously had elevated titers of U1-RNP Ab, MDA-5 Ab, Ku, RNP 70 and U2 snRNP.  Patient was received rituximab on 04/14/2023 and 04/28/2023.  Renal indices have stabilized, however, proteinuria remains significant.  Patient is currently taking hydroxychloroquine, prednisone.  She has been on prednisone since 2004.  Patient benefit from Voclosporin therapy to improve renal response/decrease proteinuria.      The efficacy and safety of voclosporin in active LN were evaluated in a phase III, multicenter, randomized controlled trial of 357 patients (61 percent with focal or diffuse LN, 14 percent with lupus membranous nephropathy, and 25 percent with both).  At 52 weeks, the rate of complete renal response (defined as a composite of urine protein-to-creatinine ratio of ?0.5 mg/mg, eGFR ?60 mL/min/1.73 m2 or no decrease of >20 percent from baseline eGFR, no use of rescue therapy, and no more than 10 mg prednisone equivalent per day for ?3 consecutive days or for ?7 days during weeks 44 through 52) was higher among patients treated with voclosporin compared with placebo (41 versus 23 percent; odds ratio [OR] 2.65, 95% CI 1.64-4.27).     Will start Voclosporin 23.7 mg BID.   Follow up in about 3 months (around 11/8/2023).    Essential hypertension  Blood pressure reading is at goal, continue current antihypertensive regimen and 2 g a day dietary sodium restriction.      Iron deficiency anemia, unspecified iron deficiency anemia type  Patient  received IV iron, H&H improved, she is iron replete.     Hypovitaminosis D  -     ergocalciferol (ERGOCALCIFEROL) 50,000 unit Cap; Take 1 capsule (50,000 Units total) by mouth every 7 days. for 12 doses  Dispense: 12 capsule; Refill: 0  Will start vitamin D replacement.     BMI 25.0-25.9,adult  Lifestyle and dietary interventions discussed, patient encouraged to maintain non-sedentary lifestyle and well-balanced diet.     Chronic pain of both shoulders  -     X-ray Shoulder 2 or More Views Right; Future; Expected date: 08/08/2023  -     X-Ray Shoulder 2 or More Views Left; Future; Expected date: 08/08/2023  Patient reports recent falls. Will order X rays of bilateral shoulders, f/u with results by phone.     De Quervain's disease (tenosynovitis)  -     Ambulatory referral/consult to Orthopedics; Future; Expected date: 08/15/2023  Will refer back to Ortho clinic.     Claudication of upper extremity  -     VAS Segmental Pressure Upper Ext Resting; Future; Expected date: 08/08/2023 8/8/2023  Rachel Broderick NP  [facility]

## 2023-08-10 NOTE — PROGRESS NOTES
Please let the patient know that I reviewed Xray results, there are changes consistent with arthritis but no fractures.  I referred her back to orthopedics clinic to get this addressed.  Thank you

## 2023-08-16 LAB
INR PPP: 1.1
PROTHROMBIN TIME: 14.4 SECONDS (ref 11.4–14)

## 2023-08-22 ENCOUNTER — OFFICE VISIT (OUTPATIENT)
Dept: INTERNAL MEDICINE | Facility: CLINIC | Age: 58
End: 2023-08-22
Payer: MEDICAID

## 2023-08-22 VITALS
TEMPERATURE: 98 F | BODY MASS INDEX: 27.16 KG/M2 | WEIGHT: 163 LBS | DIASTOLIC BLOOD PRESSURE: 75 MMHG | RESPIRATION RATE: 16 BRPM | OXYGEN SATURATION: 96 % | SYSTOLIC BLOOD PRESSURE: 131 MMHG | HEIGHT: 65 IN | HEART RATE: 60 BPM

## 2023-08-22 DIAGNOSIS — Z79.4 TYPE 2 DIABETES MELLITUS WITH HYPEROSMOLARITY WITHOUT COMA, WITH LONG-TERM CURRENT USE OF INSULIN: ICD-10-CM

## 2023-08-22 DIAGNOSIS — Z00.00 HEALTHCARE MAINTENANCE: ICD-10-CM

## 2023-08-22 DIAGNOSIS — G47.8 INSOMNIA DISORDER, WITH OTHER SLEEP DISORDER, RECURRENT: Primary | ICD-10-CM

## 2023-08-22 DIAGNOSIS — G47.00 INSOMNIA DISORDER, WITH OTHER SLEEP DISORDER, RECURRENT: Primary | ICD-10-CM

## 2023-08-22 DIAGNOSIS — N39.0 URINARY TRACT INFECTION WITHOUT HEMATURIA, SITE UNSPECIFIED: ICD-10-CM

## 2023-08-22 DIAGNOSIS — E11.00 TYPE 2 DIABETES MELLITUS WITH HYPEROSMOLARITY WITHOUT COMA, WITH LONG-TERM CURRENT USE OF INSULIN: ICD-10-CM

## 2023-08-22 DIAGNOSIS — E78.5 HYPERLIPIDEMIA, UNSPECIFIED HYPERLIPIDEMIA TYPE: ICD-10-CM

## 2023-08-22 PROCEDURE — 99215 OFFICE O/P EST HI 40 MIN: CPT | Mod: PBBFAC | Performed by: STUDENT IN AN ORGANIZED HEALTH CARE EDUCATION/TRAINING PROGRAM

## 2023-08-22 RX ORDER — NITROFURANTOIN 25; 75 MG/1; MG/1
100 CAPSULE ORAL 2 TIMES DAILY
Qty: 10 CAPSULE | Refills: 0 | Status: SHIPPED | OUTPATIENT
Start: 2023-08-22 | End: 2023-08-27

## 2023-08-22 RX ORDER — ESZOPICLONE 2 MG/1
2 TABLET, FILM COATED ORAL NIGHTLY
Qty: 15 TABLET | Refills: 0 | Status: SHIPPED | OUTPATIENT
Start: 2023-08-22 | End: 2023-09-06

## 2023-08-22 RX ORDER — PANTOPRAZOLE SODIUM 40 MG/1
40 TABLET, DELAYED RELEASE ORAL DAILY
Qty: 90 TABLET | Refills: 3 | Status: SHIPPED | OUTPATIENT
Start: 2023-08-22 | End: 2024-08-21

## 2023-08-22 NOTE — PROGRESS NOTES
"[facility] Internal Medicine Clinic Visit    Chief Complaint:      Follow-up (Follow up for Lupus. C/O weakness in Rt arm.)     Subjective:     HPI:  Vi Ulrich is a 57 y.o. female with has a past medical history of Arthritis, Asthma, Atrial fibrillation, Chronic constipation, Diabetes mellitus, Encounter for blood transfusion, GERD (gastroesophageal reflux disease), Gout, Hypertension, Lupus, Renal disorder, and SLE (systemic lupus erythematosus). She presents to clinic today for follow-up of chronic medical conditions.     Today, patient is much improved, continues to see Rheumatology currently on CellCept 1000 mg b.i.d. as well as prednisone 5 mg daily.  She continues to follow with Nephrology.  Today her only complaint is she believes she has a UTI and she is also having difficulty sleeping since her last hospital stay she feels she goes to bed early in the morning and wakes up in the afternoon will trial a 10 day course of Lunesta followed by three-week follow-up in [facility] to further evaluate resolution of UTI as well as sleeping habits.      Review of Systems  A comprehensive 12 point review of systems was completed.  Please see above for pertinent positives and negatives.     Objective:   Last 24 Hour Vital Signs:  Vitals  BP: 131/75  Temp: 98 °F (36.7 °C)  Pulse: 60  Resp: 16  SpO2: 96 %  Height: 5' 5" (165.1 cm)  Weight: 73.9 kg (163 lb)    Physical Examination:  General: Awake, alert, & oriented to person, place & time. No acute distress  Psychiatric: Mood and affect normal  HEENT: Normocephalic, atraumatic. Face symmetric.  Cardiovascular: Regular rate & rhythm  Pulmonary: Bilateral symmetric chest rise, Non-labored  Abdominal:  nondistended  Extremities: No clubbing or cyanosis.  Skin:  Exposed skin is warm & dry.  Neuro:   Patient moves all extremities equally. Sensation intact bilateraly.       Assessment & Plan:     Insomnia  Right shoulder pain  -Will try Lunesta 2 mg for 10 days with a " three-week [facility] follow-up  -Recommended good sleep hygiene  -physical therapy for right shoulder, x-rays indicative of degenerative disease, referral has already been placed to orthopedics by Nephrology  -Continue to monitor    UTI  -Urinalysis performed today indicative of UTI With symptoms  -Macrobid x5 days  -[facility] follow-up in 3 weeks to evaluate    SLE  Lupus nephritis  -please continue to follow Nephrology as well as Rheumatology  -Continue CellCept  -continue to follow renal recommendations and keep appointments    Vitamin-D deficiency  -Patient was started on 06486 units for 12 weeks Q 7 days by Nephrology  -Continue to monitor    Hypertension  -BP today: 131/75  -Continue home     GERD  -continued symptoms despite taking excessive doses of Prilosec  -Will initiate Protonix 40 mg daily at this time    To be addressed at next follow-up  -above  -complete health maintenance    Follow-ups  -Follow-up medicine clinic in 3 months    PW fu in 3 weeks  Please just address if patient is sleeping better with Lunesta and her sleep cycle is back on track, also make sure that symptoms of UTI have resolved this is all needs to be checked at the visit please on assess anything else.    Octaviano Barrios MD  Internal Medicine - PGY-3

## 2023-09-11 DIAGNOSIS — M79.7 FIBROMYALGIA: ICD-10-CM

## 2023-09-11 DIAGNOSIS — M32.14 SYSTEMIC LUPUS ERYTHEMATOSUS WITH GLOMERULAR DISEASE, UNSPECIFIED SLE TYPE: ICD-10-CM

## 2023-09-11 RX ORDER — HYDROCODONE BITARTRATE AND ACETAMINOPHEN 10; 325 MG/1; MG/1
1 TABLET ORAL EVERY 6 HOURS PRN
Qty: 90 TABLET | Refills: 0 | Status: SHIPPED | OUTPATIENT
Start: 2023-09-11 | End: 2023-10-16 | Stop reason: SDUPTHER

## 2023-09-11 NOTE — TELEPHONE ENCOUNTER
----- Message from Ramonita Crawford sent at 9/11/2023  9:42 AM CDT -----  Regarding: Medication refill  Patient called requesting a refill for [facility] on [address].

## 2023-10-02 ENCOUNTER — DOCUMENT SCAN (OUTPATIENT)
Dept: HOME HEALTH SERVICES | Facility: HOSPITAL | Age: 58
End: 2023-10-02
Payer: MEDICAID

## 2023-10-09 ENCOUNTER — HOSPITAL ENCOUNTER (INPATIENT)
Facility: HOSPITAL | Age: 58
LOS: 5 days | Discharge: HOME | DRG: 545 | End: 2023-10-14
Attending: EMERGENCY MEDICINE | Admitting: INTERNAL MEDICINE
Payer: MEDICAID

## 2023-10-09 DIAGNOSIS — R52 PAIN: ICD-10-CM

## 2023-10-09 DIAGNOSIS — E11.8 DM (DIABETES MELLITUS) WITH COMPLICATIONS: ICD-10-CM

## 2023-10-09 DIAGNOSIS — R78.81 BACTEREMIA: ICD-10-CM

## 2023-10-09 DIAGNOSIS — N17.9 AKI (ACUTE KIDNEY INJURY): ICD-10-CM

## 2023-10-09 DIAGNOSIS — M54.9 BACK PAIN, UNSPECIFIED BACK LOCATION, UNSPECIFIED BACK PAIN LATERALITY, UNSPECIFIED CHRONICITY: ICD-10-CM

## 2023-10-09 DIAGNOSIS — M32.14 LUPUS NEPHRITIS, ISN/RPS CLASS III: ICD-10-CM

## 2023-10-09 DIAGNOSIS — E11.00 TYPE 2 DIABETES MELLITUS WITH HYPEROSMOLARITY WITHOUT COMA, WITH LONG-TERM CURRENT USE OF INSULIN: ICD-10-CM

## 2023-10-09 DIAGNOSIS — I82.431 ACUTE DEEP VEIN THROMBOSIS (DVT) OF POPLITEAL VEIN OF RIGHT LOWER EXTREMITY: ICD-10-CM

## 2023-10-09 DIAGNOSIS — M32.14 OTHER SYSTEMIC LUPUS ERYTHEMATOSUS WITH GLOMERULAR DISEASE: ICD-10-CM

## 2023-10-09 DIAGNOSIS — E11.21: ICD-10-CM

## 2023-10-09 DIAGNOSIS — R07.9 CHEST PAIN: ICD-10-CM

## 2023-10-09 DIAGNOSIS — J84.9 ILD (INTERSTITIAL LUNG DISEASE): ICD-10-CM

## 2023-10-09 DIAGNOSIS — Z79.4 TYPE 2 DIABETES MELLITUS WITH HYPEROSMOLARITY WITHOUT COMA, WITH LONG-TERM CURRENT USE OF INSULIN: ICD-10-CM

## 2023-10-09 DIAGNOSIS — R06.00 DYSPNEA: ICD-10-CM

## 2023-10-09 DIAGNOSIS — I21.4 NSTEMI (NON-ST ELEVATED MYOCARDIAL INFARCTION): ICD-10-CM

## 2023-10-09 DIAGNOSIS — I48.91 NEW ONSET ATRIAL FIBRILLATION: ICD-10-CM

## 2023-10-09 DIAGNOSIS — M25.519 SHOULDER PAIN, UNSPECIFIED CHRONICITY, UNSPECIFIED LATERALITY: ICD-10-CM

## 2023-10-09 DIAGNOSIS — R06.82 TACHYPNEA: ICD-10-CM

## 2023-10-09 DIAGNOSIS — M32.9 LUPUS: Primary | ICD-10-CM

## 2023-10-09 LAB
ALBUMIN SERPL-MCNC: 3 G/DL (ref 3.5–5)
ALBUMIN/GLOB SERPL: 0.7 RATIO (ref 1.1–2)
ALP SERPL-CCNC: 114 UNIT/L (ref 40–150)
ALT SERPL-CCNC: 8 UNIT/L (ref 0–55)
AMPHET UR QL SCN: NEGATIVE
APPEARANCE UR: ABNORMAL
AST SERPL-CCNC: 15 UNIT/L (ref 5–34)
BACTERIA #/AREA URNS AUTO: ABNORMAL /HPF
BARBITURATE SCN PRESENT UR: NEGATIVE
BASE EXCESS BLD CALC-SCNC: 1.2 MMOL/L
BASOPHILS # BLD AUTO: 0.03 X10(3)/MCL
BASOPHILS NFR BLD AUTO: 0.6 %
BENZODIAZ UR QL SCN: NEGATIVE
BILIRUB SERPL-MCNC: 0.2 MG/DL
BILIRUB UR QL STRIP.AUTO: NEGATIVE
BLOOD GAS SAMPLE TYPE (OHS): ABNORMAL
BNP BLD-MCNC: 47.2 PG/ML
BUN SERPL-MCNC: 22.1 MG/DL (ref 9.8–20.1)
CALCIUM SERPL-MCNC: 8.9 MG/DL (ref 8.4–10.2)
CANNABINOIDS UR QL SCN: NEGATIVE
CHLORIDE SERPL-SCNC: 106 MMOL/L (ref 98–107)
CK MB SERPL-MCNC: <1 NG/ML
CK SERPL-CCNC: 66 U/L (ref 29–168)
CO2 BLDA-SCNC: 29.5 MMOL/L
CO2 SERPL-SCNC: 25 MMOL/L (ref 22–29)
COCAINE UR QL SCN: NEGATIVE
COHGB MFR BLDA: 3 %
COLOR UR AUTO: YELLOW
CREAT SERPL-MCNC: 1.24 MG/DL (ref 0.55–1.02)
CRP SERPL-MCNC: 28.6 MG/L
D DIMER PPP IA.FEU-MCNC: 6.78 UG/ML FEU (ref 0–0.5)
DRAWN BY BLOOD GAS (OHS): ABNORMAL
EOSINOPHIL # BLD AUTO: 0.1 X10(3)/MCL (ref 0–0.9)
EOSINOPHIL NFR BLD AUTO: 2 %
ERYTHROCYTE [DISTWIDTH] IN BLOOD BY AUTOMATED COUNT: 15.9 % (ref 11.5–17)
ERYTHROCYTE [SEDIMENTATION RATE] IN BLOOD: 50 MM/HR (ref 0–20)
EST. AVERAGE GLUCOSE BLD GHB EST-MCNC: 108.3 MG/DL
FENTANYL UR QL SCN: NEGATIVE
FIO2 BLOOD GAS (OHS): 21 %
FLUAV AG UPPER RESP QL IA.RAPID: NOT DETECTED
FLUBV AG UPPER RESP QL IA.RAPID: NOT DETECTED
GFR SERPLBLD CREATININE-BSD FMLA CKD-EPI: 52 MLS/MIN/1.73/M2
GLOBULIN SER-MCNC: 4.6 GM/DL (ref 2.4–3.5)
GLUCOSE SERPL-MCNC: 86 MG/DL (ref 74–100)
GLUCOSE UR QL STRIP.AUTO: NORMAL
HBA1C MFR BLD: 5.4 %
HCO3 BLDA-SCNC: 27.9 MMOL/L
HCT VFR BLD AUTO: 35.4 % (ref 37–47)
HGB BLD-MCNC: 11.5 G/DL (ref 12–16)
HYALINE CASTS #/AREA URNS LPF: ABNORMAL /LPF
IMM GRANULOCYTES # BLD AUTO: 0.01 X10(3)/MCL (ref 0–0.04)
IMM GRANULOCYTES NFR BLD AUTO: 0.2 %
KETONES UR QL STRIP.AUTO: NEGATIVE
LACTATE SERPL-SCNC: 1.1 MMOL/L (ref 0.5–2.2)
LEUKOCYTE ESTERASE UR QL STRIP.AUTO: 500
LYMPHOCYTES # BLD AUTO: 1.48 X10(3)/MCL (ref 0.6–4.6)
LYMPHOCYTES NFR BLD AUTO: 29.9 %
MAGNESIUM SERPL-MCNC: 1.9 MG/DL (ref 1.6–2.6)
MCH RBC QN AUTO: 29.1 PG (ref 27–31)
MCHC RBC AUTO-ENTMCNC: 32.5 G/DL (ref 33–36)
MCV RBC AUTO: 89.6 FL (ref 80–94)
MDMA UR QL SCN: NEGATIVE
METHGB MFR BLDA: 0.4 %
MONOCYTES # BLD AUTO: 0.27 X10(3)/MCL (ref 0.1–1.3)
MONOCYTES NFR BLD AUTO: 5.5 %
MUCOUS THREADS URNS QL MICRO: ABNORMAL /LPF
NEUTROPHILS # BLD AUTO: 3.06 X10(3)/MCL (ref 2.1–9.2)
NEUTROPHILS NFR BLD AUTO: 61.8 %
NITRITE UR QL STRIP.AUTO: NEGATIVE
NRBC BLD AUTO-RTO: 0 %
O2 HB BLOOD GAS (OHS): 95.5 %
OPIATES UR QL SCN: POSITIVE
OXYHGB MFR BLDA: 11.2 G/DL
PCO2 BLDA: 53 MMHG (ref 40–50)
PCP UR QL: NEGATIVE
PH BLDA: 7.33 [PH] (ref 7.3–7.4)
PH UR STRIP.AUTO: 5.5 [PH]
PH UR: 5.5 [PH] (ref 3–11)
PLATELET # BLD AUTO: 388 X10(3)/MCL (ref 130–400)
PMV BLD AUTO: 9.3 FL (ref 7.4–10.4)
PO2 BLDA: 96 MMHG (ref 30–40)
POTASSIUM SERPL-SCNC: 4.2 MMOL/L (ref 3.5–5.1)
PROT SERPL-MCNC: 7.6 GM/DL (ref 6.4–8.3)
PROT UR QL STRIP.AUTO: ABNORMAL
RBC # BLD AUTO: 3.95 X10(6)/MCL (ref 4.2–5.4)
RBC #/AREA URNS AUTO: ABNORMAL /HPF
RBC UR QL AUTO: ABNORMAL
SAO2 % BLDA: 99 %
SARS-COV-2 RNA RESP QL NAA+PROBE: NOT DETECTED
SODIUM SERPL-SCNC: 138 MMOL/L (ref 136–145)
SP GR UR STRIP.AUTO: 1.02 (ref 1–1.03)
SPECIFIC GRAVITY, URINE AUTO (.000) (OHS): 1.02 (ref 1–1.03)
SQUAMOUS #/AREA URNS LPF: ABNORMAL /HPF
TROPONIN I SERPL-MCNC: 0.06 NG/ML (ref 0–0.04)
UROBILINOGEN UR STRIP-ACNC: ABNORMAL
WBC # SPEC AUTO: 4.95 X10(3)/MCL (ref 4.5–11.5)
WBC #/AREA URNS AUTO: >100 /HPF
WBC CLUMPS UR QL AUTO: ABNORMAL /HPF

## 2023-10-09 PROCEDURE — 87154 CUL TYP ID BLD PTHGN 6+ TRGT: CPT

## 2023-10-09 PROCEDURE — 81025 URINE PREGNANCY TEST: CPT

## 2023-10-09 PROCEDURE — 82550 ASSAY OF CK (CPK): CPT | Performed by: EMERGENCY MEDICINE

## 2023-10-09 PROCEDURE — 25000003 PHARM REV CODE 250

## 2023-10-09 PROCEDURE — 21400001 HC TELEMETRY ROOM

## 2023-10-09 PROCEDURE — G0378 HOSPITAL OBSERVATION PER HR: HCPCS

## 2023-10-09 PROCEDURE — 87088 URINE BACTERIA CULTURE: CPT | Performed by: EMERGENCY MEDICINE

## 2023-10-09 PROCEDURE — 83735 ASSAY OF MAGNESIUM: CPT | Performed by: EMERGENCY MEDICINE

## 2023-10-09 PROCEDURE — 85652 RBC SED RATE AUTOMATED: CPT

## 2023-10-09 PROCEDURE — 80053 COMPREHEN METABOLIC PANEL: CPT | Performed by: EMERGENCY MEDICINE

## 2023-10-09 PROCEDURE — 84443 ASSAY THYROID STIM HORMONE: CPT

## 2023-10-09 PROCEDURE — 84484 ASSAY OF TROPONIN QUANT: CPT | Performed by: EMERGENCY MEDICINE

## 2023-10-09 PROCEDURE — 85025 COMPLETE CBC W/AUTO DIFF WBC: CPT | Performed by: EMERGENCY MEDICINE

## 2023-10-09 PROCEDURE — 86225 DNA ANTIBODY NATIVE: CPT

## 2023-10-09 PROCEDURE — 93005 ELECTROCARDIOGRAM TRACING: CPT

## 2023-10-09 PROCEDURE — 63600175 PHARM REV CODE 636 W HCPCS

## 2023-10-09 PROCEDURE — 80307 DRUG TEST PRSMV CHEM ANLYZR: CPT | Performed by: EMERGENCY MEDICINE

## 2023-10-09 PROCEDURE — 87077 CULTURE AEROBIC IDENTIFY: CPT

## 2023-10-09 PROCEDURE — 96365 THER/PROPH/DIAG IV INF INIT: CPT

## 2023-10-09 PROCEDURE — 80061 LIPID PANEL: CPT

## 2023-10-09 PROCEDURE — 83880 ASSAY OF NATRIURETIC PEPTIDE: CPT | Performed by: EMERGENCY MEDICINE

## 2023-10-09 PROCEDURE — 86160 COMPLEMENT ANTIGEN: CPT | Performed by: EMERGENCY MEDICINE

## 2023-10-09 PROCEDURE — 63600175 PHARM REV CODE 636 W HCPCS: Performed by: EMERGENCY MEDICINE

## 2023-10-09 PROCEDURE — 25000003 PHARM REV CODE 250: Performed by: EMERGENCY MEDICINE

## 2023-10-09 PROCEDURE — 25500020 PHARM REV CODE 255

## 2023-10-09 PROCEDURE — 83605 ASSAY OF LACTIC ACID: CPT | Performed by: EMERGENCY MEDICINE

## 2023-10-09 PROCEDURE — 83036 HEMOGLOBIN GLYCOSYLATED A1C: CPT

## 2023-10-09 PROCEDURE — 0240U COVID/FLU A&B PCR: CPT | Performed by: EMERGENCY MEDICINE

## 2023-10-09 PROCEDURE — 82553 CREATINE MB FRACTION: CPT | Performed by: EMERGENCY MEDICINE

## 2023-10-09 PROCEDURE — 86140 C-REACTIVE PROTEIN: CPT | Performed by: EMERGENCY MEDICINE

## 2023-10-09 PROCEDURE — 81001 URINALYSIS AUTO W/SCOPE: CPT | Performed by: EMERGENCY MEDICINE

## 2023-10-09 PROCEDURE — 96361 HYDRATE IV INFUSION ADD-ON: CPT

## 2023-10-09 PROCEDURE — 87040 BLOOD CULTURE FOR BACTERIA: CPT

## 2023-10-09 PROCEDURE — 85379 FIBRIN DEGRADATION QUANT: CPT | Performed by: EMERGENCY MEDICINE

## 2023-10-09 PROCEDURE — 82962 GLUCOSE BLOOD TEST: CPT

## 2023-10-09 RX ORDER — ALBUTEROL SULFATE 0.63 MG/3ML
0.63 SOLUTION RESPIRATORY (INHALATION) EVERY 4 HOURS PRN
Status: DISCONTINUED | OUTPATIENT
Start: 2023-10-09 | End: 2023-10-14 | Stop reason: HOSPADM

## 2023-10-09 RX ORDER — HYDROMORPHONE HYDROCHLORIDE 1 MG/ML
1 INJECTION, SOLUTION INTRAMUSCULAR; INTRAVENOUS; SUBCUTANEOUS
Status: COMPLETED | OUTPATIENT
Start: 2023-10-09 | End: 2023-10-09

## 2023-10-09 RX ORDER — ONDANSETRON 4 MG/1
4 TABLET, FILM COATED ORAL EVERY 6 HOURS PRN
Status: DISCONTINUED | OUTPATIENT
Start: 2023-10-09 | End: 2023-10-14 | Stop reason: HOSPADM

## 2023-10-09 RX ORDER — ATORVASTATIN CALCIUM 10 MG/1
10 TABLET, FILM COATED ORAL DAILY
Status: DISCONTINUED | OUTPATIENT
Start: 2023-10-10 | End: 2023-10-14 | Stop reason: HOSPADM

## 2023-10-09 RX ORDER — PANTOPRAZOLE SODIUM 40 MG/1
40 TABLET, DELAYED RELEASE ORAL DAILY
Status: DISCONTINUED | OUTPATIENT
Start: 2023-10-10 | End: 2023-10-14 | Stop reason: HOSPADM

## 2023-10-09 RX ORDER — VOCLOSPORIN 7.9 MG/1
CAPSULE ORAL
Status: ON HOLD | COMMUNITY
Start: 2023-10-04 | End: 2023-10-13 | Stop reason: HOSPADM

## 2023-10-09 RX ORDER — ASPIRIN 325 MG
325 TABLET ORAL ONCE
Status: COMPLETED | OUTPATIENT
Start: 2023-10-09 | End: 2023-10-09

## 2023-10-09 RX ORDER — LABETALOL HCL 20 MG/4 ML
10 SYRINGE (ML) INTRAVENOUS EVERY 4 HOURS PRN
Status: DISCONTINUED | OUTPATIENT
Start: 2023-10-10 | End: 2023-10-14 | Stop reason: HOSPADM

## 2023-10-09 RX ORDER — ENOXAPARIN SODIUM 100 MG/ML
40 INJECTION SUBCUTANEOUS EVERY 24 HOURS
Status: DISCONTINUED | OUTPATIENT
Start: 2023-10-10 | End: 2023-10-10

## 2023-10-09 RX ORDER — GLUCAGON 1 MG
1 KIT INJECTION
Status: DISCONTINUED | OUTPATIENT
Start: 2023-10-09 | End: 2023-10-14 | Stop reason: HOSPADM

## 2023-10-09 RX ORDER — NAPROXEN SODIUM 220 MG/1
81 TABLET, FILM COATED ORAL DAILY
Status: DISCONTINUED | OUTPATIENT
Start: 2023-10-10 | End: 2023-10-14 | Stop reason: HOSPADM

## 2023-10-09 RX ORDER — HYDROCODONE BITARTRATE AND ACETAMINOPHEN 10; 325 MG/1; MG/1
1 TABLET ORAL EVERY 6 HOURS PRN
Status: DISPENSED | OUTPATIENT
Start: 2023-10-09 | End: 2023-10-14

## 2023-10-09 RX ORDER — INSULIN ASPART 100 [IU]/ML
0-10 INJECTION, SOLUTION INTRAVENOUS; SUBCUTANEOUS
Status: DISCONTINUED | OUTPATIENT
Start: 2023-10-09 | End: 2023-10-14 | Stop reason: HOSPADM

## 2023-10-09 RX ORDER — TALC
6 POWDER (GRAM) TOPICAL NIGHTLY PRN
Status: DISCONTINUED | OUTPATIENT
Start: 2023-10-09 | End: 2023-10-14 | Stop reason: HOSPADM

## 2023-10-09 RX ORDER — MYCOPHENOLATE MOFETIL 500 MG/1
1000 TABLET ORAL 2 TIMES DAILY
Status: DISCONTINUED | OUTPATIENT
Start: 2023-10-09 | End: 2023-10-10

## 2023-10-09 RX ORDER — METHYLPREDNISOLONE SOD SUCC 125 MG
100 VIAL (EA) INJECTION 2 TIMES DAILY
Status: DISCONTINUED | OUTPATIENT
Start: 2023-10-10 | End: 2023-10-11

## 2023-10-09 RX ORDER — IBUPROFEN 200 MG
24 TABLET ORAL
Status: DISCONTINUED | OUTPATIENT
Start: 2023-10-09 | End: 2023-10-14 | Stop reason: HOSPADM

## 2023-10-09 RX ORDER — METHYLPREDNISOLONE SOD SUCC 125 MG
125 VIAL (EA) INJECTION
Status: COMPLETED | OUTPATIENT
Start: 2023-10-09 | End: 2023-10-09

## 2023-10-09 RX ORDER — HYDRALAZINE HYDROCHLORIDE 20 MG/ML
10 INJECTION INTRAMUSCULAR; INTRAVENOUS EVERY 4 HOURS PRN
Status: DISCONTINUED | OUTPATIENT
Start: 2023-10-09 | End: 2023-10-14 | Stop reason: HOSPADM

## 2023-10-09 RX ORDER — ESCITALOPRAM OXALATE 10 MG/1
10 TABLET ORAL DAILY
Status: DISCONTINUED | OUTPATIENT
Start: 2023-10-10 | End: 2023-10-14 | Stop reason: HOSPADM

## 2023-10-09 RX ORDER — IBUPROFEN 200 MG
16 TABLET ORAL
Status: DISCONTINUED | OUTPATIENT
Start: 2023-10-09 | End: 2023-10-14 | Stop reason: HOSPADM

## 2023-10-09 RX ORDER — SODIUM CHLORIDE 0.9 % (FLUSH) 0.9 %
10 SYRINGE (ML) INJECTION EVERY 12 HOURS PRN
Status: DISCONTINUED | OUTPATIENT
Start: 2023-10-09 | End: 2023-10-14 | Stop reason: HOSPADM

## 2023-10-09 RX ORDER — POLYETHYLENE GLYCOL 3350 17 G/17G
17 POWDER, FOR SOLUTION ORAL 2 TIMES DAILY PRN
Status: DISCONTINUED | OUTPATIENT
Start: 2023-10-09 | End: 2023-10-14 | Stop reason: HOSPADM

## 2023-10-09 RX ADMIN — SODIUM CHLORIDE 1000 ML: 9 INJECTION, SOLUTION INTRAVENOUS at 07:10

## 2023-10-09 RX ADMIN — METHYLPREDNISOLONE SODIUM SUCCINATE 125 MG: 125 INJECTION, POWDER, FOR SOLUTION INTRAMUSCULAR; INTRAVENOUS at 07:10

## 2023-10-09 RX ADMIN — HYDROMORPHONE HYDROCHLORIDE 1 MG: 1 INJECTION, SOLUTION INTRAMUSCULAR; INTRAVENOUS; SUBCUTANEOUS at 10:10

## 2023-10-09 RX ADMIN — ASPIRIN 325 MG ORAL TABLET 325 MG: 325 PILL ORAL at 11:10

## 2023-10-09 RX ADMIN — CEFTRIAXONE SODIUM 1 G: 1 INJECTION, POWDER, FOR SOLUTION INTRAMUSCULAR; INTRAVENOUS at 11:10

## 2023-10-09 RX ADMIN — IOHEXOL 100 ML: 350 INJECTION, SOLUTION INTRAVENOUS at 08:10

## 2023-10-09 NOTE — Clinical Note
Diagnosis: Back pain, unspecified back location, unspecified back pain laterality, unspecified chronicity [[id number]]   Future Attending Provider: MEENU SEWELL [[id number]]   Admitting Provider:: MEENU SEWELL [[id number]]

## 2023-10-09 NOTE — ED PROVIDER NOTES
Encounter Date: 10/9/2023       History     Chief Complaint   Patient presents with    Generalized Body Aches     Pt reports lupus flare up for two days     Shoulder pain, back pain; in sequence with previous episodes diagnosed as lupus flare ; patient modestly tachypeneic on arrival      Back Pain   This is a recurrent problem. The current episode started two days ago. The problem occurs constantly. The problem has been gradually worsening. The pain is associated with no known injury. The pain is present in the thoracic spine. The quality of the pain is described as aching. Radiates to: right shoulder. The pain is at a severity of 3/10. The symptoms are aggravated by bending and twisting. The pain is The same all the time. Pertinent negatives include no chest pain, no fever, no numbness, no weight loss, no headaches, no abdominal pain, no abdominal swelling, no bowel incontinence, no perianal numbness, no bladder incontinence and no dysuria. Associated symptoms comments: Right shoulder pain. Treatments tried: home meds being taken as prescribed ; The treatment provided no relief. Risk factors: history lupus, HTN, DM, CAD.     Review of patient's allergies indicates:   Allergen Reactions    Ketorolac (pf) Swelling    Mycophenolate mofetil Swelling    Penicillins Hives    Percocet [oxycodone-acetaminophen] Hives and Itching    Tramadol Hives     Past Medical History:   Diagnosis Date    Arthritis     knees    Asthma     Atrial fibrillation     Chronic constipation     Diabetes mellitus     Encounter for blood transfusion 10/2014    GERD (gastroesophageal reflux disease)     Gout     Hypertension     Lupus 2004    SLE    Renal disorder     SLE (systemic lupus erythematosus)      Past Surgical History:   Procedure Laterality Date    APPENDECTOMY      CHOLECYSTECTOMY      COLONOSCOPY N/A 4/19/2018    Procedure: COLONOSCOPY;  Surgeon: Minerva Porras MD;  Location: [facility];  Service: Endoscopy;  Laterality: N/A;     ESOPHAGOGASTRODUODENOSCOPY N/A 2018    Procedure: ESOPHAGOGASTRODUODENOSCOPY (EGD);  Surgeon: Minerva Porras MD;  Location: [facility];  Service: Endoscopy;  Laterality: N/A;    HYSTERECTOMY      JOINT REPLACEMENT Left 2015    Knee    LYMPH NODE BIOPSY Left     MASTECTOMY      Left arm- NO BP    PARTIAL HYSTERECTOMY       Family History   Problem Relation Age of Onset    Heart block Mother     Arthritis Mother     Asthma Mother     Diabetes Mother     Heart disease Father     Diabetes Sister     Heart disease Sister     Kidney disease Sister      Social History     Tobacco Use    Smoking status: Former     Current packs/day: 0.00     Types: Cigarettes     Start date: 1980     Quit date: 2022     Years since quittin.4    Smokeless tobacco: Never   Substance Use Topics    Alcohol use: No    Drug use: No     Review of Systems   Constitutional:  Negative for fever and weight loss.   Cardiovascular:  Negative for chest pain.   Gastrointestinal:  Negative for abdominal pain and bowel incontinence.   Genitourinary:  Negative for bladder incontinence and dysuria.   Musculoskeletal:  Positive for back pain.   Neurological:  Negative for numbness and headaches.   All other systems reviewed and are negative.      Physical Exam     Initial Vitals [10/09/23 1817]   BP Pulse Resp Temp SpO2   (!) 179/83 86 18 98.2 °F (36.8 °C) 100 %      MAP       --         Physical Exam    Nursing note and vitals reviewed.  Constitutional: She appears well-developed and well-nourished. She is not diaphoretic. No distress.   HENT:   Head: Normocephalic and atraumatic.   Right Ear: External ear normal.   Left Ear: External ear normal.   Eyes: EOM are normal. Pupils are equal, round, and reactive to light. Right eye exhibits no discharge. Left eye exhibits no discharge.   Neck: Neck supple. No thyromegaly present. No tracheal deviation present. No JVD present.   Normal range of motion.  Cardiovascular:  Normal  rate, regular rhythm, normal heart sounds and intact distal pulses.     Exam reveals no gallop and no friction rub.       No murmur heard.  Pulmonary/Chest: Breath sounds normal. No stridor. No respiratory distress. She has no wheezes. She has no rhonchi. She has no rales.   Moderate tachypnea ;   Abdominal: Abdomen is soft. Bowel sounds are normal. She exhibits no distension. There is no abdominal tenderness. There is no rebound and no guarding.   Musculoskeletal:         General: No tenderness or edema. Normal range of motion.      Cervical back: Normal range of motion and neck supple.     Neurological: She is alert and oriented to person, place, and time. She has normal strength. No cranial nerve deficit or sensory deficit. GCS score is 15. GCS eye subscore is 4. GCS verbal subscore is 5. GCS motor subscore is 6.   Skin: Skin is warm and dry. Capillary refill takes less than 2 seconds. No rash and no abscess noted. No erythema. No pallor.   Psychiatric: She has a normal mood and affect. Her behavior is normal. Judgment and thought content normal.         ED Course   Procedures  Labs Reviewed   COMPREHENSIVE METABOLIC PANEL - Abnormal; Notable for the following components:       Result Value    Blood Urea Nitrogen 22.1 (*)     Creatinine 1.24 (*)     Albumin Level 3.0 (*)     Globulin 4.6 (*)     Albumin/Globulin Ratio 0.7 (*)     All other components within normal limits   TROPONIN I - Abnormal; Notable for the following components:    Troponin-I 0.055 (*)     All other components within normal limits   D DIMER, QUANTITATIVE - Abnormal; Notable for the following components:    D-Dimer 6.78 (*)     All other components within normal limits   URINALYSIS, REFLEX TO URINE CULTURE - Abnormal; Notable for the following components:    Appearance, UA Turbid (*)     Protein, UA 3+ (*)     Blood, UA 2+ (*)     Urobilinogen, UA 1+ (*)     Leukocyte Esterase,  (*)     WBC, UA >100 (*)     WBC Clumps, UA Few (*)      Bacteria, UA Occ (*)     Squamous Epithelial Cells, UA Moderate (*)     Mucous, UA Trace (*)     Hyaline Casts, UA 6-10 (*)     RBC, UA 21-50 (*)     All other components within normal limits   DRUG SCREEN, URINE (BEAKER) - Abnormal; Notable for the following components:    Opiates, Urine Positive (*)     All other components within normal limits    Narrative:     Cut off concentrations:    Amphetamines - 1000 ng/ml  Barbiturates - 200 ng/ml  Benzodiazepine - 200 ng/ml  Cannabinoids (THC) - 50 ng/ml  Cocaine - 300 ng/ml  Fentanyl - 1.0 ng/ml  MDMA - 500 ng/ml  Opiates - 300 ng/ml   Phencyclidine (PCP) - 25 ng/ml    Specimen submitted for drug analysis and tested for pH and specific gravity in order to evaluate sample integrity. Suspect tampering if specific gravity is <1.003 and/or pH is not within the range of 4.5 - 8.0  False negatives may result form substances such as bleach added to urine.  False positives may result for the presence of a substance with similar chemical structure to the drug or its metabolite.    This test provides only a PRELIMINARY analytical test result. A more specific alternate chemical method must be used in order to obtain a confirmed analytical result. Gas chromatography/mass spectrometry (GC/MS) is the preferred confirmatory method. Other chemical confirmation methods are available. Clinical consideration and professional judgement should be applied to any drug of abuse test result, particularly when preliminary positive results are used.    Positive results will be confirmed only at the physicians request. Unconfirmed screening results are to be used only for medical purposes (treatment).        C-REACTIVE PROTEIN - Abnormal; Notable for the following components:    C-Reactive Protein 28.60 (*)     All other components within normal limits   BLOOD GAS - Abnormal; Notable for the following components:    pCO2, Blood gas 53.0 (*)     pO2, Blood gas 96.0 (*)     All other components  within normal limits   CBC WITH DIFFERENTIAL - Abnormal; Notable for the following components:    RBC 3.95 (*)     Hgb 11.5 (*)     Hct 35.4 (*)     MCHC 32.5 (*)     All other components within normal limits   SEDIMENTATION RATE, AUTOMATED - Abnormal; Notable for the following components:    Sed Rate 50 (*)     All other components within normal limits   LIPID PANEL - Abnormal; Notable for the following components:    HDL Cholesterol 29 (*)     All other components within normal limits   COMPREHENSIVE METABOLIC PANEL - Abnormal; Notable for the following components:    Sodium Level 135 (*)     Glucose Level 181 (*)     Creatinine 1.09 (*)     Albumin Level 2.9 (*)     Globulin 4.8 (*)     Albumin/Globulin Ratio 0.6 (*)     All other components within normal limits   CBC WITH DIFFERENTIAL - Abnormal; Notable for the following components:    RBC 3.82 (*)     Hgb 11.0 (*)     Hct 34.3 (*)     MCHC 32.1 (*)     Platelet 411 (*)     Mono # 0.06 (*)     All other components within normal limits   POCT GLUCOSE - Abnormal; Notable for the following components:    POCT Glucose 137 (*)     All other components within normal limits   MAGNESIUM - Normal   CK-MB - Normal   CK - Normal   B-TYPE NATRIURETIC PEPTIDE - Normal   COVID/FLU A&B PCR - Normal    Narrative:     The Xpert Xpress SARS-CoV-2/FLU/RSV plus is a rapid, multiplexed real-time PCR test intended for the simultaneous qualitative detection and differentiation of SARS-CoV-2, Influenza A, Influenza B, and respiratory syncytial virus (RSV) viral RNA in either nasopharyngeal swab or nasal swab specimens.         LACTIC ACID, PLASMA - Normal   TSH - Normal   MAGNESIUM - Normal   PHOSPHORUS - Normal   TROPONIN I - Normal   CULTURE, URINE   BLOOD CULTURE [facility]   BLOOD CULTURE [facility]   CBC W/ AUTO DIFFERENTIAL    Narrative:     The following orders were created for panel order CBC auto differential.  Procedure                               Abnormality         Status                      ---------                               -----------         ------                     CBC with Differential[[id number]]       Abnormal            Final result                 Please view results for these tests on the individual orders.   HEMOGLOBIN A1C   CBC W/ AUTO DIFFERENTIAL    Narrative:     The following orders were created for panel order CBC with Automated Differential.  Procedure                               Abnormality         Status                     ---------                               -----------         ------                     CBC with Differential[[id number]]       Abnormal            Final result                 Please view results for these tests on the individual orders.   C3 COMPLEMENT   C4 COMPLEMENT   PROTEIN / CREATININE RATIO, URINE   DSDNA   POCT URINE PREGNANCY   POCT GLUCOSE MONITORING CONTINUOUS     EKG Readings: (Independently Interpreted)   Initial Reading: No STEMI. Rhythm: Normal Sinus Rhythm. Heart Rate: 61. Ectopy: No Ectopy. Conduction: Normal and LPFB. Axis: Right Axis Deviation.     ECG Results              EKG 12-lead (In process)  Result time 10/10/23 06:08:31      In process by Interface, Lab In [facility] (10/10/23 06:08:31)                   Narrative:    Test Reason : R07.9,    Vent. Rate : 053 BPM     Atrial Rate : 053 BPM     P-R Int : 196 ms          QRS Dur : 088 ms      QT Int : 454 ms       P-R-T Axes : 040 -06 036 degrees     QTc Int : 426 ms    Sinus bradycardia  Otherwise normal ECG  When compared with ECG of 09-OCT-2023 19:17,  Left posterior fascicular block is no longer Present    Referred By: [email or web address]   SELF           Confirmed By:                                      EKG 12-lead (In process)  Result time 10/10/23 06:08:29      In process by Interface, Lab In [facility] (10/10/23 06:08:29)                   Narrative:    Test Reason : R06.00,    Vent. Rate : 061 BPM     Atrial Rate : 061 BPM     P-R Int : 184 ms          QRS Dur : 082 ms      QT Int  : 428 ms       P-R-T Axes : 047 117 012 degrees     QTc Int : 430 ms    Normal sinus rhythm  Left posterior fascicular block  Abnormal ECG  When compared with ECG of 09-MAR-2023 12:04,  Left posterior fascicular block is now Present  Nonspecific T wave abnormality now evident in Anterior leads  Nonspecific T wave abnormality no longer evident in Lateral leads    Referred By: [email or web address]   SELF           Confirmed By:                                   Imaging Results              CTA Chest Non-Coronary (PE Studies) (Final result)  Result time 10/09/23 20:37:02      Final result by García Gilbert MD (10/09/23 20:37:02)                   Impression:      No CT evidence for pulmonary embolus.    Stable ground-glass patchy and confluent opacities in both lower lobes and to a lesser extent in the lingula and right middle lobe.    Slight interval decrease in axillary lymphadenopathy.      Electronically signed by: García Gilbert MD  Date:    10/09/2023  Time:    20:37               Narrative:    EXAMINATION:  CTA chest    CLINICAL HISTORY:  PE suspected    COMPARISON:  CT of the chest dated 03/09/2023    TECHNIQUE:  Routine CT angiogram of the chest was performed intravenous contrast. 3D MIP images are obtained.    Total DLP: 191 mGy.cm    Automatic exposure control was utilized to reduce the patient's dose    FINDINGS:  No intraluminal filling defects within the pulmonary arteries to suggest pulmonary embolus.  Thoracic aorta normal caliber.  There are mild atherosclerotic calcifications of the thoracic aorta.  The heart is normal in size.  There is bilateral lymphadenopathy in both axilla, slightly decreased when compared to prior.  No mediastinal or hilar lymphadenopathy.  The heart is normal in size.  No pericardial pleural effusion.  Visualized upper abdomen appears grossly unremarkable.    The central airways are patent and unremarkable.  Is no consolidation.  There is persistent patchy and confluent areas of  ground-glass opacities in both lower lobes, lingula and right middle lobe    No osseous destructive process.                                       X-Ray Chest AP Portable (Final result)  Result time 10/09/23 19:53:58      Final result by Octaviano Michelle MD (10/09/23 19:53:58)                   Narrative:    EXAMINATION  XR CHEST AP PORTABLE    CLINICAL HISTORY  Tachypnea, not elsewhere classified    TECHNIQUE  A total of 1 frontal image(s) of the chest.    COMPARISON  9 March 2023    FINDINGS  Lines/tubes/devices: none present    The cardiomediastinal silhouette and central pulmonary vasculature are unremarkable for utilized technique.  The trachea is midline. There is no lobar consolidation, pleural effusion, or pneumothorax.    There is no acute osseous or extrathoracic abnormality.    IMPRESSION  No convincing acute radiographic abnormality.      Electronically signed by: Octaviano Michelle  Date:    10/09/2023  Time:    19:53                                     Medications   albuterol nebulizer solution 0.63 mg (has no administration in time range)   atorvastatin tablet 10 mg (has no administration in time range)   EScitalopram oxalate tablet 10 mg (has no administration in time range)   HYDROcodone-acetaminophen  mg per tablet 1 tablet (1 tablet Oral Given 10/10/23 0608)   insulin detemir U-100 injection 80 Units (80 Units Subcutaneous Given 10/10/23 0028)   ondansetron tablet 4 mg (has no administration in time range)   pantoprazole EC tablet 40 mg (has no administration in time range)   sodium chloride 0.9% flush 10 mL (has no administration in time range)   glucose chewable tablet 16 g (has no administration in time range)   glucose chewable tablet 24 g (has no administration in time range)   dextrose 10% bolus 125 mL 125 mL (has no administration in time range)   dextrose 10% bolus 250 mL 250 mL (has no administration in time range)   glucagon (human recombinant) injection 1 mg (has no administration in  time range)   polyethylene glycol packet 17 g (has no administration in time range)   enoxaparin injection 40 mg (has no administration in time range)   insulin aspart U-100 injection 0-10 Units (has no administration in time range)   melatonin tablet 6 mg (has no administration in time range)   multivitamin tablet (has no administration in time range)   aspirin chewable tablet 81 mg (has no administration in time range)   methylPREDNISolone sodium succinate injection 100 mg (has no administration in time range)   hydrALAZINE injection 10 mg (has no administration in time range)   labetalol 20 mg/4 mL (5 mg/mL) IV syring (has no administration in time range)   cefTRIAXone (ROCEPHIN) 1 g in dextrose 5 % in water (D5W) 100 mL IVPB (MB+) (0 g Intravenous Stopped 10/10/23 0026)   mycophenolate capsule 1,000 mg (1,000 mg Oral Given 10/10/23 0040)   sodium chloride 0.9% bolus 1,000 mL 1,000 mL (0 mLs Intravenous Stopped 10/9/23 2046)   methylPREDNISolone sodium succinate injection 125 mg (125 mg Intravenous Given 10/9/23 1947)   iohexoL (OMNIPAQUE 350) 350 mg iodine/mL injection (100 mLs Intravenous Given 10/9/23 2000)   HYDROmorphone injection 1 mg (1 mg Intravenous Given 10/9/23 2215)   aspirin tablet 325 mg (325 mg Oral Given 10/9/23 2356)   lactated ringers bolus 1,000 mL (0 mLs Intravenous Stopped 10/10/23 0139)     Medical Decision Making  57-year-old woman presents to the emergency department this evening with thoracic back pain and right shoulder pain which she reports is similar to previous episodes for which she is been diagnosed with lupus flare.  Patient endorses that she is taking her steroids and CellCept and chronic Norco as directed without change in recent weeks.  She denies fever, chills, nausea, vomiting, abdominal pain, diarrhea, constipation.  Review of records demonstrates relatively frequent visits for nonspecific complaints.  She has been fired from the clinic 3 times once in 2018 twice in 2019,  for aggressive and inappropriate behavior per review of the chart.  In the ED today she is calm and reliable, but does appear frustrated that medical interventions do not seem to change the course of her chronic painful condition.  Her symptoms are found nonspecific, nevertheless age and comorbid conditions raises risk sufficiently that an expanded evaluation with objective data is found appropriate.    Patient signed out to me, Dr. Friend, at end of shift.  I have seen and evaluated the patient.  She reports essentially hurting all over in her joints and muscles, and feels like she was having a lupus flare.  She denies having any cough or shortness of breath or chest pain.  CBC shows no leukocytosis or leukopenia, hemoglobin 11.5 which appears stable from previous labs.  Initial troponin elevated at 0.055.  Creatinine 1.24 which is actually improved from 1.27 back in August.  CRP elevated at 28.6 although this is down from 88 back in March.  D-dimer elevated at 6.78, so CTA chest was obtained which was read by radiologist showing no CT evidence of PE, stable ground-glass patchy and confluent opacities in both lower lobes, lingula, and right middle lobe.  Review of medical record shows that on her previous admission back in March these findings were also seen on CT imaging of her chest and it did not appear that they were concerned it was of a infectious etiology, and given that patient denies having any cough or fever or shortness of breath I do not suspect that this is infectious either.  All labs and imaging discussed with the patient.  She was agreeable to admission.  I have spoken with the patient and/or caregivers. I have explained the patient's condition, diagnoses and treatment plan based on the information available to me at this time. I have answered the patient's and/or caregiver's questions and addressed any concerns. The patient and/or caregivers have as good an understanding of the patient's  diagnosis, condition and treatment plan as can be expected at this point. The patient has been stabilized within the capability of the emergency department. The patient will be transported for further care and management or will be moved to an observation or inpatient service. I have communicated with the staff or medical practitioner taking over this patient's care.    Amount and/or Complexity of Data Reviewed  External Data Reviewed: labs, radiology and notes.  Labs: ordered. Decision-making details documented in ED Course.  Radiology: ordered.    Risk  Prescription drug management.  Parenteral controlled substances.  Decision regarding hospitalization.               ED Course as of 10/10/23 0626   Mon Oct 09, 2023   1942 Troponin I(!): 0.055 [IB]   1942 CRP(!): 28.60  Down from 88 back in March. [IB]   1943 Creatinine(!): 1.24  Improved from 1.27 back in August. [IB]   1943 WBC: 4.95 [IB]   1943 Hemoglobin(!): 11.5  Stable from previous labs. [IB]   1943 D-Dimer(!): 6.78 [IB]   2013 CPK MB: <1.0 [IB]   2043 POC PH: 7.330 [IB]   2043 Leukocytes, UA(!): 500 [IB]   2043 WBC, UA(!): >100 [IB]   2043 WBC Clumps, UA(!): Few [IB]   2043 Bacteria, UA(!): Occ [IB]   2043 Squamous Epithelial Cells, UA(!): Moderate [IB]   2043 RBC, UA(!): 21-50 [IB]      ED Course User Index  [IB] Kb Friend DO                    Clinical Impression:   Final diagnoses:  [R06.00] Dyspnea  [R06.82] Tachypnea  [M54.9] Back pain, unspecified back location, unspecified back pain laterality, unspecified chronicity (Primary)  [M25.519] Shoulder pain, unspecified chronicity, unspecified laterality  [M32.9] Lupus        ED Disposition Condition    Observation Stable                Kb Friend DO  10/10/23 0626

## 2023-10-10 ENCOUNTER — TELEPHONE (OUTPATIENT)
Dept: RHEUMATOLOGY | Facility: CLINIC | Age: 58
End: 2023-10-10
Payer: MEDICAID

## 2023-10-10 PROBLEM — E11.21: Status: ACTIVE | Noted: 2023-10-10

## 2023-10-10 PROBLEM — I82.431 ACUTE DEEP VEIN THROMBOSIS (DVT) OF POPLITEAL VEIN OF RIGHT LOWER EXTREMITY: Status: ACTIVE | Noted: 2023-10-10

## 2023-10-10 PROBLEM — J84.9 ILD (INTERSTITIAL LUNG DISEASE): Status: ACTIVE | Noted: 2023-10-10

## 2023-10-10 PROBLEM — M54.9 BACK PAIN: Status: ACTIVE | Noted: 2023-10-10

## 2023-10-10 LAB
ALBUMIN SERPL-MCNC: 2.9 G/DL (ref 3.5–5)
ALBUMIN/GLOB SERPL: 0.6 RATIO (ref 1.1–2)
ALP SERPL-CCNC: 116 UNIT/L (ref 40–150)
ALT SERPL-CCNC: 8 UNIT/L (ref 0–55)
APTT PPP: 154.2 SECONDS (ref 23.2–33.7)
APTT PPP: 40.2 SECONDS (ref 23.2–33.7)
AST SERPL-CCNC: 14 UNIT/L (ref 5–34)
AT III ACT/NOR PPP CHRO: 108 % (ref 82–139)
B-HCG UR QL: NEGATIVE
BASOPHILS # BLD AUTO: 0.01 X10(3)/MCL
BASOPHILS # BLD AUTO: 0.01 X10(3)/MCL
BASOPHILS NFR BLD AUTO: 0.2 %
BASOPHILS NFR BLD AUTO: 0.2 %
BILIRUB SERPL-MCNC: 0.1 MG/DL
BUN SERPL-MCNC: 19.3 MG/DL (ref 9.8–20.1)
C3 SERPL-MCNC: 75 MG/DL (ref 80–173)
C4 SERPL-MCNC: 13 MG/DL (ref 13–46)
CALCIUM SERPL-MCNC: 8.9 MG/DL (ref 8.4–10.2)
CHLORIDE SERPL-SCNC: 105 MMOL/L (ref 98–107)
CHOLEST SERPL-MCNC: 151 MG/DL
CHOLEST/HDLC SERPL: 5 {RATIO} (ref 0–5)
CO2 SERPL-SCNC: 22 MMOL/L (ref 22–29)
CREAT SERPL-MCNC: 1.09 MG/DL (ref 0.55–1.02)
CREAT UR-MCNC: 75.9 MG/DL (ref 45–106)
CTP QC/QA: YES
EOSINOPHIL # BLD AUTO: 0 X10(3)/MCL (ref 0–0.9)
EOSINOPHIL # BLD AUTO: 0 X10(3)/MCL (ref 0–0.9)
EOSINOPHIL NFR BLD AUTO: 0 %
EOSINOPHIL NFR BLD AUTO: 0 %
ERYTHROCYTE [DISTWIDTH] IN BLOOD BY AUTOMATED COUNT: 15.8 % (ref 11.5–17)
ERYTHROCYTE [DISTWIDTH] IN BLOOD BY AUTOMATED COUNT: 15.9 % (ref 11.5–17)
GFR SERPLBLD CREATININE-BSD FMLA CKD-EPI: 60 MLS/MIN/1.73/M2
GLOBULIN SER-MCNC: 4.8 GM/DL (ref 2.4–3.5)
GLUCOSE SERPL-MCNC: 181 MG/DL (ref 74–100)
HCT VFR BLD AUTO: 34.3 % (ref 37–47)
HCT VFR BLD AUTO: 40.2 % (ref 37–47)
HDLC SERPL-MCNC: 29 MG/DL (ref 35–60)
HGB BLD-MCNC: 11 G/DL (ref 12–16)
HGB BLD-MCNC: 13 G/DL (ref 12–16)
IMM GRANULOCYTES # BLD AUTO: 0.02 X10(3)/MCL (ref 0–0.04)
IMM GRANULOCYTES # BLD AUTO: 0.02 X10(3)/MCL (ref 0–0.04)
IMM GRANULOCYTES NFR BLD AUTO: 0.3 %
IMM GRANULOCYTES NFR BLD AUTO: 0.4 %
INR PPP: 1
LDLC SERPL CALC-MCNC: 96 MG/DL (ref 50–140)
LYMPHOCYTES # BLD AUTO: 0.69 X10(3)/MCL (ref 0.6–4.6)
LYMPHOCYTES # BLD AUTO: 0.96 X10(3)/MCL (ref 0.6–4.6)
LYMPHOCYTES NFR BLD AUTO: 11.6 %
LYMPHOCYTES NFR BLD AUTO: 20.2 %
MAGNESIUM SERPL-MCNC: 1.9 MG/DL (ref 1.6–2.6)
MCH RBC QN AUTO: 28.8 PG (ref 27–31)
MCH RBC QN AUTO: 29 PG (ref 27–31)
MCHC RBC AUTO-ENTMCNC: 32.1 G/DL (ref 33–36)
MCHC RBC AUTO-ENTMCNC: 32.3 G/DL (ref 33–36)
MCV RBC AUTO: 89.7 FL (ref 80–94)
MCV RBC AUTO: 89.8 FL (ref 80–94)
MONOCYTES # BLD AUTO: 0.06 X10(3)/MCL (ref 0.1–1.3)
MONOCYTES # BLD AUTO: 0.09 X10(3)/MCL (ref 0.1–1.3)
MONOCYTES NFR BLD AUTO: 1 %
MONOCYTES NFR BLD AUTO: 1.9 %
NEUTROPHILS # BLD AUTO: 3.67 X10(3)/MCL (ref 2.1–9.2)
NEUTROPHILS # BLD AUTO: 5.15 X10(3)/MCL (ref 2.1–9.2)
NEUTROPHILS NFR BLD AUTO: 77.3 %
NEUTROPHILS NFR BLD AUTO: 86.9 %
NRBC BLD AUTO-RTO: 0 %
NRBC BLD AUTO-RTO: 0 %
PHOSPHATE SERPL-MCNC: 3.5 MG/DL (ref 2.3–4.7)
PLATELET # BLD AUTO: 393 X10(3)/MCL (ref 130–400)
PLATELET # BLD AUTO: 411 X10(3)/MCL (ref 130–400)
PMV BLD AUTO: 10 FL (ref 7.4–10.4)
PMV BLD AUTO: 9.8 FL (ref 7.4–10.4)
POCT GLUCOSE: 122 MG/DL (ref 70–110)
POCT GLUCOSE: 130 MG/DL (ref 70–110)
POCT GLUCOSE: 137 MG/DL (ref 70–110)
POCT GLUCOSE: 149 MG/DL (ref 70–110)
POCT GLUCOSE: 95 MG/DL (ref 70–110)
POTASSIUM SERPL-SCNC: 4.8 MMOL/L (ref 3.5–5.1)
PROT SERPL-MCNC: 7.7 GM/DL (ref 6.4–8.3)
PROT UR STRIP-MCNC: 377.6 MG/DL
PROTHROMBIN TIME: 13.6 SECONDS (ref 11.4–14)
RBC # BLD AUTO: 3.82 X10(6)/MCL (ref 4.2–5.4)
RBC # BLD AUTO: 4.48 X10(6)/MCL (ref 4.2–5.4)
SODIUM SERPL-SCNC: 135 MMOL/L (ref 136–145)
TRIGL SERPL-MCNC: 129 MG/DL (ref 37–140)
TROPONIN I SERPL-MCNC: <0.01 NG/ML (ref 0–0.04)
TSH SERPL-ACNC: 0.68 UIU/ML (ref 0.35–4.94)
URINE PROTEIN/CREATININE RATIO (OHS): 5
VLDLC SERPL CALC-MCNC: 26 MG/DL
WBC # SPEC AUTO: 4.75 X10(3)/MCL (ref 4.5–11.5)
WBC # SPEC AUTO: 5.93 X10(3)/MCL (ref 4.5–11.5)

## 2023-10-10 PROCEDURE — 25000003 PHARM REV CODE 250

## 2023-10-10 PROCEDURE — 99900035 HC TECH TIME PER 15 MIN (STAT)

## 2023-10-10 PROCEDURE — 63600175 PHARM REV CODE 636 W HCPCS

## 2023-10-10 PROCEDURE — 84100 ASSAY OF PHOSPHORUS: CPT

## 2023-10-10 PROCEDURE — 86147 CARDIOLIPIN ANTIBODY EA IG: CPT | Performed by: FAMILY MEDICINE

## 2023-10-10 PROCEDURE — 84484 ASSAY OF TROPONIN QUANT: CPT

## 2023-10-10 PROCEDURE — 63600175 PHARM REV CODE 636 W HCPCS: Performed by: STUDENT IN AN ORGANIZED HEALTH CARE EDUCATION/TRAINING PROGRAM

## 2023-10-10 PROCEDURE — 21400001 HC TELEMETRY ROOM

## 2023-10-10 PROCEDURE — 85025 COMPLETE CBC W/AUTO DIFF WBC: CPT | Mod: 91 | Performed by: STUDENT IN AN ORGANIZED HEALTH CARE EDUCATION/TRAINING PROGRAM

## 2023-10-10 PROCEDURE — 99285 EMERGENCY DEPT VISIT HI MDM: CPT | Mod: 25

## 2023-10-10 PROCEDURE — 82570 ASSAY OF URINE CREATININE: CPT

## 2023-10-10 PROCEDURE — 86146 BETA-2 GLYCOPROTEIN ANTIBODY: CPT | Performed by: FAMILY MEDICINE

## 2023-10-10 PROCEDURE — 96361 HYDRATE IV INFUSION ADD-ON: CPT

## 2023-10-10 PROCEDURE — 99223 PR INITIAL HOSPITAL CARE,LEVL III: ICD-10-PCS | Mod: ,,, | Performed by: INTERNAL MEDICINE

## 2023-10-10 PROCEDURE — 80053 COMPREHEN METABOLIC PANEL: CPT

## 2023-10-10 PROCEDURE — 85390 FIBRINOLYSINS SCREEN I&R: CPT

## 2023-10-10 PROCEDURE — 96375 TX/PRO/DX INJ NEW DRUG ADDON: CPT

## 2023-10-10 PROCEDURE — 81241 F5 GENE: CPT | Performed by: FAMILY MEDICINE

## 2023-10-10 PROCEDURE — 99223 1ST HOSP IP/OBS HIGH 75: CPT | Mod: ,,, | Performed by: INTERNAL MEDICINE

## 2023-10-10 PROCEDURE — 85301 ANTITHROMBIN III ANTIGEN: CPT | Performed by: FAMILY MEDICINE

## 2023-10-10 PROCEDURE — 85306 CLOT INHIBIT PROT S FREE: CPT | Performed by: FAMILY MEDICINE

## 2023-10-10 PROCEDURE — 96372 THER/PROPH/DIAG INJ SC/IM: CPT

## 2023-10-10 PROCEDURE — 85730 THROMBOPLASTIN TIME PARTIAL: CPT | Performed by: STUDENT IN AN ORGANIZED HEALTH CARE EDUCATION/TRAINING PROGRAM

## 2023-10-10 PROCEDURE — 83735 ASSAY OF MAGNESIUM: CPT

## 2023-10-10 PROCEDURE — 93005 ELECTROCARDIOGRAM TRACING: CPT

## 2023-10-10 PROCEDURE — 85730 THROMBOPLASTIN TIME PARTIAL: CPT | Performed by: INTERNAL MEDICINE

## 2023-10-10 PROCEDURE — 85610 PROTHROMBIN TIME: CPT | Performed by: STUDENT IN AN ORGANIZED HEALTH CARE EDUCATION/TRAINING PROGRAM

## 2023-10-10 PROCEDURE — 25000003 PHARM REV CODE 250: Performed by: STUDENT IN AN ORGANIZED HEALTH CARE EDUCATION/TRAINING PROGRAM

## 2023-10-10 PROCEDURE — 94761 N-INVAS EAR/PLS OXIMETRY MLT: CPT

## 2023-10-10 PROCEDURE — 85220 BLOOC CLOT FACTOR V TEST: CPT | Performed by: INTERNAL MEDICINE

## 2023-10-10 PROCEDURE — 85025 COMPLETE CBC W/AUTO DIFF WBC: CPT

## 2023-10-10 RX ORDER — HEPARIN SODIUM,PORCINE/D5W 25000/250
0-40 INTRAVENOUS SOLUTION INTRAVENOUS CONTINUOUS
Status: DISCONTINUED | OUTPATIENT
Start: 2023-10-10 | End: 2023-10-12

## 2023-10-10 RX ORDER — AMLODIPINE BESYLATE 10 MG/1
10 TABLET ORAL DAILY
Status: DISCONTINUED | OUTPATIENT
Start: 2023-10-10 | End: 2023-10-14 | Stop reason: HOSPADM

## 2023-10-10 RX ORDER — MYCOPHENOLATE MOFETIL 250 MG/1
1500 CAPSULE ORAL 2 TIMES DAILY
Status: DISCONTINUED | OUTPATIENT
Start: 2023-10-10 | End: 2023-10-11

## 2023-10-10 RX ORDER — MYCOPHENOLATE MOFETIL 250 MG/1
1000 CAPSULE ORAL 2 TIMES DAILY
Status: DISCONTINUED | OUTPATIENT
Start: 2023-10-10 | End: 2023-10-10

## 2023-10-10 RX ADMIN — HYDROCODONE BITARTRATE AND ACETAMINOPHEN 1 TABLET: 10; 325 TABLET ORAL at 12:10

## 2023-10-10 RX ADMIN — ESCITALOPRAM OXALATE 10 MG: 10 TABLET ORAL at 09:10

## 2023-10-10 RX ADMIN — ONDANSETRON 4 MG: 4 TABLET ORAL at 12:10

## 2023-10-10 RX ADMIN — METHYLPREDNISOLONE SODIUM SUCCINATE 100 MG: 125 INJECTION, POWDER, FOR SOLUTION INTRAMUSCULAR; INTRAVENOUS at 09:10

## 2023-10-10 RX ADMIN — ATORVASTATIN CALCIUM 10 MG: 10 TABLET, FILM COATED ORAL at 09:10

## 2023-10-10 RX ADMIN — ASPIRIN 81 MG: 81 TABLET, CHEWABLE ORAL at 09:10

## 2023-10-10 RX ADMIN — CEFTRIAXONE SODIUM 1 G: 1 INJECTION, POWDER, FOR SOLUTION INTRAMUSCULAR; INTRAVENOUS at 11:10

## 2023-10-10 RX ADMIN — MYCOPHENOLATE MOFETIL 1000 MG: 250 CAPSULE ORAL at 12:10

## 2023-10-10 RX ADMIN — HEPARIN SODIUM 18 UNITS/KG/HR: 10000 INJECTION, SOLUTION INTRAVENOUS at 12:10

## 2023-10-10 RX ADMIN — INSULIN DETEMIR 80 UNITS: 100 INJECTION, SOLUTION SUBCUTANEOUS at 09:10

## 2023-10-10 RX ADMIN — AMLODIPINE BESYLATE 10 MG: 10 TABLET ORAL at 09:10

## 2023-10-10 RX ADMIN — PANTOPRAZOLE SODIUM 40 MG: 40 TABLET, DELAYED RELEASE ORAL at 09:10

## 2023-10-10 RX ADMIN — THERA TABS 1 TABLET: TAB at 09:10

## 2023-10-10 RX ADMIN — HYDROCODONE BITARTRATE AND ACETAMINOPHEN 1 TABLET: 10; 325 TABLET ORAL at 09:10

## 2023-10-10 RX ADMIN — HYDROCODONE BITARTRATE AND ACETAMINOPHEN 1 TABLET: 10; 325 TABLET ORAL at 06:10

## 2023-10-10 RX ADMIN — INSULIN DETEMIR 80 UNITS: 100 INJECTION, SOLUTION SUBCUTANEOUS at 12:10

## 2023-10-10 RX ADMIN — SODIUM CHLORIDE, POTASSIUM CHLORIDE, SODIUM LACTATE AND CALCIUM CHLORIDE 1000 ML: 600; 310; 30; 20 INJECTION, SOLUTION INTRAVENOUS at 12:10

## 2023-10-10 NOTE — CONSULTS
Consult to assist with patient's daughter coming to the hospital; spoke to patient, who states her daughter is 31 yo, autistic, >400 lbs and is currently at home with her 81 yo grandmother; as long as the patient's mother can stay with her daughter, she is ok with staying in the hospital; if her mother is no longer able to take care of her daughter, she will  have to leave the hospital to go home; according to her, the daughter is not capable of packing her clothes, nor does she have a ride to get to the hospital; CM offered to get an Uber, but she states that her daughter can't do that.

## 2023-10-10 NOTE — PROGRESS NOTES
10/10/23 1330   Missed Time Reason   OT Attempted Eval Date 10/10/23   OT Attempted Eval Time 1330   Missed Treatment Reason Other (Comment)  (pt not seen due to unavailable as MD in room for lengthy conversation)     Will attempt again as schedule permits

## 2023-10-10 NOTE — H&P
[facility] H&P Note     Resident Team: [facility] Medicine List 4  Attending Physician: Syeda Altman MD    Subjective:      Brief HPI:  56 y/o female with history of SLE, DM-2, a-fib, CKD Stage 3, HTN, GERD, migraines, and fibromyalgia presents to ED with chief complaint of generalized myalgias x 3 days. She reports chest wall pain exacerbated by deep inhalation, bilateral lower extremity pain/cramps, generalized weakness, decreased appetite, and headache. Pt states symptoms are typical of her lupus flares. She is followed by rheumatology and is currently on CellCept 1000mg BID and prednisone 5mg daily. Pt also has prednisone 20mg prescription for flares, which she she took twice over the past 3 days without relief. Pt denies fever, cough, SOB, abdominal pain, N/V/D, and urinary symptoms.   In ED, pt was hypertensive with /83 on arrival. Labs notable for troponin of 0.055, CRP of 28.6, d-dimer of 6.7, and a positive UA. Pt not currently taking home antihypertensives or ASA.    Review of Systems:  ROS completed and negative except as indicated above.     Objective:     Last 24 Hour Vital Signs:  BP  Min: 179/83  Max: 179/83  Temp  Av.2 °F (36.8 °C)  Min: 98.2 °F (36.8 °C)  Max: 98.2 °F (36.8 °C)  Pulse  Av  Min: 86  Max: 86  Resp  Av  Min: 18  Max: 18  SpO2  Av %  Min: 100 %  Max: 100 %  Weight  Av.9 kg (163 lb)  Min: 73.9 kg (163 lb)  Max: 73.9 kg (163 lb)  No intake/output data recorded.    Physical Examination:  Physical Exam  Vitals reviewed.   Constitutional:       General: She is not in acute distress.     Appearance: She is not ill-appearing.   HENT:      Head: Normocephalic and atraumatic.      Right Ear: External ear normal.      Left Ear: External ear normal.      Nose: Nose normal.   Eyes:      Extraocular Movements: Extraocular movements intact.   Cardiovascular:      Rate and Rhythm: Normal rate and regular rhythm.      Pulses: Normal pulses.      Heart sounds: Normal  heart sounds. No murmur heard.  Pulmonary:      Effort: Pulmonary effort is normal. No respiratory distress.   Abdominal:      General: There is no distension.      Palpations: Abdomen is soft.      Tenderness: There is no abdominal tenderness. There is no guarding.   Musculoskeletal:         General: Normal range of motion.      Cervical back: Normal range of motion.      Right lower leg: No edema.      Left lower leg: No edema.      Comments: Tender to palpation of L-sided anterior chest wall   Skin:     General: Skin is warm and dry.   Neurological:      General: No focal deficit present.      Mental Status: She is alert.   Psychiatric:         Mood and Affect: Mood normal.         Laboratory:  Most Recent Data:  CBC:   Lab Results   Component Value Date    WBC 4.95 10/09/2023    HGB 11.5 (L) 10/09/2023    HCT 35.4 (L) 10/09/2023     10/09/2023    MCV 89.6 10/09/2023    RDW 15.9 10/09/2023     WBC Differential:   Recent Labs   Lab 10/09/23  1858   WBC 4.95   HGB 11.5*   HCT 35.4*      MCV 89.6     BMP:   Lab Results   Component Value Date     10/09/2023    K 4.2 10/09/2023     08/23/2021    CO2 25 10/09/2023    BUN 22.1 (H) 10/09/2023    CREATININE 1.24 (H) 10/09/2023     (H) 08/23/2021    CALCIUM 8.9 10/09/2023    MG 1.90 10/09/2023    PHOS 2.0 (L) 03/13/2023     LFTs:   Lab Results   Component Value Date    PROT 7.9 08/23/2021    ALBUMIN 3.0 (L) 10/09/2023    BILITOT 0.2 10/09/2023    AST 15 10/09/2023    ALKPHOS 114 10/09/2023    ALT 8 10/09/2023     Coags:   Lab Results   Component Value Date    INR 1.1 03/09/2023    PROTIME 14.4 (H) 03/09/2023    PTT 32.9 06/02/2022     FLP:   Lab Results   Component Value Date    CHOL 185 01/12/2023    HDL 30 (L) 01/12/2023    LDLCALC 80.6 07/31/2018    TRIG 186 (H) 01/12/2023    CHOLHDL 20.0 07/31/2018     DM:   Lab Results   Component Value Date    HGBA1C 6.0 05/29/2022    HGBA1C 6.1 05/28/2022    HGBA1C 6.6 (H) 03/04/2020    LDLCALC 80.6  07/31/2018    CREATININE 1.24 (H) 10/09/2023     Thyroid:   Lab Results   Component Value Date    TSH 2.366 03/09/2023     Anemia:   Lab Results   Component Value Date    IRON 219 (H) 08/04/2023    TIBC 284 08/04/2023    FERRITIN 398.91 (H) 08/04/2023    WAAEFHEI51 674 01/12/2023    FOLATE 5.3 (L) 01/12/2023     Cardiac:   Lab Results   Component Value Date    TROPONINI 0.055 (H) 10/09/2023    BNP 47.2 10/09/2023       Radiology:  Imaging Results              CTA Chest Non-Coronary (PE Studies) (Final result)  Result time 10/09/23 20:37:02      Final result by García Gilbert MD (10/09/23 20:37:02)                   Impression:      No CT evidence for pulmonary embolus.    Stable ground-glass patchy and confluent opacities in both lower lobes and to a lesser extent in the lingula and right middle lobe.    Slight interval decrease in axillary lymphadenopathy.      Electronically signed by: García Gilbert MD  Date:    10/09/2023  Time:    20:37               Narrative:    EXAMINATION:  CTA chest    CLINICAL HISTORY:  PE suspected    COMPARISON:  CT of the chest dated 03/09/2023    TECHNIQUE:  Routine CT angiogram of the chest was performed intravenous contrast. 3D MIP images are obtained.    Total DLP: 191 mGy.cm    Automatic exposure control was utilized to reduce the patient's dose    FINDINGS:  No intraluminal filling defects within the pulmonary arteries to suggest pulmonary embolus.  Thoracic aorta normal caliber.  There are mild atherosclerotic calcifications of the thoracic aorta.  The heart is normal in size.  There is bilateral lymphadenopathy in both axilla, slightly decreased when compared to prior.  No mediastinal or hilar lymphadenopathy.  The heart is normal in size.  No pericardial pleural effusion.  Visualized upper abdomen appears grossly unremarkable.    The central airways are patent and unremarkable.  Is no consolidation.  There is persistent patchy and confluent areas of ground-glass opacities  in both lower lobes, lingula and right middle lobe    No osseous destructive process.                                       X-Ray Chest AP Portable (Final result)  Result time 10/09/23 19:53:58      Final result by Octaviano Michelle MD (10/09/23 19:53:58)                   Narrative:    EXAMINATION  XR CHEST AP PORTABLE    CLINICAL HISTORY  Tachypnea, not elsewhere classified    TECHNIQUE  A total of 1 frontal image(s) of the chest.    COMPARISON  9 March 2023    FINDINGS  Lines/tubes/devices: none present    The cardiomediastinal silhouette and central pulmonary vasculature are unremarkable for utilized technique.  The trachea is midline. There is no lobar consolidation, pleural effusion, or pneumothorax.    There is no acute osseous or extrathoracic abnormality.    IMPRESSION  No convincing acute radiographic abnormality.      Electronically signed by: Octaviano Michelle  Date:    10/09/2023  Time:    19:53                                      Assessment & Plan:     SLE  Lupus Nephritis  HTN  - Followed by rheumatology Dr. Mcfarlane and [facility]   - Continue home Mycophenolate 1000mg bid   - Home voclosporin 7.9mg not on formulary, defer to pharmacy.  - Initiated solumedrol 100mg bid. Taper with prednisone.  - Per rheumatology note, pt previously on rituximab infusions. May need rheumatology referral for consideration of biologics.   - Cr 1.24, appears at pt baseline.  - Not currently on home antihypertensive. Consider starting ACE-inhibitor. PRN hydralazine and labetalol ordered.   - SLE workup initiated including ESR, CRP, DS-DNA    Pleuritic Chest Pain  Elevated Troponin   NSTEMI, likely Type 2  - JAGDEEP score 2 points. Heart score 4 points.  - Initial trop 0.055. EKG with no ST changes  - CXR with no consolidation, pleural effusion, or pneumothorax.  - CTA chest with stable ground-glass opacities. No evidence of PE.   - Loading dose ASA given. ASA 81mg daily beginning tomorrow.   - Continue home atorvastatin 10mg.    - Trending trop and EKG q6h x 3    UTI  - Rocephin initiated. Allergies reviewed, pt known to tolerate.   - Culture pending. De-escalate per sensitivities.     DM-2  - Detemir 80u qhs to replace home lantus 80u.  - SSI prn       GERD  - Continue home pantoprazole 40mg qd      CODE STATUS: Full  Access: PIV  Antibiotics: Rocephin   Diet: NPO  DVT Prophylaxis: Lovenox  GI Prophylaxis: PPI  Fluids: LR@125ml/hr      Aaliyah Woodruff DO  [facility] HO-[id number]

## 2023-10-10 NOTE — CARE UPDATE
Informed Ms. Richey with  of patient admit, patient is well known to Case Management and Social Service will inform inpatient case management.

## 2023-10-10 NOTE — CONSULTS
Nephrology Consultation    Date of Consultation:  October 10, 2023    Consultation Requested By:  Zabrina Willett MD    Reason for Consultation:  To assess for possible lupus nephritis flare and assistance with medication management    History of Present Illness:  This is a 57 y.o. female with class 3 lupus nephritis patient sees Dr. Velazquez and is seen in [facility].  Patient has stage 3 CKD.  Patient presented to the hospital complaining generalized myalgias x3 days.  She also reported chest wall pain that was exacerbated by deep inhalation, bilateral lower extremity pain and cramping and generalized weakness decreased appetite and headache.  Patient related to staff that this is typical of 1 of her lupus flares.  Patient is on prednisone and CellCept outpatient.  Patient was diagnosed with urinary tract infection with 500 leukocytes and.  Urinalysis revealed proteinuria and microscopic hematuria.  Greater than 100 WBC.  Patient was prescribed vaclosporin but she is unclear whether she is taking medication.  Creatinine 1.24 upon admission with a BUN of 22.  Renal service consulted for evaluation and management of possible lupus flare as well as assistance with medication management.    Review of patient's allergies indicates:   Allergen Reactions    Ketorolac (pf) Swelling    Mycophenolate mofetil Swelling    Penicillins Hives    Percocet [oxycodone-acetaminophen] Hives and Itching    Tramadol Hives       Review of systems: 12 point review of systems conducted, negative except as stated in the HPI.     Past Medical History:   Past Medical History:   Diagnosis Date    Arthritis     knees    Asthma     Atrial fibrillation     Chronic constipation     Diabetes mellitus     Encounter for blood transfusion 10/2014    GERD (gastroesophageal reflux disease)     Gout     Hypertension     Lupus 2004    SLE    Renal disorder     SLE (systemic lupus erythematosus)        Procedure History:   Past Surgical History:   Procedure  Laterality Date    APPENDECTOMY      CHOLECYSTECTOMY      COLONOSCOPY N/A 4/19/2018    Procedure: COLONOSCOPY;  Surgeon: Minerva Porras MD;  Location: [facility];  Service: Endoscopy;  Laterality: N/A;    ESOPHAGOGASTRODUODENOSCOPY N/A 4/19/2018    Procedure: ESOPHAGOGASTRODUODENOSCOPY (EGD);  Surgeon: Minerva Porras MD;  Location: [facility];  Service: Endoscopy;  Laterality: N/A;    HYSTERECTOMY      JOINT REPLACEMENT Left 08/07/2015    Knee    LYMPH NODE BIOPSY Left 2014    MASTECTOMY      Left arm- NO BP    PARTIAL HYSTERECTOMY         Family History: family history includes Arthritis in her mother; Asthma in her mother; Diabetes in her mother and sister; Heart block in her mother; Heart disease in her father and sister; Kidney disease in her sister.    Social History:  reports that she quit smoking about 16 months ago. Her smoking use included cigarettes. She started smoking about 42 years ago. She has never used smokeless tobacco. She reports that she does not drink alcohol and does not use drugs.    Home Medications:   (Not in a hospital admission)      Inpatient Medications:     Current Facility-Administered Medications:     albuterol nebulizer solution 0.63 mg, 0.63 mg, Nebulization, Q4H PRN, Aaliyah Woodruff DO    aspirin chewable tablet 81 mg, 81 mg, Oral, Daily, Ana María Fernandez MD, 81 mg at 10/10/23 0908    atorvastatin tablet 10 mg, 10 mg, Oral, Daily, Aaliyah Woodruff DO, 10 mg at 10/10/23 0908    cefTRIAXone (ROCEPHIN) 1 g in dextrose 5 % in water (D5W) 100 mL IVPB (MB+), 1 g, Intravenous, Q24H, Aaliyah Woodruff DO, Stopped at 10/10/23 0026    dextrose 10% bolus 125 mL 125 mL, 12.5 g, Intravenous, PRN, Aaliyah Woodruff DO    dextrose 10% bolus 250 mL 250 mL, 25 g, Intravenous, PRN, Aaliyah Woodruff DO    enoxaparin injection 40 mg, 40 mg, Subcutaneous, Daily, Aaliyah Woodruff DO    EScitalopram oxalate tablet 10 mg, 10 mg, Oral, Daily, Aaliyah Woodruff DO,  10 mg at 10/10/23 0908    glucagon (human recombinant) injection 1 mg, 1 mg, Intramuscular, PRN, Aaliyah Woodruff DO    glucose chewable tablet 16 g, 16 g, Oral, PRN, Aaliyah Woodruff DO    glucose chewable tablet 24 g, 24 g, Oral, PRN, Aaliyah Woodruff DO    heparin 25,000 units in dextrose 5% (100 units/ml) IV bolus from bag - ADDITIONAL PRN BOLUS - 30 units/kg, 30 Units/kg (Adjusted), Intravenous, PRN, Zabrina Willett MD    heparin 25,000 units in dextrose 5% (100 units/ml) IV bolus from bag - ADDITIONAL PRN BOLUS - 60 units/kg, 60 Units/kg (Adjusted), Intravenous, PRN, Zabrina Willett MD    heparin 25,000 units in dextrose 5% (100 units/ml) IV bolus from bag INITIAL BOLUS, 80 Units/kg (Adjusted), Intravenous, Once, Zabrina Willett MD    heparin 25,000 units in dextrose 5% 250 mL (100 units/mL) infusion HIGH INTENSITY nomogram - LAF, 0-40 Units/kg/hr (Adjusted), Intravenous, Continuous, Zabrina Willett MD    hydrALAZINE injection 10 mg, 10 mg, Intravenous, Q4H PRN, Aaliyah Woodruff DO    HYDROcodone-acetaminophen  mg per tablet 1 tablet, 1 tablet, Oral, Q6H PRN, Aaliyah Woodruff DO, 1 tablet at 10/10/23 0608    insulin aspart U-100 injection 0-10 Units, 0-10 Units, Subcutaneous, QID (AC + HS) PRN, Aaliyah Woodruff DO    insulin detemir U-100 injection 80 Units, 80 Units, Subcutaneous, QHS, Aaliyah Woodruff DO, 80 Units at 10/10/23 0028    labetalol 20 mg/4 mL (5 mg/mL) IV syring, 10 mg, Intravenous, Q4H PRN, Aaliyah Woodruff DO    melatonin tablet 6 mg, 6 mg, Oral, Nightly PRN, Aaliyah Woodruff DO    methylPREDNISolone sodium succinate injection 100 mg, 100 mg, Intravenous, BID, Ana María Fernandez MD, 100 mg at 10/10/23 0908    multivitamin tablet, 1 tablet, Oral, Daily, Ana María Fernandez MD, 1 tablet at 10/10/23 0908    mycophenolate capsule 1,000 mg, 1,000 mg, Oral, BID, Aaliyah Woodruff DO, 1,000 mg at 10/10/23 0040    ondansetron tablet 4 mg, 4 mg, Oral, Q6H PRN,   Katerina Thurstonn, DO    pantoprazole EC tablet 40 mg, 40 mg, Oral, Daily, Aaliyah Woodruff DO, 40 mg at 10/10/23 0908    polyethylene glycol packet 17 g, 17 g, Oral, BID PRN, Aaliyah Woodruff,     sodium chloride 0.9% flush 10 mL, 10 mL, Intravenous, Q12H PRN, Aaliyah Woodruff     Current Outpatient Medications:     albuterol (ACCUNEB) 0.63 mg/3 mL Nebu, Take 3 mLs (0.63 mg total) by nebulization every 4 (four) hours as needed (wheezing). Rescue, Disp: 75 mL, Rfl: 3    albuterol sulfate (PROAIR RESPICLICK) 90 mcg/actuation inhaler, Inhale 1 puff into the lungs every 4 (four) hours as needed for Wheezing. Rescue, Disp: , Rfl:     atorvastatin (LIPITOR) 10 MG tablet, Take 10 mg by mouth once daily., Disp: , Rfl:     blood sugar diagnostic (ONETOUCH VERIO) Strp, 1 each by Misc.(Non-Drug; Combo Route) route 4 (four) times daily., Disp: 50 each, Rfl: 11    blood sugar diagnostic Strp, 1 each by Misc.(Non-Drug; Combo Route) route 4 (four) times daily., Disp: 50 each, Rfl: 11    blood-glucose meter (FREESTYLE SYSTEM KIT) kit, Use as instructed, Disp: 1 each, Rfl: 0    eletriptan (RELPAX) 40 MG tablet, Take 1 tablet (40 mg total) by mouth as needed (take one at the beginning of migraine, may repeat in 2 h. max 2 in 24h). may repeat in 2 hours if necessary, Disp: 9 tablet, Rfl: 5    ergocalciferol (ERGOCALCIFEROL) 50,000 unit Cap, Take 1 capsule (50,000 Units total) by mouth every 7 days. for 12 doses, Disp: 12 capsule, Rfl: 0    EScitalopram oxalate (LEXAPRO) 10 MG tablet, Take 1 tablet (10 mg total) by mouth once daily., Disp: 30 tablet, Rfl: 5    HYDROcodone-acetaminophen (NORCO)  mg per tablet, Take 1 tablet by mouth every 6 (six) hours as needed for Pain., Disp: 90 tablet, Rfl: 0    insulin (LANTUS SOLOSTAR U-100 INSULIN) glargine 100 units/mL SubQ pen, Inject 80 Units into the skin every evening., Disp: 72 mL, Rfl: 3    lancets 28 gauge Misc, 100 lancets by Misc.(Non-Drug; Combo Route) route 3  (three) times daily., Disp: 100 each, Rfl: 0    lancets 33 gauge Misc, 1 lancet by Misc.(Non-Drug; Combo Route) route 4 (four) times daily., Disp: 100 each, Rfl: 11    LUPKYNIS 7.9 mg Cap, Take by mouth., Disp: , Rfl:     mycophenolate (CELLCEPT) 500 mg Tab, Take 2 tablets (1,000 mg total) by mouth 2 (two) times daily., Disp: 120 tablet, Rfl: 11    ondansetron (ZOFRAN) 4 MG tablet, TAKE 1 TABLET(4 MG) BY MOUTH EVERY 6 HOURS AS NEEDED FOR NAUSEA, Disp: 24 tablet, Rfl: 0    pantoprazole (PROTONIX) 40 MG tablet, Take 1 tablet (40 mg total) by mouth once daily., Disp: 90 tablet, Rfl: 3    predniSONE (DELTASONE) 5 MG tablet, Take 1 tablet (5 mg total) by mouth once daily., Disp: 30 tablet, Rfl: 11    walker Misc, Please dispense 1 Rollator, Disp: 1 each, Rfl: 0     Laboratory Data:   Recent Results (from the past 24 hour(s))   Comprehensive metabolic panel    Collection Time: 10/09/23  6:58 PM   Result Value Ref Range    Sodium Level 138 136 - 145 mmol/L    Potassium Level 4.2 3.5 - 5.1 mmol/L    Chloride 106 98 - 107 mmol/L    Carbon Dioxide 25 22 - 29 mmol/L    Glucose Level 86 74 - 100 mg/dL    Blood Urea Nitrogen 22.1 (H) 9.8 - 20.1 mg/dL    Creatinine 1.24 (H) 0.55 - 1.02 mg/dL    Calcium Level Total 8.9 8.4 - 10.2 mg/dL    Protein Total 7.6 6.4 - 8.3 gm/dL    Albumin Level 3.0 (L) 3.5 - 5.0 g/dL    Globulin 4.6 (H) 2.4 - 3.5 gm/dL    Albumin/Globulin Ratio 0.7 (L) 1.1 - 2.0 ratio    Bilirubin Total 0.2 <=1.5 mg/dL    Alkaline Phosphatase 114 40 - 150 unit/L    Alanine Aminotransferase 8 0 - 55 unit/L    Aspartate Aminotransferase 15 5 - 34 unit/L    eGFR 52 mls/min/1.73/m2   Magnesium    Collection Time: 10/09/23  6:58 PM   Result Value Ref Range    Magnesium Level 1.90 1.60 - 2.60 mg/dL   CK-MB    Collection Time: 10/09/23  6:58 PM   Result Value Ref Range    Creatine Kinase MB <1.0 <=3.4 ng/mL   CK    Collection Time: 10/09/23  6:58 PM   Result Value Ref Range    Creatine Kinase 66 29 - 168 U/L   Brain  natriuretic peptide    Collection Time: 10/09/23  6:58 PM   Result Value Ref Range    Natriuretic Peptide 47.2 <=100.0 pg/mL   D dimer, quantitative    Collection Time: 10/09/23  6:58 PM   Result Value Ref Range    D-Dimer 6.78 (H) 0.00 - 0.50 ug/mL FEU   C-reactive protein    Collection Time: 10/09/23  6:58 PM   Result Value Ref Range    C-Reactive Protein 28.60 (H) <5.00 mg/L   CBC with Differential    Collection Time: 10/09/23  6:58 PM   Result Value Ref Range    WBC 4.95 4.50 - 11.50 x10(3)/mcL    RBC 3.95 (L) 4.20 - 5.40 x10(6)/mcL    Hgb 11.5 (L) 12.0 - 16.0 g/dL    Hct 35.4 (L) 37.0 - 47.0 %    MCV 89.6 80.0 - 94.0 fL    MCH 29.1 27.0 - 31.0 pg    MCHC 32.5 (L) 33.0 - 36.0 g/dL    RDW 15.9 11.5 - 17.0 %    Platelet 388 130 - 400 x10(3)/mcL    MPV 9.3 7.4 - 10.4 fL    Neut % 61.8 %    Lymph % 29.9 %    Mono % 5.5 %    Eos % 2.0 %    Basophil % 0.6 %    Lymph # 1.48 0.6 - 4.6 x10(3)/mcL    Neut # 3.06 2.1 - 9.2 x10(3)/mcL    Mono # 0.27 0.1 - 1.3 x10(3)/mcL    Eos # 0.10 0 - 0.9 x10(3)/mcL    Baso # 0.03 <=0.2 x10(3)/mcL    IG# 0.01 0 - 0.04 x10(3)/mcL    IG% 0.2 %    NRBC% 0.0 %   Lactic acid, plasma    Collection Time: 10/09/23  6:58 PM   Result Value Ref Range    Lactic Acid Level 1.1 0.5 - 2.2 mmol/L   TSH    Collection Time: 10/09/23  6:58 PM   Result Value Ref Range    TSH 0.677 0.350 - 4.940 uIU/mL   Lipid panel    Collection Time: 10/09/23  6:58 PM   Result Value Ref Range    Cholesterol Total 151 <=200 mg/dL    HDL Cholesterol 29 (L) 35 - 60 mg/dL    Triglyceride 129 37 - 140 mg/dL    Cholesterol/HDL Ratio 5 0 - 5    Very Low Density Lipoprotein 26     LDL Cholesterol 96.00 50.00 - 140.00 mg/dL   Troponin I    Collection Time: 10/09/23  6:59 PM   Result Value Ref Range    Troponin-I 0.055 (H) 0.000 - 0.045 ng/mL   Urinalysis, Reflex to Urine Culture    Collection Time: 10/09/23  7:32 PM    Specimen: Urine   Result Value Ref Range    Color, UA Yellow Yellow, Light-Yellow, Dark Yellow, Krissy, Straw     Appearance, UA Turbid (A) Clear    Specific Gravity, UA 1.025 1.005 - 1.030    pH, UA 5.5 5.0 - 8.5    Protein, UA 3+ (A) Negative    Glucose, UA Normal Negative, Normal    Ketones, UA Negative Negative    Blood, UA 2+ (A) Negative    Bilirubin, UA Negative Negative    Urobilinogen, UA 1+ (A) 0.2, 1.0, Normal    Nitrites, UA Negative Negative    Leukocyte Esterase,  (A) Negative    WBC, UA >100 (A) None Seen, 0-2, 3-5, 0-5 /HPF    WBC Clumps, UA Few (A) None Seen, 0-5 /HPF    Bacteria, UA Occ (A) None Seen /HPF    Squamous Epithelial Cells, UA Moderate (A) None Seen /HPF    Mucous, UA Trace (A) None Seen /LPF    Hyaline Casts, UA 6-10 (A) None Seen /lpf    RBC, UA 21-50 (A) None Seen, 0-2, 3-5, 0-5 /HPF   Drug Screen, Urine    Collection Time: 10/09/23  7:32 PM   Result Value Ref Range    Amphetamines, Urine Negative Negative    Barbituates, Urine Negative Negative    Benzodiazepine, Urine Negative Negative    Cannabinoids, Urine Negative Negative    Cocaine, Urine Negative Negative    Fentanyl, Urine Negative Negative    MDMA, Urine Negative Negative    Opiates, Urine Positive (A) Negative    Phencyclidine, Urine Negative Negative    pH, Urine 5.5 3.0 - 11.0    Specific Gravity, Urine Auto 1.025 1.001 - 1.035   COVID/FLU A&B PCR    Collection Time: 10/09/23  7:48 PM   Result Value Ref Range    Influenza A PCR Not Detected Not Detected    Influenza B PCR Not Detected Not Detected    SARS-CoV-2 PCR Not Detected Not Detected, Negative, Invalid   RT Blood Gas    Collection Time: 10/09/23  8:07 PM   Result Value Ref Range    Sample Type Venous Blood     Drawn by LAB     pH, Blood gas 7.330 7.300 - 7.400    pCO2, Blood gas 53.0 (H) 40.0 - 50.0 mmHg    pO2, Blood gas 96.0 (H) 30.0 - 40.0 mmHg    TOC2, Blood gas 29.5 mmol/L    Base Excess, Blood gas 1.20 mmol/L    sO2, Blood gas 99.0 %    HCO3, Blood gas 27.9 mmol/L    THb, Blood gas 11.2 g/dL    O2 Hb, Blood Gas 95.5 %    CO Hgb 3.0 %    Met Hgb 0.4 %    FIO2, Blood  gas 21.0 %   Sedimentation Rate    Collection Time: 10/09/23 11:00 PM   Result Value Ref Range    Sed Rate 50 (H) 0 - 20 mm/hr   Hemoglobin A1C    Collection Time: 10/09/23 11:00 PM   Result Value Ref Range    Hemoglobin A1c 5.4 <=7.0 %    Estimated Average Glucose 108.3 mg/dL   POCT glucose    Collection Time: 10/10/23 12:06 AM   Result Value Ref Range    POCT Glucose 137 (H) 70 - 110 mg/dL   Comprehensive Metabolic Panel (CMP)    Collection Time: 10/10/23  1:52 AM   Result Value Ref Range    Sodium Level 135 (L) 136 - 145 mmol/L    Potassium Level 4.8 3.5 - 5.1 mmol/L    Chloride 105 98 - 107 mmol/L    Carbon Dioxide 22 22 - 29 mmol/L    Glucose Level 181 (H) 74 - 100 mg/dL    Blood Urea Nitrogen 19.3 9.8 - 20.1 mg/dL    Creatinine 1.09 (H) 0.55 - 1.02 mg/dL    Calcium Level Total 8.9 8.4 - 10.2 mg/dL    Protein Total 7.7 6.4 - 8.3 gm/dL    Albumin Level 2.9 (L) 3.5 - 5.0 g/dL    Globulin 4.8 (H) 2.4 - 3.5 gm/dL    Albumin/Globulin Ratio 0.6 (L) 1.1 - 2.0 ratio    Bilirubin Total 0.1 <=1.5 mg/dL    Alkaline Phosphatase 116 40 - 150 unit/L    Alanine Aminotransferase 8 0 - 55 unit/L    Aspartate Aminotransferase 14 5 - 34 unit/L    eGFR 60 mls/min/1.73/m2   Magnesium    Collection Time: 10/10/23  1:52 AM   Result Value Ref Range    Magnesium Level 1.90 1.60 - 2.60 mg/dL   Phosphorus    Collection Time: 10/10/23  1:52 AM   Result Value Ref Range    Phosphorus Level 3.5 2.3 - 4.7 mg/dL   Troponin I    Collection Time: 10/10/23  1:52 AM   Result Value Ref Range    Troponin-I <0.010 0.000 - 0.045 ng/mL   CBC with Differential    Collection Time: 10/10/23  1:52 AM   Result Value Ref Range    WBC 5.93 4.50 - 11.50 x10(3)/mcL    RBC 3.82 (L) 4.20 - 5.40 x10(6)/mcL    Hgb 11.0 (L) 12.0 - 16.0 g/dL    Hct 34.3 (L) 37.0 - 47.0 %    MCV 89.8 80.0 - 94.0 fL    MCH 28.8 27.0 - 31.0 pg    MCHC 32.1 (L) 33.0 - 36.0 g/dL    RDW 15.9 11.5 - 17.0 %    Platelet 411 (H) 130 - 400 x10(3)/mcL    MPV 10.0 7.4 - 10.4 fL    Neut % 86.9 %     Lymph % 11.6 %    Mono % 1.0 %    Eos % 0.0 %    Basophil % 0.2 %    Lymph # 0.69 0.6 - 4.6 x10(3)/mcL    Neut # 5.15 2.1 - 9.2 x10(3)/mcL    Mono # 0.06 (L) 0.1 - 1.3 x10(3)/mcL    Eos # 0.00 0 - 0.9 x10(3)/mcL    Baso # 0.01 <=0.2 x10(3)/mcL    IG# 0.02 0 - 0.04 x10(3)/mcL    IG% 0.3 %    NRBC% 0.0 %   Protein/Creatinine Ratio, Urine    Collection Time: 10/10/23  6:23 AM   Result Value Ref Range    Urine Protein Level 377.6 mg/dL    Urine Creatinine 75.9 45.0 - 106.0 mg/dL    Urine Protein/Creatinine Ratio 5.0    POCT urine pregnancy    Collection Time: 10/10/23  6:26 AM   Result Value Ref Range    POC Preg Test, Ur Negative Negative     Acceptable Yes    Troponin I    Collection Time: 10/10/23  8:20 AM   Result Value Ref Range    Troponin-I <0.010 0.000 - 0.045 ng/mL   POCT glucose    Collection Time: 10/10/23  8:47 AM   Result Value Ref Range    POCT Glucose 130 (H) 70 - 110 mg/dL       Imaging:  CTA Chest Non-Coronary (PE Studies)   Final Result      No CT evidence for pulmonary embolus.      Stable ground-glass patchy and confluent opacities in both lower lobes and to a lesser extent in the lingula and right middle lobe.      Slight interval decrease in axillary lymphadenopathy.         Electronically signed by: García Gilbert MD   Date:    10/09/2023   Time:    20:37      X-Ray Chest AP Portable   Final Result          Physical Exam:   BP (!) 153/80   Pulse (!) 55   Temp 98.2 °F (36.8 °C) (Temporal)   Resp 15   Wt 73.9 kg (163 lb)   LMP  (LMP Unknown)   SpO2 100%   BMI 27.12 kg/m²  Body mass index is 27.12 kg/m².  General:  Patient is alert and oriented person place time no acute distress  HEENT normocephalic atraumatic pupils equal round reactive to light and accommodation OU(ou)  Neck:  No JVD bruit thyromegaly adenopathy appreciated  Lungs:  Clear to auscultation bilaterally all fields  Cardiovascular regular rate and rhythm no murmur rub or gallop appreciated  Abdomen:  Positive  bowel sounds all 4 quadrants soft nontender nondistended  Extremities no clubbing cyanosis noted trace edema of lower extremities appreciated bilaterally  Neurologic no clonus asterixis appreciated cranial nerves nerves 2 through 12 grossly intact.    Impression/Plan:  SLE class 3 lupus nephritis per kidney biopsy in .  Agree with IV steroid therapy.  I do agree with rheumatology consult and evaluation.  Patient is unclear whether she is taking baclofen aspirin as well.  Patient has received rituximab in the past.  Mycophenolate is listed as an allergy although patient is on mycophenolate outpatient and not having any issues.  Urinary tract infection: Agree with IV antibiotic therapy.  Await culture and sensitivity  Proteinuria: Check protein creatinine ratio to quantify proteinuria.  Unclear whether due to lupus or diabetic nephropathy.  Recommend ACE inhibitor ARB for blood pressure control and this should help with proteinuria as well.  Patient unsure whether she was taking vaclosporin.  Will check again with patient and our pharmacy to determine whether she was actually taking vaclosporin.  Prerenal azotemia:  Improved with IV fluids.  Monitor will recheck CMP.  Hypertension: consider adding ace inhibitor or arb to regimen, with proteinuria. May need hctz or labetalol.    Thank you very much for your consultation    Hiwot ySkes M.D.  Nephrology

## 2023-10-10 NOTE — CONSULTS
[facility]  Consult Note    Patient Name: Vi Ulrich  : 1965  MRN: [id number]  Admission Date: 10/9/2023  Attending Provider: Seyda Altman MD  Primary Care Physician: Octaviano Barrios MD    Date of Consultation: 10/10/2023    Consultation Requested By: Dr. Syeda Altman    Reason for Consultation: Lupus flare management    Subjective:     HPI: Ms Vi Ulrich is a 57 y.o.  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 Patient reports that for the last 2-3 days she has been relatively bedridden and she notes that she has had very little water intake with roughly 1 bottle of water 20 oz daily.  She reports that her left-sided chest wall pain and shortness a breath is worsened with deep inhalation.  She notes that she still continues to get intermittent headaches.  She states that she has been taking all of her medications but is unable to name many of them and does not know the quantity of pills.  Additionally her dispense history is not congruent with her report of medication fills.  Per dispense history she has not had any more than 30 days of CellCept or Plaquenil obtained since 2023.  Patient denies any fever cough, hemoptysis, she does note that she has had dark red urine, she denies any history of PE or DVT, she denies stroke MI, Raynaud's phenomenon, history of inflammatory eye diseases, history of inflammatory bowel disease.    The patient has been diagnosed with lupus nephritis, with a renal biopsy in 2022, which revealed class 3 and class 5 nephritis with diabetic glomerulopathy  features. She has a history of intermittent anemia, leukopenia/neutropenia, interstitial lung disease, and chronic proteinuria.    Treatment hx of Lupus Nephritis:    Plaquenil (Hydroxychloroquine) and Prednisone: Ongoing since diagnosis of lupus (2004).  Rituximab: April 14, 2023, and April 28, 2023.  Cytoxan: Documented use in the past, but unclear on dose and dates.  Benlysta (Belimumab) infusions: Recommended but not administered.  Voclosporin Therapy: Started to improve renal response and decrease proteinuria in March 2023.    Medication Tolerance and Allergies:    Imuran (Azathioprine): Not tolerated due to abdominal upset.  CellCept (Mycophenolate Mofetil): Allergic reaction with hives, needed to go to the ER 6/2022 but has been nown to tolerate since march of 2023     Patient is allergic to ketorolac (pf), mycophenolate mofetil, penicillins, percocet [oxycodone-acetaminophen], and tramadol.     Past Medical History:  has a past medical history of Arthritis, Asthma, Atrial fibrillation, Chronic constipation, Diabetes mellitus, Encounter for blood transfusion, GERD (gastroesophageal reflux disease), Gout, Hypertension, Lupus, Renal disorder, and SLE (systemic lupus erythematosus).    Procedure History:  has a past surgical history that includes Cholecystectomy; Appendectomy; Partial hysterectomy; Lymph node biopsy (Left, 2014); Hysterectomy; Joint replacement (Left, 08/07/2015); Mastectomy; Esophagogastroduodenoscopy (N/A, 4/19/2018); and Colonoscopy (N/A, 4/19/2018).    Family History: family history includes Arthritis in her mother; Asthma in her mother; Diabetes in her mother and sister; Heart block in her mother; Heart disease in her father and sister; Kidney disease in her sister.    Social History:  reports that she quit smoking about 16 months ago. Her smoking use included cigarettes. She started smoking about 42 years ago. She has never used smokeless tobacco. She reports that she does not drink  alcohol and does not use drugs.    Review of systems:   CONSTITUTIONAL: negative with no fatigue or fevers  SKIN: negative with no recent rashes  EYES: negative with no complaints of dry irritated eyes  ENT: negative with no mouth dryness or sores  ENDO: negative with no hypothyroid symptoms  HEME: negative, with no history of anemia or DVT  CV: negative without exertional chest pain, palpitations, or edema  RESP: negative without cough, dyspnea, or pleuritic pain  GI: negative without nausea, vomiting, heartburn, or bleeding  NEURO: negative, with no imbalance and no numbness or tingling of the hands or feet  MUSCULOSKELETAL: as per HPI    Inpatient Medications:     Current Facility-Administered Medications:     albuterol nebulizer solution 0.63 mg, 0.63 mg, Nebulization, Q4H PRN, Aaliyah Woodruff DO    aspirin chewable tablet 81 mg, 81 mg, Oral, Daily, Ana María Fernandez MD, 81 mg at 10/10/23 0908    atorvastatin tablet 10 mg, 10 mg, Oral, Daily, Aaliyah Woodruff DO, 10 mg at 10/10/23 0908    cefTRIAXone (ROCEPHIN) 1 g in dextrose 5 % in water (D5W) 100 mL IVPB (MB+), 1 g, Intravenous, Q24H, Aaliyah Woodruff DO, Stopped at 10/10/23 0026    dextrose 10% bolus 125 mL 125 mL, 12.5 g, Intravenous, PRN, Aaliyah Woodruff DO    dextrose 10% bolus 250 mL 250 mL, 25 g, Intravenous, PRN, Aaliyah Woodruff DO    enoxaparin injection 40 mg, 40 mg, Subcutaneous, Daily, Aaliyah Woodruff DO    EScitalopram oxalate tablet 10 mg, 10 mg, Oral, Daily, Aaliyah Woodruff DO, 10 mg at 10/10/23 0908    glucagon (human recombinant) injection 1 mg, 1 mg, Intramuscular, PRN, Aaliyah Woodruff DO    glucose chewable tablet 16 g, 16 g, Oral, PRN, Aaliyah Woodruff DO    glucose chewable tablet 24 g, 24 g, Oral, PRN, Aaliyah Woodruff DO    heparin 25,000 units in dextrose 5% (100 units/ml) IV bolus from bag - ADDITIONAL PRN BOLUS - 30 units/kg, 30 Units/kg (Adjusted), Intravenous, PRN, Zabrina Willett,  MD    heparin 25,000 units in dextrose 5% (100 units/ml) IV bolus from bag - ADDITIONAL PRN BOLUS - 60 units/kg, 60 Units/kg (Adjusted), Intravenous, PRN, Zabrina Willett MD    heparin 25,000 units in dextrose 5% 250 mL (100 units/mL) infusion HIGH INTENSITY nomogram - LAF, 0-40 Units/kg/hr (Adjusted), Intravenous, Continuous, Zabrina Willett MD, Last Rate: 11.5 mL/hr at 10/10/23 1230, 18 Units/kg/hr at 10/10/23 1230    hydrALAZINE injection 10 mg, 10 mg, Intravenous, Q4H PRN, Aaliyah Woodruff DO    HYDROcodone-acetaminophen  mg per tablet 1 tablet, 1 tablet, Oral, Q6H PRN, Aaliyah Woodruff DO, 1 tablet at 10/10/23 0608    insulin aspart U-100 injection 0-10 Units, 0-10 Units, Subcutaneous, QID (AC + HS) PRN, Aaliyah Woodruff DO    insulin detemir U-100 injection 80 Units, 80 Units, Subcutaneous, QHS, Aaliyah Woodruff DO, 80 Units at 10/10/23 0028    labetalol 20 mg/4 mL (5 mg/mL) IV syring, 10 mg, Intravenous, Q4H PRN, Aaliyah Woodruff DO    melatonin tablet 6 mg, 6 mg, Oral, Nightly PRN, Aaliyah Woodruff DO    methylPREDNISolone sodium succinate injection 100 mg, 100 mg, Intravenous, BID, Ana María Fernandez MD, 100 mg at 10/10/23 0908    multivitamin tablet, 1 tablet, Oral, Daily, Ana María Fernandez MD, 1 tablet at 10/10/23 0908    mycophenolate capsule 1,000 mg, 1,000 mg, Oral, BID, Aaliyah Woodruff DO, 1,000 mg at 10/10/23 1244    ondansetron tablet 4 mg, 4 mg, Oral, Q6H PRN, Aaliyah Woodruff DO, 4 mg at 10/10/23 1228    pantoprazole EC tablet 40 mg, 40 mg, Oral, Daily, Aaliyah Woodruff DO, 40 mg at 10/10/23 0908    polyethylene glycol packet 17 g, 17 g, Oral, BID PRN, Aaliyah Woodruff DO    sodium chloride 0.9% flush 10 mL, 10 mL, Intravenous, Q12H PRN, Aaliyah Woodruff DO    Current Outpatient Medications:     albuterol (ACCUNEB) 0.63 mg/3 mL Nebu, Take 3 mLs (0.63 mg total) by nebulization every 4 (four) hours as needed (wheezing). Rescue, Disp: 75 mL, Rfl: 3     albuterol sulfate (PROAIR RESPICLICK) 90 mcg/actuation inhaler, Inhale 1 puff into the lungs every 4 (four) hours as needed for Wheezing. Rescue, Disp: , Rfl:     atorvastatin (LIPITOR) 10 MG tablet, Take 10 mg by mouth once daily., Disp: , Rfl:     blood sugar diagnostic (ONETOUCH VERIO) Strp, 1 each by Misc.(Non-Drug; Combo Route) route 4 (four) times daily., Disp: 50 each, Rfl: 11    blood sugar diagnostic Strp, 1 each by Misc.(Non-Drug; Combo Route) route 4 (four) times daily., Disp: 50 each, Rfl: 11    blood-glucose meter (FREESTYLE SYSTEM KIT) kit, Use as instructed, Disp: 1 each, Rfl: 0    eletriptan (RELPAX) 40 MG tablet, Take 1 tablet (40 mg total) by mouth as needed (take one at the beginning of migraine, may repeat in 2 h. max 2 in 24h). may repeat in 2 hours if necessary, Disp: 9 tablet, Rfl: 5    ergocalciferol (ERGOCALCIFEROL) 50,000 unit Cap, Take 1 capsule (50,000 Units total) by mouth every 7 days. for 12 doses, Disp: 12 capsule, Rfl: 0    EScitalopram oxalate (LEXAPRO) 10 MG tablet, Take 1 tablet (10 mg total) by mouth once daily., Disp: 30 tablet, Rfl: 5    HYDROcodone-acetaminophen (NORCO)  mg per tablet, Take 1 tablet by mouth every 6 (six) hours as needed for Pain., Disp: 90 tablet, Rfl: 0    insulin (LANTUS SOLOSTAR U-100 INSULIN) glargine 100 units/mL SubQ pen, Inject 80 Units into the skin every evening., Disp: 72 mL, Rfl: 3    lancets 28 gauge Misc, 100 lancets by Misc.(Non-Drug; Combo Route) route 3 (three) times daily., Disp: 100 each, Rfl: 0    lancets 33 gauge Misc, 1 lancet by Misc.(Non-Drug; Combo Route) route 4 (four) times daily., Disp: 100 each, Rfl: 11    LUPKYNIS 7.9 mg Cap, Take by mouth., Disp: , Rfl:     mycophenolate (CELLCEPT) 500 mg Tab, Take 2 tablets (1,000 mg total) by mouth 2 (two) times daily., Disp: 120 tablet, Rfl: 11    ondansetron (ZOFRAN) 4 MG tablet, TAKE 1 TABLET(4 MG) BY MOUTH EVERY 6 HOURS AS NEEDED FOR NAUSEA, Disp: 24 tablet, Rfl: 0    pantoprazole  "(PROTONIX) 40 MG tablet, Take 1 tablet (40 mg total) by mouth once daily., Disp: 90 tablet, Rfl: 3    predniSONE (DELTASONE) 5 MG tablet, Take 1 tablet (5 mg total) by mouth once daily., Disp: 30 tablet, Rfl: 11    walker Misc, Please dispense 1 Rollator, Disp: 1 each, Rfl: 0     Objective:     Vital Signs (Most Recent):  Temp: 98.2 °F (36.8 °C) (10/09/23 1817)  Pulse: (!) 59 (10/10/23 1230)  Resp: 16 (10/10/23 1230)  BP: (!) 145/66 (10/10/23 1230)  SpO2: (!) 14 % (10/10/23 1230)   Vital Signs (24h Range):  Temp:  [98.2 °F (36.8 °C)] 98.2 °F (36.8 °C)  Pulse:  [54-86] 59  Resp:  [13-18] 16  SpO2:  [14 %-100 %] 14 %  BP: (142-179)/(60-83) 145/66     Weight: 73.9 kg (163 lb) (10/09/23 1817)  Body mass index is 27.12 kg/m².  Body surface area is 1.84 meters squared.      Intake/Output Summary (Last 24 hours) at 10/10/2023 1448  Last data filed at 10/10/2023 1402  Gross per 24 hour   Intake 1099.22 ml   Output --   Net 1099.22 ml       Physical Exam:   General Appearance: no acute distress and cooperative  Skin: Skin color, texture, turgor normal. No rashes or lesions.  Eyes: conjunctivae/corneas clear. PERRL, EOM's intact.   ENT: No oral or nasal ulcers.  Neck:  Neck supple. No adenopathy.   Lungs: CTA throughout without crackles, rhonchi, or wheezes.   Heart: RRR w/o murmurs.  Abdomen: Soft, non-tender, no masses, organomegaly, rebound or guarding.  Neuro: CN II-XII GI, sensory and motor innervation intact.  Musculoskeletal: scars of b/l knees with normal ROM s/p arthroplasty.    Significant Labs:    Blood Culture: No results for input(s): "LABBLOO" in the last 24 hours.  CBC:   Recent Labs   Lab 10/09/23  1858 10/10/23  0152 10/10/23  1010   WBC 4.95 5.93 4.75   HGB 11.5* 11.0* 13.0   HCT 35.4* 34.3* 40.2    411* 393       CMP:   Recent Labs   Lab 10/10/23  0152   CALCIUM 8.9   ALBUMIN 2.9*   *   K 4.8   CO2 22   BUN 19.3   CREATININE 1.09*   ALKPHOS 116   ALT 8   AST 14   BILITOT 0.1       CRP:   Recent " Labs   Lab 10/09/23  1858   CRP 28.60*       ESR:   Recent Labs   Lab 10/09/23  2300   SEDRATE 50*         All pertinent lab results from the last 24 hours have been reviewed.    Significant Imaging:  Imaging results within the past 24 hours have been reviewed.  CTA Chest Non-Coronary (PE Studies)   Final Result      No CT evidence for pulmonary embolus.      Stable ground-glass patchy and confluent opacities in both lower lobes and to a lesser extent in the lingula and right middle lobe.      Slight interval decrease in axillary lymphadenopathy.         Electronically signed by: García Gilbert MD   Date:    10/09/2023   Time:    20:37      X-Ray Chest AP Portable   Final Result          Assessment/Plan:     Lupus nephritis  Proteinuria  Diabetic glomerulopathy  HTN  CKD  Chronic pain  Fibromyalgia  Unprovoked DVT  - recommend decreasing prednisone to 60 mg daily via solumedrol IV or prednisone PO  - increased mycophenolate to 1500 mg daily  - hypercoagulable workup ordered (recommend initiating workup prior to heparin therapy in pt with unprovoked DVT)  - continue plaquenil and Lupkinus  - In the setting of minimally decreased C3 which has fluctuated in the past I am not convinced her current proteinuria is 2/2 lupus nephritis alone, and I recommend repeating renal biopsy to further differentiate worsened proteinuria. If lupus nephritis is inactive we will taper prednisone.    Plan discussed with primary team.     Thank you for your consult. I will follow-up with patient. Please contact us if you have any additional questions.    James Cutler MD - PGY3  [facility]

## 2023-10-10 NOTE — TELEPHONE ENCOUNTER
The patient is in the hospital at this time and was told they would like for you to reorder her Lupus infusions to be restarted at the infusion center before she is discharged so that she does  not miss any.   Thank you Leopoldo

## 2023-10-10 NOTE — PT/OT/SLP PROGRESS
Physical Therapy    Missed Treatment Session    Patient Name:  Vi Ulrich   MRN:  [id number]      Patient not seen at this time secondary to Other (Comment) (patient having a long conversation with the MD.).    Will follow-up as patient is available to participate and as therapists' schedule allows.

## 2023-10-11 LAB
(HCYS)2 SERPL-MCNC: 12.1 UMOL/L (ref 5.1–15.4)
ACINETOBACTER CALCOACETICUS-BAUMANNII COMPLEX (OHS): NOT DETECTED
ALBUMIN SERPL-MCNC: 3 G/DL (ref 3.5–5)
ALBUMIN/GLOB SERPL: 0.6 RATIO (ref 1.1–2)
ALP SERPL-CCNC: 104 UNIT/L (ref 40–150)
ALT SERPL-CCNC: 7 UNIT/L (ref 0–55)
APTT PPP: 167.1 SECONDS (ref 23.2–33.7)
APTT PPP: 47.3 SECONDS (ref 23.2–33.7)
APTT PPP: 50.4 SECONDS (ref 23.2–33.7)
APTT PPP: 93.5 SECONDS (ref 23.2–33.7)
AST SERPL-CCNC: 12 UNIT/L (ref 5–34)
AV INDEX (PROSTH): 0.59
AV MEAN GRADIENT: 4 MMHG
AV PEAK GRADIENT: 8 MMHG
AV VALVE AREA BY VELOCITY RATIO: 2.12 CM²
AV VALVE AREA: 2.12 CM²
AV VELOCITY RATIO: 0.59
BACTEROIDES FRAGILIS (OHS): NOT DETECTED
BASOPHILS # BLD AUTO: 0 X10(3)/MCL
BASOPHILS NFR BLD AUTO: 0 %
BILIRUB SERPL-MCNC: 0.1 MG/DL
BSA FOR ECHO PROCEDURE: 1.84 M2
BUN SERPL-MCNC: 26.6 MG/DL (ref 9.8–20.1)
C AURIS DNA BLD POS QL NAA+NON-PROBE: NOT DETECTED
C GATTII+NEOFOR DNA CSF QL NAA+NON-PROBE: NOT DETECTED
CALCIUM SERPL-MCNC: 9.2 MG/DL (ref 8.4–10.2)
CANDIDA ALBICANS (OHS): NOT DETECTED
CANDIDA GLABRATA (OHS): NOT DETECTED
CANDIDA KRUSEI (OHS): NOT DETECTED
CANDIDA PARAPSILOSIS (OHS): NOT DETECTED
CANDIDA TROPICALIS (OHS): NOT DETECTED
CHLORIDE SERPL-SCNC: 109 MMOL/L (ref 98–107)
CO2 SERPL-SCNC: 23 MMOL/L (ref 22–29)
CREAT SERPL-MCNC: 1.05 MG/DL (ref 0.55–1.02)
CTX-M (OHS): ABNORMAL
CV ECHO LV RWT: 0.55 CM
D DIMER PPP IA.FEU-MCNC: 2.09 UG/ML FEU (ref 0–0.5)
DOP CALC AO PEAK VEL: 1.43 M/S
DOP CALC AO VTI: 31.8 CM
DOP CALC LVOT AREA: 3.6 CM2
DOP CALC LVOT DIAMETER: 2.13 CM
DOP CALC LVOT PEAK VEL: 0.85 M/S
DOP CALC LVOT STROKE VOLUME: 67.31 CM3
DOP CALC MV VTI: 41.9 CM
DOP CALCLVOT PEAK VEL VTI: 18.9 CM
E WAVE DECELERATION TIME: 332.89 MSEC
E/A RATIO: 1.59
E/E' RATIO: 14.24 M/S
ECHO LV POSTERIOR WALL: 1.19 CM (ref 0.6–1.1)
ENTEROBACTER CLOACAE COMPLEX (OHS): NOT DETECTED
ENTEROBACTERALES (OHS): NOT DETECTED
ENTEROCOCCUS FAECALIS (OHS): NOT DETECTED
ENTEROCOCCUS FAECIUM (OHS): NOT DETECTED
EOSINOPHIL # BLD AUTO: 0 X10(3)/MCL (ref 0–0.9)
EOSINOPHIL NFR BLD AUTO: 0 %
ERYTHROCYTE [DISTWIDTH] IN BLOOD BY AUTOMATED COUNT: 15.6 % (ref 11.5–17)
ESCHERICHIA COLI (OHS): NOT DETECTED
FIBRINOGEN PPP-MCNC: 512 MG/DL (ref 210–463)
FRACTIONAL SHORTENING: 32 % (ref 28–44)
GFR SERPLBLD CREATININE-BSD FMLA CKD-EPI: >60 MLS/MIN/1.73/M2
GLOBULIN SER-MCNC: 4.8 GM/DL (ref 2.4–3.5)
GLUCOSE SERPL-MCNC: 78 MG/DL (ref 74–100)
GP B STREP DNA CSF QL NAA+NON-PROBE: NOT DETECTED
HAEM INFLU DNA CSF QL NAA+NON-PROBE: NOT DETECTED
HCT VFR BLD AUTO: 32.6 % (ref 37–47)
HGB BLD-MCNC: 10.6 G/DL (ref 12–16)
IMM GRANULOCYTES # BLD AUTO: 0.02 X10(3)/MCL (ref 0–0.04)
IMM GRANULOCYTES NFR BLD AUTO: 0.3 %
IMP (OHS): ABNORMAL
INR PPP: 1
INTERVENTRICULAR SEPTUM: 1.27 CM (ref 0.6–1.1)
KLEBSIELLA AEROGENES (OHS): NOT DETECTED
KLEBSIELLA OXYTOCA (OHS): NOT DETECTED
KLEBSIELLA PNEUMONIAE GROUP (OHS): NOT DETECTED
KPC (OHS): ABNORMAL
L MONOCYTOG DNA CSF QL NAA+NON-PROBE: NOT DETECTED
LEFT INTERNAL DIMENSION IN SYSTOLE: 2.94 CM (ref 2.1–4)
LEFT VENTRICLE DIASTOLIC VOLUME INDEX: 47.19 ML/M2
LEFT VENTRICLE DIASTOLIC VOLUME: 85.42 ML
LEFT VENTRICLE MASS INDEX: 108 G/M2
LEFT VENTRICLE SYSTOLIC VOLUME INDEX: 18.4 ML/M2
LEFT VENTRICLE SYSTOLIC VOLUME: 33.32 ML
LEFT VENTRICULAR INTERNAL DIMENSION IN DIASTOLE: 4.35 CM (ref 3.5–6)
LEFT VENTRICULAR MASS: 194.88 G
LV LATERAL E/E' RATIO: 13.44 M/S
LV SEPTAL E/E' RATIO: 15.13 M/S
LVOT MG: 1.44 MMHG
LVOT MV: 0.56 CM/S
LYMPHOCYTES # BLD AUTO: 0.68 X10(3)/MCL (ref 0.6–4.6)
LYMPHOCYTES NFR BLD AUTO: 11 %
MAGNESIUM SERPL-MCNC: 2.1 MG/DL (ref 1.6–2.6)
MCH RBC QN AUTO: 28.7 PG (ref 27–31)
MCHC RBC AUTO-ENTMCNC: 32.5 G/DL (ref 33–36)
MCR-1 (OHS): ABNORMAL
MCV RBC AUTO: 88.3 FL (ref 80–94)
MECA/C (OHS): ABNORMAL
MECA/C AND MREJ (MRSA)(OHS): ABNORMAL
MONOCYTES # BLD AUTO: 0.09 X10(3)/MCL (ref 0.1–1.3)
MONOCYTES NFR BLD AUTO: 1.5 %
MV MEAN GRADIENT: 2 MMHG
MV PEAK A VEL: 0.76 M/S
MV PEAK E VEL: 1.21 M/S
MV PEAK GRADIENT: 8 MMHG
MV STENOSIS PRESSURE HALF TIME: 96.54 MS
MV VALVE AREA BY CONTINUITY EQUATION: 1.61 CM2
MV VALVE AREA P 1/2 METHOD: 2.28 CM2
N MEN DNA CSF QL NAA+NON-PROBE: NOT DETECTED
NDM (OHS): ABNORMAL
NEUTROPHILS # BLD AUTO: 5.41 X10(3)/MCL (ref 2.1–9.2)
NEUTROPHILS NFR BLD AUTO: 87.2 %
NRBC BLD AUTO-RTO: 0 %
OHS LV EJECTION FRACTION SIMPSONS BIPLANE MOD: 60 %
OXA-48-LIKE (OHS): ABNORMAL
PHOSPHATE SERPL-MCNC: 3.5 MG/DL (ref 2.3–4.7)
PISA TR MAX VEL: 2.2 M/S
PLATELET # BLD AUTO: 397 X10(3)/MCL (ref 130–400)
PMV BLD AUTO: 9.5 FL (ref 7.4–10.4)
POCT GLUCOSE: 109 MG/DL (ref 70–110)
POCT GLUCOSE: 124 MG/DL (ref 70–110)
POCT GLUCOSE: 60 MG/DL (ref 70–110)
POCT GLUCOSE: 68 MG/DL (ref 70–110)
POTASSIUM SERPL-SCNC: 4.7 MMOL/L (ref 3.5–5.1)
PROT 24H UR-MCNC: 2119.5 MG/24 H (ref 0–165)
PROT SERPL-MCNC: 7.8 GM/DL (ref 6.4–8.3)
PROT UR STRIP-MCNC: 141.3 MG/DL
PROTEUS SPP. (OHS): NOT DETECTED
PROTHROMBIN TIME: 13.7 SECONDS (ref 11.4–14)
PSEUDOMONAS AERUGINOSA (OHS): NOT DETECTED
RA PRESSURE ESTIMATED: 8 MMHG
RBC # BLD AUTO: 3.69 X10(6)/MCL (ref 4.2–5.4)
RIGHT VENTRICULAR END-DIASTOLIC DIMENSION: 2.3 CM
RV TB RVSP: 10 MMHG
S ENT+BONG DNA STL QL NAA+NON-PROBE: NOT DETECTED
S PNEUM DNA CSF QL NAA+NON-PROBE: NOT DETECTED
SERRATIA MARCESCENS (OHS): NOT DETECTED
SODIUM SERPL-SCNC: 140 MMOL/L (ref 136–145)
STAPHYLOCOCCUS AUREUS (OHS): NOT DETECTED
STAPHYLOCOCCUS EPIDERMIDIS (OHS): NOT DETECTED
STAPHYLOCOCCUS LUGDUNENSIS (OHS): NOT DETECTED
STAPHYLOCOCCUS SPP. (OHS): DETECTED
STENOTROPHOMONAS MALTOPHILIA (OHS): NOT DETECTED
STREPTOCOCCUS PYOGENES (GROUP A)(OHS): NOT DETECTED
STREPTOCOCCUS SPP. (OHS): NOT DETECTED
TDI LATERAL: 0.09 M/S
TDI SEPTAL: 0.08 M/S
TDI: 0.09 M/S
TOTAL VOLUME  (OHS): 1500 ML
TR MAX PG: 19 MMHG
TV REST PULMONARY ARTERY PRESSURE: 27 MMHG
VANA/B (OHS): ABNORMAL
VIM (OHS): ABNORMAL
WBC # SPEC AUTO: 6.2 X10(3)/MCL (ref 4.5–11.5)
Z-SCORE OF LEFT VENTRICULAR DIMENSION IN END DIASTOLE: -1.41
Z-SCORE OF LEFT VENTRICULAR DIMENSION IN END SYSTOLE: -0.4

## 2023-10-11 PROCEDURE — 85306 CLOT INHIBIT PROT S FREE: CPT

## 2023-10-11 PROCEDURE — 85610 PROTHROMBIN TIME: CPT

## 2023-10-11 PROCEDURE — 36410 VNPNXR 3YR/> PHY/QHP DX/THER: CPT

## 2023-10-11 PROCEDURE — 99233 SBSQ HOSP IP/OBS HIGH 50: CPT | Mod: ,,, | Performed by: INTERNAL MEDICINE

## 2023-10-11 PROCEDURE — 99900035 HC TECH TIME PER 15 MIN (STAT)

## 2023-10-11 PROCEDURE — 85300 ANTITHROMBIN III ACTIVITY: CPT

## 2023-10-11 PROCEDURE — 84156 ASSAY OF PROTEIN URINE: CPT | Performed by: INTERNAL MEDICINE

## 2023-10-11 PROCEDURE — 25000003 PHARM REV CODE 250: Performed by: INTERNAL MEDICINE

## 2023-10-11 PROCEDURE — 80053 COMPREHEN METABOLIC PANEL: CPT

## 2023-10-11 PROCEDURE — 85730 THROMBOPLASTIN TIME PARTIAL: CPT | Performed by: INTERNAL MEDICINE

## 2023-10-11 PROCEDURE — 85613 RUSSELL VIPER VENOM DILUTED: CPT

## 2023-10-11 PROCEDURE — 25000003 PHARM REV CODE 250: Performed by: STUDENT IN AN ORGANIZED HEALTH CARE EDUCATION/TRAINING PROGRAM

## 2023-10-11 PROCEDURE — 83695 ASSAY OF LIPOPROTEIN(A): CPT

## 2023-10-11 PROCEDURE — 63600175 PHARM REV CODE 636 W HCPCS: Performed by: STUDENT IN AN ORGANIZED HEALTH CARE EDUCATION/TRAINING PROGRAM

## 2023-10-11 PROCEDURE — 83090 ASSAY OF HOMOCYSTEINE: CPT

## 2023-10-11 PROCEDURE — 87040 BLOOD CULTURE FOR BACTERIA: CPT | Performed by: STUDENT IN AN ORGANIZED HEALTH CARE EDUCATION/TRAINING PROGRAM

## 2023-10-11 PROCEDURE — 63600175 PHARM REV CODE 636 W HCPCS

## 2023-10-11 PROCEDURE — 97162 PT EVAL MOD COMPLEX 30 MIN: CPT

## 2023-10-11 PROCEDURE — 25000003 PHARM REV CODE 250

## 2023-10-11 PROCEDURE — 85303 CLOT INHIBIT PROT C ACTIVITY: CPT

## 2023-10-11 PROCEDURE — 86147 CARDIOLIPIN ANTIBODY EA IG: CPT | Performed by: INTERNAL MEDICINE

## 2023-10-11 PROCEDURE — 63600175 PHARM REV CODE 636 W HCPCS: Performed by: FAMILY MEDICINE

## 2023-10-11 PROCEDURE — A4216 STERILE WATER/SALINE, 10 ML: HCPCS

## 2023-10-11 PROCEDURE — 21400001 HC TELEMETRY ROOM

## 2023-10-11 PROCEDURE — 97166 OT EVAL MOD COMPLEX 45 MIN: CPT

## 2023-10-11 PROCEDURE — 94761 N-INVAS EAR/PLS OXIMETRY MLT: CPT

## 2023-10-11 PROCEDURE — 85379 FIBRIN DEGRADATION QUANT: CPT

## 2023-10-11 PROCEDURE — 84100 ASSAY OF PHOSPHORUS: CPT

## 2023-10-11 PROCEDURE — C1751 CATH, INF, PER/CENT/MIDLINE: HCPCS

## 2023-10-11 PROCEDURE — 99233 PR SUBSEQUENT HOSPITAL CARE,LEVL III: ICD-10-PCS | Mod: ,,, | Performed by: INTERNAL MEDICINE

## 2023-10-11 PROCEDURE — 85384 FIBRINOGEN ACTIVITY: CPT

## 2023-10-11 PROCEDURE — 63600175 PHARM REV CODE 636 W HCPCS: Performed by: INTERNAL MEDICINE

## 2023-10-11 PROCEDURE — 85025 COMPLETE CBC W/AUTO DIFF WBC: CPT

## 2023-10-11 PROCEDURE — 83735 ASSAY OF MAGNESIUM: CPT

## 2023-10-11 RX ORDER — DIPHENHYDRAMINE HYDROCHLORIDE 50 MG/ML
INJECTION INTRAMUSCULAR; INTRAVENOUS
Status: COMPLETED
Start: 2023-10-11 | End: 2023-10-11

## 2023-10-11 RX ORDER — SULFAMETHOXAZOLE AND TRIMETHOPRIM 800; 160 MG/1; MG/1
1 TABLET ORAL 2 TIMES DAILY
Status: DISCONTINUED | OUTPATIENT
Start: 2023-10-11 | End: 2023-10-11

## 2023-10-11 RX ORDER — EPINEPHRINE 0.3 MG/.3ML
0.3 INJECTION SUBCUTANEOUS ONCE AS NEEDED
Status: CANCELLED | OUTPATIENT
Start: 2023-10-11

## 2023-10-11 RX ORDER — SODIUM CHLORIDE 0.9 % (FLUSH) 0.9 %
10 SYRINGE (ML) INJECTION EVERY 6 HOURS
Status: DISCONTINUED | OUTPATIENT
Start: 2023-10-11 | End: 2023-10-14 | Stop reason: HOSPADM

## 2023-10-11 RX ORDER — PREDNISONE 10 MG/1
10 TABLET ORAL DAILY
Status: DISCONTINUED | OUTPATIENT
Start: 2023-10-11 | End: 2023-10-12

## 2023-10-11 RX ORDER — DIPHENHYDRAMINE HYDROCHLORIDE 50 MG/ML
50 INJECTION INTRAMUSCULAR; INTRAVENOUS ONCE AS NEEDED
Status: CANCELLED | OUTPATIENT
Start: 2023-10-11

## 2023-10-11 RX ORDER — METHYLPREDNISOLONE SOD SUCC 125 MG
100 VIAL (EA) INJECTION
Status: CANCELLED | OUTPATIENT
Start: 2023-10-11

## 2023-10-11 RX ORDER — ACETAMINOPHEN 325 MG/1
650 TABLET ORAL
Status: CANCELLED | OUTPATIENT
Start: 2023-10-11

## 2023-10-11 RX ORDER — EPINEPHRINE CONVENIENCE KIT 1 MG/ML(1)
0.3 KIT INTRAMUSCULAR; SUBCUTANEOUS ONCE
Status: CANCELLED | OUTPATIENT
Start: 2023-10-11

## 2023-10-11 RX ORDER — MEPERIDINE HYDROCHLORIDE 50 MG/ML
25 INJECTION INTRAMUSCULAR; INTRAVENOUS; SUBCUTANEOUS
Status: CANCELLED | OUTPATIENT
Start: 2023-10-11 | End: 2023-10-12

## 2023-10-11 RX ORDER — LOSARTAN POTASSIUM 25 MG/1
25 TABLET ORAL DAILY
Status: DISCONTINUED | OUTPATIENT
Start: 2023-10-11 | End: 2023-10-14 | Stop reason: HOSPADM

## 2023-10-11 RX ORDER — DIPHENHYDRAMINE HYDROCHLORIDE 50 MG/ML
25 INJECTION INTRAMUSCULAR; INTRAVENOUS ONCE
Status: DISCONTINUED | OUTPATIENT
Start: 2023-10-11 | End: 2023-10-14 | Stop reason: HOSPADM

## 2023-10-11 RX ORDER — SULFAMETHOXAZOLE AND TRIMETHOPRIM 800; 160 MG/1; MG/1
2 TABLET ORAL DAILY
Status: DISCONTINUED | OUTPATIENT
Start: 2023-10-11 | End: 2023-10-14 | Stop reason: HOSPADM

## 2023-10-11 RX ORDER — SODIUM CHLORIDE 0.9 % (FLUSH) 0.9 %
10 SYRINGE (ML) INJECTION
Status: CANCELLED | OUTPATIENT
Start: 2023-10-11

## 2023-10-11 RX ORDER — SODIUM CHLORIDE 0.9 % (FLUSH) 0.9 %
10 SYRINGE (ML) INJECTION
Status: DISCONTINUED | OUTPATIENT
Start: 2023-10-11 | End: 2023-10-14 | Stop reason: HOSPADM

## 2023-10-11 RX ORDER — FAMOTIDINE 10 MG/ML
20 INJECTION INTRAVENOUS
Status: CANCELLED | OUTPATIENT
Start: 2023-10-11

## 2023-10-11 RX ORDER — HEPARIN 100 UNIT/ML
500 SYRINGE INTRAVENOUS
Status: CANCELLED | OUTPATIENT
Start: 2023-10-11

## 2023-10-11 RX ORDER — DIPHENHYDRAMINE HYDROCHLORIDE 50 MG/ML
25 INJECTION INTRAMUSCULAR; INTRAVENOUS ONCE
Status: COMPLETED | OUTPATIENT
Start: 2023-10-11 | End: 2023-10-11

## 2023-10-11 RX ADMIN — ATORVASTATIN CALCIUM 10 MG: 10 TABLET, FILM COATED ORAL at 09:10

## 2023-10-11 RX ADMIN — SULFAMETHOXAZOLE AND TRIMETHOPRIM 1 TABLET: 800; 160 TABLET ORAL at 09:10

## 2023-10-11 RX ADMIN — ASPIRIN 81 MG: 81 TABLET, CHEWABLE ORAL at 09:10

## 2023-10-11 RX ADMIN — PREDNISONE 10 MG: 10 TABLET ORAL at 09:10

## 2023-10-11 RX ADMIN — Medication 10 ML: at 05:10

## 2023-10-11 RX ADMIN — AMLODIPINE BESYLATE 10 MG: 10 TABLET ORAL at 09:10

## 2023-10-11 RX ADMIN — HYDROCODONE BITARTRATE AND ACETAMINOPHEN 1 TABLET: 10; 325 TABLET ORAL at 03:10

## 2023-10-11 RX ADMIN — HEPARIN SODIUM 17 UNITS/KG/HR: 10000 INJECTION, SOLUTION INTRAVENOUS at 04:10

## 2023-10-11 RX ADMIN — VANCOMYCIN HYDROCHLORIDE 1750 MG: 1 INJECTION, POWDER, LYOPHILIZED, FOR SOLUTION INTRAVENOUS at 10:10

## 2023-10-11 RX ADMIN — DIPHENHYDRAMINE HYDROCHLORIDE 25 MG: 50 INJECTION INTRAMUSCULAR; INTRAVENOUS at 08:10

## 2023-10-11 RX ADMIN — LOSARTAN POTASSIUM 25 MG: 25 TABLET, FILM COATED ORAL at 09:10

## 2023-10-11 RX ADMIN — DIPHENHYDRAMINE HYDROCHLORIDE 50 MG: 50 INJECTION, SOLUTION INTRAMUSCULAR; INTRAVENOUS at 07:10

## 2023-10-11 RX ADMIN — PANTOPRAZOLE SODIUM 40 MG: 40 TABLET, DELAYED RELEASE ORAL at 09:10

## 2023-10-11 RX ADMIN — INSULIN DETEMIR 40 UNITS: 100 INJECTION, SOLUTION SUBCUTANEOUS at 09:10

## 2023-10-11 RX ADMIN — THERA TABS 1 TABLET: TAB at 09:10

## 2023-10-11 RX ADMIN — HYDROCODONE BITARTRATE AND ACETAMINOPHEN 1 TABLET: 10; 325 TABLET ORAL at 04:10

## 2023-10-11 RX ADMIN — DIPHENHYDRAMINE HYDROCHLORIDE 25 MG: 50 INJECTION, SOLUTION INTRAMUSCULAR; INTRAVENOUS at 08:10

## 2023-10-11 RX ADMIN — SULFAMETHOXAZOLE AND TRIMETHOPRIM 2 TABLET: 800; 160 TABLET ORAL at 03:10

## 2023-10-11 RX ADMIN — ESCITALOPRAM OXALATE 10 MG: 10 TABLET ORAL at 09:10

## 2023-10-11 RX ADMIN — HEPARIN SODIUM 17 UNITS/KG/HR: 10000 INJECTION, SOLUTION INTRAVENOUS at 07:10

## 2023-10-11 RX ADMIN — MYCOPHENOLATE MOFETIL 1500 MG: 250 CAPSULE ORAL at 09:10

## 2023-10-11 NOTE — PROCEDURES
"Vi Ulrich is a 57 y.o. female patient.    Temp: 98 °F (36.7 °C) (10/11/23 1115)  Pulse: (!) 58 (10/11/23 1115)  Resp: 20 (10/11/23 1115)  BP: 127/77 (10/11/23 1115)  SpO2: 100 % (10/11/23 1115)  Weight: 73.5 kg (162 lb) (10/11/23 0915)  Height: 5' 5" (165.1 cm) (10/11/23 0915)    PICC  Date/Time: 10/11/2023 2:59 PM  Performed by: Swapna Hill RN  Consent Done: Yes  Time out: Immediately prior to procedure a time out was called to verify the correct patient, procedure, equipment, support staff and site/side marked as required  Indications: vascular access  Anesthesia: local infiltration  Local anesthetic: lidocaine 1% without epinephrine  Anesthetic Total (mL): 5  Preparation: skin prepped with ChloraPrep  Skin prep agent dried: skin prep agent completely dried prior to procedure  Sterile barriers: all five maximum sterile barriers used - cap, mask, sterile gown, sterile gloves, and large sterile sheet  Hand hygiene: hand hygiene performed prior to central venous catheter insertion  Location details: right brachial  Catheter type: single lumen  Catheter size: 4 Fr  Catheter Length: 16cm    Ultrasound guidance: yes  Vessel Caliber: medium and patent, compressibility normal  Needle advanced into vessel with real time Ultrasound guidance.  Guidewire confirmed in vessel.  Sterile sheath used.  Number of attempts: 1  Post-procedure: blood return through all ports, sterile dressing applied and chlorhexidine patch    Complications: none  Comments: Arm circumference 28cm. Left mastectomy, limb alert. Receiving cont heparin, vanc d/c'c, spoke with Dr LOURDES Marie states may add Iv antibiotics but for  now PO. Made Dr aware that if pt will need long term antibiotics recommend midline be exchanged for picc as patient is limited to only one extremity. Dr Marie agrees and verbalized understanding, as well as SRAVANI Lao RN and patient.           Name Swapna Hill RN  10/11/2023    "

## 2023-10-11 NOTE — PROGRESS NOTES
[facility]  Consult Note    Patient Name: Vi Ulrich  : 1965  MRN: [id number]  Admission Date: 10/9/2023  Attending Provider: Syeda Altman MD  Primary Care Physician: Octaviano Barrios MD    Date of Consultation: 10/11/2023    Consultation Requested By: Dr. Syeda Altman    Reason for Consultation: Lupus flare management    Subjective:     HPI: Ms Vi Ulrich is a 57 y.o.  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 Patient reports that for the last 2-3 days she has been relatively bedridden and she notes that she has had very little water intake with roughly 1 bottle of water 20 oz daily.  She reports that her left-sided chest wall pain and shortness a breath is worsened with deep inhalation.  She notes that she still continues to get intermittent headaches.  She states that she has been taking all of her medications but is unable to name many of them and does not know the quantity of pills.  Additionally her dispense history is not congruent with her report of medication fills.  Per dispense history she has not had any more than 30 days of CellCept or Plaquenil obtained since 2023.  Patient denies any fever cough, hemoptysis, she does note that she has had dark red urine, she denies any history of PE or DVT, she denies stroke MI, Raynaud's phenomenon, history of inflammatory eye diseases, history of inflammatory bowel disease.    The patient has been diagnosed with lupus nephritis, with a renal biopsy in 2022, which revealed class 3 and class 5 nephritis with diabetic glomerulopathy  features. She has a history of intermittent anemia, leukopenia/neutropenia, interstitial lung disease, and chronic proteinuria.    Interval hx:  Denies any concerns today. Remains afebrile, non tachycardic, no tachypnea.     Treatment hx of Lupus Nephritis:    Plaquenil (Hydroxychloroquine) and Prednisone: Ongoing since diagnosis of lupus (2004).  Rituximab: April 14, 2023, and April 28, 2023.  Cytoxan: Documented use in the past, but unclear on dose and dates.  Benlysta (Belimumab) infusions: Recommended but not administered.  Voclosporin Therapy: Started to improve renal response and decrease proteinuria in March 2023.    Medication Tolerance and Allergies:    Imuran (Azathioprine): Not tolerated due to abdominal upset.  CellCept (Mycophenolate Mofetil): Allergic reaction with hives, needed to go to the ER 6/2022 but has been nown to tolerate since march of 2023     Patient is allergic to ketorolac (pf), mycophenolate mofetil, penicillins, percocet [oxycodone-acetaminophen], and tramadol.     Past Medical History:  has a past medical history of Arthritis, Asthma, Atrial fibrillation, Chronic constipation, Diabetes mellitus, Encounter for blood transfusion, GERD (gastroesophageal reflux disease), Gout, Hypertension, Lupus, Renal disorder, and SLE (systemic lupus erythematosus).    Procedure History:  has a past surgical history that includes Cholecystectomy; Appendectomy; Partial hysterectomy; Lymph node biopsy (Left, 2014); Hysterectomy; Joint replacement (Left, 08/07/2015); Mastectomy; Esophagogastroduodenoscopy (N/A, 4/19/2018); and Colonoscopy (N/A, 4/19/2018).    Family History: family history includes Arthritis in her mother; Asthma in her mother; Diabetes in her mother and sister; Heart block in her mother; Heart disease in her father and sister; Kidney disease in her sister.    Social History:  reports that she quit smoking about 17 months ago. Her smoking use included cigarettes. She started smoking about  42 years ago. She has never used smokeless tobacco. She reports that she does not drink alcohol and does not use drugs.    Review of systems:   CONSTITUTIONAL: negative with no fatigue or fevers  SKIN: negative with no recent rashes  EYES: negative with no complaints of dry irritated eyes  ENT: negative with no mouth dryness or sores  ENDO: negative with no hypothyroid symptoms  HEME: negative, with no history of anemia or DVT  CV: negative without exertional chest pain, palpitations, or edema  RESP: negative without cough, dyspnea, or pleuritic pain  GI: negative without nausea, vomiting, heartburn, or bleeding  NEURO: negative, with no imbalance and no numbness or tingling of the hands or feet  MUSCULOSKELETAL: as per HPI    Inpatient Medications:     Current Facility-Administered Medications:     albuterol nebulizer solution 0.63 mg, 0.63 mg, Nebulization, Q4H PRN, Aaliyah Woodruff DO    amLODIPine tablet 10 mg, 10 mg, Oral, Daily, Zabrina Willett MD, 10 mg at 10/10/23 2107    aspirin chewable tablet 81 mg, 81 mg, Oral, Daily, Ana María Fernandez MD, 81 mg at 10/10/23 0908    atorvastatin tablet 10 mg, 10 mg, Oral, Daily, Aaliyah Woodruff DO, 10 mg at 10/10/23 0908    cefTRIAXone (ROCEPHIN) 1 g in dextrose 5 % in water (D5W) 100 mL IVPB (MB+), 1 g, Intravenous, Q24H, Aaliyah Woodruff DO, Stopped at 10/11/23 0016    dextrose 10% bolus 125 mL 125 mL, 12.5 g, Intravenous, PRN, Aaliyah Woodruff DO    dextrose 10% bolus 250 mL 250 mL, 25 g, Intravenous, PRN, Aaliyah Woodruff DO    EScitalopram oxalate tablet 10 mg, 10 mg, Oral, Daily, Aaliyah Woodruff DO, 10 mg at 10/10/23 0908    glucagon (human recombinant) injection 1 mg, 1 mg, Intramuscular, PRN, Aaliyah Woodruff,     glucose chewable tablet 16 g, 16 g, Oral, PRN, Aaliyah Woodruff,     glucose chewable tablet 24 g, 24 g, Oral, PRN, Aaliyah Woodruff,     heparin 25,000 units in dextrose 5% (100 units/ml) IV bolus from bag -  ADDITIONAL PRN BOLUS - 30 units/kg, 30 Units/kg (Adjusted), Intravenous, PRN, Zabrina Willett MD    heparin 25,000 units in dextrose 5% (100 units/ml) IV bolus from bag - ADDITIONAL PRN BOLUS - 60 units/kg, 60 Units/kg (Adjusted), Intravenous, PRN, Zabrina Willett MD, 3,828 Units at 10/11/23 0438    heparin 25,000 units in dextrose 5% 250 mL (100 units/mL) infusion HIGH INTENSITY nomogram - LAF, 0-40 Units/kg/hr (Adjusted), Intravenous, Continuous, Zabrina Willett MD, Last Rate: 10.8 mL/hr at 10/11/23 0730, 17 Units/kg/hr at 10/11/23 0730    hydrALAZINE injection 10 mg, 10 mg, Intravenous, Q4H PRN, Aaliyah Woodruff DO    HYDROcodone-acetaminophen  mg per tablet 1 tablet, 1 tablet, Oral, Q6H PRN, Aaliyah Woodruff DO, 1 tablet at 10/11/23 0431    insulin aspart U-100 injection 0-10 Units, 0-10 Units, Subcutaneous, QID (AC + HS) PRN, Aaliyah Woodruff DO    insulin detemir U-100 injection 80 Units, 80 Units, Subcutaneous, QHS, Aaliyah Woodruff DO, 80 Units at 10/10/23 2107    labetalol 20 mg/4 mL (5 mg/mL) IV syring, 10 mg, Intravenous, Q4H PRN, Aalyiah Woodruff DO    losartan tablet 25 mg, 25 mg, Oral, Daily, Antoni Marie DO    melatonin tablet 6 mg, 6 mg, Oral, Nightly PRN, Aaliyah Woodruff DO    multivitamin tablet, 1 tablet, Oral, Daily, Ana María Fernandez MD, 1 tablet at 10/10/23 0908    mycophenolate capsule 1,500 mg, 1,500 mg, Oral, BID, James Cutler MD    ondansetron tablet 4 mg, 4 mg, Oral, Q6H PRN, Aaliyah Woodruff DO, 4 mg at 10/10/23 1228    pantoprazole EC tablet 40 mg, 40 mg, Oral, Daily, Aaliyah Woodruff DO, 40 mg at 10/10/23 0908    polyethylene glycol packet 17 g, 17 g, Oral, BID PRN, Aaliyah Woodruff DO    predniSONE tablet 10 mg, 10 mg, Oral, Daily, Antoni Marie DO    sodium chloride 0.9% flush 10 mL, 10 mL, Intravenous, Q12H PRN, Aaliyah Woodruff DO    sulfamethoxazole-trimethoprim 800-160mg per tablet 1 tablet, 1 tablet, Oral, BID, Antoni aMrie,   "    Objective:     Vital Signs (Most Recent):  Temp: 97.9 °F (36.6 °C) (10/11/23 0745)  Pulse: (!) 56 (10/11/23 0745)  Resp: 20 (10/11/23 0745)  BP: 135/79 (10/11/23 0745)  SpO2: 100 % (10/11/23 0745)   Vital Signs (24h Range):  Temp:  [97.6 °F (36.4 °C)-98.7 °F (37.1 °C)] 97.9 °F (36.6 °C)  Pulse:  [52-84] 56  Resp:  [13-24] 20  SpO2:  [14 %-100 %] 100 %  BP: (127-184)/(60-96) 135/79     Weight: 73.9 kg (162 lb 14.7 oz) (10/10/23 1735)  Body mass index is 27.11 kg/m².  Body surface area is 1.84 meters squared.      Intake/Output Summary (Last 24 hours) at 10/11/2023 0914  Last data filed at 10/11/2023 0639  Gross per 24 hour   Intake 486.64 ml   Output 900 ml   Net -413.36 ml       Physical Exam:   General Appearance: no acute distress and cooperative. Sitting up in bed having echo performed.  Skin: Skin color, texture, turgor normal. No rashes or lesions.  Eyes: conjunctivae/corneas clear. PERRL, EOM's intact.   ENT: No oral or nasal ulcers.  Neck:  Neck supple. No adenopathy.   Lungs: CTA throughout without crackles, rhonchi, or wheezes.   Heart: RRR w/o murmurs.  Abdomen: Soft, non-tender, no masses, organomegaly, rebound or guarding.  Neuro: CN II-XII GI, sensory and motor innervation intact.  Musculoskeletal: scars of b/l knees with normal ROM s/p arthroplasty.    Significant Labs:    Blood Culture: No results for input(s): "LABBLOO" in the last 24 hours.  CBC:   Recent Labs   Lab 10/10/23  1010 10/11/23  0212   WBC 4.75 6.20   HGB 13.0 10.6*   HCT 40.2 32.6*    397       CMP:   Recent Labs   Lab 10/11/23  0212   CALCIUM 9.2   ALBUMIN 3.0*      K 4.7   CO2 23   BUN 26.6*   CREATININE 1.05*   ALKPHOS 104   ALT 7   AST 12   BILITOT 0.1       CRP:   No results for input(s): "CRP" in the last 24 hours.      ESR:   No results for input(s): "SEDRATE" in the last 24 hours.        All pertinent lab results from the last 24 hours have been reviewed.    Significant Imaging:  Imaging results within the past " 24 hours have been reviewed.  CTA Chest Non-Coronary (PE Studies)   Final Result      No CT evidence for pulmonary embolus.      Stable ground-glass patchy and confluent opacities in both lower lobes and to a lesser extent in the lingula and right middle lobe.      Slight interval decrease in axillary lymphadenopathy.         Electronically signed by: García Gilbert MD   Date:    10/09/2023   Time:    20:37      X-Ray Chest AP Portable   Final Result          Assessment/Plan:     Lupus nephritis  Proteinuria  Diabetic glomerulopathy  HTN  CKD  Chronic pain  Fibromyalgia  Staph bacteremia 1/2 bottles  - decrease prednisone to 5 or 10 mg qd and hold cellcept/voclosporin while concerned for possible bacteremia  - hypercoagulable workup ordered (recommend initiating workup prior to heparin therapy in pt with unprovoked DVT)  - continue plaquenil  - Recommend renal biopsy to further evaluate after results of urine culture and either treatment of bacteremia or exclusion of bacteremia  - abx per primary team  - anticoagulation per primary   - c/w PPI and atovaquone with prednisone >20 daily  - c/w 24 hr urine protein    Hypercoagulable workup for unprovoked DVT  - anti-thrombin activity normal, prothrombin gene mutation needs to be collected, factor V activity and leiden mutation pending, protein C/S activity pending, beta2 glycoprotein, and cardiolipin abs pending,   - lupus anticoagulant lab will be falsely abnormal d/t starting heparin prior to evaluation  - lipoprotein A may be falsely elevated in the setting of acute illness as it is an acute phase reactant    Plan discussed with primary team.     Thank you for your consult. I will follow-up with patient. Please contact us if you have any additional questions.    James Cutler MD - PGY3  [employer] [facility]

## 2023-10-11 NOTE — PROGRESS NOTES
"Inpatient Nutrition Evaluation    Admit Date: 10/9/2023   Total duration of encounter: 2 days    Nutrition Recommendation/Prescription     Suggest cardiac/diabetic diet (renal fx stable)  Pt stated food preferences/will honor  Pt education on diet/complete  MVI/fe  Biweekly wt  Will monitor nutrition status     Nutrition Assessment     Chart Review    Reason Seen: continuous nutrition monitoring    Malnutrition Screening Tool Results   Have you recently lost weight without trying?: No  Have you been eating poorly because of a decreased appetite?: No   MST Score: 0     Diagnosis:  Lupus nephritis, proteinuria, diabetic glomerulopathy, HTN, CKD, ? DVT, pain, fibromyalgia, bacteremia,     Relevant Medical History: myositis, pyelonephritis, DM, CKD, Lupus nephritis, HTN     Nutrition-Related Medications: heparin, aspirin, atorvastatin, insulin, losartan, MVI, pantoprazole, prednisone, vancomycin     Nutrition-Related Labs:  (10-11) H/H 10.6/32.6(L) Gluc 78 Bun 26.6 Cr 1.0 K 4.7 GFR >60     Diet Order: DIET RENAL NON-DIALYSIS Cardiac  Oral Supplement Order: none  Appetite/Oral Intake: fair/50-75% of meals  Factors Affecting Nutritional Intake: none identified  Food/Congregational/Cultural Preferences:  likes oatmeal/toast/jelly for breakfast   Food Allergies: none reported       Wound(s):   none reported    Comments    (10/11) Pt reported tolerating oral diet; tries to limit carbohydrate foods; likes oatmeal/toast; has trouble with reflux at times; fair po intake--usually eats only 1 big meal per day/ snacks; discussed trying to eat 3 small meals/ reviewed diet. No wt loss. Labs acknowleged.     Anthropometrics    Height: 5' 5" (165.1 cm)    Last Weight: 73.5 kg (162 lb) (10/11/23 0915) Weight Method: Bed Scale  BMI (Calculated): 27  BMI Classification: overweight (BMI 25-29.9)     Ideal Body Weight (IBW), Female: 125 lb     % Ideal Body Weight, Female (lb): 129.6 %                    Usual Body Weight (UBW), k.9 kg  % " Usual Body Weight: 99.64     Usual Weight Provided By: patient and EMR weight history    Wt Readings from Last 5 Encounters:   10/11/23 73.5 kg (162 lb)   08/22/23 73.9 kg (163 lb)   08/08/23 72.4 kg (159 lb 9.6 oz)   08/04/23 72.6 kg (160 lb 0.9 oz)   07/28/23 73.2 kg (161 lb 6 oz)     Weight Change(s) Since Admission:  Admit Weight: 73.9 kg (163 lb) (10/09/23 1817)  No wt loss     Patient Education    Education Provided: diabetic diet and heart healthy diet  Teaching Method: explanation and printed materials  Comprehension: verbalizes understanding  Barriers to Learning: desire/motivation  Expected Compliance: fair  Comments: All questions were answered and dietitian's contact information was provided.     Monitoring & Evaluation     Dietitian will monitor food and beverage intake, parenteral nutrition intake, and electrolyte/renal panel.  Nutrition Risk/Follow-Up: low (follow-up in 5-7 days)  Patients assigned 'low nutrition risk' status do not qualify for a full nutritional assessment but will be monitored and re-evaluated in a 5-7 day time period. Please consult if re-evaluation needed sooner.

## 2023-10-11 NOTE — PROGRESS NOTES
Pharmacokinetic Initial Assessment: IV Vancomycin    Assessment/Plan:    Initiate intravenous vancomycin with loading dose of 1750 mg once followed by a maintenance dose of vancomycin 750mg IV every 12 hours  Desired empiric serum trough concentration is 15 to 20 mcg/mL  Draw vancomycin trough level 60 min prior to third dose on 10/12 at approximately 1000  Pharmacy will continue to follow and monitor vancomycin.      Please contact pharmacy at extension [phone] with any questions regarding this assessment.     Thank you for the consult,   Lucina Andra       Patient brief summary:  Vi Ulrich is a 57 y.o. female initiated on antimicrobial therapy with IV Vancomycin for treatment of suspected bacteremia    Drug Allergies:   Review of patient's allergies indicates:   Allergen Reactions    Ketorolac (pf) Swelling    Mycophenolate mofetil Swelling    Penicillins Hives    Percocet [oxycodone-acetaminophen] Hives and Itching    Tramadol Hives       Actual Body Weight:   73.9kg    Renal Function:   Estimated Creatinine Clearance: 61 mL/min (A) (based on SCr of 1.05 mg/dL (H)).,     Dialysis Method (if applicable):  N/A    CBC (last 72 hours):  Recent Labs   Lab Result Units 10/09/23  1858 10/09/23  2300 10/10/23  0152 10/10/23  1010 10/11/23  0212   WBC x10(3)/mcL 4.95  --  5.93 4.75 6.20   Hgb g/dL 11.5*  --  11.0* 13.0 10.6*   Hemoglobin A1c %  --  5.4  --   --   --    Hct % 35.4*  --  34.3* 40.2 32.6*   Platelet x10(3)/mcL 388  --  411* 393 397   Mono % % 5.5  --  1.0 1.9 1.5   Eos % % 2.0  --  0.0 0.0 0.0   Basophil % % 0.6  --  0.2 0.2 0.0       Metabolic Panel (last 72 hours):  Recent Labs   Lab Result Units 10/09/23  1858 10/09/23  1932 10/10/23  0152 10/10/23  0623 10/11/23  0212   Sodium Level mmol/L 138  --  135*  --  140   Potassium Level mmol/L 4.2  --  4.8  --  4.7   Chloride mmol/L 106  --  105  --  109*   Carbon Dioxide mmol/L 25  --  22  --  23   Glucose Level mg/dL 86  --  181*  --  78   Glucose, UA   --   "Normal  --   --   --    Blood Urea Nitrogen mg/dL 22.1*  --  19.3  --  26.6*   Creatinine mg/dL 1.24*  --  1.09*  --  1.05*   Urine Creatinine mg/dL  --   --   --  75.9  --    Albumin Level g/dL 3.0*  --  2.9*  --  3.0*   Bilirubin Total mg/dL 0.2  --  0.1  --  0.1   Alkaline Phosphatase unit/L 114  --  116  --  104   Aspartate Aminotransferase unit/L 15  --  14  --  12   Alanine Aminotransferase unit/L 8  --  8  --  7   Magnesium Level mg/dL 1.90  --  1.90  --  2.10   Phosphorus Level mg/dL  --   --  3.5  --  3.5       Drug levels (last 3 results):  No results for input(s): "VANCOMYCINRA", "VANCORANDOM", "VANCOMYCINPE", "VANCOPEAK", "VANCOMYCINTR", "VANCOTROUGH" in the last 72 hours.    Microbiologic Results:  Microbiology Results (last 7 days)       Procedure Component Value Units Date/Time    Urine culture [[id number]] Collected: 10/09/23 1932    Order Status: Completed Specimen: Urine Updated: 10/11/23 0644     Urine Culture No Growth At 24 Hours    BCID2 Panel [[id number]]  (Abnormal) Collected: 10/09/23 2300    Order Status: Completed Specimen: Blood from Wrist, Right Updated: 10/11/23 0327     CTX-M (ESBL ) N/A     IMP (Cabapenemase ) N/A     KPC resistance gene (Carbapenemase ) N/A     mcr-1 N/A     mecA ID N/A     Comment: Note: Antimicrobial resistance can occur via multiple mechanisms. A Not Detected result for antimicrobial resistance gene(s) does not indicate antimicrobial susceptibility. Subculturing is required for species identification and susceptibility testing of   isolates.        mecA/C and MREJ (MRSA) gene N/A     NDM (Carbapenemase ) N/A     OXA-48-like (Carbapenemase ) N/A     Nik/B (VRE gene) N/A     VIM (Carbapenemase ) N/A     Enterococcus faecalis Not Detected     Enterococcus faecium Not Detected     Listeria monocytogenes Not Detected     Staphylococcus spp. Detected     Staphylococcus aureus Not Detected     Staphylococcus epidermidis " Not Detected     Staphylococcus lugdunensis Not Detected     Streptococcus spp. Not Detected     Streptococcus agalactiae (Group B) Not Detected     Streptococcus pneumoniae Not Detected     Streptococcus pyogenes (Group A) Not Detected     Acinetobacter calcoaceticus/baumannii complex Not Detected     Bacteroides fragilis Not Detected     Enterobacterales Not Detected     Enterobacter cloacae complex Not Detected     Escherichia coli Not Detected     Klebsiella aerogenes Not Detected     Klebsiella oxytoca Not Detected     Klebsiella pneumoniae group Not Detected     Proteus spp. Not Detected     Salmonella spp. Not Detected     Serratia marcescens Not Detected     Haemophilus influenzae Not Detected     Neisseria meningitidis Not Detected     Pseudomonas aeruginosa Not Detected     Stenotrophomonas maltophilia Not Detected     Candida albicans Not Detected     Candida auris Not Detected     Candida glabrata Not Detected     Candida krusei Not Detected     Candida parapsilosis Not Detected     Candida tropicalis Not Detected     Cryptococcus neoformans/gattii Not Detected    Narrative:      The [employer] BCID2 Panel is a multiplexed nucleic acid test intended for the use with [employer]® 2.0 or [employer]® [employer] for the simultaneous qualitative detection and identification of multiple bacterial and yeast nucleic acids and select genetic determinants associated with antimicrobial resistance.  The [employer] BCID2 Panel test is performed directly on blood culture samples identified as positive by a continuous monitoring blood culture system.  Results are intended to be interpreted in conjunction with Gram stain results.    Blood Culture [[id number]]  (Abnormal) Collected: 10/09/23 2300    Order Status: Completed Specimen: Blood from Wrist, Right Updated: 10/11/23 0212     GRAM STAIN Gram Positive Cocci, probable Staphylococcus      Seen in gram stain of broth only      1 of 2 Anaerobic bottles  positive    Blood Culture [[id number]] Collected: 10/09/23 9298    Order Status: Resulted Specimen: Blood from Hand, Right Updated: 10/10/23 4198

## 2023-10-11 NOTE — PROGRESS NOTES
"[facility]  Nephrology Progress Note    Patient Name: Vi Ulrich  Age: 57 y.o.  : 1965  MRN: [id number]  Admission Date: 10/9/2023    Chief complaint: Generalized Body Aches (Pt reports lupus flare up for two days)      Hospital course  Vi Ulrich is a 57 y.o. 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  Patient presented to the emergency department on 10/09/2023 with complaints of generalized body aches and chest discomfort.  CT angiogram revealed no evidence of pulmonary embolus, stable ground-glass patchy and confluent opacities in both lobes and to a lesser extent in the lingula and right middle lobe, as well as slight interval decrease in axillary lymphadenopathy.  Ultrasound of lower extremities was positive for DVT in the right popliteal vein.  Blood culture was positive for Gram-positive cocci, probable Staphylococcus.  Patient was admitted to medicine service, Nephrology was consulted for assistance with management of CKD.         Subjective  Patient is resting in bed, reports that body aches have improved somewhat.  Denies fever chills overnight.      Review of Systems  Respiratory: Negative for shortness of breath   Cardiovascular:  Negative for swelling or chest pain   Musculoskeletal: Positive for joint pain    Objective  /87   Pulse (!) 59   Temp 98.7 °F (37.1 °C)   Resp 18   Ht 5' 5" (1.651 m)   Wt 73.9 kg (162 lb 14.7 oz)   LMP  (LMP Unknown)   SpO2 98%   BMI 27.11 kg/m²     Intake/Output Summary (Last 24 hours) at 10/11/2023 0792  Last data filed at 10/11/2023 0639  Gross per 24 hour "   Intake 486.64 ml   Output 900 ml   Net -413.36 ml     Physical Exam  General appearance: Patient is in no acute distress.  Skin: No rashes or wounds.  HEENT: PERRLA, EOMI, no scleral icterus, no JVD. Neck is supple.  Chest: Respirations are unlabored. Lungs sounds are clear.   Heart: S1, S2.   Abdomen: Benign.  : Deferred.  Extremities: No edema, peripheral pulses are palpable.   Neuro: No focal deficits.     Medications    Current Facility-Administered Medications:     albuterol nebulizer solution 0.63 mg, 0.63 mg, Nebulization, Q4H PRN, Aaliyah Woodruff,     amLODIPine tablet 10 mg, 10 mg, Oral, Daily, Zabrina Willett MD, 10 mg at 10/10/23 2107    aspirin chewable tablet 81 mg, 81 mg, Oral, Daily, Ana María Fernandez MD, 81 mg at 10/10/23 0908    atorvastatin tablet 10 mg, 10 mg, Oral, Daily, Aaliyah Woodruff DO, 10 mg at 10/10/23 0908    cefTRIAXone (ROCEPHIN) 1 g in dextrose 5 % in water (D5W) 100 mL IVPB (MB+), 1 g, Intravenous, Q24H, Aaliyah Woodruff DO, Stopped at 10/11/23 0016    dextrose 10% bolus 125 mL 125 mL, 12.5 g, Intravenous, PRN, Aaliyah Woodruff,     dextrose 10% bolus 250 mL 250 mL, 25 g, Intravenous, PRN, Aaliyah Woodruff, DO    EScitalopram oxalate tablet 10 mg, 10 mg, Oral, Daily, Aaliyah Woodruff DO, 10 mg at 10/10/23 0908    glucagon (human recombinant) injection 1 mg, 1 mg, Intramuscular, PRN, Katerina Woodruffn, DO    glucose chewable tablet 16 g, 16 g, Oral, PRN, Katerina Woodruffn, DO    glucose chewable tablet 24 g, 24 g, Oral, PRN, Aaliyah Woodruff,     heparin 25,000 units in dextrose 5% (100 units/ml) IV bolus from bag - ADDITIONAL PRN BOLUS - 30 units/kg, 30 Units/kg (Adjusted), Intravenous, PRN, Zabrina Willett MD    heparin 25,000 units in dextrose 5% (100 units/ml) IV bolus from bag - ADDITIONAL PRN BOLUS - 60 units/kg, 60 Units/kg (Adjusted), Intravenous, Brennon JACKSON Erica, MD, 3,828 Units at 10/11/23 0438    heparin 25,000 units in dextrose 5%  250 mL (100 units/mL) infusion HIGH INTENSITY nomogram - LAF, 0-40 Units/kg/hr (Adjusted), Intravenous, Continuous, Zabrina Willett MD, Last Rate: 10.8 mL/hr at 10/11/23 0639, 17 Units/kg/hr at 10/11/23 0639    hydrALAZINE injection 10 mg, 10 mg, Intravenous, Q4H PRN, Aaliyah Woodurff DO    HYDROcodone-acetaminophen  mg per tablet 1 tablet, 1 tablet, Oral, Q6H PRN, Aaliyah Woodruff DO, 1 tablet at 10/11/23 0431    insulin aspart U-100 injection 0-10 Units, 0-10 Units, Subcutaneous, QID (AC + HS) PRN, Aaliyah Woodruff DO    insulin detemir U-100 injection 80 Units, 80 Units, Subcutaneous, QHS, Aaliyah Woodruff DO, 80 Units at 10/10/23 2107    labetalol 20 mg/4 mL (5 mg/mL) IV syring, 10 mg, Intravenous, Q4H PRN, Aaliyah Woodruff DO    losartan tablet 25 mg, 25 mg, Oral, Daily, Antoni Marie DO    melatonin tablet 6 mg, 6 mg, Oral, Nightly PRN, Aaliyah Woodruff DO    methylPREDNISolone sodium succinate injection 60 mg, 60 mg, Intravenous, Daily, Antoni Marie DO    multivitamin tablet, 1 tablet, Oral, Daily, Ana María Fernandez MD, 1 tablet at 10/10/23 0908    mycophenolate capsule 1,500 mg, 1,500 mg, Oral, BID, James Cutler MD    ondansetron tablet 4 mg, 4 mg, Oral, Q6H PRN, Aaliyah Woodruff DO, 4 mg at 10/10/23 1228    pantoprazole EC tablet 40 mg, 40 mg, Oral, Daily, Aaliyah Woodrfuf DO, 40 mg at 10/10/23 0908    polyethylene glycol packet 17 g, 17 g, Oral, BID PRN, Aaliyah Woodruff DO    sodium chloride 0.9% flush 10 mL, 10 mL, Intravenous, Q12H PRN, Aaliyah Woodruff DO    sulfamethoxazole-trimethoprim 800-160mg per tablet 1 tablet, 1 tablet, Oral, BID, Antoni Marie DO     Imaging:    Reviewed    Laboratory Data:  Hematology  Lab Results   Component Value Date    WBC 6.20 10/11/2023    HGB 10.6 (L) 10/11/2023    HCT 32.6 (L) 10/11/2023     10/11/2023     10/11/2023    K 4.7 10/11/2023    CHLORIDE 109 (H) 10/11/2023    CO2 23 10/11/2023    BUN 26.6 (H)  10/11/2023    CREATININE 1.05 (H) 10/11/2023    EGFRNORACEVR >60 10/11/2023    GLUCOSE 78 10/11/2023    CALCIUM 9.2 10/11/2023    ALKPHOS 104 10/11/2023    LABPROT 7.8 10/11/2023    ALBUMIN 3.0 (L) 10/11/2023    BILIDIR 0.1 03/28/2022    IBILI 0.10 03/28/2022    AST 12 10/11/2023    ALT 7 10/11/2023    MG 2.10 10/11/2023    PHOS 3.5 10/11/2023     Lab Results   Component Value Date    .7 (H) 03/03/2023    QMWLQKXB84YZ 15.0 (L) 08/04/2023       Lab Results   Component Value Date    IRON 219 (H) 08/04/2023    TIBC 284 08/04/2023    TRANS 152 (L) 08/04/2023    LABIRON 77 (H) 08/04/2023    FERRITIN 398.91 (H) 08/04/2023    FOLATE 5.3 (L) 01/12/2023    SVABGZLI50 674 01/12/2023    HAPTO 249 03/10/2023     (H) 03/10/2023       Lab Results   Component Value Date    HEPCAB Nonreactive 03/09/2023    HEPBSURFAG Nonreactive 03/09/2023    HEPBSAB Negative 11/17/2020       Impression  Gram-positive bacteremia  Chronic kidney disease stage 2/A3  Biopsy-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  Hypertension is now better controlled.  Continue ARB for antihypertensive and proteinuria-reducing benefit.  Cultures were positive for gm (+) cocci, patient was started on ceftriaxone pending speciation/final culture results. Will defer immunosuppression to rheumatology.     Rachel Broderick NP  [facility]   10/11/2023

## 2023-10-11 NOTE — PROGRESS NOTES
[facility]ards   Progress Note     Resident Team: [facility] Medicine List 4  Attending Physician: Syeda Altman MD  Resident:  Zabrina Willett MD  Intern: Antoni Marie DO   Date of Admit: 10/9/2023    Subjective:      Brief HPI:  56 y/o female with history of SLE, DM-2, a-fib, CKD Stage 3, HTN, GERD, migraines, and fibromyalgia presents to ED with chief complaint of generalized myalgias x 3 days. She reports chest wall pain exacerbated by deep inhalation, bilateral lower extremity pain/cramps, generalized weakness, decreased appetite, and headache. Pt states symptoms are typical of her lupus flares. She is followed by rheumatology and is currently on CellCept 1000mg BID and prednisone 5mg daily. Pt also has prednisone 20mg prescription for flares, which she she took twice over the past 3 days without relief. Pt denies fever, cough, SOB, abdominal pain, N/V/D, and urinary symptoms.   In ED, pt was hypertensive with /83 on arrival. Labs notable for troponin of 0.055, CRP of 28.6, d-dimer of 6.7, and a positive UA. Pt not currently taking home antihypertensives or ASA.       Interval History:  Blood culture positive for GPCs in 1 of 2 bottles; vanc x1 given, now DC per Infectious Disease.  Rheumatology recommends decreasing steroids, prednisone 10 q.d. and increasing CellCept to 1500 b.i.d..  Rheumatology recommends Bactrim for PJP prophylaxis.  Nephrology and rheumatology both recommend IR guided renal biopsy; will order when 2nd set of blood cultures result negative.  Cardiology recommends outpatient stress test.  Otherwise patient is doing well, no acute concerns, denies any chest pain shortness of breath nausea vomiting diarrhea dysuria.    Review of Systems:  12 point review of symptoms negative unless otherwise stated above       Objective:     Vital Signs (Most Recent):  Temp: 98 °F (36.7 °C) (10/11/23 1115)  Pulse: (!) 58 (10/11/23 1115)  Resp: 20 (10/11/23 1115)  BP: 127/77 (10/11/23 1115)  SpO2: 100 %  (10/11/23 1115) Vital Signs (24h Range):  Temp:  [97.6 °F (36.4 °C)-98.7 °F (37.1 °C)] 98 °F (36.7 °C)  Pulse:  [52-71] 58  Resp:  [13-24] 20  SpO2:  [98 %-100 %] 100 %  BP: (127-184)/(71-96) 127/77      Physical Exam:  Physical Exam  Vitals and nursing note reviewed.   Constitutional:       General: She is not in acute distress.     Appearance: Normal appearance.   HENT:      Head: Normocephalic and atraumatic.      Right Ear: External ear normal.      Left Ear: External ear normal.      Nose: Nose normal.      Mouth/Throat:      Mouth: Mucous membranes are moist.      Pharynx: Oropharynx is clear.   Eyes:      General: No scleral icterus.     Extraocular Movements: Extraocular movements intact.      Conjunctiva/sclera: Conjunctivae normal.      Pupils: Pupils are equal, round, and reactive to light.   Cardiovascular:      Rate and Rhythm: Normal rate and regular rhythm.      Pulses: Normal pulses.      Heart sounds: Normal heart sounds. No murmur heard.     No gallop.   Pulmonary:      Effort: Pulmonary effort is normal.      Breath sounds: Normal breath sounds. No wheezing, rhonchi or rales.   Abdominal:      General: Bowel sounds are normal. There is no distension.      Palpations: Abdomen is soft.      Tenderness: There is no abdominal tenderness.   Musculoskeletal:         General: Normal range of motion.      Cervical back: Normal range of motion and neck supple.   Skin:     General: Skin is warm and dry.      Capillary Refill: Capillary refill takes less than 2 seconds.      Coloration: Skin is not jaundiced.      Findings: No bruising, erythema or rash.   Neurological:      General: No focal deficit present.      Mental Status: She is alert and oriented to person, place, and time.   Psychiatric:         Mood and Affect: Mood normal.         Behavior: Behavior normal.         Laboratory  Lab Results   Component Value Date     10/11/2023    K 4.7 10/11/2023     08/23/2021    CO2 23 10/11/2023      (H) 08/23/2021    BUN 26.6 (H) 10/11/2023    CREATININE 1.05 (H) 10/11/2023    CALCIUM 9.2 10/11/2023    PROT 7.9 08/23/2021    BILIDIR 0.1 03/28/2022    IBILI 0.10 03/28/2022    ALKPHOS 104 10/11/2023    AST 12 10/11/2023    ALT 7 10/11/2023    MG 2.10 10/11/2023    PHOS 3.5 10/11/2023        Lab Results   Component Value Date    WBC 6.20 10/11/2023    RBC 3.69 (L) 10/11/2023    HGB 10.6 (L) 10/11/2023    HCT 32.6 (L) 10/11/2023    MCV 88.3 10/11/2023    MCH 28.7 10/11/2023    MCHC 32.5 (L) 10/11/2023    RDW 15.6 10/11/2023     10/11/2023    MPV 9.5 10/11/2023    GRAN 4.4 08/23/2021    GRAN 71.0 08/23/2021    LYMPH 1.5 08/23/2021    LYMPH 24.6 08/23/2021    MONO 0.2 (L) 08/23/2021    MONO 3.5 (L) 08/23/2021    EOS 0.0 08/23/2021    BASO 0.02 08/23/2021    EOSINOPHIL 0.3 08/23/2021    BASOPHIL 0.3 08/23/2021        Microbiology:  Microbiology Results (last 7 days)       Procedure Component Value Units Date/Time    Blood Culture [[id number]] Collected: 10/11/23 1334    Order Status: Sent Specimen: Blood from Wrist, Right Updated: 10/11/23 1344    Blood Culture [[id number]] Collected: 10/11/23 1334    Order Status: Sent Specimen: Blood from Hand, Right Updated: 10/11/23 1344    Blood Culture [[id number]]  (Normal) Collected: 10/09/23 2300    Order Status: Completed Specimen: Blood from Hand, Right Updated: 10/11/23 1101     CULTURE, BLOOD ([facility]) No Growth At 24 Hours    Urine culture [[id number]] Collected: 10/09/23 1932    Order Status: Completed Specimen: Urine Updated: 10/11/23 0644     Urine Culture No Growth At 24 Hours    BCID2 Panel [[id number]]  (Abnormal) Collected: 10/09/23 2300    Order Status: Completed Specimen: Blood from Wrist, Right Updated: 10/11/23 0327     CTX-M (ESBL ) N/A     IMP (Cabapenemase ) N/A     KPC resistance gene (Carbapenemase ) N/A     mcr-1 N/A     mecA ID N/A     Comment: Note: Antimicrobial resistance can occur via multiple mechanisms. A Not  Detected result for antimicrobial resistance gene(s) does not indicate antimicrobial susceptibility. Subculturing is required for species identification and susceptibility testing of   isolates.        mecA/C and MREJ (MRSA) gene N/A     NDM (Carbapenemase ) N/A     OXA-48-like (Carbapenemase ) N/A     Nik/B (VRE gene) N/A     VIM (Carbapenemase ) N/A     Enterococcus faecalis Not Detected     Enterococcus faecium Not Detected     Listeria monocytogenes Not Detected     Staphylococcus spp. Detected     Staphylococcus aureus Not Detected     Staphylococcus epidermidis Not Detected     Staphylococcus lugdunensis Not Detected     Streptococcus spp. Not Detected     Streptococcus agalactiae (Group B) Not Detected     Streptococcus pneumoniae Not Detected     Streptococcus pyogenes (Group A) Not Detected     Acinetobacter calcoaceticus/baumannii complex Not Detected     Bacteroides fragilis Not Detected     Enterobacterales Not Detected     Enterobacter cloacae complex Not Detected     Escherichia coli Not Detected     Klebsiella aerogenes Not Detected     Klebsiella oxytoca Not Detected     Klebsiella pneumoniae group Not Detected     Proteus spp. Not Detected     Salmonella spp. Not Detected     Serratia marcescens Not Detected     Haemophilus influenzae Not Detected     Neisseria meningitidis Not Detected     Pseudomonas aeruginosa Not Detected     Stenotrophomonas maltophilia Not Detected     Candida albicans Not Detected     Candida auris Not Detected     Candida glabrata Not Detected     Candida krusei Not Detected     Candida parapsilosis Not Detected     Candida tropicalis Not Detected     Cryptococcus neoformans/gattii Not Detected    Narrative:      The [employer] BCID2 Panel is a multiplexed nucleic acid test intended for the use with Beam.® 2.0 or Beam.® [employer] for the simultaneous qualitative detection and identification of multiple bacterial and yeast  nucleic acids and select genetic determinants associated with antimicrobial resistance.  The [employer] BCID2 Panel test is performed directly on blood culture samples identified as positive by a continuous monitoring blood culture system.  Results are intended to be interpreted in conjunction with Gram stain results.    Blood Culture [[id number]]  (Abnormal) Collected: 10/09/23 2300    Order Status: Completed Specimen: Blood from Wrist, Right Updated: 10/11/23 0212     GRAM STAIN Gram Positive Cocci, probable Staphylococcus      Seen in gram stain of broth only      1 of 2 Anaerobic bottles positive            Radiology:  Imaging Results              CTA Chest Non-Coronary (PE Studies) (Final result)  Result time 10/09/23 20:37:02      Final result by García Gilbert MD (10/09/23 20:37:02)                   Impression:      No CT evidence for pulmonary embolus.    Stable ground-glass patchy and confluent opacities in both lower lobes and to a lesser extent in the lingula and right middle lobe.    Slight interval decrease in axillary lymphadenopathy.      Electronically signed by: García Gilbert MD  Date:    10/09/2023  Time:    20:37               Narrative:    EXAMINATION:  CTA chest    CLINICAL HISTORY:  PE suspected    COMPARISON:  CT of the chest dated 03/09/2023    TECHNIQUE:  Routine CT angiogram of the chest was performed intravenous contrast. 3D MIP images are obtained.    Total DLP: 191 mGy.cm    Automatic exposure control was utilized to reduce the patient's dose    FINDINGS:  No intraluminal filling defects within the pulmonary arteries to suggest pulmonary embolus.  Thoracic aorta normal caliber.  There are mild atherosclerotic calcifications of the thoracic aorta.  The heart is normal in size.  There is bilateral lymphadenopathy in both axilla, slightly decreased when compared to prior.  No mediastinal or hilar lymphadenopathy.  The heart is normal in size.  No pericardial pleural effusion.  Visualized  upper abdomen appears grossly unremarkable.    The central airways are patent and unremarkable.  Is no consolidation.  There is persistent patchy and confluent areas of ground-glass opacities in both lower lobes, lingula and right middle lobe    No osseous destructive process.                                       X-Ray Chest AP Portable (Final result)  Result time 10/09/23 19:53:58      Final result by Octaviano Michelle MD (10/09/23 19:53:58)                   Narrative:    EXAMINATION  XR CHEST AP PORTABLE    CLINICAL HISTORY  Tachypnea, not elsewhere classified    TECHNIQUE  A total of 1 frontal image(s) of the chest.    COMPARISON  9 March 2023    FINDINGS  Lines/tubes/devices: none present    The cardiomediastinal silhouette and central pulmonary vasculature are unremarkable for utilized technique.  The trachea is midline. There is no lobar consolidation, pleural effusion, or pneumothorax.    There is no acute osseous or extrathoracic abnormality.    IMPRESSION  No convincing acute radiographic abnormality.      Electronically signed by: Octaviano Michelle  Date:    10/09/2023  Time:    19:53                                    Current Medications:     Infusions:   heparin (porcine) in D5W 17 Units/kg/hr (10/11/23 0730)        Scheduled:   amLODIPine  10 mg Oral Daily    aspirin  81 mg Oral Daily    atorvastatin  10 mg Oral Daily    EScitalopram oxalate  10 mg Oral Daily    insulin detemir U-100  80 Units Subcutaneous QHS    losartan  25 mg Oral Daily    multivitamin  1 tablet Oral Daily    pantoprazole  40 mg Oral Daily    predniSONE  10 mg Oral Daily    vancomycin (VANCOCIN) IV (PEDS and ADULTS)  750 mg Intravenous Q12H        PRN:  albuterol, dextrose 10%, dextrose 10%, glucagon (human recombinant), glucose, glucose, heparin (PORCINE), heparin (PORCINE), hydrALAZINE, HYDROcodone-acetaminophen, insulin aspart U-100, labetalol, melatonin, ondansetron, polyethylene glycol, sodium chloride 0.9%    Antibiotics and Day  Number of Therapy:  Bactrim 800/160 x2 qD for PJP PPX - day 1      Intake/Output Summary (Last 24 hours) at 10/11/2023 1357  Last data filed at 10/11/2023 0915  Gross per 24 hour   Intake 486.64 ml   Output 1000 ml   Net -513.36 ml       Lines/Drains/Airways       Peripheral Intravenous Line  Duration                  Peripheral IV - Single Lumen 10/10/23 1217 18 G Right Upper Arm 1 day                      Assessment & Plan:   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   - POCT glucose elevated at 122  - C3 compliment decreased at 75. C4 wnl. Sed 50  - Pt continued on on home dose of prednisone 10 mg po qd for lupus diagnosis. Rheumatology recommends Cellcept at 1500 mg po BID. Currently holding due to concern of bacteremia.   - On PJP PPX with Bactrim   - Continue to PT to aid in ambulation  - Nephrology and Rheumatology consulted; recs appreciated.   - Neph and Rheum rec IR-Renal Bx; awaiting neg BCX      (+) Staphylococci in 1 of 2 Blood Culture   R/o contamination  - vancomycin IV x1  - R/p BCX ordered   - Notify immediately if pt develops acute symptoms suggesting possible infection.      Unprovoked DVT of the R LE   - Ultrasound revealed DVT in right lower extremity  - PTT is elevated at 167.7, D-Dimer elevated at 6.78>>2.09 as of 10/11.   - Awaiting results of Lipoprotein A, Protein C activity, Lupus anticoagulant, Antithrombin III, Cardiolipin AB, and Factor V levels.   - Due to supra therapeutic APTT, 25,000 units of IV Heparin held. Continue monitor and restart per protocol.    - According to CTA of chest, there are mild atherosclerotic calcifications of the thoracic aorta. No evidence of PE.     Chest Pain with Elevated Troponin   - Trop 0.55 >>> 0.01  - Low tasha for primary ischemic  CP; likely pleuritis   - ASA 81 mg po qd.   - Cardiology consulted. Recommends outpatient stress test; assistance appreciated.     Paroxysmal Atrial Fibrillation   - HR currently controlled  - Remain on tele; continue Hep   - Cards rec d/c with eliquis 5 BID     HTN  - ECG unremarkable for ischemic changes, with mild elevation of troponin on admission. Troponins have returned to baseline.  - BP stabilized at 135/79   - Pt placed on 10 mg amlodipine po qd, losartan 25 mg po qd,   - TTE performed, awaiting results 10/11.      Anxiety  - For anxiety, pt continued on home dose of 10mg lexapro po qd.         CODE STATUS: Full Code   Access: PIV  Antibiotics: Vancomycin Day 1  Diet: Diet NPO  DVT Prophylaxis: Heparin 25k   GI Prophylaxis: protonix      Disposition: Vi Ulrich is a 57 y.o. female who is admitted for generalized weakness, unprovoked DVT of the right lower extremity, HTN, and Lupus flare.    Antoni Marie,    PGY-1 Internal Medicine

## 2023-10-11 NOTE — PLAN OF CARE
10/11/23 1329   Discharge Assessment   Assessment Type Discharge Planning Assessment   Confirmed/corrected address, phone number and insurance Yes   Confirmed Demographics Correct on Facesheet   Source of Information patient;health record   When was your last doctors appointment?   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 Yes   Do you have help at home or someone to help you manage your care at home? Yes   Who are your caregiver(s) and their phone number(s)? Diana Ulrich (Son)   [phone]   Prior to hospitilization cognitive status: Alert/Oriented   Current cognitive status: Alert/Oriented   Walking or Climbing Stairs ambulation difficulty, requires equipment   Mobility Management Rollator   Home Accessibility wheelchair accessible   Home Layout Able to live on 1st floor   Equipment Currently Used at Home bedside commode;rollator   Readmission within 30 days? No   Patient currently being followed by outpatient case management? No   Do you currently have service(s) that help you manage your care at home? No   Do you take prescription medications? Yes   Do you have prescription coverage? Yes   Coverage [facility] M/D   Do you have any problems affording any of your prescribed medications? No   Is the patient taking medications as prescribed? yes   Who is going to help you get home at discharge? Family   How do you get to doctors appointments? car, drives self   Are you on dialysis?  No   DME Needed Upon Discharge  none   Discharge Plan discussed with: Patient   Transition of Care Barriers None   Discharge Plan A Home with family   OTHER   Name(s) of People in Home Mother (80 yrs); Special needs dghtr (30 yrs)     Pt single; Special needs dghtr and elderly mother reside with pt; Pt previously discharged from  services (unable to recall name of provider); Pt independent with ADL's; CM to follow for d/c planning needs.

## 2023-10-11 NOTE — PROGRESS NOTES
Cardiology Progress Note    History of Present Illness:  This is a 57 y.o. female with HTN, PAF, CKD III, SLE, and fibromyalgia that presents with suspect SLE flare. Cardiology consulted for abnormal troponin.     No acute events overnight. Patient is without acute complaint this morning.     Review of patient's allergies indicates:   Allergen Reactions    Ketorolac (pf) Swelling    Mycophenolate mofetil Swelling    Penicillins Hives    Percocet [oxycodone-acetaminophen] Hives and Itching    Tramadol Hives       Review of systems: 12 point review of systems conducted, negative except as stated in the HPI.     Past Medical History:   Past Medical History:   Diagnosis Date    Arthritis     knees    Asthma     Atrial fibrillation     Chronic constipation     Diabetes mellitus     Encounter for blood transfusion 10/2014    GERD (gastroesophageal reflux disease)     Gout     Hypertension     Lupus 2004    SLE    Renal disorder     SLE (systemic lupus erythematosus)        Procedure History:   Past Surgical History:   Procedure Laterality Date    APPENDECTOMY      CHOLECYSTECTOMY      COLONOSCOPY N/A 4/19/2018    Procedure: COLONOSCOPY;  Surgeon: Minerva Porras MD;  Location: [facility];  Service: Endoscopy;  Laterality: N/A;    ESOPHAGOGASTRODUODENOSCOPY N/A 4/19/2018    Procedure: ESOPHAGOGASTRODUODENOSCOPY (EGD);  Surgeon: Minerva Porras MD;  Location: [facility];  Service: Endoscopy;  Laterality: N/A;    HYSTERECTOMY      JOINT REPLACEMENT Left 08/07/2015    Knee    LYMPH NODE BIOPSY Left 2014    MASTECTOMY      Left arm- NO BP    PARTIAL HYSTERECTOMY         Family History: family history includes Arthritis in her mother; Asthma in her mother; Diabetes in her mother and sister; Heart block in her mother; Heart disease in her father and sister; Kidney disease in her sister.    Social History:  reports that she quit smoking about 17 months ago. Her smoking use included cigarettes. She started smoking  about 42 years ago. She has never used smokeless tobacco. She reports that she does not drink alcohol and does not use drugs.    Home Medications:   Medications Prior to Admission   Medication Sig Dispense Refill Last Dose    albuterol (ACCUNEB) 0.63 mg/3 mL Nebu Take 3 mLs (0.63 mg total) by nebulization every 4 (four) hours as needed (wheezing). Rescue 75 mL 3     albuterol sulfate (PROAIR RESPICLICK) 90 mcg/actuation inhaler Inhale 1 puff into the lungs every 4 (four) hours as needed for Wheezing. Rescue       atorvastatin (LIPITOR) 10 MG tablet Take 10 mg by mouth once daily.       blood sugar diagnostic (ONETOUCH VERIO) Strp 1 each by Misc.(Non-Drug; Combo Route) route 4 (four) times daily. 50 each 11     blood sugar diagnostic Strp 1 each by Misc.(Non-Drug; Combo Route) route 4 (four) times daily. 50 each 11     blood-glucose meter (FREESTYLE SYSTEM KIT) kit Use as instructed 1 each 0     eletriptan (RELPAX) 40 MG tablet Take 1 tablet (40 mg total) by mouth as needed (take one at the beginning of migraine, may repeat in 2 h. max 2 in 24h). may repeat in 2 hours if necessary 9 tablet 5     ergocalciferol (ERGOCALCIFEROL) 50,000 unit Cap Take 1 capsule (50,000 Units total) by mouth every 7 days. for 12 doses 12 capsule 0     EScitalopram oxalate (LEXAPRO) 10 MG tablet Take 1 tablet (10 mg total) by mouth once daily. 30 tablet 5     HYDROcodone-acetaminophen (NORCO)  mg per tablet Take 1 tablet by mouth every 6 (six) hours as needed for Pain. 90 tablet 0     insulin (LANTUS SOLOSTAR U-100 INSULIN) glargine 100 units/mL SubQ pen Inject 80 Units into the skin every evening. 72 mL 3     lancets 28 gauge Misc 100 lancets by Misc.(Non-Drug; Combo Route) route 3 (three) times daily. 100 each 0     lancets 33 gauge Misc 1 lancet by Misc.(Non-Drug; Combo Route) route 4 (four) times daily. 100 each 11     LUPKYNIS 7.9 mg Cap Take by mouth.       mycophenolate (CELLCEPT) 500 mg Tab Take 2 tablets (1,000 mg total) by  mouth 2 (two) times daily. 120 tablet 11     ondansetron (ZOFRAN) 4 MG tablet TAKE 1 TABLET(4 MG) BY MOUTH EVERY 6 HOURS AS NEEDED FOR NAUSEA 24 tablet 0     pantoprazole (PROTONIX) 40 MG tablet Take 1 tablet (40 mg total) by mouth once daily. 90 tablet 3     predniSONE (DELTASONE) 5 MG tablet Take 1 tablet (5 mg total) by mouth once daily. 30 tablet 11     walker Misc Please dispense 1 Rollator 1 each 0        Inpatient Medications:     Current Facility-Administered Medications:     albuterol nebulizer solution 0.63 mg, 0.63 mg, Nebulization, Q4H PRN, Aaliyah Woodruff, DO    amLODIPine tablet 10 mg, 10 mg, Oral, Daily, Zabrina Willett MD, 10 mg at 10/11/23 0915    aspirin chewable tablet 81 mg, 81 mg, Oral, Daily, Ana María Fernandez MD, 81 mg at 10/11/23 0915    atorvastatin tablet 10 mg, 10 mg, Oral, Daily, Aaliyah Woodruff DO, 10 mg at 10/11/23 0914    dextrose 10% bolus 125 mL 125 mL, 12.5 g, Intravenous, PRN, [place] Aaliyah, DO    dextrose 10% bolus 250 mL 250 mL, 25 g, Intravenous, PRN, StKaterina Bainsn, DO    EScitalopram oxalate tablet 10 mg, 10 mg, Oral, Daily, StAaliyah Bains, , 10 mg at 10/11/23 0915    glucagon (human recombinant) injection 1 mg, 1 mg, Intramuscular, PRN, StKaterina Bainsn, DO    glucose chewable tablet 16 g, 16 g, Oral, PRN, [facility] Aaliyah, DO    glucose chewable tablet 24 g, 24 g, Oral, PRN, StNora Bainselyn, DO    heparin 25,000 units in dextrose 5% (100 units/ml) IV bolus from bag - ADDITIONAL PRN BOLUS - 30 units/kg, 30 Units/kg (Adjusted), Intravenous, MANUEL, Zabrina Willett MD    heparin 25,000 units in dextrose 5% (100 units/ml) IV bolus from bag - ADDITIONAL PRN BOLUS - 60 units/kg, 60 Units/kg (Adjusted), Intravenous, PRBENSON, Zabrina Willett MD, 3,828 Units at 10/11/23 0438    heparin 25,000 units in dextrose 5% 250 mL (100 units/mL) infusion HIGH INTENSITY nomogram - LAF, 0-40 Units/kg/hr (Adjusted), Intravenous, Continuous, Zabrina Willett MD, Last Rate:  10.8 mL/hr at 10/11/23 0730, 17 Units/kg/hr at 10/11/23 0730    hydrALAZINE injection 10 mg, 10 mg, Intravenous, Q4H PRN, Aaliyah Woodruff DO    HYDROcodone-acetaminophen  mg per tablet 1 tablet, 1 tablet, Oral, Q6H PRN, Aaliyah Woodruff DO, 1 tablet at 10/11/23 0431    insulin aspart U-100 injection 0-10 Units, 0-10 Units, Subcutaneous, QID (AC + HS) PRN, Aaliyah Woodruff DO    insulin detemir U-100 injection 80 Units, 80 Units, Subcutaneous, QHS, Aaliyah Woodruff DO, 80 Units at 10/10/23 2107    labetalol 20 mg/4 mL (5 mg/mL) IV syring, 10 mg, Intravenous, Q4H PRN, Aaliyah Woodruff DO    losartan tablet 25 mg, 25 mg, Oral, Daily, Antoni Marie DO, 25 mg at 10/11/23 0915    melatonin tablet 6 mg, 6 mg, Oral, Nightly PRN, Aaliyah Woodruff DO    multivitamin tablet, 1 tablet, Oral, Daily, Ana María Fernandez MD, 1 tablet at 10/11/23 0915    ondansetron tablet 4 mg, 4 mg, Oral, Q6H PRN, Aaliyah Woodruff DO, 4 mg at 10/10/23 1228    pantoprazole EC tablet 40 mg, 40 mg, Oral, Daily, Aaliyah Woodruff DO, 40 mg at 10/11/23 0915    polyethylene glycol packet 17 g, 17 g, Oral, BID PRN, Aaliyah Woodruff DO    predniSONE tablet 10 mg, 10 mg, Oral, Daily, Antoni Marie DO, 10 mg at 10/11/23 0927    sodium chloride 0.9% flush 10 mL, 10 mL, Intravenous, Q12H PRN, Aaliyah Woodruff DO    [START ON 10/12/2023] vancomycin (VANCOCIN) 1,750 mg in dextrose 5 % (D5W) 500 mL IVPB, 1,750 mg, Intravenous, Q24H, Antoni Marie,     vancomycin 750 mg in dextrose 5 % (D5W) 250 mL IVPB, 750 mg, Intravenous, Q12H, Syeda Altman MD     Laboratory Data:   Recent Results (from the past 24 hour(s))   Troponin I    Collection Time: 10/10/23  3:25 PM   Result Value Ref Range    Troponin-I <0.010 0.000 - 0.045 ng/mL   POCT glucose    Collection Time: 10/10/23  3:30 PM   Result Value Ref Range    POCT Glucose 95 70 - 110 mg/dL   Antithrombin Activity    Collection Time: 10/10/23  4:01 PM   Result Value  Ref Range    Antithromb  82 - 139 %   POCT glucose    Collection Time: 10/10/23  6:15 PM   Result Value Ref Range    POCT Glucose 149 (H) 70 - 110 mg/dL   PTT Heparin Monitoring    Collection Time: 10/10/23  6:43 PM   Result Value Ref Range    PTT Heparin Monitor 154.2 (HH) 23.2 - 33.7 seconds   POCT glucose    Collection Time: 10/10/23  7:17 PM   Result Value Ref Range    POCT Glucose 122 (H) 70 - 110 mg/dL   Comprehensive Metabolic Panel (CMP)    Collection Time: 10/11/23  2:12 AM   Result Value Ref Range    Sodium Level 140 136 - 145 mmol/L    Potassium Level 4.7 3.5 - 5.1 mmol/L    Chloride 109 (H) 98 - 107 mmol/L    Carbon Dioxide 23 22 - 29 mmol/L    Glucose Level 78 74 - 100 mg/dL    Blood Urea Nitrogen 26.6 (H) 9.8 - 20.1 mg/dL    Creatinine 1.05 (H) 0.55 - 1.02 mg/dL    Calcium Level Total 9.2 8.4 - 10.2 mg/dL    Protein Total 7.8 6.4 - 8.3 gm/dL    Albumin Level 3.0 (L) 3.5 - 5.0 g/dL    Globulin 4.8 (H) 2.4 - 3.5 gm/dL    Albumin/Globulin Ratio 0.6 (L) 1.1 - 2.0 ratio    Bilirubin Total 0.1 <=1.5 mg/dL    Alkaline Phosphatase 104 40 - 150 unit/L    Alanine Aminotransferase 7 0 - 55 unit/L    Aspartate Aminotransferase 12 5 - 34 unit/L    eGFR >60 mls/min/1.73/m2   Magnesium    Collection Time: 10/11/23  2:12 AM   Result Value Ref Range    Magnesium Level 2.10 1.60 - 2.60 mg/dL   Phosphorus    Collection Time: 10/11/23  2:12 AM   Result Value Ref Range    Phosphorus Level 3.5 2.3 - 4.7 mg/dL   APTT    Collection Time: 10/11/23  2:12 AM   Result Value Ref Range    PTT 47.3 (H) 23.2 - 33.7 seconds   CBC with Differential    Collection Time: 10/11/23  2:12 AM   Result Value Ref Range    WBC 6.20 4.50 - 11.50 x10(3)/mcL    RBC 3.69 (L) 4.20 - 5.40 x10(6)/mcL    Hgb 10.6 (L) 12.0 - 16.0 g/dL    Hct 32.6 (L) 37.0 - 47.0 %    MCV 88.3 80.0 - 94.0 fL    MCH 28.7 27.0 - 31.0 pg    MCHC 32.5 (L) 33.0 - 36.0 g/dL    RDW 15.6 11.5 - 17.0 %    Platelet 397 130 - 400 x10(3)/mcL    MPV 9.5 7.4 - 10.4 fL    Neut %  87.2 %    Lymph % 11.0 %    Mono % 1.5 %    Eos % 0.0 %    Basophil % 0.0 %    Lymph # 0.68 0.6 - 4.6 x10(3)/mcL    Neut # 5.41 2.1 - 9.2 x10(3)/mcL    Mono # 0.09 (L) 0.1 - 1.3 x10(3)/mcL    Eos # 0.00 0 - 0.9 x10(3)/mcL    Baso # 0.00 <=0.2 x10(3)/mcL    IG# 0.02 0 - 0.04 x10(3)/mcL    IG% 0.3 %    NRBC% 0.0 %   POCT glucose    Collection Time: 10/11/23  6:28 AM   Result Value Ref Range    POCT Glucose 60 (L) 70 - 110 mg/dL   Homocysteine, serum    Collection Time: 10/11/23  8:04 AM   Result Value Ref Range    Homocysteine Total 12.1 5.1 - 15.4 umol/L   Protime-INR    Collection Time: 10/11/23  8:04 AM   Result Value Ref Range    PT 13.7 11.4 - 14.0 seconds    INR 1.0 <=1.3   D-dimer, quantitative    Collection Time: 10/11/23  8:04 AM   Result Value Ref Range    D-Dimer 2.09 (H) 0.00 - 0.50 ug/mL FEU   Fibrinogen    Collection Time: 10/11/23  8:04 AM   Result Value Ref Range    Fibrinogen 512.0 (H) 210.0 - 463.0 mg/dL   APTT    Collection Time: 10/11/23  8:04 AM   Result Value Ref Range    .1 (HH) 23.2 - 33.7 seconds   Echo    Collection Time: 10/11/23  9:02 AM   Result Value Ref Range    BSA 1.84 m2    Khan's Biplane MOD Ejection Fraction 60 %    LVOT stroke volume 67.31 cm3    LVIDd 4.35 3.5 - 6.0 cm    LV Systolic Volume 33.32 mL    LV Systolic Volume Index 18.4 mL/m2    LVIDs 2.94 2.1 - 4.0 cm    LV Diastolic Volume 85.42 mL    LV Diastolic Volume Index 47.19 mL/m2    IVS 1.27 (A) 0.6 - 1.1 cm    LVOT diameter 2.13 cm    LVOT area 3.6 cm2    FS 32 28 - 44 %    Left Ventricle Relative Wall Thickness 0.55 cm    Posterior Wall 1.19 (A) 0.6 - 1.1 cm    LV mass 194.88 g    LV Mass Index 108 g/m2    MV Peak E Kailash 1.21 m/s    TDI LATERAL 0.09 m/s    TDI SEPTAL 0.08 m/s    E/E' ratio 14.24 m/s    MV Peak A Kailash 0.76 m/s    TR Max Kailash 2.20 m/s    E/A ratio 1.59     E wave deceleration time 332.89 msec    LV SEPTAL E/E' RATIO 15.13 m/s    LV LATERAL E/E' RATIO 13.44 m/s    LVOT peak kailash 0.85 m/s    Left  "Ventricular Outflow Tract Mean Velocity 0.56 cm/s    Left Ventricular Outflow Tract Mean Gradient 1.44 mmHg    RVDD 2.30 cm    AV mean gradient 4 mmHg    AV peak gradient 8 mmHg    Ao peak gurdeep 1.43 m/s    Ao VTI 31.80 cm    LVOT peak VTI 18.90 cm    AV valve area 2.12 cm²    AV Velocity Ratio 0.59     AV index (prosthetic) 0.59     NINA by Velocity Ratio 2.12 cm²    MV mean gradient 2 mmHg    MV peak gradient 8 mmHg    MV stenosis pressure 1/2 time 96.54 ms    MV valve area p 1/2 method 2.28 cm2    MV valve area by continuity eq 1.61 cm2    MV VTI 41.9 cm    Triscuspid Valve Regurgitation Peak Gradient 19 mmHg    Mean e' 0.09 m/s    ZLVIDS -0.40     ZLVIDD -1.41     TV resting pulmonary artery pressure 27 mmHg    RV TB RVSP 10 mmHg    Est. RA pres 8 mmHg   APTT    Collection Time: 10/11/23 10:38 AM   Result Value Ref Range    PTT 93.5 (H) 23.2 - 33.7 seconds   POCT glucose    Collection Time: 10/11/23 12:14 PM   Result Value Ref Range    POCT Glucose 68 (L) 70 - 110 mg/dL       Imaging:  CTA Chest Non-Coronary (PE Studies)   Final Result      No CT evidence for pulmonary embolus.      Stable ground-glass patchy and confluent opacities in both lower lobes and to a lesser extent in the lingula and right middle lobe.      Slight interval decrease in axillary lymphadenopathy.         Electronically signed by: García Gilbert MD   Date:    10/09/2023   Time:    20:37      X-Ray Chest AP Portable   Final Result          Physical Exam:   /77   Pulse (!) 58   Temp 98 °F (36.7 °C) (Oral)   Resp 20   Ht 5' 5" (1.651 m)   Wt 73.5 kg (162 lb)   LMP  (LMP Unknown)   SpO2 100%   BMI 26.96 kg/m²  Body mass index is 26.96 kg/m².  Physical Exam  Vitals reviewed.   Constitutional:       General: She is not in acute distress.     Appearance: Normal appearance. She is normal weight. She is not ill-appearing.   HENT:      Head: Normocephalic and atraumatic.      Right Ear: External ear normal.      Left Ear: External ear " normal.      Nose: Nose normal.      Mouth/Throat:      Mouth: Mucous membranes are moist.   Eyes:      Conjunctiva/sclera: Conjunctivae normal.   Cardiovascular:      Rate and Rhythm: Normal rate and regular rhythm.      Pulses: Normal pulses.      Heart sounds: Murmur heard.   Pulmonary:      Effort: Pulmonary effort is normal. No respiratory distress.      Breath sounds: Normal breath sounds. No stridor. No wheezing, rhonchi or rales.   Chest:      Chest wall: No tenderness.   Abdominal:      General: Abdomen is flat. Bowel sounds are normal. There is no distension.      Palpations: Abdomen is soft.   Musculoskeletal:      Cervical back: Neck supple.      Right lower leg: No edema.      Left lower leg: No edema.   Skin:     General: Skin is warm and dry.      Capillary Refill: Capillary refill takes less than 2 seconds.   Neurological:      Mental Status: She is alert and oriented to person, place, and time.   Psychiatric:         Mood and Affect: Mood normal.         Behavior: Behavior normal.           Impression:   57 y.o. female with HTN, PAF, CKD III, SLE, and fibromyalgia that presents with suspect SLE flare. Cardiology consulted for abnormal troponin.     Plan:   Chest Pain with Elevated Troponin  -Low suspicion for primary ischemic cardiac chest pain. Could consider husam-pericarditis but ECG is not suggestive and troponin trend is not consistent with this. Perhaps the cause is pleuritis from flare.   -The patient does report symptoms potentially representative of angina outpatient with chest tightness along with dyspnea on exertion. Additionally has risk factors of HTN, CKD, and SLE which can contribute to CAD. Recommend outpatient stress testing with cardiology follow up.   -Story, enzymes, and ECG are not consistent with Type I event; however, can use heparin for DVT and AF CVA PPX per primary team.      PAF  -Rate control with beta blocker if needed but heart rate is currently controlled. Keep on  telemetry.   -Consider anticoagulation as CV2 score is at least 2. Recommend Eliquis 5 mg BID on DC. On Heparin currently.      Sriram Rae MD  [facility]

## 2023-10-11 NOTE — CONSULTS
Cardiology Consult Note    History of Present Illness:  This is a 57 y.o. female with HTN, PAF, CKD III, SLE, and fibromyalgia who presented with generalized pain for several days. She reports generalized body pain but also chest discomfort with inspiration and laying supine. The pain was relieved with leaning forward. It was exacerbated by inspiration. It was not associated with exertion or relieved with rest; however, she reports she intermittently gets symptoms of chest tightness and dyspnea with exertion in the outpatient setting. She was noted to be hypertensive on arrival. She was noted to have an elevated d-dimer but CTA chest was unremarkable for PE. She had a mild troponin elevation that subsequently returned to baseline. ECG not remarkable for acute ischemic changes. Found to have RLE DVT.     Review of patient's allergies indicates:   Allergen Reactions    Ketorolac (pf) Swelling    Mycophenolate mofetil Swelling    Penicillins Hives    Percocet [oxycodone-acetaminophen] Hives and Itching    Tramadol Hives       Review of systems: 12 point review of systems conducted, negative except as stated in the HPI.     Past Medical History:   Past Medical History:   Diagnosis Date    Arthritis     knees    Asthma     Atrial fibrillation     Chronic constipation     Diabetes mellitus     Encounter for blood transfusion 10/2014    GERD (gastroesophageal reflux disease)     Gout     Hypertension     Lupus 2004    SLE    Renal disorder     SLE (systemic lupus erythematosus)        Procedure History:   Past Surgical History:   Procedure Laterality Date    APPENDECTOMY      CHOLECYSTECTOMY      COLONOSCOPY N/A 4/19/2018    Procedure: COLONOSCOPY;  Surgeon: Minerva Porras MD;  Location: [facility];  Service: Endoscopy;  Laterality: N/A;    ESOPHAGOGASTRODUODENOSCOPY N/A 4/19/2018    Procedure: ESOPHAGOGASTRODUODENOSCOPY (EGD);  Surgeon: Minerva Porras MD;  Location: [facility];  Service: Endoscopy;   Laterality: N/A;    HYSTERECTOMY      JOINT REPLACEMENT Left 08/07/2015    Knee    LYMPH NODE BIOPSY Left 2014    MASTECTOMY      Left arm- NO BP    PARTIAL HYSTERECTOMY         Family History: family history includes Arthritis in her mother; Asthma in her mother; Diabetes in her mother and sister; Heart block in her mother; Heart disease in her father and sister; Kidney disease in her sister.    Social History:  reports that she quit smoking about 16 months ago. Her smoking use included cigarettes. She started smoking about 42 years ago. She has never used smokeless tobacco. She reports that she does not drink alcohol and does not use drugs.    Home Medications:   Medications Prior to Admission   Medication Sig Dispense Refill Last Dose    albuterol (ACCUNEB) 0.63 mg/3 mL Nebu Take 3 mLs (0.63 mg total) by nebulization every 4 (four) hours as needed (wheezing). Rescue 75 mL 3     albuterol sulfate (PROAIR RESPICLICK) 90 mcg/actuation inhaler Inhale 1 puff into the lungs every 4 (four) hours as needed for Wheezing. Rescue       atorvastatin (LIPITOR) 10 MG tablet Take 10 mg by mouth once daily.       blood sugar diagnostic (ONETOUCH VERIO) Strp 1 each by Misc.(Non-Drug; Combo Route) route 4 (four) times daily. 50 each 11     blood sugar diagnostic Strp 1 each by Misc.(Non-Drug; Combo Route) route 4 (four) times daily. 50 each 11     blood-glucose meter (FREESTYLE SYSTEM KIT) kit Use as instructed 1 each 0     eletriptan (RELPAX) 40 MG tablet Take 1 tablet (40 mg total) by mouth as needed (take one at the beginning of migraine, may repeat in 2 h. max 2 in 24h). may repeat in 2 hours if necessary 9 tablet 5     ergocalciferol (ERGOCALCIFEROL) 50,000 unit Cap Take 1 capsule (50,000 Units total) by mouth every 7 days. for 12 doses 12 capsule 0     EScitalopram oxalate (LEXAPRO) 10 MG tablet Take 1 tablet (10 mg total) by mouth once daily. 30 tablet 5     HYDROcodone-acetaminophen (NORCO)  mg per tablet Take 1  tablet by mouth every 6 (six) hours as needed for Pain. 90 tablet 0     insulin (LANTUS SOLOSTAR U-100 INSULIN) glargine 100 units/mL SubQ pen Inject 80 Units into the skin every evening. 72 mL 3     lancets 28 gauge Misc 100 lancets by Misc.(Non-Drug; Combo Route) route 3 (three) times daily. 100 each 0     lancets 33 gauge Misc 1 lancet by Misc.(Non-Drug; Combo Route) route 4 (four) times daily. 100 each 11     LUPKYNIS 7.9 mg Cap Take by mouth.       mycophenolate (CELLCEPT) 500 mg Tab Take 2 tablets (1,000 mg total) by mouth 2 (two) times daily. 120 tablet 11     ondansetron (ZOFRAN) 4 MG tablet TAKE 1 TABLET(4 MG) BY MOUTH EVERY 6 HOURS AS NEEDED FOR NAUSEA 24 tablet 0     pantoprazole (PROTONIX) 40 MG tablet Take 1 tablet (40 mg total) by mouth once daily. 90 tablet 3     predniSONE (DELTASONE) 5 MG tablet Take 1 tablet (5 mg total) by mouth once daily. 30 tablet 11     walker Misc Please dispense 1 Rollator 1 each 0        Inpatient Medications:     Current Facility-Administered Medications:     albuterol nebulizer solution 0.63 mg, 0.63 mg, Nebulization, Q4H PRN, Aaliyah Woodruff DO    amLODIPine tablet 10 mg, 10 mg, Oral, Daily, Zabrina Willett MD    aspirin chewable tablet 81 mg, 81 mg, Oral, Daily, Ana María Fernandez MD, 81 mg at 10/10/23 0908    atorvastatin tablet 10 mg, 10 mg, Oral, Daily, Aaliyah Woodruff DO, 10 mg at 10/10/23 0908    cefTRIAXone (ROCEPHIN) 1 g in dextrose 5 % in water (D5W) 100 mL IVPB (MB+), 1 g, Intravenous, Q24H, Aaliyah Woodruff DO, Stopped at 10/10/23 0026    dextrose 10% bolus 125 mL 125 mL, 12.5 g, Intravenous, PRN, Aaliyah Woodruff DO    dextrose 10% bolus 250 mL 250 mL, 25 g, Intravenous, PRN, Aaliyah Woodruff DO    EScitalopram oxalate tablet 10 mg, 10 mg, Oral, Daily, Aaliyah Woodruff DO, 10 mg at 10/10/23 0908    glucagon (human recombinant) injection 1 mg, 1 mg, Intramuscular, PRN, Aaliyah Woodruff DO    glucose chewable tablet 16 g, 16 g,  Oral, PRN, Aaliyah Woodruff DO    glucose chewable tablet 24 g, 24 g, Oral, PRN, Aaliyah Woodruff DO    heparin 25,000 units in dextrose 5% (100 units/ml) IV bolus from bag - ADDITIONAL PRN BOLUS - 30 units/kg, 30 Units/kg (Adjusted), Intravenous, PRN, Zabrina Willett MD    heparin 25,000 units in dextrose 5% (100 units/ml) IV bolus from bag - ADDITIONAL PRN BOLUS - 60 units/kg, 60 Units/kg (Adjusted), Intravenous, PRN, Zabrina Willett MD    heparin 25,000 units in dextrose 5% 250 mL (100 units/mL) infusion HIGH INTENSITY nomogram - LAF, 0-40 Units/kg/hr (Adjusted), Intravenous, Continuous, Zabrina Willett MD, Paused at 10/10/23 1928    hydrALAZINE injection 10 mg, 10 mg, Intravenous, Q4H PRN, Aaliyah Woodruff DO    HYDROcodone-acetaminophen  mg per tablet 1 tablet, 1 tablet, Oral, Q6H PRN, Aaliyah Woodruff DO, 1 tablet at 10/10/23 0608    insulin aspart U-100 injection 0-10 Units, 0-10 Units, Subcutaneous, QID (AC + HS) PRN, Aaliyah Woodruff DO    insulin detemir U-100 injection 80 Units, 80 Units, Subcutaneous, QHS, Aaliyah Woodruff DO, 80 Units at 10/10/23 0028    labetalol 20 mg/4 mL (5 mg/mL) IV syring, 10 mg, Intravenous, Q4H PRN, Aaliyah Woodruff DO    melatonin tablet 6 mg, 6 mg, Oral, Nightly PRN, Aaliyah Woodruff DO    methylPREDNISolone sodium succinate injection 100 mg, 100 mg, Intravenous, BID, Ana María Fernandez MD, 100 mg at 10/10/23 0908    multivitamin tablet, 1 tablet, Oral, Daily, Ana María Fernandez MD, 1 tablet at 10/10/23 0908    mycophenolate capsule 1,500 mg, 1,500 mg, Oral, BID, James Cutler MD    ondansetron tablet 4 mg, 4 mg, Oral, Q6H PRN, Aaliyah Woodruff DO, 4 mg at 10/10/23 1228    pantoprazole EC tablet 40 mg, 40 mg, Oral, Daily, Aaliyah Woodruff DO, 40 mg at 10/10/23 0908    polyethylene glycol packet 17 g, 17 g, Oral, BID PRN, Aaliyah Woodruff DO    sodium chloride 0.9% flush 10 mL, 10 mL, Intravenous, Q12H PRN, Aaliyah Woodruff DO      Laboratory Data:   Recent Results (from the past 24 hour(s))   RT Blood Gas    Collection Time: 10/09/23  8:07 PM   Result Value Ref Range    Sample Type Venous Blood     Drawn by LAB     pH, Blood gas 7.330 7.300 - 7.400    pCO2, Blood gas 53.0 (H) 40.0 - 50.0 mmHg    pO2, Blood gas 96.0 (H) 30.0 - 40.0 mmHg    TOC2, Blood gas 29.5 mmol/L    Base Excess, Blood gas 1.20 mmol/L    sO2, Blood gas 99.0 %    HCO3, Blood gas 27.9 mmol/L    THb, Blood gas 11.2 g/dL    O2 Hb, Blood Gas 95.5 %    CO Hgb 3.0 %    Met Hgb 0.4 %    FIO2, Blood gas 21.0 %   Sedimentation Rate    Collection Time: 10/09/23 11:00 PM   Result Value Ref Range    Sed Rate 50 (H) 0 - 20 mm/hr   Hemoglobin A1C    Collection Time: 10/09/23 11:00 PM   Result Value Ref Range    Hemoglobin A1c 5.4 <=7.0 %    Estimated Average Glucose 108.3 mg/dL   POCT glucose    Collection Time: 10/10/23 12:06 AM   Result Value Ref Range    POCT Glucose 137 (H) 70 - 110 mg/dL   Comprehensive Metabolic Panel (CMP)    Collection Time: 10/10/23  1:52 AM   Result Value Ref Range    Sodium Level 135 (L) 136 - 145 mmol/L    Potassium Level 4.8 3.5 - 5.1 mmol/L    Chloride 105 98 - 107 mmol/L    Carbon Dioxide 22 22 - 29 mmol/L    Glucose Level 181 (H) 74 - 100 mg/dL    Blood Urea Nitrogen 19.3 9.8 - 20.1 mg/dL    Creatinine 1.09 (H) 0.55 - 1.02 mg/dL    Calcium Level Total 8.9 8.4 - 10.2 mg/dL    Protein Total 7.7 6.4 - 8.3 gm/dL    Albumin Level 2.9 (L) 3.5 - 5.0 g/dL    Globulin 4.8 (H) 2.4 - 3.5 gm/dL    Albumin/Globulin Ratio 0.6 (L) 1.1 - 2.0 ratio    Bilirubin Total 0.1 <=1.5 mg/dL    Alkaline Phosphatase 116 40 - 150 unit/L    Alanine Aminotransferase 8 0 - 55 unit/L    Aspartate Aminotransferase 14 5 - 34 unit/L    eGFR 60 mls/min/1.73/m2   Magnesium    Collection Time: 10/10/23  1:52 AM   Result Value Ref Range    Magnesium Level 1.90 1.60 - 2.60 mg/dL   Phosphorus    Collection Time: 10/10/23  1:52 AM   Result Value Ref Range    Phosphorus Level 3.5 2.3 - 4.7  mg/dL   Troponin I    Collection Time: 10/10/23  1:52 AM   Result Value Ref Range    Troponin-I <0.010 0.000 - 0.045 ng/mL   CBC with Differential    Collection Time: 10/10/23  1:52 AM   Result Value Ref Range    WBC 5.93 4.50 - 11.50 x10(3)/mcL    RBC 3.82 (L) 4.20 - 5.40 x10(6)/mcL    Hgb 11.0 (L) 12.0 - 16.0 g/dL    Hct 34.3 (L) 37.0 - 47.0 %    MCV 89.8 80.0 - 94.0 fL    MCH 28.8 27.0 - 31.0 pg    MCHC 32.1 (L) 33.0 - 36.0 g/dL    RDW 15.9 11.5 - 17.0 %    Platelet 411 (H) 130 - 400 x10(3)/mcL    MPV 10.0 7.4 - 10.4 fL    Neut % 86.9 %    Lymph % 11.6 %    Mono % 1.0 %    Eos % 0.0 %    Basophil % 0.2 %    Lymph # 0.69 0.6 - 4.6 x10(3)/mcL    Neut # 5.15 2.1 - 9.2 x10(3)/mcL    Mono # 0.06 (L) 0.1 - 1.3 x10(3)/mcL    Eos # 0.00 0 - 0.9 x10(3)/mcL    Baso # 0.01 <=0.2 x10(3)/mcL    IG# 0.02 0 - 0.04 x10(3)/mcL    IG% 0.3 %    NRBC% 0.0 %   Protein/Creatinine Ratio, Urine    Collection Time: 10/10/23  6:23 AM   Result Value Ref Range    Urine Protein Level 377.6 mg/dL    Urine Creatinine 75.9 45.0 - 106.0 mg/dL    Urine Protein/Creatinine Ratio 5.0    POCT urine pregnancy    Collection Time: 10/10/23  6:26 AM   Result Value Ref Range    POC Preg Test, Ur Negative Negative     Acceptable Yes    Troponin I    Collection Time: 10/10/23  8:20 AM   Result Value Ref Range    Troponin-I <0.010 0.000 - 0.045 ng/mL   POCT glucose    Collection Time: 10/10/23  8:47 AM   Result Value Ref Range    POCT Glucose 130 (H) 70 - 110 mg/dL   APTT    Collection Time: 10/10/23 10:10 AM   Result Value Ref Range    PTT 40.2 (H) 23.2 - 33.7 seconds   Protime-INR    Collection Time: 10/10/23 10:10 AM   Result Value Ref Range    PT 13.6 11.4 - 14.0 seconds    INR 1.0 <=1.3   CBC with Differential    Collection Time: 10/10/23 10:10 AM   Result Value Ref Range    WBC 4.75 4.50 - 11.50 x10(3)/mcL    RBC 4.48 4.20 - 5.40 x10(6)/mcL    Hgb 13.0 12.0 - 16.0 g/dL    Hct 40.2 37.0 - 47.0 %    MCV 89.7 80.0 - 94.0 fL    MCH 29.0 27.0  "- 31.0 pg    MCHC 32.3 (L) 33.0 - 36.0 g/dL    RDW 15.8 11.5 - 17.0 %    Platelet 393 130 - 400 x10(3)/mcL    MPV 9.8 7.4 - 10.4 fL    Neut % 77.3 %    Lymph % 20.2 %    Mono % 1.9 %    Eos % 0.0 %    Basophil % 0.2 %    Lymph # 0.96 0.6 - 4.6 x10(3)/mcL    Neut # 3.67 2.1 - 9.2 x10(3)/mcL    Mono # 0.09 (L) 0.1 - 1.3 x10(3)/mcL    Eos # 0.00 0 - 0.9 x10(3)/mcL    Baso # 0.01 <=0.2 x10(3)/mcL    IG# 0.02 0 - 0.04 x10(3)/mcL    IG% 0.4 %    NRBC% 0.0 %   Troponin I    Collection Time: 10/10/23  3:25 PM   Result Value Ref Range    Troponin-I <0.010 0.000 - 0.045 ng/mL   POCT glucose    Collection Time: 10/10/23  3:30 PM   Result Value Ref Range    POCT Glucose 95 70 - 110 mg/dL   Antithrombin Activity    Collection Time: 10/10/23  4:01 PM   Result Value Ref Range    Antithromb  82 - 139 %   POCT glucose    Collection Time: 10/10/23  6:15 PM   Result Value Ref Range    POCT Glucose 149 (H) 70 - 110 mg/dL   PTT Heparin Monitoring    Collection Time: 10/10/23  6:43 PM   Result Value Ref Range    PTT Heparin Monitor 154.2 (HH) 23.2 - 33.7 seconds   POCT glucose    Collection Time: 10/10/23  7:17 PM   Result Value Ref Range    POCT Glucose 122 (H) 70 - 110 mg/dL       Imaging:  CTA Chest Non-Coronary (PE Studies)   Final Result      No CT evidence for pulmonary embolus.      Stable ground-glass patchy and confluent opacities in both lower lobes and to a lesser extent in the lingula and right middle lobe.      Slight interval decrease in axillary lymphadenopathy.         Electronically signed by: García Gilbert MD   Date:    10/09/2023   Time:    20:37      X-Ray Chest AP Portable   Final Result          Physical Exam:   /75   Pulse (!) 59   Temp 97.6 °F (36.4 °C) (Oral)   Resp 20   Ht 5' 5" (1.651 m)   Wt 73.9 kg (162 lb 14.7 oz)   LMP  (LMP Unknown)   SpO2 98%   BMI 27.11 kg/m²  Body mass index is 27.11 kg/m².  Physical Exam  Vitals reviewed.   Constitutional:       General: She is not in acute " distress.     Appearance: Normal appearance. She is normal weight. She is not ill-appearing.   HENT:      Head: Normocephalic and atraumatic.      Right Ear: External ear normal.      Left Ear: External ear normal.      Nose: Nose normal.      Mouth/Throat:      Mouth: Mucous membranes are moist.   Eyes:      Conjunctiva/sclera: Conjunctivae normal.   Cardiovascular:      Rate and Rhythm: Normal rate and regular rhythm.      Pulses: Normal pulses.      Heart sounds: Normal heart sounds. No murmur heard.     No friction rub.      Comments: Do not appreciate a friction rub.   Pulmonary:      Effort: Pulmonary effort is normal. No respiratory distress.      Breath sounds: Normal breath sounds. No stridor. No wheezing, rhonchi or rales.   Chest:      Chest wall: No tenderness.   Abdominal:      General: Abdomen is flat. Bowel sounds are normal. There is no distension.      Palpations: Abdomen is soft.   Musculoskeletal:      Cervical back: Neck supple.      Right lower leg: No edema.      Left lower leg: No edema.   Skin:     General: Skin is warm and dry.      Capillary Refill: Capillary refill takes less than 2 seconds.   Neurological:      Mental Status: She is alert and oriented to person, place, and time.   Psychiatric:         Mood and Affect: Mood normal.         Behavior: Behavior normal.       Impression:    57 y.o. female with HTN, PAF, CKD III, SLE, and fibromyalgia that presents with suspect SLE flare. Cardiology consulted for abnormal troponin.     Plan:   Chest Pain with Elevated Troponin  -Low suspicion for primary ischemic cardiac chest pain. Could consider husam-pericarditis but ECG is not suggestive and troponin trend is not consistent with this. Perhaps the cause is pleuritis from flare.   -The patient does report symptoms potentially representative of angina outpatient with chest tightness along with dyspnea on exertion. Additionally has risk factors of HTN, CKD, and SLE which can contribute to CAD.  Recommend outpatient stress testing with cardiology follow up.   -Recommend obtaining a TTE to assess for worsening dyspnea and valvular abnormalities.   -Story, enzymes, and ECG are not consistent with Type I event; however, can use heparin for DVT and AF CVA PPX per primary team.     PAF  -Rate control with beta blocker if needed but heart rate is currently controlled. Keep on telemetry.   -Consider anticoagulation as CV2 score is at least 2. Consider Eliquis 5 mg BID.     Thank you very much for your consultation    Sriram Rae MD  [employer] [facility] PGY-5

## 2023-10-11 NOTE — PT/OT/SLP EVAL
Occupational Therapy   Evaluation and Discharge Note    Name: Vi Ulrich  MRN: [id number]  Admitting Diagnosis: Final diagnoses:  [R06.00] Dyspnea  [R06.82] Tachypnea  [M54.9] Back pain, unspecified back location, unspecified back pain laterality, unspecified chronicity  [M25.519] Shoulder pain, unspecified chronicity, unspecified laterality  [M32.9] Lupus (Primary)   Patient Active Problem List   Diagnosis    Essential hypertension    Diabetes mellitus, type 2    Insomnia    Hyperlipidemia    History of asthma    New onset atrial fibrillation    Periumbilical pain    Constipation    Iron deficiency anemia    Anxiety    Class 1 obesity with serious comorbidity and body mass index (BMI) of 32.0 to 32.9 in adult    Acute cystitis with hematuria    STIVEN (acute kidney injury)    Pyelonephritis    Inadequate dietary energy intake    Lupus    Fibromyalgia    Lupus nephritis, ISN/RPS class III    Asthma    Trigger index finger of left hand    BMI 26.0-26.9,adult    Chronic obstructive pulmonary disease    Long term current use of insulin    Migraine aura without headache    Moderate recurrent major depression    Mood disorder    Raynaud's disease    Thyroiditis    Vitamin D deficiency    Acute deep vein thrombosis (DVT) of popliteal vein of right lower extremity    ILD (interstitial lung disease)    Diabetic glomerulosclerosis    Back pain      Recent Surgery: * No surgery found *      Recommendations:     Discharge Recommendations: home  Discharge Equipment Recommendations: bedside commode, rollator  Barriers to discharge:  None    Assessment:     Vi Ulrich is a 57 y.o. female with a medical diagnosis of see above. At this time, patient is functioning at their prior level of function and does not require further acute OT services.     Plan:     During this hospitalization, patient does not require further acute OT services.  Please re-consult if situation changes.    Plan of Care Reviewed with: patient    Subjective  "    Chief Complaint: none stated   Patient/Family Comments/goals: return home    Occupational Profile:  Living Environment: Pt lives with adult, special needs daughter in 1st floor apartment with no steps and tub shower combo  Previous level of function: independent basic and I ADL's and ambulation with rollator "most of the time" with occasional assist from mom due to lupus flare  Roles and Routines: Pt is disabled. 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  Patient found HOB elevated with telemetry, peripheral IV upon OT entry to room.    General Precautions: Standard,    Orthopedic Precautions: N/A  Braces: N/A  Respiratory Status: Room air     Occupational Performance:    Bed Mobility:    Patient completed Supine to Sit with independence  Patient completed Sit to Supine with independence    Functional Mobility/Transfers:  Patient completed Sit <> Stand Transfer with independence  with  no assistive device   Patient completed Bed <> Chair Transfer using Step Transfer technique with independence with no assistive device  Patient completed Toilet Transfer Step Transfer technique with independence with  no AD  Functional Mobility: independent ambulation in room for basic self care tasks and in hallway x 130 feet without AD     Activities of Daily Living:  Feeding:  independence    Grooming: independence    Bathing: independence wipe up UB and LB  Upper Body Dressing: independence    Lower Body Dressing: independence socks and underwear   Toileting: independence clothing mgt and hygiene    Cognitive/Visual Perceptual:  Cognitive/Psychosocial " Skills:     -       Oriented to: Person, Place, Time, and Situation   -       Follows Commands/attention:Follows multistep  commands  -       Safety awareness/insight to disability: intact     Physical Exam:    Pt is left hand dominant    B UE AROM, strength and coordination WFL ; sensation intact light touch B UE and hands    Patient left up in chair with all lines intact, call button in reach, and nurse  notified    GOALS:   Multidisciplinary Problems       Occupational Therapy Goals       Not on file                    History:     Past Medical History:   Diagnosis Date    Arthritis     knees    Asthma     Atrial fibrillation     Chronic constipation     Diabetes mellitus     Encounter for blood transfusion 10/2014    GERD (gastroesophageal reflux disease)     Gout     Hypertension     Lupus 2004    SLE    Renal disorder     SLE (systemic lupus erythematosus)          Past Surgical History:   Procedure Laterality Date    APPENDECTOMY      CHOLECYSTECTOMY      COLONOSCOPY N/A 4/19/2018    Procedure: COLONOSCOPY;  Surgeon: Minerva Porras MD;  Location: [facility] beds at [facility];  Service: Endoscopy;  Laterality: N/A;    ESOPHAGOGASTRODUODENOSCOPY N/A 4/19/2018    Procedure: ESOPHAGOGASTRODUODENOSCOPY (EGD);  Surgeon: Minerva Porras MD;  Location: [facility] beds at the [facility];  Service: Endoscopy;  Laterality: N/A;    HYSTERECTOMY      JOINT REPLACEMENT Left 08/07/2015    Knee    LYMPH NODE BIOPSY Left 2014    MASTECTOMY      Left arm- NO BP    PARTIAL HYSTERECTOMY         Time Tracking:     OT Date of Treatment: 10/11/23  OT Start Time: 0940  OT Stop Time: 1010  OT Total Time (min): 30 min    Billable Minutes:Evaluation 30 min     10/11/2023

## 2023-10-11 NOTE — PT/OT/SLP EVAL
Physical Therapy Evaluation & Discharge Note    Patient Name:  Vi Ulrich   MRN:  [id number]    Recommendations     Discharge Recommendations:  home   Discharge Equipment Recommendations: bedside commode, rollator   Barriers to discharge: none    Assessment     Vi Ulrich is a 57 y.o. female admitted with a medical diagnosis of     SLE  Lupus Nephritis  HTN  Pleuritic Chest Pain  Elevated Troponin   NSTEMI, likely Type 2  UTI   DM-2  GERD  Patient Active Problem List   Diagnosis    Essential hypertension    Diabetes mellitus, type 2    Insomnia    Hyperlipidemia    History of asthma    New onset atrial fibrillation    Periumbilical pain    Constipation    Iron deficiency anemia    Anxiety    Class 1 obesity with serious comorbidity and body mass index (BMI) of 32.0 to 32.9 in adult    Acute cystitis with hematuria    STIVEN (acute kidney injury)    Pyelonephritis    Inadequate dietary energy intake    Lupus    Fibromyalgia    Lupus nephritis, ISN/RPS class III    Asthma    Trigger index finger of left hand    BMI 26.0-26.9,adult    Chronic obstructive pulmonary disease    Long term current use of insulin    Migraine aura without headache    Moderate recurrent major depression    Mood disorder    Raynaud's disease    Thyroiditis    Vitamin D deficiency    Acute deep vein thrombosis (DVT) of popliteal vein of right lower extremity    ILD (interstitial lung disease)    Diabetic glomerulosclerosis    Back pain     She presents with the following impairments/functional limitations:   (none from  PT SERVICES standpoint).    Patient was seen by therapy for evaluation only.  Patient discharged to 'in-house' with independent and walking program  Patient's current level of function is at baseline (PLOF).  Patient does not require further acute PT services.  PT Services not warranted at this time.    Recent Surgery: * No surgery found *      Plan     Patient discharged from acute PT Services on 10/11/23.    Based on current  functional levels observed, patient does not require further acute PT services.  Re-consult PT Services if patient's status changes and therapy services warranted.    Subjective     Communicated with patient's nurse Zahira prior to session.    Patient agreeable to participate in evaluation.     Chief Complaint: Lt shoulder pain  Patient/Family Comments/goals: home  Pain/Comfort:  Pain Rating 1: 0/10  Pain Addressed 1: Nurse notified  Pain Rating Post-Intervention 1: 0/10    Patients cultural, spiritual, Evangelical conflicts given the current situation: no    Social History  Living Environment: Patient lives with their daughter in a first floor apartment, with no steps, with tub-shower combo.  Functional Level: Prior to admission patient was disabled, was driving, normally ambulated without assistive device (however intermittent use of rollator walker), was independent in ADL's, and was independent in IADL's (however pt's mother intermittently assists patient w/ self ADL's)  Equipment at Home: bedside commode, rollator  Assistance Upon Discharge: family and pt's mother available to assist as needed .    Hand dominance: left    Patient denied lightheadedness/dizziness.  Patient denied 'current' extremity tingling/numbness.  Patient denied current swallowing difficulty.  Patient denied 'new' vision impairment.    Objective     OT Katja in room for evaluation    Patient found supine in bed, with HOB elevated, and bed rails up bilateral HOB and right FOB with peripheral IV, telemetry  upon PT entry to room.    General Precautions: Standard,     Orthopedic Precautions:N/A   Braces: N/A  Respiratory Status: room air    Vitals   At Rest (pre-session)  BP  144/84   HR  67   O2 Sat %  67 poor wave form      With Activity (post-session)  BP  145/68   HR  58   O2 Sat %  unable to obtain reading     Exams:  Orientation: Patient is oriented to Person, Place, Time, Situation  Commands: Patient follows multi-step verbal  commands  Fine Motor Coordination:     -     Intact: Rapid alternating ankle DF/PF  Sensation:    -     Intact: light/touch bilat lower extremity  BILAT UE ROM/strength - defer to OT - see OT note for details  RLE ROM: WFL  RLE Strength: WFL  LLE ROM: WFL  LLE Strength: WFL    Functional Mobility:    Bed Mobility:  Rolling Right: independence  Scooting: independence  Supine to Sit: independence  with no cues required    Transfers:  Sit to Stand: independence with no assistive device  with no cues required    Gait:  Patient ambulated 260ft with no assistive device and independence.  Patient demonstrates steady gait, symmetrical step length, upright posture, reciprocal arm swing, no loss of balance, and no mis-steps.    Other Mobility:  not assessed    Balance:  Sit  Patient demonstrated static balance on level surface with independence with no verbal cues.  Patient demonstrated dynamic balance on level surface with independence with no verbal cues during maximal excursions.  Stand  Patient demonstrated static balance on level surface  using no device with independence with no verbal cues.    Patient left sitting in chair with  upon PT entry to room, all lines intact, call button in reach, tray table at bedside, and pt's nurse Zahira notified.    Education     White board updated regarding patient's safest level of mobility with staff assistance.    Goals - No STG's or LTG's established secondary to patient was seen for evaluation and discharge     Multidisciplinary Problems       Physical Therapy Goals       Not on file        History     Past Medical History:   Diagnosis Date    Arthritis     knees    Asthma     Atrial fibrillation     Chronic constipation     Diabetes mellitus     Encounter for blood transfusion 10/2014    GERD (gastroesophageal reflux disease)     Gout     Hypertension     Lupus 2004    SLE    Renal disorder     SLE (systemic lupus erythematosus)      Past Surgical History:   Procedure  Laterality Date    APPENDECTOMY      CHOLECYSTECTOMY      COLONOSCOPY N/A 4/19/2018    Procedure: COLONOSCOPY;  Surgeon: Minerva Porras MD;  Location: [facility];  Service: Endoscopy;  Laterality: N/A;    ESOPHAGOGASTRODUODENOSCOPY N/A 4/19/2018    Procedure: ESOPHAGOGASTRODUODENOSCOPY (EGD);  Surgeon: Minerva Porras MD;  Location: [facility];  Service: Endoscopy;  Laterality: N/A;    HYSTERECTOMY      JOINT REPLACEMENT Left 08/07/2015    Knee    LYMPH NODE BIOPSY Left 2014    MASTECTOMY      Left arm- NO BP    PARTIAL HYSTERECTOMY       Time Tracking     PT Received On: 10/11/23  PT Start Time: 0939     PT Stop Time: 1009  PT Total Time (min): 30 min     Billable Minutes: Evaluation 30    10/11/2023

## 2023-10-11 NOTE — PLAN OF CARE
Problem: Adult Inpatient Plan of Care  Goal: Plan of Care Review  Outcome: Ongoing, Progressing  Goal: Patient-Specific Goal (Individualized)  Outcome: Ongoing, Progressing  Goal: Absence of Hospital-Acquired Illness or Injury  Outcome: Ongoing, Progressing  Goal: Optimal Comfort and Wellbeing  Outcome: Ongoing, Progressing  Goal: Readiness for Transition of Care  Outcome: Ongoing, Progressing     Problem: Violence Risk or Actual  Goal: Anger and Impulse Control  Outcome: Ongoing, Progressing     Problem: Diabetes Comorbidity  Goal: Blood Glucose Level Within Targeted Range  Outcome: Ongoing, Progressing     Problem: Fluid and Electrolyte Imbalance (Acute Kidney Injury/Impairment)  Goal: Fluid and Electrolyte Balance  Outcome: Ongoing, Progressing     Problem: Oral Intake Inadequate (Acute Kidney Injury/Impairment)  Goal: Optimal Nutrition Intake  Outcome: Ongoing, Progressing     Problem: Renal Function Impairment (Acute Kidney Injury/Impairment)  Goal: Effective Renal Function  Outcome: Ongoing, Progressing     Problem: Infection  Goal: Absence of Infection Signs and Symptoms  Outcome: Ongoing, Progressing

## 2023-10-11 NOTE — MEDICAL/APP STUDENT
[facility] Progress Note    Patient Name: Vi Ulrich  MRN: [id number]  Admission Date: 10/9/2023  Hospital Length of Stay: 1 days  Code Status: Full Code  Attending Provider: Syeda Altman MD  Primary Care Provider: Octaviano Barrios MD     Subjective:      Brief HPI:  Vi Ulrich is a 57 y.o. female who  has a past medical history of Arthritis, Asthma, Atrial fibrillation, Chronic constipation, Diabetes mellitus, Encounter for blood transfusion, GERD (gastroesophageal reflux disease), Gout, Hypertension, Lupus, Renal disorder, and SLE (systemic lupus erythematosus).  She who presented to ED on 10/9/2023  with a primary complaint of generalized body weakness, and left flank pain. Patient reports that for the last 2-3 days she has been relatively bedridden and she notes that she has had very little water intake with roughly 1 bottle of water 20 oz daily. She reports that her left-sided chest wall pain and shortness a breath is worsened with deep inhalation. She notes that she still continues to get intermittent headaches. She states that she has been taking all of her medications but is unable to name many of them and does not know the quantity of pills. Additionally her dispense history is not congruent with her report of medication fills.  Per dispense history she has not had any more than 30 days of CellCept or Plaquenil obtained since June of 2023.  Patient denies any fever cough, hemoptysis, she does note that she has had dark red urine, she denies any history of PE or DVT, she denies stroke MI, Raynaud's phenomenon, history of inflammatory eye diseases, history of inflammatory bowel disease.    The patient has been diagnosed with lupus nephritis, with a renal biopsy in June 2022, which revealed class 3 and class 5 nephritis with diabetic glomerulopathy features. She has a history of intermittent anemia, leukopenia/neutropenia, interstitial lung disease, and chronic proteinuria. The patient states  she quit smoking 16 months ago. Denies alcohol and drug use.       Interval History: Pt denies any acute changes overnight. She was nauseated with some emesis, but was given ondansetron which provided pt relief. She states she has pain in her left leg more than her right, and has full range of motion of her lower extremities. She was able to sleep through the night after she was given her anti-emetic medication. She denies any fever, chills, constipation and diarrhea. She has been able to move. She requests a food tray.       Review of Systems:  The remainder of the 14 point ROS is noncontributory or negative unless mentioned/reviewed above.     Objective:     Vital Signs:  Vital Signs (Most Recent):  Temp: 97.9 °F (36.6 °C) (10/11/23 0745)  Pulse: (!) 56 (10/11/23 0745)  Resp: 20 (10/11/23 0745)  BP: 135/79 (10/11/23 0745)  SpO2: 100 % (10/11/23 0745)  Body mass index is 27.11 kg/m².  Weight: 73.9 kg (162 lb 14.7 oz) Vital Signs (24h Range):  Temp:  [97.6 °F (36.4 °C)-98.7 °F (37.1 °C)] 97.9 °F (36.6 °C)  Pulse:  [52-84] 56  Resp:  [13-24] 20  SpO2:  [14 %-100 %] 100 %  BP: (127-184)/(60-96) 135/79     SpO2 documented as 14%, presumably due to error. Other SpO2 documentation has been within %.    Input/output:     Intake/Output Summary (Last 24 hours) at 10/11/2023 0828  Last data filed at 10/11/2023 0639  Gross per 24 hour   Intake 486.64 ml   Output 900 ml   Net -413.36 ml       Physical Examination:  General:  Well developed, well nourished, no acute respiratory distress  Head: Normocephalic, atraumatic  Eyes: PERRL, anicteric sclera  CVS:  RRR, S1 and S2 normal, no murmurs, no added heart sounds, rubs, gallops, regular peripheral pulses, and no peripheral edema  Resp:  Lungs clear to auscultation bilaterally, no wheezes, rales, or rhonci  MSK:  Full range of motion, no obvious deformities. Denies pain to palpation of lower leg.   Skin:  No rashes, ulcers, erythema. Warm to touch, especially in right leg.    Neuro:  Alert and oriented x3, No focal neuro deficits, CNII-XII grossly intact  Psych:  Appropriate mood and affect       Lines/Drains/Airways       None                    Laboratory:    Recent Labs   Lab 10/10/23  0152 10/10/23  1010 10/11/23  0212   WBC 5.93 4.75 6.20   RBC 3.82* 4.48 3.69*   HGB 11.0* 13.0 10.6*   HCT 34.3* 40.2 32.6*   MCV 89.8 89.7 88.3   MCH 28.8 29.0 28.7   MCHC 32.1* 32.3* 32.5*   RDW 15.9 15.8 15.6   * 393 397   MPV 10.0 9.8 9.5      Recent Labs   Lab 10/09/23  1858 10/09/23  2007 10/10/23  0152 10/11/23  0212     --  135* 140   K 4.2  --  4.8 4.7   CHLORIDE 106  --  105 109*   CO2 25  --  22 23   BUN 22.1*  --  19.3 26.6*   CREATININE 1.24*  --  1.09* 1.05*   CALCIUM 8.9  --  8.9 9.2   PH  --  7.330  --   --    MG 1.90  --  1.90 2.10   ALBUMIN 3.0*  --  2.9* 3.0*   ALKPHOS 114  --  116 104   ALT 8  --  8 7   AST 15  --  14 12   BILITOT 0.2  --  0.1 0.1        Other Results:  Estimated Creatinine Clearance: 61 mL/min (A) (based on SCr of 1.05 mg/dL (H)).    Current Medications:     Infusions:   heparin (porcine) in D5W 17 Units/kg/hr (10/11/23 0730)         Scheduled:   amLODIPine  10 mg Oral Daily    aspirin  81 mg Oral Daily    atorvastatin  10 mg Oral Daily    cefTRIAXone (ROCEPHIN) IVPB  1 g Intravenous Q24H    EScitalopram oxalate  10 mg Oral Daily    insulin detemir U-100  80 Units Subcutaneous QHS    losartan  25 mg Oral Daily    methylPREDNISolone sodium succinate injection  60 mg Intravenous Daily    multivitamin  1 tablet Oral Daily    mycophenolate  1,500 mg Oral BID    pantoprazole  40 mg Oral Daily    sulfamethoxazole-trimethoprim 800-160mg  1 tablet Oral BID         PRN:   albuterol    dextrose 10%    dextrose 10%    glucagon (human recombinant)    glucose    glucose    heparin (PORCINE)    heparin (PORCINE)    hydrALAZINE    HYDROcodone-acetaminophen    insulin aspart U-100    labetalol    melatonin    ondansetron    polyethylene glycol    sodium chloride  0.9%        Microbiology Data:  Microbiology Results (last 7 days)       Procedure Component Value Units Date/Time    Urine culture [[id number]] Collected: 10/09/23 1932    Order Status: Completed Specimen: Urine Updated: 10/11/23 0644     Urine Culture No Growth At 24 Hours    BCID2 Panel [[id number]]  (Abnormal) Collected: 10/09/23 2300    Order Status: Completed Specimen: Blood from Wrist, Right Updated: 10/11/23 0327     CTX-M (ESBL ) N/A     IMP (Cabapenemase ) N/A     KPC resistance gene (Carbapenemase ) N/A     mcr-1 N/A     mecA ID N/A     Comment: Note: Antimicrobial resistance can occur via multiple mechanisms. A Not Detected result for antimicrobial resistance gene(s) does not indicate antimicrobial susceptibility. Subculturing is required for species identification and susceptibility testing of   isolates.        mecA/C and MREJ (MRSA) gene N/A     NDM (Carbapenemase ) N/A     OXA-48-like (Carbapenemase ) N/A     Nik/B (VRE gene) N/A     VIM (Carbapenemase ) N/A     Enterococcus faecalis Not Detected     Enterococcus faecium Not Detected     Listeria monocytogenes Not Detected     Staphylococcus spp. Detected     Staphylococcus aureus Not Detected     Staphylococcus epidermidis Not Detected     Staphylococcus lugdunensis Not Detected     Streptococcus spp. Not Detected     Streptococcus agalactiae (Group B) Not Detected     Streptococcus pneumoniae Not Detected     Streptococcus pyogenes (Group A) Not Detected     Acinetobacter calcoaceticus/baumannii complex Not Detected     Bacteroides fragilis Not Detected     Enterobacterales Not Detected     Enterobacter cloacae complex Not Detected     Escherichia coli Not Detected     Klebsiella aerogenes Not Detected     Klebsiella oxytoca Not Detected     Klebsiella pneumoniae group Not Detected     Proteus spp. Not Detected     Salmonella spp. Not Detected     Serratia marcescens Not Detected     Haemophilus  influenzae Not Detected     Neisseria meningitidis Not Detected     Pseudomonas aeruginosa Not Detected     Stenotrophomonas maltophilia Not Detected     Candida albicans Not Detected     Candida auris Not Detected     Candida glabrata Not Detected     Candida krusei Not Detected     Candida parapsilosis Not Detected     Candida tropicalis Not Detected     Cryptococcus neoformans/gattii Not Detected    Narrative:      The [employer] BCID2 Panel is a multiplexed nucleic acid test intended for the use with [employer]® 2.0 or [employer]® [employer] for the simultaneous qualitative detection and identification of multiple bacterial and yeast nucleic acids and select genetic determinants associated with antimicrobial resistance.  The [employer] BCID2 Panel test is performed directly on blood culture samples identified as positive by a continuous monitoring blood culture system.  Results are intended to be interpreted in conjunction with Gram stain results.    Blood Culture [[id number]]  (Abnormal) Collected: 10/09/23 2300    Order Status: Completed Specimen: Blood from Wrist, Right Updated: 10/11/23 0212     GRAM STAIN Gram Positive Cocci, probable Staphylococcus      Seen in gram stain of broth only      1 of 2 Anaerobic bottles positive    Blood Culture [[id number]] Collected: 10/09/23 2300    Order Status: Resulted Specimen: Blood from Hand, Right Updated: 10/10/23 0439             Antibiotics and Day Number of Therapy:  Antibiotics (From admission, onward)      Start     Stop Route Frequency Ordered    10/11/23 0900  sulfamethoxazole-trimethoprim 800-160mg per tablet 1 tablet         -- Oral 2 times daily 10/11/23 0636    10/09/23 2343  cefTRIAXone (ROCEPHIN) 1 g in dextrose 5 % in water (D5W) 100 mL IVPB (MB+)         10/16/23 2344 IV Every 24 hours (non-standard times) 10/09/23 2343             Imaging:  CV Ultrasound doppler venous legs bilat    There is no evidence of a left lower extremity DVT.     The right superficial femoral middle vein is normal.    The right popliteal vein is is abnormal.    The contralateral (left) common femoral vein is patent.    The left superficial femoral middle vein is normal.    The contralateral (right) common femoral vein is patent.    The right leg was positive for occulsive age indeterminate deep vein   thrombosis located in the popliteal vein.    There was no deep vein thrombosis identified in the visualized veins of   the left leg.      Findings given to ER nurse and via Epic chat to Antoni Marie DO.        Assessment & Plan:   Lupus nephritis  Proteinuria  CKD TYPE 3  Diabetic glomerulopathy  Fibromyalgia   Anxiety  - BUN increased at 26.6, creatinine increased 1.05, albumin decreased at 3.0, chloride increased at 109 and globulin increased at 4.8 10/11.   - POCT glucose elevated at 122  - C3 compliment decreased at 75. C4 wnl.  - Pt continued on on home dose of prednisone 10 mg po qd for lupus diagnosis. Rheumatology recommends Cellcept at 1500 mg po BID. Currently holding due to concern of bacteremia.   - Continue to PT to aid in ambulation  - Nephrology and Rheumatology consulted; recs appreciated.     Infection  R/o contamination  - Continue vancomycin for staphylococcal infection. Pharmacy consult placed for dosage.   - Notify immediately if pt develops acute symptoms suggesting possible infection.     Unprovoked DVT  - Ultrasound revealed DVT in right lower extremity  - PTT is elevated at 167.7, D-Dimer elevated at 2.09 as of 10/11. Awaiting results of Lipoprotein A, Protein C activity, Lupus anticoagulant, Antithrombin III, Cardiolipin AB, and Factor V levels.   - Due to supra therapeutic APTT, 25,000 units of IV Heparin held. Continue monitor per protocol.    - According to CTA of chest, there are mild atherosclerotic calcifications of the thoracic aorta. No evidence of PE.   - Cardiology consulted. Recommends outpatient stress test; assistance appreciated.      HTN  - ECG unremarkable for ischemic changes, with mild elevation of troponin on admission. Troponins have returned to baseline.  - BP stabilized at 135/79 10/11  - Pt placed on 10 mg amlodipine po qd, atorvastatin 10 mg po qd, losartan 25 mg po qd, asprin 81 mg po qd.   - TTE performed, awaiting results 10/11.     Anxiety  - For anxiety, pt continued on home dose of 10mg lexapro po qd.       CODE STATUS: Full Code   Access: PIV  Antibiotics: Vancomycin Day 1  Diet: Diet NPO  DVT Prophylaxis: Heparin 25k   GI Prophylaxis: protonix      Disposition: Vi Ulrich is a 57 y.o. female who is admitted for generalized weakness, DVT, HTN, and Lupus.    Renata Casanova  [facility], [facility]  10/11/2023

## 2023-10-11 NOTE — PLAN OF CARE
Problem: Adult Inpatient Plan of Care  Goal: Plan of Care Review  Outcome: Ongoing, Progressing  Goal: Patient-Specific Goal (Individualized)  Outcome: Ongoing, Progressing  Goal: Absence of Hospital-Acquired Illness or Injury  Outcome: Ongoing, Progressing  Goal: Optimal Comfort and Wellbeing  Outcome: Ongoing, Progressing  Goal: Readiness for Transition of Care  Outcome: Ongoing, Progressing     Problem: Violence Risk or Actual  Goal: Anger and Impulse Control  Outcome: Ongoing, Progressing     Problem: Diabetes Comorbidity  Goal: Blood Glucose Level Within Targeted Range  Outcome: Ongoing, Progressing     Problem: Fluid and Electrolyte Imbalance (Acute Kidney Injury/Impairment)  Goal: Fluid and Electrolyte Balance  Outcome: Ongoing, Progressing     Problem: Oral Intake Inadequate (Acute Kidney Injury/Impairment)  Goal: Optimal Nutrition Intake  Outcome: Ongoing, Progressing     Problem: Renal Function Impairment (Acute Kidney Injury/Impairment)  Goal: Effective Renal Function  Outcome: Ongoing, Progressing

## 2023-10-12 LAB
ALBUMIN SERPL-MCNC: 2.8 G/DL (ref 3.5–5)
ALBUMIN/GLOB SERPL: 0.7 RATIO (ref 1.1–2)
ALP SERPL-CCNC: 93 UNIT/L (ref 40–150)
ALT SERPL-CCNC: 7 UNIT/L (ref 0–55)
APTT PPP: 113.8 SECONDS (ref 23.2–33.7)
APTT PPP: 79 SECONDS (ref 23.2–33.7)
AST SERPL-CCNC: 10 UNIT/L (ref 5–34)
B2 GLYCOPROT1 IGG SERPL IA-ACNC: <9.4 SGU
B2 GLYCOPROT1 IGM SERPL IA-ACNC: <9.4 SMU
BACTERIA UR CULT: NORMAL
BASOPHILS # BLD AUTO: 0 X10(3)/MCL
BASOPHILS NFR BLD AUTO: 0 %
BILIRUB SERPL-MCNC: 0.1 MG/DL
BUN SERPL-MCNC: 31.8 MG/DL (ref 9.8–20.1)
CALCIUM SERPL-MCNC: 8.8 MG/DL (ref 8.4–10.2)
CHLORIDE SERPL-SCNC: 106 MMOL/L (ref 98–107)
CO2 SERPL-SCNC: 24 MMOL/L (ref 22–29)
CREAT SERPL-MCNC: 1.13 MG/DL (ref 0.55–1.02)
EOSINOPHIL # BLD AUTO: 0 X10(3)/MCL (ref 0–0.9)
EOSINOPHIL NFR BLD AUTO: 0 %
ERYTHROCYTE [DISTWIDTH] IN BLOOD BY AUTOMATED COUNT: 15.9 % (ref 11.5–17)
GFR SERPLBLD CREATININE-BSD FMLA CKD-EPI: 58 MLS/MIN/1.73/M2
GLOBULIN SER-MCNC: 4.1 GM/DL (ref 2.4–3.5)
GLUCOSE SERPL-MCNC: 64 MG/DL (ref 74–100)
HCT VFR BLD AUTO: 31.2 % (ref 37–47)
HGB BLD-MCNC: 10.4 G/DL (ref 12–16)
IMM GRANULOCYTES # BLD AUTO: 0.03 X10(3)/MCL (ref 0–0.04)
IMM GRANULOCYTES NFR BLD AUTO: 0.4 %
LPA SERPL-SCNC: 380 NMOL/L
LYMPHOCYTES # BLD AUTO: 1.13 X10(3)/MCL (ref 0.6–4.6)
LYMPHOCYTES NFR BLD AUTO: 14.9 %
MCH RBC QN AUTO: 29.5 PG (ref 27–31)
MCHC RBC AUTO-ENTMCNC: 33.3 G/DL (ref 33–36)
MCV RBC AUTO: 88.6 FL (ref 80–94)
MONOCYTES # BLD AUTO: 0.39 X10(3)/MCL (ref 0.1–1.3)
MONOCYTES NFR BLD AUTO: 5.1 %
NEUTROPHILS # BLD AUTO: 6.03 X10(3)/MCL (ref 2.1–9.2)
NEUTROPHILS NFR BLD AUTO: 79.6 %
NRBC BLD AUTO-RTO: 0 %
PLATELET # BLD AUTO: 385 X10(3)/MCL (ref 130–400)
PMV BLD AUTO: 10 FL (ref 7.4–10.4)
POCT GLUCOSE: 103 MG/DL (ref 70–110)
POCT GLUCOSE: 119 MG/DL (ref 70–110)
POCT GLUCOSE: 177 MG/DL (ref 70–110)
POCT GLUCOSE: 49 MG/DL (ref 70–110)
POCT GLUCOSE: 56 MG/DL (ref 70–110)
POCT GLUCOSE: 75 MG/DL (ref 70–110)
POCT GLUCOSE: 89 MG/DL (ref 70–110)
POCT GLUCOSE: 97 MG/DL (ref 70–110)
POTASSIUM SERPL-SCNC: 4.1 MMOL/L (ref 3.5–5.1)
PROT SERPL-MCNC: 6.9 GM/DL (ref 6.4–8.3)
RBC # BLD AUTO: 3.52 X10(6)/MCL (ref 4.2–5.4)
SODIUM SERPL-SCNC: 137 MMOL/L (ref 136–145)
WBC # SPEC AUTO: 7.58 X10(3)/MCL (ref 4.5–11.5)

## 2023-10-12 PROCEDURE — 94761 N-INVAS EAR/PLS OXIMETRY MLT: CPT

## 2023-10-12 PROCEDURE — 25000003 PHARM REV CODE 250

## 2023-10-12 PROCEDURE — 63600175 PHARM REV CODE 636 W HCPCS: Performed by: FAMILY MEDICINE

## 2023-10-12 PROCEDURE — 85025 COMPLETE CBC W/AUTO DIFF WBC: CPT

## 2023-10-12 PROCEDURE — 21400001 HC TELEMETRY ROOM

## 2023-10-12 PROCEDURE — 99233 PR SUBSEQUENT HOSPITAL CARE,LEVL III: ICD-10-PCS | Mod: ,,, | Performed by: INTERNAL MEDICINE

## 2023-10-12 PROCEDURE — 85730 THROMBOPLASTIN TIME PARTIAL: CPT | Performed by: INTERNAL MEDICINE

## 2023-10-12 PROCEDURE — 99900035 HC TECH TIME PER 15 MIN (STAT)

## 2023-10-12 PROCEDURE — 63600175 PHARM REV CODE 636 W HCPCS: Performed by: STUDENT IN AN ORGANIZED HEALTH CARE EDUCATION/TRAINING PROGRAM

## 2023-10-12 PROCEDURE — A4216 STERILE WATER/SALINE, 10 ML: HCPCS

## 2023-10-12 PROCEDURE — 80053 COMPREHEN METABOLIC PANEL: CPT

## 2023-10-12 PROCEDURE — 99233 SBSQ HOSP IP/OBS HIGH 50: CPT | Mod: ,,, | Performed by: INTERNAL MEDICINE

## 2023-10-12 PROCEDURE — 93005 ELECTROCARDIOGRAM TRACING: CPT

## 2023-10-12 PROCEDURE — 63600175 PHARM REV CODE 636 W HCPCS

## 2023-10-12 PROCEDURE — 25000003 PHARM REV CODE 250: Performed by: STUDENT IN AN ORGANIZED HEALTH CARE EDUCATION/TRAINING PROGRAM

## 2023-10-12 RX ORDER — MYCOPHENOLATE MOFETIL 250 MG/1
1500 CAPSULE ORAL 2 TIMES DAILY
Status: DISCONTINUED | OUTPATIENT
Start: 2023-10-12 | End: 2023-10-14 | Stop reason: HOSPADM

## 2023-10-12 RX ORDER — HYDROMORPHONE HYDROCHLORIDE 1 MG/ML
0.5 INJECTION, SOLUTION INTRAMUSCULAR; INTRAVENOUS; SUBCUTANEOUS EVERY 6 HOURS PRN
Status: DISPENSED | OUTPATIENT
Start: 2023-10-12 | End: 2023-10-14

## 2023-10-12 RX ADMIN — ATORVASTATIN CALCIUM 10 MG: 10 TABLET, FILM COATED ORAL at 09:10

## 2023-10-12 RX ADMIN — ESCITALOPRAM OXALATE 10 MG: 10 TABLET ORAL at 09:10

## 2023-10-12 RX ADMIN — PANTOPRAZOLE SODIUM 40 MG: 40 TABLET, DELAYED RELEASE ORAL at 09:10

## 2023-10-12 RX ADMIN — HYDROCODONE BITARTRATE AND ACETAMINOPHEN 1 TABLET: 10; 325 TABLET ORAL at 06:10

## 2023-10-12 RX ADMIN — APIXABAN 10 MG: 2.5 TABLET, FILM COATED ORAL at 09:10

## 2023-10-12 RX ADMIN — THERA TABS 1 TABLET: TAB at 09:10

## 2023-10-12 RX ADMIN — MYCOPHENOLATE MOFETIL 1500 MG: 250 CAPSULE ORAL at 09:10

## 2023-10-12 RX ADMIN — INSULIN DETEMIR 10 UNITS: 100 INJECTION, SOLUTION SUBCUTANEOUS at 09:10

## 2023-10-12 RX ADMIN — DEXTROSE MONOHYDRATE 250 ML: 100 INJECTION, SOLUTION INTRAVENOUS at 08:10

## 2023-10-12 RX ADMIN — ASPIRIN 81 MG: 81 TABLET, CHEWABLE ORAL at 09:10

## 2023-10-12 RX ADMIN — Medication 10 ML: at 12:10

## 2023-10-12 RX ADMIN — APIXABAN 10 MG: 2.5 TABLET, FILM COATED ORAL at 12:10

## 2023-10-12 RX ADMIN — Medication 10 ML: at 06:10

## 2023-10-12 RX ADMIN — DEXTROSE MONOHYDRATE 125 ML: 100 INJECTION, SOLUTION INTRAVENOUS at 06:10

## 2023-10-12 RX ADMIN — HEPARIN SODIUM 18 UNITS/KG/HR: 10000 INJECTION, SOLUTION INTRAVENOUS at 04:10

## 2023-10-12 RX ADMIN — SULFAMETHOXAZOLE AND TRIMETHOPRIM 2 TABLET: 800; 160 TABLET ORAL at 09:10

## 2023-10-12 RX ADMIN — PREDNISONE 10 MG: 10 TABLET ORAL at 09:10

## 2023-10-12 RX ADMIN — Medication 10 ML: at 05:10

## 2023-10-12 NOTE — PROGRESS NOTES
[facility]  Nephrology Progress Note    Hospital course  Vi Ulrich is a 57 y.o. 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  Patient presented to the emergency department on 10/09/2023 with complaints of generalized body aches and chest discomfort.  CT angiogram revealed no evidence of pulmonary embolus, stable ground-glass patchy and confluent opacities in both lobes and to a lesser extent in the lingula and right middle lobe, as well as slight interval decrease in axillary lymphadenopathy.  Ultrasound of lower extremities was positive for DVT in the right popliteal vein.  Blood culture was positive for Gram-positive cocci, probable Staphylococcus. Patient was admitted to medicine service, Nephrology was consulted for assistance with management of CKD.         Subjective  No acute events overnight.  Patient states she remain in generalized pain, also remains with some weakness.  Patient states she has been feeling better with corticosteroids.  Patient occasionally remains with pleuritic chest pain but improved from previous.  Patient with no other acute complaints at this time    Review of Systems   Constitutional:  Negative for chills and fever.   Respiratory:  Negative for cough and shortness of breath.    Cardiovascular:  Positive for chest pain. Negative for leg swelling.   Gastrointestinal:  Negative for abdominal pain.   Genitourinary:  Negative for dysuria.   Musculoskeletal:  Positive for back pain, joint pain and myalgias.   Neurological:  Negative for weakness and headaches.     Objective  /69   Pulse 60   " Temp 98.2 °F (36.8 °C) (Oral)   Resp 16   Ht 5' 5" (1.651 m)   Wt 73.5 kg (162 lb)   LMP  (LMP Unknown)   SpO2 96%   BMI 26.96 kg/m²     Intake/Output Summary (Last 24 hours) at 10/12/2023 0910  Last data filed at 10/12/2023 0750  Gross per 24 hour   Intake 287.12 ml   Output 200 ml   Net 87.12 ml     Physical Exam  General appearance: Patient is in no acute distress.  Skin: No rashes or wounds.  HEENT: PERRLA, EOMI, no scleral icterus, no JVD. Neck is supple.  Chest: Respirations are unlabored. Lungs sounds are clear.   Heart: S1, S2.   Abdomen: Benign.  : Deferred.  Extremities: No edema, peripheral pulses are palpable.   Neuro: No focal deficits.     Medications    Current Facility-Administered Medications:     albuterol nebulizer solution 0.63 mg, 0.63 mg, Nebulization, Q4H PRN, Aaliyah Woodruff DO    amLODIPine tablet 10 mg, 10 mg, Oral, Daily, Zabrina Willett MD, 10 mg at 10/11/23 0915    aspirin chewable tablet 81 mg, 81 mg, Oral, Daily, Ana María Fernandez MD, 81 mg at 10/11/23 0915    atorvastatin tablet 10 mg, 10 mg, Oral, Daily, Aaliyah Woodruff DO, 10 mg at 10/11/23 0914    dextrose 10% bolus 125 mL 125 mL, 12.5 g, Intravenous, PRN, Aaliyah Woodruff DO, Stopped at 10/12/23 0706    dextrose 10% bolus 250 mL 250 mL, 25 g, Intravenous, PRN, Aaliyah Woodruff DO, Last Rate: 250 mL/hr at 10/12/23 0821, 250 mL at 10/12/23 0821    diphenhydrAMINE injection 25 mg, 25 mg, Intravenous, Once, Quincy Burnett MD    EScitalopram oxalate tablet 10 mg, 10 mg, Oral, Daily, Aaliyah Woodruff DO, 10 mg at 10/11/23 0915    glucagon (human recombinant) injection 1 mg, 1 mg, Intramuscular, PRN, St. Bertrand, Aaliyah, DO    glucose chewable tablet 16 g, 16 g, Oral, PRN, St. Bretrand, Aaliyah, DO    glucose chewable tablet 24 g, 24 g, Oral, PRN, StAmeena Thurston, Aaliyah,     heparin 25,000 units in dextrose 5% (100 units/ml) IV bolus from bag - ADDITIONAL PRN BOLUS - 30 units/kg, 30 Units/kg (Adjusted), " Intravenous, PRN, Zabrina Willett MD    heparin 25,000 units in dextrose 5% (100 units/ml) IV bolus from bag - ADDITIONAL PRN BOLUS - 60 units/kg, 60 Units/kg (Adjusted), Intravenous, PRN, Zabrina Willett MD, 3,828 Units at 10/11/23 1742    heparin 25,000 units in dextrose 5% 250 mL (100 units/mL) infusion HIGH INTENSITY nomogram - LAF, 0-40 Units/kg/hr (Adjusted), Intravenous, Continuous, Zabrina Willett MD, Last Rate: 11.5 mL/hr at 10/12/23 0750, 18 Units/kg/hr at 10/12/23 0750    hydrALAZINE injection 10 mg, 10 mg, Intravenous, Q4H PRN, Aaliyah Woodruff DO    HYDROcodone-acetaminophen  mg per tablet 1 tablet, 1 tablet, Oral, Q6H PRN, Aaliyah Woodruff DO, 1 tablet at 10/12/23 0643    insulin aspart U-100 injection 0-10 Units, 0-10 Units, Subcutaneous, QID (AC + HS) PRN, Aaliyah Woodruff DO    insulin detemir U-100 injection 80 Units, 80 Units, Subcutaneous, QHS, Aaliyah Woodruff DO, 80 Units at 10/10/23 2107    labetalol 20 mg/4 mL (5 mg/mL) IV syring, 10 mg, Intravenous, Q4H PRN, Aaliyah Woodruff DO    losartan tablet 25 mg, 25 mg, Oral, Daily, Antoni Marie DO, 25 mg at 10/11/23 0915    melatonin tablet 6 mg, 6 mg, Oral, Nightly PRN, Aaliyah Woodruff DO    multivitamin tablet, 1 tablet, Oral, Daily, Ana María Fernandez MD, 1 tablet at 10/11/23 0915    ondansetron tablet 4 mg, 4 mg, Oral, Q6H PRN, Aaliyah Woodruff DO, 4 mg at 10/10/23 1228    pantoprazole EC tablet 40 mg, 40 mg, Oral, Daily, Aaliyah Woodruff DO, 40 mg at 10/11/23 0915    polyethylene glycol packet 17 g, 17 g, Oral, BID PRN, Aaliyah Woodruff DO    predniSONE tablet 10 mg, 10 mg, Oral, Daily, Antoni Marie DO, 10 mg at 10/11/23 0927    sodium chloride 0.9% flush 10 mL, 10 mL, Intravenous, Q12H PRN, Aaliyah Woodruff DO    Flushing PICC/Midline Protocol, , , Until Discontinued **AND** sodium chloride 0.9% flush 10 mL, 10 mL, Intravenous, Q6H, 10 mL at 10/12/23 0600 **AND** sodium chloride 0.9% flush 10 mL,  10 mL, Intravenous, PRN, Antoni Marie DO    sulfamethoxazole-trimethoprim 800-160mg per tablet 2 tablet, 2 tablet, Oral, Daily, Antoni Marie DO, 2 tablet at 10/11/23 1531     Imaging:    Reviewed    Laboratory Data:  Hematology  Lab Results   Component Value Date    WBC 7.58 10/12/2023    HGB 10.4 (L) 10/12/2023    HCT 31.2 (L) 10/12/2023     10/12/2023     10/12/2023    K 4.1 10/12/2023    CHLORIDE 106 10/12/2023    CO2 24 10/12/2023    BUN 31.8 (H) 10/12/2023    CREATININE 1.13 (H) 10/12/2023    EGFRNORACEVR 58 10/12/2023    GLUCOSE 64 (L) 10/12/2023    CALCIUM 8.8 10/12/2023    ALKPHOS 93 10/12/2023    LABPROT 6.9 10/12/2023    ALBUMIN 2.8 (L) 10/12/2023    BILIDIR 0.1 03/28/2022    IBILI 0.10 03/28/2022    AST 10 10/12/2023    ALT 7 10/12/2023    MG 2.10 10/11/2023    PHOS 3.5 10/11/2023     Lab Results   Component Value Date    .7 (H) 03/03/2023    CAMFDJPB81XU 15.0 (L) 08/04/2023       Lab Results   Component Value Date    IRON 219 (H) 08/04/2023    TIBC 284 08/04/2023    TRANS 152 (L) 08/04/2023    LABIRON 77 (H) 08/04/2023    FERRITIN 398.91 (H) 08/04/2023    FOLATE 5.3 (L) 01/12/2023    LCINORRJ06 674 01/12/2023    HAPTO 249 03/10/2023     (H) 03/10/2023       Lab Results   Component Value Date    HEPCAB Nonreactive 03/09/2023    HEPBSURFAG Nonreactive 03/09/2023    HEPBSAB Negative 11/17/2020       Impression  Chronic kidney disease stage 2/A3  Biopsy-proven LN class III/V with features of diabetic glomerulopathy  Hypertension   Right lower extremity DVT  Paroxysmal atrial fibrillation   Diabetes mellitus type 2   Anemia of chronic disease    Plan  -unsure if proteinuria is primarily secondary to diabetic nephropathy or lupus nephritis.  Likely will need repeat kidney biopsy to further sort  -creatinine remained stable today, patient tolerating oral intake without issue.  Appears with adequate urine output   -24 hour urine protein showed 2.1 g of protein excreted, not quite  nephrotic range despite spot protein creatinine ratio being higher  -blood pressure goal today, continue Arb.  Now off of antibiotics as blood culture appeared to be contaminant.  Should be discussed with Rheumatology today but likely can restart immunosuppressants  -will continue to monitor kidney function with daily CMP    [facility]

## 2023-10-12 NOTE — PROGRESS NOTES
[facility]  Consult Note    Patient Name: Vi Ulrich  : 1965  MRN: [id number]  Admission Date: 10/9/2023  Attending Provider: Syeda Altman MD  Primary Care Physician: Octaviano Barrios MD    Date of Consultation: 10/12/2023    Consultation Requested By: Dr. Syeda Altman    Reason for Consultation: Lupus flare management    Subjective:     HPI: Ms Vi Ulrich is a 57 y.o.  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 Patient reports that for the last 2-3 days she has been relatively bedridden and she notes that she has had very little water intake with roughly 1 bottle of water 20 oz daily.  She reports that her left-sided chest wall pain and shortness a breath is worsened with deep inhalation.  She notes that she still continues to get intermittent headaches.  She states that she has been taking all of her medications but is unable to name many of them and does not know the quantity of pills.  Additionally her dispense history is not congruent with her report of medication fills.  Per dispense history she has not had any more than 30 days of CellCept or Plaquenil obtained since 2023.  Patient denies any fever cough, hemoptysis, she does note that she has had dark red urine, she denies any history of PE or DVT, she denies stroke MI, Raynaud's phenomenon, history of inflammatory eye diseases, history of inflammatory bowel disease.    The patient has been diagnosed with lupus nephritis, with a renal biopsy in 2022, which revealed class 3 and class 5 nephritis with diabetic glomerulopathy  features. She has a history of intermittent anemia, leukopenia/neutropenia, interstitial lung disease, and chronic proteinuria.    Interval hx:  VSS. NAEO. Reports no change in low back pain while holding immunosuppressives. Vanc was stopped, repeat bctx remains pending. 24hr protein demonstrates nephritic range proteinuria.    Treatment hx of Lupus Nephritis:    Plaquenil (Hydroxychloroquine) and Prednisone: Ongoing since diagnosis of lupus (2004).  Rituximab: April 14, 2023, and April 28, 2023.  Cytoxan: Documented use in the past, but unclear on dose and dates.  Benlysta (Belimumab) infusions: Recommended but not administered.  Voclosporin Therapy: Started to improve renal response and decrease proteinuria in March 2023.    Medication Tolerance and Allergies:    Imuran (Azathioprine): Not tolerated due to abdominal upset.  CellCept (Mycophenolate Mofetil): Allergic reaction with hives, needed to go to the ER 6/2022 but has been nown to tolerate since march of 2023     Patient is allergic to ketorolac (pf), mycophenolate mofetil, penicillins, percocet [oxycodone-acetaminophen], and tramadol.     Past Medical History:  has a past medical history of Arthritis, Asthma, Atrial fibrillation, Chronic constipation, Diabetes mellitus, Encounter for blood transfusion, GERD (gastroesophageal reflux disease), Gout, Hypertension, Lupus, Renal disorder, and SLE (systemic lupus erythematosus).    Procedure History:  has a past surgical history that includes Cholecystectomy; Appendectomy; Partial hysterectomy; Lymph node biopsy (Left, 2014); Hysterectomy; Joint replacement (Left, 08/07/2015); Mastectomy; Esophagogastroduodenoscopy (N/A, 4/19/2018); and Colonoscopy (N/A, 4/19/2018).    Family History: family history includes Arthritis in her mother; Asthma in her mother; Diabetes in her mother and sister; Heart block in her mother; Heart disease in her father and sister; Kidney disease in her sister.    Social History:  reports  that she quit smoking about 17 months ago. Her smoking use included cigarettes. She started smoking about 42 years ago. She has never used smokeless tobacco. She reports that she does not drink alcohol and does not use drugs.    Review of systems:   CONSTITUTIONAL: negative with no fatigue or fevers  SKIN: negative with no recent rashes  EYES: negative with no complaints of dry irritated eyes  ENT: negative with no mouth dryness or sores  ENDO: negative with no hypothyroid symptoms  HEME: negative, with no history of anemia or DVT  CV: negative without exertional chest pain, palpitations, or edema  RESP: negative without cough, dyspnea, or pleuritic pain  GI: negative without nausea, vomiting, heartburn, or bleeding  NEURO: negative, with no imbalance and no numbness or tingling of the hands or feet  MUSCULOSKELETAL: as per HPI    Inpatient Medications:     Current Facility-Administered Medications:     albuterol nebulizer solution 0.63 mg, 0.63 mg, Nebulization, Q4H PRN, Aaliyah Woodruff DO    amLODIPine tablet 10 mg, 10 mg, Oral, Daily, Zabrina Willett MD, 10 mg at 10/11/23 0915    apixaban tablet 10 mg, 10 mg, Oral, BID, Zabrina Willett MD, 10 mg at 10/12/23 1219    aspirin chewable tablet 81 mg, 81 mg, Oral, Daily, Ana María Fernandez MD, 81 mg at 10/12/23 0943    atorvastatin tablet 10 mg, 10 mg, Oral, Daily, Aaliyah Woodruff DO, 10 mg at 10/12/23 0943    dextrose 10% bolus 125 mL 125 mL, 12.5 g, Intravenous, PRN, Aaliyah Woodruff DO, Stopped at 10/12/23 0706    dextrose 10% bolus 250 mL 250 mL, 25 g, Intravenous, PRN, Aaliyah Woodruff DO, Stopped at 10/12/23 0921    diphenhydrAMINE injection 25 mg, 25 mg, Intravenous, Once, Quincy Burnett MD    EScitalopram oxalate tablet 10 mg, 10 mg, Oral, Daily, Aaliyah Woodruff DO, 10 mg at 10/12/23 0943    glucagon (human recombinant) injection 1 mg, 1 mg, Intramuscular, PRN, St. Bertrand, Aaliyah, DO    glucose chewable tablet 16 g, 16 g, Oral, PRN, St.  Aaliyah Thurston DO    glucose chewable tablet 24 g, 24 g, Oral, PRN, Aaliyah Woodruff DO    hydrALAZINE injection 10 mg, 10 mg, Intravenous, Q4H PRN, Aaliyah Woodruff DO    HYDROcodone-acetaminophen  mg per tablet 1 tablet, 1 tablet, Oral, Q6H PRN, Aaliyah Woodruff DO, 1 tablet at 10/12/23 0643    HYDROmorphone injection 0.5 mg, 0.5 mg, Intravenous, Q6H PRN, Zabrina Willett MD    insulin aspart U-100 injection 0-10 Units, 0-10 Units, Subcutaneous, QID (AC + HS) PRN, Aaliyah Woodruff DO    insulin detemir U-100 injection 30 Units, 30 Units, Subcutaneous, QHS, Zabrina Willett MD    labetalol 20 mg/4 mL (5 mg/mL) IV syring, 10 mg, Intravenous, Q4H PRN, Aaliyah Woodruff DO    losartan tablet 25 mg, 25 mg, Oral, Daily, Antoni Marie DO, 25 mg at 10/11/23 0915    melatonin tablet 6 mg, 6 mg, Oral, Nightly PRN, Aaliyah Woodruff DO    multivitamin tablet, 1 tablet, Oral, Daily, Ana María Fernandez MD, 1 tablet at 10/12/23 0943    ondansetron tablet 4 mg, 4 mg, Oral, Q6H PRN, Aaliyah Woodruff DO, 4 mg at 10/10/23 1228    pantoprazole EC tablet 40 mg, 40 mg, Oral, Daily, Aaliyah Woodruff DO, 40 mg at 10/12/23 0943    polyethylene glycol packet 17 g, 17 g, Oral, BID PRN, Aaliyah Woodruff DO    predniSONE tablet 10 mg, 10 mg, Oral, Daily, Antoni Marie DO, 10 mg at 10/12/23 0943    sodium chloride 0.9% flush 10 mL, 10 mL, Intravenous, Q12H PRN, Aaliyah Woodruff DO    Flushing PICC/Midline Protocol, , , Until Discontinued **AND** sodium chloride 0.9% flush 10 mL, 10 mL, Intravenous, Q6H, 10 mL at 10/12/23 1220 **AND** sodium chloride 0.9% flush 10 mL, 10 mL, Intravenous, PRN, Antoni Marie DO    sulfamethoxazole-trimethoprim 800-160mg per tablet 2 tablet, 2 tablet, Oral, Daily, Antoni Marie DO, 2 tablet at 10/12/23 0943     Objective:     Vital Signs (Most Recent):  Temp: 98 °F (36.7 °C) (10/12/23 1204)  Pulse: (!) 59 (10/12/23 1204)  Resp: 17 (10/12/23 1204)  BP: 110/63 (10/12/23  "1204)  SpO2: 98 % (10/12/23 1500)   Vital Signs (24h Range):  Temp:  [98 °F (36.7 °C)-98.5 °F (36.9 °C)] 98 °F (36.7 °C)  Pulse:  [53-69] 59  Resp:  [16-20] 17  SpO2:  [95 %-99 %] 98 %  BP: (102-149)/(62-92) 110/63     Weight: 73.5 kg (162 lb) (10/11/23 0915)  Body mass index is 26.96 kg/m².  Body surface area is 1.84 meters squared.      Intake/Output Summary (Last 24 hours) at 10/12/2023 1514  Last data filed at 10/12/2023 0750  Gross per 24 hour   Intake 287.12 ml   Output 100 ml   Net 187.12 ml         Physical Exam:   General Appearance: no acute distress and cooperative. Sitting up in bed having echo performed.  Skin: Skin color, texture, turgor normal. No rashes or lesions.  Eyes: conjunctivae/corneas clear. PERRL, EOM's intact.   ENT: No oral or nasal ulcers.  Neck:  Neck supple. No adenopathy.   Lungs: CTA throughout without crackles, rhonchi, or wheezes.   Heart: RRR w/o murmurs.  Abdomen: Soft, non-tender, no masses, organomegaly, rebound or guarding.  Neuro: CN II-XII GI, sensory and motor innervation intact.  Musculoskeletal: scars of b/l knees with normal ROM s/p arthroplasty.    Significant Labs:    Blood Culture: No results for input(s): "LABBLOO" in the last 24 hours.  CBC:   Recent Labs   Lab 10/12/23  0011   WBC 7.58   HGB 10.4*   HCT 31.2*            CMP:   Recent Labs   Lab 10/12/23  0011   CALCIUM 8.8   ALBUMIN 2.8*      K 4.1   CO2 24   BUN 31.8*   CREATININE 1.13*   ALKPHOS 93   ALT 7   AST 10   BILITOT 0.1         CRP:   No results for input(s): "CRP" in the last 24 hours.      ESR:   No results for input(s): "SEDRATE" in the last 24 hours.        All pertinent lab results from the last 24 hours have been reviewed.    Significant Imaging:  Imaging results within the past 24 hours have been reviewed.  CTA Chest Non-Coronary (PE Studies)   Final Result      No CT evidence for pulmonary embolus.      Stable ground-glass patchy and confluent opacities in both lower lobes and to a " lesser extent in the lingula and right middle lobe.      Slight interval decrease in axillary lymphadenopathy.         Electronically signed by: García Gilbert MD   Date:    10/09/2023   Time:    20:37      X-Ray Chest AP Portable   Final Result          Assessment/Plan:     Lupus nephritis  Proteinuria  Diabetic glomerulopathy  HTN  CKD  Chronic pain  Fibromyalgia  Staph bacteremia 1/2 bottles  - resume prednisone at 30 mg qd and resume cellcept 1500 bid/voclosporin 23.7 mg qd  - staph hemolyticus contaminant per ID  - hypercoagulable workup ordered (recommend initiating workup prior to heparin therapy in pt with unprovoked DVT)  - continue plaquenil  - Recommend renal biopsy  - abx per primary team  - anticoagulation per primary   - c/w SUP and pjp ppx with prednisone >20 daily  - 2.2 g in 24hr u prot    Hypercoagulable workup for unprovoked DVT  - anti-thrombin activity normal, prothrombin gene mutation needs to be collected, factor V activity and leiden mutation pending, protein C/S activity pending, beta2 glycoprotein, and cardiolipin abs pending,   - lupus anticoagulant lab will be falsely abnormal d/t starting heparin prior to evaluation  - lipoprotein A may be falsely elevated in the setting of acute illness as it is an acute phase reactant    Plan discussed with primary team.     Thank you for your consult. I will follow-up with patient. Please contact us if you have any additional questions.    James Cutler MD - PGY3  [employer] [facility]

## 2023-10-12 NOTE — PROGRESS NOTES
[facility] Medicine Wards   Progress Note     Resident Team: [facility] Medicine List 4  Attending Physician: Syeda Altman MD  Resident:  Zabrina Willett MD  Intern: Antoni Marie DO   Date of Admit: 10/9/2023    Subjective:      Brief HPI:  56 y/o female with history of SLE, DM-2, a-fib, CKD Stage 3, HTN, GERD, migraines, and fibromyalgia presents to ED with chief complaint of generalized myalgias x 3 days. She reports chest wall pain exacerbated by deep inhalation, bilateral lower extremity pain/cramps, generalized weakness, decreased appetite, and headache. Pt states symptoms are typical of her lupus flares. She is followed by rheumatology and is currently on CellCept 1000mg BID and prednisone 5mg daily. Pt also has prednisone 20mg prescription for flares, which she she took twice over the past 3 days without relief. Pt denies fever, cough, SOB, abdominal pain, N/V/D, and urinary symptoms.   In ED, pt was hypertensive with /83 on arrival. Labs notable for troponin of 0.055, CRP of 28.6, d-dimer of 6.7, and a positive UA. Pt not currently taking home antihypertensives or ASA.       Interval History:   Pt was restless last night. Nurse states she has been able to urinate but not have a BM. She states that her throat and face began to swell, perhaps due to salivary stone. The swelling has decreased since last night. There is pain in her proximal left leg, which she rates a 7/10. Labs and vitals currently stable, will continue to monitor. Otherwise, no nausea, vomiting, diarrhea, anginal CP, SOB, dysuria.        Review of Systems:  12 point review of symptoms negative unless otherwise stated above       Objective:     Vital Signs (Most Recent):  Temp: 98.7 °F (37.1 °C) (10/12/23 1720)  Pulse: 68 (10/12/23 1720)  Resp: (!) 22 (10/12/23 1720)  BP: 126/66 (10/12/23 1720)  SpO2: 96 % (10/12/23 1720) Vital Signs (24h Range):  Temp:  [98 °F (36.7 °C)-98.7 °F (37.1 °C)] 98.7 °F (37.1 °C)  Pulse:  [53-68] 68  Resp:  [16-22] 22  SpO2:   [95 %-99 %] 96 %  BP: (102-127)/(62-85) 126/66      Physical Exam:  Physical Exam  Vitals and nursing note reviewed.   Constitutional:       General: She is not in acute distress.     Appearance: Normal appearance.   HENT:      Head: Normocephalic and atraumatic.      Jaw: Swelling (right jaw, periauricular, and SCM swelling relative to left side; no TTP; no erythema) present.      Right Ear: External ear normal.      Left Ear: External ear normal.      Nose: Nose normal.      Mouth/Throat:      Mouth: Mucous membranes are moist.      Pharynx: Oropharynx is clear.      Comments: Oral mucosa pink, no signs of erythema   Eyes:      General: No scleral icterus.     Extraocular Movements: Extraocular movements intact.      Conjunctiva/sclera: Conjunctivae normal.      Pupils: Pupils are equal, round, and reactive to light.   Cardiovascular:      Rate and Rhythm: Normal rate and regular rhythm.      Pulses: Normal pulses.      Heart sounds: Normal heart sounds. No murmur heard.     No gallop.   Pulmonary:      Effort: Pulmonary effort is normal.      Breath sounds: Normal breath sounds. No wheezing, rhonchi or rales.   Abdominal:      General: Bowel sounds are normal. There is no distension.      Palpations: Abdomen is soft.      Tenderness: There is no abdominal tenderness.   Musculoskeletal:         General: Tenderness (TTP at T spine bilaterally) present. Normal range of motion.      Cervical back: Normal range of motion and neck supple.   Skin:     General: Skin is warm and dry.      Capillary Refill: Capillary refill takes less than 2 seconds.      Coloration: Skin is not jaundiced.      Findings: No bruising, erythema or rash.   Neurological:      General: No focal deficit present.      Mental Status: She is alert and oriented to person, place, and time.   Psychiatric:         Mood and Affect: Mood normal.         Behavior: Behavior normal.         Laboratory  Lab Results   Component Value Date      10/12/2023    K 4.1 10/12/2023     08/23/2021    CO2 24 10/12/2023     (H) 08/23/2021    BUN 31.8 (H) 10/12/2023    CREATININE 1.13 (H) 10/12/2023    CALCIUM 8.8 10/12/2023    PROT 7.9 08/23/2021    BILIDIR 0.1 03/28/2022    IBILI 0.10 03/28/2022    ALKPHOS 93 10/12/2023    AST 10 10/12/2023    ALT 7 10/12/2023    MG 2.10 10/11/2023    PHOS 3.5 10/11/2023        Lab Results   Component Value Date    WBC 7.58 10/12/2023    RBC 3.52 (L) 10/12/2023    HGB 10.4 (L) 10/12/2023    HCT 31.2 (L) 10/12/2023    MCV 88.6 10/12/2023    MCH 29.5 10/12/2023    MCHC 33.3 10/12/2023    RDW 15.9 10/12/2023     10/12/2023    MPV 10.0 10/12/2023    GRAN 4.4 08/23/2021    GRAN 71.0 08/23/2021    LYMPH 1.5 08/23/2021    LYMPH 24.6 08/23/2021    MONO 0.2 (L) 08/23/2021    MONO 3.5 (L) 08/23/2021    EOS 0.0 08/23/2021    BASO 0.02 08/23/2021    EOSINOPHIL 0.3 08/23/2021    BASOPHIL 0.3 08/23/2021        Microbiology:  Microbiology Results (last 7 days)       Procedure Component Value Units Date/Time    Blood Culture [[id number]]  (Abnormal) Collected: 10/09/23 2300    Order Status: Completed Specimen: Blood from Wrist, Right Updated: 10/12/23 1121     CULTURE, BLOOD ([facility]) Susceptibility To Follow      Staphylococcus haemolyticus     GRAM STAIN Gram Positive Cocci, probable Staphylococcus      Seen in gram stain of broth only      1 of 2 Anaerobic bottles positive    Blood Culture [[id number]]  (Normal) Collected: 10/09/23 2300    Order Status: Completed Specimen: Blood from Hand, Right Updated: 10/12/23 1101     CULTURE, BLOOD ([facility]) No Growth At 48 Hours    Urine culture [[id number]] Collected: 10/09/23 1932    Order Status: Completed Specimen: Urine Updated: 10/12/23 0928     Urine Culture No Significant Growth    Chlamydia/GC, PCR [[id number]]     Order Status: Sent Specimen: Urine     Blood Culture [[id number]] Collected: 10/11/23 1334    Order Status: Resulted Specimen: Blood from Wrist, Right Updated: 10/11/23  1344    Blood Culture [[id number]] Collected: 10/11/23 1334    Order Status: Resulted Specimen: Blood from Hand, Right Updated: 10/11/23 1344    BCID2 Panel [[id number]]  (Abnormal) Collected: 10/09/23 2300    Order Status: Completed Specimen: Blood from Wrist, Right Updated: 10/11/23 0327     CTX-M (ESBL ) N/A     IMP (Cabapenemase ) N/A     KPC resistance gene (Carbapenemase ) N/A     mcr-1 N/A     mecA ID N/A     Comment: Note: Antimicrobial resistance can occur via multiple mechanisms. A Not Detected result for antimicrobial resistance gene(s) does not indicate antimicrobial susceptibility. Subculturing is required for species identification and susceptibility testing of   isolates.        mecA/C and MREJ (MRSA) gene N/A     NDM (Carbapenemase ) N/A     OXA-48-like (Carbapenemase ) N/A     Nik/B (VRE gene) N/A     VIM (Carbapenemase ) N/A     Enterococcus faecalis Not Detected     Enterococcus faecium Not Detected     Listeria monocytogenes Not Detected     Staphylococcus spp. Detected     Staphylococcus aureus Not Detected     Staphylococcus epidermidis Not Detected     Staphylococcus lugdunensis Not Detected     Streptococcus spp. Not Detected     Streptococcus agalactiae (Group B) Not Detected     Streptococcus pneumoniae Not Detected     Streptococcus pyogenes (Group A) Not Detected     Acinetobacter calcoaceticus/baumannii complex Not Detected     Bacteroides fragilis Not Detected     Enterobacterales Not Detected     Enterobacter cloacae complex Not Detected     Escherichia coli Not Detected     Klebsiella aerogenes Not Detected     Klebsiella oxytoca Not Detected     Klebsiella pneumoniae group Not Detected     Proteus spp. Not Detected     Salmonella spp. Not Detected     Serratia marcescens Not Detected     Haemophilus influenzae Not Detected     Neisseria meningitidis Not Detected     Pseudomonas aeruginosa Not Detected     Stenotrophomonas maltophilia  Not Detected     Candida albicans Not Detected     Candida auris Not Detected     Candida glabrata Not Detected     Candida krusei Not Detected     Candida parapsilosis Not Detected     Candida tropicalis Not Detected     Cryptococcus neoformans/gattii Not Detected    Narrative:      The "[employer]" BCID2 Panel is a multiplexed nucleic acid test intended for the use with "[employer]"® FilmArray® 2.0 or "[employer]"® FilmArray® [employer] for the simultaneous qualitative detection and identification of multiple bacterial and yeast nucleic acids and select genetic determinants associated with antimicrobial resistance.  The [employer] BCID2 Panel test is performed directly on blood culture samples identified as positive by a continuous monitoring blood culture system.  Results are intended to be interpreted in conjunction with Gram stain results.            Radiology:  Imaging Results              CTA Chest Non-Coronary (PE Studies) (Final result)  Result time 10/09/23 20:37:02      Final result by García Gilbert MD (10/09/23 20:37:02)                   Impression:      No CT evidence for pulmonary embolus.    Stable ground-glass patchy and confluent opacities in both lower lobes and to a lesser extent in the lingula and right middle lobe.    Slight interval decrease in axillary lymphadenopathy.      Electronically signed by: García Gilbert MD  Date:    10/09/2023  Time:    20:37               Narrative:    EXAMINATION:  CTA chest    CLINICAL HISTORY:  PE suspected    COMPARISON:  CT of the chest dated 03/09/2023    TECHNIQUE:  Routine CT angiogram of the chest was performed intravenous contrast. 3D MIP images are obtained.    Total DLP: 191 mGy.cm    Automatic exposure control was utilized to reduce the patient's dose    FINDINGS:  No intraluminal filling defects within the pulmonary arteries to suggest pulmonary embolus.  Thoracic aorta normal caliber.  There are mild atherosclerotic calcifications of the thoracic aorta.  The  heart is normal in size.  There is bilateral lymphadenopathy in both axilla, slightly decreased when compared to prior.  No mediastinal or hilar lymphadenopathy.  The heart is normal in size.  No pericardial pleural effusion.  Visualized upper abdomen appears grossly unremarkable.    The central airways are patent and unremarkable.  Is no consolidation.  There is persistent patchy and confluent areas of ground-glass opacities in both lower lobes, lingula and right middle lobe    No osseous destructive process.                                       X-Ray Chest AP Portable (Final result)  Result time 10/09/23 19:53:58      Final result by Octaviano Michelle MD (10/09/23 19:53:58)                   Narrative:    EXAMINATION  XR CHEST AP PORTABLE    CLINICAL HISTORY  Tachypnea, not elsewhere classified    TECHNIQUE  A total of 1 frontal image(s) of the chest.    COMPARISON  9 March 2023    FINDINGS  Lines/tubes/devices: none present    The cardiomediastinal silhouette and central pulmonary vasculature are unremarkable for utilized technique.  The trachea is midline. There is no lobar consolidation, pleural effusion, or pneumothorax.    There is no acute osseous or extrathoracic abnormality.    IMPRESSION  No convincing acute radiographic abnormality.      Electronically signed by: Octaviano Michelle  Date:    10/09/2023  Time:    19:53                                    Current Medications:     Infusions:         Scheduled:   amLODIPine  10 mg Oral Daily    apixaban  10 mg Oral BID    aspirin  81 mg Oral Daily    atorvastatin  10 mg Oral Daily    diphenhydrAMINE  25 mg Intravenous Once    EScitalopram oxalate  10 mg Oral Daily    insulin detemir U-100  30 Units Subcutaneous QHS    losartan  25 mg Oral Daily    multivitamin  1 tablet Oral Daily    pantoprazole  40 mg Oral Daily    predniSONE  10 mg Oral Daily    sodium chloride 0.9%  10 mL Intravenous Q6H    sulfamethoxazole-trimethoprim 800-160mg  2 tablet Oral Daily         PRN:  albuterol, dextrose 10%, dextrose 10%, glucagon (human recombinant), glucose, glucose, hydrALAZINE, HYDROcodone-acetaminophen, HYDROmorphone, insulin aspart U-100, labetalol, melatonin, ondansetron, polyethylene glycol, sodium chloride 0.9%, Flushing PICC/Midline Protocol **AND** sodium chloride 0.9% **AND** sodium chloride 0.9%    Antibiotics and Day Number of Therapy:  Bactrim 800/160 x2 qD for PJP PPX - day 1      Intake/Output Summary (Last 24 hours) at 10/12/2023 1725  Last data filed at 10/12/2023 0750  Gross per 24 hour   Intake 287.12 ml   Output --   Net 287.12 ml       Lines/Drains/Airways       Peripheral Intravenous Line  Duration                  Midline Catheter Insertion/Assessment  - Single Lumen 10/11/23 1459 Right brachial vein 1 day                      Assessment & Plan:   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   - POCT glucose elevated at 122  - C3 compliment decreased at 75. C4 wnl. Sed 50  - 24 hr Ur protien elevated at 2120  - Pt continued on on home dose of prednisone 10 mg po qd for lupus diagnosis. Rheumatology recommends Cellcept at 1500 mg po BID. Currently holding due to concern of bacteremia.   - On PJP PPX with Bactrim   - Continue to PT to aid in ambulation  - Nephrology and Rheumatology consulted; recs appreciated.   - Neph and Rheum rec IR-Renal Bx; awaiting neg BCX x2      (+) Staphylococci in 1 of 2 Blood Culture   R/o contamination  - vancomycin IV x1  - R/p BCX ordered   - ID recs: if 2nd Bcx neg, continue with immunosuppression and renal bx; ID signed off  - Notify immediately if pt develops acute symptoms suggesting possible infection.      Unprovoked DVT of the R LE   - Ultrasound revealed DVT in right lower extremity  - PTT is elevated at  167.7, D-Dimer elevated at 6.78>>2.09 as of 10/11.   - Awaiting results of Lipoprotein A, Protein C activity, Lupus anticoagulant, Antithrombin III, Cardiolipin AB, and Factor V levels.   - currently on eliquis 10 mEq BID   - According to CTA of chest, there are mild atherosclerotic calcifications of the thoracic aorta. No evidence of PE.     Chest Pain with Elevated Troponin   - Trop 0.55 >>> 0.01  - Low tasha for primary ischemic CP; likely pleuritis   - ASA 81 mg po qd.   - Cardiology consulted. Recommends outpatient stress test; assistance appreciated.     Paroxysmal Atrial Fibrillation   - HR currently controlled  - Remain on tele; continue Hep   - Cards rec d/c with eliquis 5 BID     HTN  - ECG unremarkable for ischemic changes, with mild elevation of troponin on admission. Troponins have returned to baseline.  - BP stabilized at 135/79   - Pt placed on 10 mg amlodipine po qd, losartan 25 mg po qd,   - TTE performed, awaiting results 10/11.      Anxiety  - For anxiety, pt continued on home dose of 10mg lexapro po qd.     Diabetes Mellitus   - Currently on 30 Lantus        CODE STATUS: Full Code   Access: PIV  Antibiotics: Vancomycin Day 1  Diet: Diet NPO  DVT Prophylaxis: Heparin 25k   GI Prophylaxis: protonix      Disposition: Vi Ulrich is a 57 y.o. female who is admitted for generalized weakness, unprovoked DVT of the right lower extremity, HTN, and Lupus flare. Awaiting IR-guided renal Bx prior to discharge.     Antoni Marie DO   PGY-1 Internal Medicine

## 2023-10-12 NOTE — MEDICAL/APP STUDENT
[facility] Progress Note    Patient Name: Vi Ulrich  MRN: [id number]  Admission Date: 10/9/2023  Hospital Length of Stay: 2 days  Code Status: Full Code  Attending Provider: Syeda Altman MD  Primary Care Provider: Octaviano Barrios MD     Subjective:      Brief HPI:  Brief HPI:  58 y/o female with history of SLE, DM-2, a-fib, CKD Stage 3, HTN, GERD, migraines, and fibromyalgia presents to ED with chief complaint of generalized myalgias x 3 days. She reports chest wall pain exacerbated by deep inhalation, bilateral lower extremity pain/cramps, generalized weakness, decreased appetite, and headache. Pt states symptoms are typical of her lupus flares. She is followed by rheumatology and is currently on CellCept 1000mg BID and prednisone 5mg daily. Pt also has prednisone 20mg prescription for flares, which she she took twice over the past 3 days without relief. Pt denies fever, cough, SOB, abdominal pain, N/V/D, and urinary symptoms.   In ED, pt was hypertensive with /83 on arrival. Labs notable for troponin of 0.055, CRP of 28.6, d-dimer of 6.7, and a positive UA. Pt not currently taking home antihypertensives or ASA.         Interval History:  Pt was restless last night. Nurse states she has been able to urinate but not have a BM. She states that her throat and face began to swell, perhaps due to salivary stone. The swelling has decreased since last night. There is pain in her proximal left leg, which she rates a 7/10. Blood culture positive for GPCs in 1 of 2 bottles; vanc x1 given, now DC per Infectious Disease. Second set of blood cultures are negative. BCID2 Panel positive for Staphylococcus Aureus. Rheumatology recommends decreasing steroids, prednisone 10 q.d. and increasing CellCept to 1500 b.i.d.. Rheumatology recommends Bactrim for PJP prophylaxis. Nephrology and rheumatology both recommend IR guided renal biopsy. Cardiology recommends outpatient stress test.        Review of Systems:  The  remainder of the 14 point ROS is noncontributory or negative unless mentioned/reviewed above.     Objective:     Vital Signs:  Vital Signs (Most Recent):  Temp: 98.5 °F (36.9 °C) (10/12/23 0431)  Pulse: (!) 53 (10/12/23 0431)  Resp: 20 (10/12/23 0431)  BP: 116/69 (10/12/23 0431)  SpO2: 95 % (10/12/23 0431)  Body mass index is 26.96 kg/m².  Weight: 73.5 kg (162 lb) Vital Signs (24h Range):  Temp:  [97.9 °F (36.6 °C)-98.5 °F (36.9 °C)] 98.5 °F (36.9 °C)  Pulse:  [53-69] 53  Resp:  [18-20] 20  SpO2:  [95 %-100 %] 95 %  BP: (108-149)/(66-92) 116/69       Input/output:     Intake/Output Summary (Last 24 hours) at 10/12/2023 0604  Last data filed at 10/11/2023 1703  Gross per 24 hour   Intake 201.14 ml   Output 1000 ml   Net -798.86 ml       Physical Examination:  General:  Well developed, well nourished, no acute respiratory distress  Head: Normocephalic, atraumatic.Swelling in mandibular and maxillary regions with tenderness.   Eyes: PERRL, anicteric sclera  Neck: Acute swelling of neck. Supple, normal ROM, no JVD  CVS:  RRR, S1 and S2 normal, no murmurs, no added heart sounds, rubs, gallops, regular peripheral pulses, and no peripheral edema  Resp: Lungs clear to auscultation bilaterally, no wheezes, rales, or rhonci  GI: Abdomen soft, non-tender, non-distended, normoactive bowel sounds  MSK:  Full range of motion, no obvious deformities   Skin: No rashes, ulcers, erythema  Neuro: Alert and oriented x3, No focal neuro deficits, CNII-XII grossly intact  Psych:  Appropriate mood and affect       Lines/Drains/Airways       None                    Laboratory:    Recent Labs   Lab 10/10/23  1010 10/11/23  0212 10/12/23  0011   WBC 4.75 6.20 7.58   RBC 4.48 3.69* 3.52*   HGB 13.0 10.6* 10.4*   HCT 40.2 32.6* 31.2*   MCV 89.7 88.3 88.6   MCH 29.0 28.7 29.5   MCHC 32.3* 32.5* 33.3   RDW 15.8 15.6 15.9    397 385   MPV 9.8 9.5 10.0      Recent Labs   Lab 10/09/23  1858 10/09/23  2007 10/10/23  0152 10/11/23  0212  10/12/23  0011     --  135* 140 137   K 4.2  --  4.8 4.7 4.1   CHLORIDE 106  --  105 109* 106   CO2 25  --  22 23 24   BUN 22.1*  --  19.3 26.6* 31.8*   CREATININE 1.24*  --  1.09* 1.05* 1.13*   CALCIUM 8.9  --  8.9 9.2 8.8   PH  --  7.330  --   --   --    MG 1.90  --  1.90 2.10  --    ALBUMIN 3.0*  --  2.9* 3.0* 2.8*   ALKPHOS 114  --  116 104 93   ALT 8  --  8 7 7   AST 15  --  14 12 10   BILITOT 0.2  --  0.1 0.1 0.1        Other Results:  Estimated Creatinine Clearance: 56.5 mL/min (A) (based on SCr of 1.13 mg/dL (H)).    Current Medications:     Infusions:   heparin (porcine) in D5W 18 Units/kg/hr (10/12/23 3058)         Scheduled:   amLODIPine  10 mg Oral Daily    aspirin  81 mg Oral Daily    atorvastatin  10 mg Oral Daily    diphenhydrAMINE  25 mg Intravenous Once    EScitalopram oxalate  10 mg Oral Daily    insulin detemir U-100  80 Units Subcutaneous QHS    losartan  25 mg Oral Daily    multivitamin  1 tablet Oral Daily    pantoprazole  40 mg Oral Daily    predniSONE  10 mg Oral Daily    sodium chloride 0.9%  10 mL Intravenous Q6H    sulfamethoxazole-trimethoprim 800-160mg  2 tablet Oral Daily         PRN:   albuterol    dextrose 10%    dextrose 10%    glucagon (human recombinant)    glucose    glucose    heparin (PORCINE)    heparin (PORCINE)    hydrALAZINE    HYDROcodone-acetaminophen    insulin aspart U-100    labetalol    melatonin    ondansetron    polyethylene glycol    sodium chloride 0.9%    sodium chloride 0.9%        Microbiology Data:  Microbiology Results (last 7 days)       Procedure Component Value Units Date/Time    Blood Culture [[id number]] Collected: 10/11/23 1334    Order Status: Resulted Specimen: Blood from Wrist, Right Updated: 10/11/23 1344    Blood Culture [[id number]] Collected: 10/11/23 1334    Order Status: Resulted Specimen: Blood from Hand, Right Updated: 10/11/23 1344    Blood Culture [[id number]]  (Normal) Collected: 10/09/23 2300    Order Status: Completed Specimen:  Blood from Hand, Right Updated: 10/11/23 1101     CULTURE, BLOOD ([facility]) No Growth At 24 Hours    Urine culture [[id number]] Collected: 10/09/23 1932    Order Status: Completed Specimen: Urine Updated: 10/11/23 0644     Urine Culture No Growth At 24 Hours    BCID2 Panel [[id number]]  (Abnormal) Collected: 10/09/23 2300    Order Status: Completed Specimen: Blood from Wrist, Right Updated: 10/11/23 0327     CTX-M (ESBL ) N/A     IMP (Cabapenemase ) N/A     KPC resistance gene (Carbapenemase ) N/A     mcr-1 N/A     mecA ID N/A     Comment: Note: Antimicrobial resistance can occur via multiple mechanisms. A Not Detected result for antimicrobial resistance gene(s) does not indicate antimicrobial susceptibility. Subculturing is required for species identification and susceptibility testing of   isolates.        mecA/C and MREJ (MRSA) gene N/A     NDM (Carbapenemase ) N/A     OXA-48-like (Carbapenemase ) N/A     Nik/B (VRE gene) N/A     VIM (Carbapenemase ) N/A     Enterococcus faecalis Not Detected     Enterococcus faecium Not Detected     Listeria monocytogenes Not Detected     Staphylococcus spp. Detected     Staphylococcus aureus Not Detected     Staphylococcus epidermidis Not Detected     Staphylococcus lugdunensis Not Detected     Streptococcus spp. Not Detected     Streptococcus agalactiae (Group B) Not Detected     Streptococcus pneumoniae Not Detected     Streptococcus pyogenes (Group A) Not Detected     Acinetobacter calcoaceticus/baumannii complex Not Detected     Bacteroides fragilis Not Detected     Enterobacterales Not Detected     Enterobacter cloacae complex Not Detected     Escherichia coli Not Detected     Klebsiella aerogenes Not Detected     Klebsiella oxytoca Not Detected     Klebsiella pneumoniae group Not Detected     Proteus spp. Not Detected     Salmonella spp. Not Detected     Serratia marcescens Not Detected     Haemophilus influenzae Not Detected      Neisseria meningitidis Not Detected     Pseudomonas aeruginosa Not Detected     Stenotrophomonas maltophilia Not Detected     Candida albicans Not Detected     Candida auris Not Detected     Candida glabrata Not Detected     Candida krusei Not Detected     Candida parapsilosis Not Detected     Candida tropicalis Not Detected     Cryptococcus neoformans/gattii Not Detected    Narrative:      The [employer] BCID2 Panel is a multiplexed nucleic acid test intended for the use with NCLC® 2.0 or NCLC® [employer] Systems for the simultaneous qualitative detection and identification of multiple bacterial and yeast nucleic acids and select genetic determinants associated with antimicrobial resistance.  The [employer] BCID2 Panel test is performed directly on blood culture samples identified as positive by a continuous monitoring blood culture system.  Results are intended to be interpreted in conjunction with Gram stain results.    Blood Culture [[id number]]  (Abnormal) Collected: 10/09/23 2300    Order Status: Completed Specimen: Blood from Wrist, Right Updated: 10/11/23 0212     GRAM STAIN Gram Positive Cocci, probable Staphylococcus      Seen in gram stain of broth only      1 of 2 Anaerobic bottles positive             Antibiotics and Day Number of Therapy:  Antibiotics (From admission, onward)      Start     Stop Route Frequency Ordered    10/11/23 1445  sulfamethoxazole-trimethoprim 800-160mg per tablet 2 tablet         -- Oral Daily 10/11/23 1438             Imaging:  Echo    Left Ventricle: The left ventricle is normal in size. Mildly increased   wall thickness. There is normal systolic function. Biplane (2D) method of   discs ejection fraction is 60%.    Left Atrium: Left atrium is mildly dilated.    Right Ventricle: Normal right ventricular cavity size. Systolic   function is normal.    Right Atrium: Right atrium is mildly dilated.    Aortic Valve: The aortic valve is a trileaflet valve.    Mitral  Valve: The mitral valve is structurally normal.    Tricuspid Valve: There is mild regurgitation.    Pulmonic Valve: There is mild regurgitation.    Pulmonary Artery: The estimated pulmonary artery systolic pressure is   27 mmHg.    IVC/SVC: Intermediate venous pressure at 8 mmHg.        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  - BUN increased at 3.18, creatinine increased 1.13, albumin decreased at 2.8, and globulin increased at 4.1 10/12.   - POCT glucose elevated at 124  - C3 compliment decreased at 75. C4 wnl. Sed 50  - Pt continued on on home dose of prednisone 10 mg po qd for lupus diagnosis. Rheumatology recommends Cellcept at 1500 mg po BID. Currently holding due to concern of bacteremia.   - Urine 24 hour protein is 2,119.5 10/11  - On PJP PPX with Bactrim   - Continue to PT to aid in ambulation  - Nephrology and Rheumatology consulted; recs appreciated.   - Neph and Rheum rec IR-Renal Bx; awaiting neg BCX      (+) Staphylococci in 1 of 2 Blood Culture   R/o contamination  - Pt previously prescribed IV vancomycin. Pt currently prescribed bactrim po BID.   - R/p BCX ordered   - Notify immediately if pt develops acute symptoms suggesting possible infection.      Unprovoked DVT of the R LE   - Ultrasound revealed DVT in right lower extremity  - PTT is elevated at 93.5, D-Dimer elevated at 6.78>>2.09, Fibrinogen 512.0 as of 10/11.   - Awaiting results of Lipoprotein A, Protein C activity, Lupus anticoagulant, Antithrombin III, Cardiolipin AB, and Factor V levels.   - Due to supra therapeutic APTT, 25,000 units of IV Heparin held. Continue monitor and restart per protocol.    - According to CTA of chest, there are mild atherosclerotic calcifications of the thoracic aorta. No evidence of PE.      Chest Pain with  Elevated Troponin   - Trop 0.55 >>> 0.01  - Low tasha for primary ischemic CP; likely pleuritis   - ASA 81 mg po qd.   - Cardiology consulted. Recommends outpatient stress test; assistance appreciated.      Paroxysmal Atrial Fibrillation   - Last HR recorded as 53.   - Remain on tele; continue Hep   - Cards rec d/c with eliquis 5 BID     HTN  - ECG unremarkable for ischemic changes, with mild elevation of troponin on admission. Troponins have returned to baseline.  - BP stabilized at 105/69.  - Pt placed on 10 mg amlodipine po qd, losartan 25 mg po qd.  - TTE performed, demonstrated mild left ventricle wall thickness, left and right atrium mildly dilated, mild tricuspid and pulmonic valve regurgitation, and an IVC/SVC intermediate venous pressure at 8 mmHg. EF is 60%; appreciate Cardiology's assistance.      Anxiety  For anxiety, pt continued on home dose of 10mg lexapro po qd.     CODE STATUS: Full Code   Access: PIV  Antibiotics: Bactrim day 2  Diet: Diet NPO  DVT Prophylaxis: Heparin 25k   GI Prophylaxis: protonix    Disposition: Vi Ulrich is a 57 y.o. female who is admitted for Vi Ulrich is a 57 y.o. female who is admitted for generalized weakness, unprovoked DVT of the right lower extremity, HTN, and Lupus flare.      Renata Casanova  [facility], [facility]  10/12/2023

## 2023-10-12 NOTE — PLAN OF CARE
57 y.o. female with HTN, PAF, CKD III, SLE, and fibromyalgia that presents with suspect SLE flare. Cardiology consulted for abnormal troponin.     Chest Pain with Elevated Troponin  -Low suspicion for primary ischemic cardiac chest pain. Could consider husam-pericarditis but ECG is not suggestive and troponin trend is not consistent with this. Perhaps the cause is pleuritis from flare.   -The patient does report symptoms potentially representative of angina outpatient with chest tightness along with dyspnea on exertion. Additionally has risk factors of HTN, CKD, and SLE which can contribute to CAD. Recommend outpatient stress testing with cardiology follow up.   -Story, enzymes, and ECG are not consistent with Type I event; however, can use anticoagulation for DVT and AF per primary team.      PAF  -Rate control with beta blocker if needed but heart rate is currently controlled. Keep on telemetry.   -Anticoagulation as CV2 score is at least 2. Recommend Eliquis 5 mg BID on DC post initial DVT dosing.       We will sign off.     Sriram Rae MD  [employer] Cardiology Fellow PGY5

## 2023-10-12 NOTE — PLAN OF CARE
Problem: Adult Inpatient Plan of Care  Goal: Plan of Care Review  10/12/2023 1617 by Zahira Lao RN  Outcome: Ongoing, Progressing  10/12/2023 1616 by Zahira Lao RN  Outcome: Ongoing, Progressing  Goal: Patient-Specific Goal (Individualized)  10/12/2023 1617 by Zahira Lao RN  Outcome: Ongoing, Progressing  10/12/2023 1616 by Zahira Lao RN  Outcome: Ongoing, Progressing  Goal: Absence of Hospital-Acquired Illness or Injury  10/12/2023 1617 by Zahira Lao RN  Outcome: Ongoing, Progressing  10/12/2023 1616 by Zahira Lao RN  Outcome: Ongoing, Progressing  Goal: Optimal Comfort and Wellbeing  10/12/2023 1617 by Zahira Lao RN  Outcome: Ongoing, Progressing  10/12/2023 1616 by Zahira Lao RN  Outcome: Ongoing, Progressing  Goal: Readiness for Transition of Care  10/12/2023 1617 by Zahira Lao RN  Outcome: Ongoing, Progressing  10/12/2023 1616 by Zahira Lao RN  Outcome: Ongoing, Progressing     Problem: Diabetes Comorbidity  Goal: Blood Glucose Level Within Targeted Range  10/12/2023 1617 by Zahira Lao RN  Outcome: Ongoing, Progressing  10/12/2023 1616 by Zahira Lao RN  Outcome: Ongoing, Progressing     Problem: Oral Intake Inadequate (Acute Kidney Injury/Impairment)  Goal: Optimal Nutrition Intake  10/12/2023 1617 by Zahira Lao RN  Outcome: Ongoing, Progressing  10/12/2023 1616 by Zahira Lao RN  Outcome: Ongoing, Progressing     Problem: Renal Function Impairment (Acute Kidney Injury/Impairment)  Goal: Effective Renal Function  10/12/2023 1617 by Zahira Lao RN  Outcome: Ongoing, Progressing  10/12/2023 1616 by Zahira Lao RN  Outcome: Ongoing, Progressing

## 2023-10-12 NOTE — CONSULTS
[facility]   Inpatient Infectious Diseases Consultation    Physician requesting consultation: Syeda Altman MD  Service requesting consultation: Internal Medicine  Reason for consultation:  Positive blood culture with coag-negative Staph    Historian: Patient and Electronic Medical Record    Isolation Status: No active isolations     HPI:     Ms Ulrich is a 57 yr old BF with past medical history that is significant for systemic lupus erythematosus (2004), interstitial lung disease, myositis, diabetes mellitus type 2 (Hgb A1c 5.4 on 10/9/23), chronic kidney disease (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  She was concern for a lupus flare.  She states she gets these quite often.  She tells me she takes CellCept, Plaquenil, and Prednisone 5 mg p.o. daily. She reports she is compliant with medication and has been on these for a long time.  According to the chart she is supposed to take 20 mg of Prednisone during a flare.  She states she does not do this as she only has the 5 mg tabs.  Initial labs showed no leukocytosis, elevated inflammatory markers, and a positive D-dimer at 6.78.  C3 75/C4 13.0.  CTA was done and showed no concerns for a PE, but did show ground-glass with bilateral opacities and increase axillary lymphadenopathy.  DVT study was done and showed occlusive popliteal DVT.  Urine culture was done and so far showing no growth, but UA with WBC > 100, 500 leukocytes, RBC 21-50, no nitrites.  Patient reports previous dysuria and dark urine.  Blood cultures were drawn 10/9/23.  After blood cultures were done, patient received 2 daily doses of Rocephin and 1 dose of Vancomycin. She was also  started on high-dose steroids during this time. Blood cultures then resulted as positive for coag-negative staph in 1 of 2 bottles. Steroids were then decreased and blood cultures repeated. Pt has remained afebrile with no leukocytosis throughout the course of hospitalization. TTE was done on 10/11/23 and made no mention of valvular vegetations, but did show concerns for regurgitation. ID has been consulted for positive blood culture with coag-negative staph. Pt has since been started on PJP prophylaxis with Bactrim DS.  Patient reports she lives in [place] by herself.  No travel/no known sick contacts/no pets.  Denies tobacco use, alcohol use, or illicit drug use/IVDU. Patient is noted with a penicillin allergy; states she gets hives and facial swelling. Patient states she is getting aggravated by all of the questions.      Antibiotic history:  Ceftriaxone 1 gram IV daily - 10/9/23 to 10/10/23  Vancomycin IV x 1 dose on 10/11/23  Bactrim DS 2 tabs po daily - 10/11/23 to present      Past Medical History/Past Surgical History/Social History     Past Medical History:   Diagnosis Date    Arthritis     knees    Asthma     Atrial fibrillation     Chronic constipation     Diabetes mellitus     Encounter for blood transfusion 10/2014    GERD (gastroesophageal reflux disease)     Gout     Hypertension     Lupus 2004    SLE    Renal disorder     SLE (systemic lupus erythematosus)        Past Surgical History:   Procedure Laterality Date    APPENDECTOMY      CHOLECYSTECTOMY      COLONOSCOPY N/A 4/19/2018    Procedure: COLONOSCOPY;  Surgeon: Minerva Porras MD;  Location: [facility];  Service: Endoscopy;  Laterality: N/A;    ESOPHAGOGASTRODUODENOSCOPY N/A 4/19/2018    Procedure: ESOPHAGOGASTRODUODENOSCOPY (EGD);  Surgeon: Minerva Porras MD;  Location: [facility];  Service: Endoscopy;  Laterality: N/A;    HYSTERECTOMY      JOINT REPLACEMENT Left 08/07/2015    Knee    LYMPH NODE BIOPSY Left 2014    MASTECTOMY       Left arm- NO BP    PARTIAL HYSTERECTOMY          FAMILY HISTORY:  family history includes Arthritis in her mother; Asthma in her mother; Diabetes in her mother and sister; Heart block in her mother; Heart disease in her father and sister; Kidney disease in her sister.     SOCIAL HISTORY:   Social History     Socioeconomic History    Marital status: Single    Years of education: 10th   Tobacco Use    Smoking status: Former     Current packs/day: 0.00     Types: Cigarettes     Start date: 1980     Quit date: 2022     Years since quittin.4    Smokeless tobacco: Never   Substance and Sexual Activity    Alcohol use: No    Drug use: No    Sexual activity: Not Currently     Birth control/protection: See Surgical Hx     Social Determinants of Health     Financial Resource Strain: Low Risk  (2023)    Overall Financial Resource Strain (CARDIA)     Difficulty of Paying Living Expenses: Not hard at all   Food Insecurity: No Food Insecurity (2023)    Hunger Vital Sign     Worried About Running Out of Food in the Last Year: Never true     Ran Out of Food in the Last Year: Never true   Transportation Needs: No Transportation Needs (2023)    PRAPARE - Transportation     Lack of Transportation (Medical): No     Lack of Transportation (Non-Medical): No   Physical Activity: Inactive (2023)    Exercise Vital Sign     Days of Exercise per Week: 0 days     Minutes of Exercise per Session: 0 min   Stress: No Stress Concern Present (2023)    [employer] - Occupational Stress Questionnaire     Feeling of Stress : Not at all   Social Connections: Socially Isolated (2023)    Social Connection and Isolation Panel [NHANES]     Frequency of Communication with Friends and Family: More than three times a week     Frequency of Social Gatherings with Friends and Family: Never     Attends [employer] Services: Never     Active Member of Clubs or Organizations: No     Attends  Club or Organization Meetings: Never     Marital Status: Never    Housing Stability: Low Risk  (1/12/2023)    Housing Stability Vital Sign     Unable to Pay for Housing in the Last Year: No     Number of Places Lived in the Last Year: 1     Unstable Housing in the Last Year: No        ALLERGIES:   Review of patient's allergies indicates:   Allergen Reactions    Ketorolac (pf) Swelling    Mycophenolate mofetil Swelling    Penicillins Hives    Percocet [oxycodone-acetaminophen] Hives and Itching    Tramadol Hives       Review of Systems     Review of Systems   Constitutional:  Positive for diaphoresis (night sweats d/t steroids she claims). Negative for chills, fever and malaise/fatigue.   HENT:  Negative for congestion, ear pain, hearing loss and sore throat.    Eyes:  Negative for blurred vision, photophobia and pain.   Respiratory:  Negative for cough, hemoptysis, sputum production and shortness of breath.    Cardiovascular:  Negative for chest pain, palpitations and leg swelling.   Gastrointestinal:  Positive for abdominal pain (RUQ), nausea and vomiting. Negative for constipation and diarrhea.   Genitourinary:  Positive for dysuria. Negative for flank pain, frequency, hematuria and urgency.        + dark urine reported   Musculoskeletal:  Positive for myalgias and neck pain. Negative for back pain and joint pain.   Skin:  Negative for itching and rash.   Neurological:  Positive for headaches (intermittent). Negative for dizziness, seizures and weakness.   Endo/Heme/Allergies:  Does not bruise/bleed easily.   Psychiatric/Behavioral:  Negative for depression, hallucinations and substance abuse.        MEDICATIONS:   Scheduled Meds:   amLODIPine  10 mg Oral Daily    apixaban  10 mg Oral BID    aspirin  81 mg Oral Daily    atorvastatin  10 mg Oral Daily    diphenhydrAMINE  25 mg Intravenous Once    EScitalopram oxalate  10 mg Oral Daily    insulin detemir U-100  30 Units Subcutaneous QHS    losartan  25 mg  Oral Daily    multivitamin  1 tablet Oral Daily    pantoprazole  40 mg Oral Daily    predniSONE  10 mg Oral Daily    sodium chloride 0.9%  10 mL Intravenous Q6H    sulfamethoxazole-trimethoprim 800-160mg  2 tablet Oral Daily     Continuous Infusions:  PRN Meds:.albuterol, dextrose 10%, dextrose 10%, glucagon (human recombinant), glucose, glucose, hydrALAZINE, HYDROcodone-acetaminophen, insulin aspart U-100, labetalol, melatonin, ondansetron, polyethylene glycol, sodium chloride 0.9%, Flushing PICC/Midline Protocol **AND** sodium chloride 0.9% **AND** sodium chloride 0.9%    Physical exam:     Temp:  [98 °F (36.7 °C)-98.5 °F (36.9 °C)] 98.2 °F (36.8 °C)  Pulse:  [53-69] 60  Resp:  [16-20] 16  SpO2:  [95 %-100 %] 96 %  BP: (102-149)/(62-92) 102/62     GENERAL: A&Ox3, NAD, does not appear ill  HEENT: atraumatic, normocephalic, anicteric, moist oral mucosa without lesions, exudate, or erythema; no thrush  LUNGS: breathing unlabored, lungs CTA bilateral  HEART: RRR; no murmur, rub, or gallop  ABDOMEN: abdomen soft, nondistended, BS noted x 4 quadrants, no tympany, no rebound, guarding, or organomegaly; slight RUQ tenderness with palpation  : no CVA tenderness  EXTREMITIES: no edema, clubbing, or cyanosis   SKIN: no rashes or lesions  NEURO: speech fluent and intact, facial symmetry preserved, no tremor  PSYCH: agitated  LINES: Midline RT upper arm without s/s infection       LABS:     I have personally reviewed patient's labs.  Pertinent results noted below.    CBC  Recent Labs     10/09/23  1858 10/10/23  0152 10/10/23  1010 10/11/23  0212 10/12/23  0011   WBC 4.95 5.93 4.75 6.20 7.58   HGB 11.5* 11.0* 13.0 10.6* 10.4*   HCT 35.4* 34.3* 40.2 32.6* 31.2*    411* 393 397 385       Differential  Recent Labs   Lab 10/12/23  0011   WBC 7.58   HGB 10.4*   HCT 31.2*           Basic Metabolic Panel  Recent Labs     10/09/23  1858 10/10/23  0152 10/11/23  0212 10/12/23  0011    135* 140 137   K 4.2 4.8 4.7  4.1   CO2 25 22 23 24   BUN 22.1* 19.3 26.6* 31.8*   CREATININE 1.24* 1.09* 1.05* 1.13*        Hepatic Panel  Lab Results   Component Value Date    ALT 7 10/12/2023    AST 10 10/12/2023    ALKPHOS 93 10/12/2023    BILITOT 0.1 10/12/2023        Urinalysis:  Results for orders placed or performed during the hospital encounter of 10/09/23   Urinalysis, Reflex to Urine Culture    Specimen: Urine   Result Value Ref Range    Color, UA Yellow Yellow, Light-Yellow, Dark Yellow, Krissy, Straw    Appearance, UA Turbid (A) Clear    Specific Gravity, UA 1.025 1.005 - 1.030    pH, UA 5.5 5.0 - 8.5    Protein, UA 3+ (A) Negative    Glucose, UA Normal Negative, Normal    Ketones, UA Negative Negative    Blood, UA 2+ (A) Negative    Bilirubin, UA Negative Negative    Urobilinogen, UA 1+ (A) 0.2, 1.0, Normal    Nitrites, UA Negative Negative    Leukocyte Esterase,  (A) Negative    WBC, UA >100 (A) None Seen, 0-2, 3-5, 0-5 /HPF    WBC Clumps, UA Few (A) None Seen, 0-5 /HPF    Bacteria, UA Occ (A) None Seen /HPF    Squamous Epithelial Cells, UA Moderate (A) None Seen /HPF    Mucous, UA Trace (A) None Seen /LPF    Hyaline Casts, UA 6-10 (A) None Seen /lpf    RBC, UA 21-50 (A) None Seen, 0-2, 3-5, 0-5 /HPF       Estimated Creatinine Clearance: 56.5 mL/min (A) (based on SCr of 1.13 mg/dL (H)).     ESR:  Results for orders placed or performed during the hospital encounter of 10/09/23   Sedimentation Rate   Result Value Ref Range    Sed Rate 50 (H) 0 - 20 mm/hr      CRP:  Results for orders placed or performed during the hospital encounter of 10/09/23   C-reactive protein   Result Value Ref Range    C-Reactive Protein 28.60 (H) <5.00 mg/L           MICRO AND PATHOLOGY:   Colonization:  10/09/2023 urine culture NGTD  10/09/2023 blood culture with Staph aureus (1/2 bottles)  10/11/2023 blood cultures pending    IMAGING:     I have personally reviewed patient's imaging. Pertinent results noted below.  CTA Chest Non-Coronary (PE Studies)    Final Result      No CT evidence for pulmonary embolus.      Stable ground-glass patchy and confluent opacities in both lower lobes and to a lesser extent in the lingula and right middle lobe.      Slight interval decrease in axillary lymphadenopathy.         Electronically signed by: García Gilbert MD   Date:    10/09/2023   Time:    20:37      X-Ray Chest AP Portable   Final Result        TTE 10/11/23:  Summary    Left Ventricle: The left ventricle is normal in size. Mildly increased wall thickness. There is normal systolic function. Biplane (2D) method of discs ejection fraction is 60%.    Left Atrium: Left atrium is mildly dilated.    Right Ventricle: Normal right ventricular cavity size. Systolic function is normal.    Right Atrium: Right atrium is mildly dilated.    Aortic Valve: The aortic valve is a trileaflet valve.    Mitral Valve: The mitral valve is structurally normal.    Tricuspid Valve: There is mild regurgitation.    Pulmonic Valve: There is mild regurgitation.    Pulmonary Artery: The estimated pulmonary artery systolic pressure is 27 mmHg.    IVC/SVC: Intermediate venous pressure at 8 mmHg.      IMPRESSION     Positive blood culture with coag-negative Staph  SLE  Lupus nephritis  Chronic immunosuppressed status  CKD  Diabetes mellitus type 2  Right lower extremity DVT  UTI      RECOMMENDATIONS:    Positive blood culture with coag-negative Staph in 1/2 bottles.  Blood cultures speciated as Staph haemolyticus.  She has been afebrile and without leukocytosis.  TTE was done and is negative for any valvular vegetations.    At this time, patient does not have any systemic symptoms of infection.  This is very likely to be a contaminant.  Patient reports they had a hard time sticking her in obtaining the blood cultures.  Additional repeat blood cultures are pending from 10/11/23.     If patient develops afebrile illness, then would recommend to repeat blood cultures off antibiotics if able.    Patient is  already had 2 days of Rocephin.  Would restart Rocephin, but at a increased dose of 2 g IV daily to complete an additional 3 days for UTI.    Would also change the dose of Bactrim DS to 1 tab daily for PJP prophylaxis    Thank you for the consult. ID will sign off at this time. Please do not hesitate to call with any questions or concerns.       Colleen Velasquez, MARLEYP-C  [facility] Infectious Diseases

## 2023-10-13 LAB
ALBUMIN SERPL-MCNC: 2.9 G/DL (ref 3.5–5)
ALBUMIN/GLOB SERPL: 0.7 RATIO (ref 1.1–2)
ALP SERPL-CCNC: 95 UNIT/L (ref 40–150)
ALT SERPL-CCNC: 13 UNIT/L (ref 0–55)
AST SERPL-CCNC: 18 UNIT/L (ref 5–34)
AT III ACT/NOR PPP CHRO: 94 % (ref 80–130)
BACTERIA BLD CULT: ABNORMAL
BASOPHILS # BLD AUTO: 0.02 X10(3)/MCL
BASOPHILS NFR BLD AUTO: 0.4 %
BILIRUB SERPL-MCNC: 0.2 MG/DL
BUN SERPL-MCNC: 35.2 MG/DL (ref 9.8–20.1)
CALCIUM SERPL-MCNC: 8.6 MG/DL (ref 8.4–10.2)
CHLORIDE SERPL-SCNC: 104 MMOL/L (ref 98–107)
CO2 SERPL-SCNC: 26 MMOL/L (ref 22–29)
CREAT SERPL-MCNC: 1.37 MG/DL (ref 0.55–1.02)
CRP SERPL-MCNC: 13.2 MG/L
EOSINOPHIL # BLD AUTO: 0.06 X10(3)/MCL (ref 0–0.9)
EOSINOPHIL NFR BLD AUTO: 1.1 %
ERYTHROCYTE [DISTWIDTH] IN BLOOD BY AUTOMATED COUNT: 16 % (ref 11.5–17)
ERYTHROCYTE [SEDIMENTATION RATE] IN BLOOD: 30 MM/HR (ref 0–20)
GFR SERPLBLD CREATININE-BSD FMLA CKD-EPI: 46 MLS/MIN/1.73/M2
GLOBULIN SER-MCNC: 4.2 GM/DL (ref 2.4–3.5)
GLUCOSE SERPL-MCNC: 76 MG/DL (ref 74–100)
GRAM STN SPEC: ABNORMAL
HAV IGM SERPL QL IA: NONREACTIVE
HBV CORE IGM SERPL QL IA: NONREACTIVE
HBV SURFACE AG SERPL QL IA: NONREACTIVE
HCT VFR BLD AUTO: 34.4 % (ref 37–47)
HCV AB SERPL QL IA: NONREACTIVE
HGB BLD-MCNC: 11.1 G/DL (ref 12–16)
HIV 1+2 AB+HIV1 P24 AG SERPL QL IA: NONREACTIVE
IMM GRANULOCYTES # BLD AUTO: 0.01 X10(3)/MCL (ref 0–0.04)
IMM GRANULOCYTES NFR BLD AUTO: 0.2 %
LYMPHOCYTES # BLD AUTO: 1.81 X10(3)/MCL (ref 0.6–4.6)
LYMPHOCYTES NFR BLD AUTO: 34.6 %
MAYO GENERIC ORDERABLE RESULT: NORMAL
MAYO GENERIC ORDERABLE RESULT: NORMAL
MCH RBC QN AUTO: 29.2 PG (ref 27–31)
MCHC RBC AUTO-ENTMCNC: 32.3 G/DL (ref 33–36)
MCV RBC AUTO: 90.5 FL (ref 80–94)
MIXING STUDIES PPP-IMP: NORMAL
MONOCYTES # BLD AUTO: 0.24 X10(3)/MCL (ref 0.1–1.3)
MONOCYTES NFR BLD AUTO: 4.6 %
NEUTROPHILS # BLD AUTO: 3.09 X10(3)/MCL (ref 2.1–9.2)
NEUTROPHILS NFR BLD AUTO: 59.1 %
NRBC BLD AUTO-RTO: 0 %
PLATELET # BLD AUTO: 387 X10(3)/MCL (ref 130–400)
PMV BLD AUTO: 10.4 FL (ref 7.4–10.4)
POCT GLUCOSE: 102 MG/DL (ref 70–110)
POCT GLUCOSE: 105 MG/DL (ref 70–110)
POCT GLUCOSE: 190 MG/DL (ref 70–110)
POCT GLUCOSE: 223 MG/DL (ref 70–110)
POCT GLUCOSE: 89 MG/DL (ref 70–110)
POTASSIUM SERPL-SCNC: 4.2 MMOL/L (ref 3.5–5.1)
PROT C ACT/NOR PPP CHRO: 120 % (ref 70–150)
PROT SERPL-MCNC: 7.1 GM/DL (ref 6.4–8.3)
RBC # BLD AUTO: 3.8 X10(6)/MCL (ref 4.2–5.4)
RPR SER QL: NORMAL
RPR SER-TITR: NORMAL {TITER}
SCREEN DRVVT/NORMAL: 0.79 RATIO
SODIUM SERPL-SCNC: 137 MMOL/L (ref 136–145)
T PALLIDUM AB SER QL: REACTIVE
WBC # SPEC AUTO: 5.23 X10(3)/MCL (ref 4.5–11.5)

## 2023-10-13 PROCEDURE — 85025 COMPLETE CBC W/AUTO DIFF WBC: CPT

## 2023-10-13 PROCEDURE — 80053 COMPREHEN METABOLIC PANEL: CPT

## 2023-10-13 PROCEDURE — 87389 HIV-1 AG W/HIV-1&-2 AB AG IA: CPT | Performed by: NURSE PRACTITIONER

## 2023-10-13 PROCEDURE — 25000003 PHARM REV CODE 250

## 2023-10-13 PROCEDURE — 21400001 HC TELEMETRY ROOM

## 2023-10-13 PROCEDURE — 63600175 PHARM REV CODE 636 W HCPCS

## 2023-10-13 PROCEDURE — 86780 TREPONEMA PALLIDUM: CPT | Performed by: NURSE PRACTITIONER

## 2023-10-13 PROCEDURE — 86140 C-REACTIVE PROTEIN: CPT | Performed by: NURSE PRACTITIONER

## 2023-10-13 PROCEDURE — 85652 RBC SED RATE AUTOMATED: CPT | Performed by: NURSE PRACTITIONER

## 2023-10-13 PROCEDURE — 63600175 PHARM REV CODE 636 W HCPCS: Performed by: FAMILY MEDICINE

## 2023-10-13 PROCEDURE — 25000003 PHARM REV CODE 250: Performed by: STUDENT IN AN ORGANIZED HEALTH CARE EDUCATION/TRAINING PROGRAM

## 2023-10-13 PROCEDURE — 94761 N-INVAS EAR/PLS OXIMETRY MLT: CPT

## 2023-10-13 PROCEDURE — 63600175 PHARM REV CODE 636 W HCPCS: Performed by: STUDENT IN AN ORGANIZED HEALTH CARE EDUCATION/TRAINING PROGRAM

## 2023-10-13 PROCEDURE — 63600175 PHARM REV CODE 636 W HCPCS: Performed by: NURSE PRACTITIONER

## 2023-10-13 PROCEDURE — 86592 SYPHILIS TEST NON-TREP QUAL: CPT | Performed by: NURSE PRACTITIONER

## 2023-10-13 PROCEDURE — 80074 ACUTE HEPATITIS PANEL: CPT

## 2023-10-13 PROCEDURE — A4216 STERILE WATER/SALINE, 10 ML: HCPCS

## 2023-10-13 PROCEDURE — 99900035 HC TECH TIME PER 15 MIN (STAT)

## 2023-10-13 RX ORDER — GABAPENTIN 100 MG/1
100 CAPSULE ORAL 3 TIMES DAILY
Status: DISCONTINUED | OUTPATIENT
Start: 2023-10-13 | End: 2023-10-14 | Stop reason: HOSPADM

## 2023-10-13 RX ORDER — SULFAMETHOXAZOLE AND TRIMETHOPRIM 800; 160 MG/1; MG/1
2 TABLET ORAL DAILY
Qty: 28 TABLET | Refills: 0 | Status: SHIPPED | OUTPATIENT
Start: 2023-10-14 | End: 2023-10-14 | Stop reason: SDUPTHER

## 2023-10-13 RX ORDER — VOCLOSPORIN 7.9 MG/1
23.7 CAPSULE ORAL DAILY
Qty: 30 CAPSULE | Refills: 11 | Status: SHIPPED | OUTPATIENT
Start: 2023-10-14 | End: 2023-10-14 | Stop reason: SDUPTHER

## 2023-10-13 RX ORDER — GABAPENTIN 300 MG/1
300 CAPSULE ORAL 3 TIMES DAILY
Status: DISCONTINUED | OUTPATIENT
Start: 2023-10-13 | End: 2023-10-13

## 2023-10-13 RX ORDER — SUMATRIPTAN 6 MG/.5ML
6 INJECTION, SOLUTION SUBCUTANEOUS ONCE
Status: COMPLETED | OUTPATIENT
Start: 2023-10-13 | End: 2023-10-13

## 2023-10-13 RX ORDER — LOSARTAN POTASSIUM 25 MG/1
25 TABLET ORAL DAILY
Qty: 90 TABLET | Refills: 3 | Status: SHIPPED | OUTPATIENT
Start: 2023-10-14 | End: 2023-10-14 | Stop reason: SDUPTHER

## 2023-10-13 RX ORDER — PREDNISONE 10 MG/1
30 TABLET ORAL DAILY
Qty: 42 TABLET | Refills: 0 | Status: SHIPPED | OUTPATIENT
Start: 2023-10-14 | End: 2023-10-14 | Stop reason: SDUPTHER

## 2023-10-13 RX ORDER — MYCOPHENOLATE MOFETIL 250 MG/1
1500 CAPSULE ORAL 2 TIMES DAILY
Qty: 360 CAPSULE | Refills: 11 | Status: SHIPPED | OUTPATIENT
Start: 2023-10-13 | End: 2023-10-14 | Stop reason: SDUPTHER

## 2023-10-13 RX ADMIN — APIXABAN 10 MG: 2.5 TABLET, FILM COATED ORAL at 09:10

## 2023-10-13 RX ADMIN — GABAPENTIN 100 MG: 100 CAPSULE ORAL at 08:10

## 2023-10-13 RX ADMIN — Medication 10 ML: at 02:10

## 2023-10-13 RX ADMIN — APIXABAN 10 MG: 2.5 TABLET, FILM COATED ORAL at 08:10

## 2023-10-13 RX ADMIN — LOSARTAN POTASSIUM 25 MG: 25 TABLET, FILM COATED ORAL at 09:10

## 2023-10-13 RX ADMIN — ASPIRIN 81 MG: 81 TABLET, CHEWABLE ORAL at 09:10

## 2023-10-13 RX ADMIN — SULFAMETHOXAZOLE AND TRIMETHOPRIM 2 TABLET: 800; 160 TABLET ORAL at 09:10

## 2023-10-13 RX ADMIN — INSULIN DETEMIR 30 UNITS: 100 INJECTION, SOLUTION SUBCUTANEOUS at 08:10

## 2023-10-13 RX ADMIN — HYDROCODONE BITARTRATE AND ACETAMINOPHEN 1 TABLET: 10; 325 TABLET ORAL at 01:10

## 2023-10-13 RX ADMIN — SUMATRIPTAN 6 MG: 6 INJECTION, SOLUTION SUBCUTANEOUS at 09:10

## 2023-10-13 RX ADMIN — ESCITALOPRAM OXALATE 10 MG: 10 TABLET ORAL at 09:10

## 2023-10-13 RX ADMIN — MYCOPHENOLATE MOFETIL 1500 MG: 250 CAPSULE ORAL at 09:10

## 2023-10-13 RX ADMIN — Medication 10 ML: at 12:10

## 2023-10-13 RX ADMIN — ATORVASTATIN CALCIUM 10 MG: 10 TABLET, FILM COATED ORAL at 09:10

## 2023-10-13 RX ADMIN — INSULIN ASPART 2 UNITS: 100 INJECTION, SOLUTION INTRAVENOUS; SUBCUTANEOUS at 09:10

## 2023-10-13 RX ADMIN — Medication 10 ML: at 06:10

## 2023-10-13 RX ADMIN — PANTOPRAZOLE SODIUM 40 MG: 40 TABLET, DELAYED RELEASE ORAL at 09:10

## 2023-10-13 RX ADMIN — AMLODIPINE BESYLATE 10 MG: 10 TABLET ORAL at 09:10

## 2023-10-13 RX ADMIN — MYCOPHENOLATE MOFETIL 1500 MG: 250 CAPSULE ORAL at 08:10

## 2023-10-13 RX ADMIN — THERA TABS 1 TABLET: TAB at 09:10

## 2023-10-13 RX ADMIN — HYDROMORPHONE HYDROCHLORIDE 0.5 MG: 0.5 INJECTION, SOLUTION INTRAMUSCULAR; INTRAVENOUS; SUBCUTANEOUS at 02:10

## 2023-10-13 RX ADMIN — PREDNISONE 30 MG: 20 TABLET ORAL at 09:10

## 2023-10-13 NOTE — PROGRESS NOTES
"[facility]  Nephrology Progress Note    Patient Name: Vi Ulrich  Age: 57 y.o.  : 1965  MRN: [id number]  Admission Date: 10/9/2023    Chief complaint: Generalized Body Aches (Pt reports lupus flare up for two days)    Hospital course  Vi Ulrich is a 57 y.o. 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  Patient presented to the emergency department on 10/09/2023 with complaints of generalized body aches and chest discomfort.  CT angiogram revealed no evidence of pulmonary embolus, stable ground-glass patchy and confluent opacities in both lobes and to a lesser extent in the lingula and right middle lobe, as well as slight interval decrease in axillary lymphadenopathy.  Ultrasound of lower extremities was positive for DVT in the right popliteal vein.  Blood culture was positive for Gram-positive cocci, probable Staphylococcus.  Patient was admitted to medicine service, Nephrology was consulted for assistance with management of CKD.         Subjective  Patient is complaining of a headache, tells me she gets migraines frequently intakes BC powder at home.  She admits to taking a 50 pack box over course of a month, has been using BC powder for a long time for management of pain at home.    Review of Systems  Respiratory: Negative for shortness of breath   Cardiovascular:  Negative for swelling or chest pain  Neuro:  Positive for headache    Objective  /69   Pulse (!) 58   Temp 98.1 °F (36.7 °C) (Oral)   Resp 18   Ht 5' 5" (1.651 m)   Wt 73.5 kg (162 lb)   LMP  (LMP Unknown)   SpO2 98%   BMI 26.96 kg/m² "     Intake/Output Summary (Last 24 hours) at 10/13/2023 0759  Last data filed at 10/13/2023 0506  Gross per 24 hour   Intake 200 ml   Output 450 ml   Net -250 ml       Physical Exam  General appearance: Patient is in no acute distress.  Skin: No rashes or wounds.  HEENT: PERRLA, EOMI, no scleral icterus, no JVD. Neck is supple.  Chest: Respirations are unlabored. Lungs sounds are clear.   Heart: S1, S2.   Abdomen: Benign.  : Deferred.  Extremities: No edema, peripheral pulses are palpable.   Neuro: No focal deficits.     Medications    Current Facility-Administered Medications:     albuterol nebulizer solution 0.63 mg, 0.63 mg, Nebulization, Q4H PRN, Aaliyah Woodruff DO    amLODIPine tablet 10 mg, 10 mg, Oral, Daily, Zabrina Willett MD, 10 mg at 10/11/23 0915    apixaban tablet 10 mg, 10 mg, Oral, BID, Zabrina Willett MD, 10 mg at 10/12/23 2129    aspirin chewable tablet 81 mg, 81 mg, Oral, Daily, Ana María Fernandez MD, 81 mg at 10/12/23 0943    atorvastatin tablet 10 mg, 10 mg, Oral, Daily, Aaliyah Woodruff DO, 10 mg at 10/12/23 0943    dextrose 10% bolus 125 mL 125 mL, 12.5 g, Intravenous, PRN, Aaliyah Woodruff DO, Stopped at 10/12/23 0706    dextrose 10% bolus 250 mL 250 mL, 25 g, Intravenous, PRN, Aaliyah Woodruff DO, Stopped at 10/12/23 0921    diphenhydrAMINE injection 25 mg, 25 mg, Intravenous, Once, Quincy Burnett MD    EScitalopram oxalate tablet 10 mg, 10 mg, Oral, Daily, Aaliyah Woodruff DO, 10 mg at 10/12/23 0943    glucagon (human recombinant) injection 1 mg, 1 mg, Intramuscular, PRN, Aaliyah Woodruff DO    glucose chewable tablet 16 g, 16 g, Oral, PRN, Aaliyah Woodruff DO    glucose chewable tablet 24 g, 24 g, Oral, PRN, Aaliyah Woodruff DO    hydrALAZINE injection 10 mg, 10 mg, Intravenous, Q4H PRN, Aaliyah Woodruff DO    HYDROcodone-acetaminophen  mg per tablet 1 tablet, 1 tablet, Oral, Q6H PRN, Aaliyah Woodruff DO, 1 tablet at 10/13/23 0140     HYDROmorphone injection 0.5 mg, 0.5 mg, Intravenous, Q6H PRN, Zabrina Willett MD    insulin aspart U-100 injection 0-10 Units, 0-10 Units, Subcutaneous, QID (AC + HS) PRN, Aaliyah Woodruff DO    insulin detemir U-100 injection 30 Units, 30 Units, Subcutaneous, QHS, Zabrina Willett MD, 10 Units at 10/12/23 2129    labetalol 20 mg/4 mL (5 mg/mL) IV syring, 10 mg, Intravenous, Q4H PRN, Aaliyah Woodruff DO    losartan tablet 25 mg, 25 mg, Oral, Daily, Antoni Marie DO, 25 mg at 10/11/23 0915    melatonin tablet 6 mg, 6 mg, Oral, Nightly PRN, Aaliyah Woodruff DO    multivitamin tablet, 1 tablet, Oral, Daily, Ana María Fernandez MD, 1 tablet at 10/12/23 0943    mycophenolate capsule 1,500 mg, 1,500 mg, Oral, BID, James Cutler MD, 1,500 mg at 10/12/23 2130    ondansetron tablet 4 mg, 4 mg, Oral, Q6H PRN, Aaliyah Woodruff DO, 4 mg at 10/10/23 1228    pantoprazole EC tablet 40 mg, 40 mg, Oral, Daily, Aaliyah Woodruff DO, 40 mg at 10/12/23 0943    polyethylene glycol packet 17 g, 17 g, Oral, BID PRN, Aaliyah Woodruff DO    predniSONE tablet 30 mg, 30 mg, Oral, Daily, James Cutler MD    sodium chloride 0.9% flush 10 mL, 10 mL, Intravenous, Q12H PRN, Aaliyah Woodruff DO    Flushing PICC/Midline Protocol, , , Until Discontinued **AND** sodium chloride 0.9% flush 10 mL, 10 mL, Intravenous, Q6H, 10 mL at 10/13/23 0600 **AND** sodium chloride 0.9% flush 10 mL, 10 mL, Intravenous, PRN, Antoni Marie DO    sulfamethoxazole-trimethoprim 800-160mg per tablet 2 tablet, 2 tablet, Oral, Daily, Antoni Marie, DO, 2 tablet at 10/12/23 0943    voclosporin Cap 23.7 mg, 23.7 mg, Oral, Daily, James Cutler MD     Imaging:    Reviewed    Laboratory Data:  Hematology  Lab Results   Component Value Date    WBC 5.23 10/13/2023    HGB 11.1 (L) 10/13/2023    HCT 34.4 (L) 10/13/2023     10/13/2023     10/13/2023    K 4.2 10/13/2023    CHLORIDE 104 10/13/2023    CO2 26 10/13/2023    BUN 35.2 (H) 10/13/2023     CREATININE 1.37 (H) 10/13/2023    EGFRNORACEVR 46 10/13/2023    GLUCOSE 76 10/13/2023    CALCIUM 8.6 10/13/2023    ALKPHOS 95 10/13/2023    LABPROT 7.1 10/13/2023    ALBUMIN 2.9 (L) 10/13/2023    BILIDIR 0.1 03/28/2022    IBILI 0.10 03/28/2022    AST 18 10/13/2023    ALT 13 10/13/2023    MG 2.10 10/11/2023    PHOS 3.5 10/11/2023     Lab Results   Component Value Date    .7 (H) 03/03/2023    BMZWHFWS86MG 15.0 (L) 08/04/2023       Lab Results   Component Value Date    IRON 219 (H) 08/04/2023    TIBC 284 08/04/2023    TRANS 152 (L) 08/04/2023    LABIRON 77 (H) 08/04/2023    FERRITIN 398.91 (H) 08/04/2023    FOLATE 5.3 (L) 01/12/2023    ZZRGNZCB36 674 01/12/2023    HAPTO 249 03/10/2023     (H) 03/10/2023       Lab Results   Component Value Date    HIV Nonreactive 10/13/2023    HEPCAB Nonreactive 10/13/2023    HEPBSURFAG Nonreactive 10/13/2023    HEPBSAB Negative 11/17/2020       Impression  Chronic kidney disease stage 2/A3  Biopsy-proven LN class III/V with features of diabetic glomerulopathy  Hypertension   Right lower extremity DVT  Paroxysmal atrial fibrillation   History of eye LD   Diabetes mellitus type 2   Anemia of chronic disease  Migraine headaches   NSAID overuse     Plan  Hypertension is well controlled, continue losartan at current dose.  Serum creatinine is slightly elevated from baseline, this is likely secondary to trimethoprim-sulfamethoxazole therapy. Trimethoprim-sulfamethoxazole can increase serum creatinine without affecting glomerular filtration rate (GFR) since it inhibits creatinine secretion in the proximal tubule.   Patient was encouraged to abstain from excessive NSAID use. Will give a dose of sumatriptan today, consider starting prophylactic therapy for migraines.   Will defer immunosuppression to rheumatology.      Rachel Broderick NP  [facility] Nephrology   10/13/2023      Dermal Autograft Text: The defect edges were debeveled with a #15 scalpel blade.  Given the location of the defect, shape of the defect and the proximity to free margins a dermal autograft was deemed most appropriate.  Using a sterile surgical marker, the primary defect shape was transferred to the donor site. The area thus outlined was incised deep to adipose tissue with a #15 scalpel blade.  The harvested graft was then trimmed of adipose and epidermal tissue until only dermis was left.  The skin graft was then placed in the primary defect and oriented appropriately.

## 2023-10-13 NOTE — PROGRESS NOTES
[facility] Medicine Wards   Progress Note     Resident Team: [facility] Medicine List 4  Attending Physician: Syeda Altman MD  Resident:  Zabrina Willett MD  Intern: Antoni Marie DO   Date of Admit: 10/9/2023    Subjective:      Brief HPI:  56 y/o female with history of SLE, DM-2, a-fib, CKD Stage 3, HTN, GERD, migraines, and fibromyalgia presents to ED with chief complaint of generalized myalgias x 3 days. She reports chest wall pain exacerbated by deep inhalation, bilateral lower extremity pain/cramps, generalized weakness, decreased appetite, and headache. Pt states symptoms are typical of her lupus flares. She is followed by rheumatology and is currently on CellCept 1000mg BID and prednisone 5mg daily. Pt also has prednisone 20mg prescription for flares, which she she took twice over the past 3 days without relief. Pt denies fever, cough, SOB, abdominal pain, N/V/D, and urinary symptoms.   In ED, pt was hypertensive with /83 on arrival. Labs notable for troponin of 0.055, CRP of 28.6, d-dimer of 6.7, and a positive UA. Pt not currently taking home antihypertensives or ASA.       Interval History:  Pt reports improvement in jaw sx overnight. She endorses significant LLE burning pain, controlled with a 1-time 0.5 dilaudid; no pain meds after 0001 10/14/23 in anticipation of discharge tomorrow. Otherwise she denies any CP, SOB, NVD, LH.         Review of Systems:  12 point review of symptoms negative unless otherwise stated above       Objective:     Vital Signs (Most Recent):  Temp: 99 °F (37.2 °C) (10/13/23 1214)  Pulse: 71 (10/13/23 1214)  Resp: 20 (10/13/23 1443)  BP: (!) 107/52 (10/13/23 1214)  SpO2: 97 % (10/13/23 1214) Vital Signs (24h Range):  Temp:  [98.1 °F (36.7 °C)-99 °F (37.2 °C)] 99 °F (37.2 °C)  Pulse:  [58-71] 71  Resp:  [18-22] 20  SpO2:  [96 %-100 %] 97 %  BP: (107-139)/(52-72) 107/52      Physical Exam:  Physical Exam  Vitals and nursing note reviewed.   Constitutional:       General: She is not in acute  distress.     Appearance: Normal appearance.   HENT:      Head: Normocephalic and atraumatic.      Right Ear: External ear normal.      Left Ear: External ear normal.      Nose: Nose normal.      Mouth/Throat:      Mouth: Mucous membranes are moist.      Pharynx: Oropharynx is clear.      Comments: Oral mucosa pink, no signs of erythema   Eyes:      General: No scleral icterus.     Extraocular Movements: Extraocular movements intact.      Conjunctiva/sclera: Conjunctivae normal.      Pupils: Pupils are equal, round, and reactive to light.   Cardiovascular:      Rate and Rhythm: Normal rate and regular rhythm.      Pulses: Normal pulses.      Heart sounds: Normal heart sounds. No murmur heard.     No gallop.   Pulmonary:      Effort: Pulmonary effort is normal.      Breath sounds: Normal breath sounds. No wheezing, rhonchi or rales.   Abdominal:      General: Bowel sounds are normal. There is no distension.      Palpations: Abdomen is soft.      Tenderness: There is no abdominal tenderness.   Musculoskeletal:         General: Tenderness present. Normal range of motion.      Cervical back: Normal range of motion and neck supple.   Skin:     General: Skin is warm and dry.      Capillary Refill: Capillary refill takes less than 2 seconds.      Coloration: Skin is not jaundiced.      Findings: No bruising, erythema or rash.   Neurological:      General: No focal deficit present.      Mental Status: She is alert and oriented to person, place, and time.   Psychiatric:         Mood and Affect: Mood normal.         Behavior: Behavior normal.         Laboratory  Lab Results   Component Value Date     10/13/2023    K 4.2 10/13/2023     08/23/2021    CO2 26 10/13/2023     (H) 08/23/2021    BUN 35.2 (H) 10/13/2023    CREATININE 1.37 (H) 10/13/2023    CALCIUM 8.6 10/13/2023    PROT 7.9 08/23/2021    BILIDIR 0.1 03/28/2022    IBILI 0.10 03/28/2022    ALKPHOS 95 10/13/2023    AST 18 10/13/2023    ALT 13  10/13/2023    MG 2.10 10/11/2023    PHOS 3.5 10/11/2023        Lab Results   Component Value Date    WBC 5.23 10/13/2023    RBC 3.80 (L) 10/13/2023    HGB 11.1 (L) 10/13/2023    HCT 34.4 (L) 10/13/2023    MCV 90.5 10/13/2023    MCH 29.2 10/13/2023    MCHC 32.3 (L) 10/13/2023    RDW 16.0 10/13/2023     10/13/2023    MPV 10.4 10/13/2023    GRAN 4.4 08/23/2021    GRAN 71.0 08/23/2021    LYMPH 1.5 08/23/2021    LYMPH 24.6 08/23/2021    MONO 0.2 (L) 08/23/2021    MONO 3.5 (L) 08/23/2021    EOS 0.0 08/23/2021    BASO 0.02 08/23/2021    EOSINOPHIL 0.3 08/23/2021    BASOPHIL 0.3 08/23/2021        Microbiology:  Microbiology Results (last 7 days)       Procedure Component Value Units Date/Time    Blood Culture [[id number]]  (Normal) Collected: 10/09/23 2300    Order Status: Completed Specimen: Blood from Hand, Right Updated: 10/13/23 1101     CULTURE, BLOOD ([facility]) No Growth At 72 Hours    Blood Culture [[id number]]  (Abnormal)  (Susceptibility) Collected: 10/09/23 2300    Order Status: Completed Specimen: Blood from Wrist, Right Updated: 10/13/23 0754     CULTURE, BLOOD ([facility]) Staphylococcus haemolyticus     GRAM STAIN Gram Positive Cocci, probable Staphylococcus      Seen in gram stain of broth only      1 of 2 Anaerobic bottles positive    Blood Culture [[id number]]  (Normal) Collected: 10/11/23 1334    Order Status: Completed Specimen: Blood from Hand, Right Updated: 10/12/23 2000     CULTURE, BLOOD ([facility]) No Growth At 24 Hours    Blood Culture [[id number]]  (Normal) Collected: 10/11/23 1334    Order Status: Completed Specimen: Blood from Wrist, Right Updated: 10/12/23 2000     CULTURE, BLOOD ([facility]) No Growth At 24 Hours    Urine culture [[id number]] Collected: 10/09/23 1932    Order Status: Completed Specimen: Urine Updated: 10/12/23 0928     Urine Culture No Significant Growth    Chlamydia/GC, PCR [[id number]]     Order Status: Sent Specimen: Urine     BCID2 Panel [[id number]]  (Abnormal) Collected: 10/09/23 2305     Order Status: Completed Specimen: Blood from Wrist, Right Updated: 10/11/23 0327     CTX-M (ESBL ) N/A     IMP (Cabapenemase ) N/A     KPC resistance gene (Carbapenemase ) N/A     mcr-1 N/A     mecA ID N/A     Comment: Note: Antimicrobial resistance can occur via multiple mechanisms. A Not Detected result for antimicrobial resistance gene(s) does not indicate antimicrobial susceptibility. Subculturing is required for species identification and susceptibility testing of   isolates.        mecA/C and MREJ (MRSA) gene N/A     NDM (Carbapenemase ) N/A     OXA-48-like (Carbapenemase ) N/A     Nik/B (VRE gene) N/A     VIM (Carbapenemase ) N/A     Enterococcus faecalis Not Detected     Enterococcus faecium Not Detected     Listeria monocytogenes Not Detected     Staphylococcus spp. Detected     Staphylococcus aureus Not Detected     Staphylococcus epidermidis Not Detected     Staphylococcus lugdunensis Not Detected     Streptococcus spp. Not Detected     Streptococcus agalactiae (Group B) Not Detected     Streptococcus pneumoniae Not Detected     Streptococcus pyogenes (Group A) Not Detected     Acinetobacter calcoaceticus/baumannii complex Not Detected     Bacteroides fragilis Not Detected     Enterobacterales Not Detected     Enterobacter cloacae complex Not Detected     Escherichia coli Not Detected     Klebsiella aerogenes Not Detected     Klebsiella oxytoca Not Detected     Klebsiella pneumoniae group Not Detected     Proteus spp. Not Detected     Salmonella spp. Not Detected     Serratia marcescens Not Detected     Haemophilus influenzae Not Detected     Neisseria meningitidis Not Detected     Pseudomonas aeruginosa Not Detected     Stenotrophomonas maltophilia Not Detected     Candida albicans Not Detected     Candida auris Not Detected     Candida glabrata Not Detected     Candida krusei Not Detected     Candida parapsilosis Not Detected     Candida tropicalis Not  Detected     Cryptococcus neoformans/gattii Not Detected    Narrative:      The [employer] BCID2 Panel is a multiplexed nucleic acid test intended for the use with [employer]® 2.0 or BioFire® FilmArray® GigsTime Systems for the simultaneous qualitative detection and identification of multiple bacterial and yeast nucleic acids and select genetic determinants associated with antimicrobial resistance.  The [employer] BCID2 Panel test is performed directly on blood culture samples identified as positive by a continuous monitoring blood culture system.  Results are intended to be interpreted in conjunction with Gram stain results.            Radiology:  Imaging Results              CTA Chest Non-Coronary (PE Studies) (Final result)  Result time 10/09/23 20:37:02      Final result by García Gilbert MD (10/09/23 20:37:02)                   Impression:      No CT evidence for pulmonary embolus.    Stable ground-glass patchy and confluent opacities in both lower lobes and to a lesser extent in the lingula and right middle lobe.    Slight interval decrease in axillary lymphadenopathy.      Electronically signed by: García Gilbert MD  Date:    10/09/2023  Time:    20:37               Narrative:    EXAMINATION:  CTA chest    CLINICAL HISTORY:  PE suspected    COMPARISON:  CT of the chest dated 03/09/2023    TECHNIQUE:  Routine CT angiogram of the chest was performed intravenous contrast. 3D MIP images are obtained.    Total DLP: 191 mGy.cm    Automatic exposure control was utilized to reduce the patient's dose    FINDINGS:  No intraluminal filling defects within the pulmonary arteries to suggest pulmonary embolus.  Thoracic aorta normal caliber.  There are mild atherosclerotic calcifications of the thoracic aorta.  The heart is normal in size.  There is bilateral lymphadenopathy in both axilla, slightly decreased when compared to prior.  No mediastinal or hilar lymphadenopathy.  The heart is normal in size.  No pericardial  pleural effusion.  Visualized upper abdomen appears grossly unremarkable.    The central airways are patent and unremarkable.  Is no consolidation.  There is persistent patchy and confluent areas of ground-glass opacities in both lower lobes, lingula and right middle lobe    No osseous destructive process.                                       X-Ray Chest AP Portable (Final result)  Result time 10/09/23 19:53:58      Final result by Octaviano Michelle MD (10/09/23 19:53:58)                   Narrative:    EXAMINATION  XR CHEST AP PORTABLE    CLINICAL HISTORY  Tachypnea, not elsewhere classified    TECHNIQUE  A total of 1 frontal image(s) of the chest.    COMPARISON  9 March 2023    FINDINGS  Lines/tubes/devices: none present    The cardiomediastinal silhouette and central pulmonary vasculature are unremarkable for utilized technique.  The trachea is midline. There is no lobar consolidation, pleural effusion, or pneumothorax.    There is no acute osseous or extrathoracic abnormality.    IMPRESSION  No convincing acute radiographic abnormality.      Electronically signed by: Octaviano Michelle  Date:    10/09/2023  Time:    19:53                                    Current Medications:     Infusions:         Scheduled:   amLODIPine  10 mg Oral Daily    apixaban  10 mg Oral BID    aspirin  81 mg Oral Daily    atorvastatin  10 mg Oral Daily    diphenhydrAMINE  25 mg Intravenous Once    EScitalopram oxalate  10 mg Oral Daily    gabapentin  300 mg Oral TID    insulin detemir U-100  30 Units Subcutaneous QHS    losartan  25 mg Oral Daily    multivitamin  1 tablet Oral Daily    mycophenolate  1,500 mg Oral BID    pantoprazole  40 mg Oral Daily    predniSONE  30 mg Oral Daily    sodium chloride 0.9%  10 mL Intravenous Q6H    sulfamethoxazole-trimethoprim 800-160mg  2 tablet Oral Daily    voclosporin  23.7 mg Oral Daily        PRN:  albuterol, dextrose 10%, dextrose 10%, glucagon (human recombinant), glucose, glucose, hydrALAZINE,  HYDROcodone-acetaminophen, HYDROmorphone, insulin aspart U-100, labetalol, melatonin, ondansetron, polyethylene glycol, sodium chloride 0.9%, Flushing PICC/Midline Protocol **AND** sodium chloride 0.9% **AND** sodium chloride 0.9%    Antibiotics and Day Number of Therapy:  Bactrim 800/160 x2 qD for PJP PPX - day 1      Intake/Output Summary (Last 24 hours) at 10/13/2023 1551  Last data filed at 10/13/2023 0506  Gross per 24 hour   Intake 200 ml   Output 450 ml   Net -250 ml       Lines/Drains/Airways       Peripheral Intravenous Line  Duration                  Midline Catheter Insertion/Assessment  - Single Lumen 10/11/23 1459 Right brachial vein 2 days                      Assessment & Plan:   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   - C3 compliment decreased at 75. C4 wnl. Sed 50  - 24 hr Ur protien elevated at 2120  - Pt currently on prednisone 30 mg po qd for lupus diagnosis. Rheumatology recommends Cellcept at 1500 mg po BID. On Voclosporin 23.7 PO qD.    - On PJP PPX with Bactrim 800-160 BID; rheum recs to continue until off pred 30 qD.   - Continue to PT to aid in ambulation  - Nephrology and Rheumatology consulted; recs appreciated.   - Neph and Rheum rec IR-Renal Bx at discharge      Unprovoked DVT of the R LE   - Ultrasound revealed DVT in right lower extremity  - PTT is elevated at 167.7, D-Dimer elevated at 6.78>>2.09 as of 10/11.   - Awaiting results of Lipoprotein A, Protein C activity, Lupus anticoagulant, Antithrombin III, Cardiolipin AB, and Factor V levels.   - currently on eliquis 10 mEq BID   - According to CTA of chest, there are mild atherosclerotic calcifications of the thoracic aorta. No evidence of PE.     Burning LE Pain   - Patient c/o burning L LE pain  - 0.5  Dilaudid given, no relief  - Will try 300 gabapentin     Paroxysmal Atrial Fibrillation   - HR currently controlled  - Remain on tele   - Cards rec 5 BID     HTN  - ECG unremarkable for ischemic changes, with mild elevation of troponin on admission. Troponins have returned to baseline.  - BP stabilized at 135/79   - Pt placed on 10 mg amlodipine po qd, losartan 25 mg po qd,   - TTE performed, awaiting results 10/11.      Diabetes Mellitus   - Currently on 30 Lantus  - Glc stable        -----Resolved / Chronic-----    (+) Staphylococci in 1 of 2 Blood Culture   Likely contamination  - vancomycin IV x1  - R/p BCX neg at 72 hr   - ID recs: Continue with immunosuppression and renal bx; ID signed off  - Notify immediately if pt develops acute symptoms suggesting possible infection.     Chest Pain with Elevated Troponin   - Trop 0.55 >>> 0.01  - Low tasha for primary ischemic CP; likely pleuritis   - ASA 81 mg po qd.   - Cardiology consulted. Recommends outpatient stress test; assistance appreciated.     Anxiety  - For anxiety, pt continued on home dose of 10mg lexapro po qd.          CODE STATUS: Full Code   Access: PIV  Antibiotics: Bactrim  Diet: Diet NPO  DVT Prophylaxis: Eliquis 10 BID   GI Prophylaxis: protonix      Disposition: Vi Ulrich is a 57 y.o. female who is admitted for generalized weakness, unprovoked DVT of the right lower extremity, HTN, and Lupus flare. Discharge likely tomorrow.     Discharge on / with   - CellCept 1500  - Pred 30 qD for 2 weeks + Bacrim 800-160 BID for 2 weeks  - Voclosporin 23.7 qD   - Apixaban 10mg BID indefinitely   - Losartan 25mg qD    - ASA 81 mg qD   - Order for IR-Guided renal Bx within 1 week of discharge     Antoni Marie DO   PGY-1 Internal Medicine

## 2023-10-14 VITALS
BODY MASS INDEX: 26.99 KG/M2 | HEIGHT: 65 IN | SYSTOLIC BLOOD PRESSURE: 124 MMHG | WEIGHT: 162 LBS | OXYGEN SATURATION: 98 % | HEART RATE: 60 BPM | TEMPERATURE: 98 F | DIASTOLIC BLOOD PRESSURE: 80 MMHG | RESPIRATION RATE: 18 BRPM

## 2023-10-14 LAB
ALBUMIN SERPL-MCNC: 2.7 G/DL (ref 3.5–5)
ALBUMIN/GLOB SERPL: 0.8 RATIO (ref 1.1–2)
ALP SERPL-CCNC: 84 UNIT/L (ref 40–150)
ALT SERPL-CCNC: 11 UNIT/L (ref 0–55)
AST SERPL-CCNC: 11 UNIT/L (ref 5–34)
BASOPHILS # BLD AUTO: 0.01 X10(3)/MCL
BASOPHILS NFR BLD AUTO: 0.2 %
BETA 2 GLYCOPROTEIN AB IGA QUANT (OHS): 5.4 U/ML
BETA 2 GLYCOPROTEIN AB IGA QUANT (OHS): 6 U/ML
BETA 2 GLYCOPROTEIN AB IGG QUANT (OHS): 1.1 U/ML
BETA 2 GLYCOPROTEIN AB IGG QUANT (OHS): 1.3 U/ML
BETA 2 GLYCOPROTEIN AB IGM QUANT (OHS): <2.4 U/ML
BETA 2 GLYCOPROTEIN AB IGM QUANT (OHS): <2.4 U/ML
BILIRUB SERPL-MCNC: 0.1 MG/DL
BUN SERPL-MCNC: 35.4 MG/DL (ref 9.8–20.1)
CALCIUM SERPL-MCNC: 8.7 MG/DL (ref 8.4–10.2)
CARDIOLIPIN IGA QUANT (OHS): 18 APL U/ML
CARDIOLIPIN IGA QUANT (OHS): 20 APL U/ML
CARDIOLIPIN IGG QUANT (OHS): 122 GPL U/ML
CARDIOLIPIN IGG QUANT (OHS): 125 GPL U/ML
CARDIOLIPIN IGG QUANT (OHS): 126 GPL U/ML
CARDIOLIPIN IGM QUANT (OHS): 1.3 MPL U/ML
CARDIOLIPIN IGM QUANT (OHS): 1.3 MPL U/ML
CARDIOLIPIN IGM QUANT (OHS): 1.4 MPL U/ML
CHLORIDE SERPL-SCNC: 107 MMOL/L (ref 98–107)
CO2 SERPL-SCNC: 24 MMOL/L (ref 22–29)
CREAT SERPL-MCNC: 1.24 MG/DL (ref 0.55–1.02)
DSDNA AB QUANT (OHS): 168 IU/ML
EOSINOPHIL # BLD AUTO: 0.01 X10(3)/MCL (ref 0–0.9)
EOSINOPHIL NFR BLD AUTO: 0.2 %
ERYTHROCYTE [DISTWIDTH] IN BLOOD BY AUTOMATED COUNT: 15.6 % (ref 11.5–17)
GFR SERPLBLD CREATININE-BSD FMLA CKD-EPI: 52 MLS/MIN/1.73/M2
GLOBULIN SER-MCNC: 3.6 GM/DL (ref 2.4–3.5)
GLUCOSE SERPL-MCNC: 86 MG/DL (ref 74–100)
HCT VFR BLD AUTO: 32 % (ref 37–47)
HGB BLD-MCNC: 10.5 G/DL (ref 12–16)
IMM GRANULOCYTES # BLD AUTO: 0.02 X10(3)/MCL (ref 0–0.04)
IMM GRANULOCYTES NFR BLD AUTO: 0.4 %
LYMPHOCYTES # BLD AUTO: 1.44 X10(3)/MCL (ref 0.6–4.6)
LYMPHOCYTES NFR BLD AUTO: 28.5 %
MCH RBC QN AUTO: 29.1 PG (ref 27–31)
MCHC RBC AUTO-ENTMCNC: 32.8 G/DL (ref 33–36)
MCV RBC AUTO: 88.6 FL (ref 80–94)
MONOCYTES # BLD AUTO: 0.37 X10(3)/MCL (ref 0.1–1.3)
MONOCYTES NFR BLD AUTO: 7.3 %
NEUTROPHILS # BLD AUTO: 3.21 X10(3)/MCL (ref 2.1–9.2)
NEUTROPHILS NFR BLD AUTO: 63.4 %
NRBC BLD AUTO-RTO: 0 %
PLATELET # BLD AUTO: 355 X10(3)/MCL (ref 130–400)
PMV BLD AUTO: 10.2 FL (ref 7.4–10.4)
POCT GLUCOSE: 133 MG/DL (ref 70–110)
POCT GLUCOSE: 142 MG/DL (ref 70–110)
POCT GLUCOSE: 52 MG/DL (ref 70–110)
POCT GLUCOSE: 57 MG/DL (ref 70–110)
POCT GLUCOSE: 83 MG/DL (ref 70–110)
POTASSIUM SERPL-SCNC: 4.4 MMOL/L (ref 3.5–5.1)
PROT SERPL-MCNC: 6.3 GM/DL (ref 6.4–8.3)
RBC # BLD AUTO: 3.61 X10(6)/MCL (ref 4.2–5.4)
SODIUM SERPL-SCNC: 137 MMOL/L (ref 136–145)
WBC # SPEC AUTO: 5.06 X10(3)/MCL (ref 4.5–11.5)

## 2023-10-14 PROCEDURE — 85025 COMPLETE CBC W/AUTO DIFF WBC: CPT

## 2023-10-14 PROCEDURE — 25000003 PHARM REV CODE 250

## 2023-10-14 PROCEDURE — A4216 STERILE WATER/SALINE, 10 ML: HCPCS

## 2023-10-14 PROCEDURE — 63600175 PHARM REV CODE 636 W HCPCS: Performed by: FAMILY MEDICINE

## 2023-10-14 PROCEDURE — 94761 N-INVAS EAR/PLS OXIMETRY MLT: CPT

## 2023-10-14 PROCEDURE — 25000003 PHARM REV CODE 250: Performed by: STUDENT IN AN ORGANIZED HEALTH CARE EDUCATION/TRAINING PROGRAM

## 2023-10-14 PROCEDURE — 80053 COMPREHEN METABOLIC PANEL: CPT

## 2023-10-14 RX ORDER — INSULIN GLARGINE 100 [IU]/ML
10 INJECTION, SOLUTION SUBCUTANEOUS 2 TIMES DAILY
Qty: 18 ML | Refills: 3 | Status: ON HOLD | OUTPATIENT
Start: 2023-10-14 | End: 2023-10-26 | Stop reason: HOSPADM

## 2023-10-14 RX ORDER — SULFAMETHOXAZOLE AND TRIMETHOPRIM 800; 160 MG/1; MG/1
2 TABLET ORAL DAILY
Qty: 28 TABLET | Refills: 0 | Status: ON HOLD | OUTPATIENT
Start: 2023-10-14 | End: 2023-10-26 | Stop reason: HOSPADM

## 2023-10-14 RX ORDER — LOSARTAN POTASSIUM 25 MG/1
25 TABLET ORAL DAILY
Qty: 90 TABLET | Refills: 3 | Status: SHIPPED | OUTPATIENT
Start: 2023-10-14 | End: 2024-10-13

## 2023-10-14 RX ORDER — LANCETS 28 GAUGE
100 EACH MISCELLANEOUS
Qty: 100 EACH | Refills: 8 | Status: SHIPPED | OUTPATIENT
Start: 2023-10-14

## 2023-10-14 RX ORDER — VOCLOSPORIN 7.9 MG/1
23.7 CAPSULE ORAL DAILY
Qty: 30 CAPSULE | Refills: 11 | Status: SHIPPED | OUTPATIENT
Start: 2023-10-14

## 2023-10-14 RX ORDER — INSULIN PUMP SYRINGE, 3 ML
EACH MISCELLANEOUS
Qty: 1 EACH | Refills: 0 | Status: SHIPPED | OUTPATIENT
Start: 2023-10-14 | End: 2024-10-13

## 2023-10-14 RX ORDER — ACETAMINOPHEN 325 MG/1
650 TABLET ORAL EVERY 6 HOURS PRN
Status: DISCONTINUED | OUTPATIENT
Start: 2023-10-14 | End: 2023-10-14 | Stop reason: HOSPADM

## 2023-10-14 RX ORDER — PREDNISONE 10 MG/1
30 TABLET ORAL DAILY
Qty: 42 TABLET | Refills: 0 | Status: SHIPPED | OUTPATIENT
Start: 2023-10-14 | End: 2023-10-28

## 2023-10-14 RX ORDER — MYCOPHENOLATE MOFETIL 250 MG/1
1500 CAPSULE ORAL 2 TIMES DAILY
Qty: 360 CAPSULE | Refills: 11 | Status: SHIPPED | OUTPATIENT
Start: 2023-10-14 | End: 2024-01-30

## 2023-10-14 RX ADMIN — PANTOPRAZOLE SODIUM 40 MG: 40 TABLET, DELAYED RELEASE ORAL at 09:10

## 2023-10-14 RX ADMIN — AMLODIPINE BESYLATE 10 MG: 10 TABLET ORAL at 09:10

## 2023-10-14 RX ADMIN — DEXTROSE MONOHYDRATE 125 ML: 100 INJECTION, SOLUTION INTRAVENOUS at 06:10

## 2023-10-14 RX ADMIN — ACETAMINOPHEN 650 MG: 325 TABLET, FILM COATED ORAL at 03:10

## 2023-10-14 RX ADMIN — APIXABAN 10 MG: 2.5 TABLET, FILM COATED ORAL at 09:10

## 2023-10-14 RX ADMIN — ASPIRIN 81 MG: 81 TABLET, CHEWABLE ORAL at 09:10

## 2023-10-14 RX ADMIN — LOSARTAN POTASSIUM 25 MG: 25 TABLET, FILM COATED ORAL at 09:10

## 2023-10-14 RX ADMIN — Medication 16 G: at 06:10

## 2023-10-14 RX ADMIN — SULFAMETHOXAZOLE AND TRIMETHOPRIM 2 TABLET: 800; 160 TABLET ORAL at 09:10

## 2023-10-14 RX ADMIN — GABAPENTIN 100 MG: 100 CAPSULE ORAL at 09:10

## 2023-10-14 RX ADMIN — ESCITALOPRAM OXALATE 10 MG: 10 TABLET ORAL at 09:10

## 2023-10-14 RX ADMIN — ATORVASTATIN CALCIUM 10 MG: 10 TABLET, FILM COATED ORAL at 09:10

## 2023-10-14 RX ADMIN — PREDNISONE 30 MG: 20 TABLET ORAL at 09:10

## 2023-10-14 RX ADMIN — Medication 10 ML: at 05:10

## 2023-10-14 RX ADMIN — THERA TABS 1 TABLET: TAB at 09:10

## 2023-10-14 RX ADMIN — MYCOPHENOLATE MOFETIL 1500 MG: 250 CAPSULE ORAL at 09:10

## 2023-10-14 NOTE — DISCHARGE SUMMARY
[facility] Discharge Summary    Admitting Physician: Syeda Altman MD  Attending Physician: Syeda Altman MD  Date of Admit: 10/9/2023  Date of Discharge: 10/14/2023    Condition: Stable  Outcome: Patient tolerated treatment/procedure well without complication and is now ready for discharge.  DISPOSITION: Home or Self Care        Discharge Diagnoses:     Patient Active Problem List   Diagnosis    Essential hypertension    Diabetes mellitus, type 2    Insomnia    Hyperlipidemia    History of asthma    New onset atrial fibrillation    Periumbilical pain    Constipation    Iron deficiency anemia    Anxiety    Class 1 obesity with serious comorbidity and body mass index (BMI) of 32.0 to 32.9 in adult    Acute cystitis with hematuria    STIVEN (acute kidney injury)    Pyelonephritis    Inadequate dietary energy intake    Lupus    Fibromyalgia    Lupus nephritis, ISN/RPS class III    Asthma    Trigger index finger of left hand    BMI 26.0-26.9,adult    Chronic obstructive pulmonary disease    Long term current use of insulin    Migraine aura without headache    Moderate recurrent major depression    Mood disorder    Raynaud's disease    Thyroiditis    Vitamin D deficiency    Acute deep vein thrombosis (DVT) of popliteal vein of right lower extremity    ILD (interstitial lung disease)    Diabetic glomerulosclerosis    Back pain       Principal Problem:  Lupus    Consultants and Procedures:     Consultants:  IP CONSULT TO HOSPITAL MEDICINE  IP CONSULT TO RHEUMATOLOGY  IP CONSULT TO NEPHROLOGY  IP CONSULT TO CARDIOLOGY  IP CONSULT TO MIDLINE TEAM  IP CONSULT TO INFECTIOUS DISEASES    Procedures:   * No surgery found *      Brief Admission History:      56 y/o female with history of SLE, DM-2, a-fib, CKD Stage 3, HTN, GERD, migraines, and fibromyalgia presents to ED with chief complaint of generalized myalgias x 3 days. She reports chest wall pain exacerbated by deep inhalation, bilateral lower extremity  "pain/cramps, generalized weakness, decreased appetite, and headache. Pt states symptoms are typical of her lupus flares. She is followed by rheumatology and is currently on CellCept 1000mg BID and prednisone 5mg daily. Pt also has prednisone 20mg prescription for flares, which she she took twice over the past 3 days without relief. Pt denies fever, cough, SOB, abdominal pain, N/V/D, and urinary symptoms.   In ED, pt was hypertensive with /83 on arrival. Labs notable for troponin of 0.055, CRP of 28.6, d-dimer of 6.7, and a positive UA. Pt not currently taking home antihypertensives or ASA. Patient found to have new RLE DVT. Admitted for new RLE DVT, NSTEMI, and concern for lupus flare.    Hospital Course with Pertinent Findings:      Patient initially treated with heparin gtt for new DVT and NSTEMI. Troponin normalized and Cardiology recommended outpatient stress test. Patient transitioned to apixaban with plans to completed 10mg BID for 7 days following by 5 mg BID indefinitely. Rheumatology consulted with recommendations to restart patient's CellCept and Voclosporin, in addition to treating with Prednisone 30mg daily for 2 weeks with close outpatient follow up with patient's Rheumatology, Dr. Mcfarlane, as well as bactrim for PJP prophylaxis. Also recommended repeating renal biopsy to determine etiology of STIVEN, lupus vs DM. Patient referred for outpatient renal biopsy. Additionally, patient became hypoglycemic on home insulin regimen of Lantus 80u nightly; will continue Lantus 10u BID. Glucometer, strips, and lancets, ordered. Patient also had positive Blood Culture 1 of 2 bottles for Staph haemolyticus, sensitive to bactrim, possible contaminant. Repeat blood cultures negative at 48 hours. Patient to follow up with Rheumatology (Dr. Mcfarlane), post wards clinic, and PCP.     Discharge physical exam:  Vitals  BP: 124/80  Temp: 98.4 °F (36.9 °C)  Temp Source: Oral  Pulse: 60  Resp: 18  SpO2: 98 %  Height: 5' 5" " (165.1 cm)  Weight: 73.5 kg (162 lb)    General: no acute distress  HEENT: NC/AT  CV: regular rate  Resp: CTAB  GI: soft, ND, NT  MSK: no lower extremity edema  Neuro: AAO    TIME SPENT ON DISCHARGE: 35 minutes    Discharge Medications:         Medication List        START taking these medications      * apixaban 5 mg Tab  Commonly known as: ELIQUIS  Take 2 tablets (10 mg total) by mouth 2 (two) times daily. for 5 days     * apixaban 5 mg Tab  Commonly known as: ELIQUIS  Take 1 tablet (5 mg total) by mouth 2 (two) times daily.  Start taking on: October 19, 2023     losartan 25 MG tablet  Commonly known as: COZAAR  Take 1 tablet (25 mg total) by mouth once daily.     multivitamin Tab  Take 1 tablet by mouth once daily.     mycophenolate 250 mg Cap  Commonly known as: CELLCEPT  Take 6 capsules (1,500 mg total) by mouth 2 (two) times daily.  Replaces: mycophenolate 500 mg Tab     sulfamethoxazole-trimethoprim 800-160mg 800-160 mg Tab  Commonly known as: BACTRIM DS  Take 2 tablets by mouth once daily. for 14 days           * This list has 2 medication(s) that are the same as other medications prescribed for you. Read the directions carefully, and ask your doctor or other care provider to review them with you.                CHANGE how you take these medications      * blood sugar diagnostic Strp  1 each by Misc.(Non-Drug; Combo Route) route 4 (four) times daily.  What changed: Another medication with the same name was added. Make sure you understand how and when to take each.     * blood sugar diagnostic Strp  Commonly known as: ONETOUCH VERIO TEST STRIPS  1 each by Misc.(Non-Drug; Combo Route) route 4 (four) times daily.  What changed: Another medication with the same name was added. Make sure you understand how and when to take each.     * blood sugar diagnostic Strp  1 strip by Misc.(Non-Drug; Combo Route) route 4 (four) times daily with meals and nightly.  What changed: You were already taking a medication with the  same name, and this prescription was added. Make sure you understand how and when to take each.     insulin glargine 100 units/mL SubQ pen  Commonly known as: LANTUS SOLOSTAR U-100 INSULIN  Inject 10 Units into the skin 2 (two) times a day.  What changed:   how much to take  when to take this     lancets 28 gauge Misc  100 lancets by Misc.(Non-Drug; Combo Route) route 4 (four) times daily with meals and nightly.  What changed:   when to take this  Another medication with the same name was removed. Continue taking this medication, and follow the directions you see here.     LUPKYNIS 7.9 mg Cap  Generic drug: voclosporin  Take 23.7 mg by mouth once daily.  What changed:   how much to take  when to take this     predniSONE 10 MG tablet  Commonly known as: DELTASONE  Take 3 tablets (30 mg total) by mouth once daily. for 14 days  What changed:   medication strength  how much to take           * This list has 3 medication(s) that are the same as other medications prescribed for you. Read the directions carefully, and ask your doctor or other care provider to review them with you.                CONTINUE taking these medications      atorvastatin 10 MG tablet  Commonly known as: LIPITOR  Take 10 mg by mouth once daily.     blood-glucose meter kit  Use as instructed     eletriptan 40 MG tablet  Commonly known as: RELPAX  Take 1 tablet (40 mg total) by mouth as needed (take one at the beginning of migraine, may repeat in 2 h. max 2 in 24h). may repeat in 2 hours if necessary     ergocalciferol 50,000 unit Cap  Commonly known as: ERGOCALCIFEROL  Take 1 capsule (50,000 Units total) by mouth every 7 days. for 12 doses     EScitalopram oxalate 10 MG tablet  Commonly known as: LEXAPRO  Take 1 tablet (10 mg total) by mouth once daily.     HYDROcodone-acetaminophen  mg per tablet  Commonly known as: NORCO  Take 1 tablet by mouth every 6 (six) hours as needed for Pain.     ondansetron 4 MG tablet  Commonly known as:  ZOFRAN  TAKE 1 TABLET(4 MG) BY MOUTH EVERY 6 HOURS AS NEEDED FOR NAUSEA     pantoprazole 40 MG tablet  Commonly known as: PROTONIX  Take 1 tablet (40 mg total) by mouth once daily.     * PROAIR RESPICLICK 90 mcg/actuation inhaler  Generic drug: albuterol sulfate  Inhale 1 puff into the lungs every 4 (four) hours as needed for Wheezing. Rescue     * albuterol 0.63 mg/3 mL Nebu  Commonly known as: ACCUNEB  Take 3 mLs (0.63 mg total) by nebulization every 4 (four) hours as needed (wheezing). Rescue     walker Misc  Please dispense 1 Rollator           * This list has 2 medication(s) that are the same as other medications prescribed for you. Read the directions carefully, and ask your doctor or other care provider to review them with you.                STOP taking these medications      mycophenolate 500 mg Tab  Commonly known as: CELLCEPT  Replaced by: mycophenolate 250 mg Cap                Discharge Instructions:         Vi Ulrich is being discharged Home or Self Care.    Discharge Procedure Orders   Ambulatory referral/consult to Internal Medicine   Standing Status: Future   Referral Priority: Routine Referral Type: Consultation   Referral Reason: Specialty Services Required   Requested Specialty: Internal Medicine   Number of Visits Requested: 1     Ambulatory referral/consult to Interventional Radiology   Standing Status: Future   Referral Priority: Routine Referral Type: Consultation   Referral Reason: Specialty Services Required   Requested Specialty: Interventional Radiology   Number of Visits Requested: 1        Follow-Up Appointments:   Follow-up Information       Po Mcfarlane MD. Go on 12/19/2023.    Specialty: Rheumatology  Why: Go to appointment on 12/19/2023 at 9:15 AM. Call the office to follow up on infusion schedule.  Contact information:  [facility] [address] [address] [phone]               Octaviano Barrios MD. Go on 11/14/2023.    Specialty: Internal Medicine  Why:  Go to appointment on 11/14/2023 at 8:50 AM.  Contact information:  [address] [facility] [id number] [phone]                               To address at follow-up:  -The following labs are to be drawn at the [facility] visit: CBG  -Insulin regimen changed from Lantus 80u nightly to 10u BID because patient was hypoglycemic during hospitalization. Review CBG log and make adjustments as needed.  -Confirm Outpatient Nuclear Stress Test scheduled.    At this time, Vi Ulrich is determined to have maximized benefits of IP hospitalization. she is discharged in stable condition with OP f/u recommendations and instructions. All questions answered, and patient verbalized agreement with the POC. They were given return precautions prior to d/c including symptoms that should prompt return to ED or to call PCP. Total time spent of DC of 35 minutes.       Zabrina Willett MD  [facility] Internal Medicine, HO-2  10/14/2023

## 2023-10-14 NOTE — NURSING
Informed patient of discharge, she again began complaining of her body shaking and being forced to leave when her sugar is low. CBG is 133 at this time. Patient ate 100% of breakfast plus drank 3 juices

## 2023-10-14 NOTE — PROGRESS NOTES
"[facility]  Nephrology Progress Note    Patient Name: Vi Ulrich  Age: 57 y.o.  : 1965  MRN: [id number]  Admission Date: 10/9/2023    Chief complaint: Generalized Body Aches (Pt reports lupus flare up for two days)    Hospital course  Vi Ulrich is a 57 y.o. 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  Patient presented to the emergency department on 10/09/2023 with complaints of generalized body aches and chest discomfort.  CT angiogram revealed no evidence of pulmonary embolus, stable ground-glass patchy and confluent opacities in both lobes and to a lesser extent in the lingula and right middle lobe.  Ultrasound of lower extremities was positive for DVT in the right popliteal vein.  Blood culture was positive for Staphylococcus haemolyticus, but this was likely a contaminant.  Patient was admitted to medicine service, Nephrology was consulted for assistance with management of CKD.         Subjective  No acute events overnight no acute events overnight    Review of Systems  Respiratory: Negative for shortness of breath   Cardiovascular:  Negative for swelling or chest pain  Neuro:  Positive for headache    Objective  /76   Pulse (!) 57   Temp 98.2 °F (36.8 °C) (Oral)   Resp 18   Ht 5' 5" (1.651 m)   Wt 73.5 kg (162 lb)   LMP  (LMP Unknown)   SpO2 100%   BMI 26.96 kg/m²     Intake/Output Summary (Last 24 hours) at 10/14/2023 0510  Last data filed at 10/13/2023 2009  Gross per 24 hour   Intake 651.59 ml   Output --   Net 651.59 ml       Physical " Exam  General appearance: Patient is in no acute distress.  Skin: No rashes or wounds.  HEENT: PERRLA, EOMI, no scleral icterus, no JVD. Neck is supple.  Chest: Respirations are unlabored. Lungs sounds are clear.   Heart: S1, S2.   Abdomen: Benign.  : Deferred.  Extremities: No edema, peripheral pulses are palpable.   Neuro: No focal deficits.     Medications    Current Facility-Administered Medications:     acetaminophen tablet 650 mg, 650 mg, Oral, Q6H PRN, Aaliyah Woodruff DO, 650 mg at 10/14/23 0320    albuterol nebulizer solution 0.63 mg, 0.63 mg, Nebulization, Q4H PRN, Aaliyah Woodruff DO    amLODIPine tablet 10 mg, 10 mg, Oral, Daily, Zabrina Willett MD, 10 mg at 10/13/23 0901    apixaban tablet 10 mg, 10 mg, Oral, BID, Zabrina Willett MD, 10 mg at 10/13/23 2012    aspirin chewable tablet 81 mg, 81 mg, Oral, Daily, Ana María Fernandez MD, 81 mg at 10/13/23 0901    atorvastatin tablet 10 mg, 10 mg, Oral, Daily, Aaliyah Woodruff DO, 10 mg at 10/13/23 0901    dextrose 10% bolus 125 mL 125 mL, 12.5 g, Intravenous, PRN, Aaliyah Woodruff DO, Stopped at 10/12/23 0706    dextrose 10% bolus 250 mL 250 mL, 25 g, Intravenous, PRN, Aaliyah Woodruff DO, Stopped at 10/12/23 0921    diphenhydrAMINE injection 25 mg, 25 mg, Intravenous, Once, Quincy Burnett MD    EScitalopram oxalate tablet 10 mg, 10 mg, Oral, Daily, Aaliyah Woodruff DO, 10 mg at 10/13/23 0901    gabapentin capsule 100 mg, 100 mg, Oral, TID, Antoni Marie DO, 100 mg at 10/13/23 2012    glucagon (human recombinant) injection 1 mg, 1 mg, Intramuscular, PRN, Aaliyah Woodruff DO    glucose chewable tablet 16 g, 16 g, Oral, PRN, [place], Aaliyah, DO    glucose chewable tablet 24 g, 24 g, Oral, PRN, Aaliyah Woodruff,     hydrALAZINE injection 10 mg, 10 mg, Intravenous, Q4H PRN, Aaliyah Woodruff,     insulin aspart U-100 injection 0-10 Units, 0-10 Units, Subcutaneous, QID (AC + HS) PRN, Aaliyah Woodruff DO, 2 Units at  10/13/23 2159    insulin detemir U-100 injection 30 Units, 30 Units, Subcutaneous, QHS, Zabrina Willett MD, 30 Units at 10/13/23 2013    labetalol 20 mg/4 mL (5 mg/mL) IV syring, 10 mg, Intravenous, Q4H PRN, Aaliyah Woodruff DO    losartan tablet 25 mg, 25 mg, Oral, Daily, Antoni Marie DO, 25 mg at 10/13/23 0902    melatonin tablet 6 mg, 6 mg, Oral, Nightly PRN, Aaliyah Woodruff DO    multivitamin tablet, 1 tablet, Oral, Daily, Ana María Fernandez MD, 1 tablet at 10/13/23 0902    mycophenolate capsule 1,500 mg, 1,500 mg, Oral, BID, James Cutler MD, 1,500 mg at 10/13/23 2012    ondansetron tablet 4 mg, 4 mg, Oral, Q6H PRN, Aaliyah Woodruff DO, 4 mg at 10/10/23 1228    pantoprazole EC tablet 40 mg, 40 mg, Oral, Daily, Aaliyah Woodruff DO, 40 mg at 10/13/23 0901    polyethylene glycol packet 17 g, 17 g, Oral, BID PRN, Aaliyah Woodruff DO    predniSONE tablet 30 mg, 30 mg, Oral, Daily, James Cutler MD, 30 mg at 10/13/23 0901    sodium chloride 0.9% flush 10 mL, 10 mL, Intravenous, Q12H PRN, Aaliyah Woodruff DO    Flushing PICC/Midline Protocol, , , Until Discontinued **AND** sodium chloride 0.9% flush 10 mL, 10 mL, Intravenous, Q6H, 10 mL at 10/13/23 1442 **AND** sodium chloride 0.9% flush 10 mL, 10 mL, Intravenous, PRN, Antoni Marie DO    sulfamethoxazole-trimethoprim 800-160mg per tablet 2 tablet, 2 tablet, Oral, Daily, Antoni Marie DO, 2 tablet at 10/13/23 0901    voclosporin Cap 23.7 mg, 23.7 mg, Oral, Daily, James Cutler MD     Imaging:    Reviewed    Laboratory Data:  Hematology  Lab Results   Component Value Date    WBC 5.06 10/14/2023    HGB 10.5 (L) 10/14/2023    HCT 32.0 (L) 10/14/2023     10/14/2023     10/13/2023    K 4.2 10/13/2023    CHLORIDE 104 10/13/2023    CO2 26 10/13/2023    BUN 35.2 (H) 10/13/2023    CREATININE 1.37 (H) 10/13/2023    EGFRNORACEVR 46 10/13/2023    GLUCOSE 76 10/13/2023    CALCIUM 8.6 10/13/2023    ALKPHOS 95 10/13/2023    LABPROT 7.1  10/13/2023    ALBUMIN 2.9 (L) 10/13/2023    BILIDIR 0.1 03/28/2022    IBILI 0.10 03/28/2022    AST 18 10/13/2023    ALT 13 10/13/2023    MG 2.10 10/11/2023    PHOS 3.5 10/11/2023     Lab Results   Component Value Date    .7 (H) 03/03/2023    JRHVMIWZ88QS 15.0 (L) 08/04/2023       Lab Results   Component Value Date    IRON 219 (H) 08/04/2023    TIBC 284 08/04/2023    TRANS 152 (L) 08/04/2023    LABIRON 77 (H) 08/04/2023    FERRITIN 398.91 (H) 08/04/2023    FOLATE 5.3 (L) 01/12/2023    [id number] 674 01/12/2023    HAPTO 249 03/10/2023     (H) 03/10/2023       Lab Results   Component Value Date    HIV Nonreactive 10/13/2023    HEPCAB Nonreactive 10/13/2023    HEPBSURFAG Nonreactive 10/13/2023    HEPBSAB Negative 11/17/2020       Impression  Chronic kidney disease stage 2/A3  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  Continue losartan for kidney protective/proteinuria-reducing benefit.  Continue immunosuppressive regimen per Rheumatology recommendations.  Disposition per primary service.     Rachel Broderick NP  [facility] Nephrology   10/14/2023

## 2023-10-14 NOTE — NURSING
"Patient c/o " my whole body is shaking" blood sugar check 83. Pt requested dylan crackers and apple juice. When I returned to room patient would not answer me when I spoke to her then she stated   " I'm spacing out". Her skin is warm and dry to touch, I wanted to continue the D10 infusion until breakfast arrived but she refused.  "

## 2023-10-15 LAB
BACTERIA BLD CULT: NORMAL
T PALLIDUM AB SER QL: REACTIVE

## 2023-10-16 ENCOUNTER — PATIENT MESSAGE (OUTPATIENT)
Dept: ADMINISTRATIVE | Facility: CLINIC | Age: 58
End: 2023-10-16
Payer: MEDICAID

## 2023-10-16 ENCOUNTER — TELEPHONE (OUTPATIENT)
Dept: RHEUMATOLOGY | Facility: CLINIC | Age: 58
End: 2023-10-16
Payer: MEDICAID

## 2023-10-16 ENCOUNTER — OFFICE VISIT (OUTPATIENT)
Dept: ORTHOPEDICS | Facility: CLINIC | Age: 58
End: 2023-10-16
Payer: MEDICAID

## 2023-10-16 ENCOUNTER — PATIENT OUTREACH (OUTPATIENT)
Dept: ADMINISTRATIVE | Facility: CLINIC | Age: 58
End: 2023-10-16
Payer: MEDICAID

## 2023-10-16 VITALS — HEIGHT: 66 IN | BODY MASS INDEX: 26.07 KG/M2 | WEIGHT: 162.19 LBS

## 2023-10-16 DIAGNOSIS — M65.321 TRIGGER INDEX FINGER OF RIGHT HAND: Primary | ICD-10-CM

## 2023-10-16 DIAGNOSIS — M79.7 FIBROMYALGIA: ICD-10-CM

## 2023-10-16 DIAGNOSIS — M32.14 SYSTEMIC LUPUS ERYTHEMATOSUS WITH GLOMERULAR DISEASE, UNSPECIFIED SLE TYPE: ICD-10-CM

## 2023-10-16 DIAGNOSIS — M65.331 TRIGGER MIDDLE FINGER OF RIGHT HAND: ICD-10-CM

## 2023-10-16 DIAGNOSIS — R20.0 FINGER NUMBNESS: ICD-10-CM

## 2023-10-16 DIAGNOSIS — S66.190S: ICD-10-CM

## 2023-10-16 LAB
BACTERIA BLD CULT: NORMAL
BACTERIA BLD CULT: NORMAL
COAGULATION SPECIALIST REVIEW: NORMAL
F5 P.R506Q BLD/T QL: NEGATIVE
PROVIDER SIGNING NAME: NORMAL

## 2023-10-16 PROCEDURE — 3044F PR MOST RECENT HEMOGLOBIN A1C LEVEL <7.0%: ICD-10-PCS | Mod: CPTII,,, | Performed by: NURSE PRACTITIONER

## 2023-10-16 PROCEDURE — 1159F PR MEDICATION LIST DOCUMENTED IN MEDICAL RECORD: ICD-10-PCS | Mod: CPTII,,, | Performed by: NURSE PRACTITIONER

## 2023-10-16 PROCEDURE — 3066F PR DOCUMENTATION OF TREATMENT FOR NEPHROPATHY: ICD-10-PCS | Mod: CPTII,,, | Performed by: NURSE PRACTITIONER

## 2023-10-16 PROCEDURE — 3008F PR BODY MASS INDEX (BMI) DOCUMENTED: ICD-10-PCS | Mod: CPTII,,, | Performed by: NURSE PRACTITIONER

## 2023-10-16 PROCEDURE — 1160F RVW MEDS BY RX/DR IN RCRD: CPT | Mod: CPTII,,, | Performed by: NURSE PRACTITIONER

## 2023-10-16 PROCEDURE — 1159F MED LIST DOCD IN RCRD: CPT | Mod: CPTII,,, | Performed by: NURSE PRACTITIONER

## 2023-10-16 PROCEDURE — 3008F BODY MASS INDEX DOCD: CPT | Mod: CPTII,,, | Performed by: NURSE PRACTITIONER

## 2023-10-16 PROCEDURE — 99214 PR OFFICE/OUTPT VISIT, EST, LEVL IV, 30-39 MIN: ICD-10-PCS | Mod: S$PBB,,, | Performed by: NURSE PRACTITIONER

## 2023-10-16 PROCEDURE — 4010F PR ACE/ARB THEARPY RXD/TAKEN: ICD-10-PCS | Mod: CPTII,,, | Performed by: NURSE PRACTITIONER

## 2023-10-16 PROCEDURE — 99215 OFFICE O/P EST HI 40 MIN: CPT | Mod: PBBFAC | Performed by: NURSE PRACTITIONER

## 2023-10-16 PROCEDURE — 1111F PR DISCHARGE MEDS RECONCILED W/ CURRENT OUTPATIENT MED LIST: ICD-10-PCS | Mod: CPTII,,, | Performed by: NURSE PRACTITIONER

## 2023-10-16 PROCEDURE — 1160F PR REVIEW ALL MEDS BY PRESCRIBER/CLIN PHARMACIST DOCUMENTED: ICD-10-PCS | Mod: CPTII,,, | Performed by: NURSE PRACTITIONER

## 2023-10-16 PROCEDURE — 3044F HG A1C LEVEL LT 7.0%: CPT | Mod: CPTII,,, | Performed by: NURSE PRACTITIONER

## 2023-10-16 PROCEDURE — 1111F DSCHRG MED/CURRENT MED MERGE: CPT | Mod: CPTII,,, | Performed by: NURSE PRACTITIONER

## 2023-10-16 PROCEDURE — 3066F NEPHROPATHY DOC TX: CPT | Mod: CPTII,,, | Performed by: NURSE PRACTITIONER

## 2023-10-16 PROCEDURE — 4010F ACE/ARB THERAPY RXD/TAKEN: CPT | Mod: CPTII,,, | Performed by: NURSE PRACTITIONER

## 2023-10-16 PROCEDURE — 99214 OFFICE O/P EST MOD 30 MIN: CPT | Mod: S$PBB,,, | Performed by: NURSE PRACTITIONER

## 2023-10-16 RX ORDER — HYDROCODONE BITARTRATE AND ACETAMINOPHEN 10; 325 MG/1; MG/1
1 TABLET ORAL EVERY 6 HOURS PRN
Qty: 90 TABLET | Refills: 0 | Status: SHIPPED | OUTPATIENT
Start: 2023-10-16 | End: 2023-10-18 | Stop reason: SDUPTHER

## 2023-10-16 NOTE — TELEPHONE ENCOUNTER
Please let her know I have received results of some of the blood work that were done when she was admitted in the hospital. One of the test called cardiolipin came back positive, which means she is at increased risk of developing blood clots in the future. I recommend her staying on anticoagulation continuously and I would also recommend Coumadin over Eliquis because that test anticardiolipin is positive. Please ask her to discuss with her rheumatologist.

## 2023-10-16 NOTE — PROGRESS NOTES
Subjective:       Follow up   Patient ID: Vi Ulrich is a 57 y.o. female. Non-smoker. Employment HX: , currently disability.    Seen [facility] ortho for same DX since n/a.   Chief Complaint: No chief complaint on file.    HPI:    Right shooting finger index, middle  hand pain. Left dominate  Injury: no known injury  Onset: several years ago worsening over time  Modifying Factors: worse with activity, improved with rest, radiates to wrist, and radiates into forearm  Associated Symptoms: popping, decreased  strength, decreased ROM, and weakness with dropping items  Activity: sedentary with light activity and pain moderately interferes with ADLs .   Previous Treatments: HEP , RX NSAIDs, and CSI since 2023 last injection 4/11/23  with some relief, but symptoms returning.   PMH: + kidney impairment and DX lupus  Family History: + OA    Note: Follow up for right index trigger finger.  CSI given 4/11/23 with some improvement.  But patient with c/o numbness, dislocation which has been there for several months worse when cold and frequent locking of index finger w/ flexion and extension. Recently hospitalized and discharged for DVT and kidney infection.     Current Outpatient Medications:     albuterol (ACCUNEB) 0.63 mg/3 mL Nebu, Take 3 mLs (0.63 mg total) by nebulization every 4 (four) hours as needed (wheezing). Rescue, Disp: 75 mL, Rfl: 3    albuterol sulfate (PROAIR RESPICLICK) 90 mcg/actuation inhaler, Inhale 1 puff into the lungs every 4 (four) hours as needed for Wheezing. Rescue, Disp: , Rfl:     apixaban (ELIQUIS) 5 mg Tab, Take 2 tablets (10 mg total) by mouth 2 (two) times daily. for 5 days, Disp: 20 tablet, Rfl: 0    [START ON 10/19/2023] apixaban (ELIQUIS) 5 mg Tab, Take 1 tablet (5 mg total) by mouth 2 (two) times daily., Disp: 180 tablet, Rfl: 3    atorvastatin (LIPITOR) 10 MG tablet, Take 10 mg by mouth once daily., Disp: , Rfl:     blood sugar diagnostic (ONETOUCH VERIO) Strp, 1 each by  Misc.(Non-Drug; Combo Route) route 4 (four) times daily., Disp: 50 each, Rfl: 11    blood sugar diagnostic Strp, 1 each by Misc.(Non-Drug; Combo Route) route 4 (four) times daily., Disp: 50 each, Rfl: 11    blood sugar diagnostic Strp, 1 strip by Misc.(Non-Drug; Combo Route) route 4 (four) times daily with meals and nightly., Disp: 200 each, Rfl: 6    blood-glucose meter kit, Use as instructed, Disp: 1 each, Rfl: 0    ergocalciferol (ERGOCALCIFEROL) 50,000 unit Cap, Take 1 capsule (50,000 Units total) by mouth every 7 days. for 12 doses, Disp: 12 capsule, Rfl: 0    EScitalopram oxalate (LEXAPRO) 10 MG tablet, Take 1 tablet (10 mg total) by mouth once daily., Disp: 30 tablet, Rfl: 5    HYDROcodone-acetaminophen (NORCO)  mg per tablet, Take 1 tablet by mouth every 6 (six) hours as needed for Pain., Disp: 90 tablet, Rfl: 0    insulin (LANTUS SOLOSTAR U-100 INSULIN) glargine 100 units/mL SubQ pen, Inject 10 Units into the skin 2 (two) times a day., Disp: 18 mL, Rfl: 3    lancets 28 gauge Misc, 100 lancets by Misc.(Non-Drug; Combo Route) route 4 (four) times daily with meals and nightly., Disp: 100 each, Rfl: 8    losartan (COZAAR) 25 MG tablet, Take 1 tablet (25 mg total) by mouth once daily., Disp: 90 tablet, Rfl: 3    multivitamin Tab, Take 1 tablet by mouth once daily., Disp: 30 tablet, Rfl: 11    mycophenolate (CELLCEPT) 250 mg Cap, Take 6 capsules (1,500 mg total) by mouth 2 (two) times daily., Disp: 360 capsule, Rfl: 11    ondansetron (ZOFRAN) 4 MG tablet, TAKE 1 TABLET(4 MG) BY MOUTH EVERY 6 HOURS AS NEEDED FOR NAUSEA, Disp: 24 tablet, Rfl: 0    pantoprazole (PROTONIX) 40 MG tablet, Take 1 tablet (40 mg total) by mouth once daily., Disp: 90 tablet, Rfl: 3    predniSONE (DELTASONE) 10 MG tablet, Take 3 tablets (30 mg total) by mouth once daily. for 14 days, Disp: 42 tablet, Rfl: 0    sulfamethoxazole-trimethoprim 800-160mg (BACTRIM DS) 800-160 mg Tab, Take 2 tablets by mouth once daily. for 14 days, Disp: 28  "tablet, Rfl: 0    voclosporin (LUPKYNIS) 7.9 mg Cap, Take 23.7 mg by mouth once daily., Disp: 30 capsule, Rfl: 11    walker Misc, Please dispense 1 Rollator, Disp: 1 each, Rfl: 0    eletriptan (RELPAX) 40 MG tablet, Take 1 tablet (40 mg total) by mouth as needed (take one at the beginning of migraine, may repeat in 2 h. max 2 in 24h). may repeat in 2 hours if necessary, Disp: 9 tablet, Rfl: 5  Review of patient's allergies indicates:   Allergen Reactions    Ketorolac (pf) Swelling    Mycophenolate mofetil Swelling    Penicillins Hives    Percocet [oxycodone-acetaminophen] Hives and Itching    Tramadol Hives     Hemoglobin A1C   Date Value Ref Range Status   03/04/2020 6.6 (H) 4.0 - 6.0 % Final   07/31/2018 6.5 (H) 4.0 - 5.6 % Final     Comment:     ADA Screening Guidelines:  5.7-6.4%  Consistent with prediabetes  >or=6.5%  Consistent with diabetes  High levels of fetal hemoglobin interfere with the HbA1C  assay. Heterozygous hemoglobin variants (HbS, HgC, etc)do  not significantly interfere with this assay.   However, presence of multiple variants may affect accuracy.     04/12/2018 7.2 (H) 4.5 - 6.2 % Final     Comment:     According to ADA guidelines, hemoglobin A1C <7.0% represents  optimal control in non-pregnant diabetic patients.  Different  metrics may apply to specific populations.   Standards of Medical Care in Diabetes - 2016.  For the purpose of screening for the presence of diabetes:  <5.7%     Consistent with the absence of diabetes  5.7-6.4%  Consistent with increasing risk for diabetes   (prediabetes)  >or=6.5%  Consistent with diabetes  Currently no consensus exists for use of hemoglobin A1C  for diagnosis of diabetes for children.       Hemoglobin A1c   Date Value Ref Range Status   10/09/2023 5.4 <=7.0 % Final   05/29/2022 6.0 <=7.0 % Final   05/28/2022 6.1 <=7.0 % Final      Body mass index is 26.18 kg/m².   Vitals:    10/16/23 0812 10/16/23 0813   Weight: 73.6 kg (162 lb 3.2 oz)    Height: 5' 6" " (1.676 m)    PainSc:    6      Review of Systems:  A ten-point review of systems was performed and is negative, except as mentioned above.   Objective:   MSK Bilateral Hand/ Wrist  General:  no apparent distress, no pain indicators, well nourished  Inspection: no masses/cyst/nodules, index finger tip with dislocation, no swelling, no erythema, no contusion, no deconditioning, no deformity, no contracture, no scars  Palpation:  tenderness to RIGHT hand flexor tendon: index, middle finger, radial pulse equal 2+  ROM:    MCP/ PIP/ DIP Flexion  catching/ locking of index, middle finger painful (R)  Smooth movement without limitation non-painful (L)  MCP/ PIP/ DIP Extension  catching/ locking of index, middle finger painful (R)  Smooth movement without limitation non-painful (L)  Strength:        4 / 5 (R) 5 / 5 (L)  Special Testing:  Trigger Finger   + index, middle finger (R)  -- (L)  Phalen    -- (R)  -- (L)  Jose Carpal Compression -- (R)  -- (L)  Tinel Test    -- (R)  -- (L)  Finkelstein Test   -- (R)  -- (L)  Neurovascular: Intact to light touch  Neuro/ Psych: Awake, alert, oriented, normal mood and affect  Lymphatic: No LAD  Skin/ Soft Tissue: no rash, skin intact  Assessment:       Imaging: X-ray 3 views of right hand dated 4/11/23, reviewed and independently interpreted by me. Discussed with patient. No acute findings .    XR HAND COMPLETE 3 VIEW RIGHT 4/11/23  CLINICAL HISTORY:  Trigger finger, unspecified finger  COMPARISON:  None.  FINDINGS:  No acute displaced fractures or dislocations.  There is a flexion deformity at the proximal interphalangeal joint of the 3rd digit with no other significant abnormalities identified  Minimal narrowing of the proximal and distal interphalangeal joints with minimal degenerative changes of the 1st carpometacarpal joint  Soft tissues within normal limits.  Impression:  Flexion deformity as above.  Degenerative changes.    EMR Review: completed with noted prior visit  4/11/23 reviewed.    1. Trigger index finger of right hand    2. Trigger middle finger of right hand    3. BMI 26.0-26.9,adult      Plan:          Ongoing education about DX and treatment recommendations including conservative treatments of daily HEP and OTC NSAID as needed according to label instructions if able to tolerate.   Treatment plan: Trigger Finger middle right hand, index finger right hand with possible tendon injury and complicated by finger numbness  MRI ordered for possible tendon injury with failed conservative treatments including: HEP > 3 months, RX NSAIDs, and CSI w/o relief.  MRI needed for definitive DX and possible surgical options.    EMG order s/t finger numbness and discoloration.   Procedure: n/a  RX Medications: continue medications as RX per PCP    RTC: after EMG study complete, after imaging complete    Pily YIN    Total Time Spent with Patient: 30 minutes .  Visit Start Time: 0810  10 minutes spent prior to visit reviewing EMR, prior labs and x-rays.  10 minutes spent in visit with patient face-to-face time completing exam, obtaining history, educating on DX and treatment plan.  10 minutes spent after visit completing EMR documentation.   Visit End Time: 0840

## 2023-10-16 NOTE — PROGRESS NOTES
C3 nurse attempted to contact Vi Ulrich for a TCC post hospital discharge follow up call. No answer. Left voicemail with callback information. The patient has a scheduled HOSFU appointment with Octaviano Barrios MD (Internal Medicine) on 11/14/2023; at 8:50 AM.

## 2023-10-17 ENCOUNTER — PATIENT MESSAGE (OUTPATIENT)
Dept: INFUSION THERAPY | Facility: HOSPITAL | Age: 58
End: 2023-10-17
Payer: MEDICAID

## 2023-10-17 DIAGNOSIS — M32.14 SYSTEMIC LUPUS ERYTHEMATOSUS WITH GLOMERULAR DISEASE, UNSPECIFIED SLE TYPE: ICD-10-CM

## 2023-10-17 DIAGNOSIS — M79.7 FIBROMYALGIA: ICD-10-CM

## 2023-10-17 NOTE — PROGRESS NOTES
3rd attempt-C3 nurse attempted to contact Vi Ulrich for a TCC post hospital discharge follow up call after patient called back and left a voice message in the TCM voicemail box. No answer. Left voicemail with callback information. The patient has a scheduled HOSFU appointment with Octaviano Barrios MD (Internal Medicine) on 11/14/2023; at 8:50 AM.

## 2023-10-17 NOTE — TELEPHONE ENCOUNTER
[facility] on [address] is out of stock . She is [facility] [address] instead . Please Advise. Thanks

## 2023-10-18 ENCOUNTER — TELEPHONE (OUTPATIENT)
Dept: RHEUMATOLOGY | Facility: CLINIC | Age: 58
End: 2023-10-18
Payer: MEDICAID

## 2023-10-18 RX ORDER — HYDROCODONE BITARTRATE AND ACETAMINOPHEN 10; 325 MG/1; MG/1
1 TABLET ORAL EVERY 6 HOURS PRN
Qty: 90 TABLET | Refills: 0 | OUTPATIENT
Start: 2023-10-18

## 2023-10-18 RX ORDER — HYDROCODONE BITARTRATE AND ACETAMINOPHEN 10; 325 MG/1; MG/1
1 TABLET ORAL EVERY 6 HOURS PRN
Qty: 90 TABLET | Refills: 0 | Status: SHIPPED | OUTPATIENT
Start: 2023-10-18 | End: 2023-11-14 | Stop reason: SDUPTHER

## 2023-10-18 NOTE — TELEPHONE ENCOUNTER
Patient called the answering service and left a message about her low blood sugars. I called the patient and asked if she reported them to her Primary care and she said no. I instructed her to call them and report the blood sugars to them because they will be the ones to treat her for the blood sugars. She verbalized understanding and stated that she will call them.   Thank you Leopoldo

## 2023-10-18 NOTE — TELEPHONE ENCOUNTER
I went over the chart and I think you meant that the prescription will be sent to [facility] on [address].  I called in to [facility] [address].  Thank you BPH

## 2023-10-18 NOTE — TELEPHONE ENCOUNTER
The message states that the pharmacy is out of stock and they are sending to the same pharmacy.  I canceled the prescription.  Thank you BPH

## 2023-10-18 NOTE — TELEPHONE ENCOUNTER
Patient called, she is having weakness not able to sleep at all at night.  Her fasting blood sugar in the mornings is 51-58. Please Advise. Thanks

## 2023-10-22 ENCOUNTER — HOSPITAL ENCOUNTER (INPATIENT)
Facility: HOSPITAL | Age: 58
LOS: 4 days | Discharge: HOME | DRG: 638 | End: 2023-10-26
Attending: STUDENT IN AN ORGANIZED HEALTH CARE EDUCATION/TRAINING PROGRAM | Admitting: INTERNAL MEDICINE
Payer: MEDICAID

## 2023-10-22 DIAGNOSIS — E63.9 INADEQUATE DIETARY ENERGY INTAKE: ICD-10-CM

## 2023-10-22 DIAGNOSIS — M32.14 LUPUS NEPHRITIS, ISN/RPS CLASS III: ICD-10-CM

## 2023-10-22 DIAGNOSIS — E11.21: ICD-10-CM

## 2023-10-22 DIAGNOSIS — N17.9 AKI (ACUTE KIDNEY INJURY): ICD-10-CM

## 2023-10-22 DIAGNOSIS — E78.5 HYPERLIPIDEMIA, UNSPECIFIED HYPERLIPIDEMIA TYPE: ICD-10-CM

## 2023-10-22 DIAGNOSIS — E11.649 HYPOGLYCEMIA ASSOCIATED WITH TYPE 2 DIABETES MELLITUS: ICD-10-CM

## 2023-10-22 DIAGNOSIS — E11.649 TYPE 2 DIABETES MELLITUS WITH HYPOGLYCEMIA WITHOUT COMA, UNSPECIFIED WHETHER LONG TERM INSULIN USE: ICD-10-CM

## 2023-10-22 DIAGNOSIS — R07.9 CHEST PAIN: ICD-10-CM

## 2023-10-22 DIAGNOSIS — R00.2 PALPITATIONS: ICD-10-CM

## 2023-10-22 DIAGNOSIS — E11.00 TYPE 2 DIABETES MELLITUS WITH HYPEROSMOLARITY WITHOUT COMA, WITHOUT LONG-TERM CURRENT USE OF INSULIN: ICD-10-CM

## 2023-10-22 DIAGNOSIS — E16.2 HYPOGLYCEMIA: Primary | ICD-10-CM

## 2023-10-22 DIAGNOSIS — M32.9 LUPUS: ICD-10-CM

## 2023-10-22 DIAGNOSIS — E87.5 HYPERKALEMIA: ICD-10-CM

## 2023-10-22 LAB
BASOPHILS # BLD AUTO: 0.02 X10(3)/MCL
BASOPHILS NFR BLD AUTO: 0.3 %
EOSINOPHIL # BLD AUTO: 0.08 X10(3)/MCL (ref 0–0.9)
EOSINOPHIL NFR BLD AUTO: 1.1 %
ERYTHROCYTE [DISTWIDTH] IN BLOOD BY AUTOMATED COUNT: 16.3 % (ref 11.5–17)
HCT VFR BLD AUTO: 35.8 % (ref 37–47)
HGB BLD-MCNC: 11.8 G/DL (ref 12–16)
IMM GRANULOCYTES # BLD AUTO: 0.04 X10(3)/MCL (ref 0–0.04)
IMM GRANULOCYTES NFR BLD AUTO: 0.6 %
LYMPHOCYTES # BLD AUTO: 2.47 X10(3)/MCL (ref 0.6–4.6)
LYMPHOCYTES NFR BLD AUTO: 34.6 %
MCH RBC QN AUTO: 30.3 PG (ref 27–31)
MCHC RBC AUTO-ENTMCNC: 33 G/DL (ref 33–36)
MCV RBC AUTO: 92 FL (ref 80–94)
MONOCYTES # BLD AUTO: 0.41 X10(3)/MCL (ref 0.1–1.3)
MONOCYTES NFR BLD AUTO: 5.8 %
NEUTROPHILS # BLD AUTO: 4.11 X10(3)/MCL (ref 2.1–9.2)
NEUTROPHILS NFR BLD AUTO: 57.6 %
NRBC BLD AUTO-RTO: 0 %
PLATELET # BLD AUTO: 406 X10(3)/MCL (ref 130–400)
PMV BLD AUTO: 10.4 FL (ref 7.4–10.4)
POCT GLUCOSE: 216 MG/DL (ref 70–110)
POCT GLUCOSE: 40 MG/DL (ref 70–110)
POCT GLUCOSE: 60 MG/DL (ref 70–110)
POCT GLUCOSE: 94 MG/DL (ref 70–110)
POCT GLUCOSE: <20 MG/DL (ref 70–110)
RBC # BLD AUTO: 3.89 X10(6)/MCL (ref 4.2–5.4)
WBC # SPEC AUTO: 7.13 X10(3)/MCL (ref 4.5–11.5)

## 2023-10-22 PROCEDURE — 82962 GLUCOSE BLOOD TEST: CPT

## 2023-10-22 PROCEDURE — 11000001 HC ACUTE MED/SURG PRIVATE ROOM

## 2023-10-22 PROCEDURE — 83735 ASSAY OF MAGNESIUM: CPT | Performed by: STUDENT IN AN ORGANIZED HEALTH CARE EDUCATION/TRAINING PROGRAM

## 2023-10-22 PROCEDURE — 25000003 PHARM REV CODE 250: Performed by: STUDENT IN AN ORGANIZED HEALTH CARE EDUCATION/TRAINING PROGRAM

## 2023-10-22 PROCEDURE — 80053 COMPREHEN METABOLIC PANEL: CPT | Performed by: STUDENT IN AN ORGANIZED HEALTH CARE EDUCATION/TRAINING PROGRAM

## 2023-10-22 PROCEDURE — 85025 COMPLETE CBC W/AUTO DIFF WBC: CPT | Performed by: STUDENT IN AN ORGANIZED HEALTH CARE EDUCATION/TRAINING PROGRAM

## 2023-10-22 PROCEDURE — 93005 ELECTROCARDIOGRAM TRACING: CPT

## 2023-10-22 RX ADMIN — DEXTROSE MONOHYDRATE 125 ML: 100 INJECTION, SOLUTION INTRAVENOUS at 11:10

## 2023-10-22 RX ADMIN — DEXTROSE MONOHYDRATE 250 ML: 100 INJECTION, SOLUTION INTRAVENOUS at 10:10

## 2023-10-23 LAB
ALBUMIN SERPL-MCNC: 2.6 G/DL (ref 3.5–5)
ALBUMIN SERPL-MCNC: 3 G/DL (ref 3.5–5)
ALBUMIN/GLOB SERPL: 0.8 RATIO (ref 1.1–2)
ALBUMIN/GLOB SERPL: 0.8 RATIO (ref 1.1–2)
ALP SERPL-CCNC: 81 UNIT/L (ref 40–150)
ALP SERPL-CCNC: 92 UNIT/L (ref 40–150)
ALT SERPL-CCNC: 8 UNIT/L (ref 0–55)
ALT SERPL-CCNC: 9 UNIT/L (ref 0–55)
APPEARANCE UR: CLEAR
AST SERPL-CCNC: 11 UNIT/L (ref 5–34)
AST SERPL-CCNC: 12 UNIT/L (ref 5–34)
BACTERIA #/AREA URNS AUTO: ABNORMAL /HPF
BASOPHILS # BLD AUTO: 0.03 X10(3)/MCL
BASOPHILS NFR BLD AUTO: 0.5 %
BILIRUB SERPL-MCNC: 0.1 MG/DL
BILIRUB SERPL-MCNC: 0.2 MG/DL
BILIRUB UR QL STRIP.AUTO: NEGATIVE
BUN SERPL-MCNC: 37.9 MG/DL (ref 9.8–20.1)
BUN SERPL-MCNC: 39 MG/DL (ref 9.8–20.1)
CALCIUM SERPL-MCNC: 8.4 MG/DL (ref 8.4–10.2)
CALCIUM SERPL-MCNC: 8.7 MG/DL (ref 8.4–10.2)
CHLORIDE SERPL-SCNC: 110 MMOL/L (ref 98–107)
CHLORIDE SERPL-SCNC: 111 MMOL/L (ref 98–107)
CO2 SERPL-SCNC: 18 MMOL/L (ref 22–29)
CO2 SERPL-SCNC: 18 MMOL/L (ref 22–29)
COLOR UR AUTO: ABNORMAL
CORTIS SERPL-SCNC: 3 UG/DL
CREAT SERPL-MCNC: 1.53 MG/DL (ref 0.55–1.02)
CREAT SERPL-MCNC: 1.63 MG/DL (ref 0.55–1.02)
EOSINOPHIL # BLD AUTO: 0.07 X10(3)/MCL (ref 0–0.9)
EOSINOPHIL NFR BLD AUTO: 1.2 %
ERYTHROCYTE [DISTWIDTH] IN BLOOD BY AUTOMATED COUNT: 16 % (ref 11.5–17)
GFR SERPLBLD CREATININE-BSD FMLA CKD-EPI: 37 MLS/MIN/1.73/M2
GFR SERPLBLD CREATININE-BSD FMLA CKD-EPI: 40 MLS/MIN/1.73/M2
GLOBULIN SER-MCNC: 3.4 GM/DL (ref 2.4–3.5)
GLOBULIN SER-MCNC: 3.8 GM/DL (ref 2.4–3.5)
GLUCOSE SERPL-MCNC: 67 MG/DL (ref 74–100)
GLUCOSE SERPL-MCNC: 83 MG/DL (ref 74–100)
GLUCOSE UR QL STRIP.AUTO: NORMAL
HCT VFR BLD AUTO: 29.9 % (ref 37–47)
HGB BLD-MCNC: 9.6 G/DL (ref 12–16)
HYALINE CASTS #/AREA URNS LPF: ABNORMAL /LPF
IMM GRANULOCYTES # BLD AUTO: 0.03 X10(3)/MCL (ref 0–0.04)
IMM GRANULOCYTES NFR BLD AUTO: 0.5 %
KETONES UR QL STRIP.AUTO: NEGATIVE
LEUKOCYTE ESTERASE UR QL STRIP.AUTO: 500
LYMPHOCYTES # BLD AUTO: 2.02 X10(3)/MCL (ref 0.6–4.6)
LYMPHOCYTES NFR BLD AUTO: 34.5 %
MAGNESIUM SERPL-MCNC: 2.1 MG/DL (ref 1.6–2.6)
MAGNESIUM SERPL-MCNC: 2.1 MG/DL (ref 1.6–2.6)
MCH RBC QN AUTO: 29 PG (ref 27–31)
MCHC RBC AUTO-ENTMCNC: 32.1 G/DL (ref 33–36)
MCV RBC AUTO: 90.3 FL (ref 80–94)
MONOCYTES # BLD AUTO: 0.36 X10(3)/MCL (ref 0.1–1.3)
MONOCYTES NFR BLD AUTO: 6.1 %
MUCOUS THREADS URNS QL MICRO: ABNORMAL /LPF
NEUTROPHILS # BLD AUTO: 3.35 X10(3)/MCL (ref 2.1–9.2)
NEUTROPHILS NFR BLD AUTO: 57.2 %
NITRITE UR QL STRIP.AUTO: NEGATIVE
NRBC BLD AUTO-RTO: 0 %
PH UR STRIP.AUTO: 5 [PH]
PHOSPHATE SERPL-MCNC: 4.4 MG/DL (ref 2.3–4.7)
PLATELET # BLD AUTO: 356 X10(3)/MCL (ref 130–400)
PMV BLD AUTO: 10.3 FL (ref 7.4–10.4)
POCT GLUCOSE: 116 MG/DL (ref 70–110)
POCT GLUCOSE: 126 MG/DL (ref 70–110)
POCT GLUCOSE: 129 MG/DL (ref 70–110)
POCT GLUCOSE: 180 MG/DL (ref 70–110)
POCT GLUCOSE: 204 MG/DL (ref 70–110)
POCT GLUCOSE: 221 MG/DL (ref 70–110)
POCT GLUCOSE: 342 MG/DL (ref 70–110)
POCT GLUCOSE: 68 MG/DL (ref 70–110)
POCT GLUCOSE: 72 MG/DL (ref 70–110)
POCT GLUCOSE: 76 MG/DL (ref 70–110)
POCT GLUCOSE: 77 MG/DL (ref 70–110)
POCT GLUCOSE: 79 MG/DL (ref 70–110)
POCT GLUCOSE: 80 MG/DL (ref 70–110)
POCT GLUCOSE: 87 MG/DL (ref 70–110)
POCT GLUCOSE: 96 MG/DL (ref 70–110)
POTASSIUM SERPL-SCNC: 4.1 MMOL/L (ref 3.5–5.1)
POTASSIUM SERPL-SCNC: 4.5 MMOL/L (ref 3.5–5.1)
PROT SERPL-MCNC: 6 GM/DL (ref 6.4–8.3)
PROT SERPL-MCNC: 6.8 GM/DL (ref 6.4–8.3)
PROT UR QL STRIP.AUTO: ABNORMAL
RBC # BLD AUTO: 3.31 X10(6)/MCL (ref 4.2–5.4)
RBC #/AREA URNS AUTO: ABNORMAL /HPF
RBC UR QL AUTO: NEGATIVE
SODIUM SERPL-SCNC: 136 MMOL/L (ref 136–145)
SODIUM SERPL-SCNC: 138 MMOL/L (ref 136–145)
SODIUM UR-SCNC: 65 MMOL/L
SP GR UR STRIP.AUTO: 1.01 (ref 1–1.03)
SQUAMOUS #/AREA URNS LPF: ABNORMAL /HPF
UROBILINOGEN UR STRIP-ACNC: NORMAL
WBC # SPEC AUTO: 5.86 X10(3)/MCL (ref 4.5–11.5)
WBC #/AREA URNS AUTO: ABNORMAL /HPF

## 2023-10-23 PROCEDURE — 83735 ASSAY OF MAGNESIUM: CPT

## 2023-10-23 PROCEDURE — 96360 HYDRATION IV INFUSION INIT: CPT

## 2023-10-23 PROCEDURE — 84681 ASSAY OF C-PEPTIDE: CPT | Performed by: STUDENT IN AN ORGANIZED HEALTH CARE EDUCATION/TRAINING PROGRAM

## 2023-10-23 PROCEDURE — 25000003 PHARM REV CODE 250

## 2023-10-23 PROCEDURE — S5010 5% DEXTROSE AND 0.45% SALINE: HCPCS

## 2023-10-23 PROCEDURE — 97162 PT EVAL MOD COMPLEX 30 MIN: CPT

## 2023-10-23 PROCEDURE — 81001 URINALYSIS AUTO W/SCOPE: CPT | Performed by: STUDENT IN AN ORGANIZED HEALTH CARE EDUCATION/TRAINING PROGRAM

## 2023-10-23 PROCEDURE — 84100 ASSAY OF PHOSPHORUS: CPT

## 2023-10-23 PROCEDURE — 87088 URINE BACTERIA CULTURE: CPT | Performed by: STUDENT IN AN ORGANIZED HEALTH CARE EDUCATION/TRAINING PROGRAM

## 2023-10-23 PROCEDURE — 25000003 PHARM REV CODE 250: Performed by: STUDENT IN AN ORGANIZED HEALTH CARE EDUCATION/TRAINING PROGRAM

## 2023-10-23 PROCEDURE — 63600175 PHARM REV CODE 636 W HCPCS: Performed by: STUDENT IN AN ORGANIZED HEALTH CARE EDUCATION/TRAINING PROGRAM

## 2023-10-23 PROCEDURE — 11000001 HC ACUTE MED/SURG PRIVATE ROOM

## 2023-10-23 PROCEDURE — 63600175 PHARM REV CODE 636 W HCPCS

## 2023-10-23 PROCEDURE — G0378 HOSPITAL OBSERVATION PER HR: HCPCS

## 2023-10-23 PROCEDURE — 96372 THER/PROPH/DIAG INJ SC/IM: CPT | Performed by: STUDENT IN AN ORGANIZED HEALTH CARE EDUCATION/TRAINING PROGRAM

## 2023-10-23 PROCEDURE — 82533 TOTAL CORTISOL: CPT | Performed by: STUDENT IN AN ORGANIZED HEALTH CARE EDUCATION/TRAINING PROGRAM

## 2023-10-23 PROCEDURE — 85025 COMPLETE CBC W/AUTO DIFF WBC: CPT

## 2023-10-23 PROCEDURE — 99285 EMERGENCY DEPT VISIT HI MDM: CPT

## 2023-10-23 PROCEDURE — 80053 COMPREHEN METABOLIC PANEL: CPT

## 2023-10-23 PROCEDURE — S5010 5% DEXTROSE AND 0.45% SALINE: HCPCS | Performed by: STUDENT IN AN ORGANIZED HEALTH CARE EDUCATION/TRAINING PROGRAM

## 2023-10-23 PROCEDURE — 82962 GLUCOSE BLOOD TEST: CPT

## 2023-10-23 PROCEDURE — 84300 ASSAY OF URINE SODIUM: CPT | Performed by: INTERNAL MEDICINE

## 2023-10-23 RX ORDER — DEXTROSE MONOHYDRATE AND SODIUM CHLORIDE 5; .45 G/100ML; G/100ML
INJECTION, SOLUTION INTRAVENOUS CONTINUOUS
Status: DISCONTINUED | OUTPATIENT
Start: 2023-10-23 | End: 2023-10-23

## 2023-10-23 RX ORDER — ENOXAPARIN SODIUM 100 MG/ML
40 INJECTION SUBCUTANEOUS EVERY 24 HOURS
Status: DISCONTINUED | OUTPATIENT
Start: 2023-10-23 | End: 2023-10-23

## 2023-10-23 RX ORDER — PANTOPRAZOLE SODIUM 40 MG/1
40 TABLET, DELAYED RELEASE ORAL DAILY
Status: DISCONTINUED | OUTPATIENT
Start: 2023-10-23 | End: 2023-10-26 | Stop reason: HOSPADM

## 2023-10-23 RX ORDER — SODIUM CHLORIDE 0.9 % (FLUSH) 0.9 %
10 SYRINGE (ML) INJECTION EVERY 12 HOURS PRN
Status: DISCONTINUED | OUTPATIENT
Start: 2023-10-23 | End: 2023-10-26 | Stop reason: HOSPADM

## 2023-10-23 RX ORDER — ESCITALOPRAM OXALATE 10 MG/1
10 TABLET ORAL DAILY
Status: DISCONTINUED | OUTPATIENT
Start: 2023-10-23 | End: 2023-10-25

## 2023-10-23 RX ORDER — ATORVASTATIN CALCIUM 10 MG/1
10 TABLET, FILM COATED ORAL DAILY
Status: DISCONTINUED | OUTPATIENT
Start: 2023-10-23 | End: 2023-10-26 | Stop reason: HOSPADM

## 2023-10-23 RX ORDER — INSULIN ASPART 100 [IU]/ML
0-5 INJECTION, SOLUTION INTRAVENOUS; SUBCUTANEOUS
Status: DISCONTINUED | OUTPATIENT
Start: 2023-10-23 | End: 2023-10-23

## 2023-10-23 RX ORDER — DEXTROSE MONOHYDRATE AND SODIUM CHLORIDE 5; .45 G/100ML; G/100ML
INJECTION, SOLUTION INTRAVENOUS CONTINUOUS
Status: ACTIVE | OUTPATIENT
Start: 2023-10-23 | End: 2023-10-23

## 2023-10-23 RX ORDER — IBUPROFEN 200 MG
24 TABLET ORAL
Status: DISCONTINUED | OUTPATIENT
Start: 2023-10-23 | End: 2023-10-26 | Stop reason: HOSPADM

## 2023-10-23 RX ORDER — DIPHENHYDRAMINE HCL 25 MG
25 CAPSULE ORAL NIGHTLY
Status: DISCONTINUED | OUTPATIENT
Start: 2023-10-23 | End: 2023-10-24

## 2023-10-23 RX ORDER — IBUPROFEN 200 MG
16 TABLET ORAL
Status: DISCONTINUED | OUTPATIENT
Start: 2023-10-23 | End: 2023-10-26 | Stop reason: HOSPADM

## 2023-10-23 RX ORDER — ACETAMINOPHEN 500 MG
1000 TABLET ORAL ONCE
Status: COMPLETED | OUTPATIENT
Start: 2023-10-23 | End: 2023-10-23

## 2023-10-23 RX ORDER — GLUCAGON 1 MG
1 KIT INJECTION
Status: DISCONTINUED | OUTPATIENT
Start: 2023-10-23 | End: 2023-10-26 | Stop reason: HOSPADM

## 2023-10-23 RX ORDER — SODIUM CHLORIDE, SODIUM LACTATE, POTASSIUM CHLORIDE, CALCIUM CHLORIDE 600; 310; 30; 20 MG/100ML; MG/100ML; MG/100ML; MG/100ML
INJECTION, SOLUTION INTRAVENOUS CONTINUOUS
Status: DISCONTINUED | OUTPATIENT
Start: 2023-10-23 | End: 2023-10-23

## 2023-10-23 RX ORDER — NALOXONE HCL 0.4 MG/ML
0.02 VIAL (ML) INJECTION
Status: DISCONTINUED | OUTPATIENT
Start: 2023-10-23 | End: 2023-10-26 | Stop reason: HOSPADM

## 2023-10-23 RX ORDER — LOSARTAN POTASSIUM 25 MG/1
25 TABLET ORAL DAILY
Status: DISCONTINUED | OUTPATIENT
Start: 2023-10-23 | End: 2023-10-26 | Stop reason: HOSPADM

## 2023-10-23 RX ORDER — INSULIN ASPART 100 [IU]/ML
0-5 INJECTION, SOLUTION INTRAVENOUS; SUBCUTANEOUS
Status: DISCONTINUED | OUTPATIENT
Start: 2023-10-23 | End: 2023-10-25

## 2023-10-23 RX ORDER — SULFAMETHOXAZOLE AND TRIMETHOPRIM 800; 160 MG/1; MG/1
2 TABLET ORAL DAILY
Status: DISCONTINUED | OUTPATIENT
Start: 2023-10-23 | End: 2023-10-25

## 2023-10-23 RX ORDER — SUCRALFATE 1 G/10ML
1 SUSPENSION ORAL
Status: DISCONTINUED | OUTPATIENT
Start: 2023-10-23 | End: 2023-10-26 | Stop reason: HOSPADM

## 2023-10-23 RX ORDER — MYCOPHENOLATE MOFETIL 250 MG/1
1500 CAPSULE ORAL 2 TIMES DAILY
Status: DISCONTINUED | OUTPATIENT
Start: 2023-10-23 | End: 2023-10-26 | Stop reason: HOSPADM

## 2023-10-23 RX ORDER — DEXTROSE MONOHYDRATE AND SODIUM CHLORIDE 5; .45 G/100ML; G/100ML
INJECTION, SOLUTION INTRAVENOUS CONTINUOUS
Status: DISCONTINUED | OUTPATIENT
Start: 2023-10-23 | End: 2023-10-24

## 2023-10-23 RX ADMIN — MYCOPHENOLATE MOFETIL 1500 MG: 250 CAPSULE ORAL at 09:10

## 2023-10-23 RX ADMIN — DEXTROSE AND SODIUM CHLORIDE: 5; 450 INJECTION, SOLUTION INTRAVENOUS at 01:10

## 2023-10-23 RX ADMIN — PREDNISONE 30 MG: 20 TABLET ORAL at 09:10

## 2023-10-23 RX ADMIN — PANTOPRAZOLE SODIUM 40 MG: 40 TABLET, DELAYED RELEASE ORAL at 09:10

## 2023-10-23 RX ADMIN — DEXTROSE AND SODIUM CHLORIDE: 5; 450 INJECTION, SOLUTION INTRAVENOUS at 11:10

## 2023-10-23 RX ADMIN — APIXABAN 5 MG: 2.5 TABLET, FILM COATED ORAL at 08:10

## 2023-10-23 RX ADMIN — INSULIN ASPART 2 UNITS: 100 INJECTION, SOLUTION INTRAVENOUS; SUBCUTANEOUS at 09:10

## 2023-10-23 RX ADMIN — ATORVASTATIN CALCIUM 10 MG: 10 TABLET, FILM COATED ORAL at 09:10

## 2023-10-23 RX ADMIN — ACETAMINOPHEN 1000 MG: 500 TABLET ORAL at 01:10

## 2023-10-23 RX ADMIN — SUCRALFATE 1 G: 1 SUSPENSION ORAL at 08:10

## 2023-10-23 RX ADMIN — MYCOPHENOLATE MOFETIL 1500 MG: 250 CAPSULE ORAL at 08:10

## 2023-10-23 RX ADMIN — DIPHENHYDRAMINE HYDROCHLORIDE 25 MG: 25 CAPSULE ORAL at 08:10

## 2023-10-23 RX ADMIN — SUCRALFATE 1 G: 1 SUSPENSION ORAL at 05:10

## 2023-10-23 RX ADMIN — ESCITALOPRAM OXALATE 10 MG: 10 TABLET ORAL at 09:10

## 2023-10-23 RX ADMIN — APIXABAN 5 MG: 2.5 TABLET, FILM COATED ORAL at 09:10

## 2023-10-23 RX ADMIN — SUCRALFATE 1 G: 1 SUSPENSION ORAL at 11:10

## 2023-10-23 NOTE — CARE UPDATE
[facility] Medicine Wards   Progress Note     Resident Team: [facility] Medicine List 4  Attending Physician: Jean Paul Coyle MD  Resident: Brennon  Intern: Frank     Subjective:      Brief HPI:  Vi Ulrich is a 57 y.o. female who  has a past medical history of Arthritis, Asthma, Atrial fibrillation, Chronic constipation, Diabetes mellitus, Encounter for blood transfusion, GERD (gastroesophageal reflux disease), Gout, Hypertension, Lupus, Renal disorder, and SLE (systemic lupus erythematosus).  She presented to [facility] on 10/22/2023  with a primary complaint of hypoglycemia with weakness and fatigue. Patient states that she had a glucose of 159 in the evening and administered 20 units of Lantus along with 10 units of novolog. Started feeling weak and having headache and rechecked sugar and was low in 20s and came to ER.  Of note, patient had recent hospitalization for lupus flare and discharge summary states patient was on 80 units nightly but had hypoglycemic episodes in hospital and was discharged on 10 units long acting BID. In speaking with patient tonight, she states that she has had diabetes for many years and has only ever been on a sliding scale at home until the recent hospitalization where she was started on long acting for the first time. Patient states her glucose has been in 60s-80s ever since starting the long acting in the morning. States she has had decreased appetite for many month as well with due to being sick from lupus flares. Is still taking the prednisone 30 mg daily she was prescribed at discharge. Denies nausea, vomiting, blurry vision, urinary issues, chest pain, SOB.     In ED, afebrile with BP low 100s/60s. CBG 40 and received apple juice and D10 bolus with repeat <20 and then increased to 216 but continued to trend down and received second bolus with  as patient was being interviewed. Cr bump of 1.5. Internal medicine consulted for further control of hypoglycemia.     Interval History:   No  "acute events since admission. Patient reports her body is "shaking," but  during rounds. Complains of diaphoresis but room is warm, will set to a more comfortable temperature.       Review of Systems:  Pertinent ROS negative unless otherwise stated above.       Objective:     Vital Signs (Most Recent):  Temp: 97.3 °F (36.3 °C) (10/23/23 0813)  Pulse: 74 (10/23/23 1227)  Resp: 18 (10/23/23 0328)  BP: 113/74 (10/23/23 1227)  SpO2: 99 % (10/23/23 1227) Vital Signs (24h Range):  Temp:  [97.3 °F (36.3 °C)-98.1 °F (36.7 °C)] 97.3 °F (36.3 °C)  Pulse:  [60-82] 74  Resp:  [14-18] 18  SpO2:  [97 %-100 %] 99 %  BP: (101-127)/(49-75) 113/74       Physical Examination:  General: No acute distress   HEENT: Head-normocephalic and atraumatic  Respiratory: clear to auscultation bilaterally without wheezes, rales, rhonchi  Cardiovascular: regular rate and rhythm without murmurs.    Gastrointestinal: soft, non-tender, non-distended   Musculoskeletal: no lower extremity edema  Integumentary: no rashes or skin lesions present  Neurologic: alert and oriented, no signs of peripheral neurological deficit, motor/sensory function intact  Psychiatric: cognitive function intact, cooperative with exam      Laboratory:  Trended Lab Data:  Recent Labs   Lab 10/22/23  2150 10/22/23  2317 10/23/23  0428   WBC 7.13  --  5.86   HGB 11.8*  --  9.6*   HCT 35.8*  --  29.9*   *  --  356   MCV 92.0  --  90.3   RDW 16.3  --  16.0   NA  --  138 136   K  --  4.1 4.5   CO2  --  18* 18*   BUN  --  39.0* 37.9*   CREATININE  --  1.53* 1.63*   ALBUMIN  --  3.0* 2.6*   BILITOT  --  0.1 0.2   AST  --  12 11   ALKPHOS  --  92 81   ALT  --  8 9         Other Results:  EKG:  Results for orders placed or performed during the hospital encounter of 10/22/23   EKG 12-lead    Collection Time: 10/22/23  9:33 PM    Narrative    Test Reason : R00.2,    Vent. Rate : 077 BPM     Atrial Rate : 077 BPM     P-R Int : 160 ms          QRS Dur : 098 ms      QT Int : " 340 ms       P-R-T Axes : 063 032 055 degrees     QTc Int : 384 ms    Normal sinus rhythm  Possible Left atrial enlargement  Nonspecific T wave abnormality  Abnormal ECG    Confirmed by Neeraj Moore MD ([id number]) on 10/23/2023 1:11:01 PM    Referred By: KAL   SELF           Confirmed By:Neeraj Moore MD       Radiology:  US Retroperitoneal Limited    Result Date: 10/23/2023  No significant sonographic abnormality of the kidneys. Electronically signed by: Jose Manuel Henao Date:    10/23/2023 Time:    12:25         Intake/Output Summary (Last 24 hours) at 10/23/2023 1403  Last data filed at 10/23/2023 0240  Gross per 24 hour   Intake 491.24 ml   Output --   Net 491.24 ml         Assessment & Plan:     T2DM  Hypoglycemia - likely iatrogenic  -most recent A1c 5.4 (10/23)  -on 10 units BID lantus and sliding scale novolog with decreased CBGS at home per patient  -glucose unstable in ED continuing to fluctuate   -received 2 D10 boluses   -started on D5 1/2 NS continuous infusion  -frequent glucose checks every 30 minutes in ED  -diabetic diet   -hold all insulin  -follow up AM cortisol     STIVEN  DM vs lupus nephritis  -Cr elevated 1.5 on arrival baseline around 1.1  -suspect prerenal due to decreased PO intake   -on IV 1/2 ns continuous   -Obtain UA and Renal US  -Consult Nephrology  -Consider inpatient renal biopsy     DVT  -continue Eliquis 5 mg BID      HTN  -normotensive on arrival  -continue home losartan 25 mg daily      SLE  -continue prednisone 30 mg daily until 10/28/23 and bactrim prophylaxis   -continue home cellcept   -followed by    -Anticardiolipin antibodies positive  -Consult Rheumatology for guidance on anticoagulation, i.e. starting warfarin inpatient     HLD  -continue home atorvastatin 10 mg daily         CODE STATUS: Full Code   Access: PIV  Antibiotics: Bactrim prophy  Diet: Diet diabetic  DVT Prophylaxis: Eliquis  GI Prophylaxis: protonix  Fluids: D5 1/2  cc/hr      Disposition:  Vi Ulrich is a 57 y.o. female who is admitted for hypoglycemia, likely iatrogenic. Follow up Nephrology recommendations in setting of STIVEN given underlying DM vs lupus nephritis. Follow up rheumatology recs for anticoagulation given positive anticardiolipin antibodies. Consider inpatient renal biopsy.       Zabrina Willett MD  [facility] Internal Medicine, -2  10/23/2023

## 2023-10-23 NOTE — PLAN OF CARE
Problem: Violence Risk or Actual  Goal: Anger and Impulse Control  Outcome: Ongoing, Progressing     Problem: Adult Inpatient Plan of Care  Goal: Plan of Care Review  Outcome: Ongoing, Progressing  Goal: Patient-Specific Goal (Individualized)  Outcome: Ongoing, Progressing  Goal: Absence of Hospital-Acquired Illness or Injury  Outcome: Ongoing, Progressing  Goal: Optimal Comfort and Wellbeing  Outcome: Ongoing, Progressing  Goal: Readiness for Transition of Care  Outcome: Ongoing, Progressing     Problem: Diabetes Comorbidity  Goal: Blood Glucose Level Within Targeted Range  Outcome: Ongoing, Progressing     Problem: Fluid and Electrolyte Imbalance (Acute Kidney Injury/Impairment)  Goal: Fluid and Electrolyte Balance  Outcome: Ongoing, Progressing     Problem: Oral Intake Inadequate (Acute Kidney Injury/Impairment)  Goal: Optimal Nutrition Intake  Outcome: Ongoing, Progressing     Problem: Renal Function Impairment (Acute Kidney Injury/Impairment)  Goal: Effective Renal Function  Outcome: Ongoing, Progressing

## 2023-10-23 NOTE — CONSULTS
"    Nephrology Consult Note    Chief Complaint:    Chief Complaint   Patient presents with    Hypoglycemia     Pt c/o "low blood sugar" after taking insulin. Pt also reports palpations       Reason for Consult:  STIVEN    HPI:   Vi Ulrich is a 57 y.o. year old female with PMH of  Arthritis, Asthma, Atrial fibrillation, Chronic constipation, Diabetes mellitus, Encounter for blood transfusion, GERD (gastroesophageal reflux disease), Gout, Hypertension, Lupus/lupus nephritis, diabetic nephropathy.  She was admitted to the hospital for hyperglycemia.  Nephrology is being consulted for STIVEN on CKD.        PMH:   Past Medical History:   Diagnosis Date    Arthritis     knees    Asthma     Atrial fibrillation     Chronic constipation     Diabetes mellitus     Encounter for blood transfusion 10/2014    GERD (gastroesophageal reflux disease)     Gout     Hypertension     Lupus 2004    SLE    Renal disorder     SLE (systemic lupus erythematosus)      PSH:   Past Surgical History:   Procedure Laterality Date    APPENDECTOMY      CHOLECYSTECTOMY      COLONOSCOPY N/A 4/19/2018    Procedure: COLONOSCOPY;  Surgeon: Minerva Porras MD;  Location: [facility];  Service: Endoscopy;  Laterality: N/A;    ESOPHAGOGASTRODUODENOSCOPY N/A 4/19/2018    Procedure: ESOPHAGOGASTRODUODENOSCOPY (EGD);  Surgeon: Minerva Porras MD;  Location: [place];  Service: Endoscopy;  Laterality: N/A;    HYSTERECTOMY      JOINT REPLACEMENT Left 08/07/2015    Knee    LYMPH NODE BIOPSY Left 2014    MASTECTOMY      Left arm- NO BP    PARTIAL HYSTERECTOMY       FH:  Family History   Problem Relation Age of Onset    Heart block Mother     Arthritis Mother     Asthma Mother     Diabetes Mother     Heart disease Father     Diabetes Sister     Heart disease Sister     Kidney disease Sister      SH:   Social History     Socioeconomic History    Marital status: Single    Years of education: 10th   Tobacco Use    Smoking status: Former     Current " packs/day: 0.00     Types: Cigarettes     Start date: 1980     Quit date: 2022     Years since quittin.4    Smokeless tobacco: Never   Substance and Sexual Activity    Alcohol use: No    Drug use: No    Sexual activity: Not Currently     Birth control/protection: See Surgical Hx     Social Determinants of Health     Financial Resource Strain: Low Risk  (2023)    Overall Financial Resource Strain (CARDIA)     Difficulty of Paying Living Expenses: Not hard at all   Food Insecurity: No Food Insecurity (2023)    Hunger Vital Sign     Worried About Running Out of Food in the Last Year: Never true     Ran Out of Food in the Last Year: Never true   Transportation Needs: No Transportation Needs (2023)    PRAPARE - Transportation     Lack of Transportation (Medical): No     Lack of Transportation (Non-Medical): No   Physical Activity: Inactive (2023)    Exercise Vital Sign     Days of Exercise per Week: 0 days     Minutes of Exercise per Session: 0 min   Stress: No Stress Concern Present (2023)    [place] [place] [employer] - Occupational Stress Questionnaire     Feeling of Stress : Not at all   Social Connections: Socially Isolated (2023)    Social Connection and Isolation Panel [NHANES]     Frequency of Communication with Friends and Family: More than three times a week     Frequency of Social Gatherings with Friends and Family: Never     Attends [employer] Services: Never     Active Member of Clubs or Organizations: No     Attends Club or Organization Meetings: Never     Marital Status: Never    Housing Stability: Low Risk  (2023)    Housing Stability Vital Sign     Unable to Pay for Housing in the Last Year: No     Number of Places Lived in the Last Year: 1     Unstable Housing in the Last Year: No     Allergies:   Review of patient's allergies indicates:   Allergen Reactions    Ketorolac (pf) Swelling    Mycophenolate mofetil Swelling    Penicillins  Hives    Percocet [oxycodone-acetaminophen] Hives and Itching    Tramadol Hives      Medications:   Scheduled Meds:   apixaban  5 mg Oral BID    atorvastatin  10 mg Oral Daily    diphenhydrAMINE  25 mg Oral QHS    EScitalopram oxalate  10 mg Oral Daily    losartan  25 mg Oral Daily    mycophenolate  1,500 mg Oral BID    pantoprazole  40 mg Oral Daily    predniSONE  30 mg Oral Daily    sucralfate  1 g Oral QID (AC & HS)    sulfamethoxazole-trimethoprim 800-160mg  2 tablet Oral Daily      PRN Meds: dextrose 10%, dextrose 10%, dextrose 10%, dextrose 10%, glucagon (human recombinant), glucose, glucose, naloxone, sodium chloride 0.9%  Continuous Infusions:   dextrose 5 % and 0.45 % NaCl 100 mL/hr at 10/23/23 1132        Review of Systems   Constitutional:  Negative for chills and fever.   Respiratory:  Negative for shortness of breath.    Cardiovascular:  Negative for chest pain and leg swelling.   Gastrointestinal:  Negative for abdominal pain, nausea and vomiting.   Genitourinary:  Negative for dysuria.   Musculoskeletal:  Negative for myalgias.       Physical Exam  Vitals:    10/23/23 1227   BP: 113/74   Pulse: 74   Resp:    Temp:      Physical Exam  Vitals and nursing note reviewed.   Constitutional:       General: She is not in acute distress.     Appearance: Normal appearance. She is normal weight. She is not ill-appearing or toxic-appearing.   HENT:      Head: Normocephalic and atraumatic.      Mouth/Throat:      Mouth: Mucous membranes are moist.   Eyes:      Extraocular Movements: Extraocular movements intact.      Conjunctiva/sclera: Conjunctivae normal.      Pupils: Pupils are equal, round, and reactive to light.   Cardiovascular:      Rate and Rhythm: Normal rate and regular rhythm.      Pulses: Normal pulses.      Heart sounds: No murmur heard.  Pulmonary:      Effort: Pulmonary effort is normal. No respiratory distress.   Musculoskeletal:         General: No swelling.      Right lower leg: No edema.       Left lower leg: No edema.   Skin:     General: Skin is warm.   Neurological:      General: No focal deficit present.      Mental Status: She is alert and oriented to person, place, and time.         Labs:  Recent Results (from the past 24 hour(s))   POCT glucose    Collection Time: 10/22/23  9:26 PM   Result Value Ref Range    POCT Glucose 40 (LL) 70 - 110 mg/dL   CBC with Differential    Collection Time: 10/22/23  9:50 PM   Result Value Ref Range    WBC 7.13 4.50 - 11.50 x10(3)/mcL    RBC 3.89 (L) 4.20 - 5.40 x10(6)/mcL    Hgb 11.8 (L) 12.0 - 16.0 g/dL    Hct 35.8 (L) 37.0 - 47.0 %    MCV 92.0 80.0 - 94.0 fL    MCH 30.3 27.0 - 31.0 pg    MCHC 33.0 33.0 - 36.0 g/dL    RDW 16.3 11.5 - 17.0 %    Platelet 406 (H) 130 - 400 x10(3)/mcL    MPV 10.4 7.4 - 10.4 fL    Neut % 57.6 %    Lymph % 34.6 %    Mono % 5.8 %    Eos % 1.1 %    Basophil % 0.3 %    Lymph # 2.47 0.6 - 4.6 x10(3)/mcL    Neut # 4.11 2.1 - 9.2 x10(3)/mcL    Mono # 0.41 0.1 - 1.3 x10(3)/mcL    Eos # 0.08 0 - 0.9 x10(3)/mcL    Baso # 0.02 <=0.2 x10(3)/mcL    IG# 0.04 0 - 0.04 x10(3)/mcL    IG% 0.6 %    NRBC% 0.0 %   POCT glucose    Collection Time: 10/22/23 10:14 PM   Result Value Ref Range    POCT Glucose <20 (L) 70 - 110 mg/dL   POCT glucose    Collection Time: 10/22/23 10:34 PM   Result Value Ref Range    POCT Glucose 216 (H) 70 - 110 mg/dL   POCT glucose    Collection Time: 10/22/23 11:07 PM   Result Value Ref Range    POCT Glucose 94 70 - 110 mg/dL   Comprehensive metabolic panel    Collection Time: 10/22/23 11:17 PM   Result Value Ref Range    Sodium Level 138 136 - 145 mmol/L    Potassium Level 4.1 3.5 - 5.1 mmol/L    Chloride 110 (H) 98 - 107 mmol/L    Carbon Dioxide 18 (L) 22 - 29 mmol/L    Glucose Level 67 (L) 74 - 100 mg/dL    Blood Urea Nitrogen 39.0 (H) 9.8 - 20.1 mg/dL    Creatinine 1.53 (H) 0.55 - 1.02 mg/dL    Calcium Level Total 8.7 8.4 - 10.2 mg/dL    Protein Total 6.8 6.4 - 8.3 gm/dL    Albumin Level 3.0 (L) 3.5 - 5.0 g/dL    Globulin 3.8  (H) 2.4 - 3.5 gm/dL    Albumin/Globulin Ratio 0.8 (L) 1.1 - 2.0 ratio    Bilirubin Total 0.1 <=1.5 mg/dL    Alkaline Phosphatase 92 40 - 150 unit/L    Alanine Aminotransferase 8 0 - 55 unit/L    Aspartate Aminotransferase 12 5 - 34 unit/L    eGFR 40 mls/min/1.73/m2   Magnesium    Collection Time: 10/22/23 11:17 PM   Result Value Ref Range    Magnesium Level 2.10 1.60 - 2.60 mg/dL   POCT glucose    Collection Time: 10/22/23 11:44 PM   Result Value Ref Range    POCT Glucose 60 (L) 70 - 110 mg/dL   POCT glucose    Collection Time: 10/23/23 12:12 AM   Result Value Ref Range    POCT Glucose 116 (H) 70 - 110 mg/dL   POCT glucose    Collection Time: 10/23/23 12:48 AM   Result Value Ref Range    POCT Glucose 80 70 - 110 mg/dL   POCT glucose    Collection Time: 10/23/23  1:39 AM   Result Value Ref Range    POCT Glucose 77 70 - 110 mg/dL   POCT glucose    Collection Time: 10/23/23  2:36 AM   Result Value Ref Range    POCT Glucose 76 70 - 110 mg/dL   POCT glucose    Collection Time: 10/23/23  3:15 AM   Result Value Ref Range    POCT Glucose 87 70 - 110 mg/dL   POCT glucose    Collection Time: 10/23/23  3:39 AM   Result Value Ref Range    POCT Glucose 79 70 - 110 mg/dL   Comprehensive Metabolic Panel (CMP)    Collection Time: 10/23/23  4:28 AM   Result Value Ref Range    Sodium Level 136 136 - 145 mmol/L    Potassium Level 4.5 3.5 - 5.1 mmol/L    Chloride 111 (H) 98 - 107 mmol/L    Carbon Dioxide 18 (L) 22 - 29 mmol/L    Glucose Level 83 74 - 100 mg/dL    Blood Urea Nitrogen 37.9 (H) 9.8 - 20.1 mg/dL    Creatinine 1.63 (H) 0.55 - 1.02 mg/dL    Calcium Level Total 8.4 8.4 - 10.2 mg/dL    Protein Total 6.0 (L) 6.4 - 8.3 gm/dL    Albumin Level 2.6 (L) 3.5 - 5.0 g/dL    Globulin 3.4 2.4 - 3.5 gm/dL    Albumin/Globulin Ratio 0.8 (L) 1.1 - 2.0 ratio    Bilirubin Total 0.2 <=1.5 mg/dL    Alkaline Phosphatase 81 40 - 150 unit/L    Alanine Aminotransferase 9 0 - 55 unit/L    Aspartate Aminotransferase 11 5 - 34 unit/L    eGFR 37  mls/min/1.73/m2   Magnesium    Collection Time: 10/23/23  4:28 AM   Result Value Ref Range    Magnesium Level 2.10 1.60 - 2.60 mg/dL   Phosphorus    Collection Time: 10/23/23  4:28 AM   Result Value Ref Range    Phosphorus Level 4.4 2.3 - 4.7 mg/dL   CBC with Differential    Collection Time: 10/23/23  4:28 AM   Result Value Ref Range    WBC 5.86 4.50 - 11.50 x10(3)/mcL    RBC 3.31 (L) 4.20 - 5.40 x10(6)/mcL    Hgb 9.6 (L) 12.0 - 16.0 g/dL    Hct 29.9 (L) 37.0 - 47.0 %    MCV 90.3 80.0 - 94.0 fL    MCH 29.0 27.0 - 31.0 pg    MCHC 32.1 (L) 33.0 - 36.0 g/dL    RDW 16.0 11.5 - 17.0 %    Platelet 356 130 - 400 x10(3)/mcL    MPV 10.3 7.4 - 10.4 fL    Neut % 57.2 %    Lymph % 34.5 %    Mono % 6.1 %    Eos % 1.2 %    Basophil % 0.5 %    Lymph # 2.02 0.6 - 4.6 x10(3)/mcL    Neut # 3.35 2.1 - 9.2 x10(3)/mcL    Mono # 0.36 0.1 - 1.3 x10(3)/mcL    Eos # 0.07 0 - 0.9 x10(3)/mcL    Baso # 0.03 <=0.2 x10(3)/mcL    IG# 0.03 0 - 0.04 x10(3)/mcL    IG% 0.5 %    NRBC% 0.0 %   POCT glucose    Collection Time: 10/23/23  5:49 AM   Result Value Ref Range    POCT Glucose 72 70 - 110 mg/dL   POCT glucose    Collection Time: 10/23/23  8:17 AM   Result Value Ref Range    POCT Glucose 68 (L) 70 - 110 mg/dL   POCT glucose    Collection Time: 10/23/23  9:26 AM   Result Value Ref Range    POCT Glucose 96 70 - 110 mg/dL   POCT glucose    Collection Time: 10/23/23 10:43 AM   Result Value Ref Range    POCT Glucose 129 (H) 70 - 110 mg/dL   POCT glucose    Collection Time: 10/23/23 12:05 PM   Result Value Ref Range    POCT Glucose 126 (H) 70 - 110 mg/dL       Imaging:  None in past 24h     Assessment:  CKD  Lupus nephritis   Diabetic nephropathy  Anemia of chronic disease  Type 2 diabetes mellitus  Paroxysmal atrial fibrillation on Eliquis  Hypertension  Right lower extremity DVT    Plan:  Would recommend volume resuscitation with D5 LR  We will get urine sodium  Also appears that patient had positive anticardiolipin antibody come back positive and  should be on warfarin but remains on Eliquis will defer this to primary team to sort    Staff: Dr. Onel Saba, DO  Internal Medicine - PGY-3

## 2023-10-23 NOTE — ED PROVIDER NOTES
"Encounter Date: 10/22/2023       History     Chief Complaint   Patient presents with    Hypoglycemia     Pt c/o "low blood sugar" after taking insulin. Pt also reports palpations      Patient presents to the emergency department due to hypoglycemia.  She states ever since discharge from the hospital the blood sugars when she wakes in the morning or in the 40s to 50s.  States tonight she took 10 for short-acting and 20 of her Lantus it was then started to have palpitations and headache.  She checked her blood sugar and it was around 20, she was some candy and then came to the ER.    The history is provided by the patient.     Review of patient's allergies indicates:   Allergen Reactions    Ketorolac (pf) Swelling    Mycophenolate mofetil Swelling    Penicillins Hives    Percocet [oxycodone-acetaminophen] Hives and Itching    Tramadol Hives     Past Medical History:   Diagnosis Date    Arthritis     knees    Asthma     Atrial fibrillation     Chronic constipation     Diabetes mellitus     Encounter for blood transfusion 10/2014    GERD (gastroesophageal reflux disease)     Gout     Hypertension     Lupus 2004    SLE    Renal disorder     SLE (systemic lupus erythematosus)      Past Surgical History:   Procedure Laterality Date    APPENDECTOMY      CHOLECYSTECTOMY      COLONOSCOPY N/A 04/19/2018    Procedure: COLONOSCOPY;  Surgeon: Minerva Porras MD;  Location: [facility];  Service: Endoscopy;  Laterality: N/A;    ESOPHAGOGASTRODUODENOSCOPY N/A 04/19/2018    Procedure: ESOPHAGOGASTRODUODENOSCOPY (EGD);  Surgeon: Minerva Porras MD;  Location: [facility];  Service: Endoscopy;  Laterality: N/A;    HYSTERECTOMY      JOINT REPLACEMENT Right 08/07/2015    Knee    LYMPH NODE BIOPSY Left 2014    MASTECTOMY      Left arm- NO BP    PARTIAL HYSTERECTOMY       Family History   Problem Relation Age of Onset    Heart block Mother     Arthritis Mother     Asthma Mother     Diabetes Mother     Heart disease Father     " Diabetes Sister     Heart disease Sister     Kidney disease Sister      Social History     Tobacco Use    Smoking status: Former     Current packs/day: 0.00     Types: Cigarettes     Start date: 1980     Quit date: 2022     Years since quittin.5    Smokeless tobacco: Never   Substance Use Topics    Alcohol use: No    Drug use: No     Review of Systems   Constitutional:  Positive for fatigue. Negative for chills and fever.   HENT:  Negative for congestion and sore throat.    Respiratory:  Negative for cough and shortness of breath.    Cardiovascular:  Positive for palpitations. Negative for chest pain.   Gastrointestinal:  Negative for abdominal pain and nausea.   Genitourinary:  Negative for dysuria and hematuria.   Musculoskeletal:  Negative for arthralgias and myalgias.   Neurological:  Positive for dizziness and headaches. Negative for weakness.       Physical Exam     Initial Vitals [10/22/23 2126]   BP Pulse Resp Temp SpO2   124/71 82 18 98.1 °F (36.7 °C) 100 %      MAP       --         Physical Exam    Nursing note and vitals reviewed.  Constitutional: She appears well-developed and well-nourished.   HENT:   Head: Normocephalic and atraumatic.   Eyes: EOM are normal. Pupils are equal, round, and reactive to light.   Neck: Neck supple.   Normal range of motion.  Cardiovascular:  Normal rate and regular rhythm.           Pulmonary/Chest: Breath sounds normal. No respiratory distress.   Abdominal: Abdomen is soft. There is no abdominal tenderness.   Musculoskeletal:         General: No edema. Normal range of motion.      Cervical back: Normal range of motion and neck supple.     Neurological: She is alert and oriented to person, place, and time.   Skin: Skin is warm and dry.         ED Course   Critical Care    Date/Time: 2023 11:03 AM    Performed by: Frederic Azar MD  Authorized by: Jean Paul Coyle MD  Direct patient critical care time: 14 minutes  Additional history critical care  time: 5 minutes  Ordering / reviewing critical care time: 6 minutes  Documentation critical care time: 5 minutes  Consulting other physicians critical care time: 5 minutes  Consult with family critical care time: 0 minutes  Other critical care time: 0 minutes  Total critical care time (exclusive of procedural time) : 35 minutes  Critical care time was exclusive of separately billable procedures and treating other patients and teaching time.  Critical care was necessary to treat or prevent imminent or life-threatening deterioration of the following conditions: endocrine crisis.  Critical care was time spent personally by me on the following activities: blood draw for specimens, development of treatment plan with patient or surrogate, discussions with consultants, evaluation of patient's response to treatment, interpretation of cardiac output measurements, examination of patient, obtaining history from patient or surrogate, ordering and performing treatments and interventions, ordering and review of laboratory studies, pulse oximetry, re-evaluation of patient's condition and review of old charts.        Labs Reviewed   COMPREHENSIVE METABOLIC PANEL - Abnormal; Notable for the following components:       Result Value    Chloride 110 (*)     Carbon Dioxide 18 (*)     Glucose Level 67 (*)     Blood Urea Nitrogen 39.0 (*)     Creatinine 1.53 (*)     Albumin Level 3.0 (*)     Globulin 3.8 (*)     Albumin/Globulin Ratio 0.8 (*)     All other components within normal limits   CBC WITH DIFFERENTIAL - Abnormal; Notable for the following components:    RBC 3.89 (*)     Hgb 11.8 (*)     Hct 35.8 (*)     Platelet 406 (*)     All other components within normal limits   POCT GLUCOSE - Abnormal; Notable for the following components:    POCT Glucose 40 (*)     All other components within normal limits   POCT GLUCOSE - Abnormal; Notable for the following components:    POCT Glucose <20 (*)     All other components within normal limits    POCT GLUCOSE - Abnormal; Notable for the following components:    POCT Glucose 216 (*)     All other components within normal limits   POCT GLUCOSE - Abnormal; Notable for the following components:    POCT Glucose 60 (*)     All other components within normal limits   POCT GLUCOSE - Abnormal; Notable for the following components:    POCT Glucose 116 (*)     All other components within normal limits   MAGNESIUM - Normal   CBC W/ AUTO DIFFERENTIAL    Narrative:     The following orders were created for panel order CBC auto differential.  Procedure                               Abnormality         Status                     ---------                               -----------         ------                     CBC with Differential[[id number]]       Abnormal            Final result                 Please view results for these tests on the individual orders.   POCT GLUCOSE   POCT GLUCOSE   POCT GLUCOSE   POCT GLUCOSE   POCT GLUCOSE     EKG Readings: (Independently Interpreted)   Initial Reading: No STEMI. Rhythm: Normal Sinus Rhythm. Heart Rate: 77. Ectopy: No Ectopy. Conduction: Normal. Axis: Normal.     ECG Results              EKG 12-lead (Final result)  Result time 10/23/23 13:11:08      Final result by Interface, Lab In [facility] (10/23/23 13:11:08)                   Narrative:    Test Reason : R00.2,    Vent. Rate : 077 BPM     Atrial Rate : 077 BPM     P-R Int : 160 ms          QRS Dur : 098 ms      QT Int : 340 ms       P-R-T Axes : 063 032 055 degrees     QTc Int : 384 ms    Normal sinus rhythm  Possible Left atrial enlargement  Nonspecific T wave abnormality  Abnormal ECG    Confirmed by Neeraj Moore MD ([id number]) on 10/23/2023 1:11:01 PM    Referred By: [email or web address]   SELF           Confirmed By:Neeraj Moore MD                                     EKG 12-LEAD (Final result)  Result time 10/27/23 14:54:54      Final result by Unknown User (10/27/23 14:54:54)                                      Imaging Results     None          Medications   dextrose 5 % and 0.45 % NaCl infusion ( Intravenous Verify Only 10/23/23 0240)   dextrose 5 % and 0.45 % NaCl infusion ( Intravenous New Bag 10/23/23 1132)   acetaminophen tablet 1,000 mg (1,000 mg Oral Given 10/23/23 0125)   sodium zirconium cyclosilicate packet 10 g (10 g Oral Given 10/24/23 0843)   diphenhydrAMINE injection 25 mg (25 mg Intravenous Given 10/24/23 2038)   sodium zirconium cyclosilicate packet 10 g (10 g Oral Given 10/24/23 2243)   albuterol nebulizer solution 10 mg (10 mg Nebulization Given 10/24/23 2306)     Medical Decision Making  Vital signs are stable.  Hypoglycemic, and given a meal tray and IV dextrose.  Patient had multiple drops while in the ER needing multiple doses of dextrose, we will place on a drip and admit to the Internal Medicine team for further management.    Amount and/or Complexity of Data Reviewed  Labs: ordered. Decision-making details documented in ED Course.    Risk  Decision regarding hospitalization.               ED Course as of 11/30/23 1102   Sun Oct 22, 2023   2311 POCT glucose [AW]      ED Course User Index  [AW] Frederic Azar MD                    Clinical Impression:   Final diagnoses:  [R00.2] Palpitations  [E16.2] Hypoglycemia (Primary)        ED Disposition Condition    Observation                 Frederic Azar MD  11/30/23 1103

## 2023-10-23 NOTE — H&P
"                                  [facility] History and Physical     Patient Name: Vi Ulrich  MRN: [id number]  Admission Date: 10/22/2023  Hospital Length of Stay: 0 days  Code Status: Full Code  Attending Provider: Syeda Altman MD  Primary Care Provider: Octaviano Barrios MD     Chief Complaint:   Hypoglycemia (Pt c/o "low blood sugar" after taking insulin. Pt also reports palpations )      Subjective:      Brief HPI:  Vi Ulrich is a 57 y.o. female who  has a past medical history of Arthritis, Asthma, Atrial fibrillation, Chronic constipation, Diabetes mellitus, Encounter for blood transfusion, GERD (gastroesophageal reflux disease), Gout, Hypertension, Lupus, Renal disorder, and SLE (systemic lupus erythematosus).  She presented to [facility] on 10/22/2023  with a primary complaint of hypoglycemia with weakness and fatigue. Patient states that she had a glucose of 159 in the evening and administered 20 units of Lantus along with 10 units of novolog. Started feeling weak and having headache and rechecked sugar and was low in 20s and came to ER.  Of note, patient had recent hospitalization for lupus flare and discharge summary states patient was on 80 units nightly but had hypoglycemic episodes in hospital and was discharged on 10 units long acting BID. In speaking with patient tonight, she states that she has had diabetes for many years and has only ever been on a sliding scale at home until the recent hospitalization where she was started on long acting for the first time. Patient states her glucose has been in 60s-80s ever since starting the long acting in the morning. States she has had decreased appetite for many month as well with due to being sick from lupus flares. Is still taking the prednisone 30 mg daily she was prescribed at discharge. Denies nausea, vomiting, blurry vision, urinary issues, chest pain, SOB.    In ED, afebrile with BP low 100s/60s. CBG 40 and received apple juice and D10 bolus with repeat <20 " and then increased to 216 but continued to trend down and received second bolus with  as patient was being interviewed. Cr bump of 1.5. Internal medicine consulted for further control of hypoglycemia.     Patient family history includes Arthritis in her mother; Asthma in her mother; Diabetes in her mother and sister; Heart block in her mother; Heart disease in her father and sister; Kidney disease in her sister.       Patient  has a past surgical history that includes Cholecystectomy; Appendectomy; Partial hysterectomy; Lymph node biopsy (Left, 2014); Hysterectomy; Joint replacement (Left, 08/07/2015); Mastectomy; Esophagogastroduodenoscopy (N/A, 4/19/2018); and Colonoscopy (N/A, 4/19/2018).    Patient is allergic to ketorolac (pf), mycophenolate mofetil, penicillins, percocet [oxycodone-acetaminophen], and tramadol.    Patient  reports that she quit smoking about 17 months ago. Her smoking use included cigarettes. She started smoking about 42 years ago. She has never used smokeless tobacco. She reports that she does not drink alcohol and does not use drugs.     Current Outpatient Medications   Medication Instructions    albuterol (ACCUNEB) 0.63 mg, Nebulization, Every 4 hours PRN, Rescue    albuterol sulfate (PROAIR RESPICLICK) 90 mcg/actuation inhaler 1 puff, Inhalation, Every 4 hours PRN, Rescue     apixaban (ELIQUIS) 5 mg, Oral, 2 times daily    atorvastatin (LIPITOR) 10 mg, Oral, Daily    blood sugar diagnostic (ONETOUCH VERIO) Strp 1 each, Misc.(Non-Drug; Combo Route), 4 times daily    blood sugar diagnostic Strp 1 each, Misc.(Non-Drug; Combo Route), 4 times daily    blood sugar diagnostic Strp 1 strip, Misc.(Non-Drug; Combo Route), 4 times daily with meals & nightly    blood-glucose meter kit Use as instructed    eletriptan (RELPAX) 40 mg, Oral, As needed (PRN), may repeat in 2 hours if necessary    ergocalciferol (ERGOCALCIFEROL) 50,000 Units, Oral, Every 7 days    EScitalopram oxalate (LEXAPRO) 10  mg, Oral, Daily    HYDROcodone-acetaminophen (NORCO)  mg per tablet 1 tablet, Oral, Every 6 hours PRN    insulin (LANTUS SOLOSTAR U-100 INSULIN) 10 Units, Subcutaneous, 2 times daily    lancets 28 gauge Misc 100 lancets, Misc.(Non-Drug; Combo Route), 4 times daily with meals & nightly    losartan (COZAAR) 25 mg, Oral, Daily    LUPKYNIS 23.7 mg, Oral, Daily    multivitamin Tab 1 tablet, Oral, Daily    mycophenolate (CELLCEPT) 1,500 mg, Oral, 2 times daily    ondansetron (ZOFRAN) 4 MG tablet TAKE 1 TABLET(4 MG) BY MOUTH EVERY 6 HOURS AS NEEDED FOR NAUSEA    pantoprazole (PROTONIX) 40 mg, Oral, Daily    predniSONE (DELTASONE) 30 mg, Oral, Daily    sulfamethoxazole-trimethoprim 800-160mg (BACTRIM DS) 800-160 mg Tab 2 tablets, Oral, Daily    walker Misc Please dispense 1 Rollator          Review of Systems:  The remainder of the 14 point ROS is noncontributory or negative unless mentioned/reviewed above.     Objective:     Vital Signs:  Vital Signs (Most Recent):  Temp: 98.1 °F (36.7 °C) (10/22/23 2126)  Pulse: 64 (10/23/23 0002)  Resp: 15 (10/23/23 0002)  BP: 107/60 (10/23/23 0002)  SpO2: 98 % (10/23/23 0002)  Body mass index is 26.4 kg/m².  Weight: 74.2 kg (163 lb 9.3 oz) Vital Signs (24h Range):  Temp:  [98.1 °F (36.7 °C)] 98.1 °F (36.7 °C)  Pulse:  [64-82] 64  Resp:  [15-18] 15  SpO2:  [98 %-100 %] 98 %  BP: (105-124)/(49-71) 107/60       Input/output:     Intake/Output Summary (Last 24 hours) at 10/23/2023 0110  Last data filed at 10/22/2023 2359  Gross per 24 hour   Intake 375.44 ml   Output --   Net 375.44 ml       Physical Examination:  General:  Well developed, well nourished, no acute respiratory distress  Head: Normocephalic, atraumatic  Eyes: PERRL, EOMI, anicteric sclera  Throat: No posterior pharyngeal erythema or exudate, no tonsillar exudate  Neck: supple, normal ROM, no JVD  CVS:  RRR, S1 and S2 normal, no murmurs, no added heart sounds, rubs, gallops, regular peripheral pulses, and no peripheral  "edema  Resp:  Lungs clear to auscultation bilaterally, no wheezes, rales, or rhonci  GI:  Abdomen soft, non-tender, non-distended, normoactive bowel sounds  MSK:  Full range of motion, no obvious deformities   Skin:  No rashes, ulcers, erythema  Neuro:  Alert and oriented x3, No focal neuro deficits, CNII-XII grossly intact  Psych:  Appropriate mood and affect     Lines/Drains/Airways       None                    Laboratory:    Recent Labs   Lab 10/22/23  2150   WBC 7.13   HGB 11.8*   HCT 35.8*   *   MCV 92.0   RDW 16.3     No results for input(s): "TROPONINI", "CKTOTAL", "CKMB", "BNP" in the last 24 hours.  No results for input(s): "TROPONINI", "CKTOTAL", "CKMB", "BNP" in the last 168 hours.  No results for input(s): "CHOL", "HDL", "LDLCALC", "TRIG", "CHOLHDL" in the last 168 hours. Recent Labs   Lab 10/22/23  2317      K 4.1   CHLORIDE 110*   CO2 18*   BUN 39.0*   CREATININE 1.53*   CALCIUM 8.7   MG 2.10     Recent Labs   Lab 10/22/23  2317   ALBUMIN 3.0*   BILITOT 0.1   AST 12   ALKPHOS 92   ALT 8     No results for input(s): "IRON", "TIBC", "FERRITIN", "SATURATEDIRO", "ACYAWHKV49", "FOLATE" in the last 168 hours.  No results for input(s): "TSH", "S2OZJZH", "HGBA1C", "INR", "PROTIME", "PTT" in the last 168 hours.           Other Results:  Estimated Creatinine Clearance: 42.8 mL/min (A) (based on SCr of 1.53 mg/dL (H)).    Current Medications:     Infusions:   dextrose 5 % and 0.45 % NaCl           Scheduled:   apixaban  5 mg Oral BID    atorvastatin  10 mg Oral Daily    EScitalopram oxalate  10 mg Oral Daily    losartan  25 mg Oral Daily    mycophenolate  1,500 mg Oral BID    pantoprazole  40 mg Oral Daily    predniSONE  30 mg Oral Daily    sulfamethoxazole-trimethoprim 800-160mg  2 tablet Oral Daily         PRN:   dextrose 10%    dextrose 10%    dextrose 10%    dextrose 10%    glucagon (human recombinant)    glucose    glucose    insulin aspart U-100    naloxone    sodium chloride 0.9%    "     Microbiology Data:  Microbiology Results (last 7 days)       ** No results found for the last 168 hours. **             Antibiotics and Day Number of Therapy:  Antibiotics (From admission, onward)      Start     Stop Route Frequency Ordered    10/23/23 0900  sulfamethoxazole-trimethoprim 800-160mg per tablet 2 tablet         -- Oral Daily 10/23/23 0053             Imaging:  Echo    Left Ventricle: The left ventricle is normal in size. Mildly increased   wall thickness. There is normal systolic function. Biplane (2D) method of   discs ejection fraction is 60%.    Left Atrium: Left atrium is mildly dilated.    Right Ventricle: Normal right ventricular cavity size. Systolic   function is normal.    Right Atrium: Right atrium is mildly dilated.    Aortic Valve: The aortic valve is a trileaflet valve.    Mitral Valve: The mitral valve is structurally normal.    Tricuspid Valve: There is mild regurgitation.    Pulmonic Valve: There is mild regurgitation.    Pulmonary Artery: The estimated pulmonary artery systolic pressure is   27 mmHg.    IVC/SVC: Intermediate venous pressure at 8 mmHg.        2D ECHO Results    Results for orders placed during the hospital encounter of 10/09/23    Echo    Interpretation Summary    Left Ventricle: The left ventricle is normal in size. Mildly increased wall thickness. There is normal systolic function. Biplane (2D) method of discs ejection fraction is 60%.    Left Atrium: Left atrium is mildly dilated.    Right Ventricle: Normal right ventricular cavity size. Systolic function is normal.    Right Atrium: Right atrium is mildly dilated.    Aortic Valve: The aortic valve is a trileaflet valve.    Mitral Valve: The mitral valve is structurally normal.    Tricuspid Valve: There is mild regurgitation.    Pulmonic Valve: There is mild regurgitation.    Pulmonary Artery: The estimated pulmonary artery systolic pressure is 27 mmHg.    IVC/SVC: Intermediate venous pressure at 8  "mmHg.        Pulmonary Functions Testing Results:    No results found for: "FEV1", "FVC", "FRE7GPC", "TLC", "DLCO"    Assessment & Plan:   T2DM  Hypoglycemia  -most recent A1c 5.4 (10/23)  -on 10 units BID lantus and sliding scale novolog with decreased CBGS at home per patient  -glucose unstable in ED continuing to fluctuate   -received 2 D10 boluses   -started on D5 1/2 NS continuous infusion  -frequent glucose checks every 30 minutes in ED  -low dose SSI initiated   -diabetic diet   -primary team to consider outpatient endocrine referral for management of glucose     STIVEN  -Cr elevated 1.5 on arrival baseline around 1.1  -suspect prerenal due to decreased PO intake   -on IV 1/2 ns continuous     DVT  -continue Eliquis 5 mg BID     HTN  -normotensive on arrival  -continue home losartan 25 mg daily     SLE  -continue prednisone 30 mg daily until 10/28/23 and bactrim prophylaxis   -continue home cellcept   -followed by Dr.Hojaili BECKFORD  -continue home atorvastatin 10 mg daily       CODE STATUS: Full Code   Access: PIV  Antibiotics: Bactrim prophy  Diet: Diet diabetic  DVT Prophylaxis: Eliquis  GI Prophylaxis: protonix  Fluids: D5 1/2  cc/hr      Disposition: Vi Ulrich is a 57 y.o. female who is admitted for hypoglycemia. Currently getting D5 1/2 NS infusion.       Janee Tirado MD  Internal Medicine Resident PGY-I  [facility] [place] [facility]\"" Internal Medicine   10/23/2023    "

## 2023-10-23 NOTE — PT/OT/SLP EVAL
Physical Therapy Evaluation    Patient Name:  Vi Ulrich   MRN:  [id number]    Recommendations:     Therapy Intensity Recommendations at Discharge: Low Intensity Therapy  Discharge Equipment Recommendations: none   Equipment to be obtained for discharge: none.  Barriers to discharge: fall risk and decreased endurance    Assessment:     Vi Ulrich is a 57 y.o. female admitted with a medical diagnosis of <principal problem not specified>.  She presents with the following impairments/functional limitations:  weakness, impaired balance, impaired endurance, impaired functional mobility, gait instability, decreased lower extremity function, decreased upper extremity function.  1. Hypoglycemia    2. Palpitations    3. Chest pain    4. Type 2 diabetes mellitus with hyperosmolarity without coma, without long-term current use of insulin    5. STIVEN (acute kidney injury)        Rehab Prognosis: Good.    Patient would benefit from continued skilled acute PT services to: address above listed impairments/functional limitations; receive patient/caregiver education; reduce fall risk; and maximize independency/safety with functional mobility.    Recent Surgery: * No surgery found *      Plan:     During this hospitalization, patient to be seen 3 x/week to address the identified impairments/functional limitations via gait training, therapeutic activities, therapeutic exercises and progress toward the established goals.    Plan of Care Expires:       Subjective     Communicated with patient's nurse Roslyn prior to session.    Patient agreeable to participate in evaluation.     Chief Complaint: being tired and having to yawn all the fazal  Patient/Family Comments/goals: return home  Pain/Comfort:  Pain Rating 1: 0/10  Pain Rating Post-Intervention 1: 0/10    Patients cultural, spiritual, Hindu conflicts given the current situation: no    Social History  Living Environment: Patient  lives with her daughter  in a first floor  apartment, with no steps, with tub-shower combo.  Functional Level: Prior to admission patient ambulated with assistive device.  Equipment Used at Home: rollator, shower chair, bedside commode  Equipment owned (not currently used): none.  Assistance Upon Discharge: family.    Objective:     Patient found right sidelying with peripheral IV, telemetry  upon PT entry to room.    General Precautions: Standard, fall   Orthopedic Precautions:N/A   Braces: N/A  Respiratory Status: room air    Exams:  Orientation: Patient is oriented to Person, Place, Time, Situation  Commands: Patient follows commands consistently  RUE ROM: WFL  LUE ROM: WFL  RLE ROM: WFL  RLE Strength: WFL  LLE ROM: WFL  LLE Strength: WFL    Functional Mobility:    Bed Mobility:  Supine to Sit: stand by assistance    Transfers:  Sit to Stand: contact guard assistance with rolling walker    Gait:  Patient ambulated 20 ft with rolling walker and contact guard assistance.  Patient demonstrates no loss of balance and no mis-steps. Pt. Stated that she feels weak and walked only in the room.    Other Mobility:  not assessed    Balance:  Sit  Static: GOOD: Takes MODERATE challenges from all directions  Dynamic: FAIR+: Maintains balance through MINIMAL excursions of active trunk motion  Stand  Static: GOOD-: Takes MODERATE challenges from all directions inconsistently  Dynamic: FAIR: Needs CONTACT GUARD during gait    Patient left right sidelying with all lines intact, call button in reach, and patient' nurse notified.    Education     Patient was instructed to utilize staff assistance for mobility/transfers.  White board updated regarding patient's safest level of mobility with staff assistance.    Goals     Multidisciplinary Problems       Physical Therapy Goals          Problem: Physical Therapy    Goal Priority Disciplines Outcome Goal Variances Interventions   Physical Therapy Goal     PT, PT/OT Ongoing, Progressing     Description: Goals to be met by: heriberto      Patient will increase functional independence with mobility by performin. Sit to stand transfer with Modified [place]  2. Gait  x 130 feet with Modified [place] using Rolling Walker.                        History:     Past Medical History:   Diagnosis Date    Arthritis     knees    Asthma     Atrial fibrillation     Chronic constipation     Diabetes mellitus     Encounter for blood transfusion 10/2014    GERD (gastroesophageal reflux disease)     Gout     Hypertension     Lupus     SLE    Renal disorder     SLE (systemic lupus erythematosus)      Past Surgical History:   Procedure Laterality Date    APPENDECTOMY      CHOLECYSTECTOMY      COLONOSCOPY N/A 2018    Procedure: COLONOSCOPY;  Surgeon: Minerva Porras MD;  Location: [facility];  Service: Endoscopy;  Laterality: N/A;    ESOPHAGOGASTRODUODENOSCOPY N/A 2018    Procedure: ESOPHAGOGASTRODUODENOSCOPY (EGD);  Surgeon: Minerva Porras MD;  Location: [facility];  Service: Endoscopy;  Laterality: N/A;    HYSTERECTOMY      JOINT REPLACEMENT Left 2015    Knee    LYMPH NODE BIOPSY Left     MASTECTOMY      Left arm- NO BP    PARTIAL HYSTERECTOMY       Time Tracking:     PT Received On: 10/23/23  PT Start Time: 1420     PT Stop Time: 1442  PT Total Time (min): 22 min     Billable Minutes: Evaluation 22    10/23/2023

## 2023-10-23 NOTE — PLAN OF CARE
Problem: Physical Therapy  Goal: Physical Therapy Goal  Description: Goals to be met by: dc     Patient will increase functional independence with mobility by performin. Sit to stand transfer with Modified [place]  2. Gait  x 130 feet with Modified [place] using Rolling Walker.     Outcome: Ongoing, Progressing

## 2023-10-24 LAB
ALBUMIN SERPL-MCNC: 2.7 G/DL (ref 3.5–5)
ALBUMIN/GLOB SERPL: 0.8 RATIO (ref 1.1–2)
ALP SERPL-CCNC: 87 UNIT/L (ref 40–150)
ALT SERPL-CCNC: 11 UNIT/L (ref 0–55)
ANION GAP SERPL CALC-SCNC: 7 MEQ/L
AST SERPL-CCNC: 11 UNIT/L (ref 5–34)
BASOPHILS # BLD AUTO: 0.01 X10(3)/MCL
BASOPHILS NFR BLD AUTO: 0.2 %
BILIRUB SERPL-MCNC: 0.1 MG/DL
BUN SERPL-MCNC: 25.4 MG/DL (ref 9.8–20.1)
BUN SERPL-MCNC: 29.4 MG/DL (ref 9.8–20.1)
C PEPTIDE P FAST SERPL-MCNC: 0.8 NG/ML (ref 1.1–4.4)
CALCIUM SERPL-MCNC: 8.6 MG/DL (ref 8.4–10.2)
CALCIUM SERPL-MCNC: 8.7 MG/DL (ref 8.4–10.2)
CHLORIDE SERPL-SCNC: 111 MMOL/L (ref 98–107)
CHLORIDE SERPL-SCNC: 113 MMOL/L (ref 98–107)
CO2 SERPL-SCNC: 18 MMOL/L (ref 22–29)
CO2 SERPL-SCNC: 18 MMOL/L (ref 22–29)
CREAT SERPL-MCNC: 1.13 MG/DL (ref 0.55–1.02)
CREAT SERPL-MCNC: 1.18 MG/DL (ref 0.55–1.02)
CREAT UR-MCNC: 41 MG/DL (ref 45–106)
CREAT/UREA NIT SERPL: 22
EOSINOPHIL # BLD AUTO: 0.02 X10(3)/MCL (ref 0–0.9)
EOSINOPHIL NFR BLD AUTO: 0.4 %
ERYTHROCYTE [DISTWIDTH] IN BLOOD BY AUTOMATED COUNT: 16.1 % (ref 11.5–17)
GFR SERPLBLD CREATININE-BSD FMLA CKD-EPI: 55 MLS/MIN/1.73/M2
GFR SERPLBLD CREATININE-BSD FMLA CKD-EPI: 58 MLS/MIN/1.73/M2
GLOBULIN SER-MCNC: 3.6 GM/DL (ref 2.4–3.5)
GLUCOSE SERPL-MCNC: 121 MG/DL (ref 74–100)
GLUCOSE SERPL-MCNC: 228 MG/DL (ref 74–100)
HCT VFR BLD AUTO: 29.9 % (ref 37–47)
HGB BLD-MCNC: 9.8 G/DL (ref 12–16)
IMM GRANULOCYTES # BLD AUTO: 0.03 X10(3)/MCL (ref 0–0.04)
IMM GRANULOCYTES NFR BLD AUTO: 0.6 %
LYMPHOCYTES # BLD AUTO: 1.47 X10(3)/MCL (ref 0.6–4.6)
LYMPHOCYTES NFR BLD AUTO: 29.5 %
MAGNESIUM SERPL-MCNC: 1.8 MG/DL (ref 1.6–2.6)
MCH RBC QN AUTO: 29.7 PG (ref 27–31)
MCHC RBC AUTO-ENTMCNC: 32.8 G/DL (ref 33–36)
MCV RBC AUTO: 90.6 FL (ref 80–94)
MONOCYTES # BLD AUTO: 0.41 X10(3)/MCL (ref 0.1–1.3)
MONOCYTES NFR BLD AUTO: 8.2 %
NEUTROPHILS # BLD AUTO: 3.04 X10(3)/MCL (ref 2.1–9.2)
NEUTROPHILS NFR BLD AUTO: 61.1 %
NRBC BLD AUTO-RTO: 0 %
PHOSPHATE SERPL-MCNC: 2.3 MG/DL (ref 2.3–4.7)
PLATELET # BLD AUTO: 389 X10(3)/MCL (ref 130–400)
PMV BLD AUTO: 10.1 FL (ref 7.4–10.4)
POCT GLUCOSE: 116 MG/DL (ref 70–110)
POCT GLUCOSE: 116 MG/DL (ref 70–110)
POCT GLUCOSE: 142 MG/DL (ref 70–110)
POCT GLUCOSE: 143 MG/DL (ref 70–110)
POCT GLUCOSE: 205 MG/DL (ref 70–110)
POCT GLUCOSE: 209 MG/DL (ref 70–110)
POCT GLUCOSE: 48 MG/DL (ref 70–110)
POTASSIUM SERPL-SCNC: 5.3 MMOL/L (ref 3.5–5.1)
POTASSIUM SERPL-SCNC: 5.7 MMOL/L (ref 3.5–5.1)
PROT SERPL-MCNC: 6.3 GM/DL (ref 6.4–8.3)
PROT UR STRIP-MCNC: 89.7 MG/DL
RBC # BLD AUTO: 3.3 X10(6)/MCL (ref 4.2–5.4)
SODIUM SERPL-SCNC: 136 MMOL/L (ref 136–145)
SODIUM SERPL-SCNC: 136 MMOL/L (ref 136–145)
URINE PROTEIN/CREATININE RATIO (OHS): 2.2
WBC # SPEC AUTO: 4.98 X10(3)/MCL (ref 4.5–11.5)

## 2023-10-24 PROCEDURE — 97165 OT EVAL LOW COMPLEX 30 MIN: CPT

## 2023-10-24 PROCEDURE — 96372 THER/PROPH/DIAG INJ SC/IM: CPT | Performed by: STUDENT IN AN ORGANIZED HEALTH CARE EDUCATION/TRAINING PROGRAM

## 2023-10-24 PROCEDURE — 93005 ELECTROCARDIOGRAM TRACING: CPT

## 2023-10-24 PROCEDURE — 84100 ASSAY OF PHOSPHORUS: CPT

## 2023-10-24 PROCEDURE — 97116 GAIT TRAINING THERAPY: CPT

## 2023-10-24 PROCEDURE — 85025 COMPLETE CBC W/AUTO DIFF WBC: CPT

## 2023-10-24 PROCEDURE — 82570 ASSAY OF URINE CREATININE: CPT | Performed by: STUDENT IN AN ORGANIZED HEALTH CARE EDUCATION/TRAINING PROGRAM

## 2023-10-24 PROCEDURE — 25000003 PHARM REV CODE 250: Performed by: STUDENT IN AN ORGANIZED HEALTH CARE EDUCATION/TRAINING PROGRAM

## 2023-10-24 PROCEDURE — 63600175 PHARM REV CODE 636 W HCPCS: Performed by: STUDENT IN AN ORGANIZED HEALTH CARE EDUCATION/TRAINING PROGRAM

## 2023-10-24 PROCEDURE — 80053 COMPREHEN METABOLIC PANEL: CPT

## 2023-10-24 PROCEDURE — 25000242 PHARM REV CODE 250 ALT 637 W/ HCPCS: Performed by: STUDENT IN AN ORGANIZED HEALTH CARE EDUCATION/TRAINING PROGRAM

## 2023-10-24 PROCEDURE — 25000003 PHARM REV CODE 250: Performed by: INTERNAL MEDICINE

## 2023-10-24 PROCEDURE — 94640 AIRWAY INHALATION TREATMENT: CPT

## 2023-10-24 PROCEDURE — 11000001 HC ACUTE MED/SURG PRIVATE ROOM

## 2023-10-24 PROCEDURE — 94761 N-INVAS EAR/PLS OXIMETRY MLT: CPT

## 2023-10-24 PROCEDURE — 25000003 PHARM REV CODE 250

## 2023-10-24 PROCEDURE — 83735 ASSAY OF MAGNESIUM: CPT

## 2023-10-24 PROCEDURE — G0378 HOSPITAL OBSERVATION PER HR: HCPCS

## 2023-10-24 PROCEDURE — 63600175 PHARM REV CODE 636 W HCPCS

## 2023-10-24 RX ORDER — DEXTROSE, SODIUM CHLORIDE, SODIUM LACTATE, POTASSIUM CHLORIDE, AND CALCIUM CHLORIDE 5; .6; .31; .03; .02 G/100ML; G/100ML; G/100ML; G/100ML; G/100ML
INJECTION, SOLUTION INTRAVENOUS CONTINUOUS
Status: DISCONTINUED | OUTPATIENT
Start: 2023-10-24 | End: 2023-10-25

## 2023-10-24 RX ORDER — COSYNTROPIN 0.25 MG/ML
0.25 INJECTION, POWDER, FOR SOLUTION INTRAMUSCULAR; INTRAVENOUS ONCE
Status: DISCONTINUED | OUTPATIENT
Start: 2023-10-24 | End: 2023-10-24

## 2023-10-24 RX ORDER — SODIUM BICARBONATE 650 MG/1
1300 TABLET ORAL 2 TIMES DAILY
Status: DISCONTINUED | OUTPATIENT
Start: 2023-10-24 | End: 2023-10-26 | Stop reason: HOSPADM

## 2023-10-24 RX ORDER — ENOXAPARIN SODIUM 100 MG/ML
1 INJECTION SUBCUTANEOUS EVERY 12 HOURS
Status: DISCONTINUED | OUTPATIENT
Start: 2023-10-25 | End: 2023-10-25

## 2023-10-24 RX ORDER — DIPHENHYDRAMINE HYDROCHLORIDE 50 MG/ML
25 INJECTION INTRAMUSCULAR; INTRAVENOUS NIGHTLY
Status: COMPLETED | OUTPATIENT
Start: 2023-10-24 | End: 2023-10-24

## 2023-10-24 RX ORDER — HYDROXYZINE 50 MG/ML
50 INJECTION, SOLUTION INTRAMUSCULAR NIGHTLY
Status: DISCONTINUED | OUTPATIENT
Start: 2023-10-24 | End: 2023-10-24

## 2023-10-24 RX ORDER — NITROFURANTOIN 25; 75 MG/1; MG/1
100 CAPSULE ORAL EVERY 12 HOURS
Status: DISCONTINUED | OUTPATIENT
Start: 2023-10-24 | End: 2023-10-25

## 2023-10-24 RX ORDER — DIPHENHYDRAMINE HCL 25 MG
25 CAPSULE ORAL NIGHTLY PRN
Status: DISCONTINUED | OUTPATIENT
Start: 2023-10-24 | End: 2023-10-24

## 2023-10-24 RX ORDER — ALBUTEROL SULFATE 0.83 MG/ML
10 SOLUTION RESPIRATORY (INHALATION) ONCE
Status: COMPLETED | OUTPATIENT
Start: 2023-10-24 | End: 2023-10-24

## 2023-10-24 RX ADMIN — MYCOPHENOLATE MOFETIL 1500 MG: 250 CAPSULE ORAL at 08:10

## 2023-10-24 RX ADMIN — PANTOPRAZOLE SODIUM 40 MG: 40 TABLET, DELAYED RELEASE ORAL at 08:10

## 2023-10-24 RX ADMIN — ESCITALOPRAM OXALATE 10 MG: 10 TABLET ORAL at 08:10

## 2023-10-24 RX ADMIN — LOSARTAN POTASSIUM 25 MG: 25 TABLET, FILM COATED ORAL at 08:10

## 2023-10-24 RX ADMIN — SODIUM BICARBONATE 650 MG TABLET 1300 MG: at 08:10

## 2023-10-24 RX ADMIN — ALBUTEROL SULFATE 10 MG: 2.5 SOLUTION RESPIRATORY (INHALATION) at 11:10

## 2023-10-24 RX ADMIN — NITROFURANTOIN 100 MG: 25; 75 CAPSULE ORAL at 08:10

## 2023-10-24 RX ADMIN — INSULIN ASPART 2 UNITS: 100 INJECTION, SOLUTION INTRAVENOUS; SUBCUTANEOUS at 05:10

## 2023-10-24 RX ADMIN — PREDNISONE 30 MG: 20 TABLET ORAL at 08:10

## 2023-10-24 RX ADMIN — SUCRALFATE 1 G: 1 SUSPENSION ORAL at 05:10

## 2023-10-24 RX ADMIN — SODIUM ZIRCONIUM CYCLOSILICATE 10 G: 10 POWDER, FOR SUSPENSION ORAL at 08:10

## 2023-10-24 RX ADMIN — ATORVASTATIN CALCIUM 10 MG: 10 TABLET, FILM COATED ORAL at 08:10

## 2023-10-24 RX ADMIN — SUCRALFATE 1 G: 1 SUSPENSION ORAL at 08:10

## 2023-10-24 RX ADMIN — SUCRALFATE 1 G: 1 SUSPENSION ORAL at 12:10

## 2023-10-24 RX ADMIN — DIPHENHYDRAMINE HYDROCHLORIDE 25 MG: 50 INJECTION INTRAMUSCULAR; INTRAVENOUS at 08:10

## 2023-10-24 RX ADMIN — SUCRALFATE 1 G: 1 SUSPENSION ORAL at 04:10

## 2023-10-24 RX ADMIN — SODIUM CHLORIDE, SODIUM LACTATE, POTASSIUM CHLORIDE, CALCIUM CHLORIDE AND DEXTROSE MONOHYDRATE: 5; 600; 310; 30; 20 INJECTION, SOLUTION INTRAVENOUS at 07:10

## 2023-10-24 RX ADMIN — SODIUM ZIRCONIUM CYCLOSILICATE 10 G: 10 POWDER, FOR SUSPENSION ORAL at 10:10

## 2023-10-24 RX ADMIN — APIXABAN 5 MG: 2.5 TABLET, FILM COATED ORAL at 08:10

## 2023-10-24 NOTE — PLAN OF CARE
Problem: Adult Inpatient Plan of Care  Goal: Plan of Care Review  Outcome: Ongoing, Progressing  Flowsheets (Taken 10/24/2023 1542)  Plan of Care Reviewed With: patient  Goal: Absence of Hospital-Acquired Illness or Injury  Outcome: Ongoing, Progressing  Intervention: Identify and Manage Fall Risk  Flowsheets (Taken 10/24/2023 1542)  Safety Promotion/Fall Prevention:   assistive device/personal item within reach   Fall Risk reviewed with patient/family   high risk medications identified   nonskid shoes/socks when out of bed   lighting adjusted   medications reviewed   Fall Risk signage in place   side rails raised x 2   instructed to call staff for mobility  Intervention: Prevent Skin Injury  Flowsheets (Taken 10/24/2023 1542)  Body Position: position changed independently  Skin Protection:   adhesive use limited   tubing/devices free from skin contact  Intervention: Prevent and Manage VTE (Venous Thromboembolism) Risk  Flowsheets (Taken 10/24/2023 1542)  Activity Management: Ambulated to bathroom - L4  VTE Prevention/Management:   remove, assess skin, and reapply sequential compression device   bleeding risk assessed  Range of Motion: active ROM (range of motion) encouraged

## 2023-10-24 NOTE — PT/OT/SLP PROGRESS
Physical Therapy Treatment and Discharge Note    Patient Name:  Vi Ulrich   MRN:  [id number]    Recommendations     Therapy Intensity Recommendations at Discharge: No Therapy Indicated  Discharge Equipment Recommendations: bedside commode, rollator, shower chair   Barriers to discharge: none    Assessment     Vi Ulrich is a 57 y.o. female admitted with a medical diagnosis of <principal problem not specified>.  1. Hypoglycemia    2. Palpitations    3. Chest pain    4. Type 2 diabetes mellitus with hyperosmolarity without coma, without long-term current use of insulin    5. STIVEN (acute kidney injury)    6. Hyperkalemia    7. Lupus    8. Diabetic glomerulosclerosis       Patient Active Problem List   Diagnosis    Essential hypertension    Diabetes mellitus, type 2    Insomnia    Hyperlipidemia    History of asthma    New onset atrial fibrillation    Periumbilical pain    Constipation    Iron deficiency anemia    Anxiety    Class 1 obesity with serious comorbidity and body mass index (BMI) of 32.0 to 32.9 in adult    Acute cystitis with hematuria    STIVEN (acute kidney injury)    Pyelonephritis    Inadequate dietary energy intake    Lupus    Fibromyalgia    Lupus nephritis, ISN/RPS class III    Asthma    Trigger middle finger of right hand    BMI 26.0-26.9,adult    Chronic obstructive pulmonary disease    Long term current use of insulin    Migraine aura without headache    Moderate recurrent major depression    Mood disorder    Raynaud's disease    Thyroiditis    Vitamin D deficiency    Acute deep vein thrombosis (DVT) of popliteal vein of right lower extremity    ILD (interstitial lung disease)    Diabetic glomerulosclerosis    Back pain      She presents with the following impairments/functional limitations:   (none from PT SERVICES standpoint).    Rehab Prognosis: Good.    Patient discharged to 'in-house' with independent and walking program  Patient has maximized benefit from therapy.  Patient's current level of  function is at baseline (PLOF).  Patient does not require further acute PT services.     Recent Surgery: * No surgery found *      Plan     Patient discharged from acute PT Services on 10/24/23.    Based on current functional levels observed, patient does not require further acute PT services.    Re-consult PT Services if patient's status changes and therapy services warranted.    Subjective     Communicated with charge nurse Kiara prior to session (patient's nurse Laurie not available).    Patient agreeable to participate in treatment session.    Chief Complaint: none until end of session, patient complaints of bladder urgency and incontinence (nurse provided patient w/ disposable brief)  Patient/Family Comments/goals: home  Pain/Comfort:  Pain Rating 1: 0/10  Pain Addressed 1: Nurse notified  Pain Rating Post-Intervention 1: 0/10    Objective     Patient found supine in bed, with HOB elevated, and bed rails up bilateral HOB with peripheral IV  upon PT entry to room.    General Precautions: Standard, fall   Orthopedic Precautions:N/A   Braces: N/A  Respiratory Status: room air    Functional Mobility:    Bed Mobility:  Rolling Left: independence  Rolling Right: independence  Scooting: independence  Supine to Sit: independence  Sit to Supine: independence  with no cues required    Transfers:  Sit to Stand: independence with no assistive device  with no cues required    Gait:  Patient ambulated 200ft  Standing pause x2 minutes  Patient ambulated 100ft  Standing pause x1 minute  Patient ambulated 330ft  Patient entered room and requested restroom  Patient ambulated 15ft to toilet  Patient provided disposable brief by nurse  Patient donned brief and then called to notify therapist she was done  Patient ambulated 10ft to bed  With no assistive device and independence.  Patient demonstrates steady gait, symmetrical step length, upright posture, reciprocal arm swing, and no loss of balance w/ 1 mis-step w/ Lt LE during a  turn w/ patient self correcting    Sit  Patient demonstrated static balance on level surface with independence with no verbal cues.  Patient demonstrated dynamic balance on level surface with independence with no verbal cues during maximal excursions.  Stand  Patient demonstrated static balance on level surface  using no device with independence with no verbal cues.    Other Mobility:  not assessed    Patient left supine in bed, with HOB elevated, and bed rails up bilateral HOB with peripheral IV with all lines intact, call button in reach, tray table at bedside, and charge nurse Kiara notified.    Goals - 2 out of 2 STG's met by patient     Multidisciplinary Problems       Physical Therapy Goals       Not on file         Multidisciplinary Problems (Resolved)       Problem: Physical Therapy    Goal Priority Disciplines Outcome Goal Variances Interventions   Physical Therapy Goal   (Resolved)     PT, PT/OT Met     Description: Goals to be met by: DISCHARGE    Patient will increase functional independence with mobility by performin. Sit to stand transfer with Modified [place] - MET 10/24/2023  2. Gait  x 130 feet with Modified [place] using Rolling Walker - MET 10/24/2023           Time Tracking     PT Received On: 10/24/23  PT Start Time: 1337     PT Stop Time: 1400  PT Total Time (min): 23 min     Billable Minutes: Gait Training 23    10/24/2023

## 2023-10-24 NOTE — PLAN OF CARE
Consult for HH for diabetes education and monitoring noted. Patient is in agreement and prefers [facility]. Referral sent via [facility].     Consult for continuous glucose monitoring noted. Will speak to HH for assistance with obtaining device.

## 2023-10-24 NOTE — PROGRESS NOTES
[facility] Medicine Wards   Progress Note     Resident Team: [facility] Medicine List 4  Attending Physician: Jean Paul Coyle MD  Resident: Brennon  Intern: Frank     Subjective:      Brief HPI:  Vi Ulrich is a 57 y.o. female who  has a past medical history of Arthritis, Asthma, Atrial fibrillation, Chronic constipation, Diabetes mellitus, Encounter for blood transfusion, GERD (gastroesophageal reflux disease), Gout, Hypertension, Lupus, Renal disorder, and SLE (systemic lupus erythematosus).  She presented to [facility] on 10/22/2023  with a primary complaint of hypoglycemia with weakness and fatigue. Patient states that she had a glucose of 159 in the evening and administered 20 units of Lantus along with 10 units of novolog. Started feeling weak and having headache and rechecked sugar and was low in 20s and came to ER.  Of note, patient had recent hospitalization for lupus flare and discharge summary states patient was on 80 units nightly but had hypoglycemic episodes in hospital and was discharged on 10 units long acting BID. In speaking with patient tonight, she states that she has had diabetes for many years and has only ever been on a sliding scale at home until the recent hospitalization where she was started on long acting for the first time. Patient states her glucose has been in 60s-80s ever since starting the long acting in the morning. States she has had decreased appetite for many month as well with due to being sick from lupus flares. Is still taking the prednisone 30 mg daily she was prescribed at discharge. Denies nausea, vomiting, blurry vision, urinary issues, chest pain, SOB.     In ED, afebrile with BP low 100s/60s. CBG 40 and received apple juice and D10 bolus with repeat <20 and then increased to 216 but continued to trend down and received second bolus with  as patient was being interviewed. Cr bump of 1.5. Internal medicine consulted for further control of hypoglycemia.     Interval History:   NAEON.  Reports she is feeling better. Denies shaky feeling. Sitting up and appears comfortable.    Review of Systems:  Pertinent ROS negative unless otherwise stated above.       Objective:     Vital Signs (Most Recent):  Temp: 98.5 °F (36.9 °C) (10/24/23 1257)  Pulse: (!) 59 (10/24/23 1257)  Resp: 18 (10/24/23 1257)  BP: (!) 143/80 (10/24/23 1257)  SpO2: 100 % (10/24/23 1257) Vital Signs (24h Range):  Temp:  [97.8 °F (36.6 °C)-98.5 °F (36.9 °C)] 98.5 °F (36.9 °C)  Pulse:  [59-80] 59  Resp:  [18] 18  SpO2:  [92 %-100 %] 100 %  BP: (107-150)/(58-80) 143/80       Physical Examination:  General: No acute distress   HEENT: Head-normocephalic and atraumatic  Respiratory: clear to auscultation bilaterally without wheezes, rales, rhonchi  Cardiovascular: regular rate and rhythm without murmurs.    Gastrointestinal: soft, non-tender, non-distended   Musculoskeletal: no lower extremity edema  Integumentary: no rashes or skin lesions present  Neurologic: alert and oriented, no signs of peripheral neurological deficit, motor/sensory function intact  Psychiatric: cognitive function intact, cooperative with exam      Laboratory:  Trended Lab Data:  Recent Labs   Lab 10/22/23  2150 10/22/23  2317 10/23/23  0428 10/24/23  0350   WBC 7.13  --  5.86 4.98   HGB 11.8*  --  9.6* 9.8*   HCT 35.8*  --  29.9* 29.9*   *  --  356 389   MCV 92.0  --  90.3 90.6   RDW 16.3  --  16.0 16.1   NA  --  138 136 136   K  --  4.1 4.5 5.3*   CO2  --  18* 18* 18*   BUN  --  39.0* 37.9* 29.4*   CREATININE  --  1.53* 1.63* 1.13*   ALBUMIN  --  3.0* 2.6* 2.7*   BILITOT  --  0.1 0.2 0.1   AST  --  12 11 11   ALKPHOS  --  92 81 87   ALT  --  8 9 11           Other Results:  EKG:  Results for orders placed or performed during the hospital encounter of 10/22/23   EKG 12-lead    Collection Time: 10/24/23  6:57 AM    Narrative    Test Reason : E87.5,    Vent. Rate : 059 BPM     Atrial Rate : 059 BPM     P-R Int : 178 ms          QRS Dur : 108 ms      QT Int :  406 ms       P-R-T Axes : 068 042 066 degrees     QTc Int : 401 ms    Sinus bradycardia  Otherwise normal ECG  When compared with ECG of 22-OCT-2023 21:33,  Nonspecific T wave abnormality no longer evident in Lateral leads    Referred By: KAL   SELF           Confirmed By:        Radiology:  US Retroperitoneal Limited    Result Date: 10/23/2023  No significant sonographic abnormality of the kidneys. Electronically signed by: Jose Manuel Henao Date:    10/23/2023 Time:    12:25       No intake or output data in the 24 hours ending 10/24/23 1415        Assessment & Plan:     T2DM  Hypoglycemia - likely iatrogenic  -most recent A1c 5.4 (10/23)  -on 10 units BID lantus and sliding scale novolog with decreased CBGS at home per patient  -glucose unstable in ED continuing to fluctuate   -received 2 D10 boluses   -started on D5 1/2 NS continuous infusion  -frequent glucose checks every 30 minutes in ED  -diabetic diet   -low dose SSI  -AM cortisol was 3  -plan for troy stim test tomorrow morning; will coordinate with nurse for timing of blood draws  -consult endocrinology given persistent hypoglycemia  -consult CM for continuous glucose monitor     STIVEN  DM vs lupus nephritis  -Cr elevated 1.5 on arrival baseline around 1.1  -suspect prerenal due to decreased PO intake   -Renal US negative for acute findings  -UA with 1+ protein, 500 leuk, 6-10 RBC, 51-99 WBC: consistent with UTI; will treat with nitrofurantoin as patient refusing bactrim  -Consulted Nephrology; lokelma given for hyperkalemia     Hyperkalemia  -K 5.3  -EKG without changes  -Lokelma given per renal    DVT  -continue Eliquis 5 mg BID      HTN  -normotensive on arrival  -continue home losartan 25 mg daily      SLE  +Anticardiolipin  -continue prednisone 30 mg daily until 10/28/23 and bactrim prophylaxis   -patient continues to REFUSE bactrim  -continue home cellcept   -followed by    -Anticardiolipin antibodies positive  -transition from eliquis to  full dose lovenox  -Talk to Rheumatology for guidance on initiating warfarin inpatient     HLD  -continue home atorvastatin 10 mg daily         CODE STATUS: Full Code   Access: PIV  Antibiotics: Macrobid   Diet: Diet diabetic  DVT Prophylaxis: Eliquis  GI Prophylaxis: protonix  Fluids: D5  cc/hr      Disposition: Vi Ulrich is a 57 y.o. female who is admitted for hypoglycemia, likely iatrogenic. Nephrology following. Discuss anticoag with Rheum regarding initiating warfarin inpateint. Consult Endocrinology for persistent swings in CBGs.       Zabrina Willett MD  \[facility]\"" Internal Medicine, -2  10/24/2023

## 2023-10-24 NOTE — PROGRESS NOTES
"    Nephrology Consult Note    Chief Complaint:    Chief Complaint   Patient presents with    Hypoglycemia     Pt c/o "low blood sugar" after taking insulin. Pt also reports palpations       Reason for Consult:  STIVEN    HPI:   Vi Ulrich is a 57 y.o. year old female with PMH of  Arthritis, Asthma, Atrial fibrillation, Chronic constipation, Diabetes mellitus, Encounter for blood transfusion, GERD (gastroesophageal reflux disease), Gout, Hypertension, Lupus/lupus nephritis, diabetic nephropathy.  She was admitted to the hospital for hyperglycemia.  Nephrology is being consulted for STIVEN on CKD.    Interval history:  Vital signs stable.  No longer hypoglycemic.  No acute complaints this morning    PMH:   Past Medical History:   Diagnosis Date    Arthritis     knees    Asthma     Atrial fibrillation     Chronic constipation     Diabetes mellitus     Encounter for blood transfusion 10/2014    GERD (gastroesophageal reflux disease)     Gout     Hypertension     Lupus 2004    SLE    Renal disorder     SLE (systemic lupus erythematosus)      PSH:   Past Surgical History:   Procedure Laterality Date    APPENDECTOMY      CHOLECYSTECTOMY      COLONOSCOPY N/A 4/19/2018    Procedure: COLONOSCOPY;  Surgeon: Minerva Porras MD;  Location: [facility];  Service: Endoscopy;  Laterality: N/A;    ESOPHAGOGASTRODUODENOSCOPY N/A 4/19/2018    Procedure: ESOPHAGOGASTRODUODENOSCOPY (EGD);  Surgeon: Minerva Porras MD;  [facility];  Service: Endoscopy;  Laterality: N/A;    HYSTERECTOMY      JOINT REPLACEMENT Left 08/07/2015    Knee    LYMPH NODE BIOPSY Left 2014    MASTECTOMY      Left arm- NO BP    PARTIAL HYSTERECTOMY       FH:  Family History   Problem Relation Age of Onset    Heart block Mother     Arthritis Mother     Asthma Mother     Diabetes Mother     Heart disease Father     Diabetes Sister     Heart disease Sister     Kidney disease Sister      SH:   Social History     Socioeconomic History    Marital " status: Single    Years of education: 10th   Tobacco Use    Smoking status: Former     Current packs/day: 0.00     Types: Cigarettes     Start date: 1980     Quit date: 2022     Years since quittin.4    Smokeless tobacco: Never   Substance and Sexual Activity    Alcohol use: No    Drug use: No    Sexual activity: Not Currently     Birth control/protection: See Surgical Hx     Social Determinants of Health     Financial Resource Strain: Low Risk  (2023)    Overall Financial Resource Strain (CARDIA)     Difficulty of Paying Living Expenses: Not hard at all   Food Insecurity: No Food Insecurity (2023)    Hunger Vital Sign     Worried About Running Out of Food in the Last Year: Never true     Ran Out of Food in the Last Year: Never true   Transportation Needs: No Transportation Needs (2023)    PRAPARE - Transportation     Lack of Transportation (Medical): No     Lack of Transportation (Non-Medical): No   Physical Activity: Inactive (2023)    Exercise Vital Sign     Days of Exercise per Week: 0 days     Minutes of Exercise per Session: 0 min   Stress: No Stress Concern Present (2023)    [employer] [place] [employer] - Occupational Stress Questionnaire     Feeling of Stress : Not at all   Social Connections: Socially Isolated (2023)    Social Connection and Isolation Panel [NHANES]     Frequency of Communication with Friends and Family: More than three times a week     Frequency of Social Gatherings with Friends and Family: Never     Attends Temple Services: Never     Active Member of Clubs or Organizations: No     Attends Club or Organization Meetings: Never     Marital Status: Never    Housing Stability: Low Risk  (2023)    Housing Stability Vital Sign     Unable to Pay for Housing in the Last Year: No     Number of Places Lived in the Last Year: 1     Unstable Housing in the Last Year: No     Allergies:   Review of patient's allergies indicates:    Allergen Reactions    Ketorolac (pf) Swelling    Mycophenolate mofetil Swelling    Penicillins Hives    Percocet [oxycodone-acetaminophen] Hives and Itching    Tramadol Hives      Medications:   Scheduled Meds:   apixaban  5 mg Oral BID    atorvastatin  10 mg Oral Daily    diphenhydrAMINE  25 mg Oral QHS    EScitalopram oxalate  10 mg Oral Daily    losartan  25 mg Oral Daily    mycophenolate  1,500 mg Oral BID    pantoprazole  40 mg Oral Daily    predniSONE  30 mg Oral Daily    sodium bicarbonate  1,300 mg Oral BID    sodium zirconium cyclosilicate  10 g Oral Once    sucralfate  1 g Oral QID (AC & HS)    sulfamethoxazole-trimethoprim 800-160mg  2 tablet Oral Daily      PRN Meds: dextrose 10%, dextrose 10%, dextrose 10%, dextrose 10%, glucagon (human recombinant), glucose, glucose, insulin aspart U-100, naloxone, sodium chloride 0.9%  Continuous Infusions:   dextrose 5% lactated ringers 75 mL/hr at 10/24/23 0757          Physical Exam  Vitals:    10/24/23 0356   BP: (!) 147/80   Pulse: (!) 59   Resp:    Temp: 97.9 °F (36.6 °C)     Physical Exam  Vitals and nursing note reviewed.   Constitutional:       General: She is not in acute distress.     Appearance: Normal appearance. She is normal weight. She is not ill-appearing or toxic-appearing.   HENT:      Head: Normocephalic and atraumatic.      Mouth/Throat:      Mouth: Mucous membranes are moist.   Eyes:      Extraocular Movements: Extraocular movements intact.      Conjunctiva/sclera: Conjunctivae normal.      Pupils: Pupils are equal, round, and reactive to light.   Cardiovascular:      Rate and Rhythm: Normal rate and regular rhythm.      Pulses: Normal pulses.      Heart sounds: No murmur heard.  Pulmonary:      Effort: Pulmonary effort is normal. No respiratory distress.   Musculoskeletal:         General: No swelling.      Right lower leg: No edema.      Left lower leg: No edema.   Skin:     General: Skin is warm.   Neurological:      General: No focal  deficit present.      Mental Status: She is alert and oriented to person, place, and time.         Labs:  Recent Results (from the past 24 hour(s))   POCT glucose    Collection Time: 10/23/23  8:17 AM   Result Value Ref Range    POCT Glucose 68 (L) 70 - 110 mg/dL   Cortisol    Collection Time: 10/23/23  8:24 AM   Result Value Ref Range    Cortisol Level 3.0 ug/dL   POCT glucose    Collection Time: 10/23/23  9:26 AM   Result Value Ref Range    POCT Glucose 96 70 - 110 mg/dL   POCT glucose    Collection Time: 10/23/23 10:43 AM   Result Value Ref Range    POCT Glucose 129 (H) 70 - 110 mg/dL   POCT glucose    Collection Time: 10/23/23 12:05 PM   Result Value Ref Range    POCT Glucose 126 (H) 70 - 110 mg/dL   POCT glucose    Collection Time: 10/23/23  2:23 PM   Result Value Ref Range    POCT Glucose 204 (H) 70 - 110 mg/dL   POCT glucose    Collection Time: 10/23/23  3:32 PM   Result Value Ref Range    POCT Glucose 221 (H) 70 - 110 mg/dL   Urinalysis, Reflex to Urine Culture    Collection Time: 10/23/23  4:08 PM    Specimen: Urine   Result Value Ref Range    Color, UA Light-Yellow Yellow, Light-Yellow, Dark Yellow, Krissy, Straw    Appearance, UA Clear Clear    Specific Gravity, UA 1.013 1.005 - 1.030    pH, UA 5.0 5.0 - 8.5    Protein, UA 1+ (A) Negative    Glucose, UA Normal Negative, Normal    Ketones, UA Negative Negative    Blood, UA Negative Negative    Bilirubin, UA Negative Negative    Urobilinogen, UA Normal 0.2, 1.0, Normal    Nitrites, UA Negative Negative    Leukocyte Esterase,  (A) Negative    WBC, UA 51-99 (A) None Seen, 0-2, 3-5, 0-5 /HPF    Bacteria, UA Trace (A) None Seen /HPF    Squamous Epithelial Cells, UA Occ (A) None Seen /HPF    Mucous, UA Trace (A) None Seen /LPF    Hyaline Casts, UA None Seen None Seen /lpf    RBC, UA 6-10 (A) None Seen, 0-2, 3-5, 0-5 /HPF   Sodium, Random Urine    Collection Time: 10/23/23  4:08 PM   Result Value Ref Range    Urine Sodium 65.0 mmol/L   Urine culture     Collection Time: 10/23/23  4:08 PM    Specimen: Urine   Result Value Ref Range    Urine Culture No Growth At 24 Hours    POCT glucose    Collection Time: 10/23/23  5:30 PM   Result Value Ref Range    POCT Glucose 180 (H) 70 - 110 mg/dL   POCT glucose    Collection Time: 10/23/23  8:53 PM   Result Value Ref Range    POCT Glucose 342 (H) 70 - 110 mg/dL   Comprehensive Metabolic Panel (CMP)    Collection Time: 10/24/23  3:50 AM   Result Value Ref Range    Sodium Level 136 136 - 145 mmol/L    Potassium Level 5.3 (H) 3.5 - 5.1 mmol/L    Chloride 113 (H) 98 - 107 mmol/L    Carbon Dioxide 18 (L) 22 - 29 mmol/L    Glucose Level 121 (H) 74 - 100 mg/dL    Blood Urea Nitrogen 29.4 (H) 9.8 - 20.1 mg/dL    Creatinine 1.13 (H) 0.55 - 1.02 mg/dL    Calcium Level Total 8.6 8.4 - 10.2 mg/dL    Protein Total 6.3 (L) 6.4 - 8.3 gm/dL    Albumin Level 2.7 (L) 3.5 - 5.0 g/dL    Globulin 3.6 (H) 2.4 - 3.5 gm/dL    Albumin/Globulin Ratio 0.8 (L) 1.1 - 2.0 ratio    Bilirubin Total 0.1 <=1.5 mg/dL    Alkaline Phosphatase 87 40 - 150 unit/L    Alanine Aminotransferase 11 0 - 55 unit/L    Aspartate Aminotransferase 11 5 - 34 unit/L    eGFR 58 mls/min/1.73/m2   Magnesium    Collection Time: 10/24/23  3:50 AM   Result Value Ref Range    Magnesium Level 1.80 1.60 - 2.60 mg/dL   Phosphorus    Collection Time: 10/24/23  3:50 AM   Result Value Ref Range    Phosphorus Level 2.3 2.3 - 4.7 mg/dL   CBC with Differential    Collection Time: 10/24/23  3:50 AM   Result Value Ref Range    WBC 4.98 4.50 - 11.50 x10(3)/mcL    RBC 3.30 (L) 4.20 - 5.40 x10(6)/mcL    Hgb 9.8 (L) 12.0 - 16.0 g/dL    Hct 29.9 (L) 37.0 - 47.0 %    MCV 90.6 80.0 - 94.0 fL    MCH 29.7 27.0 - 31.0 pg    MCHC 32.8 (L) 33.0 - 36.0 g/dL    RDW 16.1 11.5 - 17.0 %    Platelet 389 130 - 400 x10(3)/mcL    MPV 10.1 7.4 - 10.4 fL    Neut % 61.1 %    Lymph % 29.5 %    Mono % 8.2 %    Eos % 0.4 %    Basophil % 0.2 %    Lymph # 1.47 0.6 - 4.6 x10(3)/mcL    Neut # 3.04 2.1 - 9.2 x10(3)/mcL    Mono  # 0.41 0.1 - 1.3 x10(3)/mcL    Eos # 0.02 0 - 0.9 x10(3)/mcL    Baso # 0.01 <=0.2 x10(3)/mcL    IG# 0.03 0 - 0.04 x10(3)/mcL    IG% 0.6 %    NRBC% 0.0 %       Imaging:  None in past 24h     Assessment:  CKD  Hyperkalemia  Lupus nephritis   Diabetic nephropathy  Anemia of chronic disease  UTI  Type 2 diabetes mellitus  Paroxysmal atrial fibrillation on Eliquis  Hypertension  Right lower extremity DVT    Plan:  Continue current fluids  Lokelma given for hyperkalemia  UA and patient complaints suspicious for UTI/ She is on Bactrim but if abx could be changed would recommend this    Staff: Dr. Onel Saba, DO  Internal Medicine - PGY-3

## 2023-10-24 NOTE — PLAN OF CARE
"Spoke to Renata with [facility]. Patient has been accepted and she will be coming to the hospital to speak to patient. She stated that [facility] will assist her in trying to obtain a continuous monitor. They will need an order stating, "choose control specialty program" from [facility] to enroll her in their diabetic program. Dr. Marie has been updated.   "

## 2023-10-24 NOTE — PT/OT/SLP EVAL
Occupational Therapy   Evaluation and Discharge Note    Name: Vi Ulrich  MRN: [id number]  Admitting Diagnosis:   Final diagnoses:  [R00.2] Palpitations  [E16.2] Hypoglycemia (Primary)   Patient Active Problem List   Diagnosis    Essential hypertension    Diabetes mellitus, type 2    Insomnia    Hyperlipidemia    History of asthma    New onset atrial fibrillation    Periumbilical pain    Constipation    Iron deficiency anemia    Anxiety    Class 1 obesity with serious comorbidity and body mass index (BMI) of 32.0 to 32.9 in adult    Acute cystitis with hematuria    STIVEN (acute kidney injury)    Pyelonephritis    Inadequate dietary energy intake    Lupus    Fibromyalgia    Lupus nephritis, ISN/RPS class III    Asthma    Trigger middle finger of right hand    BMI 26.0-26.9,adult    Chronic obstructive pulmonary disease    Long term current use of insulin    Migraine aura without headache    Moderate recurrent major depression    Mood disorder    Raynaud's disease    Thyroiditis    Vitamin D deficiency    Acute deep vein thrombosis (DVT) of popliteal vein of right lower extremity    ILD (interstitial lung disease)    Diabetic glomerulosclerosis    Back pain      Recent Surgery: * No surgery found *      Recommendations:     Discharge Recommendations: No Therapy Indicated  Discharge Equipment Recommendations: shower chair, rollator, bedside commode  Barriers to discharge:  None    Assessment:     Vi Ulrich is a 57 y.o. female with a medical diagnosis of see above. At this time, patient is functioning at their prior level of function and does not require further acute OT services.     Plan:     During this hospitalization, patient does not require further acute OT services.  Please re-consult if situation changes.    Plan of Care Reviewed with: patient    Subjective     Chief Complaint: none stated  Patient/Family Comments/goals: I'm feeling great today    Occupational Profile:  Living Environment: Pt lives with  "daughter in first floor apt and no steps and tub shower combo and with shower chair  Previous level of function: Pt reported "when not with lupus flare up" , independent basic and I ADL's and ambulation  without AD household level and with rollator in community due to R knee giving out with long distance walking   Roles and Routines: Pt is not employed. Pt drives and shops, cooks and performs household chores .   Equipment Used at home: rollator, shower chair, bedside commode  Assistance upon Discharge: daughter    Pain/Comfort:  Pain Rating 1: 0/10  Pain Rating Post-Intervention 1: 0/10    Patients cultural, spiritual, Zoroastrianism conflicts given the current situation: no    Objective:     Communicated with: Nurse Sullivan  prior to session.  Patient found HOB elevated with telemetry, peripheral IV upon OT entry to room.    General Precautions: Standard, fall  Orthopedic Precautions: N/A  Braces: N/A  Respiratory Status: Room air     Occupational Performance:    Bed Mobility:    Patient completed Supine to Sit with independence  Patient completed Sit to Supine with independence    Functional Mobility/Transfers:  Patient completed Sit <> Stand Transfer with independence  with  no assistive device   Patient completed Toilet Transfer Step Transfer technique with independence with  no AD  Functional Mobility: independent ambulate in room for toileting and grooming at sink ; no LOB and steady pace    Activities of Daily Living:  Feeding:  independence    Grooming: independence standing at sink  Upper Body Dressing: independence    Lower Body Dressing: independence socks and pants   Toileting: independence clothing mgt and hygiene    Cognitive/Visual Perceptual:  Cognitive/Psychosocial Skills:     -       Oriented to: Person, Place, Time, and Situation   -       Follows Commands/attention:Follows multistep  commands  -       Safety awareness/insight to disability: intact   -       Mood/Affect/Coping skills/emotional " control: Cooperative and Pleasant    Physical Exam:    Pt is left hand dominant    B UE AROM WFL; tenderness and guarded  B shoulders with AROM ; B UE strength 3+/5 shoulders and WFL distally; coordination intact B hands. Sensation intact light touch B UE and hands    Treatment & Education:  Pt educated on UE HEP toweling on wall to achieve WNL AROM B shoulders  and pain free ROM . Pt with good understanding and able to perform.     Patient left HOB elevated with all lines intact, call button in reach, and nurse  notified    GOALS:   Multidisciplinary Problems       Occupational Therapy Goals       Not on file                    History:     Past Medical History:   Diagnosis Date    Arthritis     knees    Asthma     Atrial fibrillation     Chronic constipation     Diabetes mellitus     Encounter for blood transfusion 10/2014    GERD (gastroesophageal reflux disease)     Gout     Hypertension     Lupus 2004    SLE    Renal disorder     SLE (systemic lupus erythematosus)          Past Surgical History:   Procedure Laterality Date    APPENDECTOMY      CHOLECYSTECTOMY      COLONOSCOPY N/A 4/19/2018    Procedure: COLONOSCOPY;  Surgeon: Minerva Porras MD;  Location: [facility];  Service: Endoscopy;  Laterality: N/A;    ESOPHAGOGASTRODUODENOSCOPY N/A 4/19/2018    Procedure: ESOPHAGOGASTRODUODENOSCOPY (EGD);  Surgeon: Minerva Porras MD;  Location: [facility];  Service: Endoscopy;  Laterality: N/A;    HYSTERECTOMY      JOINT REPLACEMENT Left 08/07/2015    Knee    LYMPH NODE BIOPSY Left 2014    MASTECTOMY      Left arm- NO BP    PARTIAL HYSTERECTOMY         Time Tracking:     OT Date of Treatment: 10/24/23  OT Start Time: 1145  OT Stop Time: 1156  OT Total Time (min): 11 min    Billable Minutes:Evaluation 11 min    10/24/2023

## 2023-10-25 ENCOUNTER — TELEPHONE (OUTPATIENT)
Dept: INTERVENTIONAL RADIOLOGY/VASCULAR | Facility: HOSPITAL | Age: 58
End: 2023-10-25
Payer: MEDICAID

## 2023-10-25 LAB
ALBUMIN SERPL-MCNC: 2.8 G/DL (ref 3.5–5)
ALBUMIN/GLOB SERPL: 0.8 RATIO (ref 1.1–2)
ALP SERPL-CCNC: 87 UNIT/L (ref 40–150)
ALT SERPL-CCNC: 8 UNIT/L (ref 0–55)
AST SERPL-CCNC: 10 UNIT/L (ref 5–34)
BACTERIA UR CULT: NORMAL
BASOPHILS # BLD AUTO: 0.01 X10(3)/MCL
BASOPHILS NFR BLD AUTO: 0.2 %
BILIRUB SERPL-MCNC: 0.1 MG/DL
BUN SERPL-MCNC: 28.4 MG/DL (ref 9.8–20.1)
CALCIUM SERPL-MCNC: 8.9 MG/DL (ref 8.4–10.2)
CHLORIDE SERPL-SCNC: 110 MMOL/L (ref 98–107)
CO2 SERPL-SCNC: 20 MMOL/L (ref 22–29)
CREAT SERPL-MCNC: 1.11 MG/DL (ref 0.55–1.02)
EOSINOPHIL # BLD AUTO: 0.01 X10(3)/MCL (ref 0–0.9)
EOSINOPHIL NFR BLD AUTO: 0.2 %
ERYTHROCYTE [DISTWIDTH] IN BLOOD BY AUTOMATED COUNT: 16 % (ref 11.5–17)
GFR SERPLBLD CREATININE-BSD FMLA CKD-EPI: 59 MLS/MIN/1.73/M2
GLOBULIN SER-MCNC: 3.4 GM/DL (ref 2.4–3.5)
GLUCOSE SERPL-MCNC: 117 MG/DL (ref 74–100)
HCT VFR BLD AUTO: 30.1 % (ref 37–47)
HGB BLD-MCNC: 9.8 G/DL (ref 12–16)
IMM GRANULOCYTES # BLD AUTO: 0.02 X10(3)/MCL (ref 0–0.04)
IMM GRANULOCYTES NFR BLD AUTO: 0.4 %
LYMPHOCYTES # BLD AUTO: 1.51 X10(3)/MCL (ref 0.6–4.6)
LYMPHOCYTES NFR BLD AUTO: 26.7 %
MAGNESIUM SERPL-MCNC: 1.7 MG/DL (ref 1.6–2.6)
MCH RBC QN AUTO: 29.5 PG (ref 27–31)
MCHC RBC AUTO-ENTMCNC: 32.6 G/DL (ref 33–36)
MCV RBC AUTO: 90.7 FL (ref 80–94)
MONOCYTES # BLD AUTO: 0.62 X10(3)/MCL (ref 0.1–1.3)
MONOCYTES NFR BLD AUTO: 11 %
NEUTROPHILS # BLD AUTO: 3.48 X10(3)/MCL (ref 2.1–9.2)
NEUTROPHILS NFR BLD AUTO: 61.5 %
NRBC BLD AUTO-RTO: 0 %
PHOSPHATE SERPL-MCNC: 2.6 MG/DL (ref 2.3–4.7)
PLATELET # BLD AUTO: 368 X10(3)/MCL (ref 130–400)
PMV BLD AUTO: 10.1 FL (ref 7.4–10.4)
POCT GLUCOSE: 108 MG/DL (ref 70–110)
POCT GLUCOSE: 125 MG/DL (ref 70–110)
POCT GLUCOSE: 174 MG/DL (ref 70–110)
POCT GLUCOSE: 231 MG/DL (ref 70–110)
POCT GLUCOSE: 299 MG/DL (ref 70–110)
POTASSIUM SERPL-SCNC: 4.1 MMOL/L (ref 3.5–5.1)
PREALB SERPL-MCNC: 21 MG/DL (ref 16–38)
PROT SERPL-MCNC: 6.2 GM/DL (ref 6.4–8.3)
RBC # BLD AUTO: 3.32 X10(6)/MCL (ref 4.2–5.4)
SODIUM SERPL-SCNC: 139 MMOL/L (ref 136–145)
WBC # SPEC AUTO: 5.65 X10(3)/MCL (ref 4.5–11.5)

## 2023-10-25 PROCEDURE — 80053 COMPREHEN METABOLIC PANEL: CPT

## 2023-10-25 PROCEDURE — 63600175 PHARM REV CODE 636 W HCPCS

## 2023-10-25 PROCEDURE — 84100 ASSAY OF PHOSPHORUS: CPT

## 2023-10-25 PROCEDURE — 25000003 PHARM REV CODE 250: Performed by: STUDENT IN AN ORGANIZED HEALTH CARE EDUCATION/TRAINING PROGRAM

## 2023-10-25 PROCEDURE — 63600175 PHARM REV CODE 636 W HCPCS: Performed by: INTERNAL MEDICINE

## 2023-10-25 PROCEDURE — 85025 COMPLETE CBC W/AUTO DIFF WBC: CPT

## 2023-10-25 PROCEDURE — 96372 THER/PROPH/DIAG INJ SC/IM: CPT

## 2023-10-25 PROCEDURE — 84134 ASSAY OF PREALBUMIN: CPT | Performed by: INTERNAL MEDICINE

## 2023-10-25 PROCEDURE — 63600175 PHARM REV CODE 636 W HCPCS: Performed by: STUDENT IN AN ORGANIZED HEALTH CARE EDUCATION/TRAINING PROGRAM

## 2023-10-25 PROCEDURE — 25000003 PHARM REV CODE 250

## 2023-10-25 PROCEDURE — 83735 ASSAY OF MAGNESIUM: CPT

## 2023-10-25 PROCEDURE — 11000001 HC ACUTE MED/SURG PRIVATE ROOM

## 2023-10-25 PROCEDURE — 25000003 PHARM REV CODE 250: Performed by: INTERNAL MEDICINE

## 2023-10-25 RX ORDER — SODIUM CHLORIDE 0.9 % (FLUSH) 0.9 %
10 SYRINGE (ML) INJECTION
Status: DISCONTINUED | OUTPATIENT
Start: 2023-10-25 | End: 2023-10-26 | Stop reason: HOSPADM

## 2023-10-25 RX ORDER — ESCITALOPRAM OXALATE 10 MG/1
20 TABLET ORAL DAILY
Status: DISCONTINUED | OUTPATIENT
Start: 2023-10-26 | End: 2023-10-26 | Stop reason: HOSPADM

## 2023-10-25 RX ORDER — ALPRAZOLAM 0.25 MG/1
0.25 TABLET ORAL 2 TIMES DAILY PRN
Status: DISCONTINUED | OUTPATIENT
Start: 2023-10-25 | End: 2023-10-26 | Stop reason: HOSPADM

## 2023-10-25 RX ORDER — SODIUM CHLORIDE 0.9 % (FLUSH) 0.9 %
10 SYRINGE (ML) INJECTION EVERY 6 HOURS
Status: DISCONTINUED | OUTPATIENT
Start: 2023-10-26 | End: 2023-10-26 | Stop reason: HOSPADM

## 2023-10-25 RX ORDER — COSYNTROPIN 0.25 MG/ML
0.25 INJECTION, POWDER, FOR SOLUTION INTRAMUSCULAR; INTRAVENOUS ONCE
Status: DISCONTINUED | OUTPATIENT
Start: 2023-10-25 | End: 2023-10-25

## 2023-10-25 RX ORDER — DIPHENHYDRAMINE HYDROCHLORIDE 50 MG/ML
25 INJECTION INTRAMUSCULAR; INTRAVENOUS NIGHTLY
Status: DISCONTINUED | OUTPATIENT
Start: 2023-10-25 | End: 2023-10-26 | Stop reason: HOSPADM

## 2023-10-25 RX ORDER — SULFAMETHOXAZOLE AND TRIMETHOPRIM 800; 160 MG/1; MG/1
1 TABLET ORAL DAILY
Status: DISCONTINUED | OUTPATIENT
Start: 2023-10-26 | End: 2023-10-26 | Stop reason: HOSPADM

## 2023-10-25 RX ORDER — INSULIN ASPART 100 [IU]/ML
0-2 INJECTION, SOLUTION INTRAVENOUS; SUBCUTANEOUS
Status: DISCONTINUED | OUTPATIENT
Start: 2023-10-25 | End: 2023-10-26 | Stop reason: HOSPADM

## 2023-10-25 RX ADMIN — NITROFURANTOIN 100 MG: 25; 75 CAPSULE ORAL at 09:10

## 2023-10-25 RX ADMIN — MYCOPHENOLATE MOFETIL 1500 MG: 250 CAPSULE ORAL at 09:10

## 2023-10-25 RX ADMIN — PREDNISONE 30 MG: 20 TABLET ORAL at 09:10

## 2023-10-25 RX ADMIN — APIXABAN 5 MG: 2.5 TABLET, FILM COATED ORAL at 08:10

## 2023-10-25 RX ADMIN — LOSARTAN POTASSIUM 25 MG: 25 TABLET, FILM COATED ORAL at 09:10

## 2023-10-25 RX ADMIN — ENOXAPARIN SODIUM 70 MG: 80 INJECTION SUBCUTANEOUS at 09:10

## 2023-10-25 RX ADMIN — SODIUM BICARBONATE 650 MG TABLET 1300 MG: at 08:10

## 2023-10-25 RX ADMIN — SODIUM BICARBONATE 650 MG TABLET 1300 MG: at 09:10

## 2023-10-25 RX ADMIN — INSULIN ASPART 1 UNITS: 100 INJECTION, SOLUTION INTRAVENOUS; SUBCUTANEOUS at 04:10

## 2023-10-25 RX ADMIN — ATORVASTATIN CALCIUM 10 MG: 10 TABLET, FILM COATED ORAL at 09:10

## 2023-10-25 RX ADMIN — SUCRALFATE 1 G: 1 SUSPENSION ORAL at 06:10

## 2023-10-25 RX ADMIN — SUCRALFATE 1 G: 1 SUSPENSION ORAL at 12:10

## 2023-10-25 RX ADMIN — PANTOPRAZOLE SODIUM 40 MG: 40 TABLET, DELAYED RELEASE ORAL at 09:10

## 2023-10-25 RX ADMIN — ALPRAZOLAM 0.25 MG: 0.25 TABLET ORAL at 01:10

## 2023-10-25 RX ADMIN — SODIUM CHLORIDE, SODIUM LACTATE, POTASSIUM CHLORIDE, CALCIUM CHLORIDE AND DEXTROSE MONOHYDRATE: 5; 600; 310; 30; 20 INJECTION, SOLUTION INTRAVENOUS at 02:10

## 2023-10-25 RX ADMIN — MYCOPHENOLATE MOFETIL 1500 MG: 250 CAPSULE ORAL at 08:10

## 2023-10-25 RX ADMIN — SUCRALFATE 1 G: 1 SUSPENSION ORAL at 08:10

## 2023-10-25 RX ADMIN — ESCITALOPRAM OXALATE 10 MG: 10 TABLET ORAL at 09:10

## 2023-10-25 RX ADMIN — DIPHENHYDRAMINE HYDROCHLORIDE 25 MG: 50 INJECTION INTRAMUSCULAR; INTRAVENOUS at 09:10

## 2023-10-25 RX ADMIN — SUCRALFATE 1 G: 1 SUSPENSION ORAL at 04:10

## 2023-10-25 RX ADMIN — INSULIN ASPART 1 UNITS: 100 INJECTION, SOLUTION INTRAVENOUS; SUBCUTANEOUS at 08:10

## 2023-10-25 NOTE — PLAN OF CARE
10/25/23 0911   Discharge Assessment   Assessment Type Discharge Planning Assessment   Confirmed/corrected address, phone number and insurance Yes   Confirmed Demographics Correct on Facesheet   Source of Information patient   When was your last doctors appointment?   (PCP: Octaviano Barrios)   Does patient/caregiver understand observation status Yes   Communicated EWELINA with patient/caregiver Date not available/Unable to determine   Reason For Admission Palpitations; Hypoglycemia; Chest pain   People in Home child(adiel), adult   Facility Arrived From: Home   Do you expect to return to your current living situation? Yes   Do you have help at home or someone to help you manage your care at home? Yes   Who are your caregiver(s) and their phone number(s)? Zoila, mother, P: [phone]; Diana, son, P: [phone]; Wm DIL, P: [phone]   Prior to hospitilization cognitive status: Alert/Oriented;No Deficits   Current cognitive status: Alert/Oriented;No Deficits   Walking or Climbing Stairs ambulation difficulty, requires equipment   Mobility Management Rollator   Dressing/Bathing bathing difficulty, requires equipment   Dressing/Bathing Management SC   Home Accessibility wheelchair accessible   Home Layout Able to live on 1st floor   Equipment Currently Used at Home shower chair;rollator;bedside commode;glucometer   Readmission within 30 days? No   Patient currently being followed by outpatient case management? No   Do you currently have service(s) that help you manage your care at home? No   Do you take prescription medications? Yes   Do you have prescription coverage? Yes   Coverage LH   Do you have any problems affording any of your prescribed medications? No   Is the patient taking medications as prescribed? no   If no, which medications is patient not taking? Not taking insulin correctly   Who is going to help you get home at discharge? Family   How do you get to doctors appointments? car, drives self;family  or friend will provide   Are you on dialysis? No   Do you take coumadin? No   DME Needed Upon Discharge  none   Discharge Plan discussed with: Patient   Transition of Care Barriers None   OTHER   Name(s) of People in Home mother Cummings, P: [phone]; 30 year old autistic daughter     Patient is single; special needs daughter and elderly mother reside with patient; patient independent with ADL's

## 2023-10-25 NOTE — PROGRESS NOTES
"    Nephrology Consult Note    Chief Complaint:    Chief Complaint   Patient presents with    Hypoglycemia     Pt c/o "low blood sugar" after taking insulin. Pt also reports palpations       Reason for Consult:  STIVEN    HPI:   Vi Ulrich is a 57 y.o. year old female with PMH of  Arthritis, Asthma, Atrial fibrillation, Chronic constipation, Diabetes mellitus, Encounter for blood transfusion, GERD (gastroesophageal reflux disease), Gout, Hypertension, Lupus/lupus nephritis, diabetic nephropathy.  She was admitted to the hospital for hyperglycemia.  Nephrology is being consulted for STIVEN on CKD.    Interval history:  Vital signs stable.  Morning patient was complaining of feeling dizzy and lightheaded a common symptom f of hypoglycemia for her.  PMH:   Past Medical History:   Diagnosis Date    Arthritis     knees    Asthma     Atrial fibrillation     Chronic constipation     Diabetes mellitus     Encounter for blood transfusion 10/2014    GERD (gastroesophageal reflux disease)     Gout     Hypertension     Lupus 2004    SLE    Renal disorder     SLE (systemic lupus erythematosus)      PSH:   Past Surgical History:   Procedure Laterality Date    APPENDECTOMY      CHOLECYSTECTOMY      COLONOSCOPY N/A 4/19/2018    Procedure: COLONOSCOPY;  Surgeon: Minerva Porras MD;  Location: [facility];  Service: Endoscopy;  Laterality: N/A;    ESOPHAGOGASTRODUODENOSCOPY N/A 4/19/2018    Procedure: ESOPHAGOGASTRODUODENOSCOPY (EGD);  Surgeon: Minerva Porras MD;  Location: [facility];  Service: Endoscopy;  Laterality: N/A;    HYSTERECTOMY      JOINT REPLACEMENT Left 08/07/2015    Knee    LYMPH NODE BIOPSY Left 2014    MASTECTOMY      Left arm- NO BP    PARTIAL HYSTERECTOMY       FH:  Family History   Problem Relation Age of Onset    Heart block Mother     Arthritis Mother     Asthma Mother     Diabetes Mother     Heart disease Father     Diabetes Sister     Heart disease Sister     Kidney disease Sister      SH:   Social " History     Socioeconomic History    Marital status: Single    Years of education: 10th   Tobacco Use    Smoking status: Former     Current packs/day: 0.00     Types: Cigarettes     Start date: 1980     Quit date: 2022     Years since quittin.4    Smokeless tobacco: Never   Substance and Sexual Activity    Alcohol use: No    Drug use: No    Sexual activity: Not Currently     Birth control/protection: See Surgical Hx     Social Determinants of Health     Financial Resource Strain: Low Risk  (2023)    Overall Financial Resource Strain (CARDIA)     Difficulty of Paying Living Expenses: Not hard at all   Food Insecurity: No Food Insecurity (2023)    Hunger Vital Sign     Worried About Running Out of Food in the Last Year: Never true     Ran Out of Food in the Last Year: Never true   Transportation Needs: No Transportation Needs (2023)    PRAPARE - Transportation     Lack of Transportation (Medical): No     Lack of Transportation (Non-Medical): No   Physical Activity: Inactive (2023)    Exercise Vital Sign     Days of Exercise per Week: 0 days     Minutes of Exercise per Session: 0 min   Stress: No Stress Concern Present (2023)    [employer] - Occupational Stress Questionnaire     Feeling of Stress : Not at all   Social Connections: Socially Isolated (2023)    Social Connection and Isolation Panel [NHANES]     Frequency of Communication with Friends and Family: More than three times a week     Frequency of Social Gatherings with Friends and Family: Never     Attends Restorationism Services: Never     Active Member of Clubs or Organizations: No     Attends Club or Organization Meetings: Never     Marital Status: Never    Housing Stability: Low Risk  (2023)    Housing Stability Vital Sign     Unable to Pay for Housing in the Last Year: No     Number of Places Lived in the Last Year: 1     Unstable Housing in the Last Year: No     Allergies:    Review of patient's allergies indicates:   Allergen Reactions    Ketorolac (pf) Swelling    Mycophenolate mofetil Swelling    Penicillins Hives    Percocet [oxycodone-acetaminophen] Hives and Itching    Tramadol Hives      Medications:   Scheduled Meds:   apixaban  5 mg Oral BID    atorvastatin  10 mg Oral Daily    [START ON 10/26/2023] EScitalopram oxalate  20 mg Oral Daily    losartan  25 mg Oral Daily    mycophenolate  1,500 mg Oral BID    nitrofurantoin (macrocrystal-monohydrate)  100 mg Oral Q12H    pantoprazole  40 mg Oral Daily    predniSONE  30 mg Oral Daily    sodium bicarbonate  1,300 mg Oral BID    sucralfate  1 g Oral QID (AC & HS)      PRN Meds: ALPRAZolam, dextrose 10%, dextrose 10%, dextrose 10%, dextrose 10%, glucagon (human recombinant), glucose, glucose, insulin aspart U-100, naloxone, sodium chloride 0.9%  Continuous Infusions:           Physical Exam  Vitals:    10/25/23 1136   BP: 104/68   Pulse: 78   Resp:    Temp: 98.8 °F (37.1 °C)     Physical Exam  Vitals and nursing note reviewed.   Constitutional:       General: She is not in acute distress.     Appearance: Normal appearance. She is normal weight. She is not ill-appearing or toxic-appearing.   HENT:      Head: Normocephalic and atraumatic.      Mouth/Throat:      Mouth: Mucous membranes are moist.   Eyes:      Extraocular Movements: Extraocular movements intact.      Conjunctiva/sclera: Conjunctivae normal.      Pupils: Pupils are equal, round, and reactive to light.   Cardiovascular:      Rate and Rhythm: Normal rate and regular rhythm.      Pulses: Normal pulses.      Heart sounds: No murmur heard.  Pulmonary:      Effort: Pulmonary effort is normal. No respiratory distress.   Musculoskeletal:         General: No swelling.      Right lower leg: No edema.      Left lower leg: No edema.   Skin:     General: Skin is warm.   Neurological:      General: No focal deficit present.      Mental Status: She is alert and oriented to person,  place, and time.         Labs:  Recent Results (from the past 24 hour(s))   Basic Metabolic Panel    Collection Time: 10/24/23  2:41 PM   Result Value Ref Range    Sodium Level 136 136 - 145 mmol/L    Potassium Level 5.7 (H) 3.5 - 5.1 mmol/L    Chloride 111 (H) 98 - 107 mmol/L    Carbon Dioxide 18 (L) 22 - 29 mmol/L    Glucose Level 228 (H) 74 - 100 mg/dL    Blood Urea Nitrogen 25.4 (H) 9.8 - 20.1 mg/dL    Creatinine 1.18 (H) 0.55 - 1.02 mg/dL    BUN/Creatinine Ratio 22     Calcium Level Total 8.7 8.4 - 10.2 mg/dL    Anion Gap 7.0 mEq/L    eGFR 55 mls/min/1.73/m2   POCT glucose    Collection Time: 10/24/23  2:42 PM   Result Value Ref Range    POCT Glucose 205 (H) 70 - 110 mg/dL   Protein/Creatinine Ratio, Urine    Collection Time: 10/24/23  3:18 PM   Result Value Ref Range    Urine Protein Level 89.7 mg/dL    Urine Creatinine 41.0 (L) 45.0 - 106.0 mg/dL    Urine Protein/Creatinine Ratio 2.2    POCT glucose    Collection Time: 10/24/23  4:24 PM   Result Value Ref Range    POCT Glucose 209 (H) 70 - 110 mg/dL   POCT glucose    Collection Time: 10/24/23  9:28 PM   Result Value Ref Range    POCT Glucose 142 (H) 70 - 110 mg/dL   POCT glucose    Collection Time: 10/24/23 10:53 PM   Result Value Ref Range    POCT Glucose 116 (H) 70 - 110 mg/dL   POCT glucose    Collection Time: 10/24/23 11:38 PM   Result Value Ref Range    POCT Glucose 116 (H) 70 - 110 mg/dL   Comprehensive Metabolic Panel (CMP)    Collection Time: 10/25/23  4:24 AM   Result Value Ref Range    Sodium Level 139 136 - 145 mmol/L    Potassium Level 4.1 3.5 - 5.1 mmol/L    Chloride 110 (H) 98 - 107 mmol/L    Carbon Dioxide 20 (L) 22 - 29 mmol/L    Glucose Level 117 (H) 74 - 100 mg/dL    Blood Urea Nitrogen 28.4 (H) 9.8 - 20.1 mg/dL    Creatinine 1.11 (H) 0.55 - 1.02 mg/dL    Calcium Level Total 8.9 8.4 - 10.2 mg/dL    Protein Total 6.2 (L) 6.4 - 8.3 gm/dL    Albumin Level 2.8 (L) 3.5 - 5.0 g/dL    Globulin 3.4 2.4 - 3.5 gm/dL    Albumin/Globulin Ratio 0.8 (L)  1.1 - 2.0 ratio    Bilirubin Total 0.1 <=1.5 mg/dL    Alkaline Phosphatase 87 40 - 150 unit/L    Alanine Aminotransferase 8 0 - 55 unit/L    Aspartate Aminotransferase 10 5 - 34 unit/L    eGFR 59 mls/min/1.73/m2   Magnesium    Collection Time: 10/25/23  4:24 AM   Result Value Ref Range    Magnesium Level 1.70 1.60 - 2.60 mg/dL   Phosphorus    Collection Time: 10/25/23  4:24 AM   Result Value Ref Range    Phosphorus Level 2.6 2.3 - 4.7 mg/dL   CBC with Differential    Collection Time: 10/25/23  4:24 AM   Result Value Ref Range    WBC 5.65 4.50 - 11.50 x10(3)/mcL    RBC 3.32 (L) 4.20 - 5.40 x10(6)/mcL    Hgb 9.8 (L) 12.0 - 16.0 g/dL    Hct 30.1 (L) 37.0 - 47.0 %    MCV 90.7 80.0 - 94.0 fL    MCH 29.5 27.0 - 31.0 pg    MCHC 32.6 (L) 33.0 - 36.0 g/dL    RDW 16.0 11.5 - 17.0 %    Platelet 368 130 - 400 x10(3)/mcL    MPV 10.1 7.4 - 10.4 fL    Neut % 61.5 %    Lymph % 26.7 %    Mono % 11.0 %    Eos % 0.2 %    Basophil % 0.2 %    Lymph # 1.51 0.6 - 4.6 x10(3)/mcL    Neut # 3.48 2.1 - 9.2 x10(3)/mcL    Mono # 0.62 0.1 - 1.3 x10(3)/mcL    Eos # 0.01 0 - 0.9 x10(3)/mcL    Baso # 0.01 <=0.2 x10(3)/mcL    IG# 0.02 0 - 0.04 x10(3)/mcL    IG% 0.4 %    NRBC% 0.0 %   POCT glucose    Collection Time: 10/25/23  4:48 AM   Result Value Ref Range    POCT Glucose 108 70 - 110 mg/dL   Prealbumin    Collection Time: 10/25/23  7:52 AM   Result Value Ref Range    Prealbumin 21.0 16.0 - 38.0 mg/dL   POCT glucose    Collection Time: 10/25/23  9:09 AM   Result Value Ref Range    POCT Glucose 125 (H) 70 - 110 mg/dL   POCT glucose    Collection Time: 10/25/23 11:37 AM   Result Value Ref Range    POCT Glucose 174 (H) 70 - 110 mg/dL       Imaging:  None in past 24h     Assessment:  CKD  Hyperkalemia  Lupus nephritis   Diabetic nephropathy  Anemia of chronic disease  UTI  Type 2 diabetes mellitus  Paroxysmal atrial fibrillation on Eliquis  Hypertension  Right lower extremity DVT    Plan:  Primary has discontinued all foods at this point.  Recommend  continue to monitor renal function with CMP daily  Continue sodium bicarbonate b.i.d.    Staff: Dr. Onel Saba, DO  Internal Medicine - PGY-3

## 2023-10-25 NOTE — TELEPHONE ENCOUNTER
Dr. Arellano reviewed the referral for a repeat kidney and he doesn't think doing it again is needed. We relayed the info to Dr. Willett and she stated she will let the other physicians know as well.

## 2023-10-25 NOTE — CONSULTS
"Inpatient Nutrition Evaluation    Admit Date: 10/22/2023   Total duration of encounter: 3 days    Nutrition Recommendation/Prescription     Diabetic diet (1800 kcal); Double Protein, Omit juice/milk  Monitor Weight Weekly   Internal Referral to Nutrition for outpatient diabetic class      Nutrition Assessment     Chart Review    Reason Seen: physician consult for diabetic education    Malnutrition Screening Tool Results   Have you recently lost weight without trying?: No  Have you been eating poorly because of a decreased appetite?: No   MST Score: 0     Diagnosis:  DM, Hypoglycemia, STIVEN, DM vs Lupus nephritis, Hyperkalemia, DVT, HTN, SLE, Anxiety, HLD, Urge incontinence    Relevant Medical History: Arthritis, Asthma, Atrial fib, Chronic constipation, DM, GERD, Gout, HTN, Lupus, Renal disorder, SLE    Nutrition-Related Medications: atorvastatin, pantoprazole, Prednisone, sucralfate    Nutrition-Related Labs:  10/9/23 -- Hgb A1C 5.4  10/25/23 -- K 4.1, BUN 28.4 H, Cr 1.11 H, Glu 117    Diet Order: Diet diabetic Double Protein  Oral Supplement Order: none  Appetite/Oral Intake: good/% of meals  Factors Affecting Nutritional Intake: none identified  Food/Jainism/Cultural Preferences:  hamburgers and biscuits  Food Allergies: none reported       Wound(s):   skin intact    Comments    10/25/23 -- Pt with good appetite, requesting multiple burgers & biscuits -- explained diabetic diet & balancing carbs throughout the day, pt reports normally eating only 1 meal daily & being a picky eater; overall review of diabetic diet with explanation of portion sizes of carbohydrates; pt reports hypoglycemia, again explained importance of balancing carbs throughout the day for best glucose control; pt would benefit from ongoing diet education & reinforcement; will change diet to Diabetic, double protein for satiety, Omit juice/milk    Anthropometrics    Height: 5' 6" (167.6 cm) Height Method: Stated  Last Weight: 74.2 kg (163 " lb 9.3 oz) (10/23/23 0328) Weight Method: Bed Scale  BMI (Calculated): 26.4  BMI Classification: overweight (BMI 25-29.9)     Ideal Body Weight (IBW), Female: 130 lb     % Ideal Body Weight, Female (lb): 125.83 %                    Usual Body Weight (UBW), k kg  % Usual Body Weight: 100.48     Usual Weight Provided By: EMR weight history    Wt Readings from Last 5 Encounters:   10/23/23 74.2 kg (163 lb 9.3 oz)   10/16/23 73.6 kg (162 lb 3.2 oz)   10/11/23 73.5 kg (162 lb)   23 73.9 kg (163 lb)   23 72.4 kg (159 lb 9.6 oz)     Weight Change(s) Since Admission:  Admit Weight: 74.2 kg (163 lb 9.3 oz) (10/22/23 2126)      Patient Education    Education Provided: diabetic diet  Teaching Method: explanation  Comprehension: verbalizes understanding and needs reinforcement  Barriers to Learning: desire/motivation  Expected Compliance: fair  Comments: All questions were answered and dietitian's contact information was provided.     Monitoring & Evaluation     Dietitian will monitor food and beverage intake, weight change, and glucose/endocrine profile.  Nutrition Risk/Follow-Up: low (follow-up in 5-7 days)  Patients assigned 'low nutrition risk' status do not qualify for a full nutritional assessment but will be monitored and re-evaluated in a 5-7 day time period. Please consult if re-evaluation needed sooner.

## 2023-10-25 NOTE — PROGRESS NOTES
[facility] Wards   Progress Note     Resident Team: [facility] Medicine List 4  Attending Physician: Jean Paul Coyle MD  Resident: Brennon  Intern: Frank     Subjective:      Brief HPI:  Vi Ulrich is a 57 y.o. female who  has a past medical history of Arthritis, Asthma, Atrial fibrillation, Chronic constipation, Diabetes mellitus, Encounter for blood transfusion, GERD (gastroesophageal reflux disease), Gout, Hypertension, Lupus, Renal disorder, and SLE (systemic lupus erythematosus).  She presented to [facility] on 10/22/2023  with a primary complaint of hypoglycemia with weakness and fatigue. Patient states that she had a glucose of 159 in the evening and administered 20 units of Lantus along with 10 units of novolog. Started feeling weak and having headache and rechecked sugar and was low in 20s and came to ER.  Of note, patient had recent hospitalization for lupus flare and discharge summary states patient was on 80 units nightly but had hypoglycemic episodes in hospital and was discharged on 10 units long acting BID. In speaking with patient tonight, she states that she has had diabetes for many years and has only ever been on a sliding scale at home until the recent hospitalization where she was started on long acting for the first time. Patient states her glucose has been in 60s-80s ever since starting the long acting in the morning. States she has had decreased appetite for many month as well with due to being sick from lupus flares. Is still taking the prednisone 30 mg daily she was prescribed at discharge. Denies nausea, vomiting, blurry vision, urinary issues, chest pain, SOB.     In ED, afebrile with BP low 100s/60s. CBG 40 and received apple juice and D10 bolus with repeat <20 and then increased to 216 but continued to trend down and received second bolus with  as patient was being interviewed. Cr bump of 1.5. Internal medicine consulted for further control of hypoglycemia.     Interval History:   NAEON.  Reports she is feeling better. Denies shaky feeling during the morning but then mentions shakiness on team rounds. Sitting up and appears comfortable.    Review of Systems:  Pertinent ROS negative unless otherwise stated above.       Objective:     Vital Signs (Most Recent):  Temp: 98 °F (36.7 °C) (10/25/23 0659)  Pulse: 69 (10/25/23 0659)  Resp: 18 (10/24/23 2306)  BP: 130/75 (10/25/23 0659)  SpO2: 100 % (10/25/23 0659) Vital Signs (24h Range):  Temp:  [97.8 °F (36.6 °C)-98.7 °F (37.1 °C)] 98 °F (36.7 °C)  Pulse:  [57-73] 69  Resp:  [18] 18  SpO2:  [100 %] 100 %  BP: (101-157)/(51-80) 130/75       Physical Examination:  General: No acute distress   HEENT: Head-normocephalic and atraumatic  Respiratory: clear to auscultation bilaterally without wheezes, rales, rhonchi  Cardiovascular: regular rate and rhythm without murmurs.    Gastrointestinal: soft, non-tender, non-distended   Musculoskeletal: no lower extremity edema  Integumentary: no rashes or skin lesions present  Neurologic: alert and oriented, no signs of peripheral neurological deficit, motor/sensory function intact  Psychiatric: cognitive function intact, cooperative with exam      Laboratory:  Trended Lab Data:  Recent Labs   Lab 10/23/23  0428 10/24/23  0350 10/24/23  1441 10/25/23  0424   WBC 5.86 4.98  --  5.65   HGB 9.6* 9.8*  --  9.8*   HCT 29.9* 29.9*  --  30.1*    389  --  368   MCV 90.3 90.6  --  90.7   RDW 16.0 16.1  --  16.0    136 136 139   K 4.5 5.3* 5.7* 4.1   CO2 18* 18* 18* 20*   BUN 37.9* 29.4* 25.4* 28.4*   CREATININE 1.63* 1.13* 1.18* 1.11*   ALBUMIN 2.6* 2.7*  --  2.8*   BILITOT 0.2 0.1  --  0.1   AST 11 11  --  10   ALKPHOS 81 87  --  87   ALT 9 11  --  8           Other Results:  EKG:  Results for orders placed or performed during the hospital encounter of 10/22/23   EKG 12-lead    Collection Time: 10/24/23  6:57 AM    Narrative    Test Reason : E87.5,    Vent. Rate : 059 BPM     Atrial Rate : 059 BPM     P-R Int : 178 ms           QRS Dur : 108 ms      QT Int : 406 ms       P-R-T Axes : 068 042 066 degrees     QTc Int : 401 ms    Sinus bradycardia  Otherwise normal ECG  When compared with ECG of 22-OCT-2023 21:33,  Nonspecific T wave abnormality no longer evident in Lateral leads  Confirmed by Omar Calvert MD ([id number]) on 10/25/2023 1:35:39 AM    Referred By: KAL   SELF           Confirmed By:Omar Calvert MD       Radiology:  US Retroperitoneal Limited    Result Date: 10/23/2023  No significant sonographic abnormality of the kidneys. Electronically signed by: Jose Manuel Henao Date:    10/23/2023 Time:    12:25         Intake/Output Summary (Last 24 hours) at 10/25/2023 0826  Last data filed at 10/24/2023 1826  Gross per 24 hour   Intake 464.66 ml   Output --   Net 464.66 ml           Assessment & Plan:     T2DM  Hypoglycemia - likely iatrogenic  -most recent A1c 5.4 (10/23)  -on 10 units BID lantus and sliding scale novolog with decreased CBGS at home per patient  -glucose unstable in ED continuing to fluctuate   -received 2 D10 boluses   -started on D5 1/2 NS continuous infusion  -frequent glucose checks every 30 minutes in ED  -diabetic diet, low dose SSI  -consult nutrition, diabetic education, would also benefit from  HH diabetic education in the outpatient setting  -consulted endocrinology given persistent hypoglycemia: recommended prn insulin correction 1:100 points above 200  -consulted CM for continuous glucose monitor     STIVEN  DM vs lupus nephritis  -Cr elevated 1.5 on arrival baseline around 1.1  -suspect prerenal due to decreased PO intake   -Renal US negative for acute findings  -UA with 1+ protein, 500 leuk, 6-10 RBC, 51-99 WBC: consistent with UTI; will treat with nitrofurantoin as patient refusing bactrim  -Consulted Nephrology; lokelma given for hyperkalemia     Hyperkalemia  -K 5.3  -EKG without changes  -Lokelma given per renal    DVT  -continue Eliquis 5 mg BID      HTN  -normotensive on arrival  -continue home  losartan 25 mg daily      SLE  +Anticardiolipin  -continue prednisone 30 mg daily until 10/28/23 and bactrim prophylaxis   -patient continues to REFUSE bactrim because it give her vaginal burning after urination; states she will try again today  -continue home cellcept   -followed by    -Anticardiolipin antibodies positive  -transitioned from eliquis to full dose lovenox in anticipation of starting warfarin: Discussed transitioning to warfarin and after detailed explanation of continuous close follow up and labs, patient declined and stated she would not be able to make it to all appointments as she doesn't fell well at times and would not leave the house  -Will resume Eliquis    Anxiety  -patient described shakiness, may be related to high dose steroids  -increase escitalopram to 20mg daily  -start xanax 0.25 mg BID PRN     HLD  -continue home atorvastatin 10 mg daily      Urge incontinence  -consider trial of oxybutynin, will discuss further with patient before discharge       CODE STATUS: Full Code   Access: PIV  Antibiotics: Macrobid   Diet: Diet diabetic  DVT Prophylaxis: Eliquis  GI Prophylaxis: protonix  Fluids: None      Disposition: Vi Ulrich is a 57 y.o. female who is admitted for hypoglycemia, likely iatrogenic. Nephrology following. Will resume Eliquis as patient is not interested in transition to coumadin (see notes above). Per Endocrinology, will continue to hold scheduled insulin. Continue to monitor CBGs after discontinue D5, with likely discharge tomorrow.      Zabrina Willett MD  [facility] Internal Medicine, HO-2  10/25/2023

## 2023-10-25 NOTE — CONSULTS
[facility] Telemetry  Endocrinology  Consult Note    Inpatient consult to Endocrinology  Consult performed by: Dimitri Ward MD  Consult ordered by: Antoni Marie DO  Reason for consult: Diabetes mellitus type 2 with hypoglycemia          Reason for admit: Hypoglycemia    HISTORY OF PRESENT ILLNESS:  Pt is a 58 y/o w/ extensive PMHx significant for DMII w/ obesity in the past 10 yrs, SLE w/ CKD recently admitted for her disease and hypoglycemia.  Has been tx w/ increased glucocorticoids up from her baseline prednisone dose of 5mg which pt also states has been taking roughly a decade who was readmitted with hypoglycemia again.  Also on bactrim for PJP prophy and has been dealing with hypokalemia.    Pt again clarified was obese in the past.  Has lost weight with evidence of this continuing this year.  Pt states in the past her glucs increased her glucose and she would take prn insulin totaling 20-30u to bring it down.  Did this rarely.  Would wake up with shakes and symptoms.  Represented with hypoglycemia again this admission.  Team put her on D5, checks CBGs q4 and used prn insulin.      Medications and/or Treatments Impacting Glycemic Control:  Immunotherapy:    Immunosuppressants           Stop Route Frequency     mycophenolate capsule 1,500 mg         -- Oral 2 times daily          Steroids:   Hormones (From admission, onward)      Start     Stop Route Frequency Ordered    10/23/23 0900  predniSONE tablet 30 mg         -- Oral Daily 10/23/23 0053          Pressors:    Autonomic Drugs (From admission, onward)      None            Medications Prior to Admission   Medication Sig Dispense Refill Last Dose    albuterol (ACCUNEB) 0.63 mg/3 mL Nebu Take 3 mLs (0.63 mg total) by nebulization every 4 (four) hours as needed (wheezing). Rescue 75 mL 3     albuterol sulfate (PROAIR RESPICLICK) 90 mcg/actuation inhaler Inhale 1 puff into the lungs every 4 (four) hours as needed for Wheezing.  Rescue       apixaban (ELIQUIS) 5 mg Tab Take 1 tablet (5 mg total) by mouth 2 (two) times daily. 180 tablet 3     atorvastatin (LIPITOR) 10 MG tablet Take 10 mg by mouth once daily.       blood sugar diagnostic (ONETOUCH VERIO) Strp 1 each by Misc.(Non-Drug; Combo Route) route 4 (four) times daily. 50 each 11     blood sugar diagnostic Strp 1 each by Misc.(Non-Drug; Combo Route) route 4 (four) times daily. 50 each 11     blood sugar diagnostic Strp 1 strip by Misc.(Non-Drug; Combo Route) route 4 (four) times daily with meals and nightly. 200 each 6     blood-glucose meter kit Use as instructed 1 each 0     eletriptan (RELPAX) 40 MG tablet Take 1 tablet (40 mg total) by mouth as needed (take one at the beginning of migraine, may repeat in 2 h. max 2 in 24h). may repeat in 2 hours if necessary 9 tablet 5     ergocalciferol (ERGOCALCIFEROL) 50,000 unit Cap Take 1 capsule (50,000 Units total) by mouth every 7 days. for 12 doses 12 capsule 0     EScitalopram oxalate (LEXAPRO) 10 MG tablet Take 1 tablet (10 mg total) by mouth once daily. 30 tablet 5     HYDROcodone-acetaminophen (NORCO)  mg per tablet Take 1 tablet by mouth every 6 (six) hours as needed for Pain. 90 tablet 0     insulin (LANTUS SOLOSTAR U-100 INSULIN) glargine 100 units/mL SubQ pen Inject 10 Units into the skin 2 (two) times a day. 18 mL 3     lancets 28 gauge Misc 100 lancets by Misc.(Non-Drug; Combo Route) route 4 (four) times daily with meals and nightly. 100 each 8     losartan (COZAAR) 25 MG tablet Take 1 tablet (25 mg total) by mouth once daily. 90 tablet 3     multivitamin Tab Take 1 tablet by mouth once daily. 30 tablet 11     mycophenolate (CELLCEPT) 250 mg Cap Take 6 capsules (1,500 mg total) by mouth 2 (two) times daily. 360 capsule 11     ondansetron (ZOFRAN) 4 MG tablet TAKE 1 TABLET(4 MG) BY MOUTH EVERY 6 HOURS AS NEEDED FOR NAUSEA 24 tablet 0     pantoprazole (PROTONIX) 40 MG tablet Take 1 tablet (40 mg total) by mouth once daily. 90  tablet 3     predniSONE (DELTASONE) 10 MG tablet Take 3 tablets (30 mg total) by mouth once daily. for 14 days 42 tablet 0     sulfamethoxazole-trimethoprim 800-160mg (BACTRIM DS) 800-160 mg Tab Take 2 tablets by mouth once daily. for 14 days 28 tablet 0     voclosporin (LUPKYNIS) 7.9 mg Cap Take 23.7 mg by mouth once daily. 30 capsule 11     walker Misc Please dispense 1 Rollator 1 each 0        Current Facility-Administered Medications   Medication Dose Route Frequency Provider Last Rate Last Admin    atorvastatin tablet 10 mg  10 mg Oral Daily Janee Tirado MD   10 mg at 10/24/23 0842    dextrose 10% bolus 125 mL 125 mL  12.5 g Intravenous PRN Frederic Azar MD   Stopped at 10/22/23 2358    dextrose 10% bolus 125 mL 125 mL  12.5 g Intravenous PRN Janee Tirado MD        dextrose 10% bolus 250 mL 250 mL  25 g Intravenous PRN Frederic Azar MD   Stopped at 10/22/23 2233    dextrose 10% bolus 250 mL 250 mL  25 g Intravenous PRN Janee Tirado MD        enoxaparin injection 70 mg  1 mg/kg Subcutaneous Q12H (prophylaxis, 0900/2100) Antoni Marie DO        EScitalopram oxalate tablet 10 mg  10 mg Oral Daily Janee Tirado MD   10 mg at 10/24/23 0842    glucagon (human recombinant) injection 1 mg  1 mg Intramuscular PRN Janee Tirado MD        glucose chewable tablet 16 g  16 g Oral PRN Janee Tirado MD        glucose chewable tablet 24 g  24 g Oral PRN Janee Tirado MD        insulin aspart U-100 injection 0-2 Units  0-2 Units Subcutaneous QID (AC + HS) PRN Dimitri Ward MD        losartan tablet 25 mg  25 mg Oral Daily Janee Tirado MD   25 mg at 10/24/23 0842    mycophenolate capsule 1,500 mg  1,500 mg Oral BID Janee Tirado MD   1,500 mg at 10/24/23 2035    naloxone 0.4 mg/mL injection 0.02 mg  0.02 mg Intravenous PRN Janee Tirado MD        nitrofurantoin (macrocrystal-monohydrate) 100 MG capsule 100 mg  100 mg Oral Q12H Zabrina Willett MD   100 mg at 10/24/23 2035    pantoprazole EC tablet 40  "mg  40 mg Oral Daily Janee Tirado MD   40 mg at 10/24/23 0842    predniSONE tablet 30 mg  30 mg Oral Daily Janee Tirado MD   30 mg at 10/24/23 0842    sodium bicarbonate tablet 1,300 mg  1,300 mg Oral BID Hiwot Sykes MD   1,300 mg at 10/24/23 2034    sodium chloride 0.9% flush 10 mL  10 mL Intravenous Q12H PRN Janee Tirado MD        sucralfate 100 mg/mL suspension 1 g  1 g Oral QID (AC & HS) Antoni Marie DO   1 g at 10/25/23 0629    sulfamethoxazole-trimethoprim 800-160mg per tablet 2 tablet  2 tablet Oral Daily Janee Tirado MD           Review of Systems   Constitutional:  Positive for diaphoresis.       Labs Reviewed and Include:    Recent Labs   Lab 10/25/23  0424   CALCIUM 8.9   ALBUMIN 2.8*      K 4.1   CO2 20*   BUN 28.4*   CREATININE 1.11*   ALKPHOS 87   ALT 8   AST 10   BILITOT 0.1     Lab Results   Component Value Date    HGBA1C 5.4 10/09/2023       Nutritional status:   Body mass index is 26.4 kg/m².  Lab Results   Component Value Date    ALBUMIN 2.8 (L) 10/25/2023    ALBUMIN 2.7 (L) 10/24/2023    ALBUMIN 2.6 (L) 10/23/2023     No results found for: "PREALBUMIN"    TSH recently wnl.    Estimated Creatinine Clearance: 59 mL/min (A) (based on SCr of 1.11 mg/dL (H)).    POCT Glucose results:    Current Insulin Regimen:    PHYSICAL EXAMINATION:  Vitals:    10/25/23 0659   BP: 130/75   Pulse: 69   Resp:    Temp: 98 °F (36.7 °C)     Body mass index is 26.4 kg/m².    Physical Exam  Constitutional:       Appearance: Normal appearance.   HENT:      Head: Normocephalic and atraumatic.   Neurological:      Mental Status: She is alert.   Psychiatric:         Mood and Affect: Mood normal.         Behavior: Behavior normal.         ASSESSMENT and PLAN:    1:  DMII with hypoglycemia and hyperglycemia from glucs:  Pt has excellent control with some highs due to glucocorticoids.  I suspect the cause is that her insulin doses are too high relative to the response needed to mitigate her mild " hyperglycemia.  Other contributors could be her renal d/s and bactrim.  If AI is present, pt is well covered with prednisone 30mg daily.  Otherwise with her wt loss one has to consider nutrition issues so would check prealbumin.  Recommend stopping IV dextrose, changing to checking CBGs AC and HS, loosening correction scale.  If glucoses continue to go above goal off dextrose and prednisone is to remain at high doses, pt prefers to d/c on insulin so would change to NPH at d/c.    2:  Risk for AI including long term gluc use and progressive wt loss w/ hyperkalemia:  Check pre-albumin.  No need for troy stim as pt has risk factor for AI of chronic gluc use but is well covered should it be present.  Should her Rheumatologist ever which to reduce prednisone below 5mg and wean off, that would be the point to perform a troy stim to sort if pt is maintaining/recovering fxn of her adrenals as the doses are going down. There are reports of bactrim contributing to hyperkalemia and hypoglycemia in renal d/s.    Summary:  No scheduled insulin for now.  Prn insulin correction at 1:100 points above 200 (orders input)    Check prealbumin (orders input)    Change CBGs to AC and HS (orders input)    Stop IV dextrose with primary team to resume other fluids not continuing dextrose if appropriate    If pt is to need insulin post d/c, consider NPH at low doses titrated to cover for hyperglycemia while on high doses of glucs.    No troy stim for now (orders canceled)    Consider alternative PJP prophy as pt has been refusing bactrim and it can contribute to hyperkalemia and hypoglycemia      Plan discussed with patient.    Will follow.    75 minutes were spent in pt care.    Dimitri Ward MD  Endocrinology  [facility] Tele[facility]

## 2023-10-25 NOTE — PLAN OF CARE
Renata from [facility] has been provided with written prescription for a Dexcom and will assist patient with obtaining it.

## 2023-10-25 NOTE — PROGRESS NOTES
Nutrition Education    Education Provided: diabetic diet  Teaching Method: explanation  Comprehension: verbalizes understanding and needs reinforcement  Barriers to Learning: desire/motivation  Expected Compliance: fair  Comments: All questions were answered and dietitian's contact information was provided.

## 2023-10-25 NOTE — PLAN OF CARE
Problem: Adult Inpatient Plan of Care  Goal: Plan of Care Review  Outcome: Ongoing, Progressing  Flowsheets (Taken 10/25/2023 1436)  Plan of Care Reviewed With: patient  Goal: Absence of Hospital-Acquired Illness or Injury  Outcome: Ongoing, Progressing  Intervention: Identify and Manage Fall Risk  Flowsheets (Taken 10/25/2023 1436)  Safety Promotion/Fall Prevention:   assistive device/personal item within reach   nonskid shoes/socks when out of bed   lighting adjusted   medications reviewed   side rails raised x 2   instructed to call staff for mobility  Intervention: Prevent Skin Injury  Flowsheets (Taken 10/25/2023 1436)  Body Position: position changed independently  Skin Protection:   adhesive use limited   tubing/devices free from skin contact  Intervention: Prevent and Manage VTE (Venous Thromboembolism) Risk  Flowsheets (Taken 10/25/2023 1436)  Activity Management: Ambulated to bathroom - L4  VTE Prevention/Management:   remove, assess skin, and reapply sequential compression device   ambulation promoted   bleeding risk assessed  Intervention: Prevent Infection  Flowsheets (Taken 10/25/2023 1436)  Infection Prevention: hand hygiene promoted

## 2023-10-26 VITALS
DIASTOLIC BLOOD PRESSURE: 72 MMHG | HEART RATE: 66 BPM | OXYGEN SATURATION: 97 % | BODY MASS INDEX: 26.29 KG/M2 | RESPIRATION RATE: 18 BRPM | SYSTOLIC BLOOD PRESSURE: 126 MMHG | WEIGHT: 163.56 LBS | HEIGHT: 66 IN | TEMPERATURE: 99 F

## 2023-10-26 PROBLEM — E16.2 HYPOGLYCEMIA: Status: ACTIVE | Noted: 2023-10-26

## 2023-10-26 LAB
POCT GLUCOSE: 115 MG/DL (ref 70–110)
POCT GLUCOSE: 171 MG/DL (ref 70–110)
POCT GLUCOSE: 71 MG/DL (ref 70–110)

## 2023-10-26 PROCEDURE — 25000003 PHARM REV CODE 250

## 2023-10-26 PROCEDURE — 25000003 PHARM REV CODE 250: Performed by: STUDENT IN AN ORGANIZED HEALTH CARE EDUCATION/TRAINING PROGRAM

## 2023-10-26 PROCEDURE — A4216 STERILE WATER/SALINE, 10 ML: HCPCS

## 2023-10-26 PROCEDURE — C1751 CATH, INF, PER/CENT/MIDLINE: HCPCS

## 2023-10-26 PROCEDURE — 36410 VNPNXR 3YR/> PHY/QHP DX/THER: CPT

## 2023-10-26 PROCEDURE — 63600175 PHARM REV CODE 636 W HCPCS

## 2023-10-26 PROCEDURE — 25000003 PHARM REV CODE 250: Performed by: INTERNAL MEDICINE

## 2023-10-26 PROCEDURE — 63600175 PHARM REV CODE 636 W HCPCS: Performed by: INTERNAL MEDICINE

## 2023-10-26 PROCEDURE — 76937 US GUIDE VASCULAR ACCESS: CPT

## 2023-10-26 RX ORDER — ALPRAZOLAM 0.25 MG/1
0.25 TABLET ORAL 2 TIMES DAILY PRN
Qty: 14 TABLET | Refills: 0 | Status: SHIPPED | OUTPATIENT
Start: 2023-10-26 | End: 2023-11-16 | Stop reason: DRUGHIGH

## 2023-10-26 RX ORDER — INSULIN ASPART 100 [IU]/ML
2 INJECTION, SOLUTION INTRAVENOUS; SUBCUTANEOUS
Status: DISCONTINUED | OUTPATIENT
Start: 2023-10-26 | End: 2023-10-26 | Stop reason: HOSPADM

## 2023-10-26 RX ORDER — INSULIN ASPART 100 [IU]/ML
2 INJECTION, SOLUTION INTRAVENOUS; SUBCUTANEOUS
Qty: 1.8 ML | Refills: 3 | Status: SHIPPED | OUTPATIENT
Start: 2023-10-26 | End: 2024-03-05 | Stop reason: SDUPTHER

## 2023-10-26 RX ORDER — SULFAMETHOXAZOLE AND TRIMETHOPRIM 800; 160 MG/1; MG/1
1 TABLET ORAL DAILY
Qty: 90 TABLET | Refills: 0 | Status: SHIPPED | OUTPATIENT
Start: 2023-10-27 | End: 2024-01-23

## 2023-10-26 RX ORDER — PREDNISONE 10 MG/1
30 TABLET ORAL DAILY
Qty: 270 TABLET | Refills: 0 | Status: SHIPPED | OUTPATIENT
Start: 2023-10-27 | End: 2024-01-25

## 2023-10-26 RX ORDER — CHOLECALCIFEROL (VITAMIN D3) 25 MCG
1000 TABLET ORAL DAILY
Qty: 90 TABLET | Refills: 3 | Status: SHIPPED | OUTPATIENT
Start: 2023-10-26 | End: 2024-01-23

## 2023-10-26 RX ORDER — ESCITALOPRAM OXALATE 20 MG/1
20 TABLET ORAL DAILY
Qty: 90 TABLET | Refills: 3 | Status: SHIPPED | OUTPATIENT
Start: 2023-10-27 | End: 2024-10-26

## 2023-10-26 RX ADMIN — ALPRAZOLAM 0.25 MG: 0.25 TABLET ORAL at 10:10

## 2023-10-26 RX ADMIN — INSULIN ASPART 2 UNITS: 100 INJECTION, SOLUTION INTRAVENOUS; SUBCUTANEOUS at 12:10

## 2023-10-26 RX ADMIN — ESCITALOPRAM OXALATE 20 MG: 10 TABLET ORAL at 08:10

## 2023-10-26 RX ADMIN — SODIUM CHLORIDE, PRESERVATIVE FREE 10 ML: 5 INJECTION INTRAVENOUS at 12:10

## 2023-10-26 RX ADMIN — SUCRALFATE 1 G: 1 SUSPENSION ORAL at 06:10

## 2023-10-26 RX ADMIN — SODIUM CHLORIDE, PRESERVATIVE FREE 10 ML: 5 INJECTION INTRAVENOUS at 06:10

## 2023-10-26 RX ADMIN — SULFAMETHOXAZOLE AND TRIMETHOPRIM 1 TABLET: 800; 160 TABLET ORAL at 08:10

## 2023-10-26 RX ADMIN — PANTOPRAZOLE SODIUM 40 MG: 40 TABLET, DELAYED RELEASE ORAL at 08:10

## 2023-10-26 RX ADMIN — SUCRALFATE 1 G: 1 SUSPENSION ORAL at 10:10

## 2023-10-26 RX ADMIN — LOSARTAN POTASSIUM 25 MG: 25 TABLET, FILM COATED ORAL at 08:10

## 2023-10-26 RX ADMIN — MYCOPHENOLATE MOFETIL 1500 MG: 250 CAPSULE ORAL at 08:10

## 2023-10-26 RX ADMIN — ATORVASTATIN CALCIUM 10 MG: 10 TABLET, FILM COATED ORAL at 08:10

## 2023-10-26 RX ADMIN — APIXABAN 5 MG: 2.5 TABLET, FILM COATED ORAL at 08:10

## 2023-10-26 RX ADMIN — PREDNISONE 30 MG: 20 TABLET ORAL at 08:10

## 2023-10-26 RX ADMIN — SODIUM BICARBONATE 650 MG TABLET 1300 MG: at 08:10

## 2023-10-26 NOTE — PROGRESS NOTES
"    Nephrology Consult Note    Chief Complaint:    Chief Complaint   Patient presents with    Hypoglycemia     Pt c/o "low blood sugar" after taking insulin. Pt also reports palpations       Reason for Consult:  STIVEN    HPI:   Vi Ulrich is a 57 y.o. year old female with PMH of  Arthritis, Asthma, Atrial fibrillation, Chronic constipation, Diabetes mellitus, Encounter for blood transfusion, GERD (gastroesophageal reflux disease), Gout, Hypertension, Lupus/lupus nephritis, diabetic nephropathy.  She was admitted to the hospital for hyperglycemia.  Nephrology is being consulted for STIVEN on CKD.    Interval history:  Vital signs stable.  No acute complaints from patient this morning  PMH:   Past Medical History:   Diagnosis Date    Arthritis     knees    Asthma     Atrial fibrillation     Chronic constipation     Diabetes mellitus     Encounter for blood transfusion 10/2014    GERD (gastroesophageal reflux disease)     Gout     Hypertension     Lupus 2004    SLE    Renal disorder     SLE (systemic lupus erythematosus)      PSH:   Past Surgical History:   Procedure Laterality Date    APPENDECTOMY      CHOLECYSTECTOMY      COLONOSCOPY N/A 4/19/2018    Procedure: COLONOSCOPY;  Surgeon: Minerva Porras MD;  Location: [facility], [facility];  Service: Endoscopy;  Laterality: N/A;    ESOPHAGOGASTRODUODENOSCOPY N/A 4/19/2018    Procedure: ESOPHAGOGASTRODUODENOSCOPY (EGD);  Surgeon: Minerva Porras MD;  Location: [facility], [facility];  Service: Endoscopy;  Laterality: N/A;    HYSTERECTOMY      JOINT REPLACEMENT Left 08/07/2015    Knee    LYMPH NODE BIOPSY Left 2014    MASTECTOMY      Left arm- NO BP    PARTIAL HYSTERECTOMY       FH:  Family History   Problem Relation Age of Onset    Heart block Mother     Arthritis Mother     Asthma Mother     Diabetes Mother     Heart disease Father     Diabetes Sister     Heart disease Sister     Kidney disease Sister      SH:   Social History     Socioeconomic History    Marital status: Single    " Years of education: 10th   Tobacco Use    Smoking status: Former     Current packs/day: 0.00     Types: Cigarettes     Start date: 1980     Quit date: 2022     Years since quittin.4    Smokeless tobacco: Never   Substance and Sexual Activity    Alcohol use: No    Drug use: No    Sexual activity: Not Currently     Birth control/protection: See Surgical Hx     Social Determinants of Health     Financial Resource Strain: Low Risk  (2023)    Overall Financial Resource Strain (CARDIA)     Difficulty of Paying Living Expenses: Not hard at all   Food Insecurity: No Food Insecurity (2023)    Hunger Vital Sign     Worried About Running Out of Food in the Last Year: Never true     Ran Out of Food in the Last Year: Never true   Transportation Needs: No Transportation Needs (2023)    PRAPARE - Transportation     Lack of Transportation (Medical): No     Lack of Transportation (Non-Medical): No   Physical Activity: Inactive (2023)    Exercise Vital Sign     Days of Exercise per Week: 0 days     Minutes of Exercise per Session: 0 min   Stress: No Stress Concern Present (2023)    [employer] - Occupational Stress Questionnaire     Feeling of Stress : Not at all   Social Connections: Socially Isolated (2023)    Social Connection and Isolation Panel [NHANES]     Frequency of Communication with Friends and Family: More than three times a week     Frequency of Social Gatherings with Friends and Family: Never     Attends Buddhist Services: Never     Active Member of Clubs or Organizations: No     Attends Club or Organization Meetings: Never     Marital Status: Never    Housing Stability: Low Risk  (2023)    Housing Stability Vital Sign     Unable to Pay for Housing in the Last Year: No     Number of Places Lived in the Last Year: 1     Unstable Housing in the Last Year: No     Allergies:   Review of patient's allergies indicates:   Allergen Reactions     Ketorolac (pf) Swelling    Mycophenolate mofetil Swelling    Penicillins Hives    Percocet [oxycodone-acetaminophen] Hives and Itching    Tramadol Hives      Medications:   Scheduled Meds:   apixaban  5 mg Oral BID    atorvastatin  10 mg Oral Daily    diphenhydrAMINE  25 mg Intravenous QHS    EScitalopram oxalate  20 mg Oral Daily    insulin aspart U-100  2 Units Subcutaneous with lunch    losartan  25 mg Oral Daily    mycophenolate  1,500 mg Oral BID    pantoprazole  40 mg Oral Daily    predniSONE  30 mg Oral Daily    sodium bicarbonate  1,300 mg Oral BID    sodium chloride 0.9%  10 mL Intravenous Q6H    sucralfate  1 g Oral QID (AC & HS)    sulfamethoxazole-trimethoprim 800-160mg  1 tablet Oral Daily      PRN Meds: ALPRAZolam, dextrose 10%, dextrose 10%, dextrose 10%, dextrose 10%, glucagon (human recombinant), glucose, glucose, insulin aspart U-100, naloxone, sodium chloride 0.9%, Flushing PICC/Midline Protocol **AND** sodium chloride 0.9% **AND** sodium chloride 0.9%  Continuous Infusions:           Physical Exam  Vitals:    10/26/23 1102   BP: 126/72   Pulse: 66   Resp:    Temp: 98.6 °F (37 °C)     Physical Exam  Vitals and nursing note reviewed.   Constitutional:       General: She is not in acute distress.     Appearance: Normal appearance. She is normal weight. She is not ill-appearing or toxic-appearing.   HENT:      Head: Normocephalic and atraumatic.      Mouth/Throat:      Mouth: Mucous membranes are moist.   Eyes:      Extraocular Movements: Extraocular movements intact.      Conjunctiva/sclera: Conjunctivae normal.      Pupils: Pupils are equal, round, and reactive to light.   Cardiovascular:      Rate and Rhythm: Normal rate and regular rhythm.      Pulses: Normal pulses.      Heart sounds: No murmur heard.  Pulmonary:      Effort: Pulmonary effort is normal. No respiratory distress.   Musculoskeletal:         General: No swelling.      Right lower leg: No edema.      Left lower leg: No edema.    Skin:     General: Skin is warm.   Neurological:      General: No focal deficit present.      Mental Status: She is alert and oriented to person, place, and time.         Labs:  Recent Results (from the past 24 hour(s))   POCT glucose    Collection Time: 10/25/23 11:37 AM   Result Value Ref Range    POCT Glucose 174 (H) 70 - 110 mg/dL   POCT glucose    Collection Time: 10/25/23  3:02 PM   Result Value Ref Range    POCT Glucose 299 (H) 70 - 110 mg/dL   POCT glucose    Collection Time: 10/25/23  7:40 PM   Result Value Ref Range    POCT Glucose 231 (H) 70 - 110 mg/dL   POCT glucose    Collection Time: 10/26/23  7:11 AM   Result Value Ref Range    POCT Glucose 71 70 - 110 mg/dL   POCT glucose    Collection Time: 10/26/23 10:57 AM   Result Value Ref Range    POCT Glucose 115 (H) 70 - 110 mg/dL       Imaging:  None in past 24h     Assessment:  CKD  Hyperkalemia  Lupus nephritis   Diabetic nephropathy  Anemia of chronic disease  UTI  Type 2 diabetes mellitus  Paroxysmal atrial fibrillation on Eliquis  Hypertension  Right lower extremity DVT    Plan:  Continue current management per primary.    Will sign off    Staff: Dr. Onel Saba, DO  Internal Medicine - PGY-3       Addendum:  Renal will sign off thank you allowing us participate in this patient's care  IONA Narayan D.  Nephrology

## 2023-10-26 NOTE — PLAN OF CARE
Problem: Violence Risk or Actual  Goal: Anger and Impulse Control  Outcome: Ongoing, Progressing     Problem: Adult Inpatient Plan of Care  Goal: Plan of Care Review  Outcome: Ongoing, Progressing  Goal: Patient-Specific Goal (Individualized)  Outcome: Ongoing, Progressing  Goal: Absence of Hospital-Acquired Illness or Injury  Outcome: Ongoing, Progressing  Goal: Optimal Comfort and Wellbeing  Outcome: Ongoing, Progressing  Goal: Readiness for Transition of Care  Outcome: Ongoing, Progressing     Problem: Diabetes Comorbidity  Goal: Blood Glucose Level Within Targeted Range  Outcome: Ongoing, Progressing     Problem: Infection  Goal: Absence of Infection Signs and Symptoms  Outcome: Ongoing, Progressing     Problem: Renal Function Impairment (Acute Kidney Injury/Impairment)  Goal: Effective Renal Function  Outcome: Ongoing, Progressing     Problem: Oral Intake Inadequate (Acute Kidney Injury/Impairment)  Goal: Optimal Nutrition Intake  Outcome: Ongoing, Progressing

## 2023-10-26 NOTE — PROCEDURES
"Vi Ulrich is a 57 y.o. female patient.    Temp: 98.9 °F (37.2 °C) (10/25/23 1933)  Pulse: 77 (10/25/23 1933)  Resp: 18 (10/25/23 1933)  BP: 121/79 (10/25/23 1933)  SpO2: 100 % (10/25/23 1933)  Weight: 74.2 kg (163 lb 9.3 oz) (10/23/23 0328)  Height: 5' 6" (167.6 cm) (10/23/23 0328)    PICC  Date/Time: 10/26/2023 12:16 AM  Performed by: Brendon Thomas RN  Consent Done: Yes  Time out: Immediately prior to procedure a time out was called to verify the correct patient, procedure, equipment, support staff and site/side marked as required  Indications: vascular access  Anesthesia: local infiltration  Local anesthetic: lidocaine 1% without epinephrine  Anesthetic Total (mL): 3  Description of findings: RUE Midline placed per order  Preparation: skin prepped with ChloraPrep  Skin prep agent dried: skin prep agent completely dried prior to procedure  Sterile barriers: all five maximum sterile barriers used - cap, mask, sterile gown, sterile gloves, and large sterile sheet  Hand hygiene: hand hygiene performed prior to central venous catheter insertion  Location details: right basilic  Catheter type: single lumen  Catheter size: 4 Fr  Catheter Length: 17cm    Ultrasound guidance: yes  Vessel Caliber: medium and patent, compressibility normal  Needle advanced into vessel with real time Ultrasound guidance.  Guidewire confirmed in vessel.  Sterile sheath used.  Number of attempts: 1  Post-procedure: blood return through all ports, chlorhexidine patch and sterile dressing applied    Assessment: successful placement          Name ELIAS Thomas RN [facility]  10/26/2023    "

## 2023-10-26 NOTE — PROGRESS NOTES
"[facility] Telemetry  Endocrinology  Progress Note    Admit Date: 10/22/2023     Pt is a 56 y/o w/ extensive PMHx significant for DMII w/ obesity in the past 10 yrs, SLE w/ CKD recently admitted for her disease and hypoglycemia.  Has been tx w/ increased glucocorticoids up from her baseline prednisone dose of 5mg which pt also states has been taking roughly a decade who was readmitted with hypoglycemia again.  Also on bactrim for PJP prophy and has been dealing with hypokalemia.    Interval HPI:   Overnight events:    Yesterday backed off on SSI, stopped IV dextrose, prealbumin came back wnl.  Did not have hypoglycemia.        /74   Pulse 62   Temp 98.6 °F (37 °C) (Oral)   Resp 18   Ht 5' 6" (1.676 m)   Wt 74.2 kg (163 lb 9.3 oz)   LMP  (LMP Unknown)   SpO2 100%   BMI 26.40 kg/m²     Labs Reviewed and Include    No results for input(s): "GLU", "CALCIUM", "ALBUMIN", "PROT", "NA", "K", "CO2", "CL", "BUN", "CREATININE", "ALKPHOS", "ALT", "AST", "BILITOT" in the last 24 hours.  Lab Results   Component Value Date    WBC 5.65 10/25/2023    HGB 9.8 (L) 10/25/2023    HCT 30.1 (L) 10/25/2023    MCV 90.7 10/25/2023     10/25/2023     No results for input(s): "TSH", "FREET4" in the last 168 hours.  Lab Results   Component Value Date    HGBA1C 5.4 10/09/2023       Nutritional status:   Body mass index is 26.4 kg/m².  Lab Results   Component Value Date    ALBUMIN 2.8 (L) 10/25/2023    ALBUMIN 2.7 (L) 10/24/2023    ALBUMIN 2.6 (L) 10/23/2023     Prealbumin wnl    Estimated Creatinine Clearance: 59 mL/min (A) (based on SCr of 1.11 mg/dL (H)).    Accu-Checks  Recent Labs     10/24/23  1624 10/24/23  2128 10/24/23  2253 10/24/23  2338 10/25/23  0448 10/25/23  0909 10/25/23  1137 10/25/23  1502 10/25/23  1940 10/26/23  0711   POCTGLUCOSE 209* 142* 116* 116* 108 125* 174* 299* 231* 71       Current Medications and/or Treatments Impacting Glycemic Control  Immunotherapy:  "   Immunosuppressants           Stop Route Frequency     mycophenolate capsule 1,500 mg         -- Oral 2 times daily          Steroids:   Hormones (From admission, onward)      Start     Stop Route Frequency Ordered    10/23/23 0900  predniSONE tablet 30 mg         -- Oral Daily 10/23/23 0053          Pressors:    Autonomic Drugs (From admission, onward)      None          Hyperglycemia/Diabetes Medications:   Antihyperglycemics (From admission, onward)      Start     Stop Route Frequency Ordered    10/26/23 1130  insulin aspart U-100 injection 2 Units         -- SubQ with lunch 10/26/23 0910    10/25/23 0727  insulin aspart U-100 injection 0-2 Units         -- SubQ Before meals & nightly PRN 10/25/23 0729            ASSESSMENT and PLAN    1:  Diabetes mellitus exacerbated by glucocorticoids:    Continue prn sliding scale scale insulin and remain off IV dextrose.  Started scheduled aspart 2u before lunch.  If this works to control glucose, may be able to do this at home rather than NPH while taking high doses of prednisone.  Once prednisone doses go down may not need insulin.    2:  Chronic gluc use:      Imparts risk for AI, if doses ever go below 5 of prednisone per day, provider can involve Endocrinology to assess for long term adrenal insufficiency risk and mgmt.      Dimitri Ward MD  Endocrinology  [facility]

## 2023-10-26 NOTE — DISCHARGE SUMMARY
[facility] Internal Medicine Discharge Summary    Admitting Physician: Jean Paul Coyle MD  Attending Physician: Jean Paul Coyle MD  Date of Admit: 10/22/2023  Date of Discharge: 10/26/2023    Condition: Stable  Outcome: Patient tolerated treatment/procedure well without complication and is now ready for discharge.  DISPOSITION: Home or Self Care      Discharge Diagnoses:     Patient Active Problem List   Diagnosis    Essential hypertension    Diabetes mellitus, type 2    Insomnia    Hyperlipidemia    History of asthma    New onset atrial fibrillation    Periumbilical pain    Constipation    Iron deficiency anemia    Anxiety    Class 1 obesity with serious comorbidity and body mass index (BMI) of 32.0 to 32.9 in adult    Acute cystitis with hematuria    STIVEN (acute kidney injury)    Pyelonephritis    Inadequate dietary energy intake    Lupus    Fibromyalgia    Lupus nephritis, ISN/RPS class III    Asthma    Trigger middle finger of right hand    BMI 26.0-26.9,adult    Chronic obstructive pulmonary disease    Long term current use of insulin    Migraine aura without headache    Moderate recurrent major depression    Mood disorder    Raynaud's disease    Thyroiditis    Vitamin D deficiency    Acute deep vein thrombosis (DVT) of popliteal vein of right lower extremity    ILD (interstitial lung disease)    Diabetic glomerulosclerosis    Back pain    Hypoglycemia       Principal Problem:  Hypoglycemia    Consultants and Procedures:     Consultants:  IP CONSULT TO INTERNAL MEDICINE  IP CONSULT TO NEPHROLOGY  IP CONSULT TO SOCIAL WORK/CASE MANAGEMENT  IP CONSULT TO SOCIAL WORK/CASE MANAGEMENT  IP CONSULT TO ENDOCRINOLOGY  IP CONSULT TO SOCIAL WORK/CASE MANAGEMENT  IP CONSULT TO REGISTERED DIETITIAN/NUTRITIONIST  IP CONSULT TO DIABETES EDUCATOR  IP CONSULT TO MIDLINE TEAM    Procedures:   * No surgery found *      Brief Admission History:      Vi Ulrich is a 57 y.o. female who  has a past medical history of Arthritis,  Asthma, Atrial fibrillation, Chronic constipation, Diabetes mellitus, Encounter for blood transfusion, GERD (gastroesophageal reflux disease), Gout, Hypertension, Lupus, Renal disorder, and SLE (systemic lupus erythematosus).  She [facility] to [facility]  with a primary complaint of hypoglycemia with weakness and fatigue. Patient states that she had a glucose of 159 in the evening and administered 20 units of Lantus along with 10 units of novolog. Started feeling weak and having headache and rechecked sugar and was low in 20s and came to ER.  Of note, patient had recent hospitalization for lupus flare and discharge summary states patient was on 80 units nightly but had hypoglycemic episodes in hospital and was discharged on 10 units long acting BID. In speaking with patient tonight, she states that she has had diabetes for many years and has only ever been on a sliding scale at home until the recent hospitalization where she was started on long acting for the first time. Patient states her glucose has been in 60s-80s ever since starting the long acting in the morning. States she has had decreased appetite for many month as well with due to being sick from lupus flares. Is still taking the prednisone 30 mg daily she was prescribed at discharge. Denies nausea, vomiting, blurry vision, urinary issues, chest pain, SOB.     In ED, afebrile with BP low 100s/60s. CBG 40 and received apple juice and D10 bolus with repeat <20 and then increased to 216 but continued to trend down and received second bolus with  as patient was being interviewed. Cr bump of 1.5. Internal medicine consulted for further control of hypoglycemia.     Hospital Course with Pertinent Findings:      Given persistent hypoglycemia, scheduled insulin was discontinued, and patient was placed on a low dose sliding scale. Unclear is patient was taking more insulin at home than recommended at prior discharge. C-peptide low, so hypoglycemia  "appears to be iatrogenic. Endocrinology consulted with ultimate recommendation for aspart 2u before lunch as patient was hyperglycemic during the day but remain low normal glucose in the morning. Patient continued to complain of "shakiness" even with normal glucose. She tolerated a trial of low dose xanax 0.25 mg BID with improvement in anxious feelings. Escitalopram was also increased to 20 mg daily.  arranged diabetes education, so patient will be follow up on discharge. Awaiting continuous glucose monitoring.    Discussed starting warfarin given positive anticardiolipin antibodies. However, patient stated she could not make it to multiple appointments. Decision made to continue Eliquis.     Discharge physical exam:  Vitals  BP: 126/72  Temp: 98.6 °F (37 °C)  Temp Source: Oral  Pulse: 66  Resp: 18  SpO2: 97 %  Height: 5' 6" (167.6 cm)  Weight: 74.2 kg (163 lb 9.3 oz)    General: No acute distress   HEENT: Head-normocephalic and atraumatic  Respiratory: clear to auscultation bilaterally without wheezes, rales, rhonchi  Cardiovascular: regular rate and rhythm without murmurs.    Gastrointestinal: soft, non-tender, non-distended   Musculoskeletal: no lower extremity edema  Integumentary: no rashes or skin lesions present  Neurologic: alert and oriented, no signs of peripheral neurological deficit, motor/sensory function intact  Psychiatric: cognitive function intact, cooperative with exam    TIME SPENT ON DISCHARGE: 35 minutes    Discharge Medications:         Medication List        START taking these medications      ALPRAZolam 0.25 MG tablet  Commonly known as: XANAX  Take 1 tablet (0.25 mg total) by mouth 2 (two) times daily as needed for Anxiety ("Shakiness" per patient).     insulin aspart U-100 100 unit/mL injection  Commonly known as: NovoLOG  Inject 2 Units into the skin with lunch.     vitamin D 1000 units Tab  Commonly known as: VITAMIN D3  Take 1 tablet (1,000 Units total) by mouth once daily.        "     CHANGE how you take these medications      EScitalopram oxalate 20 MG tablet  Commonly known as: LEXAPRO  Take 1 tablet (20 mg total) by mouth once daily.  Start taking on: October 27, 2023  What changed:   medication strength  how much to take     * predniSONE 10 MG tablet  Commonly known as: DELTASONE  Take 3 tablets (30 mg total) by mouth once daily. for 14 days  What changed: Another medication with the same name was added. Make sure you understand how and when to take each.     * predniSONE 10 MG tablet  Commonly known as: DELTASONE  Take 3 tablets (30 mg total) by mouth once daily.  Start taking on: October 27, 2023  What changed: You were already taking a medication with the same name, and this prescription was added. Make sure you understand how and when to take each.     sulfamethoxazole-trimethoprim 800-160mg 800-160 mg Tab  Commonly known as: BACTRIM DS  Take 1 tablet by mouth once daily.  Start taking on: October 27, 2023  What changed: how much to take           * This list has 2 medication(s) that are the same as other medications prescribed for you. Read the directions carefully, and ask your doctor or other care provider to review them with you.                CONTINUE taking these medications      apixaban 5 mg Tab  Commonly known as: ELIQUIS  Take 1 tablet (5 mg total) by mouth 2 (two) times daily.     atorvastatin 10 MG tablet  Commonly known as: LIPITOR  Take 10 mg by mouth once daily.     * blood sugar diagnostic Strp  1 each by Misc.(Non-Drug; Combo Route) route 4 (four) times daily.     * blood sugar diagnostic Strp  Commonly known as: ONETOUCH VERIO TEST STRIPS  1 each by Misc.(Non-Drug; Combo Route) route 4 (four) times daily.     * blood sugar diagnostic Strp  1 strip by Misc.(Non-Drug; Combo Route) route 4 (four) times daily with meals and nightly.     blood-glucose meter kit  Use as instructed     eletriptan 40 MG tablet  Commonly known as: RELPAX  Take 1 tablet (40 mg total) by mouth  as needed (take one at the beginning of migraine, may repeat in 2 h. max 2 in 24h). may repeat in 2 hours if necessary     HYDROcodone-acetaminophen  mg per tablet  Commonly known as: NORCO  Take 1 tablet by mouth every 6 (six) hours as needed for Pain.     lancets 28 gauge Misc  100 lancets by Misc.(Non-Drug; Combo Route) route 4 (four) times daily with meals and nightly.     losartan 25 MG tablet  Commonly known as: COZAAR  Take 1 tablet (25 mg total) by mouth once daily.     LUPKYNIS 7.9 mg Cap  Generic drug: voclosporin  Take 23.7 mg by mouth once daily.     multivitamin Tab  Take 1 tablet by mouth once daily.     mycophenolate 250 mg Cap  Commonly known as: CELLCEPT  Take 6 capsules (1,500 mg total) by mouth 2 (two) times daily.     ondansetron 4 MG tablet  Commonly known as: ZOFRAN  TAKE 1 TABLET(4 MG) BY MOUTH EVERY 6 HOURS AS NEEDED FOR NAUSEA     pantoprazole 40 MG tablet  Commonly known as: PROTONIX  Take 1 tablet (40 mg total) by mouth once daily.     * PROAIR RESPICLICK 90 mcg/actuation inhaler  Generic drug: albuterol sulfate  Inhale 1 puff into the lungs every 4 (four) hours as needed for Wheezing. Rescue     * albuterol 0.63 mg/3 mL Nebu  Commonly known as: ACCUNEB  Take 3 mLs (0.63 mg total) by nebulization every 4 (four) hours as needed (wheezing). Rescue     walker Misc  Please dispense 1 Rollator           * This list has 5 medication(s) that are the same as other medications prescribed for you. Read the directions carefully, and ask your doctor or other care provider to review them with you.                STOP taking these medications      ergocalciferol 50,000 unit Cap  Commonly known as: ERGOCALCIFEROL     insulin glargine 100 units/mL SubQ pen  Commonly known as: LANTUS SOLOSTAR U-100 INSULIN                Discharge Instructions:         Vi Ulrich is being discharged Home or Self Care.    Discharge Procedure Orders   Continuous Glucose Monitoring for Home Use (E  "vendor/non-pharmacy)     Order Specific Question Answer Comments   Height: 5' 6" (1.676 m)    Weight: 74.2 kg (163 lb 9.3 oz)    Length of need (1-99 months): 99    Is the patient currently on Insulin Pump Therapy? No    CGM Preferred Brand: Dexcom G6 ( + )    Supplies needed ():  (1)    Supplies needed (): Sensors (3 per month)    Supplies needed (): Transmitter (1 every 3 months)    Diagnosis Code: Type 2 diabetes mellitus with hypoglycemia [455760]    Clinical Considerations (Check all that apply): Patient administers 3+ injections per day    Clinical Considerations (Check all that apply): Patients insulin treatment requires frequent adjustment by patient on the basis of BGM or CGM testing results    Clinical Considerations (Check all that apply): History of hypoglycemia unawareness, severed hypoglycemia resulting in third party intervention and/or hospitalization/paramedical treatment    Clinical Considerations (Check all that apply): Patients motivated and knowledgeable to use CGM and adheres to a diabetes treatment plan      Ambulatory referral/consult to Internal Medicine   Standing Status: Future   Referral Priority: Routine Referral Type: Consultation   Referral Reason: Specialty Services Required   Requested Specialty: Internal Medicine   Number of Visits Requested: 1        Follow-Up Appointments:   Follow-up Information       Octaviano Barrios MD Follow up.    Specialty: Internal Medicine  [facility] information:  [address] [facility] [facility] [id number] [phone]                               To address at follow-up:  -CBG log    At this time, Vi Ulrich is determined to have maximized benefits of IP hospitalization. she is discharged in stable condition with OP f/u recommendations and instructions. All questions answered, and patient verbalized agreement with the POC. They were given return precautions prior to d/c including symptoms that should prompt return to ED or " to call PCP. Total time spent of DC of 35 minutes.       Zabrina Willett MD  [facility]  10/26/2023

## 2023-11-01 ENCOUNTER — INFUSION (OUTPATIENT)
Dept: INFUSION THERAPY | Facility: HOSPITAL | Age: 58
End: 2023-11-01
Attending: NURSE PRACTITIONER
Payer: MEDICAID

## 2023-11-01 VITALS
SYSTOLIC BLOOD PRESSURE: 146 MMHG | DIASTOLIC BLOOD PRESSURE: 80 MMHG | TEMPERATURE: 99 F | HEART RATE: 68 BPM | HEIGHT: 66 IN | WEIGHT: 179.88 LBS | OXYGEN SATURATION: 100 % | BODY MASS INDEX: 28.91 KG/M2 | RESPIRATION RATE: 20 BRPM

## 2023-11-01 DIAGNOSIS — M32.9 LUPUS: ICD-10-CM

## 2023-11-01 DIAGNOSIS — M32.14 LUPUS NEPHRITIS, ISN/RPS CLASS III: Primary | ICD-10-CM

## 2023-11-01 PROCEDURE — 96413 CHEMO IV INFUSION 1 HR: CPT

## 2023-11-01 PROCEDURE — 63600175 PHARM REV CODE 636 W HCPCS: Performed by: INTERNAL MEDICINE

## 2023-11-01 PROCEDURE — 96415 CHEMO IV INFUSION ADDL HR: CPT

## 2023-11-01 PROCEDURE — 25000003 PHARM REV CODE 250: Performed by: INTERNAL MEDICINE

## 2023-11-01 PROCEDURE — 96375 TX/PRO/DX INJ NEW DRUG ADDON: CPT

## 2023-11-01 RX ORDER — METHYLPREDNISOLONE SOD SUCC 125 MG
100 VIAL (EA) INJECTION
Status: CANCELLED | OUTPATIENT
Start: 2023-11-15

## 2023-11-01 RX ORDER — FAMOTIDINE 10 MG/ML
20 INJECTION INTRAVENOUS
Status: COMPLETED | OUTPATIENT
Start: 2023-11-01 | End: 2023-11-01

## 2023-11-01 RX ORDER — METHYLPREDNISOLONE SOD SUCC 125 MG
100 VIAL (EA) INJECTION
Status: COMPLETED | OUTPATIENT
Start: 2023-11-01 | End: 2023-11-01

## 2023-11-01 RX ORDER — ACETAMINOPHEN 325 MG/1
650 TABLET ORAL
Status: CANCELLED | OUTPATIENT
Start: 2023-11-15

## 2023-11-01 RX ORDER — ACETAMINOPHEN 325 MG/1
650 TABLET ORAL
Status: COMPLETED | OUTPATIENT
Start: 2023-11-01 | End: 2023-11-01

## 2023-11-01 RX ORDER — EPINEPHRINE 0.3 MG/.3ML
0.3 INJECTION SUBCUTANEOUS ONCE AS NEEDED
Status: CANCELLED | OUTPATIENT
Start: 2023-11-15

## 2023-11-01 RX ORDER — SODIUM CHLORIDE 0.9 % (FLUSH) 0.9 %
10 SYRINGE (ML) INJECTION
Status: CANCELLED | OUTPATIENT
Start: 2023-11-15

## 2023-11-01 RX ORDER — DIPHENHYDRAMINE HYDROCHLORIDE 50 MG/ML
50 INJECTION INTRAMUSCULAR; INTRAVENOUS ONCE AS NEEDED
Status: CANCELLED | OUTPATIENT
Start: 2023-11-15

## 2023-11-01 RX ORDER — FAMOTIDINE 10 MG/ML
20 INJECTION INTRAVENOUS
Status: CANCELLED | OUTPATIENT
Start: 2023-11-15

## 2023-11-01 RX ORDER — HEPARIN 100 UNIT/ML
500 SYRINGE INTRAVENOUS
Status: CANCELLED | OUTPATIENT
Start: 2023-11-15

## 2023-11-01 RX ORDER — MEPERIDINE HYDROCHLORIDE 50 MG/ML
25 INJECTION INTRAMUSCULAR; INTRAVENOUS; SUBCUTANEOUS
Status: CANCELLED | OUTPATIENT
Start: 2023-11-15 | End: 2023-11-16

## 2023-11-01 RX ORDER — DIPHENHYDRAMINE HYDROCHLORIDE 50 MG/ML
50 INJECTION INTRAMUSCULAR; INTRAVENOUS ONCE
Status: COMPLETED | OUTPATIENT
Start: 2023-11-01 | End: 2023-11-01

## 2023-11-01 RX ADMIN — SODIUM CHLORIDE 1000 MG: 9 INJECTION, SOLUTION INTRAVENOUS at 09:11

## 2023-11-01 RX ADMIN — ACETAMINOPHEN 650 MG: 325 TABLET, FILM COATED ORAL at 08:11

## 2023-11-01 RX ADMIN — FAMOTIDINE 20 MG: 10 INJECTION, SOLUTION INTRAVENOUS at 09:11

## 2023-11-01 RX ADMIN — METHYLPREDNISOLONE SODIUM SUCCINATE 100 MG: 125 INJECTION, POWDER, FOR SOLUTION INTRAMUSCULAR; INTRAVENOUS at 08:11

## 2023-11-01 RX ADMIN — DIPHENHYDRAMINE HYDROCHLORIDE 50 MG: 50 INJECTION INTRAMUSCULAR; INTRAVENOUS at 08:11

## 2023-11-01 NOTE — NURSING
0808  Pt arrived for #1/2 ruxience.  Rates LOP 7/10 to knees and shoulders.  States she feels tired because she has not slept in 3 days with only a 20 min nap in the daytime.

## 2023-11-02 ENCOUNTER — HOSPITAL ENCOUNTER (OUTPATIENT)
Dept: RADIOLOGY | Facility: HOSPITAL | Age: 58
Discharge: HOME | End: 2023-11-02
Attending: NURSE PRACTITIONER
Payer: MEDICAID

## 2023-11-02 DIAGNOSIS — S66.190S: ICD-10-CM

## 2023-11-02 PROCEDURE — 73218 MRI UPPER EXTREMITY W/O DYE: CPT | Mod: TC,RT

## 2023-11-06 ENCOUNTER — OFFICE VISIT (OUTPATIENT)
Dept: ORTHOPEDICS | Facility: CLINIC | Age: 58
End: 2023-11-06
Payer: MEDICAID

## 2023-11-06 VITALS — HEIGHT: 66 IN | BODY MASS INDEX: 25.71 KG/M2 | WEIGHT: 160 LBS

## 2023-11-06 DIAGNOSIS — M65.321 TRIGGER INDEX FINGER OF RIGHT HAND: Primary | ICD-10-CM

## 2023-11-06 DIAGNOSIS — M65.331 TRIGGER MIDDLE FINGER OF RIGHT HAND: ICD-10-CM

## 2023-11-06 PROCEDURE — 3066F NEPHROPATHY DOC TX: CPT | Mod: CPTII,95,, | Performed by: NURSE PRACTITIONER

## 2023-11-06 PROCEDURE — 3066F PR DOCUMENTATION OF TREATMENT FOR NEPHROPATHY: ICD-10-PCS | Mod: CPTII,95,, | Performed by: NURSE PRACTITIONER

## 2023-11-06 PROCEDURE — 1160F RVW MEDS BY RX/DR IN RCRD: CPT | Mod: CPTII,95,, | Performed by: NURSE PRACTITIONER

## 2023-11-06 PROCEDURE — 1159F MED LIST DOCD IN RCRD: CPT | Mod: CPTII,95,, | Performed by: NURSE PRACTITIONER

## 2023-11-06 PROCEDURE — 4010F ACE/ARB THERAPY RXD/TAKEN: CPT | Mod: CPTII,95,, | Performed by: NURSE PRACTITIONER

## 2023-11-06 PROCEDURE — 3044F HG A1C LEVEL LT 7.0%: CPT | Mod: CPTII,95,, | Performed by: NURSE PRACTITIONER

## 2023-11-06 PROCEDURE — 3044F PR MOST RECENT HEMOGLOBIN A1C LEVEL <7.0%: ICD-10-PCS | Mod: CPTII,95,, | Performed by: NURSE PRACTITIONER

## 2023-11-06 PROCEDURE — 4010F PR ACE/ARB THEARPY RXD/TAKEN: ICD-10-PCS | Mod: CPTII,95,, | Performed by: NURSE PRACTITIONER

## 2023-11-06 PROCEDURE — 1159F PR MEDICATION LIST DOCUMENTED IN MEDICAL RECORD: ICD-10-PCS | Mod: CPTII,95,, | Performed by: NURSE PRACTITIONER

## 2023-11-06 PROCEDURE — 1160F PR REVIEW ALL MEDS BY PRESCRIBER/CLIN PHARMACIST DOCUMENTED: ICD-10-PCS | Mod: CPTII,95,, | Performed by: NURSE PRACTITIONER

## 2023-11-06 PROCEDURE — 99443 PR PHYSICIAN TELEPHONE EVALUATION 21-30 MIN: CPT | Mod: 95,,, | Performed by: NURSE PRACTITIONER

## 2023-11-06 PROCEDURE — 99443 PR PHYSICIAN TELEPHONE EVALUATION 21-30 MIN: ICD-10-PCS | Mod: 95,,, | Performed by: NURSE PRACTITIONER

## 2023-11-06 RX ORDER — NITROFURANTOIN 25; 75 MG/1; MG/1
100 CAPSULE ORAL
COMMUNITY
End: 2024-01-30

## 2023-11-06 NOTE — PROGRESS NOTES
Subjective:   PATIENT ID: Vi Ulrich is a 57 y.o. female. Non-smoker. Employment HX: , currently disability.    Seen [facility] for same DX since 10/2023.   CHIEF COMPLAINT: Pain of the Right Hand (Patient is here today for follow up for  MRI results of right hand.  Describes the pain as constant shocking, sharp and aching. States using an ace wrap to help.  Is currently taking Norco 10 PRN for pain at this time.  )    HPI:    Right sharp, aching  index, middle finger finger near palm.  endorses radiating into palm and/or distal finger. Left dominate  Injury/ Surgeries r/t CC: no history of significant injuries or previous surgeries  Onset: several years ago worsening over time  Modifying Factors: exacerbated by overuse, improvement with gentle massage of palm and digit, immobilization of digit, rest  Associated Symptoms: awakening s/t pain/ locking, hand numbness, decreased  strength w/ dropping items , swelling   Activity: locking affecting ADLS including: dressing, grooming  Previous Treatments: splinting, HEP > 6 weeks, RX NSAIDs: Norco, OTC Medications: ibuprofen, Tylenol, activity modification, CSI 10/16/23  without symptom relief  PMH: + DM, + COPD, + Afib, HX acute kidney injury, HX DVT, HX of Raynaud's   Family History: + OA    NOTE: Follow up for right trigger index and middle finger.  CSI given 4/11/23  with minimal relief.  Symptoms returning and continues affecting ADLs.  Interested in surgical treatment options due to failed conservative treatments. .  Denies severe diffuse hand swelling, fever, severe pain in palm, redness/ warm or recent hand injury.  Reports she completed EMG study with Dr. Mancini last week.    Current Outpatient Medications:     albuterol (ACCUNEB) 0.63 mg/3 mL Nebu, Take 3 mLs (0.63 mg total) by nebulization every 4 (four) hours as needed (wheezing). Rescue, Disp: 75 mL, Rfl: 3    albuterol sulfate (PROAIR RESPICLICK) 90 mcg/actuation inhaler, Inhale 1 puff into the  lungs every 4 (four) hours as needed for Wheezing. Rescue, Disp: , Rfl:     apixaban (ELIQUIS) 5 mg Tab, Take 1 tablet (5 mg total) by mouth 2 (two) times daily., Disp: 180 tablet, Rfl: 3    atorvastatin (LIPITOR) 10 MG tablet, Take 10 mg by mouth once daily., Disp: , Rfl:     blood sugar diagnostic (ONETOUCH VERIO) Strp, 1 each by Misc.(Non-Drug; Combo Route) route 4 (four) times daily., Disp: 50 each, Rfl: 11    blood sugar diagnostic Strp, 1 each by Misc.(Non-Drug; Combo Route) route 4 (four) times daily., Disp: 50 each, Rfl: 11    blood sugar diagnostic Strp, 1 strip by Misc.(Non-Drug; Combo Route) route 4 (four) times daily with meals and nightly., Disp: 200 each, Rfl: 6    blood-glucose meter kit, Use as instructed, Disp: 1 each, Rfl: 0    EScitalopram oxalate (LEXAPRO) 20 MG tablet, Take 1 tablet (20 mg total) by mouth once daily., Disp: 90 tablet, Rfl: 3    HYDROcodone-acetaminophen (NORCO)  mg per tablet, Take 1 tablet by mouth every 6 (six) hours as needed for Pain., Disp: 90 tablet, Rfl: 0    insulin aspart U-100 (NOVOLOG) 100 unit/mL injection, Inject 2 Units into the skin with lunch., Disp: 1.8 mL, Rfl: 3    lancets 28 gauge Misc, 100 lancets by Misc.(Non-Drug; Combo Route) route 4 (four) times daily with meals and nightly., Disp: 100 each, Rfl: 8    losartan (COZAAR) 25 MG tablet, Take 1 tablet (25 mg total) by mouth once daily., Disp: 90 tablet, Rfl: 3    multivitamin Tab, Take 1 tablet by mouth once daily., Disp: 30 tablet, Rfl: 11    mycophenolate (CELLCEPT) 250 mg Cap, Take 6 capsules (1,500 mg total) by mouth 2 (two) times daily., Disp: 360 capsule, Rfl: 11    nitrofurantoin, macrocrystal-monohydrate, (MACROBID) 100 MG capsule, Take 100 mg by mouth every 12 (twelve) hours., Disp: , Rfl:     ondansetron (ZOFRAN) 4 MG tablet, TAKE 1 TABLET(4 MG) BY MOUTH EVERY 6 HOURS AS NEEDED FOR NAUSEA, Disp: 24 tablet, Rfl: 0    pantoprazole (PROTONIX) 40 MG tablet, Take 1 tablet (40 mg total) by mouth  "once daily., Disp: 90 tablet, Rfl: 3    predniSONE (DELTASONE) 10 MG tablet, Take 3 tablets (30 mg total) by mouth once daily., Disp: 270 tablet, Rfl: 0    sulfamethoxazole-trimethoprim 800-160mg (BACTRIM DS) 800-160 mg Tab, Take 1 tablet by mouth once daily., Disp: 90 tablet, Rfl: 0    vitamin D (VITAMIN D3) 1000 units Tab, Take 1 tablet (1,000 Units total) by mouth once daily., Disp: 90 tablet, Rfl: 3    voclosporin (LUPKYNIS) 7.9 mg Cap, Take 23.7 mg by mouth once daily., Disp: 30 capsule, Rfl: 11    walker Misc, Please dispense 1 Rollator, Disp: 1 each, Rfl: 0    ALPRAZolam (XANAX) 0.25 MG tablet, Take 1 tablet (0.25 mg total) by mouth 2 (two) times daily as needed for Anxiety ("Shakiness" per patient)., Disp: 14 tablet, Rfl: 0    eletriptan (RELPAX) 40 MG tablet, Take 1 tablet (40 mg total) by mouth as needed (take one at the beginning of migraine, may repeat in 2 h. max 2 in 24h). may repeat in 2 hours if necessary (Patient not taking: Reported on 11/6/2023), Disp: 9 tablet, Rfl: 5  Review of patient's allergies indicates:   Allergen Reactions    Ketorolac (pf) Swelling    Mycophenolate mofetil Swelling    Penicillins Hives    Percocet [oxycodone-acetaminophen] Hives and Itching    Tramadol Hives     Hemoglobin A1C   Date Value Ref Range Status   03/04/2020 6.6 (H) 4.0 - 6.0 % Final   07/31/2018 6.5 (H) 4.0 - 5.6 % Final     Comment:     ADA Screening Guidelines:  5.7-6.4%  Consistent with prediabetes  >or=6.5%  Consistent with diabetes  High levels of fetal hemoglobin interfere with the HbA1C  assay. Heterozygous hemoglobin variants (HbS, HgC, etc)do  not significantly interfere with this assay.   However, presence of multiple variants may affect accuracy.     04/12/2018 7.2 (H) 4.5 - 6.2 % Final     Comment:     According to ADA guidelines, hemoglobin A1C <7.0% represents  optimal control in non-pregnant diabetic patients.  Different  metrics may apply to specific populations.   Standards of Medical Care in " "Diabetes - 2016.  For the purpose of screening for the presence of diabetes:  <5.7%     Consistent with the absence of diabetes  5.7-6.4%  Consistent with increasing risk for diabetes   (prediabetes)  >or=6.5%  Consistent with diabetes  Currently no consensus exists for use of hemoglobin A1C  for diagnosis of diabetes for children.       Hemoglobin A1c   Date Value Ref Range Status   10/09/2023 5.4 <=7.0 % Final   05/29/2022 6.0 <=7.0 % Final   05/28/2022 6.1 <=7.0 % Final      Body mass index is 25.82 kg/m².   Vitals:    11/06/23 0942   Weight: 72.6 kg (160 lb)   Height: 5' 6" (1.676 m)   PainSc:   8      REVIEW OF SYSTEMS:  A ten-point review of systems was performed and is negative, except as mentioned above  Objective:   N/a telemedicine visit   Assessment:   IMAGING: X-ray 3 views of right hand dated 11/2/23 reviewed and independently interpreted by me.  Discussed with patient. Noted no acute, DJD noted. W/ noted flexion deformity    XR HAND COMPLETE 3 VIEW RIGHT 11/2/23  CLINICAL HISTORY:  Trigger finger, unspecified finger  COMPARISON:  None.  FINDINGS:  No acute displaced fractures or dislocations.  There is a flexion deformity at the proximal interphalangeal joint of the 3rd digit with no other significant abnormalities identified  Minimal narrowing of the proximal and distal interphalangeal joints with minimal degenerative changes of the 1st carpometacarpal joint  Soft tissues within normal limits.  Impression:  Flexion deformity as above.  Degenerative changes.    MRI HAND FINGERS WITHOUT CONTRAST RIGHT 11/2/23  CLINICAL HISTORY:  Hand fracture, tendon/ligament injury suspected, xray done;  Other injury of flexor muscle, fascia and tendon of right index finger at wrist and hand level, sequela  TECHNIQUE:  Unenhanced, multiplanar, multisequence MR imaging of the right hand was obtained.  COMPARISON:  Radiographs of the right hand used for comparison dated 04/07/2023  FINDINGS:  A high-grade laceration is " present to the flexor digitorum superficialis and profundus tendons at the level of the base of the proximal phalanx of the 3rd finger and is best seen on image 12 of series 5.  Torn fibers are retracted approximately 5 mm and fluid is present in the 3rd flexor tendon sheath at the level of the 3rd metacarpal head.  On image 10 of series 5, a partial-thickness tear is almost certainly present to the flexor tendons supplying the 4th finger but is not well demonstrated on any other image.  Fluid is present in the 4th flexor tendon sheath.  The flexor digitorum superficialis supplying the 5th finger is attenuated at the level of the carpal tunnel on image 9 of series 3 and may also be partially lacerated.  The extensor tendons appear intact.  The intrinsic muscles are intact.  Fluid is present in the carpal tunnel.  The collateral ligaments appear intact.  Bone marrow signal is unremarkable.  The neurovascular structures are not well visualized on today's study.  The palmar fascia is unremarkable.  Impression:  A high-grade laceration is present to the flexor digitorum superficialis and profundus tendons at the level of the base of the proximal phalanx of the 3rd finger and is best seen on image 12 of series 5.  Torn fibers are retracted approximately 5 mm and fluid is present in the 3rd flexor tendon sheath at the level of the 3rd metacarpal head.  On image 10 of series 5, a partial-thickness tear is almost certainly present to the flexor tendons supplying the 4th finger but is not well demonstrated on any other image. Fluid is present in the 4th flexor tendon sheath.  Please confirm clinically or with ultrasound.  The flexor digitorum superficialis tendon supplying the 5th finger is attenuated at the level of the carpal tunnel on image 9 of series 3 and may also be partially lacerated.    EMR REVIEW: completed with noted prior visit 10/16/23 reviewed.    EMG Study: (obtained after visit and added into documentation  11/10/23) dated 10/30/23 per Dr. Mancini   This is an abnormal study.  There is electrodiagnostic evidence of bilateral median neuropathy at the wrist, carpal tunnel syndrome mild to moderate on left and moderate to severe on right.  Mild ulnar neuropathy at the elbow.  No electrodiagnostic evidence of radiculopathy.  DIAGNOSIS:  1. Trigger index finger of right hand    2. Trigger middle finger of right hand    3. BMI 25.0-25.9,adult       Plan:      Ongoing education about DX and treatment recommendations including conservative treatments of daily HEP and OTC NSAID as needed according to label instructions if able to tolerate.   Treatment plan: Moderate exacerbation of chronic Right Trigger Finger index, middle finger complicated by Raynaud's and hand numbness Conservative treatments have failed and patient having persistent symptoms. Interested in surgical treatment options.  I recommend referral to ortho res. for further eval and treatment.   Patient aware no surgical treatments guaranteed and co-morbid conditions would be taken into consideration. Patient insist that due to constant pain she feels something needs to be done and request eval with hand surgeon.    No EMG study noted in EMR, will request records from Dr. Mancini office for review at next appt.   Procedure: n/a   RX Medications: continue medications as RX per PCP.  RTC: next available appt. with ortho res.  W/ Dr. Elizalde  hand clinic  NOTE: Reviewed MRI, HPI and prior PE with Dr. Elizalde.  MRI showing positive evidence of trigger finger of  right hand w/ recommendation of patient to be scheduled w/ Hand Clinic for further eval. And surgical treatment options  if she wishes.        [facility]  Procedure Note:   None  Time Based Billing   Total Time Spent with Patient: 25 minutes  Visit Start Time: 1100  5 minutes spent prior to visit reviewing EMR, prior labs and x-rays.  10 minutes spent in audio only  visit with patient for medical discussion, obtaining history, educating on DX and treatment plan.  10 minutes spent after visit completing EMR documentation.   Visit End Time: 1120    Please be aware that this note has been generated with the assistance of MModal voice-to-text.  Please excuse any spelling or grammatical errors.

## 2023-11-14 ENCOUNTER — OFFICE VISIT (OUTPATIENT)
Dept: INTERNAL MEDICINE | Facility: CLINIC | Age: 58
End: 2023-11-14
Payer: MEDICAID

## 2023-11-14 VITALS
RESPIRATION RATE: 16 BRPM | WEIGHT: 180 LBS | SYSTOLIC BLOOD PRESSURE: 119 MMHG | BODY MASS INDEX: 28.93 KG/M2 | HEIGHT: 66 IN | HEART RATE: 75 BPM | DIASTOLIC BLOOD PRESSURE: 72 MMHG | TEMPERATURE: 98 F

## 2023-11-14 DIAGNOSIS — M32.9 SYSTEMIC LUPUS ERYTHEMATOSUS ARTHRITIS: ICD-10-CM

## 2023-11-14 DIAGNOSIS — I10 ESSENTIAL HYPERTENSION: ICD-10-CM

## 2023-11-14 DIAGNOSIS — G47.8 INSOMNIA DISORDER, WITH OTHER SLEEP DISORDER, RECURRENT: Primary | ICD-10-CM

## 2023-11-14 DIAGNOSIS — M79.7 FIBROMYALGIA: ICD-10-CM

## 2023-11-14 DIAGNOSIS — F41.9 INSOMNIA SECONDARY TO ANXIETY: ICD-10-CM

## 2023-11-14 DIAGNOSIS — F41.9 ANXIETY: ICD-10-CM

## 2023-11-14 DIAGNOSIS — M32.9 SYSTEMIC LUPUS ERYTHEMATOSUS, UNSPECIFIED SLE TYPE, UNSPECIFIED ORGAN INVOLVEMENT STATUS: ICD-10-CM

## 2023-11-14 DIAGNOSIS — G47.00 INSOMNIA DISORDER, WITH OTHER SLEEP DISORDER, RECURRENT: Primary | ICD-10-CM

## 2023-11-14 DIAGNOSIS — Z00.00 HEALTHCARE MAINTENANCE: ICD-10-CM

## 2023-11-14 DIAGNOSIS — E11.00 TYPE 2 DIABETES MELLITUS WITH HYPEROSMOLARITY WITHOUT COMA, WITH LONG-TERM CURRENT USE OF INSULIN: ICD-10-CM

## 2023-11-14 DIAGNOSIS — M32.14 SYSTEMIC LUPUS ERYTHEMATOSUS WITH GLOMERULAR DISEASE, UNSPECIFIED SLE TYPE: ICD-10-CM

## 2023-11-14 DIAGNOSIS — Z79.4 TYPE 2 DIABETES MELLITUS WITH HYPEROSMOLARITY WITHOUT COMA, WITH LONG-TERM CURRENT USE OF INSULIN: ICD-10-CM

## 2023-11-14 DIAGNOSIS — F51.05 INSOMNIA SECONDARY TO ANXIETY: ICD-10-CM

## 2023-11-14 DIAGNOSIS — M32.9 LUPUS: ICD-10-CM

## 2023-11-14 PROCEDURE — 99215 OFFICE O/P EST HI 40 MIN: CPT | Mod: PBBFAC | Performed by: STUDENT IN AN ORGANIZED HEALTH CARE EDUCATION/TRAINING PROGRAM

## 2023-11-14 RX ORDER — HYDROCODONE BITARTRATE AND ACETAMINOPHEN 10; 325 MG/1; MG/1
1 TABLET ORAL EVERY 6 HOURS PRN
Qty: 90 TABLET | Refills: 0 | Status: SHIPPED | OUTPATIENT
Start: 2023-11-15 | End: 2023-12-26 | Stop reason: SDUPTHER

## 2023-11-14 RX ORDER — BLOOD-GLUCOSE SENSOR
1 EACH MISCELLANEOUS
Qty: 5 EACH | Refills: 6 | Status: SHIPPED | OUTPATIENT
Start: 2023-11-14 | End: 2024-11-13

## 2023-11-14 NOTE — PROGRESS NOTES
Faculty addendum:     I have reviewed the patients history, residents  findings on physical examination, diagnosis and treatment plan. Care provided was reasonable and necessary.

## 2023-11-14 NOTE — PROGRESS NOTES
"[facility] Visit    Chief Complaint:      Follow-up (Routine follow up from ED.)     Subjective:     HPI:  Vi Ulrich is a 58 y.o. female with has a past medical history of Arthritis, Asthma, Atrial fibrillation, Chronic constipation, Diabetes mellitus, Encounter for blood transfusion, GERD (gastroesophageal reflux disease), Gout, Hypertension, Lupus, Renal disorder, and SLE (systemic lupus erythematosus). She presents to clinic today for follow-up of chronic medical conditions.     Today, states dexcom is not calibrated.  Overall patient has no complaints she did say that her rheumatologist's at [facility] is no longer able to see her as he was just seeing her when [facility] she did not have a rheumatologist, patient has been referred to rheumatologist here I will refill her Norco at this time for her lupus arthritis.  Also referred to Psychiatry for insomnia likely 2/2 anxiety      Review of Systems  A comprehensive 12 point review of systems was completed.  Please see above for pertinent positives and negatives.     Objective:   Last 24 Hour Vital Signs:  Vitals  BP: 119/72  Temp: 98 °F (36.7 °C)  Pulse: 75  Resp: 16  Height: 5' 6" (167.6 cm)  Weight: 81.6 kg (180 lb)    Physical Examination:  General: Awake, alert, & oriented to person, place & time. No acute distress  Psychiatric: Mood and affect normal  HEENT: Normocephalic, atraumatic. Face symmetric.  Cardiovascular: Regular rate & rhythm  Pulmonary: Bilateral symmetric chest rise, Non-labored  Abdominal:  nondistended  Extremities: No clubbing or cyanosis.  Skin:  Exposed skin is warm & dry.  Neuro:   Patient moves all extremities equally. Sensation intact bilateraly.       Assessment & Plan:     Insomnia  -unable to get Lunesta  -Recommended good sleep hygiene  -patient states that in the past she was on Xanax when she was in [place] at this time referred her to Psychiatry as after admission she was noted at night to be tachycardic and anxious " while trying to sleep Xanax was used and appropriate sleep was had    T2DM  -continue 2 units with lunch per endocrinology  -instructed to keep sugar log  -has dexcom,referred to have check  -refilled Dexcom    Depression  Anxiety  -at prior dc pt started on xanax prn  -continue lexapro 20 daily    SLE  Hx of Lupus nephritis  Arthritis  -please continue to follow Nephrology as well as Rheumatology  -Continue CellCept  -+ anticardioliipin antibodies, pt can't make wafrin apts, continue eliquis  -continue to follow renal recommendations and keep appointments  -referred to Rheumatology    Vitamin-D deficiency  -Patient was started on 05005 units for 12 weeks Q 7 days by Nephrology  -Continue to monitor    Hypertension  -BP today: 119/72  -Continue home     GERD  -continued symptoms despite taking excessive doses of Prilosec  -Will initiate Protonix 40 mg daily at this time    Health Maintenance  Colon cancer screening:  Colonoscopy 2018 per the patient and per chart stating no findings  Lung Cancer screen:  N/A  Mammogram:  BI-RADS 2 bilaterally 4/4/23 recommended annual exam next will be due April 2024  PapSmear:  Upcoming gynecology appointment for 9/20/24  Hep C:  Ordered today  Dexa:  N/A  A1C:  5.4  Vaccines      Pneumonia:  Patient got a [employer] [facility]      Annual Flu:  Received at [employer] [facility]      Zoster:  She will look if this was received at [employer] [facility]      Tdap:  She will check if this was received at [employer] [facility]    To be addressed at next follow-up  -above  -complete health maintenance    Follow-ups  -Follow-up medicine clinic in 3 months    Octaviano Barrios MD  Internal Medicine - PGY-3

## 2023-11-15 ENCOUNTER — INFUSION (OUTPATIENT)
Dept: INFUSION THERAPY | Facility: HOSPITAL | Age: 58
End: 2023-11-15
Attending: NURSE PRACTITIONER
Payer: MEDICAID

## 2023-11-15 VITALS
SYSTOLIC BLOOD PRESSURE: 143 MMHG | HEIGHT: 66 IN | DIASTOLIC BLOOD PRESSURE: 81 MMHG | TEMPERATURE: 99 F | OXYGEN SATURATION: 100 % | HEART RATE: 66 BPM | WEIGHT: 181.38 LBS | BODY MASS INDEX: 29.15 KG/M2 | RESPIRATION RATE: 20 BRPM

## 2023-11-15 DIAGNOSIS — M32.9 LUPUS: Primary | ICD-10-CM

## 2023-11-15 DIAGNOSIS — M32.14 LUPUS NEPHRITIS, ISN/RPS CLASS III: ICD-10-CM

## 2023-11-15 PROCEDURE — 63600175 PHARM REV CODE 636 W HCPCS: Performed by: INTERNAL MEDICINE

## 2023-11-15 PROCEDURE — 96415 CHEMO IV INFUSION ADDL HR: CPT

## 2023-11-15 PROCEDURE — 25000003 PHARM REV CODE 250: Performed by: INTERNAL MEDICINE

## 2023-11-15 PROCEDURE — 96413 CHEMO IV INFUSION 1 HR: CPT

## 2023-11-15 PROCEDURE — 96375 TX/PRO/DX INJ NEW DRUG ADDON: CPT

## 2023-11-15 RX ORDER — SODIUM CHLORIDE 0.9 % (FLUSH) 0.9 %
10 SYRINGE (ML) INJECTION
OUTPATIENT
Start: 2023-11-15

## 2023-11-15 RX ORDER — FAMOTIDINE 10 MG/ML
20 INJECTION INTRAVENOUS
Status: COMPLETED | OUTPATIENT
Start: 2023-11-15 | End: 2023-11-15

## 2023-11-15 RX ORDER — DIPHENHYDRAMINE HYDROCHLORIDE 50 MG/ML
50 INJECTION INTRAMUSCULAR; INTRAVENOUS ONCE
Status: COMPLETED | OUTPATIENT
Start: 2023-11-15 | End: 2023-11-15

## 2023-11-15 RX ORDER — HEPARIN 100 UNIT/ML
500 SYRINGE INTRAVENOUS
OUTPATIENT
Start: 2023-11-15

## 2023-11-15 RX ORDER — ACETAMINOPHEN 325 MG/1
650 TABLET ORAL
Status: COMPLETED | OUTPATIENT
Start: 2023-11-15 | End: 2023-11-15

## 2023-11-15 RX ORDER — MEPERIDINE HYDROCHLORIDE 50 MG/ML
25 INJECTION INTRAMUSCULAR; INTRAVENOUS; SUBCUTANEOUS
OUTPATIENT
Start: 2023-11-15 | End: 2023-11-16

## 2023-11-15 RX ORDER — EPINEPHRINE 0.3 MG/.3ML
0.3 INJECTION SUBCUTANEOUS ONCE AS NEEDED
OUTPATIENT
Start: 2023-11-15

## 2023-11-15 RX ORDER — ACETAMINOPHEN 325 MG/1
650 TABLET ORAL
Status: CANCELLED | OUTPATIENT
Start: 2023-11-15

## 2023-11-15 RX ORDER — METHYLPREDNISOLONE SOD SUCC 125 MG
100 VIAL (EA) INJECTION
Status: COMPLETED | OUTPATIENT
Start: 2023-11-15 | End: 2023-11-15

## 2023-11-15 RX ORDER — FAMOTIDINE 10 MG/ML
20 INJECTION INTRAVENOUS
Status: CANCELLED | OUTPATIENT
Start: 2023-11-15

## 2023-11-15 RX ORDER — DIPHENHYDRAMINE HYDROCHLORIDE 50 MG/ML
50 INJECTION INTRAMUSCULAR; INTRAVENOUS ONCE AS NEEDED
OUTPATIENT
Start: 2023-11-15

## 2023-11-15 RX ORDER — METHYLPREDNISOLONE SOD SUCC 125 MG
100 VIAL (EA) INJECTION
Status: CANCELLED | OUTPATIENT
Start: 2023-11-15

## 2023-11-15 RX ADMIN — SODIUM CHLORIDE 1000 MG: 9 INJECTION, SOLUTION INTRAVENOUS at 09:11

## 2023-11-15 RX ADMIN — METHYLPREDNISOLONE SODIUM SUCCINATE 100 MG: 125 INJECTION, POWDER, FOR SOLUTION INTRAMUSCULAR; INTRAVENOUS at 08:11

## 2023-11-15 RX ADMIN — ACETAMINOPHEN 650 MG: 325 TABLET, FILM COATED ORAL at 08:11

## 2023-11-15 RX ADMIN — FAMOTIDINE 20 MG: 10 INJECTION, SOLUTION INTRAVENOUS at 08:11

## 2023-11-15 RX ADMIN — DIPHENHYDRAMINE HYDROCHLORIDE 50 MG: 50 INJECTION INTRAMUSCULAR; INTRAVENOUS at 08:11

## 2023-11-16 ENCOUNTER — TELEPHONE (OUTPATIENT)
Dept: FAMILY MEDICINE | Facility: CLINIC | Age: 58
End: 2023-11-16
Payer: MEDICAID

## 2023-11-16 ENCOUNTER — OFFICE VISIT (OUTPATIENT)
Dept: NEPHROLOGY | Facility: CLINIC | Age: 58
End: 2023-11-16
Payer: MEDICAID

## 2023-11-16 ENCOUNTER — OFFICE VISIT (OUTPATIENT)
Dept: BEHAVIORAL HEALTH | Facility: CLINIC | Age: 58
End: 2023-11-16
Payer: MEDICAID

## 2023-11-16 VITALS
DIASTOLIC BLOOD PRESSURE: 76 MMHG | SYSTOLIC BLOOD PRESSURE: 129 MMHG | TEMPERATURE: 98 F | BODY MASS INDEX: 28.09 KG/M2 | HEIGHT: 66 IN | OXYGEN SATURATION: 100 % | RESPIRATION RATE: 18 BRPM | HEART RATE: 70 BPM | WEIGHT: 174.81 LBS

## 2023-11-16 VITALS
TEMPERATURE: 98 F | SYSTOLIC BLOOD PRESSURE: 135 MMHG | HEIGHT: 65 IN | DIASTOLIC BLOOD PRESSURE: 80 MMHG | OXYGEN SATURATION: 97 % | WEIGHT: 176 LBS | BODY MASS INDEX: 29.32 KG/M2 | RESPIRATION RATE: 20 BRPM | HEART RATE: 86 BPM

## 2023-11-16 DIAGNOSIS — M32.14 LUPUS NEPHRITIS: ICD-10-CM

## 2023-11-16 DIAGNOSIS — F33.1 MODERATE RECURRENT MAJOR DEPRESSION: ICD-10-CM

## 2023-11-16 DIAGNOSIS — G47.8 INSOMNIA DISORDER, WITH OTHER SLEEP DISORDER, RECURRENT: ICD-10-CM

## 2023-11-16 DIAGNOSIS — G47.00 INSOMNIA DISORDER, WITH OTHER SLEEP DISORDER, RECURRENT: ICD-10-CM

## 2023-11-16 DIAGNOSIS — G43.E09 CHRONIC MIGRAINE WITH AURA WITHOUT STATUS MIGRAINOSUS, NOT INTRACTABLE: ICD-10-CM

## 2023-11-16 DIAGNOSIS — F99 INSOMNIA DUE TO OTHER MENTAL DISORDER: ICD-10-CM

## 2023-11-16 DIAGNOSIS — F51.05 INSOMNIA DUE TO OTHER MENTAL DISORDER: ICD-10-CM

## 2023-11-16 DIAGNOSIS — D63.8 ANEMIA OF CHRONIC DISEASE: ICD-10-CM

## 2023-11-16 DIAGNOSIS — N18.31 CKD STAGE G3A/A3, GFR 45-59 AND ALBUMIN CREATININE RATIO >300 MG/G: Primary | ICD-10-CM

## 2023-11-16 DIAGNOSIS — R26.81 UNSTABLE GAIT: ICD-10-CM

## 2023-11-16 DIAGNOSIS — F41.1 GAD (GENERALIZED ANXIETY DISORDER): Primary | ICD-10-CM

## 2023-11-16 DIAGNOSIS — I10 ESSENTIAL HYPERTENSION: ICD-10-CM

## 2023-11-16 PROCEDURE — 99214 OFFICE O/P EST MOD 30 MIN: CPT | Mod: PBBFAC,27 | Performed by: NURSE PRACTITIONER

## 2023-11-16 PROCEDURE — 1159F MED LIST DOCD IN RCRD: CPT | Mod: CPTII,,, | Performed by: STUDENT IN AN ORGANIZED HEALTH CARE EDUCATION/TRAINING PROGRAM

## 2023-11-16 PROCEDURE — 3008F BODY MASS INDEX DOCD: CPT | Mod: CPTII,,, | Performed by: STUDENT IN AN ORGANIZED HEALTH CARE EDUCATION/TRAINING PROGRAM

## 2023-11-16 PROCEDURE — 3044F PR MOST RECENT HEMOGLOBIN A1C LEVEL <7.0%: ICD-10-PCS | Mod: CPTII,,, | Performed by: NURSE PRACTITIONER

## 2023-11-16 PROCEDURE — 4010F PR ACE/ARB THEARPY RXD/TAKEN: ICD-10-PCS | Mod: CPTII,,, | Performed by: NURSE PRACTITIONER

## 2023-11-16 PROCEDURE — 3008F BODY MASS INDEX DOCD: CPT | Mod: CPTII,,, | Performed by: NURSE PRACTITIONER

## 2023-11-16 PROCEDURE — 3044F HG A1C LEVEL LT 7.0%: CPT | Mod: CPTII,,, | Performed by: NURSE PRACTITIONER

## 2023-11-16 PROCEDURE — 3044F PR MOST RECENT HEMOGLOBIN A1C LEVEL <7.0%: ICD-10-PCS | Mod: CPTII,,, | Performed by: STUDENT IN AN ORGANIZED HEALTH CARE EDUCATION/TRAINING PROGRAM

## 2023-11-16 PROCEDURE — 99205 OFFICE O/P NEW HI 60 MIN: CPT | Mod: S$PBB,,, | Performed by: STUDENT IN AN ORGANIZED HEALTH CARE EDUCATION/TRAINING PROGRAM

## 2023-11-16 PROCEDURE — 1111F DSCHRG MED/CURRENT MED MERGE: CPT | Mod: CPTII,,, | Performed by: STUDENT IN AN ORGANIZED HEALTH CARE EDUCATION/TRAINING PROGRAM

## 2023-11-16 PROCEDURE — 3066F NEPHROPATHY DOC TX: CPT | Mod: CPTII,,, | Performed by: STUDENT IN AN ORGANIZED HEALTH CARE EDUCATION/TRAINING PROGRAM

## 2023-11-16 PROCEDURE — 4010F PR ACE/ARB THEARPY RXD/TAKEN: ICD-10-PCS | Mod: CPTII,,, | Performed by: STUDENT IN AN ORGANIZED HEALTH CARE EDUCATION/TRAINING PROGRAM

## 2023-11-16 PROCEDURE — 4010F ACE/ARB THERAPY RXD/TAKEN: CPT | Mod: CPTII,,, | Performed by: NURSE PRACTITIONER

## 2023-11-16 PROCEDURE — 99205 PR OFFICE/OUTPT VISIT, NEW, LEVL V, 60-74 MIN: ICD-10-PCS | Mod: S$PBB,,, | Performed by: STUDENT IN AN ORGANIZED HEALTH CARE EDUCATION/TRAINING PROGRAM

## 2023-11-16 PROCEDURE — 3066F NEPHROPATHY DOC TX: CPT | Mod: CPTII,,, | Performed by: NURSE PRACTITIONER

## 2023-11-16 PROCEDURE — 1111F PR DISCHARGE MEDS RECONCILED W/ CURRENT OUTPATIENT MED LIST: ICD-10-PCS | Mod: CPTII,,, | Performed by: STUDENT IN AN ORGANIZED HEALTH CARE EDUCATION/TRAINING PROGRAM

## 2023-11-16 PROCEDURE — 3074F SYST BP LT 130 MM HG: CPT | Mod: CPTII,,, | Performed by: NURSE PRACTITIONER

## 2023-11-16 PROCEDURE — 3078F PR MOST RECENT DIASTOLIC BLOOD PRESSURE < 80 MM HG: ICD-10-PCS | Mod: CPTII,,, | Performed by: NURSE PRACTITIONER

## 2023-11-16 PROCEDURE — 3066F PR DOCUMENTATION OF TREATMENT FOR NEPHROPATHY: ICD-10-PCS | Mod: CPTII,,, | Performed by: NURSE PRACTITIONER

## 2023-11-16 PROCEDURE — 99214 OFFICE O/P EST MOD 30 MIN: CPT | Mod: S$PBB,,, | Performed by: NURSE PRACTITIONER

## 2023-11-16 PROCEDURE — 99214 PR OFFICE/OUTPT VISIT, EST, LEVL IV, 30-39 MIN: ICD-10-PCS | Mod: S$PBB,,, | Performed by: NURSE PRACTITIONER

## 2023-11-16 PROCEDURE — 3008F PR BODY MASS INDEX (BMI) DOCUMENTED: ICD-10-PCS | Mod: CPTII,,, | Performed by: NURSE PRACTITIONER

## 2023-11-16 PROCEDURE — 1159F PR MEDICATION LIST DOCUMENTED IN MEDICAL RECORD: ICD-10-PCS | Mod: CPTII,,, | Performed by: STUDENT IN AN ORGANIZED HEALTH CARE EDUCATION/TRAINING PROGRAM

## 2023-11-16 PROCEDURE — 1160F PR REVIEW ALL MEDS BY PRESCRIBER/CLIN PHARMACIST DOCUMENTED: ICD-10-PCS | Mod: CPTII,,, | Performed by: STUDENT IN AN ORGANIZED HEALTH CARE EDUCATION/TRAINING PROGRAM

## 2023-11-16 PROCEDURE — 3044F HG A1C LEVEL LT 7.0%: CPT | Mod: CPTII,,, | Performed by: STUDENT IN AN ORGANIZED HEALTH CARE EDUCATION/TRAINING PROGRAM

## 2023-11-16 PROCEDURE — 3074F PR MOST RECENT SYSTOLIC BLOOD PRESSURE < 130 MM HG: ICD-10-PCS | Mod: CPTII,,, | Performed by: NURSE PRACTITIONER

## 2023-11-16 PROCEDURE — 1111F DSCHRG MED/CURRENT MED MERGE: CPT | Mod: CPTII,,, | Performed by: NURSE PRACTITIONER

## 2023-11-16 PROCEDURE — 1111F PR DISCHARGE MEDS RECONCILED W/ CURRENT OUTPATIENT MED LIST: ICD-10-PCS | Mod: CPTII,,, | Performed by: NURSE PRACTITIONER

## 2023-11-16 PROCEDURE — 3079F DIAST BP 80-89 MM HG: CPT | Mod: CPTII,,, | Performed by: STUDENT IN AN ORGANIZED HEALTH CARE EDUCATION/TRAINING PROGRAM

## 2023-11-16 PROCEDURE — 3079F PR MOST RECENT DIASTOLIC BLOOD PRESSURE 80-89 MM HG: ICD-10-PCS | Mod: CPTII,,, | Performed by: STUDENT IN AN ORGANIZED HEALTH CARE EDUCATION/TRAINING PROGRAM

## 2023-11-16 PROCEDURE — 3075F PR MOST RECENT SYSTOLIC BLOOD PRESS GE 130-139MM HG: ICD-10-PCS | Mod: CPTII,,, | Performed by: STUDENT IN AN ORGANIZED HEALTH CARE EDUCATION/TRAINING PROGRAM

## 2023-11-16 PROCEDURE — 4010F ACE/ARB THERAPY RXD/TAKEN: CPT | Mod: CPTII,,, | Performed by: STUDENT IN AN ORGANIZED HEALTH CARE EDUCATION/TRAINING PROGRAM

## 2023-11-16 PROCEDURE — 3078F DIAST BP <80 MM HG: CPT | Mod: CPTII,,, | Performed by: NURSE PRACTITIONER

## 2023-11-16 PROCEDURE — 3066F PR DOCUMENTATION OF TREATMENT FOR NEPHROPATHY: ICD-10-PCS | Mod: CPTII,,, | Performed by: STUDENT IN AN ORGANIZED HEALTH CARE EDUCATION/TRAINING PROGRAM

## 2023-11-16 PROCEDURE — 99215 OFFICE O/P EST HI 40 MIN: CPT | Mod: PBBFAC,PN | Performed by: STUDENT IN AN ORGANIZED HEALTH CARE EDUCATION/TRAINING PROGRAM

## 2023-11-16 PROCEDURE — 1160F RVW MEDS BY RX/DR IN RCRD: CPT | Mod: CPTII,,, | Performed by: STUDENT IN AN ORGANIZED HEALTH CARE EDUCATION/TRAINING PROGRAM

## 2023-11-16 PROCEDURE — 3008F PR BODY MASS INDEX (BMI) DOCUMENTED: ICD-10-PCS | Mod: CPTII,,, | Performed by: STUDENT IN AN ORGANIZED HEALTH CARE EDUCATION/TRAINING PROGRAM

## 2023-11-16 PROCEDURE — 3075F SYST BP GE 130 - 139MM HG: CPT | Mod: CPTII,,, | Performed by: STUDENT IN AN ORGANIZED HEALTH CARE EDUCATION/TRAINING PROGRAM

## 2023-11-16 RX ORDER — ALPRAZOLAM 0.5 MG/1
0.5 TABLET ORAL 2 TIMES DAILY PRN
Qty: 14 TABLET | Refills: 0 | Status: SHIPPED | OUTPATIENT
Start: 2023-11-16 | End: 2023-11-16 | Stop reason: SDUPTHER

## 2023-11-16 RX ORDER — ALPRAZOLAM 0.5 MG/1
0.5 TABLET ORAL 2 TIMES DAILY PRN
Qty: 14 TABLET | Refills: 0 | Status: SHIPPED | OUTPATIENT
Start: 2023-11-16 | End: 2023-12-12 | Stop reason: SDUPTHER

## 2023-11-16 RX ORDER — INSULIN ASPART 100 [IU]/ML
2 INJECTION, SOLUTION INTRAVENOUS; SUBCUTANEOUS
COMMUNITY
Start: 2023-10-26

## 2023-11-16 NOTE — PROGRESS NOTES
[facility]  Nephrology Clinic Note    Chief complaint: Chronic Kidney Disease (RTC, took meds, weak from chemotherapy on 11/15)    History of present illness:   Vi Ulrich is a 58 y.o. 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  Presents for follow up appointment in nephrology clinic. Complains of unsteady gait, recently sustained a fall at home.  Request a prescription for a cane.      Review of Systems  12 point review of systems conducted, negative except as stated in the history of present illness.    Allergies: Patient is allergic to ketorolac (pf), mycophenolate mofetil, penicillins, percocet [oxycodone-acetaminophen], and tramadol.     Past Medical History:  has a past medical history of Arthritis, Asthma, Atrial fibrillation, Chronic constipation, Diabetes mellitus, Encounter for blood transfusion, GERD (gastroesophageal reflux disease), Gout, Hypertension, Lupus, Renal disorder, and SLE (systemic lupus erythematosus).    Procedure History:  has a past surgical history that includes Cholecystectomy; Appendectomy; Partial hysterectomy; Lymph node biopsy (Left, 2014); Hysterectomy; Joint replacement (Right, 08/07/2015); Mastectomy; Esophagogastroduodenoscopy (N/A, 04/19/2018); and Colonoscopy (N/A, 04/19/2018).    Family History: family history includes Arthritis in her  "mother; Asthma in her mother; Diabetes in her mother and sister; Heart block in her mother; Heart disease in her father and sister; Kidney disease in her sister.    Social History:  reports that she quit smoking about 18 months ago. Her smoking use included cigarettes. She started smoking about 43 years ago. She has never used smokeless tobacco. She reports that she does not drink alcohol and does not use drugs.    Physical exam  /76 (BP Location: Right arm, Patient Position: Sitting, BP Method: Large (Automatic))   Pulse 70   Temp 98.4 °F (36.9 °C) (Oral)   Resp 18   Ht 5' 5.75" (1.67 m)   Wt 79.3 kg (174 lb 12.8 oz)   LMP  (LMP Unknown)   SpO2 100%   BMI 28.43 kg/m²   General appearance: Patient is in no acute distress.  Skin: No rashes or wounds.  HEENT: PERRLA, EOMI, no scleral icterus, no JVD. Neck is supple.  Chest: Respirations are unlabored. Lungs sounds are clear.   Heart: S1, S2.   Abdomen: Benign.  : Deferred.  Extremities: No edema, peripheral pulses are palpable.   Neuro: No focal deficits.     Home Medications:    Current Outpatient Medications:     albuterol (ACCUNEB) 0.63 mg/3 mL Nebu, Take 3 mLs (0.63 mg total) by nebulization every 4 (four) hours as needed (wheezing). Rescue, Disp: 75 mL, Rfl: 3    albuterol sulfate (PROAIR RESPICLICK) 90 mcg/actuation inhaler, Inhale 1 puff into the lungs every 4 (four) hours as needed for Wheezing. Rescue, Disp: , Rfl:     apixaban (ELIQUIS) 5 mg Tab, Take 1 tablet (5 mg total) by mouth 2 (two) times daily., Disp: 180 tablet, Rfl: 3    atorvastatin (LIPITOR) 10 MG tablet, Take 10 mg by mouth once daily., Disp: , Rfl:     blood sugar diagnostic (ONETOUCH VERIO) Strp, 1 each by Misc.(Non-Drug; Combo Route) route 4 (four) times daily., Disp: 50 each, Rfl: 11    blood sugar diagnostic Strp, 1 each by Misc.(Non-Drug; Combo Route) route 4 (four) times daily., Disp: 50 each, Rfl: 11    blood sugar diagnostic Strp, 1 strip by Misc.(Non-Drug; Combo Route) " route 4 (four) times daily with meals and nightly., Disp: 200 each, Rfl: 6    blood-glucose meter kit, Use as instructed, Disp: 1 each, Rfl: 0    blood-glucose sensor (DEXCOM G7 SENSOR) Ana, 1 each by Misc.(Non-Drug; Combo Route) route every 14 (fourteen) days., Disp: 5 each, Rfl: 6    eletriptan (RELPAX) 40 MG tablet, Take 1 tablet (40 mg total) by mouth as needed (take one at the beginning of migraine, may repeat in 2 h. max 2 in 24h). may repeat in 2 hours if necessary, Disp: 9 tablet, Rfl: 5    EScitalopram oxalate (LEXAPRO) 20 MG tablet, Take 1 tablet (20 mg total) by mouth once daily., Disp: 90 tablet, Rfl: 3    HYDROcodone-acetaminophen (NORCO)  mg per tablet, Take 1 tablet by mouth every 6 (six) hours as needed for Pain., Disp: 90 tablet, Rfl: 0    insulin aspart U-100 (NOVOLOG) 100 unit/mL (3 mL) InPn pen, Inject 2 Units into the skin as needed., Disp: , Rfl:     lancets 28 gauge Misc, 100 lancets by Misc.(Non-Drug; Combo Route) route 4 (four) times daily with meals and nightly., Disp: 100 each, Rfl: 8    losartan (COZAAR) 25 MG tablet, Take 1 tablet (25 mg total) by mouth once daily., Disp: 90 tablet, Rfl: 3    multivitamin Tab, Take 1 tablet by mouth once daily., Disp: 30 tablet, Rfl: 11    mycophenolate (CELLCEPT) 250 mg Cap, Take 6 capsules (1,500 mg total) by mouth 2 (two) times daily., Disp: 360 capsule, Rfl: 11    nitrofurantoin, macrocrystal-monohydrate, (MACROBID) 100 MG capsule, Take 100 mg by mouth every 12 (twelve) hours., Disp: , Rfl:     ondansetron (ZOFRAN) 4 MG tablet, TAKE 1 TABLET(4 MG) BY MOUTH EVERY 6 HOURS AS NEEDED FOR NAUSEA, Disp: 24 tablet, Rfl: 0    pantoprazole (PROTONIX) 40 MG tablet, Take 1 tablet (40 mg total) by mouth once daily., Disp: 90 tablet, Rfl: 3    predniSONE (DELTASONE) 10 MG tablet, Take 3 tablets (30 mg total) by mouth once daily., Disp: 270 tablet, Rfl: 0    vitamin D (VITAMIN D3) 1000 units Tab, Take 1 tablet (1,000 Units total) by mouth once daily., Disp:  90 tablet, Rfl: 3    voclosporin (LUPKYNIS) 7.9 mg Cap, Take 23.7 mg by mouth once daily., Disp: 30 capsule, Rfl: 11    walker Misc, Please dispense 1 Rollator, Disp: 1 each, Rfl: 0    ALPRAZolam (XANAX) 0.5 MG tablet, Take 1 tablet (0.5 mg total) by mouth 2 (two) times daily as needed for Anxiety., Disp: 14 tablet, Rfl: 0    cane Ana, 1 each by Misc.(Non-Drug; Combo Route) route once. for 1 dose, Disp: 1 each, Rfl: 0    insulin aspart U-100 (NOVOLOG) 100 unit/mL injection, Inject 2 Units into the skin with lunch., Disp: 1.8 mL, Rfl: 3    sulfamethoxazole-trimethoprim 800-160mg (BACTRIM DS) 800-160 mg Tab, Take 1 tablet by mouth once daily., Disp: 90 tablet, Rfl: 0    Laboratory data    Serum  Lab Results   Component Value Date    WBC 3.38 (L) 11/14/2023    HGB 10.7 (L) 11/14/2023    HCT 32.9 (L) 11/14/2023     (H) 11/14/2023    IRON 219 (H) 08/04/2023    TIBC 284 08/04/2023    TRANS 152 (L) 08/04/2023    LABIRON 77 (H) 08/04/2023    FERRITIN 398.91 (H) 08/04/2023    FOLATE 5.3 (L) 01/12/2023    DUKHNHRV81 674 01/12/2023    HAPTO 249 03/10/2023     (H) 03/10/2023     11/14/2023    K 4.8 11/14/2023    CHLORIDE 110 (H) 11/14/2023    CO2 22 11/14/2023    BUN 20.5 (H) 11/14/2023    CREATININE 1.10 (H) 11/14/2023    EGFRNORACEVR 59 11/14/2023    GLUCOSE 71 (L) 11/14/2023    CALCIUM 8.8 11/14/2023    ALKPHOS 113 11/14/2023    LABPROT 7.4 11/14/2023    ALBUMIN 3.2 (L) 11/14/2023    BILIDIR 0.1 03/28/2022    IBILI 0.10 03/28/2022    AST 15 11/14/2023    ALT 14 11/14/2023    MG 1.70 10/25/2023    PHOS 2.6 10/25/2023      Lab Results   Component Value Date    HGBA1C 5.7 11/14/2023    .7 (H) 03/03/2023    [id number] 15.0 (L) 08/04/2023    HIV Nonreactive 10/13/2023    HEPCAB Nonreactive 10/13/2023    HEPBSURFAG Nonreactive 10/13/2023    HEPBSAB Negative 11/17/2020     Urine:  Lab Results   Component Value Date    COLORUA Light-Yellow 10/23/2023    APPEARANCEUA Clear 10/23/2023    SGUA 1.013  10/23/2023    PHUA 5.0 10/23/2023    PROTEINUA 1+ (A) 10/23/2023    GLUCOSEUA Normal 10/23/2023    KETONESUA Negative 10/23/2023    BLOODUA Negative 10/23/2023    NITRITESUA Negative 10/23/2023    LEUKOCYTESUR 500 (A) 10/23/2023    RBCUA 6-10 (A) 10/23/2023    WBCUA 51-99 (A) 10/23/2023    BACTERIA Trace (A) 10/23/2023    SQEPUA Occ (A) 10/23/2023    HYALINECASTS None Seen 10/23/2023    CREATRANDUR 71.0 11/14/2023    PROTEINURINE 102.0 11/14/2023    UPROTCREA 1.4 11/14/2023      Surgical Pathology   REPORT FROM [facility]:   PATHOLOGY NO:  [id number]   SPECIMEN SUBMITTED:  BY SONI CHOU MD  FOR KIDNEY, BIOPSY   DIAGNOSIS:   FOCAL LUPUS NEPHRITIS, ISN/RPS CLASS III.   SEGMENTAL MEMBRANOUS LUPUS NEPHRITIS, ISN/RPS CLASS V.   FEATURES OF DIABETIC GLOMERULOPATHY, CLASS IIB.   MODERATE PLASMA CELL-RICH INTERSTITIAL INFLAMMATION.  SEE COMMENT.  COMMENT:  THE MODIFIED NIH LUPUS NEPHRITIS ACTIVITY SCORE IS 4/24 AND CHRONICITY SCORE IS 5/12.  OF NOTE THERE IS FOCAL MODERATE TUBULOINTERSTITIAL INFLAMMATION, WITH PLASMA CELLS.  TUBULOINTERSTITIAL INFLAMMATION IS A KNOWN COMPONENT OF LUPUS NEPHRITIS HOWEVER OTHER CAUSES INCLUDING ALLERGIC DRUG REACTION MAY BE EXCLUDED AS APPROPRIATE.  A MINORITY (~10%) OF THE PLASMA CELLS STAIN WITH IGG4, WHICH IS NOT DIAGNOSTIC OF AN IGG4 INDUCED INTERSTITIAL NEPHRITIS.  SEE TABLE BELOW FOR SUMMARY OF CHRONICITY FINDINGS:     CHRONICITY SUMMARY  TOTAL GLOMERULI-                           14  GLOBAL GLOMERULOSCLEROSIS-   3  SEGMENTAL SCLEROSIS-                 PRESENT  INTERSTITIAL FIBROSIS-                   MODERATE   TUBULAR ATROPHY-                          MODERATE  ARTERIAL INTIMAL FIBROSIS-           ABSENT  ARTERIOLAR HYALINOSIS-                MILD    FINAL DIAGNOSIS PERFORMED BY   CAROLANN CERVANTES M.D.    Imaging  US Retroperitoneal Limited 10/23/2023  Grayscale and color Doppler sonographic evaluation of the kidneys.  The right kidney measures 12 cm. The left kidney  measures 12 cm.   No hydronephrosis.  There are small renal cysts for which no follow-up is needed.  Largest measures only about 1 cm.  Otherwise grossly normal renal parenchymal echogenicity.    Impression  No significant sonographic abnormality of the kidneys.  Electronically signed by: Jose Manuel Henao  Date:    10/23/2023  Time:    12:25      Impression    ICD-10-CM ICD-9-CM   1. CKD stage G3a/A3, GFR 45-59 and albumin creatinine ratio >300 mg/g  N18.31 585.3   2. Lupus nephritis  M32.14 710.0     583.81   3. Essential hypertension  I10 401.9   4. Anemia of chronic disease  D63.8 285.29   5. BMI 29.0-29.9,adult  Z68.29 V85.25   6. Unstable gait  R26.81 781.2   7. Chronic migraine with aura without status migrainosus, not intractable  G43.E09 346.00        Plan     CKD stage G3a/A3, GFR 45-59 and albumin creatinine ratio >300 mg/g  -     Comprehensive Metabolic Panel; Future; Expected date: 02/25/2024  -     Protein/Creatinine Ratio, Urine; Future; Expected date: 02/25/2024  -     Urinalysis, Reflex to Urine Culture; Future; Expected date: 02/25/2024  Renal indices are back to baseline, urinary protein excretion has improved.  Continue ARB and current immunosuppressive regimen.  Continue renal sparing activities:  -2 g a day dietary sodium restriction  -optimize glycemic control (goal A1c is less than 7%)   -control high blood pressure (goal blood pressure is less than 130/80, patient was advised to check blood pressure once or twice a week and bring blood pressure logs to next office visit)  -exercise at least 30 minutes a day, 5 days a week  -maintain healthy weight  -stay well hydrated (drink water only, avoid juices, sweet tea, and sodas)  -ask about staying up-to-date on vaccinations (flu vaccine, pneumonia vaccine, hepatitis B vaccine)  -avoid excessive use of NSAIDs (ibuprofen, naproxen, Aleve, Advil, Toradol, Mobic), take Tylenol as needed for headache or mild pain  -take cholesterol lowering medications if  prescribed (LDL goal less than 100)  Follow up in about 3 months (around 2/16/2024).    Lupus nephritis  -     C3 Complement; Future; Expected date: 02/25/2024  -     C4 Complement; Future; Expected date: 02/25/2024  -     dsDNA Ab with Crithidia Reflex; Future; Expected date: 02/25/2024  -     Antinuclear Antibody (MARTA), HEp-2, IgG; Future; Expected date: 02/25/2024    In March of 2023, MARTA double-stranded DNA were positive with dsDNA titer of greater than 1:5120, SSA, SSB negative, CCP Ab 4 C3 57, C4 9.9.  Patient previously had elevated titers of U1-RNP Ab, MDA-5 Ab, Ku, RNP 70 and U2 snRNP.  Patient was received rituximab on 04/14/2023 and 04/28/2023.  She has received another 1 g of rituximab on 11/15/2023.  For most recent rheumatology recommendations, will continue CellCept 1500 mg BID, Lupkynis 23.7 mg BID, prednisone 20 mg a day.  Continue PPI for PUD access.  Referral to [facility] rheumatology clinic has been made already by primary care provider since patient is unable to follow up with Dr Velazquez.     Essential hypertension  Blood pressure reading is at goal, continue current antihypertensive regimen and 2 g a day dietary sodium restriction.      Anemia of chronic disease  -     CBC Auto Differential; Future; Expected date: 02/25/2024  -     Ferritin; Future; Expected date: 02/25/2024  -     Iron and TIBC; Future; Expected date: 02/25/2024  H&H are stable.     BMI 29.0-29.9,adult  Lifestyle and dietary interventions discussed, patient encouraged to maintain non-sedentary lifestyle and well-balanced diet.     Unstable gait  -     cane Ana; 1 each by Misc.(Non-Drug; Combo Route) route once. for 1 dose  Dispense: 1 each; Refill: 0  Will issue a prescription for a cane.     Chronic migraine with aura without status migrainosus, not intractable  -     Ambulatory referral/consult to Neurology; Future; Expected date: 11/24/2023  Will refer to Neurology.           11/16/2023  Rachel Broderick NP  [facility] Nephrology
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
## 2023-11-17 RX ORDER — CANE
1 EACH MISCELLANEOUS ONCE
Qty: 1 EACH | Refills: 0 | Status: SHIPPED | OUTPATIENT
Start: 2023-11-17 | End: 2023-11-17

## 2023-11-17 NOTE — TELEPHONE ENCOUNTER
Spoke to pt she stated she paid for the medication $9.00 for 14 tabs. Requesting that future refills be sent to [facility]

## 2023-11-20 ENCOUNTER — TELEPHONE (OUTPATIENT)
Dept: FAMILY MEDICINE | Facility: CLINIC | Age: 58
End: 2023-11-20
Payer: MEDICAID

## 2023-11-22 ENCOUNTER — EXTERNAL HOME HEALTH (OUTPATIENT)
Dept: HOME HEALTH SERVICES | Facility: HOSPITAL | Age: 58
End: 2023-11-22
Payer: MEDICAID

## 2023-11-25 ENCOUNTER — DOCUMENT SCAN (OUTPATIENT)
Dept: HOME HEALTH SERVICES | Facility: HOSPITAL | Age: 58
End: 2023-11-25
Payer: MEDICAID

## 2023-11-27 ENCOUNTER — DOCUMENT SCAN (OUTPATIENT)
Dept: HOME HEALTH SERVICES | Facility: HOSPITAL | Age: 58
End: 2023-11-27
Payer: MEDICAID

## 2023-11-28 ENCOUNTER — TELEPHONE (OUTPATIENT)
Dept: NEPHROLOGY | Facility: CLINIC | Age: 58
End: 2023-11-28
Payer: MEDICAID

## 2023-11-28 NOTE — TELEPHONE ENCOUNTER
Left message to return call concerning cane prescription.  She also needs to call to setup delivery for Pierre @[phone]

## 2023-12-04 ENCOUNTER — HOSPITAL ENCOUNTER (EMERGENCY)
Facility: HOSPITAL | Age: 58
Discharge: HOME | End: 2023-12-04
Attending: STUDENT IN AN ORGANIZED HEALTH CARE EDUCATION/TRAINING PROGRAM
Payer: MEDICAID

## 2023-12-04 VITALS
HEART RATE: 69 BPM | WEIGHT: 183.63 LBS | BODY MASS INDEX: 29.51 KG/M2 | DIASTOLIC BLOOD PRESSURE: 72 MMHG | OXYGEN SATURATION: 99 % | SYSTOLIC BLOOD PRESSURE: 145 MMHG | HEIGHT: 66 IN | RESPIRATION RATE: 15 BRPM | TEMPERATURE: 98 F

## 2023-12-04 DIAGNOSIS — H10.33 ACUTE BACTERIAL CONJUNCTIVITIS OF BOTH EYES: Primary | ICD-10-CM

## 2023-12-04 PROCEDURE — 99283 EMERGENCY DEPT VISIT LOW MDM: CPT

## 2023-12-04 RX ORDER — ERYTHROMYCIN 5 MG/G
OINTMENT OPHTHALMIC EVERY 4 HOURS
Qty: 3.5 G | Refills: 0 | Status: SHIPPED | OUTPATIENT
Start: 2023-12-04 | End: 2023-12-14

## 2023-12-04 NOTE — ED PROVIDER NOTES
Encounter Date: 12/4/2023       History     Chief Complaint   Patient presents with    Eye Problem     Erythema, burning to both eyes, left greater than right, x 2 days.     58-year-old female with past medical history significant for lupus, AFib, asthma, hypertension, hyperlipidemia and CKD presents to ED complaining of 2 day history of redness and burning to bilateral eyes.  Patient reports symptoms are worse in her left eye than her right.  Reports upon waking up in the morning she has bilateral mattering.  Denies getting any chemicals or other substances in eyes that may have caused irritation.  Denies vision changes, photophobia, eyeball pain, periorbital edema, fever, chills, headache, dizziness, nausea, vomiting, dysuria, vaginal discharge, genital sores.  Vital signs stable on arrival, patient in no acute distress.      Review of patient's allergies indicates:   Allergen Reactions    Ketorolac (pf) Swelling    Mycophenolate mofetil Swelling    Penicillins Hives    Percocet [oxycodone-acetaminophen] Hives and Itching    Tramadol Hives     Past Medical History:   Diagnosis Date    Arthritis     knees    Asthma     Atrial fibrillation     Chronic constipation     Diabetes mellitus     Encounter for blood transfusion 10/2014    GERD (gastroesophageal reflux disease)     Gout     Hypertension     Lupus 2004    SLE    Renal disorder     SLE (systemic lupus erythematosus)      Past Surgical History:   Procedure Laterality Date    APPENDECTOMY      CHOLECYSTECTOMY      COLONOSCOPY N/A 04/19/2018    Procedure: COLONOSCOPY;  Surgeon: Minerva Porras MD;  Location: [facility];  Service: Endoscopy;  Laterality: N/A;    ESOPHAGOGASTRODUODENOSCOPY N/A 04/19/2018    Procedure: ESOPHAGOGASTRODUODENOSCOPY (EGD);  Surgeon: Minerva Porras MD;  Location: [facility];  Service: Endoscopy;  Laterality: N/A;    HYSTERECTOMY      JOINT REPLACEMENT Right 08/07/2015    Knee    LYMPH NODE BIOPSY Left 2014    MASTECTOMY       Left arm- NO BP    PARTIAL HYSTERECTOMY       Family History   Problem Relation Age of Onset    Heart block Mother     Arthritis Mother     Asthma Mother     Diabetes Mother     Heart disease Father     Diabetes Sister     Heart disease Sister     Kidney disease Sister      Social History     Tobacco Use    Smoking status: Former     Current packs/day: 0.00     Types: Cigarettes     Start date: 1980     Quit date: 2022     Years since quittin.5    Smokeless tobacco: Never   Substance Use Topics    Alcohol use: No    Drug use: No     Review of Systems   All other systems reviewed and are negative.      Physical Exam     Initial Vitals [23 1340]   BP Pulse Resp Temp SpO2   (!) 145/72 69 15 98.4 °F (36.9 °C) 99 %      MAP       --         Physical Exam    Nursing note and vitals reviewed.  Constitutional: She appears well-developed and well-nourished. She is not diaphoretic. No distress.   HENT:   Head: Normocephalic and atraumatic.   Nose: Nose normal.   Mouth/Throat: Oropharynx is clear and moist. No oropharyngeal exudate.   Eyes: EOM and lids are normal. Pupils are equal, round, and reactive to light. Right eye exhibits no chemosis, no discharge, no exudate and no hordeolum. No foreign body present in the right eye. Left eye exhibits no chemosis, no discharge, no exudate and no hordeolum. No foreign body present in the left eye. Right conjunctiva is injected. Left conjunctiva is injected. No scleral icterus.   Neck: Neck supple.   Normal range of motion.  Cardiovascular:  Normal rate, regular rhythm, normal heart sounds and intact distal pulses.     Exam reveals no gallop and no friction rub.       No murmur heard.  Pulmonary/Chest: Breath sounds normal. No respiratory distress. She has no wheezes. She has no rhonchi. She has no rales.   Abdominal: Abdomen is soft. Bowel sounds are normal. She exhibits no distension. There is no abdominal tenderness. There is no rebound and no guarding.    Musculoskeletal:         General: No tenderness or edema. Normal range of motion.      Cervical back: Normal range of motion and neck supple.     Lymphadenopathy:     She has no cervical adenopathy.   Neurological: She is alert and oriented to person, place, and time. She has normal strength.   Skin: Skin is warm and dry. Capillary refill takes less than 2 seconds. No rash noted. No pallor.   Psychiatric: She has a normal mood and affect. Thought content normal.         ED Course   Procedures  Labs Reviewed - No data to display       Imaging Results    None          Medications - No data to display  Medical Decision Making  Differential diagnosis: Includes but not limited to bacterial conjunctivitis, allergic conjunctivitis, viral conjunctivitis    ED management:  No indication for acute medical intervention in ED at this time.      ED course:  Presentation consistent with bacterial conjunctivitis.  Patient denies any changes in vision or systemic symptoms, no indication for further workup at this time.  I will discharge patient with erythromycin ophthalmic ointment and I educated on how to use this.  Instructed patient to contact her PCP for close follow-up later this week.  Strict ED precautions given for new or worsening symptoms, along with lack of improvement of symptoms with antibiotic treatment, and patient verbalized understanding.                                      Clinical Impression:  Final diagnoses:  [H10.33] Acute bacterial conjunctivitis of both eyes (Primary)          ED Disposition Condition    Discharge Good          ED Prescriptions       Medication Sig Dispense Start Date End Date Auth. Provider    erythromycin (ROMYCIN) ophthalmic ointment Place into both eyes every 4 (four) hours. Place a 1/2 inch ribbon of ointment into the lower eyelid. for 10 days 3.5 g 12/4/2023 12/14/2023 Aubrey Feldman PA          Follow-up Information       Follow up With Specialties Details Why Contact Info     Octaviano Barrios MD Internal Medicine Call today  [address] [facility] [id number]  [phone]      [facility] - Emergency Dept Emergency Medicine  As needed, If symptoms worsen [address] [facility] [id number] [phone]             Aubrey Feldman PA  12/04/23 1423

## 2023-12-04 NOTE — DISCHARGE INSTRUCTIONS
Report to Emergency Department if symptoms return or worsen; [facility] - Medicine Clinic Within 1 to 2 days, It is important that you follow up with your primary care provider or specialist if indicated for further evaluation, workup, and treatment as necessary. The exam and treatment you received in Emergency Department was for an urgent problem and NOT INTENDED AS COMPLETE CARE. It is important that you FOLLOW UP with a doctor for ongoing care. If your symptoms become WORSE or you DO NOT IMPROVE and you are unable to reach your health care provider, you should RETURN to the Emergency Department. The Emergency Department provider has provided a PRELIMINARY INTERPRETATION of all your studies. A final interpretation may be done after you are discharged. If a change in your diagnosis or treatment is needed WE WILL CONTACT YOU. It is critical that we have a CURRENT PHONE NUMBER FOR YOU.

## 2023-12-05 ENCOUNTER — DOCUMENT SCAN (OUTPATIENT)
Dept: HOME HEALTH SERVICES | Facility: HOSPITAL | Age: 58
End: 2023-12-05
Payer: MEDICAID

## 2023-12-06 ENCOUNTER — TELEPHONE (OUTPATIENT)
Dept: NEPHROLOGY | Facility: CLINIC | Age: 58
End: 2023-12-06
Payer: MEDICAID

## 2023-12-06 NOTE — TELEPHONE ENCOUNTER
----- Message from Regi Donahue sent at 12/6/2023  1:38 PM CST -----  Jeff Jay called to speak to nurse about pt.  [phone]    Jeff called to say pt stated she has at least 1 box of medication on hand; he also stated pt will not be contacted as reminders to order medication.

## 2023-12-07 ENCOUNTER — TELEPHONE (OUTPATIENT)
Dept: NEPHROLOGY | Facility: CLINIC | Age: 58
End: 2023-12-07
Payer: MEDICAID

## 2023-12-12 ENCOUNTER — OFFICE VISIT (OUTPATIENT)
Dept: BEHAVIORAL HEALTH | Facility: CLINIC | Age: 58
End: 2023-12-12
Payer: MEDICAID

## 2023-12-12 VITALS
BODY MASS INDEX: 29.23 KG/M2 | DIASTOLIC BLOOD PRESSURE: 77 MMHG | HEART RATE: 64 BPM | SYSTOLIC BLOOD PRESSURE: 133 MMHG | WEIGHT: 181.13 LBS | TEMPERATURE: 98 F | OXYGEN SATURATION: 99 %

## 2023-12-12 DIAGNOSIS — F41.1 GAD (GENERALIZED ANXIETY DISORDER): Primary | ICD-10-CM

## 2023-12-12 DIAGNOSIS — F51.05 INSOMNIA DUE TO OTHER MENTAL DISORDER: ICD-10-CM

## 2023-12-12 DIAGNOSIS — F99 INSOMNIA DUE TO OTHER MENTAL DISORDER: ICD-10-CM

## 2023-12-12 PROCEDURE — 3066F PR DOCUMENTATION OF TREATMENT FOR NEPHROPATHY: ICD-10-PCS | Mod: CPTII,,, | Performed by: STUDENT IN AN ORGANIZED HEALTH CARE EDUCATION/TRAINING PROGRAM

## 2023-12-12 PROCEDURE — 99214 OFFICE O/P EST MOD 30 MIN: CPT | Mod: S$PBB,,, | Performed by: STUDENT IN AN ORGANIZED HEALTH CARE EDUCATION/TRAINING PROGRAM

## 2023-12-12 PROCEDURE — 1159F PR MEDICATION LIST DOCUMENTED IN MEDICAL RECORD: ICD-10-PCS | Mod: CPTII,,, | Performed by: STUDENT IN AN ORGANIZED HEALTH CARE EDUCATION/TRAINING PROGRAM

## 2023-12-12 PROCEDURE — 99214 PR OFFICE/OUTPT VISIT, EST, LEVL IV, 30-39 MIN: ICD-10-PCS | Mod: S$PBB,,, | Performed by: STUDENT IN AN ORGANIZED HEALTH CARE EDUCATION/TRAINING PROGRAM

## 2023-12-12 PROCEDURE — 3078F PR MOST RECENT DIASTOLIC BLOOD PRESSURE < 80 MM HG: ICD-10-PCS | Mod: CPTII,,, | Performed by: STUDENT IN AN ORGANIZED HEALTH CARE EDUCATION/TRAINING PROGRAM

## 2023-12-12 PROCEDURE — 3075F SYST BP GE 130 - 139MM HG: CPT | Mod: CPTII,,, | Performed by: STUDENT IN AN ORGANIZED HEALTH CARE EDUCATION/TRAINING PROGRAM

## 2023-12-12 PROCEDURE — 3066F NEPHROPATHY DOC TX: CPT | Mod: CPTII,,, | Performed by: STUDENT IN AN ORGANIZED HEALTH CARE EDUCATION/TRAINING PROGRAM

## 2023-12-12 PROCEDURE — 3008F BODY MASS INDEX DOCD: CPT | Mod: CPTII,,, | Performed by: STUDENT IN AN ORGANIZED HEALTH CARE EDUCATION/TRAINING PROGRAM

## 2023-12-12 PROCEDURE — 3075F PR MOST RECENT SYSTOLIC BLOOD PRESS GE 130-139MM HG: ICD-10-PCS | Mod: CPTII,,, | Performed by: STUDENT IN AN ORGANIZED HEALTH CARE EDUCATION/TRAINING PROGRAM

## 2023-12-12 PROCEDURE — 1160F RVW MEDS BY RX/DR IN RCRD: CPT | Mod: CPTII,,, | Performed by: STUDENT IN AN ORGANIZED HEALTH CARE EDUCATION/TRAINING PROGRAM

## 2023-12-12 PROCEDURE — 1159F MED LIST DOCD IN RCRD: CPT | Mod: CPTII,,, | Performed by: STUDENT IN AN ORGANIZED HEALTH CARE EDUCATION/TRAINING PROGRAM

## 2023-12-12 PROCEDURE — 3008F PR BODY MASS INDEX (BMI) DOCUMENTED: ICD-10-PCS | Mod: CPTII,,, | Performed by: STUDENT IN AN ORGANIZED HEALTH CARE EDUCATION/TRAINING PROGRAM

## 2023-12-12 PROCEDURE — 3078F DIAST BP <80 MM HG: CPT | Mod: CPTII,,, | Performed by: STUDENT IN AN ORGANIZED HEALTH CARE EDUCATION/TRAINING PROGRAM

## 2023-12-12 PROCEDURE — 99213 OFFICE O/P EST LOW 20 MIN: CPT | Mod: PBBFAC,PN | Performed by: STUDENT IN AN ORGANIZED HEALTH CARE EDUCATION/TRAINING PROGRAM

## 2023-12-12 PROCEDURE — 4010F PR ACE/ARB THEARPY RXD/TAKEN: ICD-10-PCS | Mod: CPTII,,, | Performed by: STUDENT IN AN ORGANIZED HEALTH CARE EDUCATION/TRAINING PROGRAM

## 2023-12-12 PROCEDURE — 1160F PR REVIEW ALL MEDS BY PRESCRIBER/CLIN PHARMACIST DOCUMENTED: ICD-10-PCS | Mod: CPTII,,, | Performed by: STUDENT IN AN ORGANIZED HEALTH CARE EDUCATION/TRAINING PROGRAM

## 2023-12-12 PROCEDURE — 4010F ACE/ARB THERAPY RXD/TAKEN: CPT | Mod: CPTII,,, | Performed by: STUDENT IN AN ORGANIZED HEALTH CARE EDUCATION/TRAINING PROGRAM

## 2023-12-12 PROCEDURE — 3044F PR MOST RECENT HEMOGLOBIN A1C LEVEL <7.0%: ICD-10-PCS | Mod: CPTII,,, | Performed by: STUDENT IN AN ORGANIZED HEALTH CARE EDUCATION/TRAINING PROGRAM

## 2023-12-12 PROCEDURE — 3044F HG A1C LEVEL LT 7.0%: CPT | Mod: CPTII,,, | Performed by: STUDENT IN AN ORGANIZED HEALTH CARE EDUCATION/TRAINING PROGRAM

## 2023-12-12 RX ORDER — ALPRAZOLAM 0.5 MG/1
0.5 TABLET ORAL DAILY PRN
Qty: 30 TABLET | Refills: 1 | Status: SHIPPED | OUTPATIENT
Start: 2023-12-12 | End: 2023-12-13 | Stop reason: SDUPTHER

## 2023-12-12 RX ORDER — ALPRAZOLAM 0.5 MG/1
0.5 TABLET ORAL DAILY PRN
Qty: 30 TABLET | Refills: 1 | Status: SHIPPED | OUTPATIENT
Start: 2023-12-12 | End: 2023-12-12 | Stop reason: SDUPTHER

## 2023-12-12 NOTE — PROGRESS NOTES
"Outpatient Psychiatry Follow-Up Visit    12/12/2023    Clinical Status of Patient:  Outpatient (Ambulatory)    Chief Complaint:  Vi Ulrich is a 58 y.o. female who presents today for follow-up of anxiety and insomnia . Patient last seen for initial evaluation on 11/16/2023. Met with patient.      Interval History and Content of Current Session:  Interim Events/Subjective Report/Content of Current Session:   Pt reports doing "ok"  overall.  Reports good mood, elevated anxiety.  Sleeping poorly.  Took xanax but didn't notice much effect.  Appetite good, weight stable.  Energy low, motivation good.  Denies irritability, denies hopelessness.  Denies SI/HI/AVH/paranoia, denies plan or desire for self harm or harm to others.  Denies SE from current regimen Denies change in chronic somatic complaints. Pt voices desire to adjust regimen to address ongoing symptoms.      Psychiatric Review of Systems-is patient experiencing or having changes in  Integrated into HPI above.     Review of Systems   PSYCHIATRIC: Pertinant items are noted in the narrative.  CONSTITUTIONAL: No weight gain or loss.  HEENT: +occasional dry mouth, denies sore throat  MUSCULOSKELETAL:  + generalized myalgias, +arthritic pain of back, hand soreness   NEUROLOGIC: No weakness, sensory changes, seizures, confusion, memory loss, tremor or other abnormal movements.  +ha  CARDIAC: No CP, + palpitations  RESPIRATORY: + shortness of breath with exertion, denies cough.  CARDIOVASCULAR: No tachycardia or chest pain.  GASTROINTESTINAL: No nausea, vomiting, pain, constipation or diarrhea.    Past Medical, Family and Social History: The patient's past medical, family and social history have been reviewed and updated as appropriate within the electronic medical record - see encounter notes.    Compliance: good    Side effects: denies    Risk Parameters:  Patient reports no suicidal ideation  Patient reports no homicidal ideation  Patient reports no self-injurious " "behavior  Patient reports no violent behavior    Exam (detailed: at least 9 elements; comprehensive: all 15 elements)   Constitutional  Vitals:  Most recent vital signs, dated less than 90 days prior to this appointment, were reviewed.     Vitals:    12/12/23 0724   BP: 133/77   Pulse: 64   Temp: 98.2 °F (36.8 °C)   SpO2: 99%   Weight: 82.1 kg (181 lb 1.6 oz)        General:   Constitutional: No acute distress, appears stated age, casually dressed    Neurologic:   Motor: moves all extremities spontaneously and without difficulty, no abnormal involuntary movements observed  Gait: normal gait and station    Mental status examination:   Appearance: appears stated age, casually dressed, no acute distress  Behavior: unremarkable for situation, calm and cooperative  Mood: "ok"  Affect: mood congruent, constricted, and anxious-appearing  Thought process: linear and goal directed  Associations: appropriate for conversation  Thought content: no plan or desire for self harm or harm to others, denies paranoia, no delusional ideation volunteered  Perceptions: denies hallucinations or other altered perceptions  Orientation: oriented to day of week, month, year, location, and situation  Language: English, fluid  Attention: able to attend to interview  Insight: good  Judgement: good    PHQ9:  Over the last two weeks how often have you been bothered by little interest or pleasure in doing things: 2  Over the last two weeks how often have you been bothered by feeling down, depressed or hopeless: 0  PHQ-2 Total Score: 2  PHQ-9 Score: 11  PHQ-9 Interpretation: Moderate          12/12/2023     7:23 AM 11/16/2023    10:29 AM   GAD7   1. Feeling nervous, anxious, or on edge? 0 3   2. Not being able to stop or control worrying? 3 3   3. Worrying too much about different things? 3 3   4. Trouble relaxing? 0 3   5. Being so restless that it is hard to sit still? 0 3   6. Becoming easily annoyed or irritable? 0 3   7. Feeling afraid as if " something awful might happen? 0 0   8. If you checked off any problems, how difficult have these problems made it for you to do your work, take care of things at home, or get along with other people? 1 3   LIANG-7 Score 6 18     Assessment and Diagnosis   Status/Progress: Based on the examination today, the patient's problem(s) is/are inadequately controlled.  New problems have not been presented today.   Co-morbidities and Lack of compliance are not complicating management of the primary condition.  Number of separate conditions addressed during today's visit: 2 (anxiety inadequately controlled, insomnia inadequately controlled).  Are medication adjustments being made today: Yes.  Are referral(s) being ordered today: No.  Complexity (level) of medical decision making employed in the encounter: MODERATE.    General Impression:    ICD-10-CM ICD-9-CM   1. LIANG (generalized anxiety disorder)  F41.1 300.02   2. Insomnia due to other mental disorder  F51.05 300.9    F99 327.02     Intervention/Counseling/Treatment Plan   Continue lexapro 20mg daily  Increase xanax to 1mg daily prn   Patient is currently on opioid pain medications and consents to treatment with benzodiazepine despite risk of life-threatening sedation  Recommend pt receive narcan for opioid reversal and should instruct family on how to administer, pt declined  No need for PEC as pt is not an imminent danger to self or others or gravely disabled due to acute psychiatric illness  Discussed that pt should either call clinic for psychiatric crisis symptoms or present to nearest emergency room    Discussed with patient informed consent including diagnosis, risks and benefits of proposed treatment above vs. alternative treatments vs. no treatment, as well as serious and common side effects of these treatments, and the inherent unpredictability of individual responses to these treatments. The patient expresses understanding of the above and displays the capacity to  agree with this current plan. Patient also agrees that, currently, the benefits outweigh the risks and would like to pursue treatment at this time, and had no other questions.    Instructions:  Take all medications as prescribed.    Abstain from recreational drugs and alcohol.  Present to ED or call 911 for SI/HI plan or intent, psychosis, or medical emergency.    Return to Clinic: Follow up in about 6 weeks (around 1/23/2024).    Total time:   The total time for services performed on the date of the encounter (including review of prior visit notes, review of notes from other providers, review of results from laboratory/imaging studies, face-to-face time with patient, and time spent on other activities directly related to patient care): 20 minutes.    Frederic Noel MD  [facility]

## 2023-12-13 ENCOUNTER — TELEPHONE (OUTPATIENT)
Dept: BEHAVIORAL HEALTH | Facility: CLINIC | Age: 58
End: 2023-12-13
Payer: MEDICAID

## 2023-12-13 DIAGNOSIS — F51.05 INSOMNIA DUE TO OTHER MENTAL DISORDER: ICD-10-CM

## 2023-12-13 DIAGNOSIS — F51.01 PRIMARY INSOMNIA: ICD-10-CM

## 2023-12-13 DIAGNOSIS — I10 ESSENTIAL HYPERTENSION: ICD-10-CM

## 2023-12-13 DIAGNOSIS — G43.109 MIGRAINE AURA WITHOUT HEADACHE: ICD-10-CM

## 2023-12-13 DIAGNOSIS — F41.1 GAD (GENERALIZED ANXIETY DISORDER): ICD-10-CM

## 2023-12-13 DIAGNOSIS — D50.9 IRON DEFICIENCY ANEMIA, UNSPECIFIED IRON DEFICIENCY ANEMIA TYPE: ICD-10-CM

## 2023-12-13 DIAGNOSIS — M79.7 FIBROMYALGIA: ICD-10-CM

## 2023-12-13 DIAGNOSIS — F99 INSOMNIA DUE TO OTHER MENTAL DISORDER: ICD-10-CM

## 2023-12-13 DIAGNOSIS — M32.14 OTHER SYSTEMIC LUPUS ERYTHEMATOSUS WITH GLOMERULAR DISEASE: ICD-10-CM

## 2023-12-13 DIAGNOSIS — F33.1 MODERATE RECURRENT MAJOR DEPRESSION: ICD-10-CM

## 2023-12-13 DIAGNOSIS — M32.14 LUPUS NEPHRITIS, ISN/RPS CLASS III: ICD-10-CM

## 2023-12-13 DIAGNOSIS — N30.01 ACUTE CYSTITIS WITH HEMATURIA: ICD-10-CM

## 2023-12-13 DIAGNOSIS — R53.1 WEAKNESS: ICD-10-CM

## 2023-12-13 RX ORDER — ELETRIPTAN HYDROBROMIDE 40 MG/1
40 TABLET, FILM COATED ORAL
Qty: 9 TABLET | Refills: 5 | Status: SHIPPED | OUTPATIENT
Start: 2023-12-13 | End: 2025-03-07

## 2023-12-13 RX ORDER — ALPRAZOLAM 1 MG/1
1 TABLET ORAL DAILY PRN
Qty: 30 TABLET | Refills: 1 | Status: SHIPPED | OUTPATIENT
Start: 2023-12-13 | End: 2024-01-09

## 2023-12-13 NOTE — TELEPHONE ENCOUNTER
The office note does say you increased xanax to 1mg prn but the script you sent in was for xanax 0.5.

## 2023-12-13 NOTE — TELEPHONE ENCOUNTER
Prescription ordered incorrectly at pt's appointment for xanax.  New prescription ordered with correct instructions.

## 2023-12-21 ENCOUNTER — PATIENT MESSAGE (OUTPATIENT)
Dept: INTERNAL MEDICINE | Facility: CLINIC | Age: 58
End: 2023-12-21
Payer: MEDICAID

## 2023-12-21 DIAGNOSIS — M79.7 FIBROMYALGIA: ICD-10-CM

## 2023-12-21 DIAGNOSIS — M32.9 SYSTEMIC LUPUS ERYTHEMATOSUS ARTHRITIS: Primary | ICD-10-CM

## 2023-12-21 DIAGNOSIS — M32.14 SYSTEMIC LUPUS ERYTHEMATOSUS WITH GLOMERULAR DISEASE, UNSPECIFIED SLE TYPE: ICD-10-CM

## 2023-12-21 RX ORDER — HYDROCODONE BITARTRATE AND ACETAMINOPHEN 10; 325 MG/1; MG/1
1 TABLET ORAL 3 TIMES DAILY
COMMUNITY
End: 2023-12-21 | Stop reason: SDUPTHER

## 2023-12-21 RX ORDER — HYDROCODONE BITARTRATE AND ACETAMINOPHEN 10; 325 MG/1; MG/1
1 TABLET ORAL 3 TIMES DAILY PRN
Qty: 10 TABLET | Refills: 0 | Status: SHIPPED | OUTPATIENT
Start: 2023-12-21 | End: 2024-02-08

## 2023-12-26 DIAGNOSIS — M32.14 SYSTEMIC LUPUS ERYTHEMATOSUS WITH GLOMERULAR DISEASE, UNSPECIFIED SLE TYPE: ICD-10-CM

## 2023-12-26 DIAGNOSIS — M79.7 FIBROMYALGIA: ICD-10-CM

## 2023-12-26 DIAGNOSIS — M32.9 SYSTEMIC LUPUS ERYTHEMATOSUS ARTHRITIS: ICD-10-CM

## 2023-12-27 RX ORDER — HYDROCODONE BITARTRATE AND ACETAMINOPHEN 10; 325 MG/1; MG/1
1 TABLET ORAL EVERY 6 HOURS PRN
Qty: 28 TABLET | Refills: 0 | Status: SHIPPED | OUTPATIENT
Start: 2023-12-27 | End: 2024-01-03

## 2023-12-28 RX ORDER — HYDROCODONE BITARTRATE AND ACETAMINOPHEN 10; 325 MG/1; MG/1
1 TABLET ORAL EVERY 6 HOURS PRN
Qty: 90 TABLET | Refills: 0 | Status: SHIPPED | OUTPATIENT
Start: 2024-01-04 | End: 2024-01-30

## 2024-01-04 ENCOUNTER — TELEPHONE (OUTPATIENT)
Dept: RHEUMATOLOGY | Facility: CLINIC | Age: 59
End: 2024-01-04
Payer: MEDICAID

## 2024-01-04 NOTE — TELEPHONE ENCOUNTER
Received message from Mr. Aguilar that patient needs to contact [facility] to scheduled delivery.    Called patient and notified that she needs to call [facility] to schedule delivery of Lupkynis and she stated that she already spoke with someone last week to set it up. Also informed her of new patient appointment with Dr. Last 9/30/24 at 8:00a  Then she explains that her CBGs have been very low (50s) and is having hypoglycemia symptoms. She also stated that she does not want to go to hospital because she will be kept over night. She is requesting Iron Infusions again. She stated that she needs some one from PCP or Renal to call her back. Told patient I would send this message to PCP and renal.

## 2024-01-05 ENCOUNTER — TELEPHONE (OUTPATIENT)
Dept: CASE MANAGEMENT | Facility: HOSPITAL | Age: 59
End: 2024-01-05
Payer: MEDICAID

## 2024-01-05 ENCOUNTER — TELEPHONE (OUTPATIENT)
Dept: NEPHROLOGY | Facility: CLINIC | Age: 59
End: 2024-01-05
Payer: MEDICAID

## 2024-01-05 NOTE — TELEPHONE ENCOUNTER
Called [facility] [phone] to attempting to confirm delivery is set up. I was told that patient called on 12/14/23 to notify [facility] that she not need medication because she has been in hospital and has over a month left of dosing. Then [facility] called her and left voicemail message asking her to call them for refill. That was the last correspondence with patient.     Then called patient and left voicemail message asking for her to call clinic back for follow up from yesterday's phone call.

## 2024-01-05 NOTE — TELEPHONE ENCOUNTER
Then she explains that her CBGs have been very low (50s) and is having hypoglycemia symptoms. She also stated that she does not want to go to hospital because she will be kept over night. She is requesting Iron Infusions again. She stated that she needs some one from PCP or Renal to call her back. Told patient I would send this message to PCP and renal.        Per Meggan FNP:  pt is not anemic 10.7/32.9-iron infusion is not appropriate at this time.  May take OTC iron tablets every day or every other day or prenatal vitamins.   Please ask the patient where she would be able to  the cane ([facility] vs other pharmacy that carries medical equipment) amd fax Rx for the cane along with my note.  3.  Pt advised to call  [facility] [phone]  to set up delivery of Lupkynis.  Pt states understanding.

## 2024-01-08 ENCOUNTER — OFFICE VISIT (OUTPATIENT)
Dept: ORTHOPEDICS | Facility: CLINIC | Age: 59
End: 2024-01-08
Payer: MEDICAID

## 2024-01-08 ENCOUNTER — TELEPHONE (OUTPATIENT)
Dept: FAMILY MEDICINE | Facility: CLINIC | Age: 59
End: 2024-01-08
Payer: MEDICAID

## 2024-01-08 VITALS
TEMPERATURE: 99 F | BODY MASS INDEX: 29.44 KG/M2 | HEART RATE: 78 BPM | DIASTOLIC BLOOD PRESSURE: 77 MMHG | WEIGHT: 182.38 LBS | SYSTOLIC BLOOD PRESSURE: 115 MMHG

## 2024-01-08 DIAGNOSIS — S66.811A FLEXOR TENDON RUPTURE OF HAND, RIGHT, INITIAL ENCOUNTER: Primary | ICD-10-CM

## 2024-01-08 PROCEDURE — 3078F DIAST BP <80 MM HG: CPT | Mod: CPTII,,, | Performed by: STUDENT IN AN ORGANIZED HEALTH CARE EDUCATION/TRAINING PROGRAM

## 2024-01-08 PROCEDURE — 3074F SYST BP LT 130 MM HG: CPT | Mod: CPTII,,, | Performed by: STUDENT IN AN ORGANIZED HEALTH CARE EDUCATION/TRAINING PROGRAM

## 2024-01-08 PROCEDURE — 3008F BODY MASS INDEX DOCD: CPT | Mod: CPTII,,, | Performed by: STUDENT IN AN ORGANIZED HEALTH CARE EDUCATION/TRAINING PROGRAM

## 2024-01-08 PROCEDURE — 1159F MED LIST DOCD IN RCRD: CPT | Mod: CPTII,,, | Performed by: STUDENT IN AN ORGANIZED HEALTH CARE EDUCATION/TRAINING PROGRAM

## 2024-01-08 PROCEDURE — 99214 OFFICE O/P EST MOD 30 MIN: CPT | Mod: PBBFAC

## 2024-01-08 PROCEDURE — 99203 OFFICE O/P NEW LOW 30 MIN: CPT | Mod: S$PBB,,, | Performed by: STUDENT IN AN ORGANIZED HEALTH CARE EDUCATION/TRAINING PROGRAM

## 2024-01-08 NOTE — PROGRESS NOTES
[facility]  Established Patient Office Visit  01/08/2024       Patient ID: Vi Ulrich  YOB: 1965  MRN: [id number]    Chief Complaint: Swelling of the Right Hand (Pt states she is here for a follow up on imaging done on right index finger. )      HPI:  Vi Ulrich is a 58 y.o. female LHD with past medical history significant for lupus, AFib, asthma, hypertension, hyperlipidemia and CKD for further clinic for inability to flex the DP of the right index finger.  Says this has  been going on for greater than 1 year. She does not recall any injury to her finger in the past.  Says she just started gradually noticing the inability to flex the DI P and having hyperextension of the PIP which she can flex through to flex the PIP.  She is able to function with her right hand to do her ADLs. No other complaints or concerns.     Past Medical History:    Past Medical History:   Diagnosis Date    Arthritis     knees    Asthma     Atrial fibrillation     Chronic constipation     Diabetes mellitus     Encounter for blood transfusion 10/2014    GERD (gastroesophageal reflux disease)     Gout     Hypertension     Lupus 2004    SLE    Renal disorder     SLE (systemic lupus erythematosus)      Past Surgical History:   Procedure Laterality Date    APPENDECTOMY      CHOLECYSTECTOMY      COLONOSCOPY N/A 04/19/2018    Procedure: COLONOSCOPY;  Surgeon: Minerva Porras MD;  Location: [facility];  Service: Endoscopy;  Laterality: N/A;    ESOPHAGOGASTRODUODENOSCOPY N/A 04/19/2018    Procedure: ESOPHAGOGASTRODUODENOSCOPY (EGD);  Surgeon: Minerva Porras MD;  Location: [facility];  Service: Endoscopy;  Laterality: N/A;    HYSTERECTOMY      JOINT REPLACEMENT Right 08/07/2015    Knee    LYMPH NODE BIOPSY Left 2014    MASTECTOMY      Left arm- NO BP    PARTIAL HYSTERECTOMY       Family History   Problem Relation Age of Onset    Heart block Mother     Arthritis Mother     Asthma Mother      Diabetes Mother     Heart disease Father     Diabetes Sister     Heart disease Sister     Kidney disease Sister      Social History     Socioeconomic History    Marital status: Single    Years of education: 10th   Tobacco Use    Smoking status: Former     Current packs/day: 0.00     Types: Cigarettes     Start date: 1980     Quit date: 2022     Years since quittin.6    Smokeless tobacco: Never   Substance and Sexual Activity    Alcohol use: No    Drug use: No    Sexual activity: Not Currently     Birth control/protection: See Surgical Hx     Social Determinants of Health     Financial Resource Strain: Low Risk  (2023)    Overall Financial Resource Strain (CARDIA)     Difficulty of Paying Living Expenses: Not hard at all   Food Insecurity: No Food Insecurity (2023)    Hunger Vital Sign     Worried About Running Out of Food in the Last Year: Never true     Ran Out of Food in the Last Year: Never true   Transportation Needs: No Transportation Needs (2023)    PRAPARE - Transportation     Lack of Transportation (Medical): No     Lack of Transportation (Non-Medical): No   Physical Activity: Inactive (2023)    Exercise Vital Sign     Days of Exercise per Week: 0 days     Minutes of Exercise per Session: 0 min   Stress: No Stress Concern Present (2023)    [place] [employer] - Occupational Stress Questionnaire     Feeling of Stress : Not at all   Social Connections: Socially Isolated (2023)    Social Connection and Isolation Panel [NHANES]     Frequency of Communication with Friends and Family: More than three times a week     Frequency of Social Gatherings with Friends and Family: Never     Attends Sabianism Services: Never     Active Member of Clubs or Organizations: No     Attends Club or Organization Meetings: Never     Marital Status: Never    Housing Stability: Low Risk  (2023)    Housing Stability Vital Sign     Unable to Pay for Housing in  the Last Year: No     Number of Places Lived in the Last Year: 1     Unstable Housing in the Last Year: No     Medication List with Changes/Refills   Current Medications    ALBUTEROL (ACCUNEB) 0.63 MG/3 ML NEBU    Take 3 mLs (0.63 mg total) by nebulization every 4 (four) hours as needed (wheezing). Rescue    ALBUTEROL SULFATE (PROAIR RESPICLICK) 90 MCG/ACTUATION INHALER    Inhale 1 puff into the lungs every 4 (four) hours as needed for Wheezing. Rescue    ALPRAZOLAM (XANAX) 1 MG TABLET    Take 1 tablet (1 mg total) by mouth daily as needed for Anxiety or Insomnia.    APIXABAN (ELIQUIS) 5 MG TAB    Take 1 tablet (5 mg total) by mouth 2 (two) times daily.    ATORVASTATIN (LIPITOR) 10 MG TABLET    Take 10 mg by mouth once daily.    BLOOD SUGAR DIAGNOSTIC (ONETOUCH VERIO) STRP    1 each by Misc.(Non-Drug; Combo Route) route 4 (four) times daily.    BLOOD SUGAR DIAGNOSTIC STRP    1 each by Misc.(Non-Drug; Combo Route) route 4 (four) times daily.    BLOOD SUGAR DIAGNOSTIC STRP    1 strip by Misc.(Non-Drug; Combo Route) route 4 (four) times daily with meals and nightly.    BLOOD-GLUCOSE METER KIT    Use as instructed    BLOOD-GLUCOSE SENSOR (DEXCOM G7 SENSOR) ANNE    1 each by Misc.(Non-Drug; Combo Route) route every 14 (fourteen) days.    ELETRIPTAN (RELPAX) 40 MG TABLET    Take 1 tablet (40 mg total) by mouth as needed (take one at the beginning of migraine, may repeat in 2 h. max 2 in 24h). may repeat in 2 hours if necessary    ESCITALOPRAM OXALATE (LEXAPRO) 20 MG TABLET    Take 1 tablet (20 mg total) by mouth once daily.    HYDROCODONE-ACETAMINOPHEN (NORCO)  MG PER TABLET    Take 1 tablet by mouth every 6 (six) hours as needed for Pain.    HYDROCODONE-ACETAMINOPHEN (NORCO)  MG PER TABLET    Take 1 tablet by mouth 3 (three) times daily as needed for Pain.    INSULIN ASPART U-100 (NOVOLOG) 100 UNIT/ML (3 ML) INPN PEN    Inject 2 Units into the skin as needed.    INSULIN ASPART U-100 (NOVOLOG) 100 UNIT/ML  INJECTION    Inject 2 Units into the skin with lunch.    LANCETS 28 GAUGE MISC    100 lancets by Misc.(Non-Drug; Combo Route) route 4 (four) times daily with meals and nightly.    LOSARTAN (COZAAR) 25 MG TABLET    Take 1 tablet (25 mg total) by mouth once daily.    MULTIVITAMIN TAB    Take 1 tablet by mouth once daily.    MYCOPHENOLATE (CELLCEPT) 250 MG CAP    Take 6 capsules (1,500 mg total) by mouth 2 (two) times daily.    NITROFURANTOIN, MACROCRYSTAL-MONOHYDRATE, (MACROBID) 100 MG CAPSULE    Take 100 mg by mouth every 12 (twelve) hours.    ONDANSETRON (ZOFRAN) 4 MG TABLET    TAKE 1 TABLET(4 MG) BY MOUTH EVERY 6 HOURS AS NEEDED FOR NAUSEA    PANTOPRAZOLE (PROTONIX) 40 MG TABLET    Take 1 tablet (40 mg total) by mouth once daily.    PREDNISONE (DELTASONE) 10 MG TABLET    Take 3 tablets (30 mg total) by mouth once daily.    SULFAMETHOXAZOLE-TRIMETHOPRIM 800-160MG (BACTRIM DS) 800-160 MG TAB    Take 1 tablet by mouth once daily.    VITAMIN D (VITAMIN D3) 1000 UNITS TAB    Take 1 tablet (1,000 Units total) by mouth once daily.    VOCLOSPORIN (LUPKYNIS) 7.9 MG CAP    Take 23.7 mg by mouth once daily.    REUBEN MISC    Please dispense 1 Rollator     Review of patient's allergies indicates:   Allergen Reactions    Ketorolac (pf) Swelling    Mycophenolate mofetil Swelling    Penicillins Hives    Percocet [oxycodone-acetaminophen] Hives and Itching    Tramadol Hives       Physical Exam:    Right upper extremity  No gross deformity, open wounds, abrasions  Has hyperextension of the PIP joint of the right index finger of about 20° that she is able to flex through and flex fully to 90°   She is unable to flex the DIPJ of the index finger  Motor intact: ain/pin/m/u/r  [place]worth: M/U/R without numbness, tingling, or asymmetry  Full ROM of shoulder, elbow, wrist, fingers  2+ radial pulse      Imaging:  MRI of the right hand reviewed and per radiology read has high-grade laceration of the FD P at the base of the proximal phalanx of  the 3rd finger and reports some the FDS.    Assessment and Plan:    Vi Ulrich is a 58 y.o. female LHD with past medical history significant for lupus, AFib, asthma, hypertension, hyperlipidemia and CKD for further clinic for inability to flex the DP of the right index finger.  Says this has  been going on for greater than 1 year. Likely had spontaneous rupture of the FDP of the right index finger as she said this just gradually progressed.  She has hyperextension of the PIP but is able to flex fully at the PIP of the right index finger to 90°.  Just has no flexion of the DIPJ of the index finger.  She is able to function with the right hand for normal activities of daily living.  She does not wish to pursue the DIP fusion at this point.  She can follow up with us as needed.    Uriel Villalpando  [facility] Orthopaedic Surgery PGY-5

## 2024-01-09 ENCOUNTER — TELEPHONE (OUTPATIENT)
Dept: INTERNAL MEDICINE | Facility: CLINIC | Age: 59
End: 2024-01-09
Payer: MEDICAID

## 2024-01-09 ENCOUNTER — TELEPHONE (OUTPATIENT)
Dept: BEHAVIORAL HEALTH | Facility: CLINIC | Age: 59
End: 2024-01-09
Payer: MEDICAID

## 2024-01-09 NOTE — PROGRESS NOTES
Faculty Attestation: Vi Ulrich  was seen at [facility] in the Orthopaedic Clinic. Patient seen and evaluated at the time of the visit. History of Present Illness, Physical Exam, and Assessment and Plan reviewed. Treatment plan is reasonable and appropriate. Compliance with treatment recommendations is important. No procedure was performed.     Taj Elizalde MD  Orthopaedic Surgery

## 2024-01-09 NOTE — TELEPHONE ENCOUNTER
Spoke to patient over the phone and gave her information to get Optometrist to have eye exam and dilation. She will notify insurance and find a place that takes her insurance.  Call ended.

## 2024-01-09 NOTE — TELEPHONE ENCOUNTER
I spoke to patient over the phone and she states that she has picked up her [place] Rx from the pharmacy. Medication was on back order, but now available at the pharmacy and she has them at home.  Call ended.

## 2024-01-17 ENCOUNTER — TELEPHONE (OUTPATIENT)
Dept: NEPHROLOGY | Facility: CLINIC | Age: 59
End: 2024-01-17
Payer: MEDICAID

## 2024-01-17 NOTE — TELEPHONE ENCOUNTER
Spoke with Vivien at [place] pharmacy:  requested clinicals and lab results to complete [facility] DIAZ  Fax [phone]

## 2024-01-19 DIAGNOSIS — R26.81 UNSTABLE GAIT: Primary | ICD-10-CM

## 2024-01-19 RX ORDER — CANE
1 EACH MISCELLANEOUS ONCE
Qty: 1 EACH | Refills: 0 | Status: SHIPPED | OUTPATIENT
Start: 2024-01-19 | End: 2024-01-19

## 2024-01-20 ENCOUNTER — EXTERNAL HOME HEALTH (OUTPATIENT)
Dept: HOME HEALTH SERVICES | Facility: HOSPITAL | Age: 59
End: 2024-01-20
Payer: MEDICAID

## 2024-01-23 ENCOUNTER — OFFICE VISIT (OUTPATIENT)
Dept: BEHAVIORAL HEALTH | Facility: CLINIC | Age: 59
End: 2024-01-23
Payer: MEDICAID

## 2024-01-23 VITALS
DIASTOLIC BLOOD PRESSURE: 81 MMHG | BODY MASS INDEX: 31.26 KG/M2 | SYSTOLIC BLOOD PRESSURE: 129 MMHG | HEART RATE: 82 BPM | TEMPERATURE: 98 F | HEIGHT: 66 IN | WEIGHT: 194.5 LBS

## 2024-01-23 DIAGNOSIS — F51.05 INSOMNIA DUE TO OTHER MENTAL DISORDER: ICD-10-CM

## 2024-01-23 DIAGNOSIS — F99 INSOMNIA DUE TO OTHER MENTAL DISORDER: ICD-10-CM

## 2024-01-23 DIAGNOSIS — F41.9 ANXIETY: Primary | ICD-10-CM

## 2024-01-23 PROCEDURE — 1159F MED LIST DOCD IN RCRD: CPT | Mod: CPTII,,, | Performed by: STUDENT IN AN ORGANIZED HEALTH CARE EDUCATION/TRAINING PROGRAM

## 2024-01-23 PROCEDURE — 99214 OFFICE O/P EST MOD 30 MIN: CPT | Mod: S$PBB,,, | Performed by: STUDENT IN AN ORGANIZED HEALTH CARE EDUCATION/TRAINING PROGRAM

## 2024-01-23 PROCEDURE — 3008F BODY MASS INDEX DOCD: CPT | Mod: CPTII,,, | Performed by: STUDENT IN AN ORGANIZED HEALTH CARE EDUCATION/TRAINING PROGRAM

## 2024-01-23 PROCEDURE — 3079F DIAST BP 80-89 MM HG: CPT | Mod: CPTII,,, | Performed by: STUDENT IN AN ORGANIZED HEALTH CARE EDUCATION/TRAINING PROGRAM

## 2024-01-23 PROCEDURE — 99214 OFFICE O/P EST MOD 30 MIN: CPT | Mod: PBBFAC,PN | Performed by: STUDENT IN AN ORGANIZED HEALTH CARE EDUCATION/TRAINING PROGRAM

## 2024-01-23 PROCEDURE — 3074F SYST BP LT 130 MM HG: CPT | Mod: CPTII,,, | Performed by: STUDENT IN AN ORGANIZED HEALTH CARE EDUCATION/TRAINING PROGRAM

## 2024-01-23 RX ORDER — ALPRAZOLAM 1 MG/1
1 TABLET ORAL DAILY PRN
Qty: 30 TABLET | Refills: 1 | Status: SHIPPED | OUTPATIENT
Start: 2024-01-23 | End: 2024-03-26 | Stop reason: SDUPTHER

## 2024-01-23 NOTE — TELEPHONE ENCOUNTER
Msg addressed by PCP's office on 1/9/24 and Nephrology on 1/5/24, all documented in telephone encounters.

## 2024-01-23 NOTE — PROGRESS NOTES
"Outpatient Psychiatry Follow-Up Visit    1/23/2024    Clinical Status of Patient:  Outpatient (Ambulatory)    Chief Complaint:  Vi Ulrich is a 58 y.o. female who presents today for follow-up of anxiety and insomnia . Patient last seen for follow-up on 12/12/2023. Met with patient.      Interval History and Content of Current Session:  Interim Events/Subjective Report/Content of Current Session:   Pt reports doing "not good" since last visit.  Gives multiple conflicting responses during visit.  Reports she hasn't been taking xanax but states due to insurance issue ("they make me pick between xanax and my pain meds").  Has extremely difficult time quantifying amount of pain medications she takes.  Initially reports she doesn't sleep at all but then states that she sleeps some and then reports unable to sleep at night but sleeps during the day instead.  Sleeping 6-8 hrs daily.  Notes continued anxiety symptoms.  Worrying about her children (two are currently in nursing home).  Using xanax to reduce her anxiety symptoms.  Has difficult time forming connection between anxious thoughts and somatic reaction to anxiety.  Denies SI/HI/AVH/paranoia.  Denies medication Se.  Wants to get back on xanax.     Psychiatric Review of Systems-is patient experiencing or having changes in  Integrated into HPI above.     Review of Systems   PSYCHIATRIC: Pertinant items are noted in the narrative.  CONSTITUTIONAL: No weight gain or loss.  HEENT: +occasional dry mouth, denies sore throat  MUSCULOSKELETAL:  + generalized myalgias, +arthritic pain of back, hand soreness   NEUROLOGIC: No weakness, sensory changes, seizures, confusion, memory loss, tremor or other abnormal movements.  +ha  CARDIAC: No CP, + palpitations  RESPIRATORY: + shortness of breath with exertion, denies cough.  CARDIOVASCULAR: No tachycardia or chest pain.  GASTROINTESTINAL: No nausea, vomiting, pain, constipation or diarrhea.    Past Medical, Family and Social History: " "The patient's past medical, family and social history have been reviewed and updated as appropriate within the electronic medical record - see encounter notes.    Compliance: good    Side effects: denies    Risk Parameters:  Patient reports no suicidal ideation  Patient reports no homicidal ideation  Patient reports no self-injurious behavior  Patient reports no violent behavior    Exam (detailed: at least 9 elements; comprehensive: all 15 elements)   Constitutional  Vitals:  Most recent vital signs, dated less than 90 days prior to this appointment, were reviewed.     Vitals:    01/23/24 0731   BP: 129/81   Pulse: 82   Temp: 97.6 °F (36.4 °C)   TempSrc: Oral   Weight: 88.2 kg (194 lb 8 oz)   Height: 5' 6" (1.676 m)        General:   Constitutional: No acute distress, appears stated age, casually dressed    Neurologic:   Motor: moves all extremities spontaneously and without difficulty, no abnormal involuntary movements observed  Gait: normal gait and station    Mental status examination:   Appearance: appears stated age, casually dressed, no acute distress  Behavior: unremarkable for situation, calm and cooperative  Mood: "anxious"  Affect: mood congruent, constricted, and anxious-appearing  Thought process: linear and goal directed  Associations: appropriate for conversation  Thought content: no plan or desire for self harm or harm to others, denies paranoia, no delusional ideation volunteered  Perceptions: denies hallucinations or other altered perceptions  Orientation: oriented to day of week, month, year, location, and situation  Language: English, fluid  Attention: able to attend to interview  Insight: good  Judgement: good    PHQ9:  Over the last two weeks how often have you been bothered by little interest or pleasure in doing things: 0  Over the last two weeks how often have you been bothered by feeling down, depressed or hopeless: 0  PHQ-2 Total Score: 0  PHQ-9 Score: 5  PHQ-9 Interpretation: Mild        " 1/23/2024     7:28 AM 12/12/2023     7:23 AM 11/16/2023    10:29 AM   GAD7   1. Feeling nervous, anxious, or on edge? 0 0 3   2. Not being able to stop or control worrying? 2 3 3   3. Worrying too much about different things? 2 3 3   4. Trouble relaxing? 2 0 3   5. Being so restless that it is hard to sit still? 0 0 3   6. Becoming easily annoyed or irritable? 0 0 3   7. Feeling afraid as if something awful might happen? 0 0 0   8. If you checked off any problems, how difficult have these problems made it for you to do your work, take care of things at home, or get along with other people? 1 1 3   LIANG-7 Score 6 6 18     Assessment and Diagnosis   Status/Progress: Based on the examination today, the patient's problem(s) is/are inadequately controlled.  New problems have not been presented today.   Co-morbidities and Lack of compliance are not complicating management of the primary condition.  Number of separate conditions addressed during today's visit: 1 (anxiety uncontrolled).  Are medication adjustments being made today: Yes.  Are referral(s) being ordered today: No.  Complexity (level) of medical decision making employed in the encounter: MODERATE.    General Impression:    ICD-10-CM ICD-9-CM   1. Anxiety  F41.9 300.00   2. Insomnia due to other mental disorder  F51.05 300.9    F99 327.02       Intervention/Counseling/Treatment Plan   Continue lexapro 20mg daily  Restart xanax 1mg daily prn   Patient is currently on opioid pain medications and consents to treatment with benzodiazepine despite risk of life-threatening sedation  Strongly recommended psychotherapy  No need for PEC as pt is not an imminent danger to self or others or gravely disabled due to acute psychiatric illness  Discussed that pt should either call clinic for psychiatric crisis symptoms or present to nearest emergency room    Discussed with patient informed consent including diagnosis, risks and benefits of proposed treatment above vs.  alternative treatments vs. no treatment, as well as serious and common side effects of these treatments, and the inherent unpredictability of individual responses to these treatments. The patient expresses understanding of the above and displays the capacity to agree with this current plan. Patient also agrees that, currently, the benefits outweigh the risks and would like to pursue treatment at this time, and had no other questions.    Instructions:  Take all medications as prescribed.    Abstain from recreational drugs and alcohol.  Present to ED or call 911 for SI/HI plan or intent, psychosis, or medical emergency.    Return to Clinic: Follow up in about 2 months (around 3/23/2024).    Total time:   The total time for services performed on the date of the encounter (including review of prior visit notes, review of notes from other providers, review of results from laboratory/imaging studies, face-to-face time with patient, and time spent on other activities directly related to patient care): 2300 minutes.    Frederic Noel MD  [facility]

## 2024-01-26 LAB — PROT S ACT/NOR PPP: NORMAL %

## 2024-01-30 ENCOUNTER — OFFICE VISIT (OUTPATIENT)
Dept: RHEUMATOLOGY | Facility: CLINIC | Age: 59
End: 2024-01-30
Payer: MEDICAID

## 2024-01-30 ENCOUNTER — HOSPITAL ENCOUNTER (OUTPATIENT)
Dept: RADIOLOGY | Facility: HOSPITAL | Age: 59
Discharge: HOME | End: 2024-01-30
Attending: INTERNAL MEDICINE
Payer: MEDICAID

## 2024-01-30 VITALS
OXYGEN SATURATION: 100 % | WEIGHT: 191 LBS | RESPIRATION RATE: 18 BRPM | HEIGHT: 66 IN | DIASTOLIC BLOOD PRESSURE: 79 MMHG | TEMPERATURE: 98 F | BODY MASS INDEX: 30.7 KG/M2 | SYSTOLIC BLOOD PRESSURE: 133 MMHG | HEART RATE: 74 BPM

## 2024-01-30 DIAGNOSIS — Z96.651 S/P TKR (TOTAL KNEE REPLACEMENT), RIGHT: ICD-10-CM

## 2024-01-30 DIAGNOSIS — E11.21 TYPE 2 DIABETES MELLITUS WITH DIABETIC NEPHROPATHY, WITH LONG-TERM CURRENT USE OF INSULIN: ICD-10-CM

## 2024-01-30 DIAGNOSIS — M32.14 SYSTEMIC LUPUS ERYTHEMATOSUS WITH GLOMERULAR DISEASE, UNSPECIFIED SLE TYPE: ICD-10-CM

## 2024-01-30 DIAGNOSIS — J84.9 ILD (INTERSTITIAL LUNG DISEASE): ICD-10-CM

## 2024-01-30 DIAGNOSIS — I10 ESSENTIAL HYPERTENSION: ICD-10-CM

## 2024-01-30 DIAGNOSIS — D84.821 DRUG-INDUCED IMMUNODEFICIENCY: ICD-10-CM

## 2024-01-30 DIAGNOSIS — M54.50 CHRONIC MIDLINE LOW BACK PAIN, UNSPECIFIED WHETHER SCIATICA PRESENT: ICD-10-CM

## 2024-01-30 DIAGNOSIS — I48.91 NEW ONSET ATRIAL FIBRILLATION: ICD-10-CM

## 2024-01-30 DIAGNOSIS — G89.29 CHRONIC MIDLINE LOW BACK PAIN, UNSPECIFIED WHETHER SCIATICA PRESENT: ICD-10-CM

## 2024-01-30 DIAGNOSIS — M32.9 SYSTEMIC LUPUS ERYTHEMATOSUS ARTHRITIS: ICD-10-CM

## 2024-01-30 DIAGNOSIS — Z79.899 DRUG-INDUCED IMMUNODEFICIENCY: ICD-10-CM

## 2024-01-30 DIAGNOSIS — M79.7 FIBROMYALGIA: ICD-10-CM

## 2024-01-30 DIAGNOSIS — M32.14 SYSTEMIC LUPUS ERYTHEMATOSUS WITH GLOMERULAR DISEASE, UNSPECIFIED SLE TYPE: Primary | ICD-10-CM

## 2024-01-30 DIAGNOSIS — Z79.4 TYPE 2 DIABETES MELLITUS WITH DIABETIC NEPHROPATHY, WITH LONG-TERM CURRENT USE OF INSULIN: ICD-10-CM

## 2024-01-30 PROCEDURE — 1159F MED LIST DOCD IN RCRD: CPT | Mod: CPTII,,, | Performed by: INTERNAL MEDICINE

## 2024-01-30 PROCEDURE — 99215 OFFICE O/P EST HI 40 MIN: CPT | Mod: S$PBB,,, | Performed by: INTERNAL MEDICINE

## 2024-01-30 PROCEDURE — 99215 OFFICE O/P EST HI 40 MIN: CPT | Mod: PBBFAC | Performed by: INTERNAL MEDICINE

## 2024-01-30 PROCEDURE — 74019 RADEX ABDOMEN 2 VIEWS: CPT | Mod: TC

## 2024-01-30 PROCEDURE — 99417 PROLNG OP E/M EACH 15 MIN: CPT | Mod: S$PBB,,, | Performed by: INTERNAL MEDICINE

## 2024-01-30 PROCEDURE — 3075F SYST BP GE 130 - 139MM HG: CPT | Mod: CPTII,,, | Performed by: INTERNAL MEDICINE

## 2024-01-30 PROCEDURE — 3078F DIAST BP <80 MM HG: CPT | Mod: CPTII,,, | Performed by: INTERNAL MEDICINE

## 2024-01-30 PROCEDURE — 3008F BODY MASS INDEX DOCD: CPT | Mod: CPTII,,, | Performed by: INTERNAL MEDICINE

## 2024-01-30 PROCEDURE — 93005 ELECTROCARDIOGRAM TRACING: CPT

## 2024-01-30 RX ORDER — OMEPRAZOLE 20 MG/1
20 CAPSULE, DELAYED RELEASE ORAL DAILY
COMMUNITY
Start: 2023-12-08 | End: 2024-05-07 | Stop reason: SDUPTHER

## 2024-01-30 RX ORDER — PREDNISONE 5 MG/1
10 TABLET ORAL DAILY
COMMUNITY
End: 2024-01-30 | Stop reason: SDUPTHER

## 2024-01-30 RX ORDER — MYCOPHENOLATE MOFETIL 500 MG/1
1500 TABLET ORAL 2 TIMES DAILY
Qty: 180 TABLET | Refills: 3 | Status: SHIPPED | OUTPATIENT
Start: 2024-01-30 | End: 2024-06-13 | Stop reason: SDUPTHER

## 2024-01-30 RX ORDER — PREDNISONE 5 MG/1
10 TABLET ORAL DAILY
Qty: 60 TABLET | Refills: 1 | Status: SHIPPED | OUTPATIENT
Start: 2024-01-30 | End: 2024-06-13 | Stop reason: SDUPTHER

## 2024-01-30 RX ORDER — LIDOCAINE 50 MG/G
1 PATCH TOPICAL DAILY
Qty: 30 PATCH | Refills: 0 | Status: SHIPPED | OUTPATIENT
Start: 2024-01-30

## 2024-01-30 RX ORDER — MYCOPHENOLATE MOFETIL 500 MG/1
1500 TABLET ORAL 2 TIMES DAILY
COMMUNITY
End: 2024-01-30 | Stop reason: SDUPTHER

## 2024-01-30 RX ORDER — DICLOFENAC SODIUM 10 MG/G
2 GEL TOPICAL 4 TIMES DAILY
Qty: 100 G | Refills: 5 | Status: SHIPPED | OUTPATIENT
Start: 2024-01-30 | End: 2024-03-05

## 2024-01-30 NOTE — PROGRESS NOTES
Patient ID: [id number]     Chief Complaint: Systemic lupus erythematosus with glomerular disease, unspe (Patient states she has been having a lot of flare up to all of joints. States she also has pain to her back and left back side areas with body aches this started over a week ago. )      Referred By: Dr. Octaviano Barrios     HPI:     Vi Ulrich is a 58 y.o. female with history of lupus and myositis overlap, ILD, lupus nephritis class III/V, class Iib diabetic glumerulopathy, here today for a new patient visit.  Former patient of Dr. Mcfarlane.    Patient was diagnosed with lupus in roughly 2004. She states she has been on various medications since then, including chronic prednisone. She has associated lupus nephritis as well as diabetic glomerulopathy diagnosed on kidney biopsy 6/2022. Further antibody testing did  reveal positive MDA 5 Ab (47) and high titer  anti U1 RNP (153).  Patient has had multiple flares, most recently in October 2023. At that time she was supposed to be taking cellcept, lupkynis. Plaquenil previously discontinued for unknown reason. Did have rituximab infusion back in April 2023 as well. At that time of hospitalization proteinuria was worsening and repeat kidney biopsy was recommended to help differentiate between lupus or diabetes related etiology of worsening proteinuria though this was not yet done. Patient also had DVT of RLE and further Hypercoagulable workup revealed  high titer cardiolipin IgG (122), low titer cardiolipin IgA (18).  Beta 2 glycoprotein negative, factor 5  Leiden assay normal, protein C and antithrombin 3 activity normal. Since discharge she has continued her lupkynis, cellcept, and prednisone (now on 15 mg daily) and also had rituximab infusions 11/1/23 and 11/15/23. She remains on eliquis as well.    Today, patient complains of chronic left flank pain and generalized back pain.  She denies any dysuria, though notes some urinary frequency and occasional blood.   Denies any fevers or chills.  Denies any current joint redness, warmth, or swelling.  Does have some chronic left shoulder pain and knee pain.   She denies any rashes.  Denies history of fevers, rashes, photosensitivity, oral or nasal ulcers, h/o MI, stroke, seizures, Raynaud's phenomenon, uveitis, malignancies.     Family history of autoimmune disease: None known    Pregnancies:  7 Miscarriages: 0    Smoking:   Social History     Tobacco Use   Smoking Status Former    Current packs/day: 0.00    Types: Cigarettes    Start date: 1980    Quit date: 2022    Years since quittin.7   Smokeless Tobacco Never          ----------------------------  Arthritis      Comment:  knees  Asthma  Atrial fibrillation  Chronic constipation  Diabetes mellitus  Encounter for blood transfusion  GERD (gastroesophageal reflux disease)  Gout  Hypertension  Lupus      Comment:  SLE  Renal disorder  SLE (systemic lupus erythematosus)     Past Surgical History:   Procedure Laterality Date    APPENDECTOMY      CHOLECYSTECTOMY      COLONOSCOPY N/A 2018    Procedure: COLONOSCOPY;  Surgeon: Minerva Porras MD;  Location: [facility];  Service: Endoscopy;  Laterality: N/A;    ESOPHAGOGASTRODUODENOSCOPY N/A 2018    Procedure: ESOPHAGOGASTRODUODENOSCOPY (EGD);  Surgeon: Minerva Porras MD;  Location: [facility];  Service: Endoscopy;  Laterality: N/A;    HYSTERECTOMY      JOINT REPLACEMENT Right 2015    Knee    LYMPH NODE BIOPSY Left     MASTECTOMY      Left arm- NO BP    PARTIAL HYSTERECTOMY         Review of patient's allergies indicates:   Allergen Reactions    Ketorolac (pf) Swelling    Penicillins Hives    Percocet [oxycodone-acetaminophen] Hives and Itching    Tramadol Hives       Outpatient Medications Marked as Taking for the 24 encounter (Office Visit) with Ye Last MD   Medication Sig Dispense Refill    albuterol sulfate (PROAIR RESPICLICK) 90 mcg/actuation inhaler Inhale 1 puff into  the lungs every 4 (four) hours as needed for Wheezing. Rescue      ALPRAZolam (XANAX) 1 MG tablet Take 1 tablet (1 mg total) by mouth daily as needed for Anxiety. 30 tablet 1    apixaban (ELIQUIS) 5 mg Tab Take 1 tablet (5 mg total) by mouth 2 (two) times daily. 180 tablet 3    atorvastatin (LIPITOR) 10 MG tablet Take 10 mg by mouth once daily.      blood sugar diagnostic (ONETOUCH VERIO) Strp 1 each by Misc.(Non-Drug; Combo Route) route 4 (four) times daily. 50 each 11    blood sugar diagnostic Strp 1 each by Misc.(Non-Drug; Combo Route) route 4 (four) times daily. 50 each 11    blood sugar diagnostic Strp 1 strip by Misc.(Non-Drug; Combo Route) route 4 (four) times daily with meals and nightly. 200 each 6    blood-glucose meter kit Use as instructed 1 each 0    blood-glucose sensor (DEXCOM G7 SENSOR) Ana 1 each by Misc.(Non-Drug; Combo Route) route every 14 (fourteen) days. 5 each 6    eletriptan (RELPAX) 40 MG tablet Take 1 tablet (40 mg total) by mouth as needed (take one at the beginning of migraine, may repeat in 2 h. max 2 in 24h). may repeat in 2 hours if necessary 9 tablet 5    EScitalopram oxalate (LEXAPRO) 20 MG tablet Take 1 tablet (20 mg total) by mouth once daily. 90 tablet 3    HYDROcodone-acetaminophen (NORCO)  mg per tablet Take 1 tablet by mouth 3 (three) times daily as needed for Pain. 10 tablet 0    insulin aspart U-100 (NOVOLOG) 100 unit/mL (3 mL) InPn pen Inject 2 Units into the skin as needed.      insulin aspart U-100 (NOVOLOG) 100 unit/mL injection Inject 2 Units into the skin with lunch. 1.8 mL 3    lancets 28 gauge Misc 100 lancets by Misc.(Non-Drug; Combo Route) route 4 (four) times daily with meals and nightly. 100 each 8    losartan (COZAAR) 25 MG tablet Take 1 tablet (25 mg total) by mouth once daily. 90 tablet 3    omeprazole (PRILOSEC) 20 MG capsule Take 20 mg by mouth once daily.      ondansetron (ZOFRAN) 4 MG tablet TAKE 1 TABLET(4 MG) BY MOUTH EVERY 6 HOURS AS NEEDED FOR  NAUSEA 24 tablet 0    pantoprazole (PROTONIX) 40 MG tablet Take 1 tablet (40 mg total) by mouth once daily. 90 tablet 3    voclosporin (LUPKYNIS) 7.9 mg Cap Take 23.7 mg by mouth once daily. 30 capsule 11    walker Misc Please dispense 1 Rollator 1 each 0    [DISCONTINUED] HYDROcodone-acetaminophen (NORCO)  mg per tablet Take 1 tablet by mouth every 6 (six) hours as needed for Pain. 90 tablet 0       Social History     Socioeconomic History    Marital status: Single    Years of education: 10th   Tobacco Use    Smoking status: Former     Current packs/day: 0.00     Types: Cigarettes     Start date: 1980     Quit date: 2022     Years since quittin.7    Smokeless tobacco: Never   Substance and Sexual Activity    Alcohol use: No    Drug use: No    Sexual activity: Not Currently     Birth control/protection: See Surgical Hx     Social Determinants of Health     Financial Resource Strain: Low Risk  (2023)    Overall Financial Resource Strain (CARDIA)     Difficulty of Paying Living Expenses: Not hard at all   Food Insecurity: No Food Insecurity (2023)    Hunger Vital Sign     Worried About Running Out of Food in the Last Year: Never true     Ran Out of Food in the Last Year: Never true   Transportation Needs: No Transportation Needs (2023)    PRAPARE - Transportation     Lack of Transportation (Medical): No     Lack of Transportation (Non-Medical): No   Physical Activity: Inactive (2023)    Exercise Vital Sign     Days of Exercise per Week: 0 days     Minutes of Exercise per Session: 0 min   Stress: No Stress Concern Present (2023)    [employer] [employer] - Occupational Stress Questionnaire     Feeling of Stress : Not at all   Social Connections: Socially Isolated (2023)    Social Connection and Isolation Panel [NHANES]     Frequency of Communication with Friends and Family: More than three times a week     Frequency of Social Gatherings with Friends  and Family: Never     Attends Jew Services: Never     Active Member of Clubs or Organizations: No     Attends Club or Organization Meetings: Never     Marital Status: Never    Housing Stability: Low Risk  (1/12/2023)    Housing Stability Vital Sign     Unable to Pay for Housing in the Last Year: No     Number of Places Lived in the Last Year: 1     Unstable Housing in the Last Year: No        Family History   Problem Relation Age of Onset    Heart block Mother     Arthritis Mother     Asthma Mother     Diabetes Mother     Heart disease Father     Diabetes Sister     Heart disease Sister     Kidney disease Sister         Immunization History   Administered Date(s) Administered    COVID-19, MRNA, LN-S, PF (MODERNA FULL 0.5 ML DOSE) 08/20/2021, 10/12/2021, 06/30/2022    COVID-19, mRNA, LNP-S, PF, natalee-sucrose, 30 mcg/0.3 mL (Pfizer 2023 Ages 12+) 01/12/2024    Influenza - Quadrivalent 01/30/2019    Influenza - Quadrivalent - MDCK - PF 12/24/2020    Influenza - Quadrivalent - PF *Preferred* (6 months and older) 01/30/2019, 02/14/2020, 03/10/2022, 01/12/2024    Pneumococcal Conjugate - 20 Valent 06/30/2022    Td - PF (ADULT) 11/22/2016    Tdap 03/10/2022, 01/12/2024    Vaccinia, smallpox monkeypox vaccine live, PF 08/31/2022, 09/29/2022    Zoster Recombinant 09/06/2018, 11/25/2018       Patient Care Team:  Octaviano Barrios MD as PCP - General (Internal Medicine)  Rachel Broderick FNP as Nurse Practitioner (Nephrology)  Katina Wells ANP as Nurse Practitioner (Gynecology)     Subjective:     Constitutional:  Denies chills. Denies fever. Denies night sweats. Denies weight loss.   Ophthalmology: Denies blurred vision. Denies dry eyes. Denies eye pain. Denies Itching and redness.   ENT: Denies oral ulcers. Denies epistaxis. Denies dry mouth. Denies swollen glands.   Endocrine: Denies thyroid Problems. Reports diabetes.  Respiratory: Denies cough. Denies shortness of breath. Denies shortness of breath with  "exertion. Denies hemoptysis.   Cardiovascular: Denies chest pain at rest. Denies chest pain with exertion. Denies palpitations.    Gastrointestinal: Denies abdominal pain. Denies diarrhea. Denies nausea. Denies vomiting. Denies hematemesis or hematochezia. Denies heartburn.  Genitourinary: Reports urinary frequency. Reports hematuria. Denies dysuria.  Musculoskeletal: See HPI for details  Integumentary: Denies rash. Denies photosensitivity.   Peripheral Vascular: Denies Ulcers of hands and/or feet. Denies Cold extremities.   Neurologic: Denies dizziness. Denies loss of strength. Denies numbness or tingling. Reports migraine headaches.  Psychiatric: Denies depression. Denies anxiety. Denies suicidal/homicidal ideations.      Objective:     /79 (BP Location: Right arm, Patient Position: Sitting, BP Method: Large (Automatic))   Pulse 74   Temp 98 °F (36.7 °C) (Oral)   Resp 18   Ht 5' 6" (1.676 m)   Wt 86.6 kg (191 lb)   LMP  (LMP Unknown)   SpO2 100%   BMI 30.83 kg/m²     Physical Exam    General Appearance: alert, pleasant, in no acute distress.  Skin: Skin color, texture, turgor normal. No rashes or lesions.  Eyes:  extraocular movement intact (EOMI), pupils equal, round, reactive to light and accommodation, conjunctiva clear.  ENT: No oral or nasal ulcers.  Neck:  Neck supple. No adenopathy.   Lungs: CTA throughout without crackles, rhonchi, or wheezes.   Heart: RRR w/o murmurs.  No edema.   Unable to palpate DP/TP pulse.  Abdomen: Soft, non-tender, no masses, rebound or guarding.  Neuro: Alert, oriented, CN II-XII GI, sensory and motor innervation intact.  Musculoskeletal: Diffuse generalized back tenderness. No upper  extremity swelling.  Mild left shoulder pain with range of motion.  No lower extremity swelling or tenderness. Right knee surgical scar.  Psych: Alert, oriented, normal eye contact.    Labs:     3/31/23:  MDA 5 antibody positive, 47.  U1 RNP high titer 153.     10/10/23:  Factor 5 " Leiden assay normal.  Cardiolipin IgG elevated 122, high titer.  Cardiolipin IgA low titer 18, IgM negative.  Beta 2 glycoprotein negative.  DRVVT normal.  Protein C activity normal.  Antithrombin 3 activity normal.    10/11/23: 24 hour urine protein 2119    Imaging:     High res CT chest pending:     Assessment:       ICD-10-CM ICD-9-CM   1. Systemic lupus erythematosus with glomerular disease, unspecified SLE type  M32.14 710.0     583.81   2. Systemic lupus erythematosus arthritis  M32.9 710.0   3. ILD (interstitial lung disease)  J84.9 515   4. Fibromyalgia  M79.7 729.1   5. Type 2 diabetes mellitus with diabetic nephropathy, with long-term current use of insulin  E11.21 250.40    Z79.4 583.81     V58.67   6. Drug-induced immunodeficiency  D84.821 279.3    Z79.899 E947.9   7. Chronic midline low back pain, unspecified whether sciatica present  M54.50 724.2    G89.29 338.29   8. Essential hypertension  I10 401.9   9. S/P TKR (total knee replacement), right  Z96.651 V43.65   10. New onset atrial fibrillation  I48.91 427.31        Plan:     1. Systemic lupus erythematosus with glomerular disease, unspecified SLE type:  Kidney biopsy 6/3/2022 revealed class III focal lupus nephritis, class V segmental membranous lupus nephritis, class IIB diabetic glomerulopathy.   - Continue lupkynis 23.7 mg BID and CellCept 1500 mg BID. Decrease prednisone to 10 mg daily  - Repeat labs including  CBC, CMP, complements, inflammatory markers, dsDNA, CK+aldolase, UA, urine P/C ratio. If proteinuria is not improved patient could benefit from repeat kidney biopsy to help differentiate lupus nephritis versus diabetic etiology as previous biopsy revealed polynephritis  - Patient with previous DVT of RLE.  Hypercoagulable workup at that time was significant for elevated cardiolipin IgG (122) and low titer cardiolipin IgA (18).  Will repeat hypercoagulable workup today.   If it remains positive, patient would need to be switched from  Eliquis to warfarin.   2. Systemic lupus erythematosus arthritis : No current synovitis. Continue therapy as above   3. ILD (interstitial lung disease):   Will repeat PFTs and order high-resolution CT scan of chest.  If PFTs are worsening, patient may benefit from RHC to evaluate for pulmonary hypertension   4. Fibromyalgia: Recommended good sleep hygiene, stress control, stretching and exercise   5. Type 2 diabetes mellitus with diabetic nephropathy, with long-term current use of insulin: Management per PCP   6. Drug-induced immunodeficiency:  recheck immunoglobulin levels today,  had recent rituximab infusion in November   7. Chronic midline low back pain, unspecified whether sciatica present:  recommended heating pads, stretching, physical therapy   8. Essential hypertension:  control today, management per PCP   9. S/P TKR (total knee replacement), right    10. New onset atrial fibrillation:  has history of atrial fibrillation, though currently not in atrial fibrillation.   However EKG in office reveals possible ectopic rhythm.  Will refer to Cardiology        Follow up in about 2 months (around 3/30/2024). In addition to their scheduled follow up, the patient has also been instructed to follow up on as needed basis.        Total time spent with patient and documentation is 90 minutes. All questions were answered to patient's satisfaction and patient verbalized understanding.

## 2024-02-08 ENCOUNTER — OFFICE VISIT (OUTPATIENT)
Dept: INTERNAL MEDICINE | Facility: CLINIC | Age: 59
End: 2024-02-08
Payer: MEDICAID

## 2024-02-08 VITALS
RESPIRATION RATE: 16 BRPM | OXYGEN SATURATION: 100 % | HEART RATE: 67 BPM | TEMPERATURE: 98 F | BODY MASS INDEX: 30.37 KG/M2 | WEIGHT: 189 LBS | SYSTOLIC BLOOD PRESSURE: 124 MMHG | DIASTOLIC BLOOD PRESSURE: 70 MMHG | HEIGHT: 66 IN

## 2024-02-08 DIAGNOSIS — M32.9 SYSTEMIC LUPUS ERYTHEMATOSUS ARTHRITIS: ICD-10-CM

## 2024-02-08 DIAGNOSIS — R05.9 COUGH, UNSPECIFIED TYPE: Primary | ICD-10-CM

## 2024-02-08 PROCEDURE — 99215 OFFICE O/P EST HI 40 MIN: CPT | Mod: PBBFAC | Performed by: STUDENT IN AN ORGANIZED HEALTH CARE EDUCATION/TRAINING PROGRAM

## 2024-02-08 RX ORDER — HYDROCODONE BITARTRATE AND ACETAMINOPHEN 10; 325 MG/1; MG/1
1 TABLET ORAL EVERY 8 HOURS PRN
Qty: 90 TABLET | Refills: 0 | Status: SHIPPED | OUTPATIENT
Start: 2024-02-08 | End: 2024-03-07 | Stop reason: SDUPTHER

## 2024-02-08 RX ORDER — BENZONATATE 200 MG/1
200 CAPSULE ORAL 3 TIMES DAILY PRN
Qty: 30 CAPSULE | Refills: 0 | Status: SHIPPED | OUTPATIENT
Start: 2024-02-08 | End: 2024-02-18

## 2024-02-08 NOTE — PROGRESS NOTES
"[facility] Visit    Chief Complaint:      Follow-up (Routine follow up visit. C/O Lt knee popping and Lt hip pain. )     Subjective:     HPI:  Vi Ulrich is a 58 y.o. female with has a past medical history of Arthritis, Asthma, Atrial fibrillation, Chronic constipation, Diabetes mellitus, Encounter for blood transfusion, GERD (gastroesophageal reflux disease), Gout, Hypertension, Lupus, Renal disorder, and SLE (systemic lupus erythematosus). She presents to clinic today for follow-up of chronic medical conditions.     Today, patient was late for her as well as her daughters appointment they were 21 minutes late thus putting me behind so I told him I can only dressed 1 issue for each at that time medications she needed were refilled she will have to make another appointment due to the nature of the situation.  Patient herself was > 50 minutes late for her appointment.  She denies any chest pain, changes in urination however does endorse a cough.      Review of Systems  A comprehensive 12 point review of systems was completed.  Please see above for pertinent positives and negatives.     Objective:   Last 24 Hour Vital Signs:  Vitals  BP: 124/70  Temp: 98.1 °F (36.7 °C)  Pulse: 67  Resp: 16  SpO2: 100 %  Height: 5' 6" (167.6 cm)  Weight: 85.7 kg (189 lb)    Physical Examination:  General: Awake, alert, & oriented to person, place & time. No acute distress  Psychiatric: Mood and affect normal  HEENT: Normocephalic, atraumatic. Face symmetric.  Cardiovascular: Regular rate & rhythm  Pulmonary: Bilateral symmetric chest rise, Non-labored  Abdominal:  nondistended  Extremities: No clubbing or cyanosis.  Skin:  Exposed skin is warm & dry.  Neuro:   Patient moves all extremities equally. Sensation intact bilateraly.       Assessment & Plan:     Cough  -Tessalon Perles    T2DM  -continue 2 units with lunch per endocrinology  -instructed to keep sugar log    Depression  Anxiety  -patient sees Psychiatry, " currently on Xanax  -continue lexapro 20 daily    SLE  Hx of Lupus nephritis  Arthritis  -please continue to follow Nephrology as well as Rheumatology  -Continue CellCept  -+ anticardioliipin antibodies, pt can't make wafrin apts, continue eliquis  -continue to follow renal recommendations and keep appointments  -continue to follow with rheumatology    Vitamin-D deficiency  -Patient was started on 51372 units for 12 weeks Q 7 days by Nephrology  -Continue to monitor    Hypertension  -BP today: 124/70  -Continue home meds    Health Maintenance  Colon cancer screening:  Colonoscopy 2018 per the patient and per chart stating no findings  Lung Cancer screen:  N/A  Mammogram:  BI-RADS 2 bilaterally 4/4/23 recommended annual exam next will be due April 2024  PapSmear:  Upcoming gynecology appointment for 9/20/24  Hep C:  Ordered today  Dexa:  N/A  A1C:  5.4  Vaccines      Pneumonia:  Patient got a [facility]      Annual Flu:  Received at [facility]      Zoster:  She will look if this was received at [facility]      Tdap:  She will check if this was received at [facility]      Follow-ups  -Follow-up medicine clinic in 3 months    Octaviano Barrios MD  Internal Medicine - PGY-3

## 2024-02-12 NOTE — PROGRESS NOTES
Attending Addendum:   Patient seen and examined in clinic. Management and Plan were discussed with resident. Care was reasonable and necessary.   Louise Shepherd MD  [facility] - Internal Medicine

## 2024-02-18 ENCOUNTER — HOSPITAL ENCOUNTER (EMERGENCY)
Facility: HOSPITAL | Age: 59
Discharge: HOME | End: 2024-02-18
Attending: EMERGENCY MEDICINE
Payer: MEDICAID

## 2024-02-18 VITALS
WEIGHT: 180 LBS | HEIGHT: 66 IN | DIASTOLIC BLOOD PRESSURE: 70 MMHG | BODY MASS INDEX: 28.93 KG/M2 | OXYGEN SATURATION: 99 % | RESPIRATION RATE: 17 BRPM | HEART RATE: 71 BPM | SYSTOLIC BLOOD PRESSURE: 148 MMHG | TEMPERATURE: 98 F

## 2024-02-18 DIAGNOSIS — N30.00 ACUTE CYSTITIS WITHOUT HEMATURIA: ICD-10-CM

## 2024-02-18 DIAGNOSIS — B34.9 VIRAL SYNDROME: Primary | ICD-10-CM

## 2024-02-18 LAB
ALBUMIN SERPL-MCNC: 3.6 G/DL (ref 3.5–5)
ALBUMIN/GLOB SERPL: 0.8 RATIO (ref 1.1–2)
ALP SERPL-CCNC: 126 UNIT/L (ref 40–150)
ALT SERPL-CCNC: 9 UNIT/L (ref 0–55)
APPEARANCE UR: CLEAR
AST SERPL-CCNC: 22 UNIT/L (ref 5–34)
BACTERIA #/AREA URNS AUTO: ABNORMAL /HPF
BASOPHILS # BLD AUTO: 0.04 X10(3)/MCL
BASOPHILS NFR BLD AUTO: 0.5 %
BILIRUB SERPL-MCNC: 0.2 MG/DL
BILIRUB UR QL STRIP.AUTO: NEGATIVE
BUN SERPL-MCNC: 26.2 MG/DL (ref 9.8–20.1)
CALCIUM SERPL-MCNC: 9.3 MG/DL (ref 8.4–10.2)
CHLORIDE SERPL-SCNC: 106 MMOL/L (ref 98–107)
CO2 SERPL-SCNC: 24 MMOL/L (ref 22–29)
COLOR UR AUTO: ABNORMAL
CREAT SERPL-MCNC: 1.4 MG/DL (ref 0.55–1.02)
EOSINOPHIL # BLD AUTO: 0.14 X10(3)/MCL (ref 0–0.9)
EOSINOPHIL NFR BLD AUTO: 1.9 %
ERYTHROCYTE [DISTWIDTH] IN BLOOD BY AUTOMATED COUNT: 13.8 % (ref 11.5–17)
FLUAV AG UPPER RESP QL IA.RAPID: NOT DETECTED
FLUAV AG UPPER RESP QL IA.RAPID: NOT DETECTED
FLUBV AG UPPER RESP QL IA.RAPID: NOT DETECTED
FLUBV AG UPPER RESP QL IA.RAPID: NOT DETECTED
GFR SERPLBLD CREATININE-BSD FMLA CKD-EPI: 44 MLS/MIN/1.73/M2
GLOBULIN SER-MCNC: 4.6 GM/DL (ref 2.4–3.5)
GLUCOSE SERPL-MCNC: 114 MG/DL (ref 74–100)
GLUCOSE UR QL STRIP.AUTO: NORMAL
HCT VFR BLD AUTO: 36.6 % (ref 37–47)
HGB BLD-MCNC: 11.6 G/DL (ref 12–16)
HYALINE CASTS #/AREA URNS LPF: ABNORMAL /LPF
IMM GRANULOCYTES # BLD AUTO: 0.02 X10(3)/MCL (ref 0–0.04)
IMM GRANULOCYTES NFR BLD AUTO: 0.3 %
KETONES UR QL STRIP.AUTO: NEGATIVE
LEUKOCYTE ESTERASE UR QL STRIP.AUTO: 250
LYMPHOCYTES # BLD AUTO: 1.51 X10(3)/MCL (ref 0.6–4.6)
LYMPHOCYTES NFR BLD AUTO: 20.3 %
MCH RBC QN AUTO: 29.3 PG (ref 27–31)
MCHC RBC AUTO-ENTMCNC: 31.7 G/DL (ref 33–36)
MCV RBC AUTO: 92.4 FL (ref 80–94)
MONOCYTES # BLD AUTO: 0.51 X10(3)/MCL (ref 0.1–1.3)
MONOCYTES NFR BLD AUTO: 6.9 %
MUCOUS THREADS URNS QL MICRO: ABNORMAL /LPF
NEUTROPHILS # BLD AUTO: 5.21 X10(3)/MCL (ref 2.1–9.2)
NEUTROPHILS NFR BLD AUTO: 70.1 %
NITRITE UR QL STRIP.AUTO: NEGATIVE
NRBC BLD AUTO-RTO: 0 %
PH UR STRIP.AUTO: 5.5 [PH]
PLATELET # BLD AUTO: 493 X10(3)/MCL (ref 130–400)
PMV BLD AUTO: 9.5 FL (ref 7.4–10.4)
POTASSIUM SERPL-SCNC: 4.7 MMOL/L (ref 3.5–5.1)
PROT SERPL-MCNC: 8.2 GM/DL (ref 6.4–8.3)
PROT UR QL STRIP.AUTO: ABNORMAL
RBC # BLD AUTO: 3.96 X10(6)/MCL (ref 4.2–5.4)
RBC #/AREA URNS AUTO: ABNORMAL /HPF
RBC UR QL AUTO: ABNORMAL
RSV A 5' UTR RNA NPH QL NAA+PROBE: NOT DETECTED
SARS-COV-2 RNA RESP QL NAA+PROBE: NOT DETECTED
SARS-COV-2 RNA RESP QL NAA+PROBE: NOT DETECTED
SODIUM SERPL-SCNC: 139 MMOL/L (ref 136–145)
SP GR UR STRIP.AUTO: 1.02 (ref 1–1.03)
SQUAMOUS #/AREA URNS LPF: ABNORMAL /HPF
UROBILINOGEN UR STRIP-ACNC: NORMAL
WBC # SPEC AUTO: 7.43 X10(3)/MCL (ref 4.5–11.5)
WBC #/AREA URNS AUTO: ABNORMAL /HPF

## 2024-02-18 PROCEDURE — 87086 URINE CULTURE/COLONY COUNT: CPT | Performed by: NURSE PRACTITIONER

## 2024-02-18 PROCEDURE — 85025 COMPLETE CBC W/AUTO DIFF WBC: CPT | Performed by: NURSE PRACTITIONER

## 2024-02-18 PROCEDURE — 80053 COMPREHEN METABOLIC PANEL: CPT | Performed by: NURSE PRACTITIONER

## 2024-02-18 PROCEDURE — 99284 EMERGENCY DEPT VISIT MOD MDM: CPT | Mod: 25

## 2024-02-18 PROCEDURE — 81001 URINALYSIS AUTO W/SCOPE: CPT | Performed by: NURSE PRACTITIONER

## 2024-02-18 PROCEDURE — 0240U COVID/FLU A&B PCR: CPT | Performed by: NURSE PRACTITIONER

## 2024-02-18 PROCEDURE — 96374 THER/PROPH/DIAG INJ IV PUSH: CPT

## 2024-02-18 PROCEDURE — 87077 CULTURE AEROBIC IDENTIFY: CPT | Performed by: NURSE PRACTITIONER

## 2024-02-18 PROCEDURE — 63600175 PHARM REV CODE 636 W HCPCS

## 2024-02-18 PROCEDURE — 96361 HYDRATE IV INFUSION ADD-ON: CPT

## 2024-02-18 PROCEDURE — 0241U COVID/RSV/FLU A&B PCR: CPT

## 2024-02-18 PROCEDURE — 25000003 PHARM REV CODE 250: Performed by: NURSE PRACTITIONER

## 2024-02-18 RX ORDER — ONDANSETRON 4 MG/1
4 TABLET, FILM COATED ORAL EVERY 6 HOURS PRN
Qty: 12 TABLET | Refills: 0 | Status: SHIPPED | OUTPATIENT
Start: 2024-02-18

## 2024-02-18 RX ORDER — SODIUM CHLORIDE 9 MG/ML
1000 INJECTION, SOLUTION INTRAVENOUS
Status: COMPLETED | OUTPATIENT
Start: 2024-02-18 | End: 2024-02-18

## 2024-02-18 RX ORDER — ONDANSETRON HYDROCHLORIDE 2 MG/ML
4 INJECTION, SOLUTION INTRAVENOUS
Status: COMPLETED | OUTPATIENT
Start: 2024-02-18 | End: 2024-02-18

## 2024-02-18 RX ORDER — BENZONATATE 100 MG/1
100 CAPSULE ORAL 3 TIMES DAILY PRN
Qty: 15 CAPSULE | Refills: 0 | Status: SHIPPED | OUTPATIENT
Start: 2024-02-18

## 2024-02-18 RX ORDER — NITROFURANTOIN 25; 75 MG/1; MG/1
100 CAPSULE ORAL 2 TIMES DAILY
Qty: 10 CAPSULE | Refills: 0 | Status: SHIPPED | OUTPATIENT
Start: 2024-02-18 | End: 2024-02-23

## 2024-02-18 RX ADMIN — SODIUM CHLORIDE 1000 ML: 9 INJECTION, SOLUTION INTRAVENOUS at 12:02

## 2024-02-18 RX ADMIN — ONDANSETRON 4 MG: 2 INJECTION INTRAMUSCULAR; INTRAVENOUS at 03:02

## 2024-02-18 NOTE — ED PROVIDER NOTES
Encounter Date: 2/18/2024       History     Chief Complaint   Patient presents with    Vomiting     C/o n/v/d x2 episodes onset today with non-productive cough. Reports daughter has RSV and she started feeling bad today as well. Denies fever.      58 y.o.  female with a history of SLE, DM, and CKD presents to Emergency Department with a chief complaint of vomiting. Symptoms began on yesterday and have been constant since onset. Reports recent sick exposure; patient's daughter has RSV. Associated symptoms include diarrhea and cough. The patient denies CP, SOB, fever, chills, dizziness, or wheezing. No other reported symptoms at this time      The history is provided by the patient. No  was used.   Emesis   This is a new problem. The current episode started yesterday. The problem occurs 2 - 4 times per day. The problem has been unchanged. The emesis has an appearance of stomach contents and bilious material. Associated symptoms include cough and diarrhea. Pertinent negatives include no abdominal pain, no arthralgias, no chills and no fever. Risk factors include ill contacts.     Review of patient's allergies indicates:   Allergen Reactions    Ketorolac (pf) Swelling    Penicillins Hives    Percocet [oxycodone-acetaminophen] Hives and Itching    Tramadol Hives     Past Medical History:   Diagnosis Date    Arthritis     knees    Asthma     Atrial fibrillation     Chronic constipation     Diabetes mellitus     Encounter for blood transfusion 10/2014    GERD (gastroesophageal reflux disease)     Gout     Hypertension     Lupus 2004    SLE    Renal disorder     SLE (systemic lupus erythematosus)      Past Surgical History:   Procedure Laterality Date    APPENDECTOMY      CHOLECYSTECTOMY      COLONOSCOPY N/A 04/19/2018    Procedure: COLONOSCOPY;  Surgeon: Minerva Porras MD;  Location: [facility];  Service: Endoscopy;  Laterality: N/A;    ESOPHAGOGASTRODUODENOSCOPY N/A 04/19/2018     Procedure: ESOPHAGOGASTRODUODENOSCOPY (EGD);  Surgeon: Minerva Porras MD;  Location: [facility];  Service: Endoscopy;  Laterality: N/A;    HYSTERECTOMY      JOINT REPLACEMENT Right 2015    Knee    LYMPH NODE BIOPSY Left     MASTECTOMY      Left arm- NO BP    PARTIAL HYSTERECTOMY       Family History   Problem Relation Age of Onset    Heart block Mother     Arthritis Mother     Asthma Mother     Diabetes Mother     Heart disease Father     Diabetes Sister     Heart disease Sister     Kidney disease Sister      Social History     Tobacco Use    Smoking status: Former     Current packs/day: 0.00     Types: Cigarettes     Start date: 1980     Quit date: 2022     Years since quittin.7    Smokeless tobacco: Never   Substance Use Topics    Alcohol use: No    Drug use: No     Review of Systems   Constitutional:  Negative for chills, fatigue and fever.   HENT:  Negative for facial swelling, hearing loss, sore throat, trouble swallowing and voice change.    Eyes:  Negative for photophobia.   Respiratory:  Positive for cough. Negative for shortness of breath, wheezing and stridor.    Cardiovascular:  Negative for chest pain, palpitations and leg swelling.   Gastrointestinal:  Positive for diarrhea and vomiting. Negative for abdominal pain.   Musculoskeletal:  Negative for arthralgias.   All other systems reviewed and are negative.      Physical Exam     Initial Vitals [24 1216]   BP Pulse Resp Temp SpO2   (!) 141/71 80 17 98.2 °F (36.8 °C) 98 %      MAP       --         Physical Exam    Nursing note and vitals reviewed.  Constitutional: She appears well-developed and well-nourished. She is not diaphoretic. She is cooperative.  Non-toxic appearance. No distress.   No active vomiting noted.   HENT:   Head: Normocephalic and atraumatic.   Right Ear: External ear normal.   Left Ear: External ear normal.   Nose: Nose normal.   Eyes: Conjunctivae and EOM are normal. Pupils are equal, round, and  reactive to light.   Neck: Neck supple.   Normal range of motion.  Cardiovascular:  Normal rate, regular rhythm, S1 normal, S2 normal, normal heart sounds, intact distal pulses and normal pulses.           Pulmonary/Chest: Effort normal and breath sounds normal. No tachypnea and no bradypnea. No respiratory distress. She has no decreased breath sounds. She has no wheezes. She has no rhonchi. She has no rales.   Abdominal: Abdomen is soft. Bowel sounds are normal. She exhibits no distension. There is no abdominal tenderness. There is no rebound.   Musculoskeletal:         General: Normal range of motion.      Cervical back: Normal range of motion and neck supple.     Neurological: She is alert and oriented to person, place, and time. She has normal strength. No sensory deficit. GCS score is 15. GCS eye subscore is 4. GCS verbal subscore is 5. GCS motor subscore is 6.   Skin: Skin is warm and dry. Capillary refill takes less than 2 seconds.   Psychiatric: She has a normal mood and affect. Thought content normal.         ED Course   Procedures  Labs Reviewed   COMPREHENSIVE METABOLIC PANEL - Abnormal; Notable for the following components:       Result Value    Glucose Level 114 (*)     Blood Urea Nitrogen 26.2 (*)     Creatinine 1.40 (*)     Globulin 4.6 (*)     Albumin/Globulin Ratio 0.8 (*)     All other components within normal limits   URINALYSIS, REFLEX TO URINE CULTURE - Abnormal; Notable for the following components:    Protein, UA 1+ (*)     Blood, UA 1+ (*)     Leukocyte Esterase,  (*)     WBC, UA 21-50 (*)     Mucous, UA Trace (*)     Hyaline Casts, UA 6-10 (*)     RBC, UA 6-10 (*)     All other components within normal limits   CBC WITH DIFFERENTIAL - Abnormal; Notable for the following components:    RBC 3.96 (*)     Hgb 11.6 (*)     Hct 36.6 (*)     MCHC 31.7 (*)     Platelet 493 (*)     All other components within normal limits   COVID/FLU A&B PCR - Normal    Narrative:     The Xpert Xpress  SARS-CoV-2/FLU/RSV plus is a rapid, multiplexed real-time PCR test intended for the simultaneous qualitative detection and differentiation of SARS-CoV-2, Influenza A, Influenza B, and respiratory syncytial virus (RSV) viral RNA in either nasopharyngeal swab or nasal swab specimens.         COVID/RSV/FLU A&B PCR - Normal    Narrative:     The Xpert Xpress SARS-CoV-2/FLU/RSV plus is a rapid, multiplexed real-time PCR test intended for the simultaneous qualitative detection and differentiation of SARS-CoV-2, Influenza A, Influenza B, and respiratory syncytial virus (RSV) viral RNA in either nasopharyngeal swab or nasal swab specimens.         CULTURE, URINE   CBC W/ AUTO DIFFERENTIAL    Narrative:     The following orders were created for panel order CBC Auto Differential.  Procedure                               Abnormality         Status                     ---------                               -----------         ------                     CBC with Differential[[id number]]       Abnormal            Final result                 Please view results for these tests on the individual orders.          Imaging Results    None          Medications   0.9%  NaCl infusion (0 mLs Intravenous Stopped 2/18/24 1427)   ondansetron injection 4 mg (4 mg Intravenous Given 2/18/24 1504)     Medical Decision Making  Patient awake, alert, has non-labored breathing, and follows commands appropriately. C/o cough, vomiting, and diarrhea that began on yesterday. Reports recent exposure to RSV. Afebrile. NAD Noted.         Differential Diagnosis: COVID, RSV, Viral Gastroenteritis, Vomiting     Amount and/or Complexity of Data Reviewed  Labs: ordered.     Details: Swabs negative. Cr- 1.4, hx of CKD, trending near baseline. UA- WBCs and leukocytes. Informed patient of results.   Discussion of management or test interpretation with external provider(s): Discussed plan of care and interventions with patient. Agreed to and aware of plan of  care. Comfortable being discharged home.   Patient discharged home. Patient denies new or additional complaints; no further tests indicated at this time. Verbalized understanding of instructions. No emergent or apparent distress noted prior to discharge. To follow up with PCP in 1 week as needed. Strict ER return precautions given.       Risk  OTC drugs.  Prescription drug management.                                      Clinical Impression:  Final diagnoses:  [B34.9] Viral syndrome (Primary)  [N30.00] Acute cystitis without hematuria          ED Disposition Condition    Discharge Stable          ED Prescriptions       Medication Sig Dispense Start Date End Date Auth. Provider    ondansetron (ZOFRAN) 4 MG tablet Take 1 tablet (4 mg total) by mouth every 6 (six) hours as needed for Nausea. 12 tablet 2/18/2024 -- Emmie Chatman NP    benzonatate (TESSALON) 100 MG capsule Take 1 capsule (100 mg total) by mouth 3 (three) times daily as needed for Cough. 15 capsule 2/18/2024 -- Emmie Chatman NP    nitrofurantoin, macrocrystal-monohydrate, (MACROBID) 100 MG capsule Take 1 capsule (100 mg total) by mouth 2 (two) times daily. for 5 days 10 capsule 2/18/2024 2/23/2024 Emmie Chatman, VANESSA          Follow-up Information       Follow up With Specialties Details Why Contact Info    Octaviano Barrios MD Internal Medicine Call in 1 week If symptoms worsen, As needed [address] [facility] [address]  [phone]      [facility]  Emergency Dept Emergency Medicine Go to  If symptoms worsen, As needed [address] [facility] [id number] [phone]             Emmie Chatman NP  02/18/24 [id number]

## 2024-02-18 NOTE — FIRST PROVIDER EVALUATION
Medical screening examination initiated.  I have conducted a focused provider triage encounter, findings are as follows:    Brief history of present illness:  57y/o F presents to the ED with nausea/vomiting and diarrhea. Onset 4 am this morning.     There were no vitals filed for this visit.    Pertinent physical exam:  AAA x 3    Brief workup plan:  Labs    Preliminary workup initiated; this workup will be continued and followed by the physician or advanced practice provider that is assigned to the patient when roomed.

## 2024-02-21 LAB — BACTERIA UR CULT: ABNORMAL

## 2024-03-05 ENCOUNTER — OFFICE VISIT (OUTPATIENT)
Dept: NEPHROLOGY | Facility: CLINIC | Age: 59
End: 2024-03-05
Payer: MEDICAID

## 2024-03-05 DIAGNOSIS — Z79.4 TYPE 2 DIABETES MELLITUS WITH STAGE 3B CHRONIC KIDNEY DISEASE, WITH LONG-TERM CURRENT USE OF INSULIN: ICD-10-CM

## 2024-03-05 DIAGNOSIS — N18.32 CKD STAGE G3B/A3, GFR 30-44 AND ALBUMIN CREATININE RATIO >300 MG/G: Primary | ICD-10-CM

## 2024-03-05 DIAGNOSIS — E11.22 TYPE 2 DIABETES MELLITUS WITH STAGE 3B CHRONIC KIDNEY DISEASE, WITH LONG-TERM CURRENT USE OF INSULIN: ICD-10-CM

## 2024-03-05 DIAGNOSIS — N18.32 TYPE 2 DIABETES MELLITUS WITH STAGE 3B CHRONIC KIDNEY DISEASE, WITH LONG-TERM CURRENT USE OF INSULIN: ICD-10-CM

## 2024-03-05 DIAGNOSIS — I10 ESSENTIAL HYPERTENSION: ICD-10-CM

## 2024-03-05 DIAGNOSIS — M32.14 LUPUS NEPHRITIS: ICD-10-CM

## 2024-03-05 DIAGNOSIS — D84.9 IMMUNOSUPPRESSED STATUS: ICD-10-CM

## 2024-03-05 PROCEDURE — 1160F RVW MEDS BY RX/DR IN RCRD: CPT | Mod: CPTII,95,, | Performed by: NURSE PRACTITIONER

## 2024-03-05 PROCEDURE — 1159F MED LIST DOCD IN RCRD: CPT | Mod: CPTII,95,, | Performed by: NURSE PRACTITIONER

## 2024-03-05 PROCEDURE — 99214 OFFICE O/P EST MOD 30 MIN: CPT | Mod: 95,,, | Performed by: NURSE PRACTITIONER

## 2024-03-05 PROCEDURE — 3066F NEPHROPATHY DOC TX: CPT | Mod: CPTII,95,, | Performed by: NURSE PRACTITIONER

## 2024-03-05 PROCEDURE — 4010F ACE/ARB THERAPY RXD/TAKEN: CPT | Mod: CPTII,95,, | Performed by: NURSE PRACTITIONER

## 2024-03-05 RX ORDER — BENZONATATE 200 MG/1
200 CAPSULE ORAL
COMMUNITY
Start: 2024-02-27

## 2024-03-05 NOTE — PROGRESS NOTES
Established Patient - Audio Only Telehealth Visit     The patient location is: Home  The chief complaint leading to consultation is: CKD  Visit type: Virtual visit with audio only (telephone)  Total time spent with patient:  25  minutes     The reason for the audio only service rather than synchronous audio and video virtual visit was related to technical difficulties or patient preference/necessity.     Each patient to whom I provide medical services by telemedicine is:  (1) informed of the relationship between the physician and patient and the respective role of any other health care provider with respect to management of the patient; and (2) notified that they may decline to receive medical services by telemedicine and may withdraw from such care at any time. Patient verbally consented to receive this service via voice-only telephone call.     This service was not originating from a related E/M service provided within the previous 7 days nor will  to an E/M service or procedure within the next 24 hours or my soonest available appointment.  Prevailing standard of care was able to be met in this audio-only visit.      [facility]  Nephrology Clinic Note    Chief complaint: Follow-up (Audio, diarrhea, back pain)    History of present illness:   Vi Ulrich is a 58 y.o. Black or  female with past medical history of systemic lupus erythematosus diagnosed in 2004 (was previously under the care of Dr Velazquez, has established care with Dr. 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    She presents for follow-up appointment in Nephrology Clinic today.  Compliant with medication regimen.  Complains of back pain, which is chronic.    Review of Systems  12 point review of systems conducted, negative except as stated in the history of present illness.    Allergies: Patient is allergic to ketorolac (pf), penicillins, percocet [oxycodone-acetaminophen], and tramadol.     Past Medical History:  has a past medical history of Arthritis, Asthma, Atrial fibrillation, Chronic constipation, Diabetes mellitus, Encounter for blood transfusion, GERD (gastroesophageal reflux disease), Gout, Hypertension, Lupus, Renal disorder, and SLE (systemic lupus erythematosus).    Procedure History:  has a past surgical history that includes Cholecystectomy; Appendectomy; Partial hysterectomy; Lymph node biopsy (Left, 2014); Hysterectomy; Joint replacement (Right, 08/07/2015); Mastectomy; Esophagogastroduodenoscopy (N/A, 04/19/2018); and Colonoscopy (N/A, 04/19/2018).    Family History: family history includes Arthritis in her mother; Asthma in her mother; Diabetes in her mother and sister; Heart block in her mother; Heart disease in her father and sister; Kidney disease in her sister.    Social History:  reports that she quit smoking about 21 months ago. Her smoking use included cigarettes. She started smoking about 43 years ago. She has never used smokeless tobacco. She reports that she does not drink alcohol and does not use drugs.    Home Medications:    Current Outpatient Medications:     albuterol sulfate (PROAIR RESPICLICK) 90 mcg/actuation inhaler, Inhale 1 puff into the lungs every 4 (four) hours as needed for Wheezing. Rescue, Disp: , Rfl:     ALPRAZolam (XANAX) 1 MG tablet, Take 1 tablet (1 mg total) by mouth daily as needed for Anxiety., Disp: 30 tablet, Rfl: 1    apixaban (ELIQUIS) 5 mg Tab, Take 1 tablet (5 mg total) by  mouth 2 (two) times daily., Disp: 180 tablet, Rfl: 3    atorvastatin (LIPITOR) 10 MG tablet, Take 10 mg by mouth once daily., Disp: , Rfl:     benzonatate (TESSALON) 200 MG capsule, Take 200 mg by mouth., Disp: , Rfl:     blood sugar diagnostic (ONETOUCH VERIO) Strp, 1 each by Misc.(Non-Drug; Combo Route) route 4 (four) times daily., Disp: 50 each, Rfl: 11    blood sugar diagnostic Strp, 1 each by Misc.(Non-Drug; Combo Route) route 4 (four) times daily., Disp: 50 each, Rfl: 11    blood sugar diagnostic Strp, 1 strip by Misc.(Non-Drug; Combo Route) route 4 (four) times daily with meals and nightly., Disp: 200 each, Rfl: 6    blood-glucose meter kit, Use as instructed, Disp: 1 each, Rfl: 0    blood-glucose sensor (DEXCOM G7 SENSOR) Ana, 1 each by Misc.(Non-Drug; Combo Route) route every 14 (fourteen) days., Disp: 5 each, Rfl: 6    EScitalopram oxalate (LEXAPRO) 20 MG tablet, Take 1 tablet (20 mg total) by mouth once daily., Disp: 90 tablet, Rfl: 3    HYDROcodone-acetaminophen (NORCO)  mg per tablet, Take 1 tablet by mouth every 8 (eight) hours as needed for Pain., Disp: 90 tablet, Rfl: 0    insulin aspart U-100 (NOVOLOG) 100 unit/mL (3 mL) InPn pen, Inject 2 Units into the skin as needed., Disp: , Rfl:     lancets 28 gauge Misc, 100 lancets by Misc.(Non-Drug; Combo Route) route 4 (four) times daily with meals and nightly., Disp: 100 each, Rfl: 8    LIDOcaine (LIDODERM) 5 %, Place 1 patch onto the skin once daily. Remove & Discard patch within 12 hours or as directed by MD, Disp: 30 patch, Rfl: 0    losartan (COZAAR) 25 MG tablet, Take 1 tablet (25 mg total) by mouth once daily., Disp: 90 tablet, Rfl: 3    mycophenolate (CELLCEPT) 500 mg Tab, Take 3 tablets (1,500 mg total) by mouth 2 (two) times daily. (Patient taking differently: Take 1,500 mg by mouth 2 (two) times daily. 250 mg 6 tabs), Disp: 180 tablet, Rfl: 3    omeprazole (PRILOSEC) 20 MG capsule, Take 20 mg by mouth once daily., Disp: , Rfl:      ondansetron (ZOFRAN) 4 MG tablet, Take 1 tablet (4 mg total) by mouth every 6 (six) hours as needed for Nausea., Disp: 12 tablet, Rfl: 0    predniSONE (DELTASONE) 5 MG tablet, Take 2 tablets (10 mg total) by mouth once daily., Disp: 60 tablet, Rfl: 1    voclosporin (LUPKYNIS) 7.9 mg Cap, Take 23.7 mg by mouth once daily., Disp: 30 capsule, Rfl: 11    walker Misc, Please dispense 1 Rollator, Disp: 1 each, Rfl: 0    benzonatate (TESSALON) 100 MG capsule, Take 1 capsule (100 mg total) by mouth 3 (three) times daily as needed for Cough., Disp: 15 capsule, Rfl: 0    eletriptan (RELPAX) 40 MG tablet, Take 1 tablet (40 mg total) by mouth as needed (take one at the beginning of migraine, may repeat in 2 h. max 2 in 24h). may repeat in 2 hours if necessary, Disp: 9 tablet, Rfl: 5    pantoprazole (PROTONIX) 40 MG tablet, Take 1 tablet (40 mg total) by mouth once daily. (Patient not taking: Reported on 3/5/2024), Disp: 90 tablet, Rfl: 3    Laboratory data    Lab Results   Component Value Date    WBC 7.43 02/18/2024    HGB 11.6 (L) 02/18/2024    HCT 36.6 (L) 02/18/2024     (H) 02/18/2024    IRON 71 01/30/2024    TIBC 260 01/30/2024    TRANS 236 01/30/2024    LABIRON 27 01/30/2024    FERRITIN 147.52 01/30/2024    FOLATE 5.3 (L) 01/12/2023    HWZFLLKQ23 674 01/12/2023    HAPTO 249 03/10/2023     (H) 03/10/2023     02/18/2024    K 4.7 02/18/2024    CHLORIDE 106 02/18/2024    CO2 24 02/18/2024    BUN 26.2 (H) 02/18/2024    CREATININE 1.40 (H) 02/18/2024    EGFRNORACEVR 44 02/18/2024    GLUCOSE 114 (H) 02/18/2024    CALCIUM 9.3 02/18/2024    ALKPHOS 126 02/18/2024    LABPROT 8.2 02/18/2024    ALBUMIN 3.6 02/18/2024    BILIDIR 0.1 03/28/2022    IBILI 0.10 03/28/2022    AST 22 02/18/2024    ALT 9 02/18/2024    MG 1.70 10/25/2023    PHOS 2.6 10/25/2023      Lab Results   Component Value Date    HGBA1C 5.7 11/14/2023    .7 (H) 03/03/2023    ISQBLWSS41DE 15.0 (L) 08/04/2023    HIV Nonreactive 10/13/2023     HEPCAB Nonreactive 10/13/2023    HEPBSURFAG Nonreactive 10/13/2023    HEPBSAB Negative 11/17/2020     Urine:  Lab Results   Component Value Date    COLORUA Light-Yellow 02/18/2024    APPEARANCEUA Clear 02/18/2024    SGUA 1.022 02/18/2024    PHUA 5.5 02/18/2024    PROTEINUA 1+ (A) 02/18/2024    GLUCOSEUA Normal 02/18/2024    KETONESUA Negative 02/18/2024    BLOODUA 1+ (A) 02/18/2024    NITRITESUA Negative 02/18/2024    LEUKOCYTESUR 250 (A) 02/18/2024    RBCUA 6-10 (A) 02/18/2024    WBCUA 21-50 (A) 02/18/2024    BACTERIA None Seen 02/18/2024    SQEPUA Trace 02/18/2024    HYALINECASTS 6-10 (A) 02/18/2024    CREATRANDUR 126.8 (H) 01/30/2024    PROTEINURINE 55.1 01/30/2024    UPROTCREA 0.4 01/30/2024         Imaging  US Retroperitoneal Limited 10/23/2023  Grayscale and color Doppler sonographic evaluation of the kidneys.  The right kidney measures 12 cm. The left kidney measures 12 cm.   No hydronephrosis.  There are small renal cysts for which no follow-up is needed.  Largest measures only about 1 cm.  Otherwise grossly normal renal parenchymal echogenicity.    Impression  No significant sonographic abnormality of the kidneys.  Electronically signed by: Jose Manuel Henao  Date:    10/23/2023  Time:    12:25      Impression    ICD-10-CM ICD-9-CM   1. CKD stage G3b/A3, GFR 30-44 and albumin creatinine ratio >300 mg/g  N18.32 585.3   2. Essential hypertension  I10 401.9   3. Lupus nephritis  M32.14 710.0     583.81   4. Type 2 diabetes mellitus with stage 3b chronic kidney disease, with long-term current use of insulin  E11.22 250.40    N18.32 585.3    Z79.4 V58.67   5. Immunosuppressed status  D84.9 279.9   6. BMI 29.0-29.9,adult  Z68.29 V85.25        Plan     CKD stage G3b/A3, GFR 30-44 and albumin creatinine ratio >300 mg/g  -     Comprehensive Metabolic Panel; Future; Expected date: 06/25/2024  -     Protein/Creatinine Ratio, Urine; Future; Expected date: 06/25/2024  -     Urinalysis, Reflex to Urine Culture; Future;  Expected date: 06/25/2024  Serum creatinine is stable, urinary protein excretion has improved.  Continue current medication regimen.  Will defer immunosuppression adjustment to rheumatology.    Continue renal sparing activities:  -2 g a day dietary sodium restriction  -optimize glycemic control (goal A1c is less than 7%)  -control high blood pressure (goal blood pressure is less than 130/80, patient was advised to check blood pressure once or twice a week and bring blood pressure logs to next office visit)  -exercise at least 30 minutes a day, 5 days a week  -maintain healthy weight  -decrease or stop alcohol use  -do not smoke  -stay well hydrated (drink water only, avoid juices, sweet tea, and sodas)  -ask about staying up-to-date on vaccinations (flu vaccine, pneumonia vaccine, hepatitis B vaccine)  -avoid excessive use of NSAIDs (ibuprofen, naproxen, Aleve, Advil, Toradol, Mobic), take Tylenol as needed for headache or mild pain  -take cholesterol lowering medications if prescribed (LDL goal less than 100)  Follow up in about 4 months (around 7/5/2024).     Essential hypertension  Blood pressure reading is at goal, continue current antihypertensive regimen and 2 g a day dietary sodium restriction.      Lupus nephritis  Serum creatinine is stable, urinary protein excretion has improved.  Continue current medication regimen.  Will defer immunosuppression adjustment to rheumatology.      Type 2 diabetes mellitus with stage 3b chronic kidney disease, with long-term current use of insulin  Continue attempts at good glycemic control and PAULY therapy.      Immunosuppressed status  -Advised to stay up-to-date on age appropriate vaccinations and malignancy screening with PCP.   -Recommended good hand hygiene and limiting contact with people who are sick or recently vaccinated.     BMI 29.0-29.9,adult  Lifestyle and dietary interventions discussed, patient encouraged to maintain non-sedentary lifestyle and well-balanced  diet.         3/5/2024  Rachel Broderick NP  [facility]

## 2024-03-07 ENCOUNTER — TELEPHONE (OUTPATIENT)
Dept: INTERNAL MEDICINE | Facility: CLINIC | Age: 59
End: 2024-03-07
Payer: MEDICAID

## 2024-03-07 ENCOUNTER — OFFICE VISIT (OUTPATIENT)
Dept: CARDIOLOGY | Facility: CLINIC | Age: 59
End: 2024-03-07
Payer: MEDICAID

## 2024-03-07 VITALS
RESPIRATION RATE: 18 BRPM | SYSTOLIC BLOOD PRESSURE: 116 MMHG | BODY MASS INDEX: 29.51 KG/M2 | HEIGHT: 66 IN | HEART RATE: 72 BPM | WEIGHT: 183.63 LBS | DIASTOLIC BLOOD PRESSURE: 74 MMHG | TEMPERATURE: 98 F

## 2024-03-07 DIAGNOSIS — I50.30 HEART FAILURE WITH PRESERVED EJECTION FRACTION, UNSPECIFIED HF CHRONICITY: ICD-10-CM

## 2024-03-07 DIAGNOSIS — M32.9 SYSTEMIC LUPUS ERYTHEMATOSUS ARTHRITIS: ICD-10-CM

## 2024-03-07 DIAGNOSIS — M25.512 LEFT SHOULDER PAIN, UNSPECIFIED CHRONICITY: Primary | ICD-10-CM

## 2024-03-07 DIAGNOSIS — E78.5 HYPERLIPIDEMIA, UNSPECIFIED HYPERLIPIDEMIA TYPE: ICD-10-CM

## 2024-03-07 DIAGNOSIS — M32.14 SYSTEMIC LUPUS ERYTHEMATOSUS WITH GLOMERULAR DISEASE, UNSPECIFIED SLE TYPE: ICD-10-CM

## 2024-03-07 DIAGNOSIS — I10 ESSENTIAL HYPERTENSION: Primary | ICD-10-CM

## 2024-03-07 DIAGNOSIS — J84.9 ILD (INTERSTITIAL LUNG DISEASE): ICD-10-CM

## 2024-03-07 DIAGNOSIS — R06.09 DYSPNEA ON EXERTION: ICD-10-CM

## 2024-03-07 DIAGNOSIS — I73.9 CLAUDICATION: ICD-10-CM

## 2024-03-07 PROCEDURE — 99215 OFFICE O/P EST HI 40 MIN: CPT | Mod: PBBFAC | Performed by: INTERNAL MEDICINE

## 2024-03-07 RX ORDER — ATORVASTATIN CALCIUM 40 MG/1
40 TABLET, FILM COATED ORAL DAILY
Qty: 90 TABLET | Refills: 3 | Status: SHIPPED | OUTPATIENT
Start: 2024-03-07

## 2024-03-07 RX ORDER — FUROSEMIDE 20 MG/1
20 TABLET ORAL DAILY
Qty: 90 TABLET | Refills: 3 | Status: SHIPPED | OUTPATIENT
Start: 2024-03-07 | End: 2025-03-07

## 2024-03-07 RX ORDER — HYDROCODONE BITARTRATE AND ACETAMINOPHEN 10; 325 MG/1; MG/1
1 TABLET ORAL EVERY 8 HOURS PRN
Qty: 90 TABLET | Refills: 0 | Status: SHIPPED | OUTPATIENT
Start: 2024-03-07 | End: 2024-04-03 | Stop reason: SDUPTHER

## 2024-03-07 NOTE — PROGRESS NOTES
[facility]  Outpatient Cardiology Clinic    Chief Complaint: Follow-up                                 HPI:  Vi Ulrich 58 y.o. female with a past medical history of DM, SLE, hypertension, diabetic glomerulonephropathy, asthma, atrial fibrillation, GERD, DVT who presents today following an abnormal rhythm. Pt was recently in the ED for an upper respiratory infection and emesis. Prior to that, she started care with a new rheumatologist who ordered an EKG showing possible atrial fibrillation. Pt was then referred here.    Pt reports feeling overall okay. On February 26th, pt experienced an episode of syncope lasting 2 minutes when she was in the laundry room. Pt denies any trauma. She does not recall the act of passing out. Lightheadedness and dizziness preceded the syncopal episode. Pt endorses SOB and orthopnea. She has occasional palpitations which she attributes as a symptom of her anxiety. She reports lower extremity pain when walking too far. Pt dnies CP and PND.                                                                                                                                                                                                                                                                                                                                                                                                                                                                             CARDIAC TESTING:  Results for orders placed during the hospital encounter of 10/09/23    Echo    Interpretation Summary    Left Ventricle: The left ventricle is normal in size. Mildly increased wall thickness. There is normal systolic function. Biplane (2D) method of discs ejection fraction is 60%.    Left Atrium: Left atrium is mildly dilated.    Right Ventricle: Normal right ventricular cavity size. Systolic function is normal.    Right Atrium: Right atrium is mildly dilated.    Aortic Valve: The aortic  valve is a trileaflet valve.    Mitral Valve: The mitral valve is structurally normal.    Tricuspid Valve: There is mild regurgitation.    Pulmonic Valve: There is mild regurgitation.    Pulmonary Artery: The estimated pulmonary artery systolic pressure is 27 mmHg.    IVC/SVC: Intermediate venous pressure at 8 mmHg.    No results found for this or any previous visit.     No results found for this or any previous visit.       Patient Active Problem List   Diagnosis    Essential hypertension    Diabetes mellitus, type 2    Insomnia due to other mental disorder    Hyperlipidemia    History of asthma    New onset atrial fibrillation    Periumbilical pain    Constipation    Iron deficiency anemia    Anxiety    Class 1 obesity with serious comorbidity and body mass index (BMI) of 32.0 to 32.9 in adult    Acute cystitis with hematuria    STIVEN (acute kidney injury)    Pyelonephritis    Inadequate dietary energy intake    Systemic lupus erythematosus with glomerular disease    Fibromyalgia    Lupus nephritis, ISN/RPS class III    Asthma    Trigger index finger of right hand    BMI 25.0-25.9,adult    Chronic obstructive pulmonary disease    Long term current use of insulin    Migraine aura without headache    Moderate recurrent major depression    Raynaud's disease    Thyroiditis    Vitamin D deficiency    Acute deep vein thrombosis (DVT) of popliteal vein of right lower extremity    ILD (interstitial lung disease)    Diabetic glomerulosclerosis    Back pain    Hypoglycemia     Past Surgical History:   Procedure Laterality Date    APPENDECTOMY      CHOLECYSTECTOMY      COLONOSCOPY N/A 04/19/2018    Procedure: COLONOSCOPY;  Surgeon: Minerva Porras MD;  [facility];  Service: Endoscopy;  Laterality: N/A;    ESOPHAGOGASTRODUODENOSCOPY N/A 04/19/2018    Procedure: ESOPHAGOGASTRODUODENOSCOPY (EGD);  Surgeon: Minerva Porras MD;  Location[facility];  Service: Endoscopy;  Laterality: N/A;    HYSTERECTOMY       JOINT REPLACEMENT Right 2015    Knee    LYMPH NODE BIOPSY Left 2014    MASTECTOMY      Left arm- NO BP    PARTIAL HYSTERECTOMY       Social History     Socioeconomic History    Marital status: Single    Years of education: 10th   Tobacco Use    Smoking status: Former     Current packs/day: 0.00     Types: Cigarettes     Start date: 1980     Quit date: 2022     Years since quittin.8    Smokeless tobacco: Never   Substance and Sexual Activity    Alcohol use: No    Drug use: No    Sexual activity: Not Currently     Birth control/protection: See Surgical Hx     Social Determinants of Health     Financial Resource Strain: Low Risk  (2023)    Overall Financial Resource Strain (CARDIA)     Difficulty of Paying Living Expenses: Not hard at all   Food Insecurity: No Food Insecurity (2023)    Hunger Vital Sign     Worried About Running Out of Food in the Last Year: Never true     Ran Out of Food in the Last Year: Never true   Transportation Needs: No Transportation Needs (2023)    PRAPARE - Transportation     Lack of Transportation (Medical): No     Lack of Transportation (Non-Medical): No   Physical Activity: Inactive (2023)    Exercise Vital Sign     Days of Exercise per Week: 0 days     Minutes of Exercise per Session: 0 min   Stress: No Stress Concern Present (2023)    [employer] - Occupational Stress Questionnaire     Feeling of Stress : Not at all   Social Connections: Socially Isolated (2023)    Social Connection and Isolation Panel [NHANES]     Frequency of Communication with Friends and Family: More than three times a week     Frequency of Social Gatherings with Friends and Family: Never     Attends [employer] Services: Never     Active Member of Clubs or Organizations: No     Attends Club or Organization Meetings: Never     Marital Status: Never    Housing Stability: Low Risk  (2023)    Housing Stability Vital Sign     Unable  "to Pay for Housing in the Last Year: No     Number of Places Lived in the Last Year: 1     Unstable Housing in the Last Year: No        Family History   Problem Relation Age of Onset    Heart block Mother     Arthritis Mother     Asthma Mother     Diabetes Mother     Heart disease Father     Diabetes Sister     Heart disease Sister     Kidney disease Sister      Review of patient's allergies indicates:   Allergen Reactions    Ketorolac (pf) Swelling    Penicillins Hives    Percocet [oxycodone-acetaminophen] Hives and Itching    Tramadol Hives         ROS:                                                                                                                                                                             Negative except as stated in the history of present illness. See HPI for details.    PHYSICAL EXAM:  Visit Vitals  /74   Pulse 72   Temp 98.2 °F (36.8 °C)   Resp 18   Ht 5' 6" (1.676 m)   Wt 83.3 kg (183 lb 10.3 oz)   LMP  (LMP Unknown)   BMI 29.64 kg/m²     Physical Exam  Constitutional:       General: She is not in acute distress.     Appearance: Normal appearance. She is normal weight. She is not ill-appearing.   HENT:      Head: Normocephalic and atraumatic.      Right Ear: External ear normal.      Left Ear: External ear normal.      Nose: Nose normal.   Eyes:      Extraocular Movements: Extraocular movements intact.      Conjunctiva/sclera: Conjunctivae normal.   Cardiovascular:      Rate and Rhythm: Normal rate and regular rhythm.      Pulses: Normal pulses.           Radial pulses are 2+ on the right side and 2+ on the left side.        Posterior tibial pulses are 2+ on the right side and 2+ on the left side.      Heart sounds: Normal heart sounds. No murmur heard.     No friction rub. No gallop.   Pulmonary:      Effort: Pulmonary effort is normal. No respiratory distress.      Breath sounds: Normal breath sounds. No stridor. No wheezing, rhonchi or rales.   Chest:      Chest wall: " No tenderness.   Abdominal:      General: Bowel sounds are normal. There is no distension.      Palpations: Abdomen is soft.   Musculoskeletal:      Cervical back: Neck supple.   Skin:     General: Skin is warm and dry.   Psychiatric:         Mood and Affect: Mood normal.         Behavior: Behavior normal.           Current Outpatient Medications   Medication Instructions    albuterol sulfate (PROAIR RESPICLICK) 90 mcg/actuation inhaler 1 puff, Inhalation, Every 4 hours PRN, Rescue     ALPRAZolam (XANAX) 1 mg, Oral, Daily PRN    apixaban (ELIQUIS) 5 mg, Oral, 2 times daily    atorvastatin (LIPITOR) 10 mg, Oral, Daily    benzonatate (TESSALON) 100 mg, Oral, 3 times daily PRN    benzonatate (TESSALON) 200 mg, Oral    blood sugar diagnostic (ONETOUCH VERIO) Strp 1 each, Misc.(Non-Drug; Combo Route), 4 times daily    blood sugar diagnostic Strp 1 each, Misc.(Non-Drug; Combo Route), 4 times daily    blood sugar diagnostic Strp 1 strip, Misc.(Non-Drug; Combo Route), 4 times daily with meals & nightly    blood-glucose meter kit Use as instructed    blood-glucose sensor (DEXCOM G7 SENSOR) Ana 1 each, Misc.(Non-Drug; Combo Route), Every 14 days    eletriptan (RELPAX) 40 mg, Oral, As needed (PRN), may repeat in 2 hours if necessary    EScitalopram oxalate (LEXAPRO) 20 mg, Oral, Daily    HYDROcodone-acetaminophen (NORCO)  mg per tablet 1 tablet, Oral, Every 8 hours PRN    insulin aspart U-100 (NOVOLOG) 2 Units, Subcutaneous, As needed (PRN)    lancets 28 gauge Misc 100 lancets, Misc.(Non-Drug; Combo Route), 4 times daily with meals & nightly    LIDOcaine (LIDODERM) 5 % 1 patch, Transdermal, Daily, Remove & Discard patch within 12 hours or as directed by MD    losartan (COZAAR) 25 mg, Oral, Daily    LUPKYNIS 23.7 mg, Oral, Daily    mycophenolate (CELLCEPT) 1,500 mg, Oral, 2 times daily    omeprazole (PRILOSEC) 20 mg, Oral, Daily    ondansetron (ZOFRAN) 4 mg, Oral, Every 6 hours PRN    pantoprazole (PROTONIX) 40 mg, Oral,  Daily    predniSONE (DELTASONE) 10 mg, Oral, Daily    walker Misc Please dispense 1 Rollator        All medications, laboratory studies, cardiac diagnostic imaging independently reviewed.     Lab Results   Component Value Date    LDL 96.00 10/09/2023    .00 01/12/2023    TRIG 129 10/09/2023    TRIG 186 (H) 01/12/2023    CREATININE 1.40 (H) 02/18/2024    MG 1.70 10/25/2023    K 4.7 02/18/2024        ASSESSMENT:    Vi Ulrich 58 y.o. female with a past medical history of DM, SLE, hypertension, diabetic glomerulonephropathy, asthma, GERD, DVT who presents today following an abnormal rhythm. Pt recently established care with a new rheumatologist. Pt has previous EKG with only one showing atrial fibrillation. Recent EKG at rheumatology showed possible arrhythmia and was referred to clinic. Pt  endorses symptoms of HFpEF. Echo shows signs of possible diastolic dysfunction.       PLAN:     Atrial fibrillation from past EKG  -Pt had an atrial fibrillation detected on a previous EKG but has not been spotted since  -Pt currently on Eliquis from history of recent DVT  -Continue Eliquis    HFpEF  -Pt endorses symptoms consistent with HFpEF: SOB, orthopnea, coughing, abdominal distension  -Echo showed evidence of diastolic dysfunction likely related to hx of hypertension and diabetes   -Take 20 mg Lasix  -Scheduled stress test       Hyperlipidemia   -increased atorvastatin to 40 mg once daily   -Total cholesterol 151, LDL 96  -LDL goal of < 70      Claudication  -arterial ultrasound to look for evidence of PAD   -pt experiences pain in bilateral legs with exertion    Wilbur Monterroso   [employer] MS[facility]

## 2024-03-07 NOTE — PATIENT INSTRUCTIONS
Return to clinic in 4 months with Arterial Ultrasound, Nuclear stress test and lab work.   Nuclear stress test and Arterial ultrasound of both legs will need to be schedule  New medication changes noted.   Lasix 20 mg as per provider can be increased to 40 mg depending on urine output.

## 2024-03-11 PROBLEM — I50.30 HEART FAILURE WITH PRESERVED EJECTION FRACTION: Status: ACTIVE | Noted: 2024-03-11

## 2024-03-11 NOTE — PROGRESS NOTES
Cardiology attending addendum  Patient was seen and examined with DAVE Trent. Agree with plan as outlined below. 57 yo F with lupus and myositis overlap, DM, HTN, HLP, DVT, CKD3 who was referred to cardiology for further management of an abnormal EKG. Pt had a recent EKG done at rheumatologist's office that revealed ectopic atrial rhythm.   Ectopic atrial rhythms do not require further management. Review of all the pt's EKGs showed one EKG in 2017 revealing atrial fibrillation. Pt is already of DOAC due to hx of DVT. She is in sinus rhythm today.  Pt has been complaining of dyspnea on exertion, orthopnea and PND. She also has mild lower ext edema. Echocardiogram which I have personally reviewed shows preserved EF with grade 2 diastolic dysfunction. Will prescribe lasix 20mg (advised her to try one tablet and if she has no increased urine output to take 2 tablets. Will obtain BMP and Mag in 1-2 weeks. [place], will obtain a nuclear stress test for further evaluation of dyspnea. Pt has also been complaining of claudication and will obtain lower ext duplex US. LDL not at goal (96 in 10/2023) and will increase lipitor to 40mg.       TO NOTE: pt had a nuclear stress test ordered in 10/2023 but it was never completed.      Halley Shepherd MD  Cardiology staff-[facility]

## 2024-03-26 ENCOUNTER — OFFICE VISIT (OUTPATIENT)
Dept: BEHAVIORAL HEALTH | Facility: CLINIC | Age: 59
End: 2024-03-26
Payer: MEDICAID

## 2024-03-26 VITALS
TEMPERATURE: 99 F | WEIGHT: 188.13 LBS | DIASTOLIC BLOOD PRESSURE: 83 MMHG | OXYGEN SATURATION: 99 % | BODY MASS INDEX: 30.36 KG/M2 | HEART RATE: 76 BPM | SYSTOLIC BLOOD PRESSURE: 138 MMHG

## 2024-03-26 DIAGNOSIS — F41.9 ANXIETY: ICD-10-CM

## 2024-03-26 DIAGNOSIS — E11.21 TYPE 2 DIABETES MELLITUS WITH DIABETIC NEPHROPATHY, WITHOUT LONG-TERM CURRENT USE OF INSULIN: Primary | ICD-10-CM

## 2024-03-26 DIAGNOSIS — F33.1 MODERATE RECURRENT MAJOR DEPRESSION: ICD-10-CM

## 2024-03-26 DIAGNOSIS — F99 INSOMNIA DUE TO OTHER MENTAL DISORDER: ICD-10-CM

## 2024-03-26 DIAGNOSIS — F51.05 INSOMNIA DUE TO OTHER MENTAL DISORDER: ICD-10-CM

## 2024-03-26 LAB — GLUCOSE SERPL-MCNC: 68 MG/DL (ref 70–110)

## 2024-03-26 PROCEDURE — 3008F BODY MASS INDEX DOCD: CPT | Mod: CPTII,,, | Performed by: STUDENT IN AN ORGANIZED HEALTH CARE EDUCATION/TRAINING PROGRAM

## 2024-03-26 PROCEDURE — 4010F ACE/ARB THERAPY RXD/TAKEN: CPT | Mod: CPTII,,, | Performed by: STUDENT IN AN ORGANIZED HEALTH CARE EDUCATION/TRAINING PROGRAM

## 2024-03-26 PROCEDURE — 1160F RVW MEDS BY RX/DR IN RCRD: CPT | Mod: CPTII,,, | Performed by: STUDENT IN AN ORGANIZED HEALTH CARE EDUCATION/TRAINING PROGRAM

## 2024-03-26 PROCEDURE — 82962 GLUCOSE BLOOD TEST: CPT | Mod: PBBFAC,PN | Performed by: STUDENT IN AN ORGANIZED HEALTH CARE EDUCATION/TRAINING PROGRAM

## 2024-03-26 PROCEDURE — 99213 OFFICE O/P EST LOW 20 MIN: CPT | Mod: S$PBB,,, | Performed by: STUDENT IN AN ORGANIZED HEALTH CARE EDUCATION/TRAINING PROGRAM

## 2024-03-26 PROCEDURE — 1159F MED LIST DOCD IN RCRD: CPT | Mod: CPTII,,, | Performed by: STUDENT IN AN ORGANIZED HEALTH CARE EDUCATION/TRAINING PROGRAM

## 2024-03-26 PROCEDURE — 3079F DIAST BP 80-89 MM HG: CPT | Mod: CPTII,,, | Performed by: STUDENT IN AN ORGANIZED HEALTH CARE EDUCATION/TRAINING PROGRAM

## 2024-03-26 PROCEDURE — 3066F NEPHROPATHY DOC TX: CPT | Mod: CPTII,,, | Performed by: STUDENT IN AN ORGANIZED HEALTH CARE EDUCATION/TRAINING PROGRAM

## 2024-03-26 PROCEDURE — 3075F SYST BP GE 130 - 139MM HG: CPT | Mod: CPTII,,, | Performed by: STUDENT IN AN ORGANIZED HEALTH CARE EDUCATION/TRAINING PROGRAM

## 2024-03-26 PROCEDURE — 99214 OFFICE O/P EST MOD 30 MIN: CPT | Mod: PBBFAC,PN | Performed by: STUDENT IN AN ORGANIZED HEALTH CARE EDUCATION/TRAINING PROGRAM

## 2024-03-26 RX ORDER — ALPRAZOLAM 1 MG/1
1 TABLET ORAL DAILY PRN
Qty: 30 TABLET | Refills: 2 | Status: SHIPPED | OUTPATIENT
Start: 2024-03-26 | End: 2024-05-08 | Stop reason: SDUPTHER

## 2024-03-26 NOTE — PROGRESS NOTES
"Outpatient Psychiatry Follow-Up Visit    3/26/2024    Clinical Status of Patient:  Outpatient (Ambulatory)    Chief Complaint:  Vi Ulrich is a 58 y.o. female who presents today for follow-up of anxiety and insomnia . Patient last seen for follow-up on 12/12/2023. Met with patient.      Interval History and Content of Current Session:  Interim Events/Subjective Report/Content of Current Session:   Pt reports doing "better"  overall.  Reports "the same" mood, denies currently feeling depressed, improved baseline anxiety but having occasional panic attacks (when stressed/triggered, 3x weekly).  Sleeping improved. Appetite good, weight increased slightly.  Energy low, motivation low.  Occasional irritability, denies hopelessness.  Denies SI/HI/AVH/paranoia, denies plan or desire for self harm or harm to others.  Denies SE from current regimen. Denies change in chronic somatic complaints. Taking opioid for pain control <1x daily.  Pt happy with current regimen and wants to continue.     Of note, pt tremulous and appeared cold during interview.  POCT BG checked and BG 68.  Apple juice given.      Psychiatric Review of Systems-is patient experiencing or having changes in  Integrated into HPI above.     Review of Systems   PSYCHIATRIC: Pertinant items are noted in the narrative.  CONSTITUTIONAL: No weight gain or loss.  HEENT: +occasional dry mouth, denies sore throat  MUSCULOSKELETAL:  + generalized myalgias, +arthritic pain of back, hand soreness   NEUROLOGIC: No weakness, sensory changes, seizures, confusion, memory loss, tremor or other abnormal movements.  +ha  CARDIAC: No CP, + palpitations  RESPIRATORY: + shortness of breath with exertion, denies cough.  CARDIOVASCULAR: No tachycardia or chest pain.  GASTROINTESTINAL: No nausea, vomiting, pain, constipation or diarrhea.    Past Medical, Family and Social History: The patient's past medical, family and social history have been reviewed and updated as appropriate " "within the electronic medical record - see encounter notes.    Compliance: good    Side effects: denies    Risk Parameters:  Patient reports no suicidal ideation  Patient reports no homicidal ideation  Patient reports no self-injurious behavior  Patient reports no violent behavior    Exam (detailed: at least 9 elements; comprehensive: all 15 elements)   Constitutional  Vitals:  Most recent vital signs, dated less than 90 days prior to this appointment, were reviewed.     Vitals:    03/26/24 0755   BP: 138/83   Pulse: 76   Temp: 98.6 °F (37 °C)   SpO2: 99%   Weight: 85.3 kg (188 lb 1.6 oz)        General:   Constitutional: No acute distress, appears stated age, casually dressed    Neurologic:   Motor: moves all extremities spontaneously and without difficulty, no abnormal involuntary movements observed  Gait: normal gait and station    Mental status examination:   Appearance: appears stated age, casually dressed, no acute distress  Behavior: unremarkable for situation, calm and cooperative  Mood: "anxious"  Affect: mood congruent, constricted, and anxious-appearing  Thought process: linear and goal directed  Associations: appropriate for conversation  Thought content: no plan or desire for self harm or harm to others, denies paranoia, no delusional ideation volunteered  Perceptions: denies hallucinations or other altered perceptions  Orientation: oriented to day of week, month, year, location, and situation  Language: English, fluid  Attention: able to attend to interview  Insight: good  Judgement: good    PHQ9:  Over the last two weeks how often have you been bothered by little interest or pleasure in doing things: 0  Over the last two weeks how often have you been bothered by feeling down, depressed or hopeless: 0  PHQ-2 Total Score: 0  PHQ-9 Score: 4  PHQ-9 Interpretation: Minimal or None        3/26/2024     7:54 AM 1/23/2024     7:28 AM 12/12/2023     7:23 AM   GAD7   1. Feeling nervous, anxious, or on edge? 3 0 " 0   2. Not being able to stop or control worrying? 3 2 3   3. Worrying too much about different things? 3 2 3   4. Trouble relaxing? 2 2 0   5. Being so restless that it is hard to sit still? 1 0 0   6. Becoming easily annoyed or irritable? 0 0 0   7. Feeling afraid as if something awful might happen? 0 0 0   8. If you checked off any problems, how difficult have these problems made it for you to do your work, take care of things at home, or get along with other people? 2 1 1   LIANG-7 Score 12 6 6     Assessment and Diagnosis   Status/Progress: Based on the examination today, the patient's problem(s) is/are well controlled.  New problems have not been presented today.   Co-morbidities are complicating management of the primary condition.  Number of separate conditions addressed during today's visit: 2 (anxiety improved, insomnia improved).  Are medication adjustments being made today: No.  Are referral(s) being ordered today: No.  Complexity (level) of medical decision making employed in the encounter: MODERATE.    General Impression:    ICD-10-CM ICD-9-CM   1. Type 2 diabetes mellitus with diabetic nephropathy, without long-term current use of insulin  E11.21 250.40     583.81   2. Moderate recurrent major depression  F33.1 296.32   3. Anxiety  F41.9 300.00   4. Insomnia due to other mental disorder  F51.05 300.9    F99 327.02     Intervention/Counseling/Treatment Plan   Continue lexapro 20mg daily  Continue xanax 1mg daily prn   Patient is currently on opioid pain medications and consents to treatment with benzodiazepine despite risk of life-threatening sedation  Strongly recommended psychotherapy  Recommended pt eat consistently to avoid issues with BG  Encouraged pt to follow up with her other providers  No need for PEC as pt is not an imminent danger to self or others or gravely disabled due to acute psychiatric illness  Discussed that pt should either call clinic for psychiatric crisis symptoms or present to  [facility] emergency room    Discussed with patient informed consent including diagnosis, risks and benefits of proposed treatment above vs. alternative treatments vs. no treatment, as well as serious and common side effects of these treatments, and the inherent unpredictability of individual responses to these treatments. The patient expresses understanding of the above and displays the capacity to agree with this current plan. Patient also agrees that, currently, the benefits outweigh the risks and would like to pursue treatment at this time, and had no other questions.    Instructions:  Take all medications as prescribed.    Abstain from recreational drugs and alcohol.  Present to ED or call 911 for SI/HI plan or intent, psychosis, or medical emergency.    Return to Clinic: Follow up in about 2 months (around 5/26/2024).    Total time:   The total time for services performed on the date of the encounter (including review of prior visit notes, review of notes from other providers, review of results from laboratory/imaging studies, face-to-face time with patient, and time spent on other activities directly related to patient care): 20 minutes.    Frederic Noel MD  [facility]

## 2024-04-03 DIAGNOSIS — M32.9 SYSTEMIC LUPUS ERYTHEMATOSUS ARTHRITIS: ICD-10-CM

## 2024-04-04 ENCOUNTER — TELEPHONE (OUTPATIENT)
Dept: INTERNAL MEDICINE | Facility: CLINIC | Age: 59
End: 2024-04-04
Payer: MEDICAID

## 2024-04-04 RX ORDER — HYDROCODONE BITARTRATE AND ACETAMINOPHEN 10; 325 MG/1; MG/1
1 TABLET ORAL EVERY 8 HOURS PRN
Qty: 90 TABLET | Refills: 0 | Status: SHIPPED | OUTPATIENT
Start: 2024-04-08 | End: 2024-05-07 | Stop reason: SDUPTHER

## 2024-04-12 ENCOUNTER — DOCUMENTATION ONLY (OUTPATIENT)
Dept: RHEUMATOLOGY | Facility: CLINIC | Age: 59
End: 2024-04-12
Payer: MEDICAID

## 2024-04-12 NOTE — PROGRESS NOTES
Certified letter _[id number]_ mailed to patient in regard to Rheumatology provider  from organization/ medication refills. Letter returned due to Vi Ulrich incorrect address, etc.

## 2024-04-17 ENCOUNTER — TELEPHONE (OUTPATIENT)
Dept: RHEUMATOLOGY | Facility: CLINIC | Age: 59
End: 2024-04-17
Payer: MEDICAID

## 2024-05-07 DIAGNOSIS — M32.9 SYSTEMIC LUPUS ERYTHEMATOSUS ARTHRITIS: ICD-10-CM

## 2024-05-08 ENCOUNTER — TELEPHONE (OUTPATIENT)
Dept: BEHAVIORAL HEALTH | Facility: CLINIC | Age: 59
End: 2024-05-08
Payer: MEDICAID

## 2024-05-08 DIAGNOSIS — F41.9 ANXIETY: ICD-10-CM

## 2024-05-08 DIAGNOSIS — F51.05 INSOMNIA DUE TO OTHER MENTAL DISORDER: ICD-10-CM

## 2024-05-08 DIAGNOSIS — F99 INSOMNIA DUE TO OTHER MENTAL DISORDER: ICD-10-CM

## 2024-05-08 RX ORDER — ALPRAZOLAM 1 MG/1
1 TABLET ORAL DAILY PRN
Qty: 30 TABLET | Refills: 1 | Status: SHIPPED | OUTPATIENT
Start: 2024-05-08 | End: 2024-06-05 | Stop reason: SDUPTHER

## 2024-05-08 NOTE — TELEPHONE ENCOUNTER
"Patient just called asking to speak to Dr. Noel,    She stated her anxiety was getting the best of her, she was just crying and was under too much pressure, She said her son was in detention and it was too much for her. I asked if she was alone and if she thought she needed to go to the ER.     She responded "No, I just need to speak to Dr. Noel, he put me on Xanax and I'm taking it once a day, I just think I need it more than that cause like I said I'm under too too much pressure in here and my anxiety is all over the place and I don't know what to do"    I informed her that I was forwarding her message to Dr. Noel  "

## 2024-05-08 NOTE — TELEPHONE ENCOUNTER
Patient called clinic this morning to report anxiety symptoms have worsened and anxiety is not difficult to manage.  Taking Xanax 1 mg nightly.  Says that is this medicine is effective for her at night but she wants to take it during the day as well.  Notes that she continues to take Norco for pain control.  Discussed that I am okay with increasing the quantity she is dispensed; however, discussed that Xanax does not address the underlying anxiety triggers and that she needs to work on this side of things herself.  Also discussed that Xanax and opioid pain medicines have a life-threatening interaction when taken together, discussion needs to be extremely careful about using the medicine safely.  Patient voiced understanding.  We will discuss further at her follow-up appointment.

## 2024-05-10 RX ORDER — OMEPRAZOLE 20 MG/1
20 CAPSULE, DELAYED RELEASE ORAL DAILY
Qty: 90 CAPSULE | Refills: 0 | Status: SHIPPED | OUTPATIENT
Start: 2024-05-10 | End: 2024-08-08

## 2024-05-10 RX ORDER — PREDNISONE 5 MG/1
10 TABLET ORAL DAILY
Qty: 60 TABLET | Refills: 1 | OUTPATIENT
Start: 2024-05-10

## 2024-05-10 RX ORDER — HYDROCODONE BITARTRATE AND ACETAMINOPHEN 10; 325 MG/1; MG/1
1 TABLET ORAL EVERY 8 HOURS PRN
Qty: 90 TABLET | Refills: 0 | Status: SHIPPED | OUTPATIENT
Start: 2024-05-10 | End: 2024-05-29 | Stop reason: SDUPTHER

## 2024-05-10 RX ORDER — PANTOPRAZOLE SODIUM 40 MG/1
40 TABLET, DELAYED RELEASE ORAL DAILY
Qty: 90 TABLET | Refills: 3 | Status: SHIPPED | OUTPATIENT
Start: 2024-05-10 | End: 2025-05-10

## 2024-05-10 RX ORDER — LOSARTAN POTASSIUM 25 MG/1
25 TABLET ORAL DAILY
Qty: 90 TABLET | Refills: 3 | Status: SHIPPED | OUTPATIENT
Start: 2024-05-10 | End: 2025-05-10

## 2024-05-14 ENCOUNTER — HOSPITAL ENCOUNTER (OUTPATIENT)
Dept: RADIOLOGY | Facility: HOSPITAL | Age: 59
Discharge: HOME | End: 2024-05-14
Attending: INTERNAL MEDICINE
Payer: MEDICAID

## 2024-05-14 DIAGNOSIS — I73.9 CLAUDICATION: ICD-10-CM

## 2024-05-14 LAB
LEFT CFA PSV: 84 CM/S
LEFT DORSALIS PEDIS PSV: 36 CM/S
LEFT POPLITEAL PSV: 37 CM/S
LEFT POST TIBIAL SYS PSV: 13 CM/S
LEFT PROFUNDA SYS PSV: 104 CM/S
LEFT SUPER FEMORAL DIST SYS PSV: 32 CM/S
LEFT SUPER FEMORAL MID SYS PSV: 34 CM/S
LEFT SUPER FEMORAL PROX SYS PSV: 50 CM/S
OHS CV LEFT LOWER EXTREMITY ABI (NO CALC): 0.54
OHS CV RIGHT ABI LOWER EXTREMITY (NO CALC): 0.65
RIGHT CFA PSV: 110 CM/S
RIGHT DORSALIS PEDIS PSV: 8 CM/S
RIGHT POPLITEAL PSV: 42 CM/S
RIGHT POST TIBIAL SYS PSV: 43 CM/S
RIGHT PROFUNDA SYS PSV: 46 CM/S
RIGHT SUPER FEMORAL DIST SYS PSV: 114 CM/S
RIGHT SUPER FEMORAL MID SYS PSV: 74 CM/S
RIGHT SUPER FEMORAL PROX SYS PSV: 96 CM/S

## 2024-05-14 PROCEDURE — 93925 LOWER EXTREMITY STUDY: CPT

## 2024-05-29 ENCOUNTER — OFFICE VISIT (OUTPATIENT)
Dept: INTERNAL MEDICINE | Facility: CLINIC | Age: 59
End: 2024-05-29
Payer: MEDICAID

## 2024-05-29 VITALS
SYSTOLIC BLOOD PRESSURE: 134 MMHG | DIASTOLIC BLOOD PRESSURE: 79 MMHG | OXYGEN SATURATION: 98 % | WEIGHT: 189 LBS | HEART RATE: 88 BPM | BODY MASS INDEX: 30.37 KG/M2 | TEMPERATURE: 98 F | RESPIRATION RATE: 16 BRPM | HEIGHT: 66 IN

## 2024-05-29 DIAGNOSIS — M32.9 SYSTEMIC LUPUS ERYTHEMATOSUS ARTHRITIS: ICD-10-CM

## 2024-05-29 DIAGNOSIS — Z12.31 BREAST CANCER SCREENING BY MAMMOGRAM: ICD-10-CM

## 2024-05-29 DIAGNOSIS — M24.549 CONTRACTURE OF JOINT OF FINGER, UNSPECIFIED LATERALITY: ICD-10-CM

## 2024-05-29 DIAGNOSIS — Z79.4 TYPE 2 DIABETES MELLITUS WITH HYPEROSMOLARITY WITHOUT COMA, WITH LONG-TERM CURRENT USE OF INSULIN: Primary | ICD-10-CM

## 2024-05-29 DIAGNOSIS — E11.00 TYPE 2 DIABETES MELLITUS WITH HYPEROSMOLARITY WITHOUT COMA, WITH LONG-TERM CURRENT USE OF INSULIN: Primary | ICD-10-CM

## 2024-05-29 LAB — HBA1C MFR BLD: 5.8 %

## 2024-05-29 PROCEDURE — 83036 HEMOGLOBIN GLYCOSYLATED A1C: CPT | Mod: PBBFAC | Performed by: STUDENT IN AN ORGANIZED HEALTH CARE EDUCATION/TRAINING PROGRAM

## 2024-05-29 PROCEDURE — 99213 OFFICE O/P EST LOW 20 MIN: CPT | Mod: PBBFAC | Performed by: STUDENT IN AN ORGANIZED HEALTH CARE EDUCATION/TRAINING PROGRAM

## 2024-05-29 RX ORDER — MYCOPHENOLATE MOFETIL 250 MG/1
CAPSULE ORAL
COMMUNITY
Start: 2024-04-08 | End: 2024-06-13 | Stop reason: SDUPTHER

## 2024-05-29 RX ORDER — HYDROCODONE BITARTRATE AND ACETAMINOPHEN 10; 325 MG/1; MG/1
1 TABLET ORAL EVERY 8 HOURS PRN
Qty: 90 TABLET | Refills: 0 | Status: SHIPPED | OUTPATIENT
Start: 2024-06-07 | End: 2024-07-07

## 2024-05-29 NOTE — PROGRESS NOTES
I have reviewed the notes, assessments, and management plan performed by resident, I concur with his documentation of Vi Ulrich.  Date of Service: 5/29/2024

## 2024-05-29 NOTE — PROGRESS NOTES
"[facility] Internal Medicine Clinic Visit    Chief Complaint:      Diabetes (Strained shoulder and back after her daughter fell yesterday. )     Subjective:     HPI:  Vi Ulrich is a 58 y.o. female with has a past medical history of Arthritis, Asthma, Atrial fibrillation, Chronic constipation, Diabetes mellitus, Encounter for blood transfusion, GERD (gastroesophageal reflux disease), Gout, Hypertension, Lupus, Renal disorder, and SLE (systemic lupus erythematosus). She presents to clinic today for follow-up of chronic medical conditions.     Today, only complaint is left 3rd digit contracture for which I am sending her to procedure Clinic for injections.  Also complaining that she has got approved help for her daughter however they are currently trying to find someone to come to the house to a sister with this.  She denies any chest pain, changes in urination however does endorse a cough.      Review of Systems  A comprehensive 12 point review of systems was completed.  Please see above for pertinent positives and negatives.     Objective:   Last 24 Hour Vital Signs:  Vitals  BP: 134/79  Temp: 98.1 °F (36.7 °C)  Pulse: 88  Resp: 16  SpO2: 98 %  Height: 5' 6" (167.6 cm)  Weight: 85.7 kg (189 lb)    Physical Examination:  General: Awake, alert, & oriented to person, place & time. No acute distress  Psychiatric: Mood and affect normal  HEENT: Normocephalic, atraumatic. Face symmetric.  Cardiovascular: Regular rate & rhythm  Pulmonary: Bilateral symmetric chest rise, Non-labored  Abdominal:  nondistended  Extremities: No clubbing or cyanosis.  Skin:  Exposed skin is warm & dry.  Neuro:   Patient moves all extremities equally. Sensation intact bilateraly.       Assessment & Plan:     T2DM  -continue 2 units with lunch per endocrinology  -instructed to keep sugar log    Depression  Anxiety  -patient sees Psychiatry, currently on Xanax  -continue lexapro 20 daily    SLE  Hx of Lupus nephritis  Arthritis  -please continue to " follow Nephrology as well as Rheumatology  -Continue CellCept  -+ anticardioliipin antibodies, pt can't make wafrin apts, continue eliquis  -continue to follow renal recommendations and keep appointments  -continue to follow with rheumatology    Vitamin-D deficiency  -Patient was started on 90100 units for 12 weeks Q 7 days by Nephrology  -Continue to monitor    Hypertension  -BP today: 134/79  -Continue home meds    Health Maintenance  Colon cancer screening:  Colonoscopy 2018 per the patient and per chart stating no findings  Lung Cancer screen:  N/A  Mammogram:  BI-RADS 2 bilaterally 4/4/23 recommended annual exam next will be due April 2024, ordered today  PapSmear:  Upcoming gynecology appointment for 9/20/24  Hep C:  Ordered today  Dexa:  N/A  A1C:  5.4  Vaccines      Pneumonia:  Patient got a [facility]      Annual Flu:  Received at [facility]      Zoster:  She will look if this was received at [facility]      Tdap:  She will check if this was received at [facility]      Follow-ups  -Follow-up medicine clinic in 3 months    Octaviano Barrios MD  Internal Medicine - PGY-3

## 2024-06-04 ENCOUNTER — TELEPHONE (OUTPATIENT)
Dept: BEHAVIORAL HEALTH | Facility: CLINIC | Age: 59
End: 2024-06-04
Payer: MEDICAID

## 2024-06-04 DIAGNOSIS — F41.9 ANXIETY: ICD-10-CM

## 2024-06-04 DIAGNOSIS — F99 INSOMNIA DUE TO OTHER MENTAL DISORDER: ICD-10-CM

## 2024-06-04 DIAGNOSIS — F51.05 INSOMNIA DUE TO OTHER MENTAL DISORDER: ICD-10-CM

## 2024-06-04 NOTE — TELEPHONE ENCOUNTER
"Pt was called to r/s appt due to provider being out. Pt voiced frustration being moved. Demanded sooner appt than was available. Discussed virtual appt but pt declined. Offered to put pt on cx list. Pt then requested dose increase of xanax. Discussed that provider did not agree to this per last telephone encounter. Pt became argumentative and demanded that she speak with provider immediately. Explained to pt provider is seeing pts and pt again voiced frustration. Explained to pt that I will inform provider of call and request. Pt then stated "ok" and ended call.   "

## 2024-06-05 RX ORDER — ALPRAZOLAM 1 MG/1
1 TABLET ORAL 2 TIMES DAILY PRN
Qty: 40 TABLET | Refills: 1 | Status: SHIPPED | OUTPATIENT
Start: 2024-06-05

## 2024-06-05 NOTE — TELEPHONE ENCOUNTER
Patient's call returned.  Discussed patient's ongoing symptoms, particularly anxiety.  Patient reported continued anxiety that is difficult to manage.  Again requested higher quantity Xanax to allow her to use the Xanax more frequently.  Patient initially requested to use the medication twice daily later said she would only use the medicine for panic attacks.  Patient voiced that she has limited nonpharmacologic strategies.  Discussed risk of overdose with taking opioids, patient minimize this risk.  Discussed with patient that she needs to attempts to utilize nonpharmacologic treatments strategies, reiterated to patient risk of life-threatening interaction with opioid medications.  Discussed that I was willing to prescribe her 10 additional tablets per month but I would not be willing to prescribe any more than this if patient were not in psychotherapy to develop alternative coping skills.  Patient voiced understanding of this.  Provided phone numbers for psychotherapy providers in [facility], patient voiced she would call to set up her appointment.  All questions answered.  PDMP reviewed and demonstrated no abnormal filling patterns.

## 2024-06-07 ENCOUNTER — TELEPHONE (OUTPATIENT)
Dept: BEHAVIORAL HEALTH | Facility: CLINIC | Age: 59
End: 2024-06-07
Payer: MEDICAID

## 2024-06-07 DIAGNOSIS — F99 INSOMNIA DUE TO OTHER MENTAL DISORDER: ICD-10-CM

## 2024-06-07 DIAGNOSIS — F41.9 ANXIETY: ICD-10-CM

## 2024-06-07 DIAGNOSIS — F51.05 INSOMNIA DUE TO OTHER MENTAL DISORDER: ICD-10-CM

## 2024-06-07 DIAGNOSIS — F33.1 MODERATE RECURRENT MAJOR DEPRESSION: ICD-10-CM

## 2024-06-07 DIAGNOSIS — F41.1 GAD (GENERALIZED ANXIETY DISORDER): Primary | ICD-10-CM

## 2024-06-07 NOTE — TELEPHONE ENCOUNTER
Patient called to communicate her decision to pursue [facility].  Requested referral from provider.  Referral packet compiled and will be faxed to [facility] as requested.

## 2024-06-13 ENCOUNTER — DOCUMENTATION ONLY (OUTPATIENT)
Dept: RHEUMATOLOGY | Facility: CLINIC | Age: 59
End: 2024-06-13
Payer: MEDICAID

## 2024-06-13 ENCOUNTER — OFFICE VISIT (OUTPATIENT)
Dept: RHEUMATOLOGY | Facility: CLINIC | Age: 59
End: 2024-06-13
Payer: MEDICAID

## 2024-06-13 ENCOUNTER — LAB VISIT (OUTPATIENT)
Dept: LAB | Facility: HOSPITAL | Age: 59
End: 2024-06-13
Attending: INTERNAL MEDICINE
Payer: MEDICAID

## 2024-06-13 VITALS
RESPIRATION RATE: 18 BRPM | OXYGEN SATURATION: 100 % | DIASTOLIC BLOOD PRESSURE: 83 MMHG | WEIGHT: 162.94 LBS | SYSTOLIC BLOOD PRESSURE: 130 MMHG | TEMPERATURE: 98 F | BODY MASS INDEX: 26.19 KG/M2 | HEART RATE: 76 BPM | HEIGHT: 66 IN

## 2024-06-13 DIAGNOSIS — I48.0 PAROXYSMAL ATRIAL FIBRILLATION: ICD-10-CM

## 2024-06-13 DIAGNOSIS — I10 ESSENTIAL HYPERTENSION: ICD-10-CM

## 2024-06-13 DIAGNOSIS — N18.32 STAGE 3B CHRONIC KIDNEY DISEASE: ICD-10-CM

## 2024-06-13 DIAGNOSIS — M32.14 SYSTEMIC LUPUS ERYTHEMATOSUS WITH GLOMERULAR DISEASE, UNSPECIFIED SLE TYPE: Primary | ICD-10-CM

## 2024-06-13 DIAGNOSIS — M60.80: ICD-10-CM

## 2024-06-13 DIAGNOSIS — J84.9 ILD (INTERSTITIAL LUNG DISEASE): ICD-10-CM

## 2024-06-13 DIAGNOSIS — Z96.651 S/P TKR (TOTAL KNEE REPLACEMENT), RIGHT: ICD-10-CM

## 2024-06-13 DIAGNOSIS — N18.32 CKD STAGE G3B/A3, GFR 30-44 AND ALBUMIN CREATININE RATIO >300 MG/G: ICD-10-CM

## 2024-06-13 DIAGNOSIS — D84.821 DRUG-INDUCED IMMUNODEFICIENCY: ICD-10-CM

## 2024-06-13 DIAGNOSIS — E11.21 TYPE 2 DIABETES MELLITUS WITH DIABETIC NEPHROPATHY, WITH LONG-TERM CURRENT USE OF INSULIN: ICD-10-CM

## 2024-06-13 DIAGNOSIS — Z79.4 TYPE 2 DIABETES MELLITUS WITH DIABETIC NEPHROPATHY, WITH LONG-TERM CURRENT USE OF INSULIN: ICD-10-CM

## 2024-06-13 DIAGNOSIS — M54.50 CHRONIC MIDLINE LOW BACK PAIN, UNSPECIFIED WHETHER SCIATICA PRESENT: ICD-10-CM

## 2024-06-13 DIAGNOSIS — M32.14 SYSTEMIC LUPUS ERYTHEMATOSUS WITH GLOMERULAR DISEASE, UNSPECIFIED SLE TYPE: ICD-10-CM

## 2024-06-13 DIAGNOSIS — M32.14 LUPUS NEPHRITIS, ISN/RPS CLASS III: ICD-10-CM

## 2024-06-13 DIAGNOSIS — M79.7 FIBROMYALGIA: ICD-10-CM

## 2024-06-13 DIAGNOSIS — Z79.899 DRUG-INDUCED IMMUNODEFICIENCY: ICD-10-CM

## 2024-06-13 DIAGNOSIS — R52 GENERALIZED PAIN: ICD-10-CM

## 2024-06-13 DIAGNOSIS — G89.29 CHRONIC MIDLINE LOW BACK PAIN, UNSPECIFIED WHETHER SCIATICA PRESENT: ICD-10-CM

## 2024-06-13 LAB
ALBUMIN SERPL-MCNC: 3.4 G/DL (ref 3.5–5)
ALBUMIN/GLOB SERPL: 0.7 RATIO (ref 1.1–2)
ALP SERPL-CCNC: 133 UNIT/L (ref 40–150)
ALT SERPL-CCNC: <5 UNIT/L (ref 0–55)
ANION GAP SERPL CALC-SCNC: 9 MEQ/L
AST SERPL-CCNC: 11 UNIT/L (ref 5–34)
BASOPHILS # BLD AUTO: 0.04 X10(3)/MCL
BASOPHILS NFR BLD AUTO: 0.6 %
BILIRUB SERPL-MCNC: 0.3 MG/DL
BUN SERPL-MCNC: 21 MG/DL (ref 9.8–20.1)
C3 SERPL-MCNC: 101 MG/DL (ref 80–173)
C4 SERPL-MCNC: 17 MG/DL (ref 13–46)
CALCIUM SERPL-MCNC: 9.5 MG/DL (ref 8.4–10.2)
CHLORIDE SERPL-SCNC: 107 MMOL/L (ref 98–107)
CK SERPL-CCNC: 135 U/L (ref 29–168)
CO2 SERPL-SCNC: 25 MMOL/L (ref 22–29)
CREAT SERPL-MCNC: 1.61 MG/DL (ref 0.55–1.02)
CREAT/UREA NIT SERPL: 13
CRP SERPL-MCNC: 56 MG/L
EOSINOPHIL # BLD AUTO: 0.13 X10(3)/MCL (ref 0–0.9)
EOSINOPHIL NFR BLD AUTO: 2 %
ERYTHROCYTE [DISTWIDTH] IN BLOOD BY AUTOMATED COUNT: 14.3 % (ref 11.5–17)
ERYTHROCYTE [SEDIMENTATION RATE] IN BLOOD: 45 MM/HR (ref 0–20)
GFR SERPLBLD CREATININE-BSD FMLA CKD-EPI: 37 ML/MIN/1.73/M2
GLOBULIN SER-MCNC: 5.2 GM/DL (ref 2.4–3.5)
GLUCOSE SERPL-MCNC: 84 MG/DL (ref 74–100)
HCT VFR BLD AUTO: 35 % (ref 37–47)
HGB BLD-MCNC: 11.5 G/DL (ref 12–16)
IMM GRANULOCYTES # BLD AUTO: 0.02 X10(3)/MCL (ref 0–0.04)
IMM GRANULOCYTES NFR BLD AUTO: 0.3 %
LYMPHOCYTES # BLD AUTO: 1.41 X10(3)/MCL (ref 0.6–4.6)
LYMPHOCYTES NFR BLD AUTO: 21.4 %
MCH RBC QN AUTO: 29.8 PG (ref 27–31)
MCHC RBC AUTO-ENTMCNC: 32.9 G/DL (ref 33–36)
MCV RBC AUTO: 90.7 FL (ref 80–94)
MONOCYTES # BLD AUTO: 0.52 X10(3)/MCL (ref 0.1–1.3)
MONOCYTES NFR BLD AUTO: 7.9 %
NEUTROPHILS # BLD AUTO: 4.48 X10(3)/MCL (ref 2.1–9.2)
NEUTROPHILS NFR BLD AUTO: 67.8 %
NRBC BLD AUTO-RTO: 0 %
PLATELET # BLD AUTO: 515 X10(3)/MCL (ref 130–400)
PMV BLD AUTO: 9.6 FL (ref 7.4–10.4)
POTASSIUM SERPL-SCNC: 3.7 MMOL/L (ref 3.5–5.1)
PROT SERPL-MCNC: 8.6 GM/DL (ref 6.4–8.3)
RBC # BLD AUTO: 3.86 X10(6)/MCL (ref 4.2–5.4)
SODIUM SERPL-SCNC: 141 MMOL/L (ref 136–145)
WBC # SPEC AUTO: 6.6 X10(3)/MCL (ref 4.5–11.5)

## 2024-06-13 PROCEDURE — 85652 RBC SED RATE AUTOMATED: CPT

## 2024-06-13 PROCEDURE — 86225 DNA ANTIBODY NATIVE: CPT

## 2024-06-13 PROCEDURE — 85025 COMPLETE CBC W/AUTO DIFF WBC: CPT

## 2024-06-13 PROCEDURE — 3079F DIAST BP 80-89 MM HG: CPT | Mod: CPTII,,, | Performed by: INTERNAL MEDICINE

## 2024-06-13 PROCEDURE — 80053 COMPREHEN METABOLIC PANEL: CPT

## 2024-06-13 PROCEDURE — 99213 OFFICE O/P EST LOW 20 MIN: CPT | Mod: PBBFAC | Performed by: INTERNAL MEDICINE

## 2024-06-13 PROCEDURE — 3066F NEPHROPATHY DOC TX: CPT | Mod: CPTII,,, | Performed by: INTERNAL MEDICINE

## 2024-06-13 PROCEDURE — 86160 COMPLEMENT ANTIGEN: CPT

## 2024-06-13 PROCEDURE — G2211 COMPLEX E/M VISIT ADD ON: HCPCS | Mod: S$PBB,,, | Performed by: INTERNAL MEDICINE

## 2024-06-13 PROCEDURE — 86140 C-REACTIVE PROTEIN: CPT

## 2024-06-13 PROCEDURE — 82550 ASSAY OF CK (CPK): CPT

## 2024-06-13 PROCEDURE — 3044F HG A1C LEVEL LT 7.0%: CPT | Mod: CPTII,,, | Performed by: INTERNAL MEDICINE

## 2024-06-13 PROCEDURE — 4010F ACE/ARB THERAPY RXD/TAKEN: CPT | Mod: CPTII,,, | Performed by: INTERNAL MEDICINE

## 2024-06-13 PROCEDURE — 3075F SYST BP GE 130 - 139MM HG: CPT | Mod: CPTII,,, | Performed by: INTERNAL MEDICINE

## 2024-06-13 PROCEDURE — 3008F BODY MASS INDEX DOCD: CPT | Mod: CPTII,,, | Performed by: INTERNAL MEDICINE

## 2024-06-13 PROCEDURE — 99215 OFFICE O/P EST HI 40 MIN: CPT | Mod: S$PBB,,, | Performed by: INTERNAL MEDICINE

## 2024-06-13 PROCEDURE — 36415 COLL VENOUS BLD VENIPUNCTURE: CPT

## 2024-06-13 RX ORDER — MYCOPHENOLATE MOFETIL 500 MG/1
1500 TABLET ORAL 2 TIMES DAILY
Qty: 180 TABLET | Refills: 3 | Status: SHIPPED | OUTPATIENT
Start: 2024-06-13

## 2024-06-13 RX ORDER — PREDNISONE 5 MG/1
7.5 TABLET ORAL DAILY
Qty: 60 TABLET | Refills: 3 | Status: SHIPPED | OUTPATIENT
Start: 2024-06-13

## 2024-06-13 NOTE — PROGRESS NOTES
Please let her know kidney function is slightly lower than before, asked her to stay hydrated and avoid NSAIDs.  Did she gave urine sample?  I need to check how much protein is there in urine to see if lupus is active or not.

## 2024-06-13 NOTE — PROGRESS NOTES
Patient ID: [id number]     Chief Complaint: Systemic lupus erythematosus with glomerular disease, unspe (Pt states she is having flares and joint pain all over her body.  She also says she has been having constant flares )      HPI:     Vi Ulrich is a 58 y.o. female with lupus and myositis overlap, ILD, lupus nephritis class III/V, class Iib diabetic glumerulopathy, here today for follow up.    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 She used to live-in \[facility]", she had seen Dr Reji Gutierrez in the past.  For lupus nephritis she was treated with Plaquenil, prednisone.  Benlysta infusions were recommended but patient never took the infusions.  She had moved to [place] in beginning of 2021.  She was admitted at [facility] in June 2022 for acute pyelonephritis. During that admission she had renal biopsy which showed lupus nephritis.  Later she had seen Dr. Baca and has received 2 doses of rituximab.  She had questionable allergic reaction to CellCept with hives needing to go to ER, but tolerating it well now.  She was also treated with prednisone, Plaquenil and Imuran 50 mg daily.  Complained of abdominal upset with Imuran, stop taking it. Poor historian, medication noncompliance and noncompliance to previous rheumatological appointments per records.     She is complaining of generalized pain, intermittent flares in all her joints. Chronic pain in lower back. Morning stiffness for an hour and denies red, warm and swollen joints.   She is currently taking CellCept 1500 mg b.i.d., Lupkynis 23.7 mg BID(-started in 8/2023) and prednisone 10 mg daily.   Plaquenil discontinued in June 2023- reason not clear.   She had received rituximab 1000 mg IV 2 infusions in April 2023 and  1000 mg IV 2  infusions in 2023.  Admits intermittent flare with pain in all the bones and muscles.  Admits shortness of breath with activities.  Admits pain and swelling in all her joints.  Denies chest pain or cough.  Fingers turn purple sometimes, never white.  Never had ulcers on fingertips or toes.  History of oral ulcers, none lately.     Denies fevers, rashes, nasal ulcers, history of DVT or PE, history of stroke or seizures, history of MI, history of malignancies, Raynaud's phenomenon, history of inflammatory eye diseases, history of inflammatory bowel disease.  Smoking: Quit smoking in beginning of .  Family history of autoimmune disease: None known  Pregnancies:  7 Miscarriages: 0  Smoking: quit smoking in .      Social History     Tobacco Use   Smoking Status Former    Current packs/day: 0.00    Types: Cigarettes    Start date: 1980    Quit date: 2022    Years since quittin.0   Smokeless Tobacco Never          -------------------------------------    Arthritis    knees    Asthma    Atrial fibrillation    Chronic constipation    Diabetes mellitus    Encounter for blood transfusion    GERD (gastroesophageal reflux disease)    Gout    Hypertension    Lupus    SLE    Renal disorder    SLE (systemic lupus erythematosus)        Past Surgical History:   Procedure Laterality Date    APPENDECTOMY      CHOLECYSTECTOMY      COLONOSCOPY N/A 2018    Procedure: COLONOSCOPY;  Surgeon: Minerva Porras MD;  Location: [facility];  Service: Endoscopy;  Laterality: N/A;    ESOPHAGOGASTRODUODENOSCOPY N/A 2018    Procedure: ESOPHAGOGASTRODUODENOSCOPY (EGD);  Surgeon: Minerva Porras MD;  Location: [facility];  Service: Endoscopy;  Laterality: N/A;    HYSTERECTOMY      JOINT REPLACEMENT Right 2015    Knee    LYMPH NODE BIOPSY Left     MASTECTOMY      Left arm- NO BP    PARTIAL HYSTERECTOMY         Review of patient's allergies indicates:   Allergen Reactions    Ketorolac (pf)  Swelling    Penicillins Hives    Percocet [oxycodone-acetaminophen] Hives and Itching    Tramadol Hives       Outpatient Medications Marked as Taking for the 6/13/24 encounter (Office Visit) with Ye Last MD   Medication Sig Dispense Refill    albuterol sulfate (PROAIR RESPICLICK) 90 mcg/actuation inhaler Inhale 1 puff into the lungs every 4 (four) hours as needed for Wheezing. Rescue      ALPRAZolam (XANAX) 1 MG tablet Take 1 tablet (1 mg total) by mouth 2 (two) times daily as needed for Anxiety. 40 tablet 1    apixaban (ELIQUIS) 5 mg Tab Take 1 tablet (5 mg total) by mouth 2 (two) times daily. 180 tablet 3    atorvastatin (LIPITOR) 40 MG tablet Take 1 tablet (40 mg total) by mouth once daily. 90 tablet 3    benzonatate (TESSALON) 100 MG capsule Take 1 capsule (100 mg total) by mouth 3 (three) times daily as needed for Cough. 15 capsule 0    benzonatate (TESSALON) 200 MG capsule Take 200 mg by mouth.      blood sugar diagnostic (ONETOUCH VERIO) Strp 1 each by Misc.(Non-Drug; Combo Route) route 4 (four) times daily. 50 each 11    blood sugar diagnostic Strp 1 each by Misc.(Non-Drug; Combo Route) route 4 (four) times daily. 50 each 11    blood sugar diagnostic Strp 1 strip by Misc.(Non-Drug; Combo Route) route 4 (four) times daily with meals and nightly. 200 each 6    blood-glucose meter kit Use as instructed 1 each 0    blood-glucose sensor (DEXCOM G7 SENSOR) Ana 1 each by Misc.(Non-Drug; Combo Route) route every 14 (fourteen) days. 5 each 6    eletriptan (RELPAX) 40 MG tablet Take 1 tablet (40 mg total) by mouth as needed (take one at the beginning of migraine, may repeat in 2 h. max 2 in 24h). may repeat in 2 hours if necessary 9 tablet 5    furosemide (LASIX) 20 MG tablet Take 1 tablet (20 mg total) by mouth once daily. 90 tablet 3    HYDROcodone-acetaminophen (NORCO)  mg per tablet Take 1 tablet by mouth every 8 (eight) hours as needed for Pain. 90 tablet 0    insulin aspart U-100 (NOVOLOG) 100  unit/mL (3 mL) InPn pen Inject 2 Units into the skin as needed.      lancets 28 gauge Misc 100 lancets by Misc.(Non-Drug; Combo Route) route 4 (four) times daily with meals and nightly. 100 each 8    LIDOcaine (LIDODERM) 5 % Place 1 patch onto the skin once daily. Remove & Discard patch within 12 hours or as directed by MD 30 patch 0    losartan (COZAAR) 25 MG tablet Take 1 tablet (25 mg total) by mouth once daily. 90 tablet 3    omeprazole (PRILOSEC) 20 MG capsule Take 1 capsule (20 mg total) by mouth once daily. 90 capsule 0    ondansetron (ZOFRAN) 4 MG tablet Take 1 tablet (4 mg total) by mouth every 6 (six) hours as needed for Nausea. 12 tablet 0    pantoprazole (PROTONIX) 40 MG tablet Take 1 tablet (40 mg total) by mouth once daily. 90 tablet 3    voclosporin (LUPKYNIS) 7.9 mg Cap Take 23.7 mg by mouth once daily. 30 capsule 11    walker Misc Please dispense 1 Rollator 1 each 0    [DISCONTINUED] predniSONE (DELTASONE) 5 MG tablet Take 2 tablets (10 mg total) by mouth once daily. 60 tablet 1       Social History     Socioeconomic History    Marital status: Single    Years of education: 10th   Tobacco Use    Smoking status: Former     Current packs/day: 0.00     Types: Cigarettes     Start date: 1980     Quit date: 2022     Years since quittin.0    Smokeless tobacco: Never   Substance and Sexual Activity    Alcohol use: No    Drug use: No    Sexual activity: Not Currently     Birth control/protection: See Surgical Hx     Social Determinants of Health     Financial Resource Strain: Low Risk  (2023)    Overall Financial Resource Strain (CARDIA)     Difficulty of Paying Living Expenses: Not hard at all   Food Insecurity: No Food Insecurity (2023)    Hunger Vital Sign     Worried About Running Out of Food in the Last Year: Never true     Ran Out of Food in the Last Year: Never true   Transportation Needs: No Transportation Needs (2023)    PRAPARE - Transportation     Lack of  Transportation (Medical): No     Lack of Transportation (Non-Medical): No   Physical Activity: Inactive (1/12/2023)    Exercise Vital Sign     Days of Exercise per Week: 0 days     Minutes of Exercise per Session: 0 min   Stress: No Stress Concern Present (1/12/2023)    [employer] [place] - Occupational Stress Questionnaire     Feeling of Stress : Not at all   Housing Stability: Low Risk  (1/12/2023)    Housing Stability Vital Sign     Unable to Pay for Housing in the Last Year: No     Number of Places Lived in the Last Year: 1     Unstable Housing in the Last Year: No        Family History   Problem Relation Name Age of Onset    Heart block Mother Mother     Arthritis Mother Mother     Asthma Mother Mother     Diabetes Mother Mother     Heart disease Father Gilfermin hampton     Diabetes Sister      Heart disease Sister      Kidney disease Sister          Immunization History   Administered Date(s) Administered    COVID-19 MRNA, LN-S PF (MODERNA HALF 0.25 ML DOSE) 06/30/2022    COVID-19, MRNA, LN-S, PF (MODERNA FULL 0.5 ML DOSE) 08/20/2021, 10/12/2021    COVID-19, mRNA, LNP-S, PF, natalee-sucrose, 30 mcg/0.3 mL (Pfizer 2023 Ages 12+) 01/12/2024    Influenza - Quadrivalent 01/30/2019    Influenza - Quadrivalent - MDCK - PF 12/24/2020    Influenza - Quadrivalent - PF *Preferred* (6 months and older) 01/30/2019, 02/14/2020, 03/10/2022, 01/12/2024    Pneumococcal Conjugate - 20 Valent 06/30/2022    Td - PF (ADULT) 11/22/2016    Tdap 03/10/2022, 01/12/2024    Vaccinia, smallpox monkeypox vaccine live, PF 08/31/2022, 09/29/2022    Zoster Recombinant 09/06/2018, 11/25/2018       Patient Care Team:  Octaviano Barrios MD as PCP - General (Internal Medicine)  Rachel Broderick FNP as Nurse Practitioner (Nephrology)  Katina Wells ANP as Nurse Practitioner (Gynecology)     Subjective:     Constitutional:  Denies chills. Denies fever. Denies night sweats. Denies weight loss.   Ophthalmology: Denies blurred  "vision. Denies dry eyes. Denies eye pain. Denies Itching and redness.   ENT: Denies oral ulcers. Denies epistaxis. Denies dry mouth. Denies swollen glands.   Endocrine: Denies thyroid Problems. Reports diabetes.  Respiratory: Denies cough. Denies shortness of breath. Denies shortness of breath with exertion. Denies hemoptysis.   Cardiovascular: Denies chest pain at rest. Denies chest pain with exertion. Denies palpitations.    Gastrointestinal: Denies abdominal pain. Denies diarrhea. Denies nausea. Denies vomiting. Denies hematemesis or hematochezia. Denies heartburn.  Genitourinary: Reports urinary frequency. Reports hematuria. Denies dysuria.  Musculoskeletal: See HPI for details  Integumentary: Denies rash. Denies photosensitivity.   Peripheral Vascular: Denies Ulcers of hands and/or feet. Denies Cold extremities.   Neurologic: Denies dizziness. Denies loss of strength. Denies numbness or tingling. Reports migraine headaches.  Psychiatric: Denies depression. Denies anxiety. Denies suicidal/homicidal ideations.      Objective:     /83 (BP Location: Left arm, Patient Position: Sitting, BP Method: Large (Automatic))   Pulse 76   Temp 97.7 °F (36.5 °C) (Oral)   Resp 18   Ht 5' 6" (1.676 m)   Wt 73.9 kg (162 lb 14.7 oz)   LMP  (LMP Unknown)   SpO2 100%   BMI 26.30 kg/m²     Physical Exam    General Appearance: alert, pleasant, in no acute distress.  Skin: Skin color, texture, turgor normal. No rashes or lesions.  Eyes:  extraocular movement intact (EOMI), pupils equal, round, reactive to light and accommodation, conjunctiva clear.  ENT: No oral or nasal ulcers.  Neck:  Neck supple. No adenopathy.   Lungs: CTA throughout without crackles, rhonchi, or wheezes.   Heart: RRR w/o murmurs.  No edema.   Unable to palpate DP/TP pulse.  Abdomen: Soft, non-tender, no masses, rebound or guarding.  Neuro: Alert, oriented, CN II-XII GI, sensory and motor innervation intact.  Musculoskeletal: Diffuse generalized back " tenderness. No upper  extremity swelling.  Mild left shoulder pain with range of motion.  No lower extremity swelling or tenderness. Right knee surgical scar.  Psych: Alert, oriented, normal eye contact.    Labs:     08/2021:  Positive MARTA, 1:1280.  High titer dsDNA.  Myositis panel showed high titer U1 RNP, 156, moderate titer MDA 5 antibody.  Other antibodies negative in myositis panel.    5/2022: Negative SSA, SSB, centromere.  Positive Mello, RNP, dsDNA.  low complements.  3/31/23:  MDA 5 antibody positive, 47.  U1 RNP high titer 153.   10/10/23:  Factor 5 Leiden assay normal.  Cardiolipin IgG elevated 122, high titer.  Cardiolipin IgA low titer 18, IgM negative.  Beta 2 glycoprotein negative.  DRVVT normal.  Protein C activity normal.  Antithrombin 3 activity normal.  10/11/23: 24 hour urine protein 2119     01/30/2024; 1+ protein and trace blood in urine.  ESR 51.  CRP 24.  CPK normal.  CBC okay.  Platelet count 452.  Creatinine 1.5, GFR 40. C3, C4 normal.  Wait for upc and call her.  ESR 51.  Slightly high levels of immunoglobulins, IgG 1881, IgA 545.  IgM low 23.  CRP 24.  Aldolase normal.  Upc 400 milligram/gram.  Cardiolipin negative. Beta 2 glycoprotein negative. LAC negative. DsDNA 661, high titer. Sent TE. ? No need for coumadin and DOAC per primary team.       ECG 01/30/2024 showed possible ectopic atrial rhythm replaced sinus rhythm.  Unusual P axis.    Echo: 10/2023:  EF 60%.  Normal systolic function of left ventricle, mildly increased wall thickness.  Normal right ventricle systolic function.  PASP 27 mmHg.    1/30/24: Abdominal x-ray showed constipation with nonspecific bowel gas pattern.     3/2023:  CT chest showed persistent ground-glass interstitial opacities in lung bases bilaterally as well as right middle lobe, findings unchanged compared to prior exam.  10/09/2023:  CT angio chest did not show PE, stable ground-glass patchy opacities in both lower lobes and to a lesser extent in lingula  and right middle lobe.  Slight interval decrease in the axillary lymphadenopathy.     PFTs:  02/03/2023 showed FVC 57%, FEV1 57%, ratio 79, unable to perform lung volumes and DLCO.  08/01/2017:  FVC 83%, FEV1 87%, ratio 104, total lung capacity 75%, residual volume 77%, DLCO 59%, DLCO 71%.     Renal biopsy 06/07/2022 showed focal lupus nephritis, class 3, class 5 membranous lupus nephritis.  Features of diabetic glomerulopathy, class 2B.  Moderate plasma cell Rich interstitial inflammation.  Activity score 4/24, chronicity score 5/12.  Minority less than 10% of plasma cell stain with IgG 4 was positive which is not diagnostic of IgG4 induced interstitial nephritis.  Moderate interstitial fibrosis and moderate tubular atrophy present.     Assessment:       ICD-10-CM ICD-9-CM   1. Systemic lupus erythematosus with glomerular disease, unspecified SLE type  M32.14 710.0     583.81   2. Adult onset overlap myositis  M60.80 729.1   3. ILD (interstitial lung disease)  J84.9 515   4. Lupus nephritis, ISN/RPS class III  M32.14 710.0     583.81   5. Fibromyalgia  M79.7 729.1   6. Drug-induced immunodeficiency  D84.821 279.3    Z79.899 E947.9   7. Type 2 diabetes mellitus with diabetic nephropathy, with long-term current use of insulin  E11.21 250.40    Z79.4 583.81     V58.67   8. S/P TKR (total knee replacement), right  Z96.651 V43.65   9. Essential hypertension  I10 401.9   10. Chronic midline low back pain, unspecified whether sciatica present  M54.50 724.2    G89.29 338.29   11. Generalized pain  R52 780.96   12. Paroxysmal atrial fibrillation  I48.0 427.31   13. Stage 3b chronic kidney disease  N18.32 585.3          Plan:        1. Lupus/lupus nephritis: Positive MARTA, 1:1280.  High titer dsDNA. Positive burnett and RNP. Renal biopsy in June 2022 showed class 3, class 5 nephritis with diabetic glomerulopathy features and few plasma cells stain positive for IgG 4.  She was treated with prednisone, Plaquenil, Imuran- GI  issues and rituximab in June 21, 2022 and July 5, 2022 again 1000 mg IV, 2 infusions in April 2023, 1000 mg IV, 2 infusions on 11/1/23 and 11/15/23.   - Hx of cytoxan use in the past, documented in chart, unclear of dose and dates. She has anemia, leukopenia/intermittent neutropenia, interstitial lung disease, chronic proteinuria.  Complements were low in the past.  High titer MARTA, dsDNA, Mello.    - She has overlap syndrome with myositis causing interstitial lung disease. MDA 5 antibody positive, 47.  CT chest on 03/09/2023 showed ground-glass opacities in lung bases bilaterally as well as in right middle lobe.  Similar changes compared to May 2022.  No lymphadenopathy, axillary lymphadenopathy there was seen on May 2022 CT chest but no mention of axillary lymphadenopathy in current CT chest.  CT chest done on 10/9/23 showed stable ground-glass patchy opacities.   - history of questionable allergic reaction to CellCept but currently tolerating it well.  Continue with CellCept 1500 mg b.i.d..  She is also taking Lupkynis 23.7 mg BID, continue at the current dose.  - if ILD and proteinuria continues to be stable we will continue with same dose of CellCept and Lupkynis, if there is any worsening will consider restarting rituximab.  - labs today.  - decrease prednisone dose to 7.5 mg daily.  She has been on prednisone for more than 15 years continuously.  - repeat CT chest and PFT's.      2. Questionable intermittent atrial fibrillation:  Sent referral to Cardiology, she had seen them, she is being evaluated for PVD and stress test was ordered.  Recent EKG did not show atrial fibrillation. She is taking Eliquis.     3. Questionable DVT of right lower extremity in right popliteal vein in October 2023, hypercoagulable workup showed positive cardiolipin.  Repeat APL labs today.  APL labs negative in 2020. Patient currently taking Eliquis.   Cardiolipin and Beta 2 glycoprotein negative,  LAC negative in 1/2024. AC per  PCP.     4. Left flank pain:  KUB was ok. Flank pain is better.      5. CKD: Advised to stay hydrated and not take NSAID's. Continue to monitor GFR. Continue follow up with nephrology.     6. Fibromyalgia: Recommended good sleep hygiene, stress control, stretching and exercise. Generalized pain due to fibro. Discussed symptomatic management.     7. HTN, DM 2: follow up with PCP.     8. Drug-induced immunodeficiency: Persons with rheumatoid arthritis, lupus, psoriatic arthritis and other autoimmune diseases are at increased risk of cardiovascular disease including heart attack and stroke. We recommend that all patients with these conditions have annual health maintenance exams including lipid measurements, blood pressure measurements, and smoking cessation counseling when applicable at their primary care provider's office.   -Advised to stay up-to-date on age appropriate vaccinations and malignancy screening, including yearly skin exams.      9. Chronic midline low back pain, unspecified whether sciatica present:  recommended heating pads, stretching, physical therapy    Follow up in about 3 months (around 9/13/2024). In addition to their scheduled follow up, the patient has also been instructed to follow up on as needed basis.        Total time spent with patient and documentation is 40 minutes. All questions were answered to patient's satisfaction and patient verbalized understanding.

## 2024-06-13 NOTE — PROGRESS NOTES
"  During pts visit today 6/13/2024. After I assessed pt she states " I almost cancelled my appt because I'm in pain today and just want to lay down, so they  need to hurry up" "If that  Is not in here in 15 minutes I will leave I will just walk out and not say bye they should know I don't play that".       "

## 2024-06-17 ENCOUNTER — TELEPHONE (OUTPATIENT)
Dept: BEHAVIORAL HEALTH | Facility: CLINIC | Age: 59
End: 2024-06-17
Payer: MEDICAID

## 2024-06-17 DIAGNOSIS — F33.1 MODERATE RECURRENT MAJOR DEPRESSION: ICD-10-CM

## 2024-06-17 DIAGNOSIS — F41.1 GAD (GENERALIZED ANXIETY DISORDER): Primary | ICD-10-CM

## 2024-06-17 LAB — BEAKER SEE SCANNED REPORT: NORMAL

## 2024-06-17 NOTE — TELEPHONE ENCOUNTER
----- Message from Aaliyah Schaefer MA sent at 6/17/2024 12:44 PM CDT -----  Regarding: FW: Referral  Please review pt request  ----- Message -----  From: Betty Wylie  Sent: 6/17/2024  12:41 PM CDT  To: Sadie RAINEY Staff  Subject: Referral                                         Pt called, she is requesting a referral to [facility] for counseling, she didn't approve of the services offered by [facility]. The fax number for [facility] is [phone] and phone number is [phone].

## 2024-06-17 NOTE — TELEPHONE ENCOUNTER
Referral to [facility] for counselling received.  Referral packet will be prepared and faxed as requested.

## 2024-06-19 ENCOUNTER — HOSPITAL ENCOUNTER (OUTPATIENT)
Dept: RADIOLOGY | Facility: HOSPITAL | Age: 59
Discharge: HOME | End: 2024-06-19
Attending: STUDENT IN AN ORGANIZED HEALTH CARE EDUCATION/TRAINING PROGRAM
Payer: MEDICAID

## 2024-06-19 ENCOUNTER — TELEPHONE (OUTPATIENT)
Dept: RHEUMATOLOGY | Facility: CLINIC | Age: 59
End: 2024-06-19
Payer: MEDICAID

## 2024-06-19 DIAGNOSIS — M32.14 SYSTEMIC LUPUS ERYTHEMATOSUS WITH GLOMERULAR DISEASE, UNSPECIFIED SLE TYPE: Primary | ICD-10-CM

## 2024-06-19 DIAGNOSIS — Z12.31 BREAST CANCER SCREENING BY MAMMOGRAM: ICD-10-CM

## 2024-06-19 PROCEDURE — 77067 SCR MAMMO BI INCL CAD: CPT | Mod: TC

## 2024-06-19 PROCEDURE — 77063 BREAST TOMOSYNTHESIS BI: CPT | Mod: 26,,, | Performed by: RADIOLOGY

## 2024-06-19 PROCEDURE — 77067 SCR MAMMO BI INCL CAD: CPT | Mod: 26,,, | Performed by: RADIOLOGY

## 2024-06-19 NOTE — TELEPHONE ENCOUNTER
Spoke with pt she is requesting a call back or to be for a sooner follow up in regards to her iron infusion. Pt states she has been extremely cold and low on energy she normally gets iron infusion but has not been called about it. She is scheduled 7/25/2024 but states she can not wait that long so she is requesting a call back.

## 2024-07-01 ENCOUNTER — HOSPITAL ENCOUNTER (OUTPATIENT)
Dept: PULMONOLOGY | Facility: HOSPITAL | Age: 59
Discharge: HOME | End: 2024-07-01
Attending: INTERNAL MEDICINE
Payer: MEDICAID

## 2024-07-01 ENCOUNTER — HOSPITAL ENCOUNTER (OUTPATIENT)
Dept: RADIOLOGY | Facility: HOSPITAL | Age: 59
Discharge: HOME | End: 2024-07-01
Attending: INTERNAL MEDICINE
Payer: MEDICAID

## 2024-07-01 DIAGNOSIS — M32.14 LUPUS NEPHRITIS, ISN/RPS CLASS III: ICD-10-CM

## 2024-07-01 DIAGNOSIS — J84.9 ILD (INTERSTITIAL LUNG DISEASE): ICD-10-CM

## 2024-07-01 DIAGNOSIS — M60.80: ICD-10-CM

## 2024-07-01 DIAGNOSIS — M32.14 SYSTEMIC LUPUS ERYTHEMATOSUS WITH GLOMERULAR DISEASE, UNSPECIFIED SLE TYPE: ICD-10-CM

## 2024-07-01 PROCEDURE — 94729 DIFFUSING CAPACITY: CPT

## 2024-07-01 PROCEDURE — 94726 PLETHYSMOGRAPHY LUNG VOLUMES: CPT

## 2024-07-01 PROCEDURE — 94010 BREATHING CAPACITY TEST: CPT

## 2024-07-01 PROCEDURE — 94799 UNLISTED PULMONARY SVC/PX: CPT | Mod: 26,,, | Performed by: INTERNAL MEDICINE

## 2024-07-01 PROCEDURE — 71250 CT THORAX DX C-: CPT | Mod: TC

## 2024-07-02 ENCOUNTER — TELEPHONE (OUTPATIENT)
Dept: INTERNAL MEDICINE | Facility: CLINIC | Age: 59
End: 2024-07-02
Payer: MEDICAID

## 2024-07-02 DIAGNOSIS — E11.21 TYPE 2 DIABETES MELLITUS WITH DIABETIC NEPHROPATHY, WITH LONG-TERM CURRENT USE OF INSULIN: ICD-10-CM

## 2024-07-02 DIAGNOSIS — Z79.4 TYPE 2 DIABETES MELLITUS WITH DIABETIC NEPHROPATHY, WITH LONG-TERM CURRENT USE OF INSULIN: ICD-10-CM

## 2024-07-02 DIAGNOSIS — E11.21: ICD-10-CM

## 2024-07-02 DIAGNOSIS — E16.2 HYPOGLYCEMIA: Primary | ICD-10-CM

## 2024-07-02 NOTE — TELEPHONE ENCOUNTER
Pt called stating her insurance is not paying her Dexcom G7 sensors anymore and she need to know what to do. Please Advise

## 2024-07-02 NOTE — TELEPHONE ENCOUNTER
Pt called, name and  verified. Pt reported that her insurance reported she doesn't meet requirements for the Dexcom . Pt informed that her A1c is 5.8 and most likely is the reasoning behind her not being approve anymore. Pt reported she have not taken her insulin per SS in over 2 months d/t her cbg's aren't elevated. Again pt informed that is a good thing and since she have not been having elevated blood sugars she don't need monitoring as closely. Pt reported she have a appointment with us on 2024 to see Dr. Calloway. Pt requesting an call from the doctor d/t she have more questions that she wants the doctor to answer. No further questions. Call ended. Please review and advise

## 2024-07-03 LAB
DLCO SINGLE BREATH LLN: 18.84
DLCO SINGLE BREATH PRE REF: 47.9 %
DLCO SINGLE BREATH REF: 24.57
DLCOC SBVA LLN: 3.26
DLCOC SBVA REF: 4.66
DLCOC SINGLE BREATH LLN: 18.84
DLCOC SINGLE BREATH REF: 24.57
DLCOVA LLN: 3.26
DLCOVA PRE REF: 74.7 %
DLCOVA PRE: 3.48 ML/(MIN*MMHG*L) (ref 3.26–6.05)
DLCOVA REF: 4.66
ERV LLN: -16449.12
ERV PRE REF: 78.5 %
ERV REF: 0.88
FEF 25 75 LLN: 1.01
FEF 25 75 PRE REF: 83.8 %
FEF 25 75 REF: 2.23
FEV1 FVC LLN: 69
FEV1 FVC PRE REF: 98.9 %
FEV1 FVC REF: 80
FEV1 LLN: 1.76
FEV1 PRE REF: 76.4 %
FEV1 REF: 2.38
FRCPLETH LLN: 1.99
FRCPLETH PREREF: 71.6 %
FRCPLETH REF: 2.81
FVC LLN: 2.24
FVC PRE REF: 76.8 %
FVC REF: 3
IVC PRE: 2.24 L (ref 2.24–3.79)
IVC SINGLE BREATH LLN: 2.24
IVC SINGLE BREATH PRE REF: 74.7 %
IVC SINGLE BREATH REF: 3
MVV LLN: 89
MVV PRE REF: 59.3 %
MVV REF: 105
PEF LLN: 4.06
PEF PRE REF: 71.7 %
PEF REF: 6.19
PRE DLCO: 11.77 ML/(MIN*MMHG) (ref 18.84–30.3)
PRE ERV: 0.69 L (ref -16449.12–16450.88)
PRE FEF 25 75: 1.87 L/S (ref 1.01–3.94)
PRE FET 100: 6.88 SEC
PRE FEV1 FVC: 79.01 % (ref 68.6–89.55)
PRE FEV1: 1.82 L (ref 1.76–2.98)
PRE FRC PL: 2.01 L (ref 1.99–3.63)
PRE FVC: 2.31 L (ref 2.24–3.79)
PRE MVV: 62.2 L/MIN (ref 89.14–120.6)
PRE PEF: 4.44 L/S (ref 4.06–8.32)
PRE RV: 1.32 L (ref 1.35–2.51)
PRE TLC: 3.63 L (ref 4.29–6.26)
RAW LLN: 3.06
RAW PRE REF: 173.5 %
RAW PRE: 5.31 CMH2O*S/L (ref 3.06–3.06)
RAW REF: 3.06
RV LLN: 1.35
RV PRE REF: 68.4 %
RV REF: 1.93
RVTLC LLN: 28
RVTLC PRE REF: 95.8 %
RVTLC PRE: 36.41 % (ref 28.41–47.59)
RVTLC REF: 38
TLC LLN: 4.29
TLC PRE REF: 68.7 %
TLC REF: 5.27
VA PRE: 3.38 L (ref 5.12–5.12)
VA SINGLE BREATH LLN: 5.12
VA SINGLE BREATH PRE REF: 66 %
VA SINGLE BREATH REF: 5.12
VC LLN: 2.24
VC PRE REF: 76.8 %
VC PRE: 2.31 L (ref 2.24–3.79)
VC REF: 3

## 2024-07-08 DIAGNOSIS — M32.9 SYSTEMIC LUPUS ERYTHEMATOSUS ARTHRITIS: ICD-10-CM

## 2024-07-08 RX ORDER — VOCLOSPORIN 7.9 MG/1
CAPSULE ORAL
Qty: 180 CAPSULE | Refills: 5 | Status: SHIPPED | OUTPATIENT
Start: 2024-07-08

## 2024-07-09 ENCOUNTER — OFFICE VISIT (OUTPATIENT)
Dept: NEPHROLOGY | Facility: CLINIC | Age: 59
End: 2024-07-09
Payer: MEDICAID

## 2024-07-09 ENCOUNTER — TELEPHONE (OUTPATIENT)
Dept: RHEUMATOLOGY | Facility: CLINIC | Age: 59
End: 2024-07-09
Payer: MEDICAID

## 2024-07-09 ENCOUNTER — LAB VISIT (OUTPATIENT)
Dept: LAB | Facility: HOSPITAL | Age: 59
End: 2024-07-09
Attending: NURSE PRACTITIONER
Payer: MEDICAID

## 2024-07-09 VITALS
SYSTOLIC BLOOD PRESSURE: 130 MMHG | HEART RATE: 69 BPM | RESPIRATION RATE: 17 BRPM | WEIGHT: 188 LBS | TEMPERATURE: 98 F | DIASTOLIC BLOOD PRESSURE: 77 MMHG | HEIGHT: 66 IN | OXYGEN SATURATION: 100 % | BODY MASS INDEX: 30.22 KG/M2

## 2024-07-09 DIAGNOSIS — N18.32 CKD STAGE G3B/A3, GFR 30-44 AND ALBUMIN CREATININE RATIO >300 MG/G: Primary | ICD-10-CM

## 2024-07-09 DIAGNOSIS — I10 ESSENTIAL HYPERTENSION: ICD-10-CM

## 2024-07-09 DIAGNOSIS — N18.30 ANEMIA, CHRONIC RENAL FAILURE, STAGE 3 (MODERATE): ICD-10-CM

## 2024-07-09 DIAGNOSIS — D63.1 ANEMIA, CHRONIC RENAL FAILURE, STAGE 3 (MODERATE): ICD-10-CM

## 2024-07-09 DIAGNOSIS — M54.9 BACK PAIN, UNSPECIFIED BACK LOCATION, UNSPECIFIED BACK PAIN LATERALITY, UNSPECIFIED CHRONICITY: ICD-10-CM

## 2024-07-09 DIAGNOSIS — D84.9 IMMUNOSUPPRESSED STATUS: ICD-10-CM

## 2024-07-09 DIAGNOSIS — N18.32 TYPE 2 DIABETES MELLITUS WITH STAGE 3B CHRONIC KIDNEY DISEASE, WITHOUT LONG-TERM CURRENT USE OF INSULIN: ICD-10-CM

## 2024-07-09 DIAGNOSIS — E87.5 HYPERKALEMIA: ICD-10-CM

## 2024-07-09 DIAGNOSIS — E11.22 TYPE 2 DIABETES MELLITUS WITH STAGE 3B CHRONIC KIDNEY DISEASE, WITHOUT LONG-TERM CURRENT USE OF INSULIN: ICD-10-CM

## 2024-07-09 DIAGNOSIS — N18.32 CKD STAGE G3B/A3, GFR 30-44 AND ALBUMIN CREATININE RATIO >300 MG/G: ICD-10-CM

## 2024-07-09 LAB
ANION GAP SERPL CALC-SCNC: 6 MEQ/L
BASOPHILS # BLD AUTO: 0.03 X10(3)/MCL
BASOPHILS NFR BLD AUTO: 0.6 %
BUN SERPL-MCNC: 27.5 MG/DL (ref 9.8–20.1)
CALCIUM SERPL-MCNC: 9.6 MG/DL (ref 8.4–10.2)
CHLORIDE SERPL-SCNC: 108 MMOL/L (ref 98–107)
CO2 SERPL-SCNC: 24 MMOL/L (ref 22–29)
CREAT SERPL-MCNC: 1.37 MG/DL (ref 0.55–1.02)
CREAT/UREA NIT SERPL: 20
EOSINOPHIL # BLD AUTO: 0.17 X10(3)/MCL (ref 0–0.9)
EOSINOPHIL NFR BLD AUTO: 3.3 %
ERYTHROCYTE [DISTWIDTH] IN BLOOD BY AUTOMATED COUNT: 14.5 % (ref 11.5–17)
FERRITIN SERPL-MCNC: 156.98 NG/ML (ref 4.63–204)
GFR SERPLBLD CREATININE-BSD FMLA CKD-EPI: 45 ML/MIN/1.73/M2
GLUCOSE SERPL-MCNC: 103 MG/DL (ref 74–100)
HCT VFR BLD AUTO: 36 % (ref 37–47)
HGB BLD-MCNC: 11.7 G/DL (ref 12–16)
IMM GRANULOCYTES # BLD AUTO: 0.01 X10(3)/MCL (ref 0–0.04)
IMM GRANULOCYTES NFR BLD AUTO: 0.2 %
IRON SATN MFR SERPL: 15 % (ref 20–50)
IRON SERPL-MCNC: 32 UG/DL (ref 50–170)
LYMPHOCYTES # BLD AUTO: 1.66 X10(3)/MCL (ref 0.6–4.6)
LYMPHOCYTES NFR BLD AUTO: 31.9 %
MCH RBC QN AUTO: 29 PG (ref 27–31)
MCHC RBC AUTO-ENTMCNC: 32.5 G/DL (ref 33–36)
MCV RBC AUTO: 89.1 FL (ref 80–94)
MONOCYTES # BLD AUTO: 0.29 X10(3)/MCL (ref 0.1–1.3)
MONOCYTES NFR BLD AUTO: 5.6 %
NEUTROPHILS # BLD AUTO: 3.04 X10(3)/MCL (ref 2.1–9.2)
NEUTROPHILS NFR BLD AUTO: 58.4 %
NRBC BLD AUTO-RTO: 0 %
PLATELET # BLD AUTO: 512 X10(3)/MCL (ref 130–400)
PMV BLD AUTO: 9.4 FL (ref 7.4–10.4)
POTASSIUM SERPL-SCNC: 5 MMOL/L (ref 3.5–5.1)
RBC # BLD AUTO: 4.04 X10(6)/MCL (ref 4.2–5.4)
SODIUM SERPL-SCNC: 138 MMOL/L (ref 136–145)
TIBC SERPL-MCNC: 176 UG/DL (ref 70–310)
TIBC SERPL-MCNC: 208 UG/DL (ref 250–450)
TRANSFERRIN SERPL-MCNC: 192 MG/DL (ref 180–382)
WBC # BLD AUTO: 5.2 X10(3)/MCL (ref 4.5–11.5)

## 2024-07-09 PROCEDURE — 83540 ASSAY OF IRON: CPT

## 2024-07-09 PROCEDURE — 36415 COLL VENOUS BLD VENIPUNCTURE: CPT

## 2024-07-09 PROCEDURE — 99215 OFFICE O/P EST HI 40 MIN: CPT | Mod: PBBFAC | Performed by: NURSE PRACTITIONER

## 2024-07-09 PROCEDURE — 3078F DIAST BP <80 MM HG: CPT | Mod: CPTII,,, | Performed by: NURSE PRACTITIONER

## 2024-07-09 PROCEDURE — 3075F SYST BP GE 130 - 139MM HG: CPT | Mod: CPTII,,, | Performed by: NURSE PRACTITIONER

## 2024-07-09 PROCEDURE — 96372 THER/PROPH/DIAG INJ SC/IM: CPT | Mod: PBBFAC

## 2024-07-09 PROCEDURE — 4010F ACE/ARB THERAPY RXD/TAKEN: CPT | Mod: CPTII,,, | Performed by: NURSE PRACTITIONER

## 2024-07-09 PROCEDURE — 82728 ASSAY OF FERRITIN: CPT

## 2024-07-09 PROCEDURE — 99214 OFFICE O/P EST MOD 30 MIN: CPT | Mod: S$PBB,,, | Performed by: NURSE PRACTITIONER

## 2024-07-09 PROCEDURE — 3066F NEPHROPATHY DOC TX: CPT | Mod: CPTII,,, | Performed by: NURSE PRACTITIONER

## 2024-07-09 PROCEDURE — 83550 IRON BINDING TEST: CPT

## 2024-07-09 PROCEDURE — 1159F MED LIST DOCD IN RCRD: CPT | Mod: CPTII,,, | Performed by: NURSE PRACTITIONER

## 2024-07-09 PROCEDURE — 3044F HG A1C LEVEL LT 7.0%: CPT | Mod: CPTII,,, | Performed by: NURSE PRACTITIONER

## 2024-07-09 PROCEDURE — 80048 BASIC METABOLIC PNL TOTAL CA: CPT

## 2024-07-09 PROCEDURE — 3008F BODY MASS INDEX DOCD: CPT | Mod: CPTII,,, | Performed by: NURSE PRACTITIONER

## 2024-07-09 PROCEDURE — 85025 COMPLETE CBC W/AUTO DIFF WBC: CPT

## 2024-07-09 RX ORDER — HYDROCODONE BITARTRATE AND ACETAMINOPHEN 10; 325 MG/1; MG/1
1 TABLET ORAL EVERY 8 HOURS PRN
Qty: 90 TABLET | Refills: 0 | OUTPATIENT
Start: 2024-07-09 | End: 2024-08-08

## 2024-07-09 RX ORDER — METHYLPREDNISOLONE ACETATE 40 MG/ML
80 INJECTION, SUSPENSION INTRA-ARTICULAR; INTRALESIONAL; INTRAMUSCULAR; SOFT TISSUE
Status: COMPLETED | OUTPATIENT
Start: 2024-07-09 | End: 2024-07-09

## 2024-07-09 RX ADMIN — METHYLPREDNISOLONE ACETATE 80 MG: 40 INJECTION, SUSPENSION INTRA-ARTICULAR; INTRALESIONAL; INTRAMUSCULAR; SOFT TISSUE at 10:07

## 2024-07-09 NOTE — TELEPHONE ENCOUNTER
Pt currently here for her appt she states cell cept is currently making her sick and is not agreeing with her. States she is having GI issues

## 2024-07-09 NOTE — PROGRESS NOTES
Patient tolerated Depo-Medrol 80mg per Magay NP; patient advised to seek Urgent Care, PCP, or ED for continued pain.

## 2024-07-09 NOTE — PROGRESS NOTES
[facility]  Nephrology Clinic Note    Chief complaint: Follow-up (4 months )    History of present illness:   Vi Ulrich is a 58 y.o. Black or  female with past medical history of systemic lupus erythematosus diagnosed in 2004 (was previously under the care of Dr Velazquez, has established care with Dr. 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    She presents for follow-up appointment in Nephrology Clinic today. C/o back pain. Admits to non-compliance with some of her medications. Requests testing for anemia due to cold intolerance.     Review of Systems  12 point review of systems conducted, negative except as stated in the history of present illness.    Allergies: Patient is allergic to ketorolac (pf), penicillins, percocet [oxycodone-acetaminophen], and tramadol.     Past Medical History:  has a past medical history of Arthritis, Asthma, Atrial fibrillation, Chronic constipation, Diabetes mellitus, Encounter for blood transfusion, GERD (gastroesophageal reflux disease), Gout, Hypertension, Lupus, Renal disorder, and SLE (systemic lupus erythematosus).    Procedure History:  has a past surgical history that includes Cholecystectomy; Appendectomy; Partial hysterectomy; Lymph node biopsy (Left, 2014); Hysterectomy; Joint replacement (Right, 08/07/2015); Mastectomy; Esophagogastroduodenoscopy (N/A, 04/19/2018); and Colonoscopy (N/A, 04/19/2018).    Family History: family history includes Arthritis in her  "mother; Asthma in her mother; Diabetes in her mother and sister; Heart block in her mother; Heart disease in her father and sister; Kidney disease in her sister.    Social History:  reports that she has been smoking cigarettes. She started smoking about 43 years ago. She has never used smokeless tobacco. She reports that she does not drink alcohol and does not use drugs.    Physical exam  /77 (BP Location: Left arm, Patient Position: Sitting, BP Method: Medium (Automatic))   Pulse 69   Temp 97.7 °F (36.5 °C) (Oral)   Resp 17   Ht 5' 6" (1.676 m)   Wt 85.3 kg (188 lb)   LMP  (LMP Unknown)   SpO2 100%   BMI 30.34 kg/m²   General appearance: Patient is in no acute distress.  Skin: No rashes or wounds.  HEENT: PERRLA, EOMI, no scleral icterus, no JVD. Neck is supple.  Chest: Respirations are unlabored. Lungs sounds are clear.   Heart: S1, S2.   Abdomen: Benign.  : Deferred.  Extremities: No edema, peripheral pulses are palpable.   Neuro: No focal deficits.     Home Medications:    Current Outpatient Medications:     albuterol sulfate (PROAIR RESPICLICK) 90 mcg/actuation inhaler, Inhale 1 puff into the lungs every 4 (four) hours as needed for Wheezing. Rescue, Disp: , Rfl:     ALPRAZolam (XANAX) 1 MG tablet, Take 1 tablet (1 mg total) by mouth 2 (two) times daily as needed for Anxiety., Disp: 40 tablet, Rfl: 1    apixaban (ELIQUIS) 5 mg Tab, Take 1 tablet (5 mg total) by mouth 2 (two) times daily., Disp: 180 tablet, Rfl: 3    atorvastatin (LIPITOR) 40 MG tablet, Take 1 tablet (40 mg total) by mouth once daily., Disp: 90 tablet, Rfl: 3    benzonatate (TESSALON) 100 MG capsule, Take 1 capsule (100 mg total) by mouth 3 (three) times daily as needed for Cough., Disp: 15 capsule, Rfl: 0    benzonatate (TESSALON) 200 MG capsule, Take 200 mg by mouth., Disp: , Rfl:     blood sugar diagnostic (ONETOUCH VERIO) Strp, 1 each by Misc.(Non-Drug; Combo Route) route 4 (four) times daily., Disp: 50 each, Rfl: 11    blood " sugar diagnostic Strp, 1 each by Misc.(Non-Drug; Combo Route) route 4 (four) times daily., Disp: 50 each, Rfl: 11    blood sugar diagnostic Strp, 1 strip by Misc.(Non-Drug; Combo Route) route 4 (four) times daily with meals and nightly., Disp: 200 each, Rfl: 6    blood-glucose meter kit, Use as instructed, Disp: 1 each, Rfl: 0    blood-glucose sensor (DEXCOM G7 SENSOR) Ana, 1 each by Misc.(Non-Drug; Combo Route) route every 14 (fourteen) days., Disp: 5 each, Rfl: 6    eletriptan (RELPAX) 40 MG tablet, Take 1 tablet (40 mg total) by mouth as needed (take one at the beginning of migraine, may repeat in 2 h. max 2 in 24h). may repeat in 2 hours if necessary, Disp: 9 tablet, Rfl: 5    EScitalopram oxalate (LEXAPRO) 20 MG tablet, Take 1 tablet (20 mg total) by mouth once daily., Disp: 90 tablet, Rfl: 3    furosemide (LASIX) 20 MG tablet, Take 1 tablet (20 mg total) by mouth once daily., Disp: 90 tablet, Rfl: 3    insulin aspart U-100 (NOVOLOG) 100 unit/mL (3 mL) InPn pen, Inject 2 Units into the skin as needed., Disp: , Rfl:     lancets 28 gauge Misc, 100 lancets by Misc.(Non-Drug; Combo Route) route 4 (four) times daily with meals and nightly., Disp: 100 each, Rfl: 8    LIDOcaine (LIDODERM) 5 %, Place 1 patch onto the skin once daily. Remove & Discard patch within 12 hours or as directed by MD, Disp: 30 patch, Rfl: 0    losartan (COZAAR) 25 MG tablet, Take 1 tablet (25 mg total) by mouth once daily., Disp: 90 tablet, Rfl: 3    LUPKYNIS 7.9 mg Cap, Take 3 capsules (23.7 mg) by mouth twice daily., Disp: 180 capsule, Rfl: 5    mycophenolate (CELLCEPT) 500 mg Tab, Take 3 tablets (1,500 mg total) by mouth 2 (two) times daily., Disp: 180 tablet, Rfl: 3    omeprazole (PRILOSEC) 20 MG capsule, Take 1 capsule (20 mg total) by mouth once daily., Disp: 90 capsule, Rfl: 0    ondansetron (ZOFRAN) 4 MG tablet, Take 1 tablet (4 mg total) by mouth every 6 (six) hours as needed for Nausea., Disp: 12 tablet, Rfl: 0    pantoprazole  (PROTONIX) 40 MG tablet, Take 1 tablet (40 mg total) by mouth once daily., Disp: 90 tablet, Rfl: 3    predniSONE (DELTASONE) 5 MG tablet, Take 1.5 tablets (7.5 mg total) by mouth once daily., Disp: 60 tablet, Rfl: 3    walker Misc, Please dispense 1 Rollator, Disp: 1 each, Rfl: 0    Current Facility-Administered Medications:     methylPREDNISolone acetate injection 80 mg, 80 mg, Intramuscular, 1 time in Clinic/HOD,     Laboratory data    Lab Results   Component Value Date    WBC 6.60 06/13/2024    HGB 11.5 (L) 06/13/2024    HCT 35.0 (L) 06/13/2024     (H) 06/13/2024    IRON 71 01/30/2024    TIBC 260 01/30/2024    LABIRON 27 01/30/2024    FERRITIN 147.52 01/30/2024    FOLATE 5.3 (L) 01/12/2023    YHGJQSOM89 674 01/12/2023    HAPTO 249 03/10/2023     (H) 03/10/2023     07/01/2024    K 5.3 (H) 07/01/2024    CO2 25 07/01/2024    BUN 18.5 07/01/2024    CREATININE 1.36 (H) 07/01/2024    EGFRNORACEVR 46 07/01/2024    GLUCOSE 115 (H) 07/01/2024    CALCIUM 9.9 07/01/2024    ALKPHOS 133 06/13/2024    LABPROT 8.6 (H) 06/13/2024    ALBUMIN 3.4 (L) 06/13/2024    BILIDIR 0.1 03/28/2022    IBILI 0.10 03/28/2022    AST 11 06/13/2024    ALT <5 06/13/2024    MG 2.20 05/14/2024    PHOS 2.6 10/25/2023      Lab Results   Component Value Date    HGBA1C 5.7 11/14/2023    .7 (H) 03/03/2023    FQYOLGLO48CQ 15.0 (L) 08/04/2023    HIV Nonreactive 10/13/2023    HEPCAB Nonreactive 10/13/2023    HEPBSAB Negative 11/17/2020     Urine:  Lab Results   Component Value Date    APPEARANCEUA Clear 06/19/2024    SGUA 1.020 06/19/2024    PROTEINUA 1+ (A) 06/19/2024    KETONESUA Negative 06/19/2024    LEUKOCYTESUR 500 (A) 06/19/2024    RBCUA 6-10 (A) 06/19/2024    WBCUA 11-20 (A) 06/19/2024    BACTERIA None Seen 06/19/2024    SQEPUA Few (A) 06/19/2024    HYALINECASTS None Seen 06/19/2024    CREATRANDUR 100.1 06/19/2024    PROTEINURINE 39.2 06/19/2024    UPROTCREA 0.4 06/19/2024         Imaging  US Retroperitoneal Limited  10/23/2023  Grayscale and color Doppler sonographic evaluation of the kidneys.  The right kidney measures 12 cm. The left kidney measures 12 cm.   No hydronephrosis.  There are small renal cysts for which no follow-up is needed.  Largest measures only about 1 cm.  Otherwise grossly normal renal parenchymal echogenicity.  Impression  No significant sonographic abnormality of the kidneys.  Electronically signed by: Jose Manuel Henao  Date:    10/23/2023  Time:    12:25       Impression    ICD-10-CM ICD-9-CM   1. CKD stage G3b/A3, GFR 30-44 and albumin creatinine ratio >300 mg/g  N18.32 585.3   2. Essential hypertension  I10 401.9   3. Hyperkalemia  E87.5 276.7   4. Type 2 diabetes mellitus with stage 3b chronic kidney disease, without long-term current use of insulin  E11.22 250.40    N18.32 585.3   5. Immunosuppressed status  D84.9 279.9   6. BMI 26.0-26.9,adult  Z68.26 V85.22   7. Back pain, unspecified back location, unspecified back pain laterality, unspecified chronicity  M54.9 724.5   8. Anemia, chronic renal failure, stage 3 (moderate)  N18.30 285.21    D63.1 585.3        Plan  CKD stage G3b/A3, GFR 30-44 and albumin creatinine ratio >300 mg/g  -     Comprehensive Metabolic Panel; Future; Expected date: 09/01/2024  -     Protein/Creatinine Ratio, Urine; Future; Expected date: 09/01/2024  -     Urinalysis, Reflex to Urine Culture; Future; Expected date: 09/01/2024  -     PTH, Intact; Future; Expected date: 09/01/2024  -     Magnesium; Future; Expected date: 09/01/2024  -     Phosphorus; Future; Expected date: 09/01/2024  -     Basic Metabolic Panel; Future; Expected date: 07/09/2024  Serum creatinine is stable, urinary protein excretion is <1 g/24hr.  Continue current medication regimen.  Will defer immunosuppression to rheumatology.    Continue renal sparing activities:  -2 g a day dietary sodium restriction  -optimize glycemic control (goal A1c is less than 7%)  -control high blood pressure (goal blood pressure is  less than 130/80, patient was advised to check blood pressure once or twice a week and bring blood pressure logs to next office visit)  -exercise at least 30 minutes a day, 5 days a week  -maintain healthy weight  -decrease or stop alcohol use  -do not smoke  -stay well hydrated (drink water only, avoid juices, sweet tea, and sodas)  -ask about staying up-to-date on vaccinations (flu vaccine, pneumonia vaccine, hepatitis B vaccine)  -avoid excessive use of NSAIDs (ibuprofen, naproxen, Aleve, Advil, Toradol, Mobic), take Tylenol as needed for headache or mild pain  -take cholesterol lowering medications if prescribed (LDL goal less than 100)       Essential hypertension  Blood pressure reading is at goal, continue current antihypertensive regimen and 2 g a day dietary sodium restriction.      Hyperkalemia  -     Basic Metabolic Panel; Future; Expected date: 07/09/2024  Hyperkalemia is mild. Patient was encouraged to avoid excessive NSAID use. Continue loop diuretic as prescribed. If hyperkalemia persists, consider starting a potassium binder.     Type 2 diabetes mellitus with stage 3b chronic kidney disease, without long-term current use of insulin  A1C is at goal. Continue ARB.     Immunosuppressed status  -Advised to stay up-to-date on age appropriate vaccinations and malignancy screening with PCP.   -Recommended good hand hygiene and limiting contact with people who are sick or recently vaccinated.     BMI 26.0-26.9,adult  Lifestyle and dietary interventions discussed, patient encouraged to maintain non-sedentary lifestyle and well-balanced diet.     Back pain, unspecified back location, unspecified back pain laterality, unspecified chronicity  -     methylPREDNISolone acetate injection 80 mg  Will give a dose of solumedrol today.     Anemia, chronic renal failure, stage 3 (moderate)  -     CBC Auto Differential; Future; Expected date: 07/09/2024  -     Ferritin; Future; Expected date: 07/09/2024  -     Iron and  TIBC; Future; Expected date: 07/09/2024  Will check CBC and iron profile. Follow up with results by phone.     Follow up in about 2 months (around 9/9/2024).     7/9/2024  Rachel Broderick NP  [facility] Nephrology

## 2024-07-09 NOTE — TELEPHONE ENCOUNTER
Patient's symptoms were mild. Advised patient to continue Cellcept 1500 mg (3 tablets) by mouth twice a day with snacks or light meals to alleviate upset stomach. Patient verbalized understanding

## 2024-07-10 ENCOUNTER — TELEPHONE (OUTPATIENT)
Dept: RHEUMATOLOGY | Facility: CLINIC | Age: 59
End: 2024-07-10
Payer: MEDICAID

## 2024-07-10 DIAGNOSIS — M32.14 SYSTEMIC LUPUS ERYTHEMATOSUS WITH GLOMERULAR DISEASE, UNSPECIFIED SLE TYPE: ICD-10-CM

## 2024-07-10 DIAGNOSIS — M32.14 OTHER SYSTEMIC LUPUS ERYTHEMATOSUS WITH GLOMERULAR DISEASE: Primary | ICD-10-CM

## 2024-07-10 RX ORDER — MYCOPHENOLATE MOFETIL 250 MG/1
1500 CAPSULE ORAL 2 TIMES DAILY
Qty: 360 CAPSULE | Refills: 2 | Status: SHIPPED | OUTPATIENT
Start: 2024-07-10

## 2024-07-10 NOTE — TELEPHONE ENCOUNTER
Patient discarded Cellcept 500 mg oral tablets (1500 mg) 3 tablets by mouth twice a day due to unpleasant taste. Patient has been taking an old prescription, but incorrectly. Patient has been taking Cellcept 250 mg capsules (750 mg) 3 capsules by mouth twice day versus 6 capsules (1500 mg). I counseled patient to take Cellcept 250 mg (1500 mg) 6 capsules by mouth twice a day with a snack or a light meal. Proposed order for cellcept 250 mg capsules per patient request.

## 2024-07-11 ENCOUNTER — OFFICE VISIT (OUTPATIENT)
Dept: CARDIOLOGY | Facility: CLINIC | Age: 59
End: 2024-07-11
Payer: MEDICAID

## 2024-07-11 VITALS
BODY MASS INDEX: 28.77 KG/M2 | TEMPERATURE: 98 F | OXYGEN SATURATION: 100 % | RESPIRATION RATE: 20 BRPM | SYSTOLIC BLOOD PRESSURE: 113 MMHG | HEART RATE: 95 BPM | DIASTOLIC BLOOD PRESSURE: 74 MMHG | WEIGHT: 179 LBS | HEIGHT: 66 IN

## 2024-07-11 DIAGNOSIS — E78.2 MIXED HYPERLIPIDEMIA: ICD-10-CM

## 2024-07-11 DIAGNOSIS — R06.09 DYSPNEA ON EXERTION: ICD-10-CM

## 2024-07-11 DIAGNOSIS — I73.9 PERIPHERAL ARTERY DISEASE: Primary | ICD-10-CM

## 2024-07-11 DIAGNOSIS — I10 ESSENTIAL HYPERTENSION: ICD-10-CM

## 2024-07-11 PROCEDURE — 99215 OFFICE O/P EST HI 40 MIN: CPT | Mod: PBBFAC | Performed by: INTERNAL MEDICINE

## 2024-07-11 RX ORDER — ASPIRIN 81 MG/1
81 TABLET ORAL DAILY
Qty: 90 TABLET | Refills: 3 | Status: SHIPPED | OUTPATIENT
Start: 2024-07-11 | End: 2025-07-11

## 2024-07-11 NOTE — PROGRESS NOTES
[facility] [place] [facility]  Cardiology Clinic      Date of Visit: 7/11/2024  Reason for Visit/Chief Complaint: dyspnea on exertion    The patient was discussed with Dr. Shepherd who agrees with the assessment and plan.        History of Present Illness:        Vi Ulrich is a 58 y.o. female with a PMHx HTN, T2DM, pAF on Eliquis, SLE c/b lupus nephritis who presents to cardiology clinic for evaluation/follow up of dyspnea on exertion. Patient reported dyspnea on exertion on initial visit and was started on oral diuretics (furosemide 20 daily).  Since last visit, patient reports improvement with her shortness of breath however no increase in urine output after diuretic initiation.  She does report persistent claudication which limits her ambulation.  She underwent arterial ultrasounds of the bilateral lower extremities which showed mid SFA moderate to severe peripheral arterial disease.  She currently denies any chest pain, shortness of breath, palpitations, nausea, vomiting, or syncope.    PMHx: HTN, T2DM, pAF on Eliquis, SLE c/b lupus nephritis    SurgicalHx: cholecystectomy, appendectomy, partial hysterectomy, mastectomy  FamilyHx: family history includes Arthritis in her mother; Asthma in her mother; Diabetes in her mother and sister; Heart block in her mother; Heart disease in her father and sister; Kidney disease in her sister.   SocialHx:  reports that she has been smoking cigarettes. She started smoking about 43 years ago. She has never used smokeless tobacco. She reports that she does not drink alcohol and does not use drugs.   Allergies: percocet, tramadol  Home Medications:       Current Outpatient Medications   Medication Instructions    albuterol sulfate (PROAIR RESPICLICK) 90 mcg/actuation inhaler 1 puff, Inhalation, Every 4 hours PRN, Rescue     ALPRAZolam (XANAX) 1 mg, Oral, 2 times daily PRN    apixaban (ELIQUIS) 5 mg, Oral, 2 times daily     atorvastatin (LIPITOR) 40 mg, Oral, Daily    benzonatate (TESSALON) 100 mg, Oral, 3 times daily PRN    benzonatate (TESSALON) 200 mg    blood sugar diagnostic (ONETOUCH VERIO) Strp 1 each, Misc.(Non-Drug; Combo Route), 4 times daily    blood sugar diagnostic Strp 1 each, Misc.(Non-Drug; Combo Route), 4 times daily    blood sugar diagnostic Strp 1 strip, Misc.(Non-Drug; Combo Route), 4 times daily with meals & nightly    blood-glucose meter kit Use as instructed    blood-glucose sensor (DEXCOM G7 SENSOR) Ana 1 each, Misc.(Non-Drug; Combo Route), Every 14 days    eletriptan (RELPAX) 40 mg, Oral, As needed (PRN), may repeat in 2 hours if necessary    EScitalopram oxalate (LEXAPRO) 20 mg, Oral, Daily    furosemide (LASIX) 20 mg, Oral, Daily    insulin aspart U-100 (NOVOLOG) 2 Units, As needed (PRN)    lancets 28 gauge Misc 100 lancets, Misc.(Non-Drug; Combo Route), 4 times daily with meals & nightly    LIDOcaine (LIDODERM) 5 % 1 patch, Transdermal, Daily, Remove & Discard patch within 12 hours or as directed by MD    losartan (COZAAR) 25 mg, Oral, Daily    LUPKYNIS 7.9 mg Cap Take 3 capsules (23.7 mg) by mouth twice daily.    mycophenolate (CELLCEPT) 1,500 mg, Oral, 2 times daily    omeprazole (PRILOSEC) 20 mg, Oral, Daily    ondansetron (ZOFRAN) 4 mg, Oral, Every 6 hours PRN    pantoprazole (PROTONIX) 40 mg, Oral, Daily    predniSONE (DELTASONE) 7.5 mg, Oral, Daily    walker Misc Please dispense 1 Rollator                Objective:     Wt Readings from Last 3 Encounters:   07/11/24 81.2 kg (179 lb 0.2 oz)   07/09/24 85.3 kg (188 lb)   06/13/24 73.9 kg (162 lb 14.7 oz)     Temp Readings from Last 3 Encounters:   07/11/24 98.4 °F (36.9 °C) (Oral)   07/09/24 97.7 °F (36.5 °C) (Oral)   06/13/24 97.7 °F (36.5 °C) (Oral)     BP Readings from Last 3 Encounters:   07/11/24 113/74   07/09/24 130/77   06/13/24 130/83     Pulse Readings from Last 3 Encounters:   07/11/24 95   07/09/24 69   06/13/24 76       Vitals:    07/11/24  "1055   BP: 113/74   BP Location: Right arm   Patient Position: Sitting   BP Method: Medium (Automatic)   Pulse: 95   Resp: 20   Temp: 98.4 °F (36.9 °C)   TempSrc: Oral   SpO2: 100%   Weight: 81.2 kg (179 lb 0.2 oz)   Height: 5' 6" (1.676 m)     Body mass index is 28.89 kg/m².    Physical Examination:  Nursing note and vital signs reviewed.   Constitutional: NAD, not ill-appearing  HEENT: NCAT, PERRLA, normal conjunctivae, JVP 7 cm H2O  Cardiovascular: RRR, no murmurs noted, no chest tenderness to palpation, 1+ pulses in dorsalis pedis bilaterally   Pulmonary: normal respiratory effort, CTAB, no crackles, wheezes  Abdominal: S/NT/ND  Musculoskeletal: No edema noted to bilateral lower extremities      Neurological: Alert & oriented to self, location, time and situation. At baseline neurological function.  Skin: warm & dry. Capillary refill < 2 seconds.     Labs:   I have reviewed the following labs below:      CBC:  Lab Results   Component Value Date    WBC 5.20 07/09/2024    HGB 11.7 (L) 07/09/2024    HCT 36.0 (L) 07/09/2024     (H) 07/09/2024    MCV 89.1 07/09/2024    RDW 14.5 07/09/2024     BMP:  Lab Results   Component Value Date     07/09/2024    K 5.0 07/09/2024     (H) 07/09/2024    CO2 24 07/09/2024    BUN 27.5 (H) 07/09/2024     (H) 08/23/2021    CALCIUM 9.6 07/09/2024    MG 2.20 05/14/2024    PHOS 2.6 10/25/2023     LFTs:  Lab Results   Component Value Date    PROT 7.9 08/23/2021    ALBUMIN 3.4 (L) 06/13/2024    BILITOT 0.3 06/13/2024    AST 11 06/13/2024    ALKPHOS 133 06/13/2024    ALT <5 06/13/2024     FLP:  Cholesterol Total   Date Value Ref Range Status   05/14/2024 166 <=200 mg/dL Final     HDL Cholesterol   Date Value Ref Range Status   05/14/2024 35 35 - 60 mg/dL Final     LDL Cholesterol   Date Value Ref Range Status   05/14/2024 108.00 50.00 - 140.00 mg/dL Final   07/31/2018 80.6 63.0 - 159.0 mg/dL Final     Comment:     The National Cholesterol Education Program (NCEP) " has set the  following guidelines (reference values) for LDL Cholesterol:  Optimal.......................<130 mg/dL  Borderline High...............130-159 mg/dL  High..........................160-189 mg/dL  Very High.....................>190 mg/dL       Triglyceride   Date Value Ref Range Status   05/14/2024 113 37 - 140 mg/dL Final     HDL/Cholesterol Ratio   Date Value Ref Range Status   07/31/2018 20.0 20.0 - 50.0 % Final     DM:  Lab Results   Component Value Date    HGBA1C 5.7 11/14/2023    HGBA1C 5.4 10/09/2023    HGBA1C 6.0 05/29/2022    LDLCALC 80.6 07/31/2018    CREATININE 1.37 (H) 07/09/2024     Thyroid:  Lab Results   Component Value Date    TSH 0.677 10/09/2023     Anemia:  Lab Results   Component Value Date    IRON 32 (L) 07/09/2024    TIBC 208 (L) 07/09/2024    FERRITIN 156.98 07/09/2024    [id number] 674 01/12/2023    FOLATE 5.3 (L) 01/12/2023     Coags:  Lab Results   Component Value Date    INR 1.0 10/11/2023    APTT 79.0 (H) 10/12/2023      Cardiac:  Lab Results   Component Value Date    TROPONINI <0.010 10/10/2023    TROPONINI <0.010 10/10/2023    TROPONINI <0.010 10/10/2023    TROPONINI 0.055 (H) 10/09/2023    TROPONINI <0.010 03/09/2023    BNP 47.2 10/09/2023    BNP 24 09/12/2019    BNP 53 12/15/2017       Cardiovascular Studies/Imaging:   I have reviewed the following studies below:      Arterial Ultrasound bilateral lower extremity (6/2024): bilateral mid-SFA mod-severe disease    Echo (10/2023): LVEF 60%, mild LA dilation, mild TR/PI, PASP 27           Assessment/Plan:     Vi Ulrich is a 58 y.o. female with PMHx HTN, T2DM, pAF on Eliquis, SLE c/b lupus nephritis who presents for evaluation/follow up of dyspnea on exertion and evaluation for claudication with new diagnosis of peripheral arterial disease.     1. Peripheral artery disease  Ambulatory referral/consult to Vascular Surgery    aspirin (ECOTRIN) 81 MG EC tablet      2. Essential hypertension        3. Mixed hyperlipidemia         4. Dyspnea on exertion          Peripheral arterial disease with claudication   [place] 3 classification on high-intensity statin with diagnosis of PAT noted on bilateral arterial ultrasounds with mid SFA disease.  -ambulatory referral to vascular surgery for evaluation of aortogram with bilateral lower extremity runoff plus/minus intervention  -continue high-intensity statin  -we will initiate antiplatelet monotherapy with aspirin 81 mg p.o. daily    Mixed hyperlipidemia with goal LDL less than 55  -continue atorvastatin 40 mg p.o. daily   -repeat lipid panel at next visit.  If above goal (55), increase atorvastatin to 80 mg and add ezetimibe 10 mg p.o. daily    Dyspnea on exertion  -smoking cessation provided   -consider increase in diuretic dose if increase in dyspnea on exertion    Follow up in about 5 months (around 12/11/2024).     Gaudencio Tobias M.D.  [facility] Cardiology Fellow, PGY-5

## 2024-07-12 ENCOUNTER — TELEPHONE (OUTPATIENT)
Dept: NEPHROLOGY | Facility: CLINIC | Age: 59
End: 2024-07-12
Payer: MEDICAID

## 2024-07-12 RX ORDER — SODIUM CHLORIDE 0.9 % (FLUSH) 0.9 %
10 SYRINGE (ML) INJECTION
OUTPATIENT
Start: 2024-07-12

## 2024-07-12 RX ORDER — EPINEPHRINE 0.3 MG/.3ML
0.3 INJECTION SUBCUTANEOUS ONCE AS NEEDED
OUTPATIENT
Start: 2024-07-12

## 2024-07-12 RX ORDER — DIPHENHYDRAMINE HYDROCHLORIDE 50 MG/ML
50 INJECTION INTRAMUSCULAR; INTRAVENOUS ONCE AS NEEDED
OUTPATIENT
Start: 2024-07-12

## 2024-07-12 NOTE — PROGRESS NOTES
Please let the patient know that her potassium level is now normal. Her iron levels are low, I will order Iron infusion for her. Thank you.

## 2024-07-12 NOTE — TELEPHONE ENCOUNTER
----- Message from ANNAMARIA Godinez sent at 7/12/2024 12:45 PM CDT -----  Please let the patient know that her potassium level is now normal. Her iron levels are low, I will order Iron infusion for her. Thank you.     Pt states understanding.

## 2024-07-18 ENCOUNTER — TELEPHONE (OUTPATIENT)
Dept: INTERNAL MEDICINE | Facility: CLINIC | Age: 59
End: 2024-07-18
Payer: MEDICAID

## 2024-07-22 ENCOUNTER — INFUSION (OUTPATIENT)
Dept: INFUSION THERAPY | Facility: HOSPITAL | Age: 59
End: 2024-07-22
Attending: INTERNAL MEDICINE
Payer: MEDICAID

## 2024-07-22 ENCOUNTER — HOSPITAL ENCOUNTER (EMERGENCY)
Facility: HOSPITAL | Age: 59
Discharge: HOME | End: 2024-07-22
Attending: EMERGENCY MEDICINE
Payer: MEDICAID

## 2024-07-22 VITALS
OXYGEN SATURATION: 94 % | WEIGHT: 183.88 LBS | HEART RATE: 76 BPM | HEIGHT: 66 IN | DIASTOLIC BLOOD PRESSURE: 66 MMHG | SYSTOLIC BLOOD PRESSURE: 143 MMHG | BODY MASS INDEX: 29.55 KG/M2 | RESPIRATION RATE: 18 BRPM | TEMPERATURE: 98 F

## 2024-07-22 VITALS
HEIGHT: 66 IN | RESPIRATION RATE: 20 BRPM | WEIGHT: 183.88 LBS | BODY MASS INDEX: 29.55 KG/M2 | TEMPERATURE: 98 F | DIASTOLIC BLOOD PRESSURE: 78 MMHG | HEART RATE: 64 BPM | SYSTOLIC BLOOD PRESSURE: 145 MMHG

## 2024-07-22 DIAGNOSIS — I73.9 INTERMITTENT CLAUDICATION: Primary | ICD-10-CM

## 2024-07-22 DIAGNOSIS — D50.9 IRON DEFICIENCY ANEMIA, UNSPECIFIED IRON DEFICIENCY ANEMIA TYPE: Primary | ICD-10-CM

## 2024-07-22 LAB
ANION GAP SERPL CALC-SCNC: 8 MEQ/L
BACTERIA #/AREA URNS AUTO: ABNORMAL /HPF
BASOPHILS # BLD AUTO: 0.03 X10(3)/MCL
BASOPHILS NFR BLD AUTO: 0.5 %
BILIRUB UR QL STRIP.AUTO: NEGATIVE
BUN SERPL-MCNC: 23.4 MG/DL (ref 9.8–20.1)
CALCIUM SERPL-MCNC: 9.4 MG/DL (ref 8.4–10.2)
CHLORIDE SERPL-SCNC: 109 MMOL/L (ref 98–107)
CK SERPL-CCNC: 47 U/L (ref 29–168)
CLARITY UR: CLEAR
CO2 SERPL-SCNC: 19 MMOL/L (ref 22–29)
COLOR UR AUTO: YELLOW
CREAT SERPL-MCNC: 1.52 MG/DL (ref 0.55–1.02)
CREAT/UREA NIT SERPL: 15
EOSINOPHIL # BLD AUTO: 0.1 X10(3)/MCL (ref 0–0.9)
EOSINOPHIL NFR BLD AUTO: 1.8 %
ERYTHROCYTE [DISTWIDTH] IN BLOOD BY AUTOMATED COUNT: 14.6 % (ref 11.5–17)
GFR SERPLBLD CREATININE-BSD FMLA CKD-EPI: 40 ML/MIN/1.73/M2
GLUCOSE SERPL-MCNC: 75 MG/DL (ref 74–100)
GLUCOSE UR QL STRIP: NORMAL
HCT VFR BLD AUTO: 36.1 % (ref 37–47)
HGB BLD-MCNC: 12 G/DL (ref 12–16)
HGB UR QL STRIP: ABNORMAL
HYALINE CASTS #/AREA URNS LPF: ABNORMAL /LPF
IMM GRANULOCYTES # BLD AUTO: 0.02 X10(3)/MCL (ref 0–0.04)
IMM GRANULOCYTES NFR BLD AUTO: 0.4 %
KETONES UR QL STRIP: NEGATIVE
LACTATE SERPL-SCNC: 2.9 MMOL/L (ref 0.5–2.2)
LEUKOCYTE ESTERASE UR QL STRIP: 500
LYMPHOCYTES # BLD AUTO: 1.62 X10(3)/MCL (ref 0.6–4.6)
LYMPHOCYTES NFR BLD AUTO: 29.4 %
MCH RBC QN AUTO: 30 PG (ref 27–31)
MCHC RBC AUTO-ENTMCNC: 33.2 G/DL (ref 33–36)
MCV RBC AUTO: 90.3 FL (ref 80–94)
MONOCYTES # BLD AUTO: 0.26 X10(3)/MCL (ref 0.1–1.3)
MONOCYTES NFR BLD AUTO: 4.7 %
MUCOUS THREADS URNS QL MICRO: ABNORMAL /LPF
NEUTROPHILS # BLD AUTO: 3.48 X10(3)/MCL (ref 2.1–9.2)
NEUTROPHILS NFR BLD AUTO: 63.2 %
NITRITE UR QL STRIP: NEGATIVE
NRBC BLD AUTO-RTO: 0 %
PH UR STRIP: 5.5 [PH]
PLATELET # BLD AUTO: 395 X10(3)/MCL (ref 130–400)
PMV BLD AUTO: 9.9 FL (ref 7.4–10.4)
POTASSIUM SERPL-SCNC: 4.9 MMOL/L (ref 3.5–5.1)
PROT UR QL STRIP: ABNORMAL
RBC # BLD AUTO: 4 X10(6)/MCL (ref 4.2–5.4)
RBC #/AREA URNS AUTO: ABNORMAL /HPF
SODIUM SERPL-SCNC: 136 MMOL/L (ref 136–145)
SP GR UR STRIP.AUTO: 1.02 (ref 1–1.03)
SQUAMOUS #/AREA URNS LPF: ABNORMAL /HPF
UROBILINOGEN UR STRIP-ACNC: NORMAL
WBC # BLD AUTO: 5.51 X10(3)/MCL (ref 4.5–11.5)
WBC #/AREA URNS AUTO: ABNORMAL /HPF

## 2024-07-22 PROCEDURE — 99283 EMERGENCY DEPT VISIT LOW MDM: CPT | Mod: 25

## 2024-07-22 PROCEDURE — 87086 URINE CULTURE/COLONY COUNT: CPT | Performed by: EMERGENCY MEDICINE

## 2024-07-22 PROCEDURE — 82550 ASSAY OF CK (CPK): CPT | Performed by: EMERGENCY MEDICINE

## 2024-07-22 PROCEDURE — A4216 STERILE WATER/SALINE, 10 ML: HCPCS | Performed by: NURSE PRACTITIONER

## 2024-07-22 PROCEDURE — 85025 COMPLETE CBC W/AUTO DIFF WBC: CPT | Performed by: EMERGENCY MEDICINE

## 2024-07-22 PROCEDURE — 83605 ASSAY OF LACTIC ACID: CPT | Performed by: EMERGENCY MEDICINE

## 2024-07-22 PROCEDURE — 96365 THER/PROPH/DIAG IV INF INIT: CPT

## 2024-07-22 PROCEDURE — 25000003 PHARM REV CODE 250: Performed by: NURSE PRACTITIONER

## 2024-07-22 PROCEDURE — 81001 URINALYSIS AUTO W/SCOPE: CPT | Performed by: EMERGENCY MEDICINE

## 2024-07-22 PROCEDURE — 63600175 PHARM REV CODE 636 W HCPCS: Performed by: NURSE PRACTITIONER

## 2024-07-22 PROCEDURE — 25000003 PHARM REV CODE 250: Performed by: EMERGENCY MEDICINE

## 2024-07-22 PROCEDURE — 80048 BASIC METABOLIC PNL TOTAL CA: CPT | Performed by: EMERGENCY MEDICINE

## 2024-07-22 RX ORDER — DIPHENHYDRAMINE HYDROCHLORIDE 50 MG/ML
50 INJECTION INTRAMUSCULAR; INTRAVENOUS ONCE AS NEEDED
OUTPATIENT
Start: 2024-07-29

## 2024-07-22 RX ORDER — SODIUM CHLORIDE 0.9 % (FLUSH) 0.9 %
10 SYRINGE (ML) INJECTION
Status: DISCONTINUED | OUTPATIENT
Start: 2024-07-22 | End: 2024-07-22 | Stop reason: HOSPADM

## 2024-07-22 RX ORDER — EPINEPHRINE 0.3 MG/.3ML
0.3 INJECTION SUBCUTANEOUS ONCE AS NEEDED
OUTPATIENT
Start: 2024-07-29

## 2024-07-22 RX ORDER — HYDROCODONE BITARTRATE AND ACETAMINOPHEN 10; 325 MG/1; MG/1
1 TABLET ORAL
Status: COMPLETED | OUTPATIENT
Start: 2024-07-22 | End: 2024-07-22

## 2024-07-22 RX ORDER — SODIUM CHLORIDE 0.9 % (FLUSH) 0.9 %
10 SYRINGE (ML) INJECTION
OUTPATIENT
Start: 2024-07-29

## 2024-07-22 RX ADMIN — Medication 10 ML: at 10:07

## 2024-07-22 RX ADMIN — HYDROCODONE BITARTRATE AND ACETAMINOPHEN 1 TABLET: 10; 325 TABLET ORAL at 02:07

## 2024-07-22 RX ADMIN — SODIUM CHLORIDE 125 MG: 9 INJECTION, SOLUTION INTRAVENOUS at 09:07

## 2024-07-22 NOTE — ED PROVIDER NOTES
ED PROVIDER NOTE  7/22/2024    CHIEF COMPLAINT:   Chief Complaint   Patient presents with    Leg Pain    Back Pain     C/o bilateral leg pain, especially right leg, back pain and body aches x 3 days. Sent from infusion center.        HISTORY OF PRESENT ILLNESS:   Vi Hampton is a 58 y.o. female who presents with chief complaint Leg pain.  Onset was 3 days ago whenever she began having pain in her lower legs brought on by walking that resolves with rest.  Reports history of peripheral artery disease and lupus.  Remote history of DVT for which she was on Eliquis.    The history is provided by the patient.         REVIEW OF SYSTEMS: as noted in the HPI.  NURSING NOTES REVIEWED      PAST MEDICAL/SURGICAL HISTORY:   Past Medical History:   Diagnosis Date    Arthritis     knees    Asthma     Atrial fibrillation     Chronic constipation     Diabetes mellitus     Encounter for blood transfusion 10/2014    GERD (gastroesophageal reflux disease)     Gout     Hypertension     Lupus 2004    SLE    Renal disorder     SLE (systemic lupus erythematosus)       Past Surgical History:   Procedure Laterality Date    APPENDECTOMY      CHOLECYSTECTOMY      COLONOSCOPY N/A 04/19/2018    Procedure: COLONOSCOPY;  Surgeon: Minerva Porras MD;  Location: [facility];  Service: Endoscopy;  Laterality: N/A;    ESOPHAGOGASTRODUODENOSCOPY N/A 04/19/2018    Procedure: ESOPHAGOGASTRODUODENOSCOPY (EGD);  Surgeon: Minerva Porras MD;  Location: [facility];  Service: Endoscopy;  Laterality: N/A;    HYSTERECTOMY      JOINT REPLACEMENT Right 08/07/2015    Knee    LYMPH NODE BIOPSY Left 2014    MASTECTOMY      Left arm- NO BP    PARTIAL HYSTERECTOMY         FAMILY HISTORY:   Family History   Problem Relation Name Age of Onset    Heart block Mother Mother     Arthritis Mother Mother     Asthma Mother Mother     Diabetes Mother Mother     Heart disease Father Romero hampton     Diabetes Sister      Heart disease Sister      Kidney disease  Sister         SOCIAL HISTORY:   Social History     Tobacco Use    Smoking status: Some Days     Current packs/day: 0.00     Types: Cigarettes     Start date: 1980     Last attempt to quit: 2022     Years since quittin.2    Smokeless tobacco: Never   Substance Use Topics    Alcohol use: No    Drug use: No       ALLERGIES:   Review of patient's allergies indicates:   Allergen Reactions    Ketorolac (pf) Swelling    Penicillins Hives    Percocet [oxycodone-acetaminophen] Hives and Itching    Tramadol Hives       PHYSICAL EXAM:  Initial Vitals [24 1100]   BP Pulse Resp Temp SpO2   136/82 86 20 97.2 °F (36.2 °C) 100 %      MAP       --         Physical Exam    Nursing note and vitals reviewed.  Constitutional: She appears well-developed and well-nourished.   HENT:   Head: Normocephalic and atraumatic.   Mouth/Throat: Uvula is midline and mucous membranes are normal.   Eyes: EOM are normal. Pupils are equal, round, and reactive to light.   Neck: Trachea normal. Neck supple.   Cardiovascular:  Normal rate, regular rhythm, intact distal pulses and normal pulses.           Dopplerable dorsalis pedis pulses bilateral.   Pulmonary/Chest: Effort normal and breath sounds normal.   Abdominal: Abdomen is soft. Bowel sounds are normal. There is no abdominal tenderness. There is no rebound and no guarding.   Musculoskeletal:         General: Normal range of motion.      Cervical back: Neck supple.     Neurological: She is alert and oriented to person, place, and time. GCS eye subscore is 4. GCS verbal subscore is 5. GCS motor subscore is 6.   Skin: Skin is warm and dry.   Psychiatric: She has a normal mood and affect. Her speech is normal. Thought content normal.         RESULTS:  Labs Reviewed   BASIC METABOLIC PANEL - Abnormal       Result Value    Sodium 136      Potassium 4.9      Chloride 109 (*)     CO2 19 (*)     Glucose 75      Blood Urea Nitrogen 23.4 (*)     Creatinine 1.52 (*)     BUN/Creatinine Ratio  15      Calcium 9.4      Anion Gap 8.0      eGFR 40     LACTIC ACID, PLASMA - Abnormal    Lactic Acid Level 2.9 (*)    URINALYSIS, REFLEX TO URINE CULTURE - Abnormal    Color, UA Yellow      Appearance, UA Clear      Specific Gravity, UA 1.022      pH, UA 5.5      Protein, UA Trace (*)     Glucose, UA Normal      Ketones, UA Negative      Blood, UA Trace (*)     Bilirubin, UA Negative      Urobilinogen, UA Normal      Nitrites, UA Negative      Leukocyte Esterase,  (*)     RBC, UA 0-5      WBC, UA 11-20 (*)     Bacteria, UA None Seen      Squamous Epithelial Cells, UA Occ (*)     Mucous, UA Trace (*)     Hyaline Casts, UA 11-20 (*)    CBC WITH DIFFERENTIAL - Abnormal    WBC 5.51      RBC 4.00 (*)     Hgb 12.0      Hct 36.1 (*)     MCV 90.3      MCH 30.0      MCHC 33.2      RDW 14.6      Platelet 395      MPV 9.9      Neut % 63.2      Lymph % 29.4      Mono % 4.7      Eos % 1.8      Basophil % 0.5      Lymph # 1.62      Neut # 3.48      Mono # 0.26      Eos # 0.10      Baso # 0.03      IG# 0.02      IG% 0.4      NRBC% 0.0     CK - Normal    Creatine Kinase 47     CULTURE, URINE    Urine Culture No Significant Growth     CBC W/ AUTO DIFFERENTIAL    Narrative:     The following orders were created for panel order CBC auto differential.  Procedure                               Abnormality         Status                     ---------                               -----------         ------                     CBC with Differential[[id number]]       Abnormal            Final result                 Please view results for these tests on the individual orders.     Imaging Results    None         PROCEDURES:  Procedures    ECG:       ED COURSE AND MEDICAL DECISION MAKING:  Medications   HYDROcodone-acetaminophen  mg per tablet 1 tablet (1 tablet Oral Given 7/22/24 1400)     ED Course as of 07/24/24 1126   Mon Jul 22, 2024   1251 WBC: 5.51 [IB]   1253 Hemoglobin: 12.0 [IB]   1253 Platelet Count: 395 [IB]   1253  Creatinine(!): 1.52  Increased from 1.37 [IB]   1253 Anion Gap: 8.0 [IB]   1253 CO2(!): 19 [IB]   1253 Lactic Acid Level(!): 2.9 [IB]   1338 CPK: 47 [IB]      ED Course User Index  [IB] Kb Friend, DO        Medical Decision Making  58-year-old female who presents with lower extremity pain brought on with walking and resolves with rest that has been going on for a couple of days now.  Differential diagnosis includes acute ischemic limb, intermittent claudication, muscle strain.  Review of medical record shows that she had peripheral artery imaging that showed moderate-to-severe ortho flow reduction of bilateral lower extremities.  She has a good color and warmth of the feet with dopplerable pulses in his not having pain at rest.  Do not suspect acute limb ischemia.  Discussed with the patient that she has intermittent claudication which she just saw Cardiology and they start her on a antiplatelet.  Discussed walking to try to help promote collateral circulation formation and have place referral for vascular surgery as well.  Given strict ED return precautions. I have spoken with the patient and/or caregivers. I have explained the patient's condition, diagnoses and treatment plan based on the information available to me at this time. I have answered the patient's and/or caregiver's questions and addressed any concerns. The patient and/or caregivers have as good an understanding of the patient's diagnosis, condition and treatment plan as can be expected at this point. The vital signs have been stable. The patient's condition is stable and appropriate for discharge from the emergency department.     The patient will pursue further outpatient evaluation with the primary care physician or other designated or consulting physician as outlined in the discharge instructions. The patient and/or caregivers are agreeable to this plan of care and follow-up instructions have been explained in detail. The patient and/or  caregivers have received these instructions in written format and have expressed an understanding of the discharge instructions. The patient and/or caregivers are aware that any significant change in condition or worsening of symptoms should prompt an immediate return to this or the closest emergency department or a call to 911.    Amount and/or Complexity of Data Reviewed  External Data Reviewed: radiology and notes.     Details: Dx: Claudication [I73.9 (ICD-10-CM)]    Interpretation Summary    The right lower extremity demonstrated a loss of multiphasic waveforms at the level of the distal SFA.  The BILLY on the right correlates with a moderately severe obstruction.  The right lower extremity demonstrated moderately severe arterial flow reduction.     The left lower extremity demonstrated a loss of multiphasic waveforms at the level of the mid SFA.  The BILLY on the left correlates with a moderately severe obstruction.  The left lower extremity demonstrated moderately severe arterial flow reduction.    Labs: ordered. Decision-making details documented in ED Course.    Risk  OTC drugs.  Prescription drug management.        CLINICAL IMPRESSION:  1. Intermittent claudication        DISPOSITION:   ED Disposition Condition    Discharge Stable            ED Prescriptions    None       Follow-up Information       Follow up With Specialties Details Why Contact Info    Roxy Schultz MD Internal Medicine Schedule an appointment as soon as possible for a visit   [address] [address]  [phone]      [facility] Emergency Dept Emergency Medicine  If symptoms worsen [address] [address] [facility] [id number] [phone]               Kb Friend DO  07/24/24 1126

## 2024-07-22 NOTE — NURSING
0849  Pt arrived for #1 ferrlecit.  Pt walking very slowly and c/o pain 8/10 to rt leg.  Pt states leg feels very heavy.  States she has been out of her pain meds for one week.  Reports a lack of appetite and difficulty sleeping.  Pt voiced she will get her infusion then go to the ER.  1015  Infusion complete.  Report called to KARYN Barry RN in ER.  Informed nurse that IV was left intact in the event they needed to use it.  1025  Pt taken to ER via WC.

## 2024-07-23 DIAGNOSIS — M65.321 TRIGGER INDEX FINGER OF RIGHT HAND: Primary | ICD-10-CM

## 2024-07-24 LAB — BACTERIA UR CULT: NORMAL

## 2024-07-24 NOTE — PROGRESS NOTES
Patient has a hx of right middle/3rd digit trigger finger, was recently referred to [facility] clinic for consideration of injections however this is done in [facility]. Patient was previously referred to [facility] in 4/2023 and was last evaluated in 11/2023 and was told to follow-up with Dr. Elizalde in orthopedic hand clinic at next available for further evaluation and surgical options if agreeable. Will inform patient of this and provide her with hand clinic phone number to re-establish care.

## 2024-07-25 ENCOUNTER — TELEPHONE (OUTPATIENT)
Dept: RHEUMATOLOGY | Facility: CLINIC | Age: 59
End: 2024-07-25
Payer: MEDICAID

## 2024-07-25 ENCOUNTER — OFFICE VISIT (OUTPATIENT)
Dept: INTERNAL MEDICINE | Facility: CLINIC | Age: 59
End: 2024-07-25
Payer: MEDICAID

## 2024-07-25 ENCOUNTER — TELEPHONE (OUTPATIENT)
Dept: INTERNAL MEDICINE | Facility: CLINIC | Age: 59
End: 2024-07-25
Payer: MEDICAID

## 2024-07-25 VITALS
OXYGEN SATURATION: 95 % | HEART RATE: 60 BPM | RESPIRATION RATE: 20 BRPM | TEMPERATURE: 99 F | DIASTOLIC BLOOD PRESSURE: 67 MMHG | BODY MASS INDEX: 28.92 KG/M2 | WEIGHT: 179.19 LBS | SYSTOLIC BLOOD PRESSURE: 112 MMHG

## 2024-07-25 DIAGNOSIS — G62.9 NEUROPATHY: ICD-10-CM

## 2024-07-25 DIAGNOSIS — E11.9 ENCOUNTER FOR DIABETIC FOOT EXAM: ICD-10-CM

## 2024-07-25 DIAGNOSIS — K63.5 SESSILE COLONIC POLYP: ICD-10-CM

## 2024-07-25 DIAGNOSIS — M79.7 FIBROMYALGIA: ICD-10-CM

## 2024-07-25 DIAGNOSIS — Z80.0 FAMILY HISTORY OF COLON CANCER IN FATHER: ICD-10-CM

## 2024-07-25 DIAGNOSIS — E11.00 TYPE 2 DIABETES MELLITUS WITH HYPEROSMOLARITY WITHOUT COMA, WITH LONG-TERM CURRENT USE OF INSULIN: Primary | ICD-10-CM

## 2024-07-25 DIAGNOSIS — Z79.4 TYPE 2 DIABETES MELLITUS WITH HYPEROSMOLARITY WITHOUT COMA, WITH LONG-TERM CURRENT USE OF INSULIN: Primary | ICD-10-CM

## 2024-07-25 LAB — HBA1C MFR BLD: 6 %

## 2024-07-25 PROCEDURE — 99214 OFFICE O/P EST MOD 30 MIN: CPT | Mod: PBBFAC

## 2024-07-25 PROCEDURE — 83036 HEMOGLOBIN GLYCOSYLATED A1C: CPT | Mod: PBBFAC

## 2024-07-25 NOTE — TELEPHONE ENCOUNTER
"At approx 10 AM I heard someone speaking aggressively while being in room 1.Upon opening door to enter in the main lobby  was noted speaking aggressively to to KAMILAH Fajardo.  She states to Tana " You are not the big dog I want to talk to the nurse I dont know you and don't want to talk to you". During the conversation pt was very aggressive states "  told me that dillon said I do not need any fucking pain medications how the fuck is she going to tell me that I don't need any pain medications when I have been having lupus since 2017" She then proceeds to repeat " said it to me she needs to get her self out here now so we can handle this because she doesn't know what the fuck she Is talking about" all statements directed towards . security notified of the situation.   "

## 2024-07-25 NOTE — TELEPHONE ENCOUNTER
"Patient knocked on the clinic entrance  door aggressively at  approximately  10 am  asking for Dr. Last. I asked her to have a seat so I can get more information on why she was so upset I attempt to deescalate the situation.      Ms. Ulrich  yelled "where that bitch at ?" I asked who are you referring to ma'am she explain  how doctor Marion  her PCP told her that Dr. Last wanted stop her pain medication as she explain she kept calling   out her name and said "I will beat beat her ass how is she going to take my pain medication away from me how is she going to tell me I don't need pain medications"  once she saw Nati the nurse she told me she didn't wanted talked to me because I could not do anything for her   "

## 2024-07-25 NOTE — PROGRESS NOTES
[facility] Internal Medicine Clinic Visit    Chief Complaint:      Follow-up (C/o pain medication needs refills and other medications. C/o right ear pain and joint pain.)     Subjective:     HPI:  Vi Ulrich is a 58 y.o. female with has a past medical history of Arthritis, Asthma, Atrial fibrillation, Chronic constipation, Diabetes mellitus, Encounter for blood transfusion, GERD (gastroesophageal reflux disease), Gout, Hypertension, Lupus, Renal disorder, and SLE (systemic lupus erythematosus). She presents to clinic today for follow-up of chronic medical conditions. Of note, patient e-request for #90 of Norco 10-325mg q 8 hr for SLE was denied on 7/8/24. I have previously discussed request with [facility], narcotic medication is not indicated for SLE flare. Patient expressed extreme discomfort due to not taking scheduled narcotic medications.     Today, patient presents with her daughter. She complains primarily of bilateral leg pain 2/2 to PAD. She is being followed by vascular surgeons however, states they are not treating her leg pain and that recommended exercise program is ineffective. She cannot use current rollaid as it does not have wheel locks and slides when she tries to sit down after fatigue with ambulation. Can not walk long distances. Patient stated that she was in severe all over pain 2/2 to SLE flare in setting of fibromyalgia. Though previous notes related Cellcept tablet intolerance, she is tolerating capsule Cellcept much better but still feels she is in flare despite scheduled steroids, Cellcept and Lupkynis and continues to request Norco #90 as she has been in severe generalized body pain since her e-request on 7/8.     Patient also has a family history of colon ca through her father who was diagnosed in her 40. Is overdue for repeat colonoscopy, most recent done in 2018. Though previous documentation states this was negative for findings per patient, EMR chart review reveals findings of 1 x  10mm transverse colon sessile polyp. He was to repeat in 3 years however this was never completed. Patient reports receiving iron infusions for years since going to [facility] several years ago.     Continues to reports daily AM hypoglycemic episodes, as she can only tolerate 1 meal per day due to nausea and vomiting. This has been going on for the last year. Patient states has never had an EGD but this was done in 2018. Patient has lost over 4 kg since 3/2023.       Review of Systems  A comprehensive 12 point review of systems was completed.  Please see above for pertinent positives and negatives.     Answers submitted by the patient for this visit:  Back Pain Questionnaire (Submitted on 7/22/2024)  Chief Complaint: Back pain  Chronicity: chronic  Onset: more than 1 year ago  Frequency: constantly  Progression since onset: gradually worsening  Pain location: lumbar spine, sacro-iliac, thoracic spine  Pain quality: shooting, stabbing  Radiates to: left foot, left knee, left thigh, right foot, right knee, right thigh  Pain - numeric: 8/10  Pain is: the same all the time  Aggravated by: bending, position, lying down, sitting, standing, twisting  Stiffness is present: all day  abdominal pain: No  bladder incontinence: No  bowel incontinence: No  chest pain: Yes  dysuria: No  fever: No  headaches: Yes  leg pain: Yes  numbness: Yes  paresis: No  paresthesias: Yes  pelvic pain: No  perianal numbness: No  tingling: Yes  weakness: Yes  weight loss: Yes  genital pain: No  hematuria: No  Pain severity: severe  Improvement on treatment: no relief    Objective:   Last 24 Hour Vital Signs:  Vitals  BP: 112/67  Temp: 98.8 °F (37.1 °C)  Temp Source: Oral  Pulse: 60  Resp: 20  SpO2: 95 %  Weight: 81.3 kg (179 lb 3.2 oz)    Physical Examination:  Physical Exam  Vitals and nursing note reviewed.   Constitutional:       General: She is not in acute distress.     Appearance: She is not ill-appearing, toxic-appearing or diaphoretic.       Comments: No joint tenderness, erythema, or swelling of upper and lower extremities. No evidence of active synovitis. No rashes. No oral/nasal mucosal ulcers. Smells of cigarette smoke.    Cardiovascular:      Rate and Rhythm: Normal rate and regular rhythm.      Pulses: Decreased pulses.           Dorsalis pedis pulses are 1+ on the right side and 1+ on the left side.        Posterior tibial pulses are 1+ on the right side and 1+ on the left side.      Heart sounds: No murmur heard.  Pulmonary:      Effort: Pulmonary effort is normal. No respiratory distress.      Breath sounds: Normal breath sounds.   Abdominal:      General: Abdomen is flat. Bowel sounds are normal. There is no distension.      Palpations: Abdomen is soft.      Tenderness: There is no abdominal tenderness.   Musculoskeletal:      Right lower leg: No edema.      Left lower leg: No edema.      Right foot: Normal range of motion. No deformity, bunion, Charcot foot, foot drop or prominent metatarsal heads.      Left foot: Normal range of motion. No deformity, bunion, Charcot foot, foot drop or prominent metatarsal heads.   Feet:      Right foot:      Protective Sensation: 10 sites tested.  8 sites sensed.      Skin integrity: Skin integrity normal. No ulcer, blister, skin breakdown, erythema, warmth, callus, dry skin or fissure.      Toenail Condition: Right toenails are abnormally thick and long. Fungal disease present.     Left foot:      Protective Sensation: 10 sites tested.  5 sites sensed.      Skin integrity: Skin integrity normal. No ulcer, blister, skin breakdown, erythema, warmth, callus, dry skin or fissure.      Toenail Condition: Left toenails are abnormally thick and long. Fungal disease present.  Neurological:      Mental Status: She is alert.   Psychiatric:         Attention and Perception: Attention normal. She does not perceive auditory or visual hallucinations.         Mood and Affect: Affect is labile.         Behavior: Behavior  "is aggressive. Behavior is not combative.        Assessment & Plan:     T2DM  Hypoglycemic Episodes  Diabetic Foot Exam   - Patient is not using insulin at this time   - Dexcom was not approved for renewal   - Will discuss with [facility]   - Diabetic foot exam consistent with neuropathy, referral to wound care clinic for diabetic foot care     SLE  Fibromyalgia   Hx of Lupus nephritis  Arthritis  - Physical exam not consistent with uncontrolled SLE/flare up  - Please continue to follow Nephrology as well as Rheumatology  - Continue Prednisone, CellCept capsules and Lupkynis, reports compliance with capsules   - Discussed the importance of compliance with scheduled steroids and DMARD medications, patient verbalized understanding   - Scheduled high dose narcotic medications are not indicated for SLE flares first line and [facility] will no longer be filling these medications, patient verbalized understanding. Patient will have to discuss SLE flare up with her rheumatologist, patient verbalized understanding.   - Per  review, patient has been receiving scheduled Norco 10/325mg #90 every month since 11/2023 from [facility]  -Offered formal pain management for fibromyalgia, patient was agreeable.   -"+ anticardioliipin antibodies, pt can't make wafrin apts, continue eliquis" per last clinic note     Depression  Anxiety  - All psychiatric medications managed by Dr. Noel, continue routine follow up   - Continue routine psychiatric counseling     Hypertension  -BP today: 112/67  -Continue home meds    Health Maintenance  Colon cancer screening:  "Colonoscopy 2018 per the patient and per chart stating no findings" per last note however procedure report consistent with removal of 1 x 10mm transverse colon sessile polyp. Urgent referral placed to [facility] for overdue repeat colonoscopy in setting of primary family history.   Lung Cancer screen:  N/A  Mammogram:  BI-RADS 2 bilaterally 4/4/23 recommended annual exam " next will be due April 2024, ordered today  PapSmear:  Upcoming gynecology appointment for 9/20/24  Hep C:  Ordered today  Dexa:  N/A  A1C:  5.4  Vaccines      Pneumonia:  Patient got a [facility]      Annual Flu:  Received at [facility]      Zoster:  She will look if this was received at [facility]      Tdap:  She will check if this was received at [facility]      Follow-ups  -Follow-up medicine clinic in 6 months    ED precautions given.       Patient case and plan of care discussed with Dr. Louise Schultz MD   Internal Medicine - [facility]

## 2024-07-29 ENCOUNTER — INFUSION (OUTPATIENT)
Dept: INFUSION THERAPY | Facility: HOSPITAL | Age: 59
End: 2024-07-29
Attending: INTERNAL MEDICINE
Payer: MEDICAID

## 2024-07-29 VITALS
WEIGHT: 178.13 LBS | HEIGHT: 66 IN | DIASTOLIC BLOOD PRESSURE: 71 MMHG | OXYGEN SATURATION: 100 % | BODY MASS INDEX: 28.63 KG/M2 | TEMPERATURE: 99 F | HEART RATE: 72 BPM | RESPIRATION RATE: 20 BRPM | SYSTOLIC BLOOD PRESSURE: 110 MMHG

## 2024-07-29 DIAGNOSIS — D50.9 IRON DEFICIENCY ANEMIA, UNSPECIFIED IRON DEFICIENCY ANEMIA TYPE: Primary | ICD-10-CM

## 2024-07-29 PROCEDURE — 96365 THER/PROPH/DIAG IV INF INIT: CPT

## 2024-07-29 PROCEDURE — 25000003 PHARM REV CODE 250: Performed by: NURSE PRACTITIONER

## 2024-07-29 PROCEDURE — 63600175 PHARM REV CODE 636 W HCPCS: Performed by: NURSE PRACTITIONER

## 2024-07-29 RX ORDER — DIPHENHYDRAMINE HYDROCHLORIDE 50 MG/ML
50 INJECTION INTRAMUSCULAR; INTRAVENOUS ONCE AS NEEDED
OUTPATIENT
Start: 2024-08-05

## 2024-07-29 RX ORDER — SODIUM CHLORIDE 0.9 % (FLUSH) 0.9 %
10 SYRINGE (ML) INJECTION
Status: DISCONTINUED | OUTPATIENT
Start: 2024-07-29 | End: 2024-07-29 | Stop reason: HOSPADM

## 2024-07-29 RX ORDER — EPINEPHRINE 0.3 MG/.3ML
0.3 INJECTION SUBCUTANEOUS ONCE AS NEEDED
OUTPATIENT
Start: 2024-08-05

## 2024-07-29 RX ORDER — EPINEPHRINE 0.3 MG/.3ML
0.3 INJECTION SUBCUTANEOUS ONCE AS NEEDED
Status: DISCONTINUED | OUTPATIENT
Start: 2024-07-29 | End: 2024-07-29 | Stop reason: HOSPADM

## 2024-07-29 RX ORDER — SODIUM CHLORIDE 0.9 % (FLUSH) 0.9 %
10 SYRINGE (ML) INJECTION
OUTPATIENT
Start: 2024-08-05

## 2024-07-29 RX ORDER — DIPHENHYDRAMINE HYDROCHLORIDE 50 MG/ML
50 INJECTION INTRAMUSCULAR; INTRAVENOUS ONCE AS NEEDED
Status: DISCONTINUED | OUTPATIENT
Start: 2024-07-29 | End: 2024-07-29 | Stop reason: HOSPADM

## 2024-07-29 RX ADMIN — SODIUM CHLORIDE 125 MG: 9 INJECTION, SOLUTION INTRAVENOUS at 08:07

## 2024-07-31 ENCOUNTER — PATIENT OUTREACH (OUTPATIENT)
Dept: ADMINISTRATIVE | Facility: OTHER | Age: 59
End: 2024-07-31
Payer: MEDICAID

## 2024-07-31 NOTE — PROGRESS NOTES
Referral addressed by [facility] , Regina Salter 7/18/24.     81 year old man PMH HTN, HLD, AFib s/p ablation (4/24/2018) presents with shortness of breath, orthopnea, LE edema x 1 month found to have new bilateral airspace infiltrates and nodules associated with mediastinal LAD on CT chest. Found to have E Coli UTI.

## 2024-08-09 ENCOUNTER — HOSPITAL ENCOUNTER (OUTPATIENT)
Dept: WOUND CARE | Facility: HOSPITAL | Age: 59
Discharge: HOME | End: 2024-08-09
Attending: FAMILY MEDICINE
Payer: MEDICAID

## 2024-08-09 ENCOUNTER — HOSPITAL ENCOUNTER (EMERGENCY)
Facility: HOSPITAL | Age: 59
Discharge: HOME | End: 2024-08-09
Attending: INTERNAL MEDICINE
Payer: MEDICAID

## 2024-08-09 VITALS
WEIGHT: 176.38 LBS | OXYGEN SATURATION: 99 % | DIASTOLIC BLOOD PRESSURE: 76 MMHG | TEMPERATURE: 99 F | RESPIRATION RATE: 18 BRPM | HEART RATE: 79 BPM | BODY MASS INDEX: 28.47 KG/M2 | SYSTOLIC BLOOD PRESSURE: 130 MMHG

## 2024-08-09 VITALS
SYSTOLIC BLOOD PRESSURE: 117 MMHG | OXYGEN SATURATION: 100 % | TEMPERATURE: 99 F | RESPIRATION RATE: 18 BRPM | HEART RATE: 64 BPM | DIASTOLIC BLOOD PRESSURE: 76 MMHG

## 2024-08-09 DIAGNOSIS — J40 BRONCHITIS: Primary | ICD-10-CM

## 2024-08-09 DIAGNOSIS — L60.3 DYSTROPHIC NAIL: Primary | ICD-10-CM

## 2024-08-09 DIAGNOSIS — E11.9 ENCOUNTER FOR DIABETIC FOOT EXAM: ICD-10-CM

## 2024-08-09 DIAGNOSIS — L84 PRE-ULCERATIVE CORN OR CALLOUS: ICD-10-CM

## 2024-08-09 DIAGNOSIS — R05.9 COUGH: ICD-10-CM

## 2024-08-09 DIAGNOSIS — G62.9 NEUROPATHY: ICD-10-CM

## 2024-08-09 LAB
ALBUMIN SERPL-MCNC: 3.4 G/DL (ref 3.5–5)
ALBUMIN/GLOB SERPL: 0.7 RATIO (ref 1.1–2)
ALP SERPL-CCNC: 128 UNIT/L (ref 40–150)
ALT SERPL-CCNC: 8 UNIT/L (ref 0–55)
ANION GAP SERPL CALC-SCNC: 10 MEQ/L
AST SERPL-CCNC: 18 UNIT/L (ref 5–34)
BASOPHILS # BLD AUTO: 0.02 X10(3)/MCL
BASOPHILS NFR BLD AUTO: 0.5 %
BILIRUB SERPL-MCNC: 0.2 MG/DL
BNP BLD-MCNC: 21.9 PG/ML
BUN SERPL-MCNC: 21.8 MG/DL (ref 9.8–20.1)
CALCIUM SERPL-MCNC: 9.2 MG/DL (ref 8.4–10.2)
CHLORIDE SERPL-SCNC: 109 MMOL/L (ref 98–107)
CO2 SERPL-SCNC: 16 MMOL/L (ref 22–29)
CREAT SERPL-MCNC: 1.74 MG/DL (ref 0.55–1.02)
CREAT/UREA NIT SERPL: 13
EOSINOPHIL # BLD AUTO: 0.09 X10(3)/MCL (ref 0–0.9)
EOSINOPHIL NFR BLD AUTO: 2.1 %
ERYTHROCYTE [DISTWIDTH] IN BLOOD BY AUTOMATED COUNT: 15.2 % (ref 11.5–17)
GFR SERPLBLD CREATININE-BSD FMLA CKD-EPI: 34 ML/MIN/1.73/M2
GLOBULIN SER-MCNC: 4.9 GM/DL (ref 2.4–3.5)
GLUCOSE SERPL-MCNC: 194 MG/DL (ref 74–100)
HCT VFR BLD AUTO: 37.9 % (ref 37–47)
HGB BLD-MCNC: 12.1 G/DL (ref 12–16)
IMM GRANULOCYTES # BLD AUTO: 0.01 X10(3)/MCL (ref 0–0.04)
IMM GRANULOCYTES NFR BLD AUTO: 0.2 %
LYMPHOCYTES # BLD AUTO: 1.65 X10(3)/MCL (ref 0.6–4.6)
LYMPHOCYTES NFR BLD AUTO: 38.1 %
MCH RBC QN AUTO: 28.9 PG (ref 27–31)
MCHC RBC AUTO-ENTMCNC: 31.9 G/DL (ref 33–36)
MCV RBC AUTO: 90.7 FL (ref 80–94)
MONOCYTES # BLD AUTO: 0.23 X10(3)/MCL (ref 0.1–1.3)
MONOCYTES NFR BLD AUTO: 5.3 %
NEUTROPHILS # BLD AUTO: 2.33 X10(3)/MCL (ref 2.1–9.2)
NEUTROPHILS NFR BLD AUTO: 53.8 %
NRBC BLD AUTO-RTO: 0 %
OHS QRS DURATION: 84 MS
OHS QTC CALCULATION: 428 MS
PLATELET # BLD AUTO: 356 X10(3)/MCL (ref 130–400)
PMV BLD AUTO: 10.5 FL (ref 7.4–10.4)
POTASSIUM SERPL-SCNC: 4.8 MMOL/L (ref 3.5–5.1)
PROT SERPL-MCNC: 8.3 GM/DL (ref 6.4–8.3)
RBC # BLD AUTO: 4.18 X10(6)/MCL (ref 4.2–5.4)
SARS-COV-2 RDRP RESP QL NAA+PROBE: NEGATIVE
SODIUM SERPL-SCNC: 135 MMOL/L (ref 136–145)
TROPONIN I SERPL-MCNC: <0.01 NG/ML (ref 0–0.04)
WBC # BLD AUTO: 4.33 X10(3)/MCL (ref 4.5–11.5)

## 2024-08-09 PROCEDURE — 25000003 PHARM REV CODE 250: Performed by: NURSE PRACTITIONER

## 2024-08-09 PROCEDURE — 83880 ASSAY OF NATRIURETIC PEPTIDE: CPT | Performed by: NURSE PRACTITIONER

## 2024-08-09 PROCEDURE — 80053 COMPREHEN METABOLIC PANEL: CPT | Performed by: NURSE PRACTITIONER

## 2024-08-09 PROCEDURE — 11056 PARNG/CUTG B9 HYPRKR LES 2-4: CPT

## 2024-08-09 PROCEDURE — U0002 COVID-19 LAB TEST NON-CDC: HCPCS | Performed by: NURSE PRACTITIONER

## 2024-08-09 PROCEDURE — 93005 ELECTROCARDIOGRAM TRACING: CPT

## 2024-08-09 PROCEDURE — 99211 OFF/OP EST MAY X REQ PHY/QHP: CPT | Mod: 25

## 2024-08-09 PROCEDURE — 94640 AIRWAY INHALATION TREATMENT: CPT

## 2024-08-09 PROCEDURE — 11721 DEBRIDE NAIL 6 OR MORE: CPT

## 2024-08-09 PROCEDURE — 85025 COMPLETE CBC W/AUTO DIFF WBC: CPT | Performed by: NURSE PRACTITIONER

## 2024-08-09 PROCEDURE — 84484 ASSAY OF TROPONIN QUANT: CPT | Performed by: NURSE PRACTITIONER

## 2024-08-09 PROCEDURE — 63600175 PHARM REV CODE 636 W HCPCS: Performed by: NURSE PRACTITIONER

## 2024-08-09 PROCEDURE — 27000999 HC MEDICAL RECORD PHOTO DOCUMENTATION

## 2024-08-09 PROCEDURE — 25000242 PHARM REV CODE 250 ALT 637 W/ HCPCS: Performed by: NURSE PRACTITIONER

## 2024-08-09 PROCEDURE — 11719 TRIM NAIL(S) ANY NUMBER: CPT

## 2024-08-09 PROCEDURE — 99285 EMERGENCY DEPT VISIT HI MDM: CPT | Mod: 25,27

## 2024-08-09 RX ORDER — PREDNISONE 10 MG/1
20 TABLET ORAL
Status: COMPLETED | OUTPATIENT
Start: 2024-08-09 | End: 2024-08-09

## 2024-08-09 RX ORDER — PROMETHAZINE HYDROCHLORIDE AND DEXTROMETHORPHAN HYDROBROMIDE 6.25; 15 MG/5ML; MG/5ML
5 SYRUP ORAL EVERY 6 HOURS PRN
Qty: 100 ML | Refills: 0 | Status: SHIPPED | OUTPATIENT
Start: 2024-08-09 | End: 2024-08-14

## 2024-08-09 RX ORDER — CETIRIZINE HYDROCHLORIDE 10 MG/1
10 TABLET ORAL DAILY
Qty: 14 TABLET | Refills: 0 | Status: SHIPPED | OUTPATIENT
Start: 2024-08-09 | End: 2024-08-23

## 2024-08-09 RX ORDER — IPRATROPIUM BROMIDE AND ALBUTEROL SULFATE 2.5; .5 MG/3ML; MG/3ML
3 SOLUTION RESPIRATORY (INHALATION)
Status: COMPLETED | OUTPATIENT
Start: 2024-08-09 | End: 2024-08-09

## 2024-08-09 RX ORDER — ACYCLOVIR 800 MG/1
800 TABLET ORAL
Qty: 35 TABLET | Refills: 0 | Status: SHIPPED | OUTPATIENT
Start: 2024-08-09 | End: 2024-08-10 | Stop reason: CLARIF

## 2024-08-09 RX ORDER — GUAIFENESIN AND DEXTROMETHORPHAN HYDROBROMIDE 10; 100 MG/5ML; MG/5ML
10 SYRUP ORAL
Status: COMPLETED | OUTPATIENT
Start: 2024-08-09 | End: 2024-08-09

## 2024-08-09 RX ADMIN — PREDNISONE 20 MG: 10 TABLET ORAL at 10:08

## 2024-08-09 RX ADMIN — IPRATROPIUM BROMIDE AND ALBUTEROL SULFATE 3 ML: .5; 3 SOLUTION RESPIRATORY (INHALATION) at 10:08

## 2024-08-09 RX ADMIN — GUAIFENESIN AND DEXTROMETHORPHAN 10 ML: 100; 10 SYRUP ORAL at 10:08

## 2024-08-09 NOTE — PROGRESS NOTES
CHIEF COMPLAINT:  Chief Complaint   Patient presents with    Nail Care     HISTORY OF  PRESENT ILLNESS:  58 y.o. Black or  female being seen today for diabetic foot care.  Patient has a prior medical history of diabetes, lupus, hypertension, etc.  She has been a diabetic for about 20 years, her diabetes has recently been well-controlled at 6.  She reports intermittent numbness and tingling in her feet.  She also does not have sensation in her feet that has been present for many years.  Her primary doctor has sent her to vascular for blockage of her lower extremities, she has a history of a DVT in her right lower extremity.  She has a abnormal ABIs with severe peripheral vascular disease.  She has calluses at the base of the 5th digit bilaterally that have been present for many years and are painful whenever she ambulates.    REVIEW OF SYSTEMS:  Review of Systems   Constitutional:  Negative for chills and fever.   Respiratory:  Negative for cough.    Gastrointestinal:  Negative for nausea.   Musculoskeletal:         As stated in HPI   Skin:         As stated in HPI   Psychiatric/Behavioral: Negative.         Past Medical History:   Diagnosis Date    Arthritis     knees    Asthma     Atrial fibrillation     Chronic constipation     Diabetes mellitus     Encounter for blood transfusion 10/2014    GERD (gastroesophageal reflux disease)     Gout     Hypertension     Lupus 2004    SLE    Renal disorder     SLE (systemic lupus erythematosus)       Past Surgical History:   Procedure Laterality Date    APPENDECTOMY      CHOLECYSTECTOMY      COLONOSCOPY N/A 04/19/2018    Procedure: COLONOSCOPY;  Surgeon: Minerva Porras MD;  [facility];  Service: Endoscopy;  Laterality: N/A;    ESOPHAGOGASTRODUODENOSCOPY N/A 04/19/2018    Procedure: ESOPHAGOGASTRODUODENOSCOPY (EGD);  Surgeon: Minerva Porras MD;  [facility];  Service: Endoscopy;  Laterality: N/A;    HYSTERECTOMY      JOINT  REPLACEMENT Right 08/07/2015    Knee    LYMPH NODE BIOPSY Left 2014    MASTECTOMY      Left arm- NO BP    PARTIAL HYSTERECTOMY        Social History     Socioeconomic History    Marital status: Single    Years of education: 10th   Tobacco Use    Smoking status: Every Day     Types: Vaping with nicotine    Smokeless tobacco: Never   Substance and Sexual Activity    Alcohol use: No    Drug use: No    Sexual activity: Not Currently     Birth control/protection: See Surgical Hx     Social Determinants of Health     Financial Resource Strain: Low Risk  (7/21/2024)    Overall Financial Resource Strain (CARDIA)     Difficulty of Paying Living Expenses: Not hard at all   Food Insecurity: No Food Insecurity (7/21/2024)    Hunger Vital Sign     Worried About Running Out of Food in the Last Year: Never true     Ran Out of Food in the Last Year: Never true   Transportation Needs: No Transportation Needs (1/12/2023)    PRAPARE - Transportation     Lack of Transportation (Medical): No     Lack of Transportation (Non-Medical): No   Physical Activity: Inactive (7/21/2024)    Exercise Vital Sign     Days of Exercise per Week: 0 days     Minutes of Exercise per Session: 0 min   Stress: No Stress Concern Present (7/21/2024)    [employer] - Occupational Stress Questionnaire     Feeling of Stress : Not at all   Housing Stability: Low Risk  (1/12/2023)    Housing Stability Vital Sign     Unable to Pay for Housing in the Last Year: No     Number of Places Lived in the Last Year: 1     Unstable Housing in the Last Year: No   .    Review of Most Recent Wound Care-Related Labs:  Lab Results   Component Value Date/Time    WBC 5.51 07/22/2024 12:11 PM    WBC 3.1 03/03/2023 10:26 AM    WBC 6.23 08/23/2021 11:42 AM    RBC 4.00 (L) 07/22/2024 12:11 PM    RBC 4.27 08/23/2021 11:42 AM    HGB 12.0 07/22/2024 12:11 PM    HGB 12.5 08/23/2021 11:42 AM    HCT 36.1 (L) 07/22/2024 12:11 PM    HCT 38.7 08/23/2021 11:42 AM     MCV 90.3 07/22/2024 12:11 PM    MCV 91 08/23/2021 11:42 AM    MCH 30.0 07/22/2024 12:11 PM    MCH 29.3 08/23/2021 11:42 AM    CREATININE 1.52 (H) 07/22/2024 12:11 PM    CREATININE 1.0 08/23/2021 11:42 AM    HGBA1C 5.7 11/14/2023 07:36 AM    HGBA1C 6.6 (H) 03/04/2020 05:59 AM    CRP 56.00 (H) 06/13/2024 10:32 AM    CRP 64.5 (H) 08/23/2021 11:42 AM        Wound-Related Imaging:            PHYSICAL EXAMINATION:  Blood pressure 117/76, pulse 64, temperature 98.6 °F (37 °C), resp. rate 18, SpO2 100%.  General:  VSS, afebrile. No acute distress.   Respiratory: Non labored.  No coughing.  Cardiovascular:  No peripheral edema.    Musculoskeletal:  Full range of motion of all extremities; no joint deformities or edema.   Neurologic:  A&O X 3.    Psychiatric:  Calm, cooperative.  Mood and effect normal.  Responses appropriate.   Integumentary:       Monofilament exam:  Abnormal bilaterally, she only has slight sensation in the 3rd and 5th digit and base of the 5th digit on the right  Posterior tibial pulses present bilaterally   Nails trimmed times 10   Callus at the base of the right 5th digit and medial great toe pared down with curette  Callus at the base of the left 5th digit pared down with curette  Nail debridement done on left 3rd 4th and 5th digit, nail debridement done on right 3rd digit       ASSESSMENT/PLAN:    Patient Active Problem List    Diagnosis Date Noted    Dystrophic nail 08/09/2024    Heart failure with preserved ejection fraction 03/11/2024    Peripheral artery disease 03/07/2024    Dyspnea on exertion 03/07/2024    Hypoglycemia 10/26/2023    Acute deep vein thrombosis (DVT) of popliteal vein of right lower extremity 10/10/2023    ILD (interstitial lung disease) 10/10/2023    Diabetic glomerulosclerosis 10/10/2023    Back pain 10/10/2023    Chronic obstructive pulmonary disease 06/01/2023    Long term current use of insulin 06/01/2023    Migraine aura without headache 06/01/2023    Moderate recurrent  major depression 06/01/2023    Raynaud's disease 06/01/2023    Thyroiditis 06/01/2023    Vitamin D deficiency 06/01/2023    Trigger index finger of right hand 04/11/2023    BMI 25.0-25.9,adult 04/11/2023    Asthma 02/03/2023    Lupus nephritis, ISN/RPS class III 06/14/2022    Systemic lupus erythematosus with glomerular disease 05/31/2022    Fibromyalgia 05/31/2022    Inadequate dietary energy intake 03/04/2020    Acute cystitis with hematuria 03/03/2020    STIVEN (acute kidney injury) 03/03/2020    Pyelonephritis 03/03/2020    Class 1 obesity with serious comorbidity and body mass index (BMI) of 32.0 to 32.9 in adult 05/07/2019    Anxiety 04/19/2018    Periumbilical pain 03/15/2018    Constipation 03/15/2018    Iron deficiency anemia 03/15/2018    New onset atrial fibrillation 12/01/2017    Hyperlipidemia 07/13/2017    History of asthma 07/13/2017    Essential hypertension 10/20/2016    Diabetes mellitus, type 2 10/20/2016    Insomnia due to other mental disorder 10/20/2016     Diabetic foot care   Diabetes-controlled   Peripheral vascular disease-she has follow up with vascular for abnormal ABIs   Return to clinic in 3 months

## 2024-08-09 NOTE — DISCHARGE INSTRUCTIONS
Follow up with your primary care physician in 3-5 days for follow up evaluation.  Take medication as prescribed.  Increase oral fluids.

## 2024-08-09 NOTE — ED PROVIDER NOTES
Encounter Date: 8/9/2024       History     Chief Complaint   Patient presents with    Cough     PT W COUGH, BODY ACHES AND SINUS CONGESTION X 1 WK.       Pt is a 58 y.o. female who presents to the [facility] ED for evaluation for a cough, runny nose which began approx 2 weeks ago and has worsened over the last few days. Denies chest pain, fever, abdominal pain, nausea, vomiting, or loss of bowel or bladder control. Hx of asthma, GERD, DM, HTN, atrial fibrillation, gout, and renal disease.      Review of patient's allergies indicates:   Allergen Reactions    Ketorolac (pf) Swelling    Penicillins Hives    Percocet [oxycodone-acetaminophen] Hives and Itching    Tramadol Hives     Past Medical History:   Diagnosis Date    Arthritis     knees    Asthma     Atrial fibrillation     Chronic constipation     Diabetes mellitus     Encounter for blood transfusion 10/2014    GERD (gastroesophageal reflux disease)     Gout     Hypertension     Lupus 2004    SLE    Renal disorder     SLE (systemic lupus erythematosus)      Past Surgical History:   Procedure Laterality Date    APPENDECTOMY      CHOLECYSTECTOMY      COLONOSCOPY N/A 04/19/2018    Procedure: COLONOSCOPY;  Surgeon: Minerva Porras MD;  Location: [facility];  Service: Endoscopy;  Laterality: N/A;    ESOPHAGOGASTRODUODENOSCOPY N/A 04/19/2018    Procedure: ESOPHAGOGASTRODUODENOSCOPY (EGD);  Surgeon: Minerva Porras MD;  Location: [facility];  Service: Endoscopy;  Laterality: N/A;    HYSTERECTOMY      JOINT REPLACEMENT Right 08/07/2015    Knee    LYMPH NODE BIOPSY Left 2014    MASTECTOMY      Left arm- NO BP    PARTIAL HYSTERECTOMY       Family History   Problem Relation Name Age of Onset    Heart block Mother Mother     Arthritis Mother Mother     Asthma Mother Mother     Diabetes Mother Mother     Heart disease Father Navfermin hampton     Diabetes Sister      Heart disease Sister      Kidney disease Sister       Social History     Tobacco Use    Smoking status:  Every Day     Types: Vaping with nicotine    Smokeless tobacco: Never   Substance Use Topics    Alcohol use: No    Drug use: No     Review of Systems   Constitutional:  Negative for chills, diaphoresis, fatigue and fever.   HENT:  Positive for rhinorrhea and sinus pressure. Negative for facial swelling, sinus pain, sore throat and trouble swallowing.    Respiratory:  Positive for cough and shortness of breath. Negative for chest tightness and wheezing.    Cardiovascular:  Negative for chest pain, palpitations and leg swelling.   Gastrointestinal:  Negative for abdominal pain, diarrhea, nausea and vomiting.   Genitourinary:  Negative for dysuria, flank pain, frequency, hematuria and urgency.   Musculoskeletal:  Negative for arthralgias, back pain, joint swelling and myalgias.   Skin:  Negative for color change and rash.   Neurological:  Negative for dizziness, syncope, weakness and light-headedness.   Hematological:  Does not bruise/bleed easily.   All other systems reviewed and are negative.      Physical Exam     Initial Vitals [08/09/24 1004]   BP Pulse Resp Temp SpO2   114/71 70 18 98.5 °F (36.9 °C) 100 %      MAP       --         Physical Exam    Nursing note and vitals reviewed.  Constitutional: She appears well-developed and well-nourished.   HENT:   Head: Normocephalic and atraumatic.   Nose: Mucosal edema and rhinorrhea present.   Mouth/Throat: Oropharynx is clear and moist.   Eyes: Conjunctivae and EOM are normal. Pupils are equal, round, and reactive to light.   Neck: Neck supple.   Normal range of motion.  Cardiovascular:  Normal rate, regular rhythm, normal heart sounds and intact distal pulses.           Pulmonary/Chest: Effort normal. No respiratory distress. She has wheezes in the right middle field and the left middle field. She has no rhonchi. She has no rales. She exhibits no tenderness.   Dry cough present.     Abdominal: Abdomen is soft and flat. Bowel sounds are normal. She exhibits no  distension. There is no abdominal tenderness. There is no rebound, no guarding, no tenderness at McBurney's point and negative Buchanan's sign. negative psoas sign  Musculoskeletal:         General: Normal range of motion.      Cervical back: Normal range of motion and neck supple.     Neurological: She is alert and oriented to person, place, and time. She has normal strength and normal reflexes.   Skin: Skin is warm and dry. Capillary refill takes less than 2 seconds.   Psychiatric: She has a normal mood and affect. Her speech is normal and behavior is normal. Judgment and thought content normal.         ED Course   Procedures  Labs Reviewed   COMPREHENSIVE METABOLIC PANEL - Abnormal       Result Value    Sodium 135 (*)     Potassium 4.8      Chloride 109 (*)     CO2 16 (*)     Glucose 194 (*)     Blood Urea Nitrogen 21.8 (*)     Creatinine 1.74 (*)     Calcium 9.2      Protein Total 8.3      Albumin 3.4 (*)     Globulin 4.9 (*)     Albumin/Globulin Ratio 0.7 (*)     Bilirubin Total 0.2            ALT 8      AST 18      eGFR 34      Anion Gap 10.0      BUN/Creatinine Ratio 13     CBC WITH DIFFERENTIAL - Abnormal    WBC 4.33 (*)     RBC 4.18 (*)     Hgb 12.1      Hct 37.9      MCV 90.7      MCH 28.9      MCHC 31.9 (*)     RDW 15.2      Platelet 356      MPV 10.5 (*)     Neut % 53.8      Lymph % 38.1      Mono % 5.3      Eos % 2.1      Basophil % 0.5      Lymph # 1.65      Neut # 2.33      Mono # 0.23      Eos # 0.09      Baso # 0.02      IG# 0.01      IG% 0.2      NRBC% 0.0     B-TYPE NATRIURETIC PEPTIDE - Normal    Natriuretic Peptide 21.9     TROPONIN I - Normal    Troponin-I <0.010     SARS-COV-2 RNA AMPLIFICATION, QUAL - Normal    SARS COV-2 Molecular Negative      Narrative:     The IDNOW COVID-19 assay is a rapid molecular in vitro diagnostic test utilizing an isothermal nucleic acid amplification technology intended for the qualitative detection of nucleic acid from the SARS-CoV-2 viral RNA in direct  nasal, nasopharyngeal or throat swabs from individuals who are suspected of COVID-19 by their healthcare provider.   CBC W/ AUTO DIFFERENTIAL    Narrative:     The following orders were created for panel order CBC auto differential.  Procedure                               Abnormality         Status                     ---------                               -----------         ------                     CBC with Differential[[id number]]       Abnormal            Final result                 Please view results for these tests on the individual orders.        ECG Results              EKG 12-lead (Final result)        Collection Time Result Time QRS Duration OHS QTC Calculation    08/09/24 10:13:43 08/09/24 11:43:43 84 428                     Final result by Interface, Lab In [facility] (08/09/24 11:43:52)                   Narrative:    Test Reason : R05.9,    Vent. Rate : 071 BPM     Atrial Rate : 071 BPM     P-R Int : 154 ms          QRS Dur : 084 ms      QT Int : 394 ms       P-R-T Axes : 003 078 044 degrees     QTc Int : 428 ms    Normal sinus rhythm  Normal ECG  When compared with ECG of 30-JAN-2024 09:53,  Sinus rhythm has replaced Ectopic atrial rhythm  Confirmed by Zulma CLEANING, Omar ([id number]) on 8/9/2024 11:43:41 AM    Referred By: [email or web address]   SELF           Confirmed By:Omar Maya MD                                  Imaging Results              X-Ray Chest 1 View (Final result)  Result time 08/09/24 10:50:10      Final result by Tavo Edwards MD (08/09/24 10:50:10)                   Impression:      No acute chest disease is identified.      Electronically signed by: Tavo Edwards  Date:    08/09/2024  Time:    10:50               Narrative:    EXAMINATION:  XR CHEST 1 VIEW    CLINICAL HISTORY:  , Cough, unspecified.    FINDINGS:  No alveolar consolidation, effusion, or pneumothorax is seen.   The thoracic aorta is normal  cardiac silhouette, central pulmonary vessels and mediastinum are normal in size and  are grossly unremarkable.   visualized osseous structures are grossly unremarkable.                                       Medications   dextromethorphan-guaiFENesin  mg/5 ml liquid 10 mL (10 mLs Oral Given 8/9/24 1021)   albuterol-ipratropium 2.5 mg-0.5 mg/3 mL nebulizer solution 3 mL (3 mLs Nebulization Given 8/9/24 1026)   predniSONE tablet 20 mg (20 mg Oral Given 8/9/24 1021)     Medical Decision Making  Differential:  URI  COVID  Pneumonia  Asthma  CHF  Electrolyte imbalance      Amount and/or Complexity of Data Reviewed  Labs: ordered.  Radiology: ordered.    Risk  OTC drugs.  Prescription drug management.               ED Course as of 08/09/24 1322   Fri Aug 09, 2024   1319   By RS quality measures in adult patients diagnosed with acute bronchitis recommendations are the avoidance of antibiotics. Nevertheless this patient has a history of asthma for which this acute exacerbation appears to be associated to a bacterial infection in lung structural disease for which antibiotic therapy is recommended.    [JA]   1319 Given strict ED return precautions. I have spoken with the patient and/or caregivers. I have explained the patient's condition, diagnoses and treatment plan based on the information available to me at this time. I have answered the patient's and/or caregiver's questions and addressed any concerns. The patient and/or caregivers have as good an understanding of the patient's diagnosis, condition and treatment plan as can be expected at this point. The vital signs have been stable. The patient's condition is stable and appropriate for discharge from the emergency department.      The patient will pursue further outpatient evaluation with the primary care physician or other designated or consulting physician as outlined in the discharge instructions. The patient and/or caregivers are agreeable to this plan of care and follow-up instructions have been explained in detail. The patient and/or  caregivers have received these instructions in written format and have expressed an understanding of the discharge instructions. The patient and/or caregivers are aware that any significant change in condition or worsening of symptoms should prompt an immediate return to this or the closest emergency department or a call to 911.   [JA]      ED Course User Index  [JA] Jose Manuel White Jr., FNP                           Clinical Impression:  Final diagnoses:  [R05.9] Cough  [J40] Bronchitis (Primary)          ED Disposition Condition    Discharge Stable          ED Prescriptions       Medication Sig Dispense Start Date End Date Auth. Provider    acyclovir (ZOVIRAX) 800 MG Tab Take 1 tablet (800 mg total) by mouth 5 (five) times daily. for 7 days 35 tablet 8/9/2024 8/16/2024 Jose Manuel White Jr., FNP    cetirizine (ZYRTEC) 10 MG tablet Take 1 tablet (10 mg total) by mouth once daily. for 14 days 14 tablet 8/9/2024 8/23/2024 Jose Manuel White Jr., FNP    promethazine-dextromethorphan (PROMETHAZINE-DM) 6.25-15 mg/5 mL Syrp Take 5 mLs by mouth every 6 (six) hours as needed (cough). 100 mL 8/9/2024 8/14/2024 Jose Manuel White Jr., FNP          Follow-up Information       Follow up With Specialties Details Why Contact Info    Roxy Schultz MD Internal Medicine In 3 days  [address] [facility] [id number]  [phone]      [facility] Emergency Dept Emergency Medicine In 3 days As needed, If symptoms worsen [address] [facility] [id number] [phone]             Jose Manuel White Jr., FNP  08/09/24 5002

## 2024-08-10 RX ORDER — AZITHROMYCIN 250 MG/1
TABLET, FILM COATED ORAL
Qty: 6 TABLET | Refills: 0 | Status: SHIPPED | OUTPATIENT
Start: 2024-08-10

## 2024-08-11 ENCOUNTER — HOSPITAL ENCOUNTER (EMERGENCY)
Facility: HOSPITAL | Age: 59
Discharge: HOME | End: 2024-08-11
Attending: INTERNAL MEDICINE
Payer: MEDICAID

## 2024-08-11 VITALS
BODY MASS INDEX: 29.57 KG/M2 | HEIGHT: 66 IN | RESPIRATION RATE: 79 BRPM | TEMPERATURE: 98 F | SYSTOLIC BLOOD PRESSURE: 124 MMHG | DIASTOLIC BLOOD PRESSURE: 79 MMHG | OXYGEN SATURATION: 99 % | WEIGHT: 184 LBS | HEART RATE: 71 BPM

## 2024-08-11 DIAGNOSIS — J06.9 UPPER RESPIRATORY TRACT INFECTION, UNSPECIFIED TYPE: Primary | ICD-10-CM

## 2024-08-11 DIAGNOSIS — Z87.09 HISTORY OF ASTHMA: ICD-10-CM

## 2024-08-11 PROCEDURE — 63700000 PHARM REV CODE 250 ALT 637 W/O HCPCS: Performed by: NURSE PRACTITIONER

## 2024-08-11 PROCEDURE — 25000003 PHARM REV CODE 250: Performed by: NURSE PRACTITIONER

## 2024-08-11 PROCEDURE — 99283 EMERGENCY DEPT VISIT LOW MDM: CPT

## 2024-08-11 RX ORDER — AZITHROMYCIN 250 MG/1
500 TABLET, FILM COATED ORAL
Status: COMPLETED | OUTPATIENT
Start: 2024-08-11 | End: 2024-08-11

## 2024-08-11 RX ORDER — GUAIFENESIN AND DEXTROMETHORPHAN HYDROBROMIDE 10; 100 MG/5ML; MG/5ML
5 SYRUP ORAL 4 TIMES DAILY PRN
Qty: 120 ML | Refills: 0 | Status: SHIPPED | OUTPATIENT
Start: 2024-08-11 | End: 2024-08-21

## 2024-08-11 RX ORDER — ONDANSETRON 4 MG/1
4 TABLET, ORALLY DISINTEGRATING ORAL EVERY 6 HOURS PRN
Qty: 12 TABLET | Refills: 0 | Status: SHIPPED | OUTPATIENT
Start: 2024-08-11

## 2024-08-11 RX ORDER — GUAIFENESIN AND DEXTROMETHORPHAN HYDROBROMIDE 10; 100 MG/5ML; MG/5ML
5 SYRUP ORAL ONCE
Status: COMPLETED | OUTPATIENT
Start: 2024-08-11 | End: 2024-08-11

## 2024-08-11 RX ADMIN — GUAIFENESIN AND DEXTROMETHORPHAN 5 ML: 100; 10 SYRUP ORAL at 08:08

## 2024-08-11 RX ADMIN — AZITHROMYCIN DIHYDRATE 500 MG: 250 TABLET, FILM COATED ORAL at 08:08

## 2024-08-12 NOTE — ED PROVIDER NOTES
Encounter Date: 8/11/2024       History     Chief Complaint   Patient presents with    Generalized Body Aches    Headache     The patient presents with occas nonprod cough, sore throat, nasal congestion, subjective fever, no cp or sob, no known covid-19 exposure.  The onset was 2 weeks ago.  The course/duration of symptoms is constant.  The degree at present is minimal.  The exacerbating factor is none.  The relieving factor is none.  Risk factors consist of asthma.  Prior episodes: occasional.  Associated symptoms: denies chest pain and denies shortness of breath.  Additional history: was seen here on 8/9 with clear cxr and neg covid. She was prescribed azithromycin but insists that she only uses [facility] pharmacy which is closed for the weekend and not open until tomorrow. She is here tonight requesting different cough medication and medication for nausea.          Review of patient's allergies indicates:   Allergen Reactions    Ketorolac (pf) Swelling    Penicillins Hives    Percocet [oxycodone-acetaminophen] Hives and Itching    Tramadol Hives     Past Medical History:   Diagnosis Date    Arthritis     knees    Asthma     Atrial fibrillation     Chronic constipation     Diabetes mellitus     Encounter for blood transfusion 10/2014    GERD (gastroesophageal reflux disease)     Gout     Hypertension     Lupus 2004    SLE    Renal disorder     SLE (systemic lupus erythematosus)      Past Surgical History:   Procedure Laterality Date    APPENDECTOMY      CHOLECYSTECTOMY      COLONOSCOPY N/A 04/19/2018    Procedure: COLONOSCOPY;  Surgeon: Minerva Porras MD;  Location: [facility];  Service: Endoscopy;  Laterality: N/A;    ESOPHAGOGASTRODUODENOSCOPY N/A 04/19/2018    Procedure: ESOPHAGOGASTRODUODENOSCOPY (EGD);  Surgeon: Minerva Porras MD;  Location: [facility];  Service: Endoscopy;  Laterality: N/A;    HYSTERECTOMY      JOINT REPLACEMENT Right 08/07/2015    Knee    LYMPH NODE BIOPSY Left 2014    MASTECTOMY       Left arm- NO BP    PARTIAL HYSTERECTOMY       Family History   Problem Relation Name Age of Onset    Heart block Mother Mother     Arthritis Mother Mother     Asthma Mother Mother     Diabetes Mother Mother     Heart disease Father Romero hampton     Diabetes Sister      Heart disease Sister      Kidney disease Sister       Social History     Tobacco Use    Smoking status: Every Day     Types: Vaping with nicotine    Smokeless tobacco: Never   Substance Use Topics    Alcohol use: No    Drug use: No     Review of Systems   Constitutional:  Negative for fever.   HENT:  Positive for congestion and sore throat.    Respiratory:  Positive for cough. Negative for shortness of breath.    Cardiovascular:  Negative for chest pain.   Gastrointestinal:  Negative for nausea.   Genitourinary:  Negative for dysuria.   Musculoskeletal:  Negative for back pain.   Skin:  Negative for rash.   Neurological:  Negative for weakness.   Hematological:  Does not bruise/bleed easily.   All other systems reviewed and are negative.      Physical Exam     Initial Vitals [08/11/24 1959]   BP Pulse Resp Temp SpO2   124/79 71 (!) 79 98.2 °F (36.8 °C) 99 %      MAP       --         Physical Exam    Nursing note and vitals reviewed.  Constitutional: She appears well-developed and well-nourished.   HENT:   Head: Normocephalic and atraumatic.   Right Ear: Tympanic membrane normal.   Left Ear: Tympanic membrane normal.   Nose: Nose normal.   Mouth/Throat: Uvula is midline, oropharynx is clear and moist and mucous membranes are normal.   Neck: Neck supple.   Normal range of motion.  Cardiovascular:  Normal rate, regular rhythm, normal heart sounds and intact distal pulses.           Pulmonary/Chest: Effort normal and breath sounds normal. She has no decreased breath sounds.   Abdominal: Abdomen is soft and flat. Bowel sounds are normal. There is no abdominal tenderness.   Musculoskeletal:         General: Normal range of motion.      Cervical back:  Normal range of motion and neck supple.     Neurological: She is alert. She has normal strength.   Skin: Skin is warm and dry.   Psychiatric: She has a normal mood and affect.         ED Course   Procedures  Labs Reviewed - No data to display       Imaging Results    None          Medications   azithromycin tablet 500 mg (has no administration in time range)   dextromethorphan-guaiFENesin  mg/5 ml liquid 5 mL (has no administration in time range)     Medical Decision Making  The patient presents with occas nonprod cough, sore throat, nasal congestion, subjective fever, no cp or sob, no known covid-19 exposure.  The onset was 2 weeks ago.  The course/duration of symptoms is constant.  The degree at present is minimal.  The exacerbating factor is none.  The relieving factor is none.  Risk factors consist of asthma.  Prior episodes: occasional.  Associated symptoms: denies chest pain and denies shortness of breath.  Additional history: was seen here on 8/9 with clear cxr and neg covid. She was prescribed azithromycin but insists that she only uses [facility] pharmacy which is closed for the weekend and not open until tomorrow. She is here tonight requesting different cough medication and medication for nausea.        Will give dose of azithromycin tonight and she will  zpack from pharmacy tomorrow. Will prescribe different cough medication and zofran. She will f/u with her pcp. Strict return to ER precautions given.    Amount and/or Complexity of Data Reviewed  External Data Reviewed: radiology.     Details: 8/9 cxr - nothing acute      Additional MDM:   Differential Diagnosis:   At this time differential diagnosis is but not limited to influenza, strep throat, covid, rsv, viral uri              ED Course as of 08/11/24 2031   Sun Aug 11, 2024   2026 Given strict ED return precautions. I have spoken with the patient and/or caregivers. I have explained the patient's condition, diagnoses and treatment plan based on  the information available to me at this time. I have answered the patient's and/or caregiver's questions and addressed any concerns. The patient and/or caregivers have as good an understanding of the patient's diagnosis, condition and treatment plan as can be expected at this point. The vital signs have been stable. The patient's condition is stable and appropriate for discharge from the emergency department.      The patient will pursue further outpatient evaluation with the primary care physician or other designated or consulting physician as outlined in the discharge instructions. The patient and/or caregivers are agreeable to this plan of care and follow-up instructions have been explained in detail. The patient and/or caregivers have received these instructions in written format and have expressed an understanding of the discharge instructions. The patient and/or caregivers are aware that any significant change in condition or worsening of symptoms should prompt an immediate return to this or the closest emergency department or a call to 911.      [RB]      ED Course User Index  [RB] Sachin Rodas ACNP                           Clinical Impression:  Final diagnoses:  [J06.9] Upper respiratory tract infection, unspecified type (Primary)  [Z87.09] History of asthma          ED Disposition Condition    Discharge Stable          ED Prescriptions       Medication Sig Dispense Start Date End Date Auth. Provider    dextromethorphan-guaiFENesin  mg/5 ml (ROBITUSSIN-DM)  mg/5 mL liquid Take 5 mLs by mouth 4 (four) times daily as needed (cough). 120 mL 8/11/2024 8/21/2024 Sachin Rodas ACNP    ondansetron (ZOFRAN-ODT) 4 MG TbDL Take 1 tablet (4 mg total) by mouth every 6 (six) hours as needed (nausea). 12 tablet 8/11/2024 -- Sachin Rodas ACNP          Follow-up Information       Follow up With Specialties Details Why Contact Info    Roxy Schultz MD Internal Medicine In 3 days  [address]  [facility] [address]  [phone]      [facility] Emergency Medicine  If symptoms worsen [address] [facility] [address] [phone]             Sachin Rodas, [facility]  08/11/24 2031

## 2024-08-12 NOTE — DISCHARGE INSTRUCTIONS
It is important that you follow up with your primary care provider or specialist if indicated for further evaluation, workup, and treatment as necessary. The exam and treatment you received in Emergency Department was for an urgent problem and NOT INTENDED AS COMPLETE CARE. It is important that you FOLLOW UP with a doctor for ongoing care. If your symptoms become WORSE or you DO NOT IMPROVE and you are unable to reach your health care provider, you should RETURN to the Emergency Department.     
Yes

## 2024-08-19 ENCOUNTER — OFFICE VISIT (OUTPATIENT)
Dept: BEHAVIORAL HEALTH | Facility: CLINIC | Age: 59
End: 2024-08-19
Payer: MEDICAID

## 2024-08-19 VITALS
WEIGHT: 178.13 LBS | DIASTOLIC BLOOD PRESSURE: 85 MMHG | BODY MASS INDEX: 28.75 KG/M2 | OXYGEN SATURATION: 99 % | SYSTOLIC BLOOD PRESSURE: 132 MMHG | HEART RATE: 76 BPM

## 2024-08-19 DIAGNOSIS — F41.1 GAD (GENERALIZED ANXIETY DISORDER): Primary | ICD-10-CM

## 2024-08-19 DIAGNOSIS — F99 INSOMNIA DUE TO OTHER MENTAL DISORDER: ICD-10-CM

## 2024-08-19 DIAGNOSIS — F41.9 ANXIETY: ICD-10-CM

## 2024-08-19 DIAGNOSIS — F51.05 INSOMNIA DUE TO OTHER MENTAL DISORDER: ICD-10-CM

## 2024-08-19 DIAGNOSIS — F33.1 MODERATE RECURRENT MAJOR DEPRESSION: ICD-10-CM

## 2024-08-19 PROCEDURE — 1160F RVW MEDS BY RX/DR IN RCRD: CPT | Mod: CPTII,,, | Performed by: STUDENT IN AN ORGANIZED HEALTH CARE EDUCATION/TRAINING PROGRAM

## 2024-08-19 PROCEDURE — 1159F MED LIST DOCD IN RCRD: CPT | Mod: CPTII,,, | Performed by: STUDENT IN AN ORGANIZED HEALTH CARE EDUCATION/TRAINING PROGRAM

## 2024-08-19 PROCEDURE — 3066F NEPHROPATHY DOC TX: CPT | Mod: CPTII,,, | Performed by: STUDENT IN AN ORGANIZED HEALTH CARE EDUCATION/TRAINING PROGRAM

## 2024-08-19 PROCEDURE — 3044F HG A1C LEVEL LT 7.0%: CPT | Mod: CPTII,,, | Performed by: STUDENT IN AN ORGANIZED HEALTH CARE EDUCATION/TRAINING PROGRAM

## 2024-08-19 PROCEDURE — 3079F DIAST BP 80-89 MM HG: CPT | Mod: CPTII,,, | Performed by: STUDENT IN AN ORGANIZED HEALTH CARE EDUCATION/TRAINING PROGRAM

## 2024-08-19 PROCEDURE — 3075F SYST BP GE 130 - 139MM HG: CPT | Mod: CPTII,,, | Performed by: STUDENT IN AN ORGANIZED HEALTH CARE EDUCATION/TRAINING PROGRAM

## 2024-08-19 PROCEDURE — 99213 OFFICE O/P EST LOW 20 MIN: CPT | Mod: PBBFAC,PN | Performed by: STUDENT IN AN ORGANIZED HEALTH CARE EDUCATION/TRAINING PROGRAM

## 2024-08-19 PROCEDURE — 99214 OFFICE O/P EST MOD 30 MIN: CPT | Mod: S$PBB,,, | Performed by: STUDENT IN AN ORGANIZED HEALTH CARE EDUCATION/TRAINING PROGRAM

## 2024-08-19 PROCEDURE — 3008F BODY MASS INDEX DOCD: CPT | Mod: CPTII,,, | Performed by: STUDENT IN AN ORGANIZED HEALTH CARE EDUCATION/TRAINING PROGRAM

## 2024-08-19 PROCEDURE — 4010F ACE/ARB THERAPY RXD/TAKEN: CPT | Mod: CPTII,,, | Performed by: STUDENT IN AN ORGANIZED HEALTH CARE EDUCATION/TRAINING PROGRAM

## 2024-08-19 RX ORDER — ESCITALOPRAM OXALATE 20 MG/1
20 TABLET ORAL DAILY
Qty: 90 TABLET | Refills: 3 | Status: SHIPPED | OUTPATIENT
Start: 2024-08-19 | End: 2025-08-19

## 2024-08-19 RX ORDER — ALPRAZOLAM 1 MG/1
1 TABLET ORAL 2 TIMES DAILY PRN
Qty: 60 TABLET | Refills: 2 | Status: SHIPPED | OUTPATIENT
Start: 2024-08-19

## 2024-08-19 NOTE — PROGRESS NOTES
"Outpatient Psychiatry Follow-Up Visit    8/19/2024    Clinical Status of Patient:  Outpatient (Ambulatory)    Chief Complaint:  Vi Ulrich is a 58 y.o. female who presents today for follow-up of anxiety and insomnia . Patient last seen for follow-up on 3/26/2024. Met with patient.      Interval History and Content of Current Session:  Interim Events/Subjective Report/Content of Current Session:   Pt reports doing "ok"  overall.  Has been having respiratory infection for past 10 days.  Reports symptoms of nausea, vomiting, chest pain, SOB, headaches.  Went to ED, given zpack.  Denies any symptomatic improvement since starting.  Reports "not too swingy" mood, denies currently feeling depressed, improved anxiety.  Sleep "good". Appetite low, weight decreased (unintentional).  Energy good, motivation good.  Denies irritability, denies hopelessness.  Denies SI/HI/AVH/paranoia, denies plan or desire for self harm or harm to others.  Denies SE from current regimen. Pt happy with current regimen and wants to continue.     Psychiatric Review of Systems-is patient experiencing or having changes in  Integrated into HPI above.     Review of Systems   PSYCHIATRIC: Pertinant items are noted in the narrative.  CONSTITUTIONAL: No weight gain or loss.  HEENT: +occasional dry mouth, denies sore throat  MUSCULOSKELETAL:  + generalized myalgias, +arthritic pain of back, hand soreness   NEUROLOGIC: No weakness, sensory changes, seizures, confusion, memory loss, tremor or other abnormal movements.  +ha  CARDIAC: No CP, + palpitations  RESPIRATORY: + shortness of breath with exertion, denies cough.  CARDIOVASCULAR: No tachycardia or chest pain.  GASTROINTESTINAL: Denies pain, constipation or diarrhea.  +nausea/vomiting  CHEST WALL: +rib pain, pain with breathing    Past Medical, Family and Social History: The patient's past medical, family and social history have been reviewed and updated as appropriate within the electronic medical " "record - see encounter notes.    Compliance: good    Side effects: denies    Risk Parameters:  Patient reports no suicidal ideation  Patient reports no homicidal ideation  Patient reports no self-injurious behavior  Patient reports no violent behavior    Exam (detailed: at least 9 elements; comprehensive: all 15 elements)   Constitutional  Vitals:  Most recent vital signs, dated less than 90 days prior to this appointment, were reviewed.     Vitals:    08/19/24 0823   BP: 132/85   Pulse: 76   SpO2: 99%   Weight: 80.8 kg (178 lb 1.6 oz)        General:   Constitutional: No acute distress, appears stated age, casually dressed    Neurologic:   Motor: moves all extremities spontaneously and without difficulty, no abnormal involuntary movements observed  Gait: normal gait and station    Mental status examination:   Appearance: appears stated age, casually dressed, no acute distress  Behavior: unremarkable for situation, calm and cooperative  Mood: "ok"  Affect: mood congruent, constricted, and anxious-appearing  Thought process: linear and goal directed  Associations: appropriate for conversation  Thought content: no plan or desire for self harm or harm to others, denies paranoia, no delusional ideation volunteered  Perceptions: denies hallucinations or other altered perceptions  Orientation: oriented to day of week, month, year, location, and situation  Language: English, fluid  Attention: able to attend to interview  Insight: good  Judgement: good    PHQ9:  Over the last two weeks how often have you been bothered by little interest or pleasure in doing things: 0  Over the last two weeks how often have you been bothered by feeling down, depressed or hopeless: 0  PHQ-2 Total Score: 0  PHQ-9 Score: 0  PHQ-9 Interpretation: Minimal or None        8/19/2024     8:22 AM 3/26/2024     7:54 AM 1/23/2024     7:28 AM   GAD7   1. Feeling nervous, anxious, or on edge? 1 3 0   2. Not being able to stop or control worrying? 1 3 2 "   3. Worrying too much about different things? 1 3 2   4. Trouble relaxing? 1 2 2   5. Being so restless that it is hard to sit still? 0 1 0   6. Becoming easily annoyed or irritable? 1 0 0   7. Feeling afraid as if something awful might happen? 1 0 0   8. If you checked off any problems, how difficult have these problems made it for you to do your work, take care of things at home, or get along with other people? 1 2 1   LIANG-7 Score 6 12 6     Assessment and Diagnosis   Status/Progress: Based on the examination today, the patient's problem(s) is/are adequately but not ideally controlled.  New problems have not been presented today.   Co-morbidities and Lack of compliance are not complicating management of the primary condition.  Number of separate conditions addressed during today's visit: 3 (mood well controlled, anxiety fair control, sleep well controlled).  Medication management: yes: Refilling a prescription.  Are referral(s) being ordered today: No.  Complexity (level) of medical decision making employed in the encounter: MODERATE.    General Impression:    ICD-10-CM ICD-9-CM   1. LIANG (generalized anxiety disorder)  F41.1 300.02   2. Moderate recurrent major depression  F33.1 296.32   3. Anxiety  F41.9 300.00   4. Insomnia due to other mental disorder  F51.05 300.9    F99 327.02     Intervention/Counseling/Treatment Plan   Continue lexapro 20mg daily  Continue xanax 1mg bid prn   PDMP reviewed and demonstrated no abnormal filling patterns.   No need for PEC as pt is not an imminent danger to self or others or gravely disabled due to acute psychiatric illness  Discussed that pt should either call clinic for psychiatric crisis symptoms or present to nearest emergency room    Discussed with patient informed consent including diagnosis, risks and benefits of proposed treatment above vs. alternative treatments vs. no treatment, as well as serious and common side effects of these treatments, and the inherent  unpredictability of individual responses to these treatments. The patient expresses understanding of the above and displays the capacity to agree with this current plan. Patient also agrees that, currently, the benefits outweigh the risks and would like to pursue treatment at this time, and had no other questions.    Instructions:  Take all medications as prescribed.    Abstain from recreational drugs and alcohol.  Present to ED or call 911 for SI/HI plan or intent, psychosis, or medical emergency.    Return to Clinic: Follow up in about 3 months (around 11/19/2024).    Total time:   The total time for services performed on the date of the encounter (including review of prior visit notes, review of notes from other providers, review of results from laboratory/imaging studies, face-to-face time with patient, and time spent on other activities directly related to patient care): 20 minutes.    Frederic Noel MD  [facility]

## 2024-08-22 DIAGNOSIS — H53.40 VISUAL FIELD LOSS: Primary | ICD-10-CM

## 2024-08-27 ENCOUNTER — OFFICE VISIT (OUTPATIENT)
Dept: VASCULAR SURGERY | Facility: CLINIC | Age: 59
End: 2024-08-27
Payer: MEDICAID

## 2024-08-27 VITALS
HEIGHT: 66 IN | WEIGHT: 180 LBS | OXYGEN SATURATION: 99 % | SYSTOLIC BLOOD PRESSURE: 112 MMHG | HEART RATE: 77 BPM | DIASTOLIC BLOOD PRESSURE: 72 MMHG | BODY MASS INDEX: 28.93 KG/M2 | RESPIRATION RATE: 18 BRPM

## 2024-08-27 DIAGNOSIS — I73.9 PERIPHERAL ARTERY DISEASE: ICD-10-CM

## 2024-08-27 DIAGNOSIS — I73.9 INTERMITTENT CLAUDICATION: ICD-10-CM

## 2024-08-27 PROCEDURE — 99215 OFFICE O/P EST HI 40 MIN: CPT | Mod: PBBFAC

## 2024-08-27 RX ORDER — ONDANSETRON HYDROCHLORIDE 2 MG/ML
4 INJECTION, SOLUTION INTRAVENOUS EVERY 12 HOURS PRN
OUTPATIENT
Start: 2024-08-27

## 2024-08-27 NOTE — PROGRESS NOTES
Patient seen by Dr. Hilton. Pt scheduled for surgery on 09/06/2024. Consents signed at visit. RTC 2 weeks post op. Pt education given both written and verbal.

## 2024-08-27 NOTE — H&P (VIEW-ONLY)
[facility] GENERAL SURGERY   Clinic Note       CC: Claudication       HPI: Vi Ulrich is a 58 y.o. female with PMHx HTN, T2DM, pAF on Eliquis, and SLE c/b lupus nephritis presenting today with bilateral claudication symptoms.  Patient reports pain that begins after walking a short distance.  She reports that the pain gets mildly better with rest and she is able to walk again however pain starts again.  She states that the pain occurs in her bilateral calves.  She describes the pain as tightness and burning in her calves.  She reports that pain is worse in her left leg compared to the right. She states that rest is the only thing that improves her symptoms.  Patient had recent ultrasounds and ABIs which indicated moderate to severe obstructions in the BLE.  Patient denies any smoking.  Patient has baseline creatinine around 1.3, most recently 1.7 in ED.    PMH:  Past Medical History:   Diagnosis Date    Arthritis     knees    Asthma     Atrial fibrillation     Chronic constipation     Diabetes mellitus     Encounter for blood transfusion 10/2014    GERD (gastroesophageal reflux disease)     Gout     Hypertension     Lupus 2004    SLE    Renal disorder     SLE (systemic lupus erythematosus)          PSH:  Past Surgical History:   Procedure Laterality Date    APPENDECTOMY      CHOLECYSTECTOMY      COLONOSCOPY N/A 04/19/2018    Procedure: COLONOSCOPY;  Surgeon: Minerva Porras MD;  Location: [facility];  Service: Endoscopy;  Laterality: N/A;    ESOPHAGOGASTRODUODENOSCOPY N/A 04/19/2018    Procedure: ESOPHAGOGASTRODUODENOSCOPY (EGD);  Surgeon: Minerva Porras MD;  Location: [facility];  Service: Endoscopy;  Laterality: N/A;    HYSTERECTOMY      JOINT REPLACEMENT Right 08/07/2015    Knee    LYMPH NODE BIOPSY Left 2014    MASTECTOMY      Left arm- NO BP    PARTIAL HYSTERECTOMY           Medications:  Current Outpatient Medications on File Prior to Visit   Medication Sig Dispense Refill    albuterol sulfate (PROAIR  RESPICLICK) 90 mcg/actuation inhaler Inhale 1 puff into the lungs every 4 (four) hours as needed for Wheezing. Rescue      ALPRAZolam (XANAX) 1 MG tablet Take 1 tablet (1 mg total) by mouth 2 (two) times daily as needed for Anxiety. 60 tablet 2    apixaban (ELIQUIS) 5 mg Tab Take 1 tablet (5 mg total) by mouth 2 (two) times daily. 180 tablet 3    aspirin (ECOTRIN) 81 MG EC tablet Take 1 tablet (81 mg total) by mouth once daily. 90 tablet 3    atorvastatin (LIPITOR) 40 MG tablet Take 1 tablet (40 mg total) by mouth once daily. 90 tablet 3    blood sugar diagnostic (ONETOUCH VERIO) Strp 1 each by Misc.(Non-Drug; Combo Route) route 4 (four) times daily. 50 each 11    blood sugar diagnostic Strp 1 each by Misc.(Non-Drug; Combo Route) route 4 (four) times daily. 50 each 11    blood sugar diagnostic Strp 1 strip by Misc.(Non-Drug; Combo Route) route 4 (four) times daily with meals and nightly. 200 each 6    blood-glucose meter kit Use as instructed 1 each 0    eletriptan (RELPAX) 40 MG tablet Take 1 tablet (40 mg total) by mouth as needed (take one at the beginning of migraine, may repeat in 2 h. max 2 in 24h). may repeat in 2 hours if necessary 9 tablet 5    EScitalopram oxalate (LEXAPRO) 20 MG tablet Take 1 tablet (20 mg total) by mouth once daily. 90 tablet 3    furosemide (LASIX) 20 MG tablet Take 1 tablet (20 mg total) by mouth once daily. 90 tablet 3    insulin aspart U-100 (NOVOLOG) 100 unit/mL (3 mL) InPn pen Inject 2 Units into the skin as needed.      lancets 28 gauge Misc 100 lancets by Misc.(Non-Drug; Combo Route) route 4 (four) times daily with meals and nightly. 100 each 8    losartan (COZAAR) 25 MG tablet Take 1 tablet (25 mg total) by mouth once daily. 90 tablet 3    LUPKYNIS 7.9 mg Cap Take 3 capsules (23.7 mg) by mouth twice daily. 180 capsule 5    mycophenolate (CELLCEPT) 250 mg Cap Take 6 capsules (1,500 mg total) by mouth 2 (two) times daily. 360 capsule 2    ondansetron (ZOFRAN) 4 MG tablet Take 1  tablet (4 mg total) by mouth every 6 (six) hours as needed for Nausea. 12 tablet 0    ondansetron (ZOFRAN-ODT) 4 MG TbDL Take 1 tablet (4 mg total) by mouth every 6 (six) hours as needed (nausea). 12 tablet 0    pantoprazole (PROTONIX) 40 MG tablet Take 1 tablet (40 mg total) by mouth once daily. 90 tablet 3    predniSONE (DELTASONE) 5 MG tablet Take 1.5 tablets (7.5 mg total) by mouth once daily. 60 tablet 3    walker Misc Please dispense 1 Rollator 1 each 0    azithromycin (Z-NIRAV) 250 MG tablet Take 2 tablets by mouth on day 1; Take 1 tablet by mouth on days 2-5 (Patient not taking: Reported on 8/19/2024) 6 tablet 0    blood-glucose sensor (DEXCOM G7 SENSOR) Ana 1 each by Misc.(Non-Drug; Combo Route) route every 14 (fourteen) days. (Patient not taking: Reported on 7/25/2024) 5 each 6    cetirizine (ZYRTEC) 10 MG tablet Take 1 tablet (10 mg total) by mouth once daily. for 14 days 14 tablet 0    LIDOcaine (LIDODERM) 5 % Place 1 patch onto the skin once daily. Remove & Discard patch within 12 hours or as directed by MD 30 patch 0    omeprazole (PRILOSEC) 20 MG capsule Take 1 capsule (20 mg total) by mouth once daily. 90 capsule 0    [DISCONTINUED] ARIPiprazole (ABILIFY) 2 MG Tab Take 2 mg by mouth once daily.   1    [DISCONTINUED] divalproex (DEPAKOTE) 250 MG EC tablet Take 250 mg by mouth.        No current facility-administered medications on file prior to visit.        Allergies:  Review of patient's allergies indicates:   Allergen Reactions    Ketorolac (pf) Swelling    Penicillins Hives    Percocet [oxycodone-acetaminophen] Hives and Itching    Tramadol Hives    Promethazine          Social Hx:  Social History     Tobacco Use   Smoking Status Former    Types: Vaping with nicotine   Smokeless Tobacco Never      Social History     Substance and Sexual Activity   Alcohol Use No         Relevant Family Hx:  Family History   Problem Relation Name Age of Onset    Heart block Mother Mother     Arthritis Mother Mother      Asthma Mother Mother     Diabetes Mother Mother     Heart disease Father Romero hampton     Diabetes Sister      Heart disease Sister      Kidney disease Sister            Physical Exam:   Vitals:    08/27/24 1512   BP: 112/72   Pulse: 77   Resp: 18      Gen: NAD, AAOx3   Eye: EOMI   CV: RR  Pulm: NWOB on RA  Abd: soft, non-tender, non-distended  Ext:  Move all 4 extremities. Biphasic DP and PT in BLE. 2+ radial pulses. 2+ femoral pulses        Interval Imaging:    Interpretation Summary      The right lower extremity demonstrated a loss of multiphasic waveforms at the level of the distal SFA.  The BILLY on the right correlates with a moderately severe obstruction.  The right lower extremity demonstrated moderately severe arterial flow reduction.     The left lower extremity demonstrated a loss of multiphasic waveforms at the level of the mid SFA.  The BILLY on the left correlates with a moderately severe obstruction.  The left lower extremity demonstrated moderately severe arterial flow reduction.             A/P: Vi Hampton is a 58 y.o. female with PMHx of HTN, T2DM, pAF on Eliquis, and SLE c/b lupus nephritis presenting today with bilateral claudication symptoms. She states that her left leg is worse than right leg. Patient baseline Cr around 1.3, most recently 1.7.      - Scheduled for left lower extremity angiogram on 9/6/24  - r/b/a discussed with patient who would like to proceed with procedure  - Given patient's kidney disease, will need preop hydration and postop hydration.  - Patient will need to hold Eliquis for 3 days prior to procedure  - Follow up 2 weeks post-operatively    Dwayne Hilton MD  [facility] PGY-1

## 2024-08-27 NOTE — PROGRESS NOTES
\[facility]\"" GENERAL SURGERY   Clinic Note       CC: Claudication       HPI: Vi Ulrich is a 58 y.o. female with PMHx HTN, T2DM, pAF on Eliquis, and SLE c/b lupus nephritis presenting today with bilateral claudication symptoms.  Patient reports pain that begins after walking a short distance.  She reports that the pain gets mildly better with rest and she is able to walk again however pain starts again.  She states that the pain occurs in her bilateral calves.  She describes the pain as tightness and burning in her calves.  She reports that pain is worse in her left leg compared to the right. She states that rest is the only thing that improves her symptoms.  Patient had recent ultrasounds and ABIs which indicated moderate to severe obstructions in the BLE.  Patient denies any smoking.  Patient has baseline creatinine around 1.3, most recently 1.7 in ED.    PMH:  Past Medical History:   Diagnosis Date    Arthritis     knees    Asthma     Atrial fibrillation     Chronic constipation     Diabetes mellitus     Encounter for blood transfusion 10/2014    GERD (gastroesophageal reflux disease)     Gout     Hypertension     Lupus 2004    SLE    Renal disorder     SLE (systemic lupus erythematosus)          PSH:  Past Surgical History:   Procedure Laterality Date    APPENDECTOMY      CHOLECYSTECTOMY      COLONOSCOPY N/A 04/19/2018    Procedure: COLONOSCOPY;  Surgeon: Minerva Porras MD;  Location: [facility];  Service: Endoscopy;  Laterality: N/A;    ESOPHAGOGASTRODUODENOSCOPY N/A 04/19/2018    Procedure: ESOPHAGOGASTRODUODENOSCOPY (EGD);  Surgeon: Minerva Porras MD;  Location: [facility];  Service: Endoscopy;  Laterality: N/A;    HYSTERECTOMY      JOINT REPLACEMENT Right 08/07/2015    Knee    LYMPH NODE BIOPSY Left 2014    MASTECTOMY      Left arm- NO BP    PARTIAL HYSTERECTOMY           Medications:  Current Outpatient Medications on File Prior to Visit   Medication Sig Dispense Refill    albuterol sulfate (PROAIR  RESPICLICK) 90 mcg/actuation inhaler Inhale 1 puff into the lungs every 4 (four) hours as needed for Wheezing. Rescue      ALPRAZolam (XANAX) 1 MG tablet Take 1 tablet (1 mg total) by mouth 2 (two) times daily as needed for Anxiety. 60 tablet 2    apixaban (ELIQUIS) 5 mg Tab Take 1 tablet (5 mg total) by mouth 2 (two) times daily. 180 tablet 3    aspirin (ECOTRIN) 81 MG EC tablet Take 1 tablet (81 mg total) by mouth once daily. 90 tablet 3    atorvastatin (LIPITOR) 40 MG tablet Take 1 tablet (40 mg total) by mouth once daily. 90 tablet 3    blood sugar diagnostic (ONETOUCH VERIO) Strp 1 each by Misc.(Non-Drug; Combo Route) route 4 (four) times daily. 50 each 11    blood sugar diagnostic Strp 1 each by Misc.(Non-Drug; Combo Route) route 4 (four) times daily. 50 each 11    blood sugar diagnostic Strp 1 strip by Misc.(Non-Drug; Combo Route) route 4 (four) times daily with meals and nightly. 200 each 6    blood-glucose meter kit Use as instructed 1 each 0    eletriptan (RELPAX) 40 MG tablet Take 1 tablet (40 mg total) by mouth as needed (take one at the beginning of migraine, may repeat in 2 h. max 2 in 24h). may repeat in 2 hours if necessary 9 tablet 5    EScitalopram oxalate (LEXAPRO) 20 MG tablet Take 1 tablet (20 mg total) by mouth once daily. 90 tablet 3    furosemide (LASIX) 20 MG tablet Take 1 tablet (20 mg total) by mouth once daily. 90 tablet 3    insulin aspart U-100 (NOVOLOG) 100 unit/mL (3 mL) InPn pen Inject 2 Units into the skin as needed.      lancets 28 gauge Misc 100 lancets by Misc.(Non-Drug; Combo Route) route 4 (four) times daily with meals and nightly. 100 each 8    losartan (COZAAR) 25 MG tablet Take 1 tablet (25 mg total) by mouth once daily. 90 tablet 3    LUPKYNIS 7.9 mg Cap Take 3 capsules (23.7 mg) by mouth twice daily. 180 capsule 5    mycophenolate (CELLCEPT) 250 mg Cap Take 6 capsules (1,500 mg total) by mouth 2 (two) times daily. 360 capsule 2    ondansetron (ZOFRAN) 4 MG tablet Take 1  tablet (4 mg total) by mouth every 6 (six) hours as needed for Nausea. 12 tablet 0    ondansetron (ZOFRAN-ODT) 4 MG TbDL Take 1 tablet (4 mg total) by mouth every 6 (six) hours as needed (nausea). 12 tablet 0    pantoprazole (PROTONIX) 40 MG tablet Take 1 tablet (40 mg total) by mouth once daily. 90 tablet 3    predniSONE (DELTASONE) 5 MG tablet Take 1.5 tablets (7.5 mg total) by mouth once daily. 60 tablet 3    walker Misc Please dispense 1 Rollator 1 each 0    azithromycin (Z-NIRAV) 250 MG tablet Take 2 tablets by mouth on day 1; Take 1 tablet by mouth on days 2-5 (Patient not taking: Reported on 8/19/2024) 6 tablet 0    blood-glucose sensor (DEXCOM G7 SENSOR) Ana 1 each by Misc.(Non-Drug; Combo Route) route every 14 (fourteen) days. (Patient not taking: Reported on 7/25/2024) 5 each 6    cetirizine (ZYRTEC) 10 MG tablet Take 1 tablet (10 mg total) by mouth once daily. for 14 days 14 tablet 0    LIDOcaine (LIDODERM) 5 % Place 1 patch onto the skin once daily. Remove & Discard patch within 12 hours or as directed by MD 30 patch 0    omeprazole (PRILOSEC) 20 MG capsule Take 1 capsule (20 mg total) by mouth once daily. 90 capsule 0    [DISCONTINUED] ARIPiprazole (ABILIFY) 2 MG Tab Take 2 mg by mouth once daily.   1    [DISCONTINUED] divalproex (DEPAKOTE) 250 MG EC tablet Take 250 mg by mouth.        No current facility-administered medications on file prior to visit.        Allergies:  Review of patient's allergies indicates:   Allergen Reactions    Ketorolac (pf) Swelling    Penicillins Hives    Percocet [oxycodone-acetaminophen] Hives and Itching    Tramadol Hives    Promethazine          Social Hx:  Social History     Tobacco Use   Smoking Status Former    Types: Vaping with nicotine   Smokeless Tobacco Never      Social History     Substance and Sexual Activity   Alcohol Use No         Relevant Family Hx:  Family History   Problem Relation Name Age of Onset    Heart block Mother Mother     Arthritis Mother Mother      Asthma Mother Mother     Diabetes Mother Mother     Heart disease Father Romero hampton     Diabetes Sister      Heart disease Sister      Kidney disease Sister            Physical Exam:   Vitals:    08/27/24 1512   BP: 112/72   Pulse: 77   Resp: 18      Gen: NAD, AAOx3   Eye: EOMI   CV: RR  Pulm: NWOB on RA  Abd: soft, non-tender, non-distended  Ext:  Move all 4 extremities. Biphasic DP and PT in BLE. 2+ radial pulses. 2+ femoral pulses        Interval Imaging:    Interpretation Summary      The right lower extremity demonstrated a loss of multiphasic waveforms at the level of the distal SFA.  The BILLY on the right correlates with a moderately severe obstruction.  The right lower extremity demonstrated moderately severe arterial flow reduction.     The left lower extremity demonstrated a loss of multiphasic waveforms at the level of the mid SFA.  The BILLY on the left correlates with a moderately severe obstruction.  The left lower extremity demonstrated moderately severe arterial flow reduction.             A/P: Vi Hampton is a 58 y.o. female with PMHx of HTN, T2DM, pAF on Eliquis, and SLE c/b lupus nephritis presenting today with bilateral claudication symptoms. She states that her left leg is worse than right leg. Patient baseline Cr around 1.3, most recently 1.7.      - Scheduled for left lower extremity angiogram on 9/6/24  - r/b/a discussed with patient who would like to proceed with procedure  - Given patient's kidney disease, will need preop hydration and postop hydration.  - Patient will need to hold Eliquis for 3 days prior to procedure  - Follow up 2 weeks post-operatively    Dwayne Hilton MD  [facility] Surgery PGY-1

## 2024-09-05 ENCOUNTER — PATIENT MESSAGE (OUTPATIENT)
Dept: SURGERY | Facility: HOSPITAL | Age: 59
End: 2024-09-05
Payer: MEDICAID

## 2024-09-05 ENCOUNTER — OFFICE VISIT (OUTPATIENT)
Dept: NEPHROLOGY | Facility: CLINIC | Age: 59
End: 2024-09-05
Payer: MEDICAID

## 2024-09-05 ENCOUNTER — TELEPHONE (OUTPATIENT)
Dept: SURGERY | Facility: HOSPITAL | Age: 59
End: 2024-09-05
Payer: MEDICAID

## 2024-09-05 ENCOUNTER — LAB VISIT (OUTPATIENT)
Dept: LAB | Facility: HOSPITAL | Age: 59
End: 2024-09-05
Attending: NURSE PRACTITIONER
Payer: MEDICAID

## 2024-09-05 VITALS
OXYGEN SATURATION: 99 % | HEIGHT: 66 IN | RESPIRATION RATE: 18 BRPM | SYSTOLIC BLOOD PRESSURE: 100 MMHG | DIASTOLIC BLOOD PRESSURE: 64 MMHG | WEIGHT: 178.81 LBS | TEMPERATURE: 99 F | BODY MASS INDEX: 28.74 KG/M2 | HEART RATE: 75 BPM

## 2024-09-05 DIAGNOSIS — N18.32 CKD STAGE G3B/A3, GFR 30-44 AND ALBUMIN CREATININE RATIO >300 MG/G: ICD-10-CM

## 2024-09-05 DIAGNOSIS — D84.9 IMMUNOSUPPRESSED STATUS: ICD-10-CM

## 2024-09-05 DIAGNOSIS — M32.14 LUPUS NEPHRITIS: ICD-10-CM

## 2024-09-05 DIAGNOSIS — N18.32 CKD STAGE G3B/A3, GFR 30-44 AND ALBUMIN CREATININE RATIO >300 MG/G: Primary | ICD-10-CM

## 2024-09-05 DIAGNOSIS — I10 ESSENTIAL HYPERTENSION: ICD-10-CM

## 2024-09-05 LAB
ALBUMIN SERPL-MCNC: 2.9 G/DL (ref 3.5–5)
ALBUMIN/GLOB SERPL: 0.6 RATIO (ref 1.1–2)
ALP SERPL-CCNC: 111 UNIT/L (ref 40–150)
ALT SERPL-CCNC: <5 UNIT/L (ref 0–55)
ANION GAP SERPL CALC-SCNC: 9 MEQ/L
AST SERPL-CCNC: 9 UNIT/L (ref 5–34)
BACTERIA #/AREA URNS AUTO: ABNORMAL /HPF
BILIRUB SERPL-MCNC: 0.2 MG/DL
BILIRUB UR QL STRIP.AUTO: NEGATIVE
BUN SERPL-MCNC: 22.4 MG/DL (ref 9.8–20.1)
CALCIUM SERPL-MCNC: 9.2 MG/DL (ref 8.4–10.2)
CHLORIDE SERPL-SCNC: 108 MMOL/L (ref 98–107)
CLARITY UR: CLEAR
CO2 SERPL-SCNC: 23 MMOL/L (ref 22–29)
COLOR UR AUTO: YELLOW
CREAT SERPL-MCNC: 1.49 MG/DL (ref 0.55–1.02)
CREAT UR-MCNC: 198.2 MG/DL (ref 45–106)
CREAT/UREA NIT SERPL: 15
GFR SERPLBLD CREATININE-BSD FMLA CKD-EPI: 41 ML/MIN/1.73/M2
GLOBULIN SER-MCNC: 4.8 GM/DL (ref 2.4–3.5)
GLUCOSE SERPL-MCNC: 75 MG/DL (ref 74–100)
GLUCOSE UR QL STRIP: NORMAL
HGB UR QL STRIP: ABNORMAL
HYALINE CASTS #/AREA URNS LPF: ABNORMAL /LPF
KETONES UR QL STRIP: NEGATIVE
LEUKOCYTE ESTERASE UR QL STRIP: 500
MAGNESIUM SERPL-MCNC: 2 MG/DL (ref 1.6–2.6)
MUCOUS THREADS URNS QL MICRO: ABNORMAL /LPF
NITRITE UR QL STRIP: NEGATIVE
PH UR STRIP: 5.5 [PH]
PHOSPHATE SERPL-MCNC: 3.5 MG/DL (ref 2.3–4.7)
POTASSIUM SERPL-SCNC: 4.3 MMOL/L (ref 3.5–5.1)
PROT SERPL-MCNC: 7.7 GM/DL (ref 6.4–8.3)
PROT UR QL STRIP: ABNORMAL
PROT UR STRIP-MCNC: 44.9 MG/DL
PTH-INTACT SERPL-MCNC: 150.9 PG/ML (ref 8.7–77)
RBC #/AREA URNS AUTO: ABNORMAL /HPF
SODIUM SERPL-SCNC: 140 MMOL/L (ref 136–145)
SP GR UR STRIP.AUTO: 1.02 (ref 1–1.03)
SQUAMOUS #/AREA URNS LPF: ABNORMAL /HPF
URINE PROTEIN/CREATININE RATIO (OLG): 0.2
UROBILINOGEN UR STRIP-ACNC: NORMAL
WBC #/AREA URNS AUTO: ABNORMAL /HPF

## 2024-09-05 PROCEDURE — 80053 COMPREHEN METABOLIC PANEL: CPT

## 2024-09-05 PROCEDURE — 84100 ASSAY OF PHOSPHORUS: CPT

## 2024-09-05 PROCEDURE — 81001 URINALYSIS AUTO W/SCOPE: CPT

## 2024-09-05 PROCEDURE — 83735 ASSAY OF MAGNESIUM: CPT

## 2024-09-05 PROCEDURE — 99214 OFFICE O/P EST MOD 30 MIN: CPT | Mod: PBBFAC | Performed by: NURSE PRACTITIONER

## 2024-09-05 PROCEDURE — 84156 ASSAY OF PROTEIN URINE: CPT

## 2024-09-05 PROCEDURE — 82570 ASSAY OF URINE CREATININE: CPT

## 2024-09-05 PROCEDURE — 83970 ASSAY OF PARATHORMONE: CPT

## 2024-09-05 PROCEDURE — 36415 COLL VENOUS BLD VENIPUNCTURE: CPT

## 2024-09-05 PROCEDURE — 87077 CULTURE AEROBIC IDENTIFY: CPT

## 2024-09-05 PROCEDURE — 87184 SC STD DISK METHOD PER PLATE: CPT

## 2024-09-05 NOTE — PROGRESS NOTES
[facility]  Nephrology Clinic Note    Chief complaint: Follow-up (RTC, did not take meds-will have procedure 9/6 assessing for blockage)    History of present illness:   Vi Ulrich is a 58 y.o. Black or  female with past medical history of  systemic lupus erythematosus diagnosed in 2004 (was previously under the care of Dr Velazquez, has established care with Dr. 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    She presents for follow-up appointment in Nephrology Clinic today. C/o bilateral foot pain.     Review of Systems  12 point review of systems conducted, negative except as stated in the history of present illness.    Allergies: Patient is allergic to ketorolac (pf), penicillins, percocet [oxycodone-acetaminophen], tramadol, and promethazine.     Past Medical History:  has a past medical history of Arthritis, Asthma, Atrial fibrillation, Chronic constipation, Diabetes mellitus, Encounter for blood transfusion, GERD (gastroesophageal reflux disease), Gout, Hypertension, Lupus, Renal disorder, and SLE (systemic lupus erythematosus).    Procedure History:  has a past surgical history that includes Cholecystectomy; Appendectomy; Partial hysterectomy; Lymph node biopsy (Left, 2014); Hysterectomy; Joint replacement (Right, 08/07/2015); Mastectomy; Esophagogastroduodenoscopy (N/A, 04/19/2018); and Colonoscopy (N/A, 04/19/2018).    Family History: family  "history includes Arthritis in her mother; Asthma in her mother; Diabetes in her mother and sister; Heart block in her mother; Heart disease in her father and sister; Kidney disease in her sister.    Social History:  reports that she has quit smoking. Her smoking use included vaping with nicotine. She has never used smokeless tobacco. She reports that she does not drink alcohol and does not use drugs.    Physical exam  /64 (BP Location: Right arm, Patient Position: Sitting, BP Method: Large (Manual))   Pulse 75   Temp 99.4 °F (37.4 °C) (Oral)   Resp 18   Ht 5' 5.75" (1.67 m)   Wt 81.1 kg (178 lb 12.7 oz)   LMP  (LMP Unknown)   SpO2 99%   BMI 29.08 kg/m²   General appearance: Patient is in no acute distress.  Skin: No rashes or wounds.  HEENT: PERRLA, EOMI, no scleral icterus, no JVD. Neck is supple.  Chest: Respirations are unlabored. Lungs sounds are clear.   Heart: S1, S2.   Abdomen: Benign.  : Deferred.  Extremities: No edema, peripheral pulses are palpable.   Neuro: No focal deficits.     Home Medications:    Current Outpatient Medications:     albuterol sulfate (PROAIR RESPICLICK) 90 mcg/actuation inhaler, Inhale 1 puff into the lungs every 4 (four) hours as needed for Wheezing. Rescue, Disp: , Rfl:     ALPRAZolam (XANAX) 1 MG tablet, Take 1 tablet (1 mg total) by mouth 2 (two) times daily as needed for Anxiety., Disp: 60 tablet, Rfl: 2    apixaban (ELIQUIS) 5 mg Tab, Take 1 tablet (5 mg total) by mouth 2 (two) times daily., Disp: 180 tablet, Rfl: 3    aspirin (ECOTRIN) 81 MG EC tablet, Take 1 tablet (81 mg total) by mouth once daily., Disp: 90 tablet, Rfl: 3    atorvastatin (LIPITOR) 40 MG tablet, Take 1 tablet (40 mg total) by mouth once daily., Disp: 90 tablet, Rfl: 3    blood sugar diagnostic (ONETOUCH VERIO) Strp, 1 each by Misc.(Non-Drug; Combo Route) route 4 (four) times daily., Disp: 50 each, Rfl: 11    blood sugar diagnostic Strp, 1 each by Misc.(Non-Drug; Combo Route) route 4 (four) " times daily., Disp: 50 each, Rfl: 11    blood sugar diagnostic Strp, 1 strip by Misc.(Non-Drug; Combo Route) route 4 (four) times daily with meals and nightly., Disp: 200 each, Rfl: 6    blood-glucose meter kit, Use as instructed, Disp: 1 each, Rfl: 0    eletriptan (RELPAX) 40 MG tablet, Take 1 tablet (40 mg total) by mouth as needed (take one at the beginning of migraine, may repeat in 2 h. max 2 in 24h). may repeat in 2 hours if necessary, Disp: 9 tablet, Rfl: 5    EScitalopram oxalate (LEXAPRO) 20 MG tablet, Take 1 tablet (20 mg total) by mouth once daily., Disp: 90 tablet, Rfl: 3    furosemide (LASIX) 20 MG tablet, Take 1 tablet (20 mg total) by mouth once daily., Disp: 90 tablet, Rfl: 3    lancets 28 gauge Misc, 100 lancets by Misc.(Non-Drug; Combo Route) route 4 (four) times daily with meals and nightly., Disp: 100 each, Rfl: 8    losartan (COZAAR) 25 MG tablet, Take 1 tablet (25 mg total) by mouth once daily., Disp: 90 tablet, Rfl: 3    LUPKYNIS 7.9 mg Cap, Take 3 capsules (23.7 mg) by mouth twice daily., Disp: 180 capsule, Rfl: 5    mycophenolate (CELLCEPT) 250 mg Cap, Take 6 capsules (1,500 mg total) by mouth 2 (two) times daily., Disp: 360 capsule, Rfl: 2    omeprazole (PRILOSEC) 20 MG capsule, Take 1 capsule (20 mg total) by mouth once daily., Disp: 90 capsule, Rfl: 0    ondansetron (ZOFRAN) 4 MG tablet, Take 1 tablet (4 mg total) by mouth every 6 (six) hours as needed for Nausea., Disp: 12 tablet, Rfl: 0    pantoprazole (PROTONIX) 40 MG tablet, Take 1 tablet (40 mg total) by mouth once daily., Disp: 90 tablet, Rfl: 3    predniSONE (DELTASONE) 5 MG tablet, Take 1.5 tablets (7.5 mg total) by mouth once daily., Disp: 60 tablet, Rfl: 3    azithromycin (Z-NIRAV) 250 MG tablet, Take 2 tablets by mouth on day 1; Take 1 tablet by mouth on days 2-5, Disp: 6 tablet, Rfl: 0    blood-glucose sensor (DEXCOM G7 SENSOR) Ana, 1 each by Misc.(Non-Drug; Combo Route) route every 14 (fourteen) days. (Patient not taking:  Reported on 7/25/2024), Disp: 5 each, Rfl: 6    cetirizine (ZYRTEC) 10 MG tablet, Take 1 tablet (10 mg total) by mouth once daily. for 14 days, Disp: 14 tablet, Rfl: 0    insulin aspart U-100 (NOVOLOG) 100 unit/mL (3 mL) InPn pen, Inject 2 Units into the skin as needed. (Patient not taking: Reported on 9/5/2024), Disp: , Rfl:     LIDOcaine (LIDODERM) 5 %, Place 1 patch onto the skin once daily. Remove & Discard patch within 12 hours or as directed by MD, Disp: 30 patch, Rfl: 0    ondansetron (ZOFRAN-ODT) 4 MG TbDL, Take 1 tablet (4 mg total) by mouth every 6 (six) hours as needed (nausea)., Disp: 12 tablet, Rfl: 0    walker Misc, Please dispense 1 Rollator (Patient not taking: Reported on 9/5/2024), Disp: 1 each, Rfl: 0    Laboratory data    Lab Results   Component Value Date    WBC 4.33 (L) 08/09/2024    HGB 12.1 08/09/2024    HCT 37.9 08/09/2024     08/09/2024    IRON 32 (L) 07/09/2024    TIBC 208 (L) 07/09/2024    LABIRON 15 (L) 07/09/2024    FERRITIN 156.98 07/09/2024    FOLATE 5.3 (L) 01/12/2023    IIBWJGNR58 674 01/12/2023    HAPTO 249 03/10/2023     (H) 03/10/2023     09/05/2024    K 4.3 09/05/2024    CO2 23 09/05/2024    BUN 22.4 (H) 09/05/2024    CREATININE 1.49 (H) 09/05/2024    EGFRNORACEVR 41 09/05/2024    GLUCOSE 75 09/05/2024    CALCIUM 9.2 09/05/2024    ALKPHOS 111 09/05/2024    LABPROT 7.7 09/05/2024    ALBUMIN 2.9 (L) 09/05/2024    BILIDIR 0.1 03/28/2022    IBILI 0.10 03/28/2022    AST 9 09/05/2024    ALT <5 09/05/2024    MG 2.00 09/05/2024    PHOS 3.5 09/05/2024      Lab Results   Component Value Date    HGBA1C 5.7 11/14/2023    .9 (H) 09/05/2024    TCOWZQCF73XS 15.0 (L) 08/04/2023    HIV Nonreactive 10/13/2023    HEPCAB Nonreactive 10/13/2023    HEPBSAB Negative 11/17/2020     Urine:  Lab Results   Component Value Date    APPEARANCEUA Clear 09/05/2024    SGUA 1.022 09/05/2024    PROTEINUA 1+ (A) 09/05/2024    KETONESUA Negative 09/05/2024    LEUKOCYTESUR 500 (A)  09/05/2024    RBCUA 6-10 (A) 09/05/2024    WBCUA 21-50 (A) 09/05/2024    BACTERIA Trace (A) 09/05/2024    SQEPUA Few (A) 09/05/2024    HYALINECASTS None Seen 09/05/2024    CREATRANDUR 198.2 (H) 09/05/2024    PROTEINURINE 44.9 09/05/2024    UPROTCREA 0.2 09/05/2024         Imaging  US Retroperitoneal Limited 10/23/2023  Grayscale and color Doppler sonographic evaluation of the kidneys.  The right kidney measures 12 cm. The left kidney measures 12 cm.   No hydronephrosis.  There are small renal cysts for which no follow-up is needed.  Largest measures only about 1 cm.  Otherwise grossly normal renal parenchymal echogenicity.  Impression  No significant sonographic abnormality of the kidneys.      Impression    ICD-10-CM ICD-9-CM   1. CKD stage G3b/A3, GFR 30-44 and albumin creatinine ratio >300 mg/g  N18.32 585.3   2. Essential hypertension  I10 401.9   3. Lupus nephritis  M32.14 710.0     583.81   4. Immunosuppressed status  D84.9 279.9   5. BMI 29.0-29.9,adult  Z68.29 V85.25        Plan  CKD stage G3b/A3, GFR 30-44 and albumin creatinine ratio >300 mg/g  -     CBC Auto Differential; Future; Expected date: 12/01/2024  -     Comprehensive Metabolic Panel; Future; Expected date: 12/01/2024  -     Phosphorus; Future; Expected date: 12/01/2024  -     PTH, Intact; Future; Expected date: 12/01/2024  -     Protein/Creatinine Ratio, Urine; Future; Expected date: 12/01/2024  -     Urinalysis, Reflex to Urine Culture; Future; Expected date: 12/01/2024  -     Vitamin D; Future; Expected date: 12/01/2024  -     Ferritin; Future; Expected date: 12/01/2024  -     Iron and TIBC; Future; Expected date: 12/01/2024  Serum creatinine is stable, urinary protein excretion is <1 g/24hr.  Continue current medication regimen.  Will defer immunosuppression to rheumatology.    Continue renal sparing activities:  -2 g a day dietary sodium restriction  -optimize glycemic control (goal A1c is less than 7%)  -control high blood pressure (goal  blood pressure is less than 130/80, patient was advised to check blood pressure once or twice a week and bring blood pressure logs to next office visit)  -exercise at least 30 minutes a day, 5 days a week  -maintain healthy weight  -decrease or stop alcohol use  -do not smoke  -stay well hydrated (drink water only, avoid juices, sweet tea, and sodas)  -ask about staying up-to-date on vaccinations (flu vaccine, pneumonia vaccine, hepatitis B vaccine)  -avoid excessive use of NSAIDs (ibuprofen, naproxen, Aleve, Advil, Toradol, Mobic), take Tylenol as needed for headache or mild pain  -take cholesterol lowering medications if prescribed (LDL goal less than 100)  RTC in 3 months.     Essential hypertension  Blood pressure reading is at goal, continue current antihypertensive regimen and 2 g a day dietary sodium restriction.      Lupus nephritis  -     Ambulatory referral/consult to Rheumatology; Future; Expected date: 09/12/2024  -     C3 Complement; Future; Expected date: 12/01/2024  -     C4 Complement; Future; Expected date: 12/01/2024  Continue current immunosuppressive regimen.  Will refer patient to Dr. Velazquez for continuation of care.      Immunosuppressed status  -Advised to stay up-to-date on age appropriate vaccinations and malignancy screening with PCP.   -Recommended good hand hygiene and limiting contact with people who are sick or recently vaccinated.     BMI 29.0-29.9,adult  Lifestyle and dietary interventions discussed, patient encouraged to maintain non-sedentary lifestyle and well-balanced diet.         9/5/2024  Rachel Broderick NP  [facility]ph[facility]

## 2024-09-06 ENCOUNTER — HOSPITAL ENCOUNTER (OUTPATIENT)
Facility: HOSPITAL | Age: 59
Discharge: HOME | End: 2024-09-06
Attending: SURGERY | Admitting: SURGERY
Payer: MEDICAID

## 2024-09-06 DIAGNOSIS — I73.9 PERIPHERAL ARTERY DISEASE: ICD-10-CM

## 2024-09-06 PROCEDURE — C1769 GUIDE WIRE: HCPCS | Performed by: SURGERY

## 2024-09-06 PROCEDURE — 37225 HC FEM/POPL REVAS W/ATHER: CPT | Mod: LT | Performed by: SURGERY

## 2024-09-06 PROCEDURE — C1714 CATH, TRANS ATHERECTOMY, DIR: HCPCS | Performed by: SURGERY

## 2024-09-06 PROCEDURE — C1725 CATH, TRANSLUMIN NON-LASER: HCPCS | Performed by: SURGERY

## 2024-09-06 PROCEDURE — 27201423 OPTIME MED/SURG SUP & DEVICES STERILE SUPPLY: Performed by: SURGERY

## 2024-09-06 PROCEDURE — C1894 INTRO/SHEATH, NON-LASER: HCPCS | Performed by: SURGERY

## 2024-09-06 PROCEDURE — C1760 CLOSURE DEV, VASC: HCPCS | Performed by: SURGERY

## 2024-09-06 PROCEDURE — 75710 ARTERY X-RAYS ARM/LEG: CPT | Mod: 59,LT | Performed by: SURGERY

## 2024-09-06 PROCEDURE — 25000003 PHARM REV CODE 250

## 2024-09-06 PROCEDURE — 99153 MOD SED SAME PHYS/QHP EA: CPT | Performed by: SURGERY

## 2024-09-06 PROCEDURE — 75625 CONTRAST EXAM ABDOMINL AORTA: CPT | Performed by: SURGERY

## 2024-09-06 PROCEDURE — C1887 CATHETER, GUIDING: HCPCS | Performed by: SURGERY

## 2024-09-06 PROCEDURE — 99152 MOD SED SAME PHYS/QHP 5/>YRS: CPT | Performed by: SURGERY

## 2024-09-06 PROCEDURE — 25500020 PHARM REV CODE 255: Performed by: SURGERY

## 2024-09-06 PROCEDURE — 63600175 PHARM REV CODE 636 W HCPCS: Performed by: SURGERY

## 2024-09-06 PROCEDURE — 25000003 PHARM REV CODE 250: Performed by: SURGERY

## 2024-09-06 RX ORDER — MIDAZOLAM HYDROCHLORIDE 1 MG/ML
INJECTION INTRAMUSCULAR; INTRAVENOUS
Status: DISCONTINUED | OUTPATIENT
Start: 2024-09-06 | End: 2024-09-06 | Stop reason: HOSPADM

## 2024-09-06 RX ORDER — HEPARIN SODIUM 5000 [USP'U]/ML
INJECTION, SOLUTION INTRAVENOUS; SUBCUTANEOUS
Status: DISCONTINUED | OUTPATIENT
Start: 2024-09-06 | End: 2024-09-06 | Stop reason: HOSPADM

## 2024-09-06 RX ORDER — CILOSTAZOL 50 MG/1
50 TABLET ORAL 2 TIMES DAILY
Qty: 60 TABLET | Refills: 11 | Status: SHIPPED | OUTPATIENT
Start: 2024-09-06 | End: 2025-09-06

## 2024-09-06 RX ORDER — ONDANSETRON HYDROCHLORIDE 2 MG/ML
4 INJECTION, SOLUTION INTRAVENOUS EVERY 12 HOURS PRN
Status: DISCONTINUED | OUTPATIENT
Start: 2024-09-06 | End: 2024-09-06 | Stop reason: HOSPADM

## 2024-09-06 RX ORDER — LIDOCAINE HYDROCHLORIDE 10 MG/ML
INJECTION, SOLUTION EPIDURAL; INFILTRATION; INTRACAUDAL; PERINEURAL
Status: DISCONTINUED | OUTPATIENT
Start: 2024-09-06 | End: 2024-09-06 | Stop reason: HOSPADM

## 2024-09-06 RX ORDER — DIPHENHYDRAMINE HYDROCHLORIDE 50 MG/ML
INJECTION INTRAMUSCULAR; INTRAVENOUS
Status: DISCONTINUED | OUTPATIENT
Start: 2024-09-06 | End: 2024-09-06 | Stop reason: HOSPADM

## 2024-09-06 RX ORDER — FENTANYL CITRATE 50 UG/ML
INJECTION, SOLUTION INTRAMUSCULAR; INTRAVENOUS
Status: DISCONTINUED | OUTPATIENT
Start: 2024-09-06 | End: 2024-09-06 | Stop reason: HOSPADM

## 2024-09-06 RX ADMIN — SODIUM CHLORIDE 1000 ML: 9 INJECTION, SOLUTION INTRAVENOUS at 06:09

## 2024-09-06 NOTE — BRIEF OP NOTE
[facility]  Brief Operative Note  Cardiology    SUMMARY     Surgery Date: 9/6/2024     Surgeons and Role:     * Devan Jin MD - Primary    Assisting Surgeon:     * Janine Veloz MD - Resident    Pre-op Diagnosis:  Peripheral artery disease [I73.9]    Post-op Diagnosis: Post-Op Diagnosis Codes:     * Peripheral artery disease [I73.9]    Procedure Performed:   LLE angiogram   Balloon angioplasty of SFA     Procedure(s) (LRB):  Angiogram Extremity Unilateral (Left)    Technical Procedures Used:   See full operative dictation     Operative Findings:   SFA, occluded   Improved after laser arthrectomy and balloon angioplasty    Estimated Blood Loss: minimal         Specimens:   Specimen (24h ago, onward)      None          Janine Veloz MD   [employer] General Surgery, PGY-3    
09-Mar-2024 18:33

## 2024-09-06 NOTE — DISCHARGE INSTRUCTIONS
`Take it easy for the next 24 hours since you have had anesthesia sedation.  Resume home medications unless otherwise instructed by your doctor.     ` Be sure to stay hydrated.      `No bending, straining or or heavy lifting over 5 pounds or bathing for 1 week.      `You may remove your bandaid in 24 hours and shower then (showers only for 1 week), PAT area dry.  Reapply dressing/band aid for 5 days.    `When coughing, sneezing, or straining for a bowel movement: press with your palm on top of the dressing/bandage.    `Keep MYNX instructions with you for 30 days.    `Notify MD if you are running fever over 100.4, see any reddness or foul smelling discharge from incision area.      `Go to your nearest ER or call 911 if you have any difficulty breathing or notice swelling or bleeding in your leg. Thank you for choosing [facility] for your care and it was my pleasure taking care of you.  [phone]

## 2024-09-06 NOTE — DISCHARGE SUMMARY
[facility]  Discharge Note  Short Stay    Procedure(s) (LRB):  Angiogram Extremity Unilateral (Left)      OUTCOME: Patient tolerated treatment/procedure well without complication and is now ready for discharge.    DISPOSITION: Home or Self Care    FINAL DIAGNOSIS:  PAD    FOLLOWUP: In clinic in 2 weeks    DISCHARGE INSTRUCTIONS:    Discharge Procedure Orders   Diet Adult Regular     Notify your health care provider if you experience any of the following:  temperature >100.4     Notify your health care provider if you experience any of the following:  persistent nausea and vomiting or diarrhea     Notify your health care provider if you experience any of the following:  severe uncontrolled pain     Notify your health care provider if you experience any of the following:  redness, tenderness, or signs of infection (pain, swelling, redness, odor or green/yellow discharge around incision site)     Remove dressing in 24 hours     Shower on day dressing removed (No bath)      Pt must lay flat following surgery for 2 hours     Cilostazol prescribed, patient already on ASA     TIME SPENT ON DISCHARGE: 20 minutes    Janine Veloz MD   [facility] General Surgery, PGY-3

## 2024-09-06 NOTE — Clinical Note
25 ml of contrast were injected throughout the case. 10 mL of contrast was the total wasted during the case. 35 mL was the total amount used during the case.

## 2024-09-06 NOTE — INTERVAL H&P NOTE
The patient has been examined and the H&P has been reviewed:    I concur with the findings and no changes have occurred since H&P was written. Ms. Ulrich last took eliquis > 3 days ago. Last PO intake was prior to midnight.     Surgery risks, benefits and alternative options discussed and understood by patient/family.    ASA 3, Mallampati 3    We will proceed to cath lab for LLE angiogram.     Janine Veloz MD   [employer] General Surgery, PGY-3

## 2024-09-06 NOTE — LETTER
September 6, 2024         [address] [address]  Phone: [phone]  Fax: [phone]       Patient: Vi Ulrich   YOB: 1965  Date of Visit: 09/06/2024    To Whom It May Concern:    Please excuse Diana Ulrich from work today. He assisted Cleve Ulrich who was at [facility] on 09/06/2024. He will return to work on next scheduled day. If you have any questions or concerns, or if I can be of further assistance, please do not hesitate to contact me.    Sincerely,    Erika Geller RN

## 2024-09-06 NOTE — Clinical Note
The sheath was exchanged in the right femoral artery. Destination sheath exchanged for [place] 5 FR

## 2024-09-07 LAB — BACTERIA UR CULT: ABNORMAL

## 2024-09-09 ENCOUNTER — TELEPHONE (OUTPATIENT)
Dept: NEPHROLOGY | Facility: CLINIC | Age: 59
End: 2024-09-09
Payer: MEDICAID

## 2024-09-09 VITALS
HEIGHT: 66 IN | OXYGEN SATURATION: 100 % | TEMPERATURE: 98 F | WEIGHT: 175.63 LBS | HEART RATE: 64 BPM | RESPIRATION RATE: 18 BRPM | DIASTOLIC BLOOD PRESSURE: 75 MMHG | BODY MASS INDEX: 28.22 KG/M2 | SYSTOLIC BLOOD PRESSURE: 127 MMHG

## 2024-09-09 RX ORDER — CEPHALEXIN 500 MG/1
500 CAPSULE ORAL EVERY 8 HOURS
Qty: 15 CAPSULE | Refills: 0 | Status: SHIPPED | OUTPATIENT
Start: 2024-09-09 | End: 2024-09-14

## 2024-09-09 NOTE — PROGRESS NOTES
Please let the patient know that lab results are consistent with a bladder infection. I will call in a prescription for an antibiotic to the pharmacy on file.   Thank you

## 2024-09-09 NOTE — TELEPHONE ENCOUNTER
----- Message from ANNAMARIA Godinez sent at 9/9/2024 11:21 AM CDT -----  Please let the patient know that lab results are consistent with a bladder infection. I will call in a prescription for an antibiotic to the pharmacy on file.   Thank you

## 2024-09-16 ENCOUNTER — TELEPHONE (OUTPATIENT)
Dept: VASCULAR SURGERY | Facility: CLINIC | Age: 59
End: 2024-09-16
Payer: MEDICAID

## 2024-09-16 NOTE — TELEPHONE ENCOUNTER
Received call from Ms. Ulrich.  Patient stated that she has been in bed in pain since procedure on 9/6/24.  Patient stated that she has been taking tylenol as directed by MD upon discharge. Patient stated that her left leg near the knee area has a little swelling.  She also stated that she is unable to apply pressure to the left leg due to pain.  She stated that she has applied an ace wrap to her left leg to help with the discomfort.  Instructed patient to remove the ace wrap NOW.  Verbalized understanding. Encouraged patient to report to the ED by ambulance ASAP for evaluation which she refuses. .  She stated that she doesn't have a car and has an autistic child that weighs almost 500lbs.  She currently has transportation set up for the post op appointment tomorrow and will be seen then.

## 2024-09-17 ENCOUNTER — OFFICE VISIT (OUTPATIENT)
Dept: VASCULAR SURGERY | Facility: CLINIC | Age: 59
End: 2024-09-17
Payer: MEDICAID

## 2024-09-17 VITALS
WEIGHT: 175 LBS | OXYGEN SATURATION: 99 % | HEIGHT: 65 IN | DIASTOLIC BLOOD PRESSURE: 62 MMHG | RESPIRATION RATE: 18 BRPM | SYSTOLIC BLOOD PRESSURE: 104 MMHG | BODY MASS INDEX: 29.16 KG/M2 | HEART RATE: 83 BPM

## 2024-09-17 DIAGNOSIS — I73.9 PERIPHERAL ARTERIAL DISEASE: Primary | ICD-10-CM

## 2024-09-17 PROCEDURE — 99213 OFFICE O/P EST LOW 20 MIN: CPT | Mod: PBBFAC

## 2024-09-17 NOTE — PROGRESS NOTES
[facility] General Surgery Clinic Note    HPI:   Vi Ulrich is a 58 y.o. female with PMHx HTN, T2DM, pAF on Eliquis, and SLE c/b lupus nephris, and PAD with claudication presenting after laser arthrectomy and balloon angioplasty of SFA in LLE on 9/6. She reports burning pain that radiates from her thigh down to the top of her foot and heaviness in her LLE. 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     PMH:   Past Medical History:   Diagnosis Date    Arthritis     knees    Asthma     Atrial fibrillation     Chronic constipation     Diabetes mellitus     Encounter for blood transfusion 10/2014    GERD (gastroesophageal reflux disease)     Gout     Hypertension     Lupus 2004    SLE    Renal disorder     SLE (systemic lupus erythematosus)       Meds:   Current Outpatient Medications:     albuterol sulfate (PROAIR RESPICLICK) 90 mcg/actuation inhaler, Inhale 1 puff into the lungs every 4 (four) hours as needed for Wheezing. Rescue, Disp: , Rfl:     ALPRAZolam (XANAX) 1 MG tablet, Take 1 tablet (1 mg total) by mouth 2 (two) times daily as needed for Anxiety., Disp: 60 tablet, Rfl: 2    apixaban (ELIQUIS) 5 mg Tab, Take 1 tablet (5 mg total) by mouth 2 (two) times daily., Disp: 180 tablet, Rfl: 3    aspirin (ECOTRIN) 81 MG EC tablet, Take 1 tablet (81 mg total) by mouth once daily., Disp: 90 tablet, Rfl: 3    atorvastatin (LIPITOR) 40 MG tablet, Take 1 tablet (40 mg total) by mouth once daily., Disp: 90 tablet, Rfl: 3    azithromycin (Z-NIRAV) 250 MG tablet, Take 2 tablets by mouth on day 1; Take 1 tablet by mouth on days 2-5, Disp: 6 tablet, Rfl: 0    blood sugar diagnostic (ONETOUCH VERIO) Strp, 1 each by Misc.(Non-Drug; Combo Route) route 4 (four) times daily., Disp: 50 each, Rfl: 11    blood sugar diagnostic Strp, 1 each by Misc.(Non-Drug; Combo Route) route 4 (four) times daily., Disp: 50 each, Rfl: 11    blood sugar diagnostic Strp, 1 strip by Misc.(Non-Drug; Combo  Route) route 4 (four) times daily with meals and nightly., Disp: 200 each, Rfl: 6    blood-glucose meter kit, Use as instructed, Disp: 1 each, Rfl: 0    cilostazoL (PLETAL) 50 MG Tab, Take 1 tablet (50 mg total) by mouth 2 (two) times daily., Disp: 60 tablet, Rfl: 11    eletriptan (RELPAX) 40 MG tablet, Take 1 tablet (40 mg total) by mouth as needed (take one at the beginning of migraine, may repeat in 2 h. max 2 in 24h). may repeat in 2 hours if necessary, Disp: 9 tablet, Rfl: 5    EScitalopram oxalate (LEXAPRO) 20 MG tablet, Take 1 tablet (20 mg total) by mouth once daily., Disp: 90 tablet, Rfl: 3    furosemide (LASIX) 20 MG tablet, Take 1 tablet (20 mg total) by mouth once daily., Disp: 90 tablet, Rfl: 3    lancets 28 gauge Misc, 100 lancets by Misc.(Non-Drug; Combo Route) route 4 (four) times daily with meals and nightly., Disp: 100 each, Rfl: 8    losartan (COZAAR) 25 MG tablet, Take 1 tablet (25 mg total) by mouth once daily., Disp: 90 tablet, Rfl: 3    LUPKYNIS 7.9 mg Cap, Take 3 capsules (23.7 mg) by mouth twice daily., Disp: 180 capsule, Rfl: 5    mycophenolate (CELLCEPT) 250 mg Cap, Take 6 capsules (1,500 mg total) by mouth 2 (two) times daily., Disp: 360 capsule, Rfl: 2    ondansetron (ZOFRAN) 4 MG tablet, Take 1 tablet (4 mg total) by mouth every 6 (six) hours as needed for Nausea., Disp: 12 tablet, Rfl: 0    ondansetron (ZOFRAN-ODT) 4 MG TbDL, Take 1 tablet (4 mg total) by mouth every 6 (six) hours as needed (nausea)., Disp: 12 tablet, Rfl: 0    pantoprazole (PROTONIX) 40 MG tablet, Take 1 tablet (40 mg total) by mouth once daily., Disp: 90 tablet, Rfl: 3    predniSONE (DELTASONE) 5 MG tablet, Take 1.5 tablets (7.5 mg total) by mouth once daily., Disp: 60 tablet, Rfl: 3    cetirizine (ZYRTEC) 10 MG tablet, Take 1 tablet (10 mg total) by mouth once daily. for 14 days, Disp: 14 tablet, Rfl: 0    insulin aspart U-100 (NOVOLOG) 100 unit/mL (3 mL) InPn pen, Inject 2 Units into the skin as needed. (Patient  not taking: Reported on 9/5/2024), Disp: , Rfl:     omeprazole (PRILOSEC) 20 MG capsule, Take 1 capsule (20 mg total) by mouth once daily., Disp: 90 capsule, Rfl: 0    walker Misc, Please dispense 1 Rollator (Patient not taking: Reported on 9/5/2024), Disp: 1 each, Rfl: 0  Allergies:   Review of patient's allergies indicates:   Allergen Reactions    Ketorolac (pf) Swelling    Penicillins Hives    Percocet [oxycodone-acetaminophen] Hives and Itching    Tramadol Hives    Promethazine      Social History:   Social History     Tobacco Use    Smoking status: Former     Types: Vaping with nicotine    Smokeless tobacco: Never   Substance Use Topics    Alcohol use: No    Drug use: No     Family History:   Family History   Problem Relation Name Age of Onset    Heart block Mother Mother     Arthritis Mother Mother     Asthma Mother Mother     Diabetes Mother Mother     Heart disease Father Romero hampton     Diabetes Sister      Heart disease Sister      Kidney disease Sister       Surgical History:   Past Surgical History:   Procedure Laterality Date    ANGIOGRAPHY OF LOWER EXTREMITY Left 9/6/2024    Procedure: Angiogram Extremity Unilateral;  Surgeon: Devan Jin MD;  Location: [facility] CATH LAB;  Service: Peripheral Vascular;  Laterality: Left;    APPENDECTOMY      CHOLECYSTECTOMY      COLONOSCOPY N/A 04/19/2018    Procedure: COLONOSCOPY;  Surgeon: Minerva Porras MD;  Location: [facility];  Service: Endoscopy;  Laterality: N/A;    ESOPHAGOGASTRODUODENOSCOPY N/A 04/19/2018    Procedure: ESOPHAGOGASTRODUODENOSCOPY (EGD);  Surgeon: Minerva Porras MD;  Location: [facility];  Service: Endoscopy;  Laterality: N/A;    HYSTERECTOMY      JOINT REPLACEMENT Right 08/07/2015    Knee    LYMPH NODE BIOPSY Left 2014    MASTECTOMY      Left arm- NO BP    PARTIAL HYSTERECTOMY         Objective:    Vitals:  Vitals:    09/17/24 1428   BP: 104/62   Pulse: 83   Resp: 18        Physical Exam:  Gen: NAD  Neuro: awake, alert, answering  questions appropriately  CV: RRR  Resp: non-labored breathing, JOSELINE  Abd: soft, ND, NT  Inguinal: R groin access site with no swelling or erythema.   Ext: moves all 4 spontaneously and purposefully. Biphasic doppler DP/PT signals bilaterally. Mild swelling around L knee. No color change to legs/toes.  Skin: warm, well perfused      Assessment/Plan:  Vi Ulrich is a 58 y.o. female with PMHx of HTN, T2DM, pAF on Eliquis, and SLE c/b lupus nephritis presenting today for follow-up of L SFA laser arthrectomy and balloon angioplasty.      - Pt reporting burning pain starting in the left thigh radiating down the left leg   - Ms. Ulrich has patent iliacs bilaterally; therefore, she should not be having burning pain in thigh related to surgery  - Will refer to NSGY for evaluation for sciatic nerve pain/disc disease  - RTC in 3 months with US b/l LE with ABIs    Janine Veloz MD   [employer] General Surgery, PGY-3

## 2024-09-23 ENCOUNTER — TELEPHONE (OUTPATIENT)
Dept: INTERNAL MEDICINE | Facility: CLINIC | Age: 59
End: 2024-09-23
Payer: MEDICAID

## 2024-09-23 NOTE — TELEPHONE ENCOUNTER
----- Message from Gregoria Inman sent at 9/23/2024  2:19 PM CDT -----  Regarding: Dr. Schultz-Rheumatology Referral  Good afternoon, patient called in stating she was referred to Dr. Mcfarlane for her lupus but will not be seen until July of 2025 which she feels is too long. Patient is requesting a referral to someone else. Thank you

## 2024-09-23 NOTE — TELEPHONE ENCOUNTER
Please review and contact pt regarding this matter, this referral was placed per sub-specialty clinic.. Pt is no longer a pt of this clinic.

## 2024-09-28 NOTE — OP NOTE
Vascular Surgery Angiogram Procedure Note     Patient Name: Vi Ulrich  Age: 58 y.o.  Sex: female  YOB: 1965  MRN: [id number]  Author: Devan Jin MD  Location: [facility]     Date: 9/06/2024    PREOPERATIVE DIAGNOSIS:    Peripheral artery disease   Hypertension   Intermittent claudication with life limiting activities      POSTOPERATIVE DIAGNOSIS: Same     PROCEDURE PERFORMED:  1. Ultrasound guided, percutaneous access of the right common femoral artery  2. Placement of catheter into the aorta with aortic angiogram  3. Third order order selective catheterization of the left popliteal artery  4. Atherectomy of the superficial femoral artery and balloon angioplasty of the superficial with a 6 mm balloon    5. Percutaneous closure of the right femoral artery with a  Mynx  for a 5Fr sheath     SURGEON: Devan Jin MD     ASSISTANT:  Janine Veloz MD     ANESTHESIA:  Concious sedation with local anesthesia     NURSING/MEDICATIONS: Continuous cardiorespiratory monitoring was provided  throughout the examination.     Moderate conscious sedation at level 3-4 was obtained with versed and fentanyl  by a trained independent observer for 60 minutes under the supervision of the  performing provider.    FINDINGS:   1. Widely patent infrarenal aorta without evidence of stenosis or aneurysmal degeneration  2. Widely patent renal arteries, bilaterally  3. Widely patent common iliac, external iliac and internal iliac arteries, bilaterally  4. The left common femoral artery was patent, the profunda femoral artery was patent   5. The mid superficial femoral with high grade stenosis with near occlusion.   6. The distal popliteal artery was occluded   8. The posterior tibial artery was occluded  9. The anterior tibial artery was reconstitute         INDICATIONS:  The patient is a 58 y.o. female with history of peripheral artery disease with life limiting claudication.  Endovascular intervention was  judged the best option in this case. Informed consent was obtained, including risk of bleeding, infection, arterial injury, worsening the situation potentially to the point where surgical intervention is needed, along with the possibility of contrast-associated renal failure.     DESCRIPTION OF PROCEDURE: The patient was brought back to the operating room and placed in the supine position. Conscious sedation was administered.  Both groins were prepped and draped in the normal sterile fashion. A universal procedural time out was performed.    With the patient the table supine position, the right groin was prepped and draped in sterile cell technique.  1% lidocaine was used for local anesthesia.  Ultrasound guidance was used to access the right femoral artery with a micropuncture needle in real time with permanent recording and reporting. The microwire was advanced into the right iliac artery under fluoroscopic guidance. A starter wire was then advanced into the aorta and the microsheath up-sized to short, 4Fr sheath. A flush catheter was placed into the aorta and an aortic angiogram was performed and the findings are as above. The left iliac artery, and then the left common femoral artery was selected using an Glidewire and the flush  catheter. A left lower extremity angiogram was performed from the catheter placed in the left femoral artery and the findings are as above.      The patient was systemically heparinized. A 5Fr, 45 cm sheath was placed in an up and over fashion from the left femoral artery into the left external iliac artery.     Utilizing a quick cross catheter and a 014 wire the distal superficial femoral artery was cannulated.  An atherectomy device was then utilized to perform atherectomy in the superficial femoral artery.  It was then pulse angioplasty with a 6 mm balloon.  After balloon angioplasty of the lesion a completion angiogram was performed which showed great flow through the superficial  femoral artery into the popliteal artery.  The distal popliteal artery was occluded at the origin of the anterior tibial artery.  Unable to cannulate it.  The posterior tibial and peroneal artery was occluded.  There was reconstitution of the anterior tibial artery.    A completion angiogram was performed and  demonstrated right femoral access. The right femoral arteriotomy was percutaneously closed using a  Mynx ; hemostasis was excellent and the skin was closed with dermabond.     The patient tolerated the procedure well without any immediate complications.       Attestation: I was present for the entirety of the procedure.    Devan Jin MD

## 2024-10-06 ENCOUNTER — HOSPITAL ENCOUNTER (EMERGENCY)
Facility: HOSPITAL | Age: 59
Discharge: HOME | End: 2024-10-06
Attending: INTERNAL MEDICINE
Payer: MEDICAID

## 2024-10-06 VITALS
HEIGHT: 65 IN | SYSTOLIC BLOOD PRESSURE: 119 MMHG | DIASTOLIC BLOOD PRESSURE: 75 MMHG | BODY MASS INDEX: 29.16 KG/M2 | RESPIRATION RATE: 15 BRPM | TEMPERATURE: 97 F | WEIGHT: 175 LBS | HEART RATE: 67 BPM | OXYGEN SATURATION: 100 %

## 2024-10-06 DIAGNOSIS — N17.9 AKI (ACUTE KIDNEY INJURY): Primary | ICD-10-CM

## 2024-10-06 DIAGNOSIS — N18.32 CKD STAGE G3B/A3, GFR 30-44 AND ALBUMIN CREATININE RATIO >300 MG/G: ICD-10-CM

## 2024-10-06 DIAGNOSIS — R55 SYNCOPE: ICD-10-CM

## 2024-10-06 LAB
ALBUMIN SERPL-MCNC: 3.4 G/DL (ref 3.5–5)
ALBUMIN/GLOB SERPL: 0.7 RATIO (ref 1.1–2)
ALP SERPL-CCNC: 116 UNIT/L (ref 40–150)
ALT SERPL-CCNC: 9 UNIT/L (ref 0–55)
AMPHET UR QL SCN: NEGATIVE
ANION GAP SERPL CALC-SCNC: 12 MEQ/L
AST SERPL-CCNC: 11 UNIT/L (ref 5–34)
BARBITURATE SCN PRESENT UR: NEGATIVE
BASOPHILS # BLD AUTO: 0.03 X10(3)/MCL
BASOPHILS NFR BLD AUTO: 0.4 %
BENZODIAZ UR QL SCN: POSITIVE
BILIRUB SERPL-MCNC: 0.1 MG/DL
BUN SERPL-MCNC: 40.6 MG/DL (ref 9.8–20.1)
CALCIUM SERPL-MCNC: 9.1 MG/DL (ref 8.4–10.2)
CANNABINOIDS UR QL SCN: NEGATIVE
CHLORIDE SERPL-SCNC: 112 MMOL/L (ref 98–107)
CO2 SERPL-SCNC: 13 MMOL/L (ref 22–29)
COCAINE UR QL SCN: NEGATIVE
CREAT SERPL-MCNC: 2.14 MG/DL (ref 0.55–1.02)
CREAT/UREA NIT SERPL: 19
EOSINOPHIL # BLD AUTO: 0.08 X10(3)/MCL (ref 0–0.9)
EOSINOPHIL NFR BLD AUTO: 1.1 %
ERYTHROCYTE [DISTWIDTH] IN BLOOD BY AUTOMATED COUNT: 17.2 % (ref 11.5–17)
ETHANOL SERPL-MCNC: <10 MG/DL
FENTANYL UR QL SCN: NEGATIVE
GFR SERPLBLD CREATININE-BSD FMLA CKD-EPI: 27 ML/MIN/1.73/M2
GLOBULIN SER-MCNC: 4.9 GM/DL (ref 2.4–3.5)
GLUCOSE SERPL-MCNC: 172 MG/DL (ref 74–100)
HCT VFR BLD AUTO: 39.4 % (ref 37–47)
HGB BLD-MCNC: 12.7 G/DL (ref 12–16)
HOLD SPECIMEN: NORMAL
IMM GRANULOCYTES # BLD AUTO: 0.04 X10(3)/MCL (ref 0–0.04)
IMM GRANULOCYTES NFR BLD AUTO: 0.5 %
IRON SATN MFR SERPL: 12 % (ref 20–50)
IRON SERPL-MCNC: 25 UG/DL (ref 50–170)
LYMPHOCYTES # BLD AUTO: 1.46 X10(3)/MCL (ref 0.6–4.6)
LYMPHOCYTES NFR BLD AUTO: 19.7 %
MCH RBC QN AUTO: 29.5 PG (ref 27–31)
MCHC RBC AUTO-ENTMCNC: 32.2 G/DL (ref 33–36)
MCV RBC AUTO: 91.4 FL (ref 80–94)
MDMA UR QL SCN: NEGATIVE
MONOCYTES # BLD AUTO: 0.25 X10(3)/MCL (ref 0.1–1.3)
MONOCYTES NFR BLD AUTO: 3.4 %
NEUTROPHILS # BLD AUTO: 5.57 X10(3)/MCL (ref 2.1–9.2)
NEUTROPHILS NFR BLD AUTO: 74.9 %
NRBC BLD AUTO-RTO: 0 %
OPIATES UR QL SCN: NEGATIVE
PCP UR QL: NEGATIVE
PH UR: 5 [PH] (ref 3–11)
PLATELET # BLD AUTO: 445 X10(3)/MCL (ref 130–400)
PMV BLD AUTO: 9.9 FL (ref 7.4–10.4)
POCT GLUCOSE: 163 MG/DL (ref 70–110)
POTASSIUM SERPL-SCNC: 4.7 MMOL/L (ref 3.5–5.1)
PROT SERPL-MCNC: 8.3 GM/DL (ref 6.4–8.3)
RBC # BLD AUTO: 4.31 X10(6)/MCL (ref 4.2–5.4)
SODIUM SERPL-SCNC: 137 MMOL/L (ref 136–145)
SPECIFIC GRAVITY, URINE AUTO (.000) (OHS): 1.02 (ref 1–1.03)
TIBC SERPL-MCNC: 182 UG/DL (ref 70–310)
TIBC SERPL-MCNC: 207 UG/DL (ref 250–450)
TRANSFERRIN SERPL-MCNC: 186 MG/DL (ref 180–382)
WBC # BLD AUTO: 7.43 X10(3)/MCL (ref 4.5–11.5)

## 2024-10-06 PROCEDURE — 63600175 PHARM REV CODE 636 W HCPCS: Performed by: INTERNAL MEDICINE

## 2024-10-06 PROCEDURE — 80053 COMPREHEN METABOLIC PANEL: CPT | Performed by: INTERNAL MEDICINE

## 2024-10-06 PROCEDURE — 80307 DRUG TEST PRSMV CHEM ANLYZR: CPT | Performed by: INTERNAL MEDICINE

## 2024-10-06 PROCEDURE — 83550 IRON BINDING TEST: CPT

## 2024-10-06 PROCEDURE — 82077 ASSAY SPEC XCP UR&BREATH IA: CPT | Performed by: INTERNAL MEDICINE

## 2024-10-06 PROCEDURE — 36415 COLL VENOUS BLD VENIPUNCTURE: CPT | Performed by: INTERNAL MEDICINE

## 2024-10-06 PROCEDURE — 93005 ELECTROCARDIOGRAM TRACING: CPT

## 2024-10-06 PROCEDURE — 85025 COMPLETE CBC W/AUTO DIFF WBC: CPT | Performed by: INTERNAL MEDICINE

## 2024-10-06 PROCEDURE — 99284 EMERGENCY DEPT VISIT MOD MDM: CPT | Mod: 25

## 2024-10-06 PROCEDURE — 83540 ASSAY OF IRON: CPT

## 2024-10-06 PROCEDURE — 82962 GLUCOSE BLOOD TEST: CPT

## 2024-10-06 PROCEDURE — 96360 HYDRATION IV INFUSION INIT: CPT

## 2024-10-06 RX ADMIN — SODIUM CHLORIDE, POTASSIUM CHLORIDE, SODIUM LACTATE AND CALCIUM CHLORIDE 500 ML: 600; 310; 30; 20 INJECTION, SOLUTION INTRAVENOUS at 08:10

## 2024-10-06 NOTE — ED PROVIDER NOTES
Encounter Date: 10/6/2024       History     Chief Complaint   Patient presents with    Loss of Consciousness     Patient brought in by ambulance post syncopal episode at [facility]. Patient reportedly had 1 syncopal episode and refused transport but shortly after had another episode so ems was called back. Ems administered iv fluids and narcan. Reports increased responsiveness after narcan administration. Patient denies hitting head with syncopal episodes, reports on eliquis. Patient with no complaints at this time.      Presents by EMS after 2 episodes of syncope while in the [facility] today. Denies pain. States Hx of A Fib and Lupus, on Eliquis. Denies alcohol or drug use. States started with nausea, vomiting, remember urinating on herself and passing out. Denies Hx of seizures. Paramedics states miotic pupils, narcan 1 mg given with improvement    The history is provided by the patient and the EMS personnel.     Review of patient's allergies indicates:   Allergen Reactions    Ketorolac (pf) Swelling    Penicillins Hives    Percocet [oxycodone-acetaminophen] Hives and Itching    Tramadol Hives    Promethazine      Past Medical History:   Diagnosis Date    Arthritis     knees    Asthma     Atrial fibrillation     Chronic constipation     Diabetes mellitus     Encounter for blood transfusion 10/2014    GERD (gastroesophageal reflux disease)     Gout     Hypertension     Lupus 2004    SLE    Renal disorder     SLE (systemic lupus erythematosus)      Past Surgical History:   Procedure Laterality Date    ANGIOGRAPHY OF LOWER EXTREMITY Left 9/6/2024    Procedure: Angiogram Extremity Unilateral;  Surgeon: Devan Jin MD;  Location: [facility] CATH LAB;  Service: Peripheral Vascular;  Laterality: Left;    APPENDECTOMY      CHOLECYSTECTOMY      COLONOSCOPY N/A 04/19/2018    Procedure: COLONOSCOPY;  Surgeon: Minerva Porras MD;  Location: [facility];  Service: Endoscopy;  Laterality: N/A;    ESOPHAGOGASTRODUODENOSCOPY N/A  04/19/2018    Procedure: ESOPHAGOGASTRODUODENOSCOPY (EGD);  Surgeon: Minerva Porras MD;  Location: [facility];  Service: Endoscopy;  Laterality: N/A;    HYSTERECTOMY      JOINT REPLACEMENT Right 08/07/2015    Knee    LYMPH NODE BIOPSY Left 2014    MASTECTOMY      Left arm- NO BP    PARTIAL HYSTERECTOMY       Family History   Problem Relation Name Age of Onset    Heart block Mother Mother     Arthritis Mother Mother     Asthma Mother Mother     Diabetes Mother Mother     Heart disease Father Gilbert hampton     Diabetes Sister      Heart disease Sister      Kidney disease Sister       Social History     Tobacco Use    Smoking status: Former     Types: Vaping with nicotine    Smokeless tobacco: Never   Substance Use Topics    Alcohol use: No    Drug use: No     Review of Systems   Gastrointestinal:  Positive for nausea and vomiting.   Neurological:  Positive for syncope.       Physical Exam     Initial Vitals [10/06/24 0740]   BP Pulse Resp Temp SpO2   102/65 74 17 97 °F (36.1 °C) 99 %      MAP       --         Physical Exam    Nursing note and vitals reviewed.  Constitutional: She appears well-developed and well-nourished. No distress.   HENT:   Head: Normocephalic and atraumatic. Mouth/Throat: Oropharynx is clear and moist.   Eyes: Conjunctivae and EOM are normal. Pupils are equal, round, and reactive to light.   Neck: Neck supple. No thyromegaly present. No JVD present.   Normal range of motion.  Cardiovascular:  Normal rate, regular rhythm and normal heart sounds.           Distal pulses +1   Pulmonary/Chest: Breath sounds normal.   Abdominal: Abdomen is soft. Bowel sounds are normal. She exhibits no distension. There is no abdominal tenderness. There is no rebound and no guarding.   Musculoskeletal:         General: No edema. Normal range of motion.      Cervical back: Normal range of motion and neck supple.     Neurological: She is alert and oriented to person, place, and time. She has normal strength. No  cranial nerve deficit. GCS score is 15. GCS eye subscore is 4. GCS verbal subscore is 5. GCS motor subscore is 6.   Skin: Skin is warm and dry. No rash noted.   Psychiatric: Her behavior is normal. Thought content normal.         ED Course   Procedures  Labs Reviewed   DRUG SCREEN, URINE (BEAKER) - Abnormal       Result Value    Amphetamines, Urine Negative      Barbiturates, Urine Negative      Benzodiazepine, Urine Positive (*)     Cannabinoids, Urine Negative      Cocaine, Urine Negative      Fentanyl, Urine Negative      MDMA, Urine Negative      Opiates, Urine Negative      Phencyclidine, Urine Negative      pH, Urine 5.0      Specific Gravity, Urine Auto 1.023      Narrative:     Cut off concentrations:    Amphetamines - 1000 ng/ml  Barbiturates - 200 ng/ml  Benzodiazepine - 200 ng/ml  Cannabinoids (THC) - 50 ng/ml  Cocaine - 300 ng/ml  Fentanyl - 1.0 ng/ml  MDMA - 500 ng/ml  Opiates - 300 ng/ml   Phencyclidine (PCP) - 25 ng/ml    Specimen submitted for drug analysis and tested for pH and specific gravity in order to evaluate sample integrity. Suspect tampering if specific gravity is <1.003 and/or pH is not within the range of 4.5 - 8.0  False negatives may result form substances such as bleach added to urine.  False positives may result for the presence of a substance with similar chemical structure to the drug or its metabolite.    This test provides only a PRELIMINARY analytical test result. A more specific alternate chemical method must be used in order to obtain a confirmed analytical result. Gas chromatography/mass spectrometry (GC/MS) is the preferred confirmatory method. Other chemical confirmation methods are available. Clinical consideration and professional judgement should be applied to any drug of abuse test result, particularly when preliminary positive results are used.    Positive results will be confirmed only at the physicians request. Unconfirmed screening results are to be used only for  medical purposes (treatment).        COMPREHENSIVE METABOLIC PANEL - Abnormal    Sodium 137      Potassium 4.7      Chloride 112 (*)     CO2 13 (*)     Glucose 172 (*)     Blood Urea Nitrogen 40.6 (*)     Creatinine 2.14 (*)     Calcium 9.1      Protein Total 8.3      Albumin 3.4 (*)     Globulin 4.9 (*)     Albumin/Globulin Ratio 0.7 (*)     Bilirubin Total 0.1            ALT 9      AST 11      eGFR 27      Anion Gap 12.0      BUN/Creatinine Ratio 19     CBC WITH DIFFERENTIAL - Abnormal    WBC 7.43      RBC 4.31      Hgb 12.7      Hct 39.4      MCV 91.4      MCH 29.5      MCHC 32.2 (*)     RDW 17.2 (*)     Platelet 445 (*)     MPV 9.9      Neut % 74.9      Lymph % 19.7      Mono % 3.4      Eos % 1.1      Basophil % 0.4      Lymph # 1.46      Neut # 5.57      Mono # 0.25      Eos # 0.08      Baso # 0.03      IG# 0.04      IG% 0.5      NRBC% 0.0     IRON AND TIBC - Abnormal    Iron Binding Capacity Unsaturated 182      Iron Level 25 (*)     Transferrin 186      Iron Binding Capacity Total 207 (*)     Iron Saturation 12 (*)    POCT GLUCOSE - Abnormal    POCT Glucose 163 (*)    ALCOHOL,MEDICAL (ETHANOL) - Normal    Ethanol Level <10.0     EXTRA TUBES    Narrative:     The following orders were created for panel order EXTRA TUBES.  Procedure                               Abnormality         Status                     ---------                               -----------         ------                     Lavender Top Hold[[id number]]                               Final result                 Please view results for these tests on the individual orders.   LAVENDER TOP HOLD    Extra Tube Hold for add-ons.     CBC W/ AUTO DIFFERENTIAL    Narrative:     The following orders were created for panel order CBC auto differential.  Procedure                               Abnormality         Status                     ---------                               -----------         ------                     CBC with  Differential[[id number]]       Abnormal            Final result                 Please view results for these tests on the individual orders.   POCT GLUCOSE, HAND-HELD DEVICE     EKG Readings: (Independently Interpreted)   Initial Reading: No STEMI. Rhythm: Normal Sinus Rhythm. Heart Rate: 72. Ectopy: No Ectopy. Conduction: Normal. ST Segments: Normal ST Segments. T Waves: Normal. Axis: Normal. Clinical Impression: Normal Sinus Rhythm     ECG Results              EKG 12-lead (In process)        Collection Time Result Time QRS Duration [facility]HS QTC Calculation    10/06/24 07:45:44 10/06/24 07:55:06 82 422                     In process by Interface, Lab In [facility] (10/06/24 07:55:15)                   Narrative:    Test Reason : R55,    Vent. Rate : 072 BPM     Atrial Rate : 072 BPM     P-R Int : 170 ms          QRS Dur : 082 ms      QT Int : 386 ms       P-R-T Axes : 056 012 038 degrees     QTc Int : 422 ms    Normal sinus rhythm with sinus arrhythmia  Anterior infarct ,age undetermined  Abnormal ECG  When compared with ECG of 09-AUG-2024 10:13,  Anterior infarct is now Present  Nonspecific T wave abnormality now evident in Anterior leads    Referred By:             Confirmed By:                                   Imaging Results    None          Medications   lactated ringers bolus 500 mL (500 mLs Intravenous New Bag 10/6/24 0898)     Medical Decision Making  Amount and/or Complexity of Data Reviewed  Independent Historian: EMS     Details: Paramedics states miotic pupils, narcan 1 mg given with improvement  Labs: ordered. Decision-making details documented in ED Course.  ECG/medicine tests: ordered and independent interpretation performed. Decision-making details documented in ED Course.      Additional MDM:   Differential Diagnosis:   Drug overdose, hypoglycemia, stroke, encephalopathy, medication side effects among others                           10:48 AM    At this time pt stable, tolerating PO intake and  asymptomatic           Clinical Impression:  Final diagnoses:  [R55] Syncope  [N17.9] STIVEN (acute kidney injury) (Primary)          ED Disposition Condition    Discharge Stable          ED Prescriptions    None       Follow-up Information       Follow up With Specialties Details Why Contact Info    Roxy Schultz MD Internal Medicine Schedule an appointment as soon as possible for a visit in 1 week  [address] [address]  [phone]      [facility] Emergency Medicine  If symptoms worsen [address] [facility] [id number] [phone]             Nelson Talley MD  10/06/24 1049

## 2024-10-07 ENCOUNTER — OFFICE VISIT (OUTPATIENT)
Dept: OPHTHALMOLOGY | Facility: CLINIC | Age: 59
End: 2024-10-07
Payer: MEDICAID

## 2024-10-07 VITALS — WEIGHT: 175.06 LBS | BODY MASS INDEX: 29.17 KG/M2 | HEIGHT: 65 IN

## 2024-10-07 DIAGNOSIS — H53.40 VISUAL FIELD LOSS: Primary | ICD-10-CM

## 2024-10-07 DIAGNOSIS — H52.203 MYOPIA OF BOTH EYES WITH ASTIGMATISM: ICD-10-CM

## 2024-10-07 DIAGNOSIS — H53.133 SUDDEN VISUAL LOSS OF BOTH EYES: ICD-10-CM

## 2024-10-07 DIAGNOSIS — H52.13 MYOPIA OF BOTH EYES WITH ASTIGMATISM: ICD-10-CM

## 2024-10-07 LAB
OHS QRS DURATION: 82 MS
OHS QTC CALCULATION: 422 MS

## 2024-10-07 PROCEDURE — 92083 EXTENDED VISUAL FIELD XM: CPT | Mod: PBBFAC,PN | Performed by: OPHTHALMOLOGY

## 2024-10-07 PROCEDURE — 92133 CPTRZD OPH DX IMG PST SGM ON: CPT | Mod: PBBFAC,PN | Performed by: OPHTHALMOLOGY

## 2024-10-07 PROCEDURE — 92133 CPTRZD OPH DX IMG PST SGM ON: CPT | Mod: PBBFAC,PN

## 2024-10-07 PROCEDURE — 99212 OFFICE O/P EST SF 10 MIN: CPT | Mod: PBBFAC,PN,25

## 2024-10-07 NOTE — PROGRESS NOTES
HPI    Patient's A1c on 7/25/2024 was 6.0  Patient states that she is experiencing blurry vision that has been going   on for about three years and she was brought to the ED yesterday for   passing out she also states that she is having trouble seeing with her   bifocals   Last edited by Chantelle Ratliff MA on 10/7/2024 11:05 AM.            Assessment /Plan     For exam results, see Encounter Report.    There are no diagnoses linked to this encounter.      OCT RNFL 10/7/24  OD- 111, ST green, white NI  OS- 107, STI green, white N    OCTM 10/7/24  OD: normal contour, no srf/irf ou, normal EZ  OS: normal contour, no srf/irf ou, normal EZ    HVF 10/7/24  OD: FL 2/12, FP2, FN N/A- generalized depression with central island  OS: FL 0/12, FP3, FN N/A- generalized depression    Blurry vision, both eyes  - complaining of blurry vision for several years. Referred by optometry for multiple abnormal visual fields  - BCVA 20/40 ou. No Apd, normal color plates. OCT mac and nerve look healthy  - HVF with diffuse depression ou, similar to previous seen at optometrist office  - differential includes: end stage glaucoma, RP, chronic papilledema, PRP , bilateral occipital lobe lesions or non functional loss of vision -nothing on exam to explain vision. Will get MRI brain/orbits to rule out other pathology      2. Myopia with astigmatism and presbyopia  - Mrx dispensed. Patient having trouble reading with current glasses, appears that bifocal is too low      1 month imaging review

## 2024-10-09 ENCOUNTER — HOSPITAL ENCOUNTER (OUTPATIENT)
Dept: RADIOLOGY | Facility: HOSPITAL | Age: 59
Discharge: HOME | End: 2024-10-09
Payer: MEDICAID

## 2024-10-09 DIAGNOSIS — I73.9 PERIPHERAL ARTERIAL DISEASE: ICD-10-CM

## 2024-10-09 LAB
IMMEDIATE ARM BP: 109 MMHG
IMMEDIATE LEFT ABI: 0.7
IMMEDIATE LEFT TIBIAL: 76 MMHG
IMMEDIATE RIGHT ABI: 0.57
IMMEDIATE RIGHT TIBIAL: 62 MMHG
LEFT ABI: 0.73
LEFT CFA PSV: 90 CM/S
LEFT DORSALIS PEDIS PSV: 12 CM/S
LEFT DORSALIS PEDIS: 72 MMHG
LEFT PERONEAL SYS PSV: 11 CM/S
LEFT POPLITEAL PSV: 40 CM/S
LEFT POST TIBIAL SYS PSV: 39 CM/S
LEFT POSTERIOR TIBIAL: 78 MMHG
LEFT PROFUNDA SYS PSV: 89 CM/S
LEFT SUPER FEMORAL DIST SYS PSV: 86 CM/S
LEFT SUPER FEMORAL MID SYS PSV: 68 CM/S
LEFT SUPER FEMORAL PROX SYS PSV: 61 CM/S
OHS CV LEFT LOWER EXTREMITY ABI (NO CALC): 0.73
OHS CV RIGHT ABI LOWER EXTREMITY (NO CALC): 0.63
RIGHT ABI: 0.63
RIGHT ARM BP: 107 MMHG
RIGHT CFA PSV: 96 CM/S
RIGHT DORSALIS PEDIS PSV: 13 CM/S
RIGHT DORSALIS PEDIS: 58 MMHG
RIGHT POPLITEAL PSV: 32 CM/S
RIGHT POST TIBIAL SYS PSV: 21 CM/S
RIGHT POSTERIOR TIBIAL: 67 MMHG
RIGHT PROFUNDA SYS PSV: 68 CM/S
RIGHT SUPER FEMORAL DIST SYS PSV: 28 CM/S
RIGHT SUPER FEMORAL MID SYS PSV: 58 CM/S
RIGHT SUPER FEMORAL PROX SYS PSV: 41 CM/S

## 2024-10-09 PROCEDURE — 93925 LOWER EXTREMITY STUDY: CPT

## 2024-10-09 PROCEDURE — 93924 LWR XTR VASC STDY BILAT: CPT

## 2024-10-22 ENCOUNTER — TELEPHONE (OUTPATIENT)
Dept: INTERNAL MEDICINE | Facility: CLINIC | Age: 59
End: 2024-10-22
Payer: MEDICAID

## 2024-10-22 NOTE — TELEPHONE ENCOUNTER
[facility] requesting a refill Eliquis 5mg tabs. Med not in pt med list to propose. Please advise

## 2024-10-29 ENCOUNTER — TELEPHONE (OUTPATIENT)
Dept: INTERNAL MEDICINE | Facility: CLINIC | Age: 59
End: 2024-10-29
Payer: MEDICAID

## 2024-10-29 ENCOUNTER — HOSPITAL ENCOUNTER (OUTPATIENT)
Dept: RADIOLOGY | Facility: HOSPITAL | Age: 59
Discharge: HOME | End: 2024-10-29
Payer: MEDICAID

## 2024-10-29 DIAGNOSIS — H53.40 VISUAL FIELD LOSS: ICD-10-CM

## 2024-10-29 LAB
CREAT SERPL-MCNC: 1.79 MG/DL (ref 0.55–1.02)
GFR SERPLBLD CREATININE-BSD FMLA CKD-EPI: 33 ML/MIN/1.73/M2

## 2024-10-29 PROCEDURE — 25500020 PHARM REV CODE 255

## 2024-10-29 PROCEDURE — 82565 ASSAY OF CREATININE: CPT

## 2024-10-29 PROCEDURE — 70543 MRI ORBT/FAC/NCK W/O &W/DYE: CPT | Mod: TC

## 2024-10-29 PROCEDURE — 70553 MRI BRAIN STEM W/O & W/DYE: CPT | Mod: TC

## 2024-10-29 PROCEDURE — A9577 INJ MULTIHANCE: HCPCS

## 2024-10-29 RX ADMIN — GADOBENATE DIMEGLUMINE 17 ML: 529 INJECTION, SOLUTION INTRAVENOUS at 12:10

## 2024-10-29 NOTE — TELEPHONE ENCOUNTER
Pt calling for refills of eliquis and complaints of left breast pain. I am aware of this pt's behavior issues in the past. However she is still a patient of ours and as such immediate needs should be addressed. We can place the patient with Faculty next available . Please advise next steps in regards to the care of this patient.

## 2024-10-29 NOTE — TELEPHONE ENCOUNTER
All Conversations: Medication Refill  (Newest Message First)October 23, 2024  Roxy Schultz MD to Ophelia Roth MA Ceaser, Shantell, MD Khan, Farha, MD       10/23/24  4:47 PM  Per instruction of my attending, Dr. Juan, following incident after last clinic appointment, residents will no longer be following this patient due to safety concerns. Please refer to notes dated 7/25/24. This patient must be reassigned to an [facility] attending physician moving forward. Thank you.    Roxy Schultz MD  [facility] Internal Medicine HO2  October 22, 2024         10/22/24  2:40 PM  Ophelia Roth MA routed this conversation to Roxy Schultz MD Carroll, Latoya, MA       10/22/24  2:40 PM  Note  [facility] requesting a refill Eliquis 5mg tabs. Med not in pt med list to propose. Please advise               10/22/24  2:35 PM  [address] (Pharmacy) contacted Ophelia Roth MA   This encounter is not signed. The conversation may still be ongoing.  Additional Documentation    Vitals: LMP  (LMP Unknown)   Encounter Info: Billing Info,     History,     Detailed Report,     Education,     Care Plan,     Allergies,     Outpatient Care Plans     Visit Pharmacy    [address]     Questionnaires    No completed forms available for this encounter.     Not recorded

## 2024-10-31 DIAGNOSIS — I48.91 NEW ONSET ATRIAL FIBRILLATION: Primary | ICD-10-CM

## 2024-11-07 RX ORDER — VOCLOSPORIN 7.9 MG/1
CAPSULE ORAL
Qty: 180 CAPSULE | Refills: 5 | Status: ACTIVE | OUTPATIENT
Start: 2024-11-07

## 2024-11-14 ENCOUNTER — OFFICE VISIT (OUTPATIENT)
Dept: CARDIOLOGY | Facility: CLINIC | Age: 59
End: 2024-11-14
Payer: MEDICAID

## 2024-11-14 VITALS
HEART RATE: 66 BPM | RESPIRATION RATE: 20 BRPM | DIASTOLIC BLOOD PRESSURE: 57 MMHG | TEMPERATURE: 99 F | HEIGHT: 65 IN | SYSTOLIC BLOOD PRESSURE: 103 MMHG | OXYGEN SATURATION: 100 % | BODY MASS INDEX: 31.36 KG/M2 | WEIGHT: 188.25 LBS

## 2024-11-14 DIAGNOSIS — M32.14 LUPUS NEPHRITIS: ICD-10-CM

## 2024-11-14 DIAGNOSIS — R06.09 DOE (DYSPNEA ON EXERTION): Primary | ICD-10-CM

## 2024-11-14 DIAGNOSIS — E78.5 HYPERLIPIDEMIA, UNSPECIFIED HYPERLIPIDEMIA TYPE: ICD-10-CM

## 2024-11-14 DIAGNOSIS — I48.0 PAROXYSMAL A-FIB: ICD-10-CM

## 2024-11-14 DIAGNOSIS — I73.9 PERIPHERAL ARTERY DISEASE: ICD-10-CM

## 2024-11-14 DIAGNOSIS — I10 HYPERTENSION, UNSPECIFIED TYPE: ICD-10-CM

## 2024-11-14 PROCEDURE — 99215 OFFICE O/P EST HI 40 MIN: CPT | Mod: PBBFAC | Performed by: INTERNAL MEDICINE

## 2024-11-14 RX ORDER — LOSARTAN POTASSIUM 25 MG/1
25 TABLET ORAL DAILY
Qty: 90 TABLET | Refills: 3 | Status: SHIPPED | OUTPATIENT
Start: 2024-11-14 | End: 2025-11-14

## 2024-11-14 RX ORDER — METOPROLOL TARTRATE 50 MG/1
50 TABLET ORAL DAILY
COMMUNITY
End: 2024-11-14 | Stop reason: SDUPTHER

## 2024-11-14 RX ORDER — FUROSEMIDE 40 MG/1
40 TABLET ORAL DAILY
Qty: 90 TABLET | Refills: 3 | Status: SHIPPED | OUTPATIENT
Start: 2024-11-14 | End: 2025-11-14

## 2024-11-14 RX ORDER — ATORVASTATIN CALCIUM 40 MG/1
40 TABLET, FILM COATED ORAL DAILY
Qty: 90 TABLET | Refills: 3 | Status: SHIPPED | OUTPATIENT
Start: 2024-11-14

## 2024-11-14 RX ORDER — PANTOPRAZOLE SODIUM 40 MG/1
40 TABLET, DELAYED RELEASE ORAL DAILY
Qty: 90 TABLET | Refills: 3 | Status: SHIPPED | OUTPATIENT
Start: 2024-11-14 | End: 2025-11-14

## 2024-11-14 RX ORDER — METOPROLOL TARTRATE 50 MG/1
50 TABLET ORAL DAILY
Qty: 90 TABLET | Refills: 3 | Status: SHIPPED | OUTPATIENT
Start: 2024-11-14 | End: 2025-11-14

## 2024-11-14 RX ORDER — ASPIRIN 81 MG/1
81 TABLET ORAL DAILY
Qty: 90 TABLET | Refills: 3 | Status: SHIPPED | OUTPATIENT
Start: 2024-11-14 | End: 2025-11-14

## 2024-11-14 NOTE — PROGRESS NOTES
[facility] [place] [facility]  Cardiology Clinic      Date of Visit: 11/14/2024  Reason for Visit/Chief Complaint: dyspnea on exertion    The patient was discussed with Dr. Shepherd who agrees with the assessment and plan.        History of Present Illness:        Vi Ulrich is a 59 y.o. female with a PMHx HTN, T2DM, pAF on Eliquis, SLE c/b lupus nephritis who presents to cardiology clinic for evaluation/follow up of dyspnea on exertion. Patient reported dyspnea on exertion on initial visit and was started on oral diuretics (furosemide 20 daily).  Last visit, patient reported improvement with her shortness of breath however no increase in urine output after diuretic initiation. Today she reports WELSH with 50-100ft as well as intermittent orthopnea.  She  underwent  laser arthrectomy and balloon angioplasty of LLE SFA 9/6/24 for her PAD/Claudication d/t SFA disease. However, reports worsening LE pain and tighthess post procedure. She was seen post op in [facility] clinic at which time it was thought to be radicular pain and repeat post angioplast Arterial duplex was ordered. Results show moderate flow reduction of b/l LE. Patient reports persistent severe pain with even weight bearing of LLE post procedure. She is unable to walk or stand > a few minutes without intense burning and cramping pain from mid thigh extending distally to foot and limps d/t discomfort and pain with ambulation.  She currently denies any chest pain, palpitations, nausea, vomiting, or syncope.    PMHx: HTN, T2DM, pAF on Eliquis, SLE c/b lupus nephritis    SurgicalHx: cholecystectomy, appendectomy, partial hysterectomy, mastectomy  FamilyHx: family history includes Arthritis in her mother; Asthma in her mother; Diabetes in her mother and sister; Heart block in her mother; Heart disease in her father and sister; Kidney disease in her sister.   SocialHx:  reports that she has been smoking cigarettes.  She started smoking about 44 years ago. She has a 10.4 pack-year smoking history. She has never used smokeless tobacco. She reports that she does not drink alcohol and does not use drugs.   Allergies: percocet, tramadol  Home Medications:       Current Outpatient Medications   Medication Instructions    albuterol sulfate (PROAIR RESPICLICK) 90 mcg/actuation inhaler 1 puff, Every 4 hours PRN    ALPRAZolam (XANAX) 1 mg, Oral, 2 times daily PRN    apixaban (ELIQUIS) 5 mg, Oral, 2 times daily    aspirin (ECOTRIN) 81 mg, Oral, Daily    atorvastatin (LIPITOR) 40 mg, Oral, Daily    azithromycin (Z-NIRAV) 250 MG tablet Take 2 tablets by mouth on day 1; Take 1 tablet by mouth on days 2-5<BR>    blood sugar diagnostic (ONETOUCH VERIO) Strp 1 each, Misc.(Non-Drug; Combo Route), 4 times daily    blood sugar diagnostic Strp 1 each, Misc.(Non-Drug; Combo Route), 4 times daily    blood sugar diagnostic Strp 1 strip, Misc.(Non-Drug; Combo Route), 4 times daily with meals & nightly    blood-glucose meter kit Use as instructed    cetirizine (ZYRTEC) 10 mg, Oral, Daily    cilostazoL (PLETAL) 50 mg, Oral, 2 times daily    eletriptan (RELPAX) 40 mg, Oral, As needed (PRN), may repeat in 2 hours if necessary    EScitalopram oxalate (LEXAPRO) 20 mg, Oral, Daily    furosemide (LASIX) 20 mg, Oral, Daily    insulin aspart U-100 (NOVOLOG) 2 Units, As needed (PRN)    lancets 28 gauge Misc 100 lancets, Misc.(Non-Drug; Combo Route), 4 times daily with meals & nightly    losartan (COZAAR) 25 mg, Oral, Daily    metoprolol tartrate (LOPRESSOR) 50 mg, Daily    mycophenolate (CELLCEPT) 1,500 mg, Oral, 2 times daily    omeprazole (PRILOSEC) 20 mg, Oral, Daily    ondansetron (ZOFRAN) 4 mg, Oral, Every 6 hours PRN    ondansetron (ZOFRAN-ODT) 4 mg, Oral, Every 6 hours PRN    pantoprazole (PROTONIX) 40 mg, Oral, Daily    predniSONE (DELTASONE) 7.5 mg, Oral, Daily    voclosporin (LUPKYNIS) 7.9 mg Cap Take 3 capsules (23.7 mg) by mouth twice daily.    walker  "Misc Please dispense 1 Rollator                Objective:     Wt Readings from Last 3 Encounters:   11/14/24 85.4 kg (188 lb 4.4 oz)   10/07/24 79.4 kg (175 lb 0.7 oz)   10/06/24 79.4 kg (175 lb)     Temp Readings from Last 3 Encounters:   11/14/24 99.1 °F (37.3 °C) (Oral)   10/06/24 97 °F (36.1 °C) (Tympanic)   09/06/24 97.9 °F (36.6 °C) (Oral)     BP Readings from Last 3 Encounters:   11/14/24 (!) 103/57   10/06/24 119/75   09/17/24 104/62     Pulse Readings from Last 3 Encounters:   11/14/24 66   10/06/24 67   09/17/24 83       Vitals:    11/14/24 1014   BP: (!) 103/57   BP Location: Right arm   Patient Position: Sitting   Pulse: 66   Resp: 20   Temp: 99.1 °F (37.3 °C)   TempSrc: Oral   SpO2: 100%   Weight: 85.4 kg (188 lb 4.4 oz)   Height: 5' 5" (1.651 m)     Body mass index is 31.33 kg/m².    Physical Examination:  Nursing note and vital signs reviewed.   Constitutional: NAD, not ill-appearing  HEENT: NCAT, PERRLA, normal conjunctivae, JVP 6 cm H2O  Cardiovascular: RRR, no murmurs noted, no chest tenderness to palpation, 1+ pulses in dorsalis pedis RLE. Faint DP pulses palpable LLE. + non-pitting edema and shiny dry skin of LLE. No evidence of wounds/tissue loss present.  Pulmonary: normal respiratory effort, CTAB, no crackles, wheezes  Abdominal: S/NT/ND  Musculoskeletal: No edema noted to bilateral lower extremities      Neurological: Alert & oriented to self, location, time and situation. At baseline neurological function.  Skin: warm & dry. Capillary refill < 2 seconds.     Labs:   I have reviewed the following labs below:      CBC:  Lab Results   Component Value Date    WBC 7.43 10/06/2024    HGB 12.7 10/06/2024    HCT 39.4 10/06/2024     (H) 10/06/2024    MCV 91.4 10/06/2024    RDW 17.2 (H) 10/06/2024     BMP:  Lab Results   Component Value Date     10/06/2024    K 4.7 10/06/2024     (H) 10/06/2024    CO2 13 (L) 10/06/2024    BUN 40.6 (H) 10/06/2024     (H) 08/23/2021    CALCIUM " 9.1 10/06/2024    MG 2.00 09/05/2024    PHOS 3.5 09/05/2024     LFTs:  Lab Results   Component Value Date    PROT 7.9 08/23/2021    ALBUMIN 3.4 (L) 10/06/2024    BILITOT 0.1 10/06/2024    AST 11 10/06/2024    ALKPHOS 116 10/06/2024    ALT 9 10/06/2024     FLP:  Cholesterol Total   Date Value Ref Range Status   05/14/2024 166 <=200 mg/dL Final     HDL Cholesterol   Date Value Ref Range Status   05/14/2024 35 35 - 60 mg/dL Final     LDL Cholesterol   Date Value Ref Range Status   05/14/2024 108.00 50.00 - 140.00 mg/dL Final   07/31/2018 80.6 63.0 - 159.0 mg/dL Final     Comment:     The National Cholesterol Education Program (NCEP) has set the  following guidelines (reference values) for LDL Cholesterol:  Optimal.......................<130 mg/dL  Borderline High...............130-159 mg/dL  High..........................160-189 mg/dL  Very High.....................>190 mg/dL       Triglyceride   Date Value Ref Range Status   05/14/2024 113 37 - 140 mg/dL Final     HDL/Cholesterol Ratio   Date Value Ref Range Status   07/31/2018 20.0 20.0 - 50.0 % Final     DM:  Lab Results   Component Value Date    HGBA1C 5.7 11/14/2023    HGBA1C 5.4 10/09/2023    HGBA1C 6.0 05/29/2022    LDLCALC 80.6 07/31/2018    CREATININE 1.79 (H) 10/29/2024     Thyroid:  Lab Results   Component Value Date    TSH 0.677 10/09/2023     Anemia:  Lab Results   Component Value Date    IRON 25 (L) 10/06/2024    TIBC 207 (L) 10/06/2024    FERRITIN 156.98 07/09/2024    [id number] 674 01/12/2023    FOLATE 5.3 (L) 01/12/2023     Coags:  Lab Results   Component Value Date    INR 1.0 10/11/2023    APTT 79.0 (H) 10/12/2023      Cardiac:  Lab Results   Component Value Date    TROPONINI <0.010 08/09/2024    TROPONINI <0.010 10/10/2023    TROPONINI <0.010 10/10/2023    TROPONINI <0.010 10/10/2023    TROPONINI 0.055 (H) 10/09/2023    BNP 21.9 08/09/2024    BNP 47.2 10/09/2023    BNP 24 09/12/2019       Cardiovascular Studies/Imaging:   I have reviewed the  following studies below:      Arterial US duplex 10/9/24:  The right lower extremity demonstrated a loss of multiphasic waveforms at the level of the distal SFA.  The BILLY on the right was moderately abnormal.  The right lower extremity demonstrated moderate arterial flow reduction.     The left lower extremity demonstrated a loss of multiphasic waveforms at the level of the popliteal artery.  The BILLY on the left was moderately abnormal.  The left lower extremity demonstrated moderate arterial flow reduction.     Peripheral Angiogram 9/6/24:  LE angiogram and Balloon angioplasty of SFA      Procedure(s) (LRB):  Angiogram Extremity Unilateral (Left)     Technical Procedures Used:   See full operative dictation      Operative Findings:   SFA, occluded   Improved after laser arthrectomy and balloon angioplasty      Arterial Ultrasound bilateral lower extremity (6/2024): bilateral mid-SFA mod-severe disease    Echo (10/2023): LVEF 60%, mild LA dilation, mild TR/PI, PASP 27           Assessment/Plan:     Vi Ulrich is a 59 y.o. female with PMHx HTN, T2DM, pAF on Eliquis, SLE c/b lupus nephritis who presents for evaluation/follow up of dyspnea on exertion and peripheral arterial disease.       Peripheral arterial disease with claudication   Nathan 3 classification on high-intensity statin with diagnosis of PAT noted on bilateral arterial ultrasounds with mid SFA disease. s/p LLE peripheral angiogram with balloon angioplasty of SFA 9/6/24 with reported worsening of LLE claudication.  -Seen in clinic by Vascular Sx 10/2024 with repeat US arterial duplex ordered which shows moderate arterial flow reduction of b/l LE. Pending F/U . Will provide contact in to attempt to schedule closer follow up.   -Continue high-intensity statin  -Continue ASA 81     Mixed hyperlipidemia with goal LDL less than 55  -Continue atorvastatin to 40 mg p.o. daily   -repeat lipid panel ordered.  If above goal (55), increase atorvastatin to 80  mg and add ezetimibe 10 mg p.o. daily    Dyspnea on exertion  -TTE to assess structure and function given change in symptoms. Last TTE 2023 with LVEF 60, mild TR, PI and mild RA dilation  -Increase to lasix 40 for  dyspnea on exertion with 50-100ft  -Will consider Jardiance next visit pending renal function    HTN  -losartan 25    pAF  -Eliquis 5 BID   -Metoprolol tartrate    RTC 4 months        Elmo Gonsales  [employer] Cardiology Fellow, PGY-6

## 2024-11-14 NOTE — PATIENT INSTRUCTIONS
Reminders:    Schedule/complete echo prior to follow up.  Contact number sheet provided.    Go to 1st floor lab at least 1 week before next visit for FASTING blood work.  Blood work does not require an appointment, you must remember to go.    Call Vascular dept to check on status of visit [phone]

## 2024-11-15 ENCOUNTER — HOSPITAL ENCOUNTER (OUTPATIENT)
Dept: WOUND CARE | Facility: HOSPITAL | Age: 59
Discharge: HOME | End: 2024-11-15
Attending: FAMILY MEDICINE
Payer: MEDICAID

## 2024-11-15 VITALS — DIASTOLIC BLOOD PRESSURE: 76 MMHG | SYSTOLIC BLOOD PRESSURE: 113 MMHG | TEMPERATURE: 100 F

## 2024-11-15 DIAGNOSIS — L60.3 DYSTROPHIC NAIL: ICD-10-CM

## 2024-11-15 DIAGNOSIS — L84 PRE-ULCERATIVE CORN OR CALLOUS: ICD-10-CM

## 2024-11-15 DIAGNOSIS — E11.9 ENCOUNTER FOR DIABETIC FOOT EXAM: Primary | ICD-10-CM

## 2024-11-15 PROCEDURE — 11056 PARNG/CUTG B9 HYPRKR LES 2-4: CPT

## 2024-11-15 PROCEDURE — 11719 TRIM NAIL(S) ANY NUMBER: CPT

## 2024-11-15 PROCEDURE — 99211 OFF/OP EST MAY X REQ PHY/QHP: CPT

## 2024-11-15 PROCEDURE — 27000999 HC MEDICAL RECORD PHOTO DOCUMENTATION

## 2024-11-15 NOTE — PROGRESS NOTES
CHIEF COMPLAINT:  Chief Complaint   Patient presents with    Wound Care     C     HISTORY OF  PRESENT ILLNESS:  59 y.o. Black or  female being seen today for diabetic foot care.  Patient has a prior medical history of diabetes, lupus, hypertension, etc.  She has been a diabetic for about 20 years, her diabetes has recently been well-controlled at 6.  She reports intermittent numbness and tingling in her feet.  She also does not have sensation in her feet that has been present for many years.  Her primary doctor has sent her to vascular for blockage of her lower extremities, she has a history of a DVT in her right lower extremity.  She has a abnormal ABIs with severe peripheral vascular disease.  She has calluses at the base of the 5th digit bilaterally that have been present for many years and are painful whenever she ambulates.    Today's information:  Patient here for follow-up diabetic foot care.  She is having pain whenever she ambulates especially on the right foot lateral edge due to a thick callus.  She has a callus at the base of the right great toe and left 5th digit.  The majority of her pain is in the right lower extremity.  She denies any wounds or ulcers.  She does get numbness and tingling in her feet.    REVIEW OF SYSTEMS:  Review of Systems   Constitutional:  Negative for chills and fever.   Respiratory:  Negative for cough.    Gastrointestinal:  Negative for nausea.   Musculoskeletal:         As stated in HPI   Skin:         As stated in HPI   Psychiatric/Behavioral: Negative.         Past Medical History:   Diagnosis Date    Arthritis     knees    Asthma     Atrial fibrillation     Chronic constipation     Diabetes mellitus     Encounter for blood transfusion 10/2014    GERD (gastroesophageal reflux disease)     Gout     Hypertension     Lupus 2004    SLE    Renal disorder     SLE (systemic lupus erythematosus)       Past Surgical History:   Procedure Laterality Date     ANGIOGRAPHY OF LOWER EXTREMITY Left 2024    Procedure: Angiogram Extremity Unilateral;  Surgeon: Devan Jin MD;  Location[facility] CATH LAB;  Service: Peripheral Vascular;  Laterality: Left;    APPENDECTOMY      CHOLECYSTECTOMY      COLONOSCOPY N/A 2018    Procedure: COLONOSCOPY;  Surgeon: Minerva Porras MD;  Location[facility];  Service: Endoscopy;  Laterality: N/A;    ESOPHAGOGASTRODUODENOSCOPY N/A 2018    Procedure: ESOPHAGOGASTRODUODENOSCOPY (EGD);  Surgeon: Minerva Porras MD;  Location[facility] ENDO;  Service: Endoscopy;  Laterality: N/A;    HYSTERECTOMY      JOINT REPLACEMENT Right 2015    Knee    LYMPH NODE BIOPSY Left     MASTECTOMY      Left arm- NO BP    PARTIAL HYSTERECTOMY      TUBAL LIGATION        Social History     Socioeconomic History    Marital status: Single    Years of education: 10th   Tobacco Use    Smoking status: Some Days     Current packs/day: 0.00     Average packs/day: 0.2 packs/day for 41.5 years (10.4 ttl pk-yrs)     Types: Cigarettes     Start date: 1980     Last attempt to quit: 2022     Years since quittin.5    Smokeless tobacco: Never   Substance and Sexual Activity    Alcohol use: Never    Drug use: Never    Sexual activity: Not Currently     Partners: Male     Birth control/protection: See Surgical Hx     Social Drivers of Health     Financial Resource Strain: Low Risk  (2024)    Overall Financial Resource Strain (CARDIA)     Difficulty of Paying Living Expenses: Not hard at all   Food Insecurity: No Food Insecurity (2024)    Hunger Vital Sign     Worried About Running Out of Food in the Last Year: Never true     Ran Out of Food in the Last Year: Never true   Transportation Needs: No Transportation Needs (2023)    PRAPARE - Transportation     Lack of Transportation (Medical): No     Lack of Transportation (Non-Medical): No   Physical Activity: Inactive (2024)    Exercise Vital Sign     Days of Exercise per  Week: 0 days     Minutes of Exercise per Session: 0 min   Stress: No Stress Concern Present (7/21/2024)    [place] [employer] - Occupational Stress Questionnaire     Feeling of Stress : Not at all   Housing Stability: Low Risk  (1/12/2023)    Housing Stability Vital Sign     Unable to Pay for Housing in the Last Year: No     Number of Places Lived in the Last Year: 1     Unstable Housing in the Last Year: No   .    Review of Most Recent Wound Care-Related Labs:  Lab Results   Component Value Date/Time    WBC 7.43 10/06/2024 08:04 AM    WBC 3.1 03/03/2023 10:26 AM    WBC 6.23 08/23/2021 11:42 AM    RBC 4.31 10/06/2024 08:04 AM    RBC 4.27 08/23/2021 11:42 AM    HGB 12.7 10/06/2024 08:04 AM    HGB 12.5 08/23/2021 11:42 AM    HCT 39.4 10/06/2024 08:04 AM    HCT 38.7 08/23/2021 11:42 AM    MCV 91.4 10/06/2024 08:04 AM    MCV 91 08/23/2021 11:42 AM    MCH 29.5 10/06/2024 08:04 AM    MCH 29.3 08/23/2021 11:42 AM    CREATININE 1.79 (H) 10/29/2024 12:35 PM    CREATININE 1.0 08/23/2021 11:42 AM    HGBA1C 5.7 11/14/2023 07:36 AM    HGBA1C 6.6 (H) 03/04/2020 05:59 AM    CRP 56.00 (H) 06/13/2024 10:32 AM    CRP 64.5 (H) 08/23/2021 11:42 AM        Wound-Related Imaging:            PHYSICAL EXAMINATION:  Blood pressure 113/76, temperature 99.9 °F (37.7 °C).  General:  VSS, afebrile. No acute distress.   Respiratory: Non labored.  No coughing.  Cardiovascular:  No peripheral edema.    Musculoskeletal:  Normal gait  Neurologic:  A&O X 3.    Psychiatric:  Calm, cooperative.  Mood and effect normal.  Responses appropriate.   Integumentary:       Protective Sensation (w/ 10 gram monofilament):  Right: Decreased  Left: Decreased    Visual Inspection:  Callus -  Bilateral    Pedal Pulses:   Right: Diminished  Left: Diminished    Posterior Tibialis Pulses:   Right:Diminished  Left: Diminished       Monofilament exam:  Abnormal bilaterally left foot:  Sensation at 3rd and 5th digit and base of the 5th digit,Right  foot 5th digit and base of the 5th digit  Posterior tibial pulses present bilaterally but diminished  Nails trimmed times 10   Callus at the base of the right 5th digit and medial great toe pared down with curette  Callus at the base of the left 5th digit pared down with curette  Nail debridement done on left 3rd and 4th digit       ASSESSMENT/PLAN:    Patient Active Problem List    Diagnosis Date Noted    Encounter for diabetic foot exam 11/15/2024    Dystrophic nail 08/09/2024    Heart failure with preserved ejection fraction 03/11/2024    Peripheral artery disease 03/07/2024    Dyspnea on exertion 03/07/2024    Hypoglycemia 10/26/2023    Acute deep vein thrombosis (DVT) of popliteal vein of right lower extremity 10/10/2023    ILD (interstitial lung disease) 10/10/2023    Diabetic glomerulosclerosis 10/10/2023    Back pain 10/10/2023    Chronic obstructive pulmonary disease 06/01/2023    Long term current use of insulin 06/01/2023    Migraine aura without headache 06/01/2023    Moderate recurrent major depression 06/01/2023    Raynaud's disease 06/01/2023    Thyroiditis 06/01/2023    Vitamin D deficiency 06/01/2023    Trigger index finger of right hand 04/11/2023    BMI 25.0-25.9,adult 04/11/2023    Asthma 02/03/2023    Lupus nephritis, ISN/RPS class III 06/14/2022    Systemic lupus erythematosus with glomerular disease 05/31/2022    Fibromyalgia 05/31/2022    Inadequate dietary energy intake 03/04/2020    Acute cystitis with hematuria 03/03/2020    STIVEN (acute kidney injury) 03/03/2020    Pyelonephritis 03/03/2020    Class 1 obesity with serious comorbidity and body mass index (BMI) of 32.0 to 32.9 in adult 05/07/2019    Anxiety 04/19/2018    Periumbilical pain 03/15/2018    Constipation 03/15/2018    Iron deficiency anemia 03/15/2018    New onset atrial fibrillation 12/01/2017    Hyperlipidemia 07/13/2017    History of asthma 07/13/2017    Essential hypertension 10/20/2016    Diabetes mellitus, type 2 10/20/2016     Insomnia due to other mental disorder 10/20/2016     Diabetic foot care   Diabetes-controlled   Peripheral vascular disease-she has follow up with vascular for abnormal ABIs   Return to clinic in 3 months

## 2024-11-19 ENCOUNTER — OFFICE VISIT (OUTPATIENT)
Dept: BEHAVIORAL HEALTH | Facility: CLINIC | Age: 59
End: 2024-11-19
Payer: MEDICAID

## 2024-11-19 VITALS
WEIGHT: 186 LBS | BODY MASS INDEX: 30.99 KG/M2 | HEIGHT: 65 IN | SYSTOLIC BLOOD PRESSURE: 117 MMHG | DIASTOLIC BLOOD PRESSURE: 72 MMHG | HEART RATE: 76 BPM

## 2024-11-19 DIAGNOSIS — F41.1 GAD (GENERALIZED ANXIETY DISORDER): ICD-10-CM

## 2024-11-19 DIAGNOSIS — F99 INSOMNIA DUE TO OTHER MENTAL DISORDER: ICD-10-CM

## 2024-11-19 DIAGNOSIS — F33.1 MODERATE RECURRENT MAJOR DEPRESSION: ICD-10-CM

## 2024-11-19 DIAGNOSIS — F51.05 INSOMNIA DUE TO OTHER MENTAL DISORDER: ICD-10-CM

## 2024-11-19 PROCEDURE — 99214 OFFICE O/P EST MOD 30 MIN: CPT | Mod: S$PBB,,, | Performed by: STUDENT IN AN ORGANIZED HEALTH CARE EDUCATION/TRAINING PROGRAM

## 2024-11-19 PROCEDURE — 1159F MED LIST DOCD IN RCRD: CPT | Mod: CPTII,,, | Performed by: STUDENT IN AN ORGANIZED HEALTH CARE EDUCATION/TRAINING PROGRAM

## 2024-11-19 PROCEDURE — 3066F NEPHROPATHY DOC TX: CPT | Mod: CPTII,,, | Performed by: STUDENT IN AN ORGANIZED HEALTH CARE EDUCATION/TRAINING PROGRAM

## 2024-11-19 PROCEDURE — 3078F DIAST BP <80 MM HG: CPT | Mod: CPTII,,, | Performed by: STUDENT IN AN ORGANIZED HEALTH CARE EDUCATION/TRAINING PROGRAM

## 2024-11-19 PROCEDURE — 3008F BODY MASS INDEX DOCD: CPT | Mod: CPTII,,, | Performed by: STUDENT IN AN ORGANIZED HEALTH CARE EDUCATION/TRAINING PROGRAM

## 2024-11-19 PROCEDURE — 3044F HG A1C LEVEL LT 7.0%: CPT | Mod: CPTII,,, | Performed by: STUDENT IN AN ORGANIZED HEALTH CARE EDUCATION/TRAINING PROGRAM

## 2024-11-19 PROCEDURE — 3074F SYST BP LT 130 MM HG: CPT | Mod: CPTII,,, | Performed by: STUDENT IN AN ORGANIZED HEALTH CARE EDUCATION/TRAINING PROGRAM

## 2024-11-19 PROCEDURE — 1160F RVW MEDS BY RX/DR IN RCRD: CPT | Mod: CPTII,,, | Performed by: STUDENT IN AN ORGANIZED HEALTH CARE EDUCATION/TRAINING PROGRAM

## 2024-11-19 PROCEDURE — 99214 OFFICE O/P EST MOD 30 MIN: CPT | Mod: PBBFAC,PN | Performed by: STUDENT IN AN ORGANIZED HEALTH CARE EDUCATION/TRAINING PROGRAM

## 2024-11-19 PROCEDURE — 4010F ACE/ARB THERAPY RXD/TAKEN: CPT | Mod: CPTII,,, | Performed by: STUDENT IN AN ORGANIZED HEALTH CARE EDUCATION/TRAINING PROGRAM

## 2024-11-19 RX ORDER — ALPRAZOLAM 1 MG/1
1 TABLET ORAL 2 TIMES DAILY PRN
Qty: 60 TABLET | Refills: 2 | Status: SHIPPED | OUTPATIENT
Start: 2024-11-19

## 2024-11-19 RX ORDER — ESCITALOPRAM OXALATE 20 MG/1
20 TABLET ORAL DAILY
Qty: 90 TABLET | Refills: 3 | Status: SHIPPED | OUTPATIENT
Start: 2024-11-19 | End: 2025-11-19

## 2024-11-19 NOTE — PROGRESS NOTES
"Outpatient Psychiatry Follow-Up Visit    11/19/2024    Clinical Status of Patient:  Outpatient (Ambulatory)    Chief Complaint:  Vi Ulrich is a 59 y.o. female who presents today for follow-up of anxiety and insomnia . Patient last seen for follow-up on 8/19/2024. Met with patient.      Interval History and Content of Current Session:  Interim Events/Subjective Report/Content of Current Session:   Pt reports doing "pretty good"  overall.  Has been working with cardiology and vascular surgery on claudication.  Has been working with counselor, finds this work very productive to her.  Reports "alright" mood, denies interim depression, "up and down" anxiety (due to family stressors).  Sleep good. Appetite good, weight increased (unintentional).  Energy good, motivation good.  Occasional irritability (has been managing easier than in the past), denies hopelessness.  Denies SI/HI/AVH/paranoia, denies plan or desire for self harm or harm to others.  Denies SE from current regimen. Reports somatic complaints of LE pain. Pt happy with current regimen and wants to continue.     Psychiatric Review of Systems-is patient experiencing or having changes in  Integrated into HPI above.     Review of Systems   PSYCHIATRIC: Pertinant items are noted in the narrative.  CONSTITUTIONAL: No weight gain or loss.  HEENT: +occasional dry mouth, denies sore throat  MUSCULOSKELETAL:  + generalized myalgias, +arthritic pain of back, hand soreness, +LE pain  NEUROLOGIC: No weakness, sensory changes, seizures, confusion, memory loss, tremor or other abnormal movements.  +ha  CARDIAC: No CP, + palpitations  RESPIRATORY: + shortness of breath with exertion, denies cough.  CARDIOVASCULAR: No tachycardia or chest pain.  GASTROINTESTINAL: Denies pain, constipation or diarrhea.  +nausea/vomiting    Past Medical, Family and Social History: The patient's past medical, family and social history have been reviewed and updated as appropriate within the " "electronic medical record - see encounter notes.    Compliance: good    Side effects: denies    Risk Parameters:  Patient reports no suicidal ideation  Patient reports no homicidal ideation  Patient reports no self-injurious behavior  Patient reports no violent behavior    Exam (detailed: at least 9 elements; comprehensive: all 15 elements)   Constitutional  Vitals:  Most recent vital signs, dated less than 90 days prior to this appointment, were reviewed.     Vitals:    11/19/24 0756   BP: 117/72   Pulse: 76   Weight: 84.4 kg (186 lb)   Height: 5' 5" (1.651 m)        General:   Constitutional: No acute distress, appears stated age, casually dressed    Neurologic:   Motor: moves all extremities spontaneously and without difficulty, no abnormal involuntary movements observed  Gait: normal gait and station    Mental status examination:   Appearance: appears stated age, casually dressed, no acute distress  Behavior: unremarkable for situation, calm and cooperative  Mood: "ok"  Affect: mood congruent, constricted, and anxious-appearing  Thought process: linear and goal directed  Associations: appropriate for conversation  Thought content: no plan or desire for self harm or harm to others, denies paranoia, no delusional ideation volunteered  Perceptions: denies hallucinations or other altered perceptions  Orientation: oriented to day of week, month, year, location, and situation  Language: English, fluid  Attention: able to attend to interview  Insight: good  Judgement: good    PHQ9:  Over the last two weeks how often have you been bothered by little interest or pleasure in doing things: 0  Over the last two weeks how often have you been bothered by feeling down, depressed or hopeless: 0  PHQ-2 Total Score: 0  PHQ-9 Score: 1  PHQ-9 Interpretation: Minimal or None        11/19/2024     7:55 AM 8/19/2024     8:22 AM 3/26/2024     7:54 AM   GAD7   1. Feeling nervous, anxious, or on edge? 0 1 3   2. Not being able to stop or " control worrying? 0 1 3   3. Worrying too much about different things? 1 1 3   4. Trouble relaxing? 0 1 2   5. Being so restless that it is hard to sit still? 0 0 1   6. Becoming easily annoyed or irritable? 0 1 0   7. Feeling afraid as if something awful might happen? 0 1 0   8. If you checked off any problems, how difficult have these problems made it for you to do your work, take care of things at home, or get along with other people? 0 1 2   LIANG-7 Score 1 6 12     Assessment and Diagnosis   Status/Progress: Based on the examination today, the patient's problem(s) is/are adequately but not ideally controlled.  New problems have not been presented today.   Co-morbidities and Lack of compliance are not complicating management of the primary condition.  Number of separate conditions addressed during today's visit: 3 (mood well controlled, anxiety fair control, sleep well controlled).  Medication management: yes: Refilling a prescription.  Are referral(s) being ordered today: No.  Complexity (level) of medical decision making employed in the encounter: MODERATE.    General Impression:    ICD-10-CM ICD-9-CM   1. LIANG (generalized anxiety disorder)  F41.1 300.02   2. Moderate recurrent major depression  F33.1 296.32   3. Insomnia due to other mental disorder  F51.05 300.9    F99 327.02     Intervention/Counseling/Treatment Plan   Continue lexapro 20mg daily  Continue xanax 1mg bid prn   PDMP reviewed and demonstrated no abnormal filling patterns.   No need for PEC as pt is not an imminent danger to self or others or gravely disabled due to acute psychiatric illness  Discussed that pt should either call clinic for psychiatric crisis symptoms or present to nearest emergency room    Discussed with patient informed consent including diagnosis, risks and benefits of proposed treatment above vs. alternative treatments vs. no treatment, as well as serious and common side effects of these treatments, and the inherent  unpredictability of individual responses to these treatments. The patient expresses understanding of the above and displays the capacity to agree with this current plan. Patient also agrees that, currently, the benefits outweigh the risks and would like to pursue treatment at this time, and had no other questions.    Instructions:  Take all medications as prescribed.    Abstain from recreational drugs and alcohol.  Present to ED or call 911 for SI/HI plan or intent, psychosis, or medical emergency.    Return to Clinic: Follow up in about 4 months (around 3/19/2025).    Total time:   The total time for services performed on the date of the encounter (including review of prior visit notes, review of notes from other providers, review of results from laboratory/imaging studies, face-to-face time with patient, and time spent on other activities directly related to patient care): 20 minutes.    Frederic Noel MD  [facility]

## 2024-12-11 ENCOUNTER — LAB VISIT (OUTPATIENT)
Dept: LAB | Facility: HOSPITAL | Age: 59
End: 2024-12-11
Attending: NURSE PRACTITIONER
Payer: MEDICAID

## 2024-12-11 ENCOUNTER — OFFICE VISIT (OUTPATIENT)
Dept: NEPHROLOGY | Facility: CLINIC | Age: 59
End: 2024-12-11
Payer: MEDICAID

## 2024-12-11 ENCOUNTER — TELEPHONE (OUTPATIENT)
Dept: NEPHROLOGY | Facility: CLINIC | Age: 59
End: 2024-12-11

## 2024-12-11 VITALS
WEIGHT: 182.56 LBS | RESPIRATION RATE: 18 BRPM | SYSTOLIC BLOOD PRESSURE: 132 MMHG | OXYGEN SATURATION: 100 % | HEIGHT: 65 IN | HEART RATE: 62 BPM | DIASTOLIC BLOOD PRESSURE: 70 MMHG | BODY MASS INDEX: 30.42 KG/M2 | TEMPERATURE: 98 F

## 2024-12-11 DIAGNOSIS — M32.14 LUPUS NEPHRITIS: ICD-10-CM

## 2024-12-11 DIAGNOSIS — E87.5 HYPERKALEMIA: ICD-10-CM

## 2024-12-11 DIAGNOSIS — N18.31 TYPE 2 DIABETES MELLITUS WITH STAGE 3A CHRONIC KIDNEY DISEASE, WITH LONG-TERM CURRENT USE OF INSULIN: ICD-10-CM

## 2024-12-11 DIAGNOSIS — I73.9 PERIPHERAL ARTERY DISEASE: ICD-10-CM

## 2024-12-11 DIAGNOSIS — M79.7 FIBROMYALGIA: ICD-10-CM

## 2024-12-11 DIAGNOSIS — Z79.4 TYPE 2 DIABETES MELLITUS WITH STAGE 3A CHRONIC KIDNEY DISEASE, WITH LONG-TERM CURRENT USE OF INSULIN: ICD-10-CM

## 2024-12-11 DIAGNOSIS — I10 ESSENTIAL HYPERTENSION: ICD-10-CM

## 2024-12-11 DIAGNOSIS — N18.32 CKD STAGE G3B/A3, GFR 30-44 AND ALBUMIN CREATININE RATIO >300 MG/G: ICD-10-CM

## 2024-12-11 DIAGNOSIS — D84.9 IMMUNOSUPPRESSED STATUS: ICD-10-CM

## 2024-12-11 DIAGNOSIS — Z72.0 TOBACCO ABUSE: ICD-10-CM

## 2024-12-11 DIAGNOSIS — E11.22 TYPE 2 DIABETES MELLITUS WITH STAGE 3A CHRONIC KIDNEY DISEASE, WITH LONG-TERM CURRENT USE OF INSULIN: ICD-10-CM

## 2024-12-11 DIAGNOSIS — N25.81 SECONDARY HYPERPARATHYROIDISM OF RENAL ORIGIN: ICD-10-CM

## 2024-12-11 DIAGNOSIS — N18.31 CKD STAGE G3A/A2, GFR 45-59 AND ALBUMIN CREATININE RATIO 30-299 MG/G: Primary | ICD-10-CM

## 2024-12-11 LAB
25(OH)D3+25(OH)D2 SERPL-MCNC: 13 NG/ML (ref 30–80)
ABS NEUT CALC (OHS): 1.85 X10(3)/MCL (ref 2.1–9.2)
ALBUMIN SERPL-MCNC: 3.5 G/DL (ref 3.5–5)
ALBUMIN/GLOB SERPL: 0.7 RATIO (ref 1.1–2)
ALP SERPL-CCNC: 91 UNIT/L (ref 40–150)
ALT SERPL-CCNC: 8 UNIT/L (ref 0–55)
ANION GAP SERPL CALC-SCNC: 8 MEQ/L
AST SERPL-CCNC: 15 UNIT/L (ref 5–34)
BILIRUB SERPL-MCNC: 0.2 MG/DL
BUN SERPL-MCNC: 25.2 MG/DL (ref 9.8–20.1)
C3 SERPL-MCNC: 46 MG/DL (ref 80–173)
C4 SERPL-MCNC: 6 MG/DL (ref 13–46)
CALCIUM SERPL-MCNC: 9 MG/DL (ref 8.4–10.2)
CHLORIDE SERPL-SCNC: 109 MMOL/L (ref 98–107)
CHOLEST SERPL-MCNC: 140 MG/DL
CHOLEST/HDLC SERPL: 4 {RATIO} (ref 0–5)
CO2 SERPL-SCNC: 22 MMOL/L (ref 22–29)
CREAT SERPL-MCNC: 1.3 MG/DL (ref 0.55–1.02)
CREAT/UREA NIT SERPL: 19
ERYTHROCYTE [DISTWIDTH] IN BLOOD BY AUTOMATED COUNT: 15.5 % (ref 11.5–17)
FERRITIN SERPL-MCNC: 249.67 NG/ML (ref 4.63–204)
GFR SERPLBLD CREATININE-BSD FMLA CKD-EPI: 48 ML/MIN/1.73/M2
GLOBULIN SER-MCNC: 5 GM/DL (ref 2.4–3.5)
GLUCOSE SERPL-MCNC: 106 MG/DL (ref 74–100)
HCT VFR BLD AUTO: 35 % (ref 37–47)
HDLC SERPL-MCNC: 34 MG/DL (ref 35–60)
HGB BLD-MCNC: 11.2 G/DL (ref 12–16)
LDLC SERPL CALC-MCNC: 83 MG/DL (ref 50–140)
LG GRANULAR LYMP (OHS): SLIGHT
LYMPH ABN # BLD MANUAL: 2 %
LYMPHOCYTES NFR BLD MANUAL: 0.94 X10(3)/MCL
LYMPHOCYTES NFR BLD MANUAL: 33 % (ref 13–40)
MCH RBC QN AUTO: 29.1 PG (ref 27–31)
MCHC RBC AUTO-ENTMCNC: 32 G/DL (ref 33–36)
MCV RBC AUTO: 90.9 FL (ref 80–94)
NEUTROPHILS NFR BLD MANUAL: 65 % (ref 47–80)
NRBC BLD AUTO-RTO: 0 %
PHOSPHATE SERPL-MCNC: 3.5 MG/DL (ref 2.3–4.7)
PLATELET # BLD AUTO: 475 X10(3)/MCL (ref 130–400)
PLATELET # BLD EST: ABNORMAL 10*3/UL
PMV BLD AUTO: 10 FL (ref 7.4–10.4)
POTASSIUM SERPL-SCNC: 5.3 MMOL/L (ref 3.5–5.1)
PROT SERPL-MCNC: 8.5 GM/DL (ref 6.4–8.3)
PTH-INTACT SERPL-MCNC: 256.3 PG/ML (ref 8.7–77)
RBC # BLD AUTO: 3.85 X10(6)/MCL (ref 4.2–5.4)
SCHISTOCYTE (OLG): ABNORMAL
SODIUM SERPL-SCNC: 139 MMOL/L (ref 136–145)
TRIGL SERPL-MCNC: 113 MG/DL (ref 37–140)
VLDLC SERPL CALC-MCNC: 23 MG/DL
WBC # BLD AUTO: 2.84 X10(3)/MCL (ref 4.5–11.5)

## 2024-12-11 PROCEDURE — 84100 ASSAY OF PHOSPHORUS: CPT

## 2024-12-11 PROCEDURE — 3044F HG A1C LEVEL LT 7.0%: CPT | Mod: CPTII,,, | Performed by: NURSE PRACTITIONER

## 2024-12-11 PROCEDURE — 83970 ASSAY OF PARATHORMONE: CPT

## 2024-12-11 PROCEDURE — 3075F SYST BP GE 130 - 139MM HG: CPT | Mod: CPTII,,, | Performed by: NURSE PRACTITIONER

## 2024-12-11 PROCEDURE — 86160 COMPLEMENT ANTIGEN: CPT

## 2024-12-11 PROCEDURE — 3008F BODY MASS INDEX DOCD: CPT | Mod: CPTII,,, | Performed by: NURSE PRACTITIONER

## 2024-12-11 PROCEDURE — 1160F RVW MEDS BY RX/DR IN RCRD: CPT | Mod: CPTII,,, | Performed by: NURSE PRACTITIONER

## 2024-12-11 PROCEDURE — 82306 VITAMIN D 25 HYDROXY: CPT

## 2024-12-11 PROCEDURE — 3066F NEPHROPATHY DOC TX: CPT | Mod: CPTII,,, | Performed by: NURSE PRACTITIONER

## 2024-12-11 PROCEDURE — 80061 LIPID PANEL: CPT

## 2024-12-11 PROCEDURE — 99214 OFFICE O/P EST MOD 30 MIN: CPT | Mod: S$PBB,,, | Performed by: NURSE PRACTITIONER

## 2024-12-11 PROCEDURE — 3078F DIAST BP <80 MM HG: CPT | Mod: CPTII,,, | Performed by: NURSE PRACTITIONER

## 2024-12-11 PROCEDURE — 36415 COLL VENOUS BLD VENIPUNCTURE: CPT

## 2024-12-11 PROCEDURE — 80053 COMPREHEN METABOLIC PANEL: CPT

## 2024-12-11 PROCEDURE — 85025 COMPLETE CBC W/AUTO DIFF WBC: CPT

## 2024-12-11 PROCEDURE — 4010F ACE/ARB THERAPY RXD/TAKEN: CPT | Mod: CPTII,,, | Performed by: NURSE PRACTITIONER

## 2024-12-11 PROCEDURE — 99215 OFFICE O/P EST HI 40 MIN: CPT | Mod: PBBFAC | Performed by: NURSE PRACTITIONER

## 2024-12-11 PROCEDURE — 82728 ASSAY OF FERRITIN: CPT

## 2024-12-11 PROCEDURE — 1159F MED LIST DOCD IN RCRD: CPT | Mod: CPTII,,, | Performed by: NURSE PRACTITIONER

## 2024-12-11 RX ORDER — LIDOCAINE 50 MG/G
1 PATCH TOPICAL DAILY
Qty: 30 PATCH | Refills: 2 | Status: SHIPPED | OUTPATIENT
Start: 2024-12-11 | End: 2025-03-11

## 2024-12-11 RX ORDER — LIDOCAINE 50 MG/G
1 PATCH TOPICAL DAILY
Qty: 30 PATCH | Refills: 2 | Status: SHIPPED | OUTPATIENT
Start: 2024-12-11 | End: 2024-12-11

## 2024-12-11 RX ORDER — LIDOCAINE HCL 4 G/100G
1 CREAM TOPICAL 2 TIMES DAILY
Qty: 120 G | Refills: 3 | Status: SHIPPED | OUTPATIENT
Start: 2024-12-11 | End: 2024-12-11

## 2024-12-11 NOTE — TELEPHONE ENCOUNTER
Patient asked if there is any financial assistance to pay for the Lidocaine patches.  Will reach out to retail pharmacy assistant MICHAEL Friend

## 2024-12-11 NOTE — PROGRESS NOTES
[facility]  Nephrology Clinic Note    Chief complaint: Follow-up (RTC, did not take meds, left leg edema-can't walk some times, right fingers and hand swollen, c/o weakness)    History of present illness:   Vi Ulrich is a 59 y.o. Black or  female with past medical history of systemic lupus erythematosus diagnosed in 2004 (was previously under the care of Dr Velazquez, has established care with Dr. 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     She presents for follow-up appointment in Nephrology Clinic today. C/o left LE pain and generalized joint pain.    Review of Systems  12 point review of systems conducted, negative except as stated in the history of present illness.    Allergies: Patient is allergic to penicillins, percocet [oxycodone-acetaminophen], tramadol, and promethazine.     Past Medical History:  has a past medical history of Arthritis, Asthma, Atrial fibrillation, Chronic constipation, Diabetes mellitus, Encounter for blood transfusion, GERD (gastroesophageal reflux disease), Gout, Hypertension, Lupus, Renal disorder, and SLE (systemic lupus erythematosus).    Procedure History:  has a past surgical history that includes Cholecystectomy; Appendectomy; Partial hysterectomy; Lymph node biopsy (Left, 2014); Hysterectomy; Joint replacement (Right, 08/07/2015); Mastectomy; Esophagogastroduodenoscopy (N/A,  "04/19/2018); Colonoscopy (N/A, 04/19/2018); Angiography of lower extremity (Left, 09/06/2024); and Tubal ligation.    Family History: family history includes Arthritis in her mother; Asthma in her mother; Diabetes in her mother and sister; Heart block in her mother; Heart disease in her father and sister; Kidney disease in her sister.    Social History:  reports that she has been smoking cigarettes. She started smoking about 44 years ago. She has a 10.4 pack-year smoking history. She has never used smokeless tobacco. She reports that she does not drink alcohol and does not use drugs.    Physical exam  /70 (BP Location: Right arm, Patient Position: Sitting)   Pulse 62   Temp 98.1 °F (36.7 °C) (Oral)   Resp 18   Ht 5' 4.96" (1.65 m)   Wt 82.8 kg (182 lb 8.7 oz)   LMP  (LMP Unknown)   SpO2 100%   BMI 30.41 kg/m²   General appearance: Patient is in no acute distress.  Skin: No rashes or wounds.  HEENT: PERRLA, EOMI, no scleral icterus, no JVD. Neck is supple.  Chest: Respirations are unlabored. Lungs sounds are clear.   Heart: S1, S2.   Abdomen: Benign.  : Deferred.  Extremities: No edema, peripheral pulses are palpable.   Neuro: No focal deficits.     Home Medications:    Current Outpatient Medications:     albuterol sulfate (PROAIR RESPICLICK) 90 mcg/actuation inhaler, Inhale 1 puff into the lungs every 4 (four) hours as needed for Wheezing. Rescue, Disp: , Rfl:     ALPRAZolam (XANAX) 1 MG tablet, Take 1 tablet (1 mg total) by mouth 2 (two) times daily as needed for Anxiety., Disp: 60 tablet, Rfl: 2    apixaban (ELIQUIS) 5 mg Tab, Take 1 tablet (5 mg total) by mouth 2 (two) times daily., Disp: 180 tablet, Rfl: 3    aspirin (ECOTRIN) 81 MG EC tablet, Take 1 tablet (81 mg total) by mouth once daily., Disp: 90 tablet, Rfl: 3    atorvastatin (LIPITOR) 40 MG tablet, Take 1 tablet (40 mg total) by mouth once daily., Disp: 90 tablet, Rfl: 3    blood sugar diagnostic (ONETOUCH VERIO) Strp, 1 each by " Misc.(Non-Drug; Combo Route) route 4 (four) times daily., Disp: 50 each, Rfl: 11    blood sugar diagnostic Strp, 1 each by Misc.(Non-Drug; Combo Route) route 4 (four) times daily., Disp: 50 each, Rfl: 11    blood sugar diagnostic Strp, 1 strip by Misc.(Non-Drug; Combo Route) route 4 (four) times daily with meals and nightly., Disp: 200 each, Rfl: 6    blood-glucose meter kit, Use as instructed, Disp: 1 each, Rfl: 0    eletriptan (RELPAX) 40 MG tablet, Take 1 tablet (40 mg total) by mouth as needed (take one at the beginning of migraine, may repeat in 2 h. max 2 in 24h). may repeat in 2 hours if necessary, Disp: 9 tablet, Rfl: 5    EScitalopram oxalate (LEXAPRO) 20 MG tablet, Take 1 tablet (20 mg total) by mouth once daily., Disp: 90 tablet, Rfl: 3    furosemide (LASIX) 40 MG tablet, Take 1 tablet (40 mg total) by mouth once daily., Disp: 90 tablet, Rfl: 3    lancets 28 gauge Misc, 100 lancets by Misc.(Non-Drug; Combo Route) route 4 (four) times daily with meals and nightly., Disp: 100 each, Rfl: 8    losartan (COZAAR) 25 MG tablet, Take 1 tablet (25 mg total) by mouth once daily., Disp: 90 tablet, Rfl: 3    metoprolol tartrate (LOPRESSOR) 50 MG tablet, Take 1 tablet (50 mg total) by mouth Daily., Disp: 90 tablet, Rfl: 3    mycophenolate (CELLCEPT) 250 mg Cap, Take 6 capsules (1,500 mg total) by mouth 2 (two) times daily., Disp: 360 capsule, Rfl: 2    ondansetron (ZOFRAN) 4 MG tablet, Take 1 tablet (4 mg total) by mouth every 6 (six) hours as needed for Nausea., Disp: 12 tablet, Rfl: 0    pantoprazole (PROTONIX) 40 MG tablet, Take 1 tablet (40 mg total) by mouth once daily., Disp: 90 tablet, Rfl: 3    predniSONE (DELTASONE) 5 MG tablet, Take 1.5 tablets (7.5 mg total) by mouth once daily., Disp: 60 tablet, Rfl: 3    voclosporin (LUPKYNIS) 7.9 mg Cap, Take 3 capsules (23.7 mg) by mouth twice daily., Disp: 180 capsule, Rfl: 5    walker Misc, Please dispense 1 Rollator, Disp: 1 each, Rfl: 0    cetirizine (ZYRTEC) 10 MG  tablet, Take 1 tablet (10 mg total) by mouth once daily. for 14 days, Disp: 14 tablet, Rfl: 0    cilostazoL (PLETAL) 50 MG Tab, Take 1 tablet (50 mg total) by mouth 2 (two) times daily. (Patient not taking: Reported on 11/14/2024), Disp: 60 tablet, Rfl: 11    insulin aspart U-100 (NOVOLOG) 100 unit/mL (3 mL) InPn pen, Inject 2 Units into the skin as needed. (Patient not taking: Reported on 9/5/2024), Disp: , Rfl:     LIDOcaine (LIDODERM) 5 %, Place 1 patch onto the skin once daily. Remove & Discard patch within 12 hours or as directed by MD, Disp: 30 patch, Rfl: 2    Laboratory data    Lab Results   Component Value Date    WBC 2.84 (L) 12/11/2024    HGB 11.2 (L) 12/11/2024    HCT 35.0 (L) 12/11/2024     (H) 12/11/2024    IRON 25 (L) 10/06/2024    TIBC 207 (L) 10/06/2024    LABIRON 12 (L) 10/06/2024    FERRITIN 156.98 07/09/2024    FOLATE 5.3 (L) 01/12/2023    EGTYAVNA68 674 01/12/2023    HAPTO 249 03/10/2023     (H) 03/10/2023     12/11/2024    K 5.3 (H) 12/11/2024    CO2 22 12/11/2024    BUN 25.2 (H) 12/11/2024    CREATININE 1.30 (H) 12/11/2024    EGFRNORACEVR 48 12/11/2024    GLUCOSE 106 (H) 12/11/2024    CALCIUM 9.0 12/11/2024    ALKPHOS 91 12/11/2024    LABPROT 8.5 (H) 12/11/2024    ALBUMIN 3.5 12/11/2024    BILIDIR 0.1 03/28/2022    IBILI 0.10 03/28/2022    AST 15 12/11/2024    ALT 8 12/11/2024    MG 2.00 09/05/2024    PHOS 3.5 12/11/2024      Lab Results   Component Value Date    HGBA1C 5.7 11/14/2023    .3 (H) 12/11/2024    RCKYYQYV22XZ 15.0 (L) 08/04/2023    HIV Nonreactive 10/13/2023    HEPCAB Nonreactive 10/13/2023    HEPBSAB Negative 11/17/2020     Urine:  Lab Results   Component Value Date    APPEARANCEUA Clear 09/05/2024    SGUA 1.022 09/05/2024    PROTEINUA 1+ (A) 09/05/2024    KETONESUA Negative 09/05/2024    LEUKOCYTESUR 500 (A) 09/05/2024    RBCUA 6-10 (A) 09/05/2024    WBCUA 21-50 (A) 09/05/2024    BACTERIA Trace (A) 09/05/2024    SQEPUA Few (A) 09/05/2024    HYALINECASTS  None Seen 09/05/2024    CREATRANDUR 198.2 (H) 09/05/2024    PROTEINURINE 44.9 09/05/2024    UPROTCREA 0.2 09/05/2024         Imaging  US Retroperitoneal Limited 10/23/2023  Grayscale and color Doppler sonographic evaluation of the kidneys.  The right kidney measures 12 cm. The left kidney measures 12 cm.   No hydronephrosis.  There are small renal cysts for which no follow-up is needed.  Largest measures only about 1 cm.  Otherwise grossly normal renal parenchymal echogenicity.  Impression  No significant sonographic abnormality of the kidneys.  Electronically signed by: Jose Manuel Henao  Date:    10/23/2023  Time:    12:25        Impression    ICD-10-CM ICD-9-CM   1. CKD stage G3a/A2, GFR 45-59 and albumin creatinine ratio  mg/g  N18.31 585.3   2. Essential hypertension  I10 401.9   3. Lupus nephritis  M32.14 710.0     583.81   4. Immunosuppressed status  D84.9 279.9   5. Hyperkalemia  E87.5 276.7   6. Fibromyalgia  M79.7 729.1   7. Secondary hyperparathyroidism of renal origin  N25.81 588.81   8. Type 2 diabetes mellitus with stage 3a chronic kidney disease, with long-term current use of insulin  E11.22 250.40    N18.31 585.3    Z79.4 V58.67   9. Tobacco abuse  Z72.0 305.1   10. BMI 30.0-30.9,adult  Z68.30 V85.30        Plan  CKD stage G3a/A2, GFR 45-59 and albumin creatinine ratio  mg/g  Serum creatinine is stable, urinary protein excretion is <1 g/24hr.  Continue current medication regimen.     Continue renal sparing activities:  -2 g a day dietary sodium restriction  -optimize glycemic control (goal A1c is less than 7%)  -control high blood pressure (goal blood pressure is less than 130/80, patient was advised to check blood pressure once or twice a week and bring blood pressure logs to next office visit)  -exercise at least 30 minutes a day, 5 days a week  -maintain healthy weight  -decrease or stop alcohol use  -do not smoke  -stay well hydrated (drink water only, avoid juices, sweet tea, and  sodas)  -ask about staying up-to-date on vaccinations (flu vaccine, pneumonia vaccine, hepatitis B vaccine)  -avoid excessive use of NSAIDs (ibuprofen, naproxen, Aleve, Advil, Toradol, Mobic), take Tylenol as needed for headache or mild pain  -take cholesterol lowering medications if prescribed (LDL goal less than 100)    Essential hypertension  Blood pressure reading is at goal, continue current antihypertensive regimen and 2 g a day dietary sodium restriction.      Lupus nephritis  Patient has not been seen by Rheumatology since June of 2024.  She is not able to get in to [facility] with Dr Velazquez due to long waiting list. Will send a referral to Rheumatology in [place].     Immunosuppressed status  -Advised to stay up-to-date on age appropriate vaccinations and malignancy screening with PCP.   -Recommended good hand hygiene and limiting contact with people who are sick or recently vaccinated.     Hyperkalemia  Hyperkalemia is mild. Continue loop diuretics, patient was advised to avoid constipation. If hyperkalemia persists, consider adding a potassium binder to medication regimen.     Fibromyalgia  -     LIDOcaine (LIDODERM) 5 %; Place 1 patch onto the skin once daily. Remove & Discard patch within 12 hours or as directed by MD  Dispense: 30 patch; Refill: 2  Patient reports being in constant pain due to fibromyalgia.  She also has joint pains due to her mixed connective tissue disease.  NSAIDs are contraindicated due to hyperkalemia and chronic kidney disease.  Patient is not able to obtain long term opioids (unable to afford pain management clinic fees).  She has reported some relief with topical lidocaine patches. Will continue Lidoderm 5% as needed for pain relief.     Secondary hyperparathyroidism of renal origin  There are no indications for active vitamin-D analogs at this time.  Will continue to monitor intact PTH, calcium, and phosphorus levels periodically.      Type 2 diabetes mellitus with  stage 3a chronic kidney disease, with long-term current use of insulin  A1C is at goal.  Continue current medication regimen.    Tobacco abuse  Patient was counseled on importance of tobacco use cessation.  Strongly encouraged to quit, offered a referral to a smoking cessation program.    BMI 30.0-30.9,adult  Lifestyle and dietary interventions discussed, patient encouraged to maintain non-sedentary lifestyle and well-balanced diet.       Follow up in about 4 months (around 4/11/2025).     Orders Placed This Encounter   Procedures    Comprehensive Metabolic Panel     Standing Status:   Future     Standing Expiration Date:   4/30/2025    CBC Auto Differential     Standing Status:   Future     Standing Expiration Date:   4/30/2025    Protein/Creatinine Ratio, Urine     Standing Status:   Future     Standing Expiration Date:   4/30/2025     Order Specific Question:   Specimen Source     Answer:   Urine    Urinalysis, Reflex to Urine Culture     Standing Status:   Future     Standing Expiration Date:   4/30/2025     Order Specific Question:   Specimen Source     Answer:   Urine    C3 Complement     Standing Status:   Future     Standing Expiration Date:   4/30/2025    C4 Complement     Standing Status:   Future     Standing Expiration Date:   4/30/2025    Ambulatory referral/consult to Rheumatology     Standing Status:   Future     Standing Expiration Date:   1/11/2026     Referral Priority:   Routine     Referral Type:   Consultation     Referral Reason:   Specialty Services Required     Referred to Provider:   Brent Islas MD     Requested Specialty:   Rheumatology     Number of Visits Requested:   1        Rachel Broderick NP  [facility]

## 2024-12-11 NOTE — TELEPHONE ENCOUNTER
Rosina:  PA not required with [facility] [address]  REF# [id number]  Product not available at [facility] pharmacy; The Garcia brand is out of stock at [facility].  Patient would like to try [facility]:  does not stock the brand; [facility] and [employer] do not carry product.  Patient informed-will wait for [facility] pharmacy

## 2024-12-17 ENCOUNTER — OFFICE VISIT (OUTPATIENT)
Dept: VASCULAR SURGERY | Facility: CLINIC | Age: 59
End: 2024-12-17
Payer: MEDICAID

## 2024-12-17 VITALS
WEIGHT: 187.19 LBS | OXYGEN SATURATION: 98 % | HEIGHT: 65 IN | TEMPERATURE: 98 F | HEART RATE: 102 BPM | DIASTOLIC BLOOD PRESSURE: 78 MMHG | BODY MASS INDEX: 31.19 KG/M2 | RESPIRATION RATE: 20 BRPM | SYSTOLIC BLOOD PRESSURE: 144 MMHG

## 2024-12-17 DIAGNOSIS — I73.9 PAD (PERIPHERAL ARTERY DISEASE): Primary | ICD-10-CM

## 2024-12-17 PROCEDURE — 99215 OFFICE O/P EST HI 40 MIN: CPT | Mod: PBBFAC

## 2024-12-17 NOTE — PROGRESS NOTES
Pt seen by Dr. Desai & Dr. Rodriguez; Pt instructed to return to clinic in 6 months w/ABIs; Discharge paperwork given w/pt verbalizing understanding

## 2024-12-17 NOTE — PROGRESS NOTES
[facility]    HPI:   Vi Ulrich is a 58 y.o. female with PMHx HTN, T2DM, pAF on Eliquis, and SLE c/b lupus nephris, and PAD who presents for follow up of neuropathic LLE pain, swelling which started around the time she got her laser arthrectomy and balloon angioplasty of SFA in LLE on 9/6. Continues to have burning pain that radiates from her thigh down to the top of her foot and heaviness in her LLE along with discoloration. Denies chest pain/SOB/fevers/chills. Endorses that she is having a Lupus flare up today, which is affecting her joints.    PMH:   Past Medical History:   Diagnosis Date    Arthritis     knees    Asthma     Atrial fibrillation     Chronic constipation     Diabetes mellitus     Encounter for blood transfusion 10/2014    GERD (gastroesophageal reflux disease)     Gout     Hypertension     Lupus 2004    SLE    Renal disorder     SLE (systemic lupus erythematosus)       Meds:   Current Outpatient Medications:     albuterol sulfate (PROAIR RESPICLICK) 90 mcg/actuation inhaler, Inhale 1 puff into the lungs every 4 (four) hours as needed for Wheezing. Rescue, Disp: , Rfl:     ALPRAZolam (XANAX) 1 MG tablet, Take 1 tablet (1 mg total) by mouth 2 (two) times daily as needed for Anxiety., Disp: 60 tablet, Rfl: 2    apixaban (ELIQUIS) 5 mg Tab, Take 1 tablet (5 mg total) by mouth 2 (two) times daily., Disp: 180 tablet, Rfl: 3    aspirin (ECOTRIN) 81 MG EC tablet, Take 1 tablet (81 mg total) by mouth once daily., Disp: 90 tablet, Rfl: 3    atorvastatin (LIPITOR) 40 MG tablet, Take 1 tablet (40 mg total) by mouth once daily., Disp: 90 tablet, Rfl: 3    blood sugar diagnostic (ONETOUCH VERIO) Strp, 1 each by Misc.(Non-Drug; Combo Route) route 4 (four) times daily., Disp: 50 each, Rfl: 11    blood sugar diagnostic Strp, 1 each by Misc.(Non-Drug; Combo Route) route 4 (four) times daily., Disp: 50 each, Rfl: 11    blood sugar diagnostic Strp, 1 strip by Misc.(Non-Drug; Combo Route)  route 4 (four) times daily with meals and nightly., Disp: 200 each, Rfl: 6    eletriptan (RELPAX) 40 MG tablet, Take 1 tablet (40 mg total) by mouth as needed (take one at the beginning of migraine, may repeat in 2 h. max 2 in 24h). may repeat in 2 hours if necessary, Disp: 9 tablet, Rfl: 5    EScitalopram oxalate (LEXAPRO) 20 MG tablet, Take 1 tablet (20 mg total) by mouth once daily., Disp: 90 tablet, Rfl: 3    furosemide (LASIX) 40 MG tablet, Take 1 tablet (40 mg total) by mouth once daily., Disp: 90 tablet, Rfl: 3    lancets 28 gauge Misc, 100 lancets by Misc.(Non-Drug; Combo Route) route 4 (four) times daily with meals and nightly., Disp: 100 each, Rfl: 8    LIDOcaine (LIDODERM) 5 %, Place 1 patch onto the skin once daily. Remove & Discard patch within 12 hours or as directed by MD, Disp: 30 patch, Rfl: 2    losartan (COZAAR) 25 MG tablet, Take 1 tablet (25 mg total) by mouth once daily., Disp: 90 tablet, Rfl: 3    metoprolol tartrate (LOPRESSOR) 50 MG tablet, Take 1 tablet (50 mg total) by mouth Daily., Disp: 90 tablet, Rfl: 3    mycophenolate (CELLCEPT) 250 mg Cap, Take 6 capsules (1,500 mg total) by mouth 2 (two) times daily., Disp: 360 capsule, Rfl: 2    ondansetron (ZOFRAN) 4 MG tablet, Take 1 tablet (4 mg total) by mouth every 6 (six) hours as needed for Nausea., Disp: 12 tablet, Rfl: 0    pantoprazole (PROTONIX) 40 MG tablet, Take 1 tablet (40 mg total) by mouth once daily., Disp: 90 tablet, Rfl: 3    predniSONE (DELTASONE) 5 MG tablet, Take 1.5 tablets (7.5 mg total) by mouth once daily., Disp: 60 tablet, Rfl: 3    voclosporin (LUPKYNIS) 7.9 mg Cap, Take 3 capsules (23.7 mg) by mouth twice daily., Disp: 180 capsule, Rfl: 5    walker Misc, Please dispense 1 Rollator, Disp: 1 each, Rfl: 0    blood-glucose meter kit, Use as instructed, Disp: 1 each, Rfl: 0    cetirizine (ZYRTEC) 10 MG tablet, Take 1 tablet (10 mg total) by mouth once daily. for 14 days, Disp: 14 tablet, Rfl: 0    cilostazoL (PLETAL) 50 MG  Tab, Take 1 tablet (50 mg total) by mouth 2 (two) times daily. (Patient not taking: Reported on 2024), Disp: 60 tablet, Rfl: 11    insulin aspart U-100 (NOVOLOG) 100 unit/mL (3 mL) InPn pen, Inject 2 Units into the skin as needed. (Patient not taking: Reported on 2024), Disp: , Rfl:   Allergies:   Review of patient's allergies indicates:   Allergen Reactions    Penicillins Hives    Percocet [oxycodone-acetaminophen] Hives and Itching    Tramadol Hives    Promethazine      Social History:   Social History     Tobacco Use    Smoking status: Some Days     Current packs/day: 0.00     Average packs/day: 0.2 packs/day for 41.5 years (10.4 ttl pk-yrs)     Types: Cigarettes     Start date: 1980     Last attempt to quit: 2022     Years since quittin.6    Smokeless tobacco: Never    Tobacco comments:     Stopped 2 months ago   Substance Use Topics    Alcohol use: Never    Drug use: Never     Family History:   Family History   Problem Relation Name Age of Onset    Heart block Mother Mother     Arthritis Mother Mother     Asthma Mother Mother     Diabetes Mother Mother     Heart disease Father Romero hampton     Diabetes Sister      Heart disease Sister      Kidney disease Sister       Surgical History:   Past Surgical History:   Procedure Laterality Date    ANGIOGRAPHY OF LOWER EXTREMITY Left 2024    Procedure: Angiogram Extremity Unilateral;  Surgeon: Devan Jin MD;  Location: [facility] CATH [facility];  Service: Peripheral Vascular;  Laterality: Left;    APPENDECTOMY      CHOLECYSTECTOMY      COLONOSCOPY N/A 2018    Procedure: COLONOSCOPY;  Surgeon: Minerva Porras MD;  Location: [facility];  Service: Endoscopy;  Laterality: N/A;    ESOPHAGOGASTRODUODENOSCOPY N/A 2018    Procedure: ESOPHAGOGASTRODUODENOSCOPY (EGD);  Surgeon: Minerva Porras MD;  Location: [facility];  Service: Endoscopy;  Laterality: N/A;    HYSTERECTOMY      JOINT REPLACEMENT Right 2015    Knee    LYMPH  NODE BIOPSY Left 2014    MASTECTOMY      Left arm- NO BP    PARTIAL HYSTERECTOMY      TUBAL LIGATION         Objective:    Vitals:  Vitals:    12/17/24 1416   BP: (!) 144/78   Pulse:    Resp:    Temp:         Physical Exam:  Gen: NAD  Neuro: awake, alert, answering questions appropriately  CV: RRR on peripheral exam  Resp: non-labored breathing, JOSELINE  Abd: soft, ND, NT  Inguinal: R groin access site with no swelling or erythema.   Ext: moves all 4 spontaneously and purposefully. Point tenderness to L lateral ankle, lower shin.  Vasc: Left: Biphasic DP, monophasic PT. Right: monophasic DP/PT.  Skin: warm, well perfused    Assessment/Plan:  Vi Ulrich is a 58 y.o. female with PMHx of HTN, T2DM, pAF on Eliquis, and SLE c/b lupus nephritis presenting today for follow-up of neuropathic LLE pain which started around the time patient underwent L SFA laser arthrectomy and balloon angioplasty on 9/6.     - LLE point tenderness to palpation, tenderness standing up, not likely claudication  - recommend primary care work up of RLE neuropathic/musculoskeletal pain  - RTC in 6 months with repeat ABIs    Sam Desai MD  [facility]

## 2025-01-28 ENCOUNTER — TELEPHONE (OUTPATIENT)
Dept: NEPHROLOGY | Facility: CLINIC | Age: 60
End: 2025-01-28
Payer: MEDICAID

## 2025-01-28 DIAGNOSIS — M32.14 SYSTEMIC LUPUS ERYTHEMATOSUS WITH GLOMERULAR DISEASE, UNSPECIFIED SLE TYPE: ICD-10-CM

## 2025-01-28 RX ORDER — PREDNISONE 5 MG/1
7.5 TABLET ORAL
Qty: 60 TABLET | Refills: 3 | OUTPATIENT
Start: 2025-01-28

## 2025-01-28 RX ORDER — MYCOPHENOLATE MOFETIL 250 MG/1
CAPSULE ORAL
Qty: 360 CAPSULE | Refills: 2 | OUTPATIENT
Start: 2025-01-28

## 2025-01-31 ENCOUNTER — HOSPITAL ENCOUNTER (EMERGENCY)
Facility: HOSPITAL | Age: 60
Discharge: HOME | End: 2025-01-31
Attending: EMERGENCY MEDICINE
Payer: MEDICAID

## 2025-01-31 VITALS
HEIGHT: 65 IN | DIASTOLIC BLOOD PRESSURE: 73 MMHG | WEIGHT: 150 LBS | RESPIRATION RATE: 12 BRPM | SYSTOLIC BLOOD PRESSURE: 130 MMHG | BODY MASS INDEX: 24.99 KG/M2 | TEMPERATURE: 98 F | OXYGEN SATURATION: 100 % | HEART RATE: 78 BPM

## 2025-01-31 DIAGNOSIS — K52.9 ENTERITIS: Primary | ICD-10-CM

## 2025-01-31 DIAGNOSIS — E16.2 HYPOGLYCEMIA: ICD-10-CM

## 2025-01-31 DIAGNOSIS — N17.9 AKI (ACUTE KIDNEY INJURY): ICD-10-CM

## 2025-01-31 DIAGNOSIS — R53.1 WEAKNESS: ICD-10-CM

## 2025-01-31 DIAGNOSIS — R11.2 NAUSEA AND VOMITING: ICD-10-CM

## 2025-01-31 LAB
ACCEPTIBLE SP GR UR QL: 1.04 (ref 1–1.03)
ALBUMIN SERPL-MCNC: 3 G/DL (ref 3.5–5)
ALBUMIN/GLOB SERPL: 0.6 RATIO (ref 1.1–2)
ALP SERPL-CCNC: 82 UNIT/L (ref 40–150)
ALT SERPL-CCNC: <5 UNIT/L (ref 0–55)
AMPHET UR QL SCN: NEGATIVE
ANION GAP SERPL CALC-SCNC: 5 MEQ/L
ANION GAP SERPL CALC-SCNC: 9 MEQ/L
AST SERPL-CCNC: 16 UNIT/L (ref 5–34)
BACTERIA #/AREA URNS AUTO: ABNORMAL /HPF
BARBITURATE SCN PRESENT UR: NEGATIVE
BASOPHILS # BLD AUTO: 0.01 X10(3)/MCL
BASOPHILS NFR BLD AUTO: 0.3 %
BENZODIAZ UR QL SCN: POSITIVE
BILIRUB SERPL-MCNC: 0.2 MG/DL
BILIRUB UR QL STRIP.AUTO: NEGATIVE
BUN SERPL-MCNC: 29.8 MG/DL (ref 9.8–20.1)
BUN SERPL-MCNC: 33.2 MG/DL (ref 9.8–20.1)
CALCIUM SERPL-MCNC: 7.6 MG/DL (ref 8.4–10.2)
CALCIUM SERPL-MCNC: 8.6 MG/DL (ref 8.4–10.2)
CANNABINOIDS UR QL SCN: NEGATIVE
CHLORIDE SERPL-SCNC: 110 MMOL/L (ref 98–107)
CHLORIDE SERPL-SCNC: 113 MMOL/L (ref 98–107)
CLARITY UR: CLEAR
CO2 SERPL-SCNC: 18 MMOL/L (ref 22–29)
CO2 SERPL-SCNC: 18 MMOL/L (ref 22–29)
COCAINE UR QL SCN: NEGATIVE
COLOR UR AUTO: ABNORMAL
CREAT SERPL-MCNC: 1.99 MG/DL (ref 0.55–1.02)
CREAT SERPL-MCNC: 2.4 MG/DL (ref 0.55–1.02)
CREAT/UREA NIT SERPL: 14
CREAT/UREA NIT SERPL: 15
EOSINOPHIL # BLD AUTO: 0.02 X10(3)/MCL (ref 0–0.9)
EOSINOPHIL NFR BLD AUTO: 0.5 %
ERYTHROCYTE [DISTWIDTH] IN BLOOD BY AUTOMATED COUNT: 15.4 % (ref 11.5–17)
FENTANYL UR QL SCN: NEGATIVE
FLUAV AG UPPER RESP QL IA.RAPID: NOT DETECTED
FLUBV AG UPPER RESP QL IA.RAPID: NOT DETECTED
GFR SERPLBLD CREATININE-BSD FMLA CKD-EPI: 23 ML/MIN/1.73/M2
GFR SERPLBLD CREATININE-BSD FMLA CKD-EPI: 29 ML/MIN/1.73/M2
GLOBULIN SER-MCNC: 4.7 GM/DL (ref 2.4–3.5)
GLUCOSE SERPL-MCNC: 72 MG/DL (ref 74–100)
GLUCOSE SERPL-MCNC: 88 MG/DL (ref 74–100)
GLUCOSE UR QL STRIP: NORMAL
HCT VFR BLD AUTO: 33.8 % (ref 37–47)
HGB BLD-MCNC: 10.8 G/DL (ref 12–16)
HGB UR QL STRIP: ABNORMAL
HOLD SPECIMEN: NORMAL
HYALINE CASTS #/AREA URNS LPF: ABNORMAL /LPF
IMM GRANULOCYTES # BLD AUTO: 0 X10(3)/MCL (ref 0–0.04)
IMM GRANULOCYTES NFR BLD AUTO: 0 %
KETONES UR QL STRIP: ABNORMAL
LEUKOCYTE ESTERASE UR QL STRIP: 250
LIPASE SERPL-CCNC: 9 U/L
LYMPHOCYTES # BLD AUTO: 1.42 X10(3)/MCL (ref 0.6–4.6)
LYMPHOCYTES NFR BLD AUTO: 35.6 %
MCH RBC QN AUTO: 28.1 PG (ref 27–31)
MCHC RBC AUTO-ENTMCNC: 32 G/DL (ref 33–36)
MCV RBC AUTO: 88 FL (ref 80–94)
MDMA UR QL SCN: NEGATIVE
MONOCYTES # BLD AUTO: 0.15 X10(3)/MCL (ref 0.1–1.3)
MONOCYTES NFR BLD AUTO: 3.8 %
MUCOUS THREADS URNS QL MICRO: ABNORMAL /LPF
NEUTROPHILS # BLD AUTO: 2.39 X10(3)/MCL (ref 2.1–9.2)
NEUTROPHILS NFR BLD AUTO: 59.8 %
NITRITE UR QL STRIP: NEGATIVE
NRBC BLD AUTO-RTO: 0 %
OPIATES UR QL SCN: POSITIVE
PCP UR QL: NEGATIVE
PH UR STRIP: 5.5 [PH]
PH UR: 5.5 [PH] (ref 3–11)
PLATELET # BLD AUTO: 351 X10(3)/MCL (ref 130–400)
PMV BLD AUTO: 10.1 FL (ref 7.4–10.4)
POCT GLUCOSE: 110 MG/DL (ref 70–110)
POCT GLUCOSE: 57 MG/DL (ref 70–110)
POCT GLUCOSE: 65 MG/DL (ref 70–110)
POTASSIUM SERPL-SCNC: 3.9 MMOL/L (ref 3.5–5.1)
POTASSIUM SERPL-SCNC: 4.2 MMOL/L (ref 3.5–5.1)
PROT SERPL-MCNC: 7.7 GM/DL (ref 6.4–8.3)
PROT UR QL STRIP: ABNORMAL
RBC # BLD AUTO: 3.84 X10(6)/MCL (ref 4.2–5.4)
RBC #/AREA URNS AUTO: ABNORMAL /HPF
RSV A 5' UTR RNA NPH QL NAA+PROBE: NOT DETECTED
SARS-COV-2 RNA RESP QL NAA+PROBE: NOT DETECTED
SODIUM SERPL-SCNC: 136 MMOL/L (ref 136–145)
SODIUM SERPL-SCNC: 137 MMOL/L (ref 136–145)
SP GR UR STRIP.AUTO: 1.04 (ref 1–1.03)
SQUAMOUS #/AREA URNS LPF: ABNORMAL /HPF
TROPONIN I SERPL-MCNC: 0.01 NG/ML (ref 0–0.04)
UROBILINOGEN UR STRIP-ACNC: NORMAL
WBC # BLD AUTO: 3.99 X10(3)/MCL (ref 4.5–11.5)
WBC #/AREA URNS AUTO: ABNORMAL /HPF

## 2025-01-31 PROCEDURE — 80053 COMPREHEN METABOLIC PANEL: CPT | Performed by: NURSE PRACTITIONER

## 2025-01-31 PROCEDURE — 63600175 PHARM REV CODE 636 W HCPCS: Performed by: NURSE PRACTITIONER

## 2025-01-31 PROCEDURE — 96374 THER/PROPH/DIAG INJ IV PUSH: CPT

## 2025-01-31 PROCEDURE — 93005 ELECTROCARDIOGRAM TRACING: CPT

## 2025-01-31 PROCEDURE — 87086 URINE CULTURE/COLONY COUNT: CPT | Performed by: NURSE PRACTITIONER

## 2025-01-31 PROCEDURE — 81001 URINALYSIS AUTO W/SCOPE: CPT | Performed by: NURSE PRACTITIONER

## 2025-01-31 PROCEDURE — 0241U COVID/RSV/FLU A&B PCR: CPT | Performed by: NURSE PRACTITIONER

## 2025-01-31 PROCEDURE — 84484 ASSAY OF TROPONIN QUANT: CPT | Performed by: NURSE PRACTITIONER

## 2025-01-31 PROCEDURE — 80307 DRUG TEST PRSMV CHEM ANLYZR: CPT | Performed by: NURSE PRACTITIONER

## 2025-01-31 PROCEDURE — 96376 TX/PRO/DX INJ SAME DRUG ADON: CPT

## 2025-01-31 PROCEDURE — 82962 GLUCOSE BLOOD TEST: CPT

## 2025-01-31 PROCEDURE — 25500020 PHARM REV CODE 255

## 2025-01-31 PROCEDURE — 99285 EMERGENCY DEPT VISIT HI MDM: CPT | Mod: 25

## 2025-01-31 PROCEDURE — 25000003 PHARM REV CODE 250: Performed by: NURSE PRACTITIONER

## 2025-01-31 PROCEDURE — 83690 ASSAY OF LIPASE: CPT | Performed by: NURSE PRACTITIONER

## 2025-01-31 PROCEDURE — 85025 COMPLETE CBC W/AUTO DIFF WBC: CPT | Performed by: NURSE PRACTITIONER

## 2025-01-31 PROCEDURE — 96375 TX/PRO/DX INJ NEW DRUG ADDON: CPT

## 2025-01-31 PROCEDURE — 96361 HYDRATE IV INFUSION ADD-ON: CPT

## 2025-01-31 RX ORDER — METOCLOPRAMIDE HYDROCHLORIDE 5 MG/ML
5 INJECTION INTRAMUSCULAR; INTRAVENOUS
Status: COMPLETED | OUTPATIENT
Start: 2025-01-31 | End: 2025-01-31

## 2025-01-31 RX ORDER — ONDANSETRON 4 MG/1
4 TABLET, ORALLY DISINTEGRATING ORAL EVERY 6 HOURS PRN
Qty: 12 TABLET | Refills: 0 | Status: SHIPPED | OUTPATIENT
Start: 2025-01-31

## 2025-01-31 RX ORDER — ONDANSETRON HYDROCHLORIDE 2 MG/ML
4 INJECTION, SOLUTION INTRAVENOUS
Status: COMPLETED | OUTPATIENT
Start: 2025-01-31 | End: 2025-01-31

## 2025-01-31 RX ORDER — MORPHINE SULFATE 2 MG/ML
4 INJECTION, SOLUTION INTRAMUSCULAR; INTRAVENOUS
Status: COMPLETED | OUTPATIENT
Start: 2025-01-31 | End: 2025-01-31

## 2025-01-31 RX ORDER — ONDANSETRON 4 MG/1
4 TABLET, ORALLY DISINTEGRATING ORAL EVERY 6 HOURS PRN
Qty: 12 TABLET | Refills: 0 | Status: SHIPPED | OUTPATIENT
Start: 2025-01-31 | End: 2025-01-31

## 2025-01-31 RX ORDER — DIPHENHYDRAMINE HYDROCHLORIDE 50 MG/ML
25 INJECTION INTRAMUSCULAR; INTRAVENOUS
Status: DISCONTINUED | OUTPATIENT
Start: 2025-01-31 | End: 2025-01-31

## 2025-01-31 RX ADMIN — IOHEXOL 100 ML: 350 INJECTION, SOLUTION INTRAVENOUS at 04:01

## 2025-01-31 RX ADMIN — SODIUM CHLORIDE 1000 ML: 9 INJECTION, SOLUTION INTRAVENOUS at 02:01

## 2025-01-31 RX ADMIN — METOCLOPRAMIDE HYDROCHLORIDE 5 MG: 5 INJECTION INTRAMUSCULAR; INTRAVENOUS at 02:01

## 2025-01-31 RX ADMIN — DEXTROSE MONOHYDRATE 250 ML: 100 INJECTION, SOLUTION INTRAVENOUS at 04:01

## 2025-01-31 RX ADMIN — METOCLOPRAMIDE HYDROCHLORIDE 5 MG: 5 INJECTION INTRAMUSCULAR; INTRAVENOUS at 04:01

## 2025-01-31 RX ADMIN — ONDANSETRON 4 MG: 2 INJECTION INTRAMUSCULAR; INTRAVENOUS at 04:01

## 2025-01-31 RX ADMIN — MORPHINE SULFATE 4 MG: 2 INJECTION, SOLUTION INTRAMUSCULAR; INTRAVENOUS at 04:01

## 2025-01-31 RX ADMIN — SODIUM CHLORIDE 1000 ML: 9 INJECTION, SOLUTION INTRAVENOUS at 04:01

## 2025-01-31 NOTE — ED PROVIDER NOTES
Encounter Date: 1/31/2025       History     Chief Complaint   Patient presents with    Emesis    Diarrhea    Abdominal Cramping     PT CO ABD CRAMPING W NVD X 4 DAYS.  UNABLE TO HOLD DOWN FLUIDS.  CO WEAKNESS/DEHYDRATION AND LUPUS FLARE.  CBG 65.  EKG OBTAINED.        The patient presents with abdominal pain.  The onset was 4 days ago.  The course/duration of symptoms is constant and fluctuating in intensity.  The character of symptoms is dull.  The degree at onset was minimal.  The Location of pain at onset was generalized abdominal.  The degree at present is moderate.  The Location of pain at present is generalized abdominal.  Radiating pain: none. There are exacerbating factors including eating and drinking.  The relieving factor is vomiting.  Therapy today: none.  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  Associated symptoms: nausea, vomiting, diarrhea this morning, denies chest pain, denies back pain, denies shortness of breath, denies fever, denies chills, denies headache and denies dizziness.         Review of patient's allergies indicates:   Allergen Reactions    Penicillins Hives    Percocet [oxycodone-acetaminophen] Hives and Itching    Tramadol Hives    Promethazine      Past Medical History:   Diagnosis Date    Arthritis     knees    Asthma     Atrial fibrillation     Chronic constipation     Diabetes mellitus     Encounter for blood transfusion 10/2014    GERD (gastroesophageal  reflux disease)     Gout     Hypertension     Lupus 2004    SLE    Renal disorder     SLE (systemic lupus erythematosus)      Past Surgical History:   Procedure Laterality Date    ANGIOGRAPHY OF LOWER EXTREMITY Left 2024    Procedure: Angiogram Extremity Unilateral;  Surgeon: Devan Jin MD;  Location: [facility] CATH LAB;  Service: Peripheral Vascular;  Laterality: Left;    APPENDECTOMY      CHOLECYSTECTOMY      COLONOSCOPY N/A 2018    Procedure: COLONOSCOPY;  Surgeon: Minerva Porras MD;  Location: [facility] ENDO;  Service: Endoscopy;  Laterality: N/A;    ESOPHAGOGASTRODUODENOSCOPY N/A 2018    Procedure: ESOPHAGOGASTRODUODENOSCOPY (EGD);  Surgeon: Minerva Porras MD;  Location: [facility] ENDO;  Service: Endoscopy;  Laterality: N/A;    HYSTERECTOMY      JOINT REPLACEMENT Right 2015    Knee    LYMPH NODE BIOPSY Left     MASTECTOMY      Left arm- NO BP    PARTIAL HYSTERECTOMY      TUBAL LIGATION       Family History   Problem Relation Name Age of Onset    Heart block Mother Mother     Arthritis Mother Mother     Asthma Mother Mother     Diabetes Mother Mother     Heart disease Father Gilbert hampton     Diabetes Sister      Heart disease Sister      Kidney disease Sister       Social History     Tobacco Use    Smoking status: Some Days     Current packs/day: 0.00     Average packs/day: 0.2 packs/day for 41.5 years (10.4 ttl pk-yrs)     Types: Cigarettes     Start date: 1980     Last attempt to quit: 2022     Years since quittin.7    Smokeless tobacco: Never    Tobacco comments:     Stopped 2 months ago   Substance Use Topics    Alcohol use: Never    Drug use: Never     Review of Systems   Constitutional:  Negative for fever.   HENT:  Negative for sore throat.    Respiratory:  Negative for shortness of breath.    Cardiovascular:  Negative for chest pain.   Gastrointestinal:  Positive for abdominal pain, diarrhea, nausea and vomiting.   Genitourinary:  Negative for dysuria.    Musculoskeletal:  Negative for back pain.   Skin:  Negative for rash.   Neurological:  Negative for weakness.   Hematological:  Does not bruise/bleed easily.   All other systems reviewed and are negative.      Physical Exam     Initial Vitals [01/31/25 1417]   BP Pulse Resp Temp SpO2   (!) 152/78 75 18 97 °F (36.1 °C) 97 %      MAP       --         Physical Exam    Nursing note and vitals reviewed.  Constitutional: She appears well-developed and well-nourished.   HENT:   Head: Normocephalic and atraumatic.   Neck: Neck supple.   Normal range of motion.  Cardiovascular:  Normal rate, regular rhythm, normal heart sounds and intact distal pulses.           Pulmonary/Chest: Effort normal and breath sounds normal.   Abdominal: Abdomen is soft and flat. Bowel sounds are normal. There is abdominal tenderness in the left lower quadrant. There is guarding. There is no rebound.   Musculoskeletal:         General: Normal range of motion.      Cervical back: Normal range of motion and neck supple.     Neurological: She is alert. She has normal strength.   Skin: Skin is warm and dry.   Psychiatric: She has a normal mood and affect.         ED Course   Procedures  Labs Reviewed   COMPREHENSIVE METABOLIC PANEL - Abnormal       Result Value    Sodium 137      Potassium 4.2      Chloride 110 (*)     CO2 18 (*)     Glucose 72 (*)     Blood Urea Nitrogen 33.2 (*)     Creatinine 2.40 (*)     Calcium 8.6      Protein Total 7.7      Albumin 3.0 (*)     Globulin 4.7 (*)     Albumin/Globulin Ratio 0.6 (*)     Bilirubin Total 0.2      ALP 82      ALT <5      AST 16      eGFR 23      Anion Gap 9.0      BUN/Creatinine Ratio 14     URINALYSIS, REFLEX TO URINE CULTURE - Abnormal    Color, UA Light-Yellow      Appearance, UA Clear      Specific Gravity, UA 1.044 (*)     pH, UA 5.5      Protein, UA 2+ (*)     Glucose, UA Normal      Ketones, UA 1+ (*)     Blood, UA 1+ (*)     Bilirubin, UA Negative      Urobilinogen, UA Normal      Nitrites, UA  Negative      Leukocyte Esterase,  (*)     RBC, UA 6-10 (*)     WBC, UA 11-20 (*)     Bacteria, UA Trace (*)     Squamous Epithelial Cells, UA Moderate (*)     Mucous, UA Trace (*)     Hyaline Casts, UA 6-10 (*)    DRUG SCREEN, URINE (BEAKER) - Abnormal    Amphetamines, Urine Negative      Barbiturates, Urine Negative      Benzodiazepine, Urine Positive (*)     Cannabinoids, Urine Negative      Cocaine, Urine Negative      Fentanyl, Urine Negative      MDMA, Urine Negative      Opiates, Urine Positive (*)     Phencyclidine, Urine Negative      pH, Urine 5.5      Specific Gravity, Urine Auto 1.044 (*)     Narrative:     Cut off concentrations:    Amphetamines - 1000 ng/ml  Barbiturates - 200 ng/ml  Benzodiazepine - 200 ng/ml  Cannabinoids (THC) - 50 ng/ml  Cocaine - 300 ng/ml  Fentanyl - 1.0 ng/ml  MDMA - 500 ng/ml  Opiates - 300 ng/ml   Phencyclidine (PCP) - 25 ng/ml    Specimen submitted for drug analysis and tested for pH and specific gravity in order to evaluate sample integrity. Suspect tampering if specific gravity is <1.003 and/or pH is not within the range of 4.5 - 8.0  False negatives may result form substances such as bleach added to urine.  False positives may result for the presence of a substance with similar chemical structure to the drug or its metabolite.    This test provides only a PRELIMINARY analytical test result. A more specific alternate chemical method must be used in order to obtain a confirmed analytical result. Gas chromatography/mass spectrometry (GC/MS) is the preferred confirmatory method. Other chemical confirmation methods are available. Clinical consideration and professional judgement should be applied to any drug of abuse test result, particularly when preliminary positive results are used.    Positive results will be confirmed only at the physicians request. Unconfirmed screening results are to be used only for medical purposes (treatment).        CBC WITH DIFFERENTIAL -  Abnormal    WBC 3.99 (*)     RBC 3.84 (*)     Hgb 10.8 (*)     Hct 33.8 (*)     MCV 88.0      MCH 28.1      MCHC 32.0 (*)     RDW 15.4      Platelet 351      MPV 10.1      Neut % 59.8      Lymph % 35.6      Mono % 3.8      Eos % 0.5      Basophil % 0.3      Imm Grans % 0.0      Neut # 2.39      Lymph # 1.42      Mono # 0.15      Eos # 0.02      Baso # 0.01      Imm Gran # 0.00      NRBC% 0.0     BASIC METABOLIC PANEL - Abnormal    Sodium 136      Potassium 3.9      Chloride 113 (*)     CO2 18 (*)     Glucose 88      Blood Urea Nitrogen 29.8 (*)     Creatinine 1.99 (*)     BUN/Creatinine Ratio 15      Calcium 7.6 (*)     Anion Gap 5.0      eGFR 29     POCT GLUCOSE - Abnormal    POCT Glucose 65 (*)    POCT GLUCOSE - Abnormal    POCT Glucose 57 (*)    LIPASE - Normal    Lipase Level 9     TROPONIN I - Normal    Troponin-I 0.012     COVID/RSV/FLU A&B PCR - Normal    Influenza A PCR Not Detected      Influenza B PCR Not Detected      Respiratory Syncytial Virus PCR Not Detected      SARS-CoV-2 PCR Not Detected      Narrative:     The Xpert Xpress SARS-CoV-2/FLU/RSV plus is a rapid, multiplexed real-time PCR test intended for the simultaneous qualitative detection and differentiation of SARS-CoV-2, Influenza A, Influenza B, and respiratory syncytial virus (RSV) viral RNA in either nasopharyngeal swab or nasal swab specimens.         CULTURE, URINE   CBC W/ AUTO DIFFERENTIAL    Narrative:     The following orders were created for panel order CBC auto differential.  Procedure                               Abnormality         Status                     ---------                               -----------         ------                     CBC with Differential[[id number]]       Abnormal            Final result                 Please view results for these tests on the individual orders.   EXTRA TUBES    Narrative:     The following orders were created for panel order EXTRA TUBES.  Procedure                                Abnormality         Status                     ---------                               -----------         ------                     Light Blue Top Hold[[id number]]                             Final result               Red Top Hold[[id number]]                                    Final result               Lavender Top Hold[[id number]]                               Final result               Gold Top Hold[[id number]]                                   Final result               Pink Top Hold[[id number]]                                   Final result                 Please view results for these tests on the individual orders.   LIGHT BLUE TOP HOLD    Extra Tube Hold for add-ons.     RED TOP HOLD    Extra Tube Hold for add-ons.     LAVENDER TOP HOLD    Extra Tube Hold for add-ons.     GOLD TOP HOLD    Extra Tube Hold for add-ons.     PINK TOP HOLD    Extra Tube Hold for add-ons.     POCT GLUCOSE, HAND-HELD DEVICE   POCT GLUCOSE    POCT Glucose 110       EKG Readings: (Independently Interpreted)   Initial Reading: No STEMI. Rhythm: Normal Sinus Rhythm. Heart Rate: 67. Ectopy: No Ectopy. Conduction: LPFB. Axis: Normal.   Reviewed by Dr Friend (ER staff)     ECG Results              EKG 12-lead (Weakness) Age > 50 (In process)        Collection Time Result Time QRS Duration [facility] QTC Calculation    01/31/25 14:26:58 01/31/25 14:32:11 90 456                     In process by Interface, Lab In [facility] (01/31/25 14:32:17)                   Narrative:    Test Reason : R53.1,    Vent. Rate :  67 BPM     Atrial Rate :  67 BPM     P-R Int : 174 ms          QRS Dur :  90 ms      QT Int : 432 ms       P-R-T Axes :  51 112 -14 degrees    QTcB Int : 456 ms    Normal sinus rhythm  Left posterior fascicular block  Cannot rule out Inferior infarct ,age undetermined  Abnormal ECG  When compared with ECG of 06-Oct-2024 07:45,  Left posterior fascicular block is now Present  Criteria for Anterior infarct are no longer Present  Minimal  criteria for Inferior infarct are now Present  T wave inversion now evident in Inferior leads  Nonspecific T wave abnormality no longer evident in Anterior leads    Referred By: AAAREFERRAL SELF           Confirmed By:                                   Imaging Results              CT Abdomen Pelvis With IV Contrast NO Oral Contrast (Final result)  Result time 01/31/25 16:27:59      Final result by Rishi Urban MD (01/31/25 16:27:59)                   Impression:      Thickened dilated loops of small bowel seen mainly in the jejunum on the left side consistent with enteritis.  There is some reactive fluid seen in the peritoneal cavity      Electronically signed by: Mathew Urban  Date:    01/31/2025  Time:    16:27               Narrative:    EXAMINATION:  CT ABDOMEN PELVIS WITH IV CONTRAST    CLINICAL HISTORY:  LLQ abdominal pain;    TECHNIQUE:  Low dose axial images, sagittal and coronal reformations were obtained from the lung bases to the pubic symphysis following the IV administration of contrast. Automatic exposure control (AEC) is utilized to reduce patient radiation exposure.    COMPARISON:  None.    FINDINGS:  There is bibasilar atelectasis..    The liver appears normal.  No liver mass or lesion is seen.  Portal and hepatic veins appear normal.    The gallbladder is not seen..    The pancreas appears normal.  No pancreatic mass or lesion is seen.    The spleen shows no acute abnormality.    The adrenal glands appear normal.  No adrenal nodule is seen.    The kidneys appear normal.  There is a cyst in the midpole the right kidney.  No hydronephrosis is seen.  No hydroureter is seen.  No nephrolithiasis is seen.  No obvious ureteral stones are seen.    Urinary bladder appears grossly unremarkable.    There markedly thickened loops of small bowel mainly in the jejunum and mainly centered around the left mid abdomen and left lower quadrant.  There is associated inflammatory changes.  There is also  associated free intraperitoneal fluid along the left pericolic gutter and to lesser extent the right pericolic gutter.  Small amount of fluid is seen in the pelvis.  Findings are consistent with a enteritis.    No colitis is seen.  No diverticulitis is seen.  No obvious colonic mass or lesion is seen.    No free air is seen.  No free fluid is seen.                                       X-Ray Chest AP Portable (Final result)  Result time 01/31/25 14:52:01      Final result by Oscar Duncan MD (01/31/25 14:52:01)                   Impression:      Bibasilar hypoventilatory changes.      Electronically signed by: Oscar Duncan  Date:    01/31/2025  Time:    14:52               Narrative:    EXAMINATION:  XR CHEST AP PORTABLE    CLINICAL HISTORY:  Nausea with vomiting, unspecified    TECHNIQUE:  One view    COMPARISON:  August 9, 2024..    FINDINGS:  Cardiopericardial silhouette is within normal limits.  Bilateral basilar hypoventilatory changes without convincing acute consolidation.  There is no overt pulmonary edema, pleural effusion or pneumothorax.                                       Medications   sodium chloride 0.9% bolus 1,000 mL 1,000 mL (0 mLs Intravenous Stopped 1/31/25 1543)   metoclopramide injection 5 mg (5 mg Intravenous Given 1/31/25 1444)   metoclopramide injection 5 mg (5 mg Intravenous Given 1/31/25 1614)   iohexoL (OMNIPAQUE 350) 350 mg iodine/mL injection (100 mLs  Given 1/31/25 1600)   morphine injection 4 mg (4 mg Intravenous Given 1/31/25 1614)   ondansetron injection 4 mg (4 mg Intravenous Given 1/31/25 1614)   sodium chloride 0.9% bolus 1,000 mL 1,000 mL ( Intravenous Stopped 1/31/25 1752)   dextrose 10% bolus 250 mL 250 mL ( Intravenous Stopped 1/31/25 1714)     Medical Decision Making  The patient presents with abdominal pain.  The onset was 4 days ago.  The course/duration of symptoms is constant and fluctuating in intensity.  The character of symptoms is dull.  The degree at onset was  minimal.  The Location of pain at onset was generalized abdominal.  The degree at present is moderate.  The Location of pain at present is generalized abdominal.  Radiating pain: none. There are exacerbating factors including eating and drinking.  The relieving factor is vomiting.  Therapy today: none.  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  Associated symptoms: nausea, vomiting, diarrhea this morning, denies chest pain, denies back pain, denies shortness of breath, denies fever, denies chills, denies headache and denies dizziness.     Re-eval at 1842: feels better after ivf and medication, creatinine to 1.99 from 2.40, she is tolerating po fluids, she refuses admission, she is advised to stay well hydrated, she will f/u with her pcp. Strict return to ER precautions given.6:43 PM DISPOSITION: The patient is resting comfortably in no acute distress.  She is hemodynamically stable and is without objective evidence for acute process requiring urgent intervention or hospitalization. I provided counseling to patient with regard to condition, the treatment plan, specific conditions for return, and the importance of follow up. Detailed written and verbal instructions provided to patient and she expressed a verbal understanding of the discharge instructions and overall management plan. Reiterated the importance of medication administration and safety and advised patient to follow up with primary  care provider in 3-5 days or sooner if needed.  Answered questions at this time. The patient is stable for discharge.       Amount and/or Complexity of Data Reviewed  Labs: ordered. Decision-making details documented in ED Course.  Radiology: ordered. Decision-making details documented in ED Course.  Discussion of management or test interpretation with external provider(s): Consulted Dr Friend (ER staff) who supervised  care, he advised to discharge patient as per her request with close followup    Risk  Prescription drug management.      Additional MDM:   Differential Diagnosis:   Gastroenteritis, appendicitis, Diverticulitis, Pancreatitis, Pyelonephritis, AAA, Dissection, MI, Gastric Ulcer, Peptic Ulcer, Urinary retention, among others                 ED Course as of 01/31/25 1848   Fri Jan 31, 2025   1650 X-Ray Chest AP Portable  Impression:     Bibasilar hypoventilatory changes.      [RB]   1650 CT Abdomen Pelvis With IV Contrast NO Oral Contrast     Impression:     Thickened dilated loops of small bowel seen mainly in the jejunum on the left side consistent with enteritis.  There is some reactive fluid seen in the peritoneal cavity      [RB]   1840 Creatinine(!): 2.40 [RB]   1841 Creatinine(!): 1.99 [RB]   1841 Glucose: 88 [RB]   1841 Given strict ED return precautions. I have spoken with the patient and/or caregivers. I have explained the patient's condition, diagnoses and treatment plan based on the information available to me at this time. I have answered the patient's and/or caregiver's questions and addressed any concerns. The patient and/or caregivers have as good an understanding of the patient's diagnosis, condition and treatment plan as can be expected at this point. The vital signs have been stable. The patient's condition is stable and appropriate for discharge from the emergency department.      The patient will pursue further outpatient evaluation with the primary care physician or other  designated or consulting physician as outlined in the discharge instructions. The patient and/or caregivers are agreeable to this plan of care and follow-up instructions have been explained in detail. The patient and/or caregivers have received these instructions in written format and have expressed an understanding of the discharge instructions. The patient and/or caregivers are aware that any significant change in condition or worsening of symptoms should prompt an immediate return to this or the closest emergency department or a call to 911.      [RB]      ED Course User Index  [RB] Sachin Rodas ACNP                             Clinical Impression:  Final diagnoses:  [R53.1] Weakness  [R11.2] Nausea and vomiting  [K52.9] Enteritis (Primary)  [N17.9] STIVEN (acute kidney injury)  [E16.2] Hypoglycemia          ED Disposition Condition    Discharge Stable          ED Prescriptions       Medication Sig Dispense Start Date End Date Auth. Provider    ondansetron (ZOFRAN-ODT) 4 MG TbDL Take 1 tablet (4 mg total) by mouth every 6 (six) hours as needed (nausea). 12 tablet 1/31/2025 -- Sachin Rodas ACNP          Follow-up Information       Follow up With Specialties Details Why Contact Info    Roxy Schultz MD Internal Medicine In 3 days  [address] [facility] [address]  [phone]      [facility] Emergency Medicine  If symptoms worsen [address] [facility] [phone]             Sachin Rodas ACNP  01/31/25 5445

## 2025-02-03 LAB
OHS QRS DURATION: 90 MS
OHS QTC CALCULATION: 456 MS

## 2025-02-04 LAB
BACTERIA UR CULT: ABNORMAL
BACTERIA UR CULT: ABNORMAL

## 2025-02-19 ENCOUNTER — HOSPITAL ENCOUNTER (OUTPATIENT)
Dept: WOUND CARE | Facility: HOSPITAL | Age: 60
Discharge: HOME | End: 2025-02-19
Attending: FAMILY MEDICINE
Payer: MEDICAID

## 2025-02-19 VITALS
TEMPERATURE: 99 F | HEART RATE: 64 BPM | DIASTOLIC BLOOD PRESSURE: 72 MMHG | RESPIRATION RATE: 20 BRPM | HEIGHT: 65 IN | SYSTOLIC BLOOD PRESSURE: 119 MMHG | OXYGEN SATURATION: 98 % | BODY MASS INDEX: 24.98 KG/M2 | WEIGHT: 149.94 LBS

## 2025-02-19 DIAGNOSIS — L84 PRE-ULCERATIVE CORN OR CALLOUS: ICD-10-CM

## 2025-02-19 DIAGNOSIS — B35.1 FUNGAL INFECTION OF TOENAIL: ICD-10-CM

## 2025-02-19 DIAGNOSIS — E11.9 ENCOUNTER FOR DIABETIC FOOT EXAM: Primary | ICD-10-CM

## 2025-02-19 PROCEDURE — 11056 PARNG/CUTG B9 HYPRKR LES 2-4: CPT

## 2025-02-19 PROCEDURE — 27000999 HC MEDICAL RECORD PHOTO DOCUMENTATION

## 2025-02-19 PROCEDURE — 11719 TRIM NAIL(S) ANY NUMBER: CPT

## 2025-02-19 PROCEDURE — 99211 OFF/OP EST MAY X REQ PHY/QHP: CPT

## 2025-02-19 RX ORDER — AMMONIUM LACTATE 12 G/100G
CREAM TOPICAL
Qty: 140 G | Refills: 2 | Status: SHIPPED | OUTPATIENT
Start: 2025-02-19

## 2025-02-19 RX ORDER — AMMONIUM LACTATE 12 G/100G
LOTION TOPICAL
Qty: 225 G | Refills: 2 | Status: SHIPPED | OUTPATIENT
Start: 2025-02-19

## 2025-02-19 NOTE — PROGRESS NOTES
-patient with complaints difficulty urinating and dysuria with MICHELLE on likely CKD  -CT abdomen showed bilateral perinephric stranding which extends inferiorly along the ureteral courses and in the right lower quadrant periappendiceal region. Potential ascending urinary tract infection  -Urine culture with Enterococcus   -ID consulted switch to Zosyn    Antibiotics (From admission, onward)      Start     Stop Route Frequency Ordered    05/06/24 1330  piperacillin-tazobactam (ZOSYN) 4.5 g in dextrose 5 % in water (D5W) 100 mL IVPB (MB+)         -- IV Every 12 hours (non-standard times) 05/06/24 1218    05/06/24 1045  mupirocin 2 % ointment         05/11/24 0859 Nasl 2 times daily 05/06/24 0932          Cultures were taken-   Microbiology Results (last 7 days)       Procedure Component Value Units Date/Time    Blood culture [[id number]] Collected: 05/05/24 0708    Order Status: Completed Specimen: Blood from Peripheral, Wrist, Right Updated: 05/08/24 0903     Blood Culture, Routine No Growth to date      No Growth to date      No Growth to date      No Growth to date    Blood culture [[id number]] Collected: 05/05/24 0708    Order Status: Completed Specimen: Blood from Peripheral, Hand, Left Updated: 05/08/24 0903     Blood Culture, Routine No Growth to date      No Growth to date      No Growth to date      No Growth to date    Urine culture [[id number]]  (Abnormal)  (Susceptibility) Collected: 05/04/24 1541    Order Status: Completed Specimen: Urine Updated: 05/06/24 1151     Urine Culture, Routine ENTEROCOCCUS FAECALIS  >100,000 cfu/ml      Narrative:      Specimen Source->Urine             CHIEF COMPLAINT:  Chief Complaint   Patient presents with    [facility]     HISTORY OF  PRESENT ILLNESS:  59 y.o. Black or  female being seen today for diabetic foot care.  Patient has a prior medical history of diabetes, lupus, hypertension, etc.  She has been a diabetic for about 20 years, her diabetes has recently been well-controlled at 6.  She reports intermittent numbness and tingling in her feet.  She also does not have sensation in her feet that has been present for many years.  Her primary doctor has sent her to vascular for blockage of her lower extremities, she has a history of a DVT in her right lower extremity.  She has a abnormal ABIs with severe peripheral vascular disease.  She has calluses at the base of the 5th digit bilaterally that have been present for many years and are painful whenever she ambulates.    Today's information:  Patient here for follow-up diabetic foot care.  She has some pain at the medial aspect of the left foot.  She also has chronic dryness and scaling of the left sole of the foot.  She denies numbness, tingling, burning.  She is planning on getting a new pair of shoes with custom insoles.  Her last hemoglobin A1c was 6.     REVIEW OF SYSTEMS:  Review of Systems   Constitutional:  Negative for chills and fever.   Respiratory:  Negative for cough.    Gastrointestinal:  Negative for nausea.   Musculoskeletal:         As stated in HPI   Skin:         As stated in HPI   Psychiatric/Behavioral: Negative.         Past Medical History:   Diagnosis Date    Arthritis     knees    Asthma     Atrial fibrillation     Chronic constipation     Diabetes mellitus     Encounter for blood transfusion 10/2014    GERD (gastroesophageal reflux disease)     Gout     Hypertension     Lupus 2004    SLE    Renal disorder     SLE (systemic lupus erythematosus)       Past Surgical History:   Procedure Laterality Date    ANGIOGRAPHY OF LOWER EXTREMITY Left 09/06/2024    Procedure: Angiogram  Extremity Unilateral;  Surgeon: Devan Jin MD;  Location: [facility] CATH LAB;  Service: Peripheral Vascular;  Laterality: Left;    APPENDECTOMY      CHOLECYSTECTOMY      COLONOSCOPY N/A 2018    Procedure: COLONOSCOPY;  Surgeon: Minerva Porras MD;  Location: [facility];  Service: Endoscopy;  Laterality: N/A;    ESOPHAGOGASTRODUODENOSCOPY N/A 2018    Procedure: ESOPHAGOGASTRODUODENOSCOPY (EGD);  Surgeon: Minerva Porras MD;  Location: [facility];  Service: Endoscopy;  Laterality: N/A;    HYSTERECTOMY      JOINT REPLACEMENT Right 2015    Knee    LYMPH NODE BIOPSY Left     MASTECTOMY      Left arm- NO BP    PARTIAL HYSTERECTOMY      TUBAL LIGATION        Social History     Socioeconomic History    Marital status: Single    Years of education: 10th   Tobacco Use    Smoking status: Some Days     Current packs/day: 0.00     Average packs/day: 0.2 packs/day for 41.5 years (10.4 ttl pk-yrs)     Types: Cigarettes     Start date: 1980     Last attempt to quit: 2022     Years since quittin.7    Smokeless tobacco: Never    Tobacco comments:     Stopped 2 months ago   Substance and Sexual Activity    Alcohol use: Never    Drug use: Never    Sexual activity: Not Currently     Partners: Male     Birth control/protection: See Surgical Hx     Social Drivers of Health     Financial Resource Strain: Low Risk  (2024)    Overall Financial Resource Strain (CARDIA)     Difficulty of Paying Living Expenses: Not hard at all   Food Insecurity: No Food Insecurity (2024)    Hunger Vital Sign     Worried About Running Out of Food in the Last Year: Never true     Ran Out of Food in the Last Year: Never true   Transportation Needs: No Transportation Needs (2023)    PRAPARE - Transportation     Lack of Transportation (Medical): No     Lack of Transportation (Non-Medical): No   Physical Activity: Inactive (2024)    Exercise Vital Sign     Days of Exercise per Week: 0 days     Minutes  "of Exercise per Session: 0 min   Stress: No Stress Concern Present (7/21/2024)    [employer] - Occupational Stress Questionnaire     Feeling of Stress : Not at all   Housing Stability: Low Risk  (1/12/2023)    Housing Stability Vital Sign     Unable to Pay for Housing in the Last Year: No     Number of Places Lived in the Last Year: 1     Unstable Housing in the Last Year: No   .    Review of Most Recent Wound Care-Related Labs:  Lab Results   Component Value Date/Time    WBC 3.99 (L) 01/31/2025 02:34 PM    WBC 3.1 03/03/2023 10:26 AM    WBC 6.23 08/23/2021 11:42 AM    RBC 3.84 (L) 01/31/2025 02:34 PM    RBC 4.27 08/23/2021 11:42 AM    HGB 10.8 (L) 01/31/2025 02:34 PM    HGB 12.5 08/23/2021 11:42 AM    HCT 33.8 (L) 01/31/2025 02:34 PM    HCT 38.7 08/23/2021 11:42 AM    MCV 88.0 01/31/2025 02:34 PM    MCV 91 08/23/2021 11:42 AM    MCH 28.1 01/31/2025 02:34 PM    MCH 29.3 08/23/2021 11:42 AM    CREATININE 1.99 (H) 01/31/2025 05:58 PM    CREATININE 1.0 08/23/2021 11:42 AM    HGBA1C 5.7 11/14/2023 07:36 AM    HGBA1C 6.6 (H) 03/04/2020 05:59 AM    CRP 56.00 (H) 06/13/2024 10:32 AM    CRP 64.5 (H) 08/23/2021 11:42 AM        Wound-Related Imaging:                PHYSICAL EXAMINATION:  Blood pressure 119/72, pulse 64, temperature 99.2 °F (37.3 °C), temperature source Oral, resp. rate 20, height 5' 5" (1.651 m), weight 68 kg (149 lb 14.6 oz), SpO2 98%.  General:  VSS, afebrile. No acute distress.   Respiratory: Non labored.  No coughing.  Cardiovascular:  No peripheral edema.    Musculoskeletal:  Normal gait  Neurologic:  A&O X 3.    Psychiatric:  Calm, cooperative.  Mood and effect normal.   Integumentary:     Hyperkeratosis of left sole of the foot    Protective Sensation (w/ 10 gram monofilament):  Right: Decreased  Left: Decreased    Visual Inspection:  Callus -  Bilateral    Pedal Pulses:   Right: Diminished  Left: Diminished    Posterior Tibialis Pulses:   Right:Diminished  Left: " Diminished       Monofilament exam:  Abnormal bilaterally left foot:  no sensation at great toe, Right foot no sensation at great toe  Posterior tibial and dorsalis pedis pulses present by Doppler  Nails trimmed times 10   Callus at the base of the right 5th digit and medial great toe pared down with curette  Callus at the base of the left 5th digit pared down with curette     ASSESSMENT/PLAN:    Patient Active Problem List    Diagnosis Date Noted    Encounter for diabetic foot exam 11/15/2024    Dystrophic nail 08/09/2024    Heart failure with preserved ejection fraction 03/11/2024    Peripheral artery disease 03/07/2024    Dyspnea on exertion 03/07/2024    Hypoglycemia 10/26/2023    Acute deep vein thrombosis (DVT) of popliteal vein of right lower extremity 10/10/2023    ILD (interstitial lung disease) 10/10/2023    Diabetic glomerulosclerosis 10/10/2023    Back pain 10/10/2023    Chronic obstructive pulmonary disease 06/01/2023    Long term current use of insulin 06/01/2023    Migraine aura without headache 06/01/2023    Moderate recurrent major depression 06/01/2023    Raynaud's disease 06/01/2023    Thyroiditis 06/01/2023    Vitamin D deficiency 06/01/2023    Trigger index finger of right hand 04/11/2023    BMI 25.0-25.9,adult 04/11/2023    Asthma 02/03/2023    Lupus nephritis, ISN/RPS class III 06/14/2022    Systemic lupus erythematosus with glomerular disease 05/31/2022    Fibromyalgia 05/31/2022    Inadequate dietary energy intake 03/04/2020    Acute cystitis with hematuria 03/03/2020    STIVEN (acute kidney injury) 03/03/2020    Pyelonephritis 03/03/2020    Class 1 obesity with serious comorbidity and body mass index (BMI) of 32.0 to 32.9 in adult 05/07/2019    Anxiety 04/19/2018    Periumbilical pain 03/15/2018    Constipation 03/15/2018    Iron deficiency anemia 03/15/2018    New onset atrial fibrillation 12/01/2017    Hyperlipidemia 07/13/2017    History of asthma 07/13/2017    Essential hypertension  10/20/2016    Diabetes mellitus, type 2 10/20/2016    Insomnia due to other mental disorder 10/20/2016     Diabetic foot care   Diabetes-controlled   Left foot hyperkeratosis-Lac-Hydrin sent to pharmacy   Onychomycosis of left foot Nails-[facility]  Peripheral vascular disease-she has follow up with vascular for abnormal ABIs   Return to clinic in 3 months

## 2025-02-20 ENCOUNTER — HOSPITAL ENCOUNTER (OUTPATIENT)
Dept: CARDIOLOGY | Facility: HOSPITAL | Age: 60
Discharge: HOME | End: 2025-02-20
Attending: STUDENT IN AN ORGANIZED HEALTH CARE EDUCATION/TRAINING PROGRAM
Payer: MEDICAID

## 2025-02-20 VITALS
DIASTOLIC BLOOD PRESSURE: 83 MMHG | WEIGHT: 149 LBS | BODY MASS INDEX: 24.83 KG/M2 | HEIGHT: 65 IN | SYSTOLIC BLOOD PRESSURE: 171 MMHG

## 2025-02-20 DIAGNOSIS — I10 HYPERTENSION, UNSPECIFIED TYPE: ICD-10-CM

## 2025-02-20 DIAGNOSIS — R06.09 DOE (DYSPNEA ON EXERTION): ICD-10-CM

## 2025-02-20 LAB
APICAL FOUR CHAMBER EJECTION FRACTION: 62 %
APICAL TWO CHAMBER EJECTION FRACTION: 63 %
ASCENDING AORTA: 3.2 CM
AV INDEX (PROSTH): 0.83
AV MEAN GRADIENT: 3 MMHG
AV PEAK GRADIENT: 5 MMHG
AV REGURGITATION PRESSURE HALF TIME: 496 MS
AV VELOCITY RATIO: 0.82
BSA FOR ECHO PROCEDURE: 1.76 M2
CV ECHO LV RWT: 0.37 CM
DOP CALC AO PEAK VEL: 1.1 M/S
DOP CALC AO VTI: 25.4 CM
DOP CALC LVOT PEAK VEL: 0.9 M/S
DOP CALC MV VTI: 34.8 CM
DOP CALCLVOT PEAK VEL VTI: 21 CM
E WAVE DECELERATION TIME: 274 MSEC
E/A RATIO: 1.11
E/E' RATIO: 9 M/S
ECHO LV POSTERIOR WALL: 0.9 CM (ref 0.6–1.1)
FRACTIONAL SHORTENING: 36.7 % (ref 28–44)
HR MV ECHO: 59 BPM
INTERVENTRICULAR SEPTUM: 1.2 CM (ref 0.6–1.1)
LEFT ATRIUM SIZE: 3.4 CM
LEFT ATRIUM VOLUME INDEX MOD: 37 ML/M2
LEFT ATRIUM VOLUME MOD: 65 ML
LEFT INTERNAL DIMENSION IN SYSTOLE: 3.1 CM (ref 2.1–4)
LEFT VENTRICLE DIASTOLIC VOLUME INDEX: 65.13 ML/M2
LEFT VENTRICLE DIASTOLIC VOLUME: 113.98 ML
LEFT VENTRICLE END DIASTOLIC VOLUME APICAL 2 CHAMBER: 65.44 ML
LEFT VENTRICLE END DIASTOLIC VOLUME APICAL 4 CHAMBER: 68.83 ML
LEFT VENTRICLE MASS INDEX: 107.5 G/M2
LEFT VENTRICLE SYSTOLIC VOLUME INDEX: 22 ML/M2
LEFT VENTRICLE SYSTOLIC VOLUME: 38.52 ML
LEFT VENTRICULAR INTERNAL DIMENSION IN DIASTOLE: 4.9 CM (ref 3.5–6)
LEFT VENTRICULAR MASS: 188.1 G
LV LATERAL E/E' RATIO: 9.2 M/S
LV SEPTAL E/E' RATIO: 9.2 M/S
LVED V (TEICH): 113.98 ML
LVES V (TEICH): 38.52 ML
LVOT MG: 1.49 MMHG
LVOT MV: 0.56 CM/S
MV MEAN GRADIENT: 1 MMHG
MV PEAK A VEL: 0.83 M/S
MV PEAK E VEL: 0.92 M/S
MV PEAK GRADIENT: 3 MMHG
MV STENOSIS PRESSURE HALF TIME: 79.4 MS
MV VALVE AREA P 1/2 METHOD: 2.77 CM2
OHS CV RV/LV RATIO: 0.55 CM
OHS LV EJECTION FRACTION SIMPSONS BIPLANE MOD: 63 %
PISA AR MAX VEL: 4.76 M/S
PISA TR MAX VEL: 2 M/S
RA PRESSURE ESTIMATED: 3 MMHG
RIGHT VENTRICLE DIASTOLIC BASEL DIMENSION: 2.7 CM
RIGHT VENTRICULAR END-DIASTOLIC DIMENSION: 2.65 CM
RV TB RVSP: 5 MMHG
SINUS: 3.1 CM
TDI LATERAL: 0.1 M/S
TDI SEPTAL: 0.1 M/S
TDI: 0.1 M/S
TR MAX PG: 16 MMHG
TRICUSPID ANNULAR PLANE SYSTOLIC EXCURSION: 2.04 CM
TV REST PULMONARY ARTERY PRESSURE: 19 MMHG
Z-SCORE OF LEFT VENTRICULAR DIMENSION IN END DIASTOLE: 0.12
Z-SCORE OF LEFT VENTRICULAR DIMENSION IN END SYSTOLE: 0.27

## 2025-02-20 PROCEDURE — 93306 TTE W/DOPPLER COMPLETE: CPT

## 2025-03-10 ENCOUNTER — TELEPHONE (OUTPATIENT)
Dept: FAMILY MEDICINE | Facility: CLINIC | Age: 60
End: 2025-03-10
Payer: MEDICAID

## 2025-03-10 NOTE — TELEPHONE ENCOUNTER
----- Message from Lorenza sent at 3/10/2025 10:34 AM CDT -----  .Who Called: Vi UlrichPreferred Method of Contact: Phone CallPatient's Preferred Phone Number on File: [phone] Best Call Back Number, if different:Additional Information: pt don't want to do counseling she just want see him

## 2025-03-13 ENCOUNTER — HOSPITAL ENCOUNTER (OUTPATIENT)
Dept: RADIOLOGY | Facility: HOSPITAL | Age: 60
Discharge: HOME | End: 2025-03-13
Payer: MEDICAID

## 2025-03-13 DIAGNOSIS — R76.8 MELANOMA DIFFERENTIATION-ASSOCIATED PROTEIN 5 (MDA-5) ANTIBODY POSITIVE: ICD-10-CM

## 2025-03-13 PROCEDURE — 71046 X-RAY EXAM CHEST 2 VIEWS: CPT | Mod: TC

## 2025-03-28 DIAGNOSIS — F99 INSOMNIA DUE TO OTHER MENTAL DISORDER: ICD-10-CM

## 2025-03-28 DIAGNOSIS — F51.05 INSOMNIA DUE TO OTHER MENTAL DISORDER: ICD-10-CM

## 2025-03-28 DIAGNOSIS — F41.1 GAD (GENERALIZED ANXIETY DISORDER): ICD-10-CM

## 2025-03-28 RX ORDER — ALPRAZOLAM 1 MG/1
1 TABLET ORAL 2 TIMES DAILY PRN
Qty: 60 TABLET | Refills: 0 | Status: SHIPPED | OUTPATIENT
Start: 2025-03-28

## 2025-03-28 NOTE — TELEPHONE ENCOUNTER
----- Message from Lety sent at 3/28/2025 10:14 AM CDT -----  .Who Called: Vi UlrichRefill or New Rx:RefillRX Name and Strength:ALPRAZolam (XANAX) 1 MG tabletHow is the patient currently taking it? (ex. 1XDay):2x per day Is this a 30 day or 90 day RX:60 in bottleLocal or Mail Order:deliveryList of preferred pharmacies on file (remove unneeded): [facility] If different Pharmacy is requested, enter Pharmacy information here including location and phone number: Ordering Provider:SadiePreferalaina Method of Contact: Phone CallPatient's Preferred Phone Number on File: [phone] Best Call Back Number, if different:Additional Information: ALPRAZolam (XANAX) 1 MG tabletPt stated out of meds completely--dr was suppose to put 3 refills not 2

## 2025-03-28 NOTE — TELEPHONE ENCOUNTER
Please see attached refill request.    Next scheduled office visit: 4/17/2025.    Last office visit: 11/19/2024.     Thank you!

## 2025-03-28 NOTE — TELEPHONE ENCOUNTER
Received refill request for xanax.  PDMP reviewed and demonstrated no abnormal filling patterns.  Refill submitted as requested.  Will follow up with patient as currently scheduled.

## 2025-04-14 ENCOUNTER — OFFICE VISIT (OUTPATIENT)
Dept: NEPHROLOGY | Facility: CLINIC | Age: 60
End: 2025-04-14
Payer: MEDICAID

## 2025-04-14 VITALS
WEIGHT: 172.81 LBS | BODY MASS INDEX: 28.79 KG/M2 | HEART RATE: 71 BPM | SYSTOLIC BLOOD PRESSURE: 136 MMHG | RESPIRATION RATE: 18 BRPM | DIASTOLIC BLOOD PRESSURE: 78 MMHG | HEIGHT: 65 IN | OXYGEN SATURATION: 100 % | TEMPERATURE: 98 F

## 2025-04-14 DIAGNOSIS — N18.30 ANEMIA, CHRONIC RENAL FAILURE, STAGE 3 (MODERATE): ICD-10-CM

## 2025-04-14 DIAGNOSIS — E11.22 TYPE 2 DIABETES MELLITUS WITH STAGE 3B CHRONIC KIDNEY DISEASE, WITHOUT LONG-TERM CURRENT USE OF INSULIN: ICD-10-CM

## 2025-04-14 DIAGNOSIS — N25.81 SECONDARY HYPERPARATHYROIDISM OF RENAL ORIGIN: ICD-10-CM

## 2025-04-14 DIAGNOSIS — N18.31 CKD STAGE G3A/A2, GFR 45-59 AND ALBUMIN CREATININE RATIO 30-299 MG/G: ICD-10-CM

## 2025-04-14 DIAGNOSIS — N18.32 CKD STAGE G3B/A3, GFR 30-44 AND ALBUMIN CREATININE RATIO >300 MG/G: Primary | ICD-10-CM

## 2025-04-14 DIAGNOSIS — M32.14 LUPUS NEPHRITIS: Primary | ICD-10-CM

## 2025-04-14 DIAGNOSIS — D63.1 ANEMIA, CHRONIC RENAL FAILURE, STAGE 3 (MODERATE): ICD-10-CM

## 2025-04-14 DIAGNOSIS — M32.14 LUPUS NEPHRITIS: ICD-10-CM

## 2025-04-14 DIAGNOSIS — D84.9 IMMUNOSUPPRESSED STATUS: ICD-10-CM

## 2025-04-14 DIAGNOSIS — I10 ESSENTIAL HYPERTENSION: ICD-10-CM

## 2025-04-14 DIAGNOSIS — N18.32 TYPE 2 DIABETES MELLITUS WITH STAGE 3B CHRONIC KIDNEY DISEASE, WITHOUT LONG-TERM CURRENT USE OF INSULIN: ICD-10-CM

## 2025-04-14 PROCEDURE — 99214 OFFICE O/P EST MOD 30 MIN: CPT | Mod: S$PBB,,, | Performed by: NURSE PRACTITIONER

## 2025-04-14 PROCEDURE — 4010F ACE/ARB THERAPY RXD/TAKEN: CPT | Mod: CPTII,,, | Performed by: NURSE PRACTITIONER

## 2025-04-14 PROCEDURE — 3066F NEPHROPATHY DOC TX: CPT | Mod: CPTII,,, | Performed by: NURSE PRACTITIONER

## 2025-04-14 PROCEDURE — 99215 OFFICE O/P EST HI 40 MIN: CPT | Mod: PBBFAC | Performed by: NURSE PRACTITIONER

## 2025-04-14 PROCEDURE — 3008F BODY MASS INDEX DOCD: CPT | Mod: CPTII,,, | Performed by: NURSE PRACTITIONER

## 2025-04-14 PROCEDURE — 3075F SYST BP GE 130 - 139MM HG: CPT | Mod: CPTII,,, | Performed by: NURSE PRACTITIONER

## 2025-04-14 PROCEDURE — 3078F DIAST BP <80 MM HG: CPT | Mod: CPTII,,, | Performed by: NURSE PRACTITIONER

## 2025-04-14 PROCEDURE — 1159F MED LIST DOCD IN RCRD: CPT | Mod: CPTII,,, | Performed by: NURSE PRACTITIONER

## 2025-04-14 RX ORDER — VOCLOSPORIN 7.9 MG/1
CAPSULE ORAL
Qty: 180 CAPSULE | Refills: 11 | Status: SHIPPED | OUTPATIENT
Start: 2025-04-14

## 2025-04-14 RX ORDER — MYCOPHENOLATE MOFETIL 500 MG/1
250 TABLET ORAL 2 TIMES DAILY
COMMUNITY

## 2025-04-14 RX ORDER — ONDANSETRON 4 MG/1
4 TABLET, ORALLY DISINTEGRATING ORAL EVERY 6 HOURS PRN
Qty: 12 TABLET | Refills: 0 | Status: SHIPPED | OUTPATIENT
Start: 2025-04-14

## 2025-04-14 RX ORDER — PREDNISONE 5 MG/1
5 TABLET ORAL DAILY
COMMUNITY
Start: 2024-06-13

## 2025-04-14 NOTE — PROGRESS NOTES
[facility]  Nephrology Clinic Note    Chief complaint: Follow-up and Chronic Kidney Disease (RTC, took meds,  c/o acid reflux from so much medicine/, seeing rheum in [place])    History of present illness:   Vi Ulrich is a 59 y.o. Black or  female with past medical history of systemic lupus erythematosus diagnosed in 2004 (was previously under the care of Dr Velazquez, has established care with Dr. 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     She presents for follow-up appointment in Nephrology Clinic today. Continues to complain of fatigue, occasional joint pains and poor appetite.     Review of Systems  12 point review of systems conducted, negative except as stated in the history of present illness.    Allergies: Patient is allergic to penicillins, percocet [oxycodone-acetaminophen], tramadol, and promethazine.     Past Medical History:  has a past medical history of Arthritis, Asthma, Atrial fibrillation, Chronic constipation, Diabetes mellitus, Encounter for blood transfusion, GERD (gastroesophageal reflux disease), Gout, Hypertension, Lupus, Renal disorder, and SLE (systemic lupus erythematosus).    Procedure History:  has a past surgical history that includes Cholecystectomy; Appendectomy; Partial hysterectomy; Lymph node biopsy (Left, 2014); Hysterectomy;  "Joint replacement (Right, 08/07/2015); Mastectomy; Esophagogastroduodenoscopy (N/A, 04/19/2018); Colonoscopy (N/A, 04/19/2018); Angiography of lower extremity (Left, 09/06/2024); and Tubal ligation.    Family History: family history includes Arthritis in her mother; Asthma in her mother; Diabetes in her mother and sister; Heart block in her mother; Heart disease in her father and sister; Kidney disease in her sister.    Social History:  reports that she quit smoking about 2 years ago. Her smoking use included cigarettes. She started smoking about 44 years ago. She has a 10.4 pack-year smoking history. She has never used smokeless tobacco. She reports that she does not drink alcohol and does not use drugs.    Physical exam  /78   Pulse 71   Temp 98 °F (36.7 °C) (Oral)   Resp 18   Ht 5' 4.96" (1.65 m)   Wt 78.4 kg (172 lb 13.5 oz)   LMP  (LMP Unknown)   SpO2 100%   BMI 28.80 kg/m²   General appearance: Patient is in no acute distress.  Skin: No rashes or wounds.  HEENT: PERRLA, EOMI, no scleral icterus, no JVD. Neck is supple.  Chest: Respirations are unlabored. Lungs sounds are clear.   Heart: S1, S2.   Abdomen: Benign.  : Deferred.  Extremities: No edema, peripheral pulses are palpable.   Neuro: No focal deficits.     Home Medications:  Current Medications[1]    Laboratory data    Lab Results   Component Value Date    WBC 2.71 (L) 03/13/2025    HGB 10.7 (L) 03/13/2025    HCT 32.8 (L) 03/13/2025     03/13/2025    IRON 25 (L) 10/06/2024    TIBC 207 (L) 10/06/2024    LABIRON 12 (L) 10/06/2024    FERRITIN 249.67 (H) 12/11/2024    FOLATE 5.3 (L) 01/12/2023    [id number] 674 01/12/2023    HAPTO 249 03/10/2023     (H) 03/10/2023     03/13/2025    K 5.1 03/13/2025    CO2 22 03/13/2025    BUN 32.8 (H) 03/13/2025    CREATININE 1.51 (H) 03/13/2025    EGFRNORACEVR 40 03/13/2025    GLUCOSE 75 03/13/2025    CALCIUM 8.5 03/13/2025    ALKPHOS 76 03/13/2025    LABPROT 7.5 03/13/2025    ALBUMIN 2.9 " (L) 03/13/2025    BILIDIR 0.1 03/28/2022    IBILI 0.10 03/28/2022    AST 15 03/13/2025    ALT 13 03/13/2025    MG 2.00 09/05/2024    PHOS 3.5 12/11/2024      Lab Results   Component Value Date    HGBA1C 5.7 11/14/2023    .3 (H) 12/11/2024    UNHGXXMI87SJ 13 (L) 12/11/2024    HIV Nonreactive 10/13/2023    HEPCAB Nonreactive 10/13/2023    HEPBSAB Negative 11/17/2020     Urine:  Lab Results   Component Value Date    APPEARANCEUA Clear 03/13/2025    SGUA 1.022 03/13/2025    PROTEINUA 3+ (A) 03/13/2025    KETONESUA Negative 03/13/2025    LEUKOCYTESUR 25 (A) 03/13/2025    RBCUA 6-10 (A) 03/13/2025    WBCUA 11-20 (A) 03/13/2025    BACTERIA Trace (A) 03/13/2025    SQEPUA Few (A) 03/13/2025    HYALINECASTS 3-5 (A) 03/13/2025    CREATRANDUR 176.1 (H) 03/13/2025    PROTEINURINE 309.2 03/13/2025    UPROTCREA 1.8 03/13/2025         Imaging  US Retroperitoneal Limited 10/23/2023  Grayscale and color Doppler sonographic evaluation of the kidneys.  The right kidney measures 12 cm. The left kidney measures 12 cm.   No hydronephrosis.  There are small renal cysts for which no follow-up is needed.  Largest measures only about 1 cm.  Otherwise grossly normal renal parenchymal echogenicity.  Impression  No significant sonographic abnormality of the kidneys.  Electronically signed by:Jose Manuel Henao  Date:                                         10/23/2023  Time:                                        12:25    Pathology:   KIDNEY BIOPSY:   REPORT FROM [employer]:   PATHOLOGY NO:  [id number]   SPECIMEN SUBMITTED:  BY SONI CHOU MD  FOR KIDNEY, BIOPSY   DIAGNOSIS:   FOCAL LUPUS NEPHRITIS, ISN/RPS CLASS III.   SEGMENTAL MEMBRANOUS LUPUS NEPHRITIS, ISN/RPS CLASS V.   FEATURES OF DIABETIC GLOMERULOPATHY, CLASS IIB.   MODERATE PLASMA CELL-RICH INTERSTITIAL INFLAMMATION.  SEE COMMENT.  COMMENT:  THE MODIFIED NIH LUPUS NEPHRITIS ACTIVITY SCORE IS 4/24 AND CHRONICITY SCORE IS 5/12.  OF NOTE THERE IS FOCAL MODERATE TUBULOINTERSTITIAL  INFLAMMATION, WITH PLASMA CELLS.  TUBULOINTERSTITIAL INFLAMMATION IS A KNOWN COMPONENT OF LUPUS NEPHRITIS HOWEVER OTHER CAUSES INCLUDING ALLERGIC DRUG REACTION MAY BE EXCLUDED AS APPROPRIATE.  A MINORITY (~10%) OF THE PLASMA CELLS STAIN WITH IGG4, WHICH IS NOT DIAGNOSTIC OF AN IGG4 INDUCED INTERSTITIAL NEPHRITIS.  SEE TABLE BELOW FOR SUMMARY OF CHRONICITY FINDINGS:                CHRONICITY SUMMARY  TOTAL GLOMERULI-                           14  GLOBAL GLOMERULOSCLEROSIS-   3  SEGMENTAL SCLEROSIS-                 PRESENT  INTERSTITIAL FIBROSIS-                   MODERATE   TUBULAR ATROPHY-                          MODERATE  ARTERIAL INTIMAL FIBROSIS-           ABSENT  ARTERIOLAR HYALINOSIS-                MILD   FINAL DIAGNOSIS PERFORMED BY   CAROLANN CERVANTES M.D.   FOR ADDITIONAL INFORMATION, PLEASE REFER TO THE COMPLETE REPORT PERFORMED BY [facility].      Impression    ICD-10-CM ICD-9-CM   1. CKD stage G3b/A3, GFR 30-44 and albumin creatinine ratio >300 mg/g  N18.32 585.3   2. Essential hypertension  I10 401.9   3. Lupus nephritis  M32.14 710.0     583.81   4. Immunosuppressed status  D84.9 279.9   5. Secondary hyperparathyroidism of renal origin  N25.81 588.81   6. Type 2 diabetes mellitus with stage 3b chronic kidney disease, without long-term current use of insulin  E11.22 250.40    N18.32 585.3   7. Anemia, chronic renal failure, stage 3 (moderate)  N18.30 285.21    D63.1 585.3        Plan  CKD stage G3b/A3, GFR 30-44 and albumin creatinine ratio >300 mg/g  -     Comprehensive Metabolic Panel; Future; Expected date: 08/10/2025  -     Microalbumin/Creatinine Ratio, Urine; Future; Expected date: 08/10/2025  -     Urinalysis, Reflex to Urine Culture; Future; Expected date: 08/10/2025  Serum creatinine is stable, but urinary protein excretion is >1 g/24hr.  Continue MMF, ARB and voclosporin. Importance of adhering to medication regimen explained to the patient. She has a follow up with her  rheumatologist in June 2025, will defer further adjustments in immunosuppression to Dr Islas.    Continue renal sparing activities:  -2 g a day dietary sodium restriction  -optimize glycemic control (goal A1c is less than 7%)  -control high blood pressure (goal blood pressure is less than 130/80, patient was advised to check blood pressure once or twice a week and bring blood pressure logs to next office visit)  -exercise at least 30 minutes a day, 5 days a week  -maintain healthy weight  -decrease or stop alcohol use  -do not smoke  -stay well hydrated (drink water only, avoid juices, sweet tea, and sodas)  -ask about staying up-to-date on vaccinations (flu vaccine, pneumonia vaccine, hepatitis B vaccine)  -avoid excessive use of NSAIDs (ibuprofen, naproxen, Aleve, Advil, Toradol, Mobic), take Tylenol as needed for headache or mild pain  -take cholesterol lowering medications if prescribed (LDL goal less than 100)    Essential hypertension  Blood pressure reading is at goal, continue current antihypertensive regimen and 2 g a day dietary sodium restriction.      Lupus nephritis  Patient has biopsy-proven LN class III/V. Continue MMF and voclosporin as ordered. She has a follow up with her rheumatologist in June 2025, will defer further adjustments in immunosuppression to Dr Islas.      Immunosuppressed status  -Advised to stay up-to-date on age appropriate vaccinations and malignancy screening with PCP.   -Recommended good hand hygiene and limiting contact with people who are sick or recently vaccinated.     Secondary hyperparathyroidism of renal origin  -     PTH, Intact; Future; Expected date: 08/10/2025  There are no indications for active vitamin-D analogs at this time.  Will continue to monitor intact PTH, calcium, and phosphorus levels periodically.      Type 2 diabetes mellitus with stage 3b chronic kidney disease, without long-term current use of insulin  A1C is at goal. Continue current medication  regimen.     Anemia, chronic renal failure, stage 3 (moderate)  -     CBC Auto Differential; Future; Expected date: 08/10/2025  -     Iron and TIBC; Future; Expected date: 08/10/2025  -     Ferritin; Future; Expected date: 08/10/2025  Will check iron studies prior to next visit.     Nausea  -     ondansetron (ZOFRAN-ODT) 4 MG TbDL; Take 1 tablet (4 mg total) by mouth every 6 (six) hours as needed (nausea).  Dispense: 12 tablet; Refill: 0  Continue as needed zofran.       Follow up in about 4 months (around 8/14/2025).       Rachel Broderick NP  [facility]            [1]   Current Outpatient Medications:     albuterol sulfate (PROAIR RESPICLICK) 90 mcg/actuation inhaler, Inhale 1 puff into the lungs every 4 (four) hours as needed for Wheezing. Rescue, Disp: , Rfl:     ALPRAZolam (XANAX) 1 MG tablet, Take 1 tablet (1 mg total) by mouth 2 (two) times daily as needed for Anxiety., Disp: 60 tablet, Rfl: 0    apixaban (ELIQUIS) 5 mg Tab, Take 1 tablet (5 mg total) by mouth 2 (two) times daily., Disp: 180 tablet, Rfl: 3    aspirin (ECOTRIN) 81 MG EC tablet, Take 1 tablet (81 mg total) by mouth once daily., Disp: 90 tablet, Rfl: 3    atorvastatin (LIPITOR) 40 MG tablet, Take 1 tablet (40 mg total) by mouth once daily., Disp: 90 tablet, Rfl: 3    cilostazoL (PLETAL) 50 MG Tab, Take 1 tablet (50 mg total) by mouth 2 (two) times daily., Disp: 60 tablet, Rfl: 11    EScitalopram oxalate (LEXAPRO) 20 MG tablet, Take 1 tablet (20 mg total) by mouth once daily., Disp: 90 tablet, Rfl: 3    furosemide (LASIX) 40 MG tablet, Take 1 tablet (40 mg total) by mouth once daily., Disp: 90 tablet, Rfl: 3    losartan (COZAAR) 25 MG tablet, Take 1 tablet (25 mg total) by mouth once daily., Disp: 90 tablet, Rfl: 3    LUPKYNIS 7.9 mg Cap, Take 3 capsules (23.7 mg) by mouth twice daily., Disp: 180 capsule, Rfl: 11    metoprolol tartrate (LOPRESSOR) 50 MG tablet, Take 1 tablet (50 mg total) by mouth Daily., Disp: 90 tablet, Rfl: 3     mycophenolate (CELLCEPT) 500 mg Tab, Take 250 mg by mouth 2 (two) times daily. 3 tabs BID, Disp: , Rfl:     pantoprazole (PROTONIX) 40 MG tablet, Take 1 tablet (40 mg total) by mouth once daily., Disp: 90 tablet, Rfl: 3    predniSONE (DELTASONE) 5 MG tablet, Take 5 mg by mouth once daily., Disp: , Rfl:     walker Misc, Please dispense 1 Rollator, Disp: 1 each, Rfl: 0    blood sugar diagnostic (ONETOUCH VERIO) Strp, 1 each by Misc.(Non-Drug; Combo Route) route 4 (four) times daily. (Patient not taking: Reported on 4/14/2025), Disp: 50 each, Rfl: 11    blood-glucose meter kit, Use as instructed (Patient not taking: Reported on 4/14/2025), Disp: 1 each, Rfl: 0    eletriptan (RELPAX) 40 MG tablet, Take 1 tablet (40 mg total) by mouth as needed (take one at the beginning of migraine, may repeat in 2 h. max 2 in 24h). may repeat in 2 hours if necessary (Patient not taking: Reported on 4/14/2025), Disp: 9 tablet, Rfl: 5    insulin aspart U-100 (NOVOLOG) 100 unit/mL (3 mL) InPn pen, Inject 2 Units into the skin as needed. (Patient not taking: Reported on 4/14/2025), Disp: , Rfl:     lancets 28 gauge Misc, 100 lancets by Misc.(Non-Drug; Combo Route) route 4 (four) times daily with meals and nightly. (Patient not taking: Reported on 4/14/2025), Disp: 100 each, Rfl: 8    ondansetron (ZOFRAN-ODT) 4 MG TbDL, Take 1 tablet (4 mg total) by mouth every 6 (six) hours as needed (nausea)., Disp: 12 tablet, Rfl: 0

## 2025-04-17 ENCOUNTER — OFFICE VISIT (OUTPATIENT)
Dept: BEHAVIORAL HEALTH | Facility: CLINIC | Age: 60
End: 2025-04-17
Payer: MEDICAID

## 2025-04-17 VITALS
WEIGHT: 171.63 LBS | HEART RATE: 71 BPM | SYSTOLIC BLOOD PRESSURE: 133 MMHG | DIASTOLIC BLOOD PRESSURE: 85 MMHG | BODY MASS INDEX: 28.59 KG/M2 | OXYGEN SATURATION: 99 % | TEMPERATURE: 98 F

## 2025-04-17 DIAGNOSIS — F99 INSOMNIA DUE TO OTHER MENTAL DISORDER: ICD-10-CM

## 2025-04-17 DIAGNOSIS — F41.1 GAD (GENERALIZED ANXIETY DISORDER): ICD-10-CM

## 2025-04-17 DIAGNOSIS — F51.05 INSOMNIA DUE TO OTHER MENTAL DISORDER: ICD-10-CM

## 2025-04-17 DIAGNOSIS — F33.1 MODERATE RECURRENT MAJOR DEPRESSION: ICD-10-CM

## 2025-04-17 PROCEDURE — 99214 OFFICE O/P EST MOD 30 MIN: CPT | Mod: PBBFAC,PN | Performed by: STUDENT IN AN ORGANIZED HEALTH CARE EDUCATION/TRAINING PROGRAM

## 2025-04-17 RX ORDER — ESCITALOPRAM OXALATE 20 MG/1
20 TABLET ORAL DAILY
Qty: 90 TABLET | Refills: 3 | Status: SHIPPED | OUTPATIENT
Start: 2025-04-17 | End: 2026-04-17

## 2025-04-17 RX ORDER — ALPRAZOLAM 1 MG/1
1 TABLET ORAL 2 TIMES DAILY PRN
Qty: 60 TABLET | Refills: 5 | Status: SHIPPED | OUTPATIENT
Start: 2025-04-17

## 2025-04-17 NOTE — PROGRESS NOTES
"Outpatient Psychiatry Follow-Up Visit    4/17/2025    Clinical Status of Patient:  Outpatient (Ambulatory)    Chief Complaint:  Vi Ulrich is a 59 y.o. female who presents today for follow-up of anxiety and insomnia . Patient last seen for follow-up on 11/19/2024. Met with patient.      Interval History and Content of Current Session:  Interim Events/Subjective Report/Content of Current Session:   Pt reports doing "about the same"  overall.  Reports "alright" mood, "fine" anxiety, continued panic attacks (especially when talking with her 2 incarcerated sons).  Sleeping well, rested on awakening. Appetite low, weight increased (??).  Energy good, motivation "very good".  Denies irritability, denies hopelessness.  Denies SI/HI/AVH/paranoia, denies plan or desire for self harm or harm to others.  Denies SE from current regimen. Denies change in chronic somatic complaints. Pt happy with current regimen and wants to continue.     Psychiatric Review of Systems-is patient experiencing or having changes in  Integrated into HPI above.     Review of Systems   PSYCHIATRIC: Pertinant items are noted in the narrative.  CONSTITUTIONAL: No weight gain or loss.  HEENT: +occasional dry mouth, denies sore throat  MUSCULOSKELETAL:  + generalized myalgias, +arthritic pain of back, hand soreness, +LE pain  NEUROLOGIC: No weakness, sensory changes, seizures, confusion, memory loss, tremor or other abnormal movements.  +ha  CARDIAC: No CP, + palpitations  RESPIRATORY: + shortness of breath with exertion, denies cough.  CARDIOVASCULAR: No tachycardia or chest pain.  GASTROINTESTINAL: Denies pain, constipation or diarrhea.  +nausea/vomiting    Past Medical, Family and Social History: The patient's past medical, family and social history have been reviewed and updated as appropriate within the electronic medical record - see encounter notes.    Compliance: good    Side effects: denies    Risk Parameters:  Patient reports no suicidal " "ideation  Patient reports no homicidal ideation  Patient reports no self-injurious behavior  Patient reports no violent behavior    Exam (detailed: at least 9 elements; comprehensive: all 15 elements)   Constitutional  Vitals:  Most recent vital signs, dated less than 90 days prior to this appointment, were reviewed.     Vitals:    04/17/25 0749   BP: 133/85   Pulse: 71   Temp: 97.9 °F (36.6 °C)   SpO2: 99%   Weight: 77.8 kg (171 lb 9.6 oz)        General:   Constitutional: No acute distress, appears stated age, casually dressed    Neurologic:   Motor: moves all extremities spontaneously and without difficulty, no abnormal involuntary movements observed  Gait: normal gait and station    Mental status examination:   Appearance: appears stated age, casually dressed, no acute distress  Behavior: unremarkable for situation, calm and cooperative  Mood: "ok"  Affect: mood congruent, constricted, and anxious-appearing  Thought process: linear and goal directed  Associations: appropriate for conversation  Thought content: no plan or desire for self harm or harm to others, denies paranoia, no delusional ideation volunteered  Perceptions: denies hallucinations or other altered perceptions  Orientation: oriented to day of week, month, year, location, and situation  Language: English, fluid  Attention: able to attend to interview  Insight: good  Judgement: good    PHQ9:  Over the last two weeks how often have you been bothered by little interest or pleasure in doing things: 0  Over the last two weeks how often have you been bothered by feeling down, depressed or hopeless: 0  PHQ-2 Total Score: 0  PHQ-9 Score: 1  PHQ-9 Interpretation: Minimal or None        11/19/2024     7:55 AM 8/19/2024     8:22 AM 3/26/2024     7:54 AM   GAD7   1. Feeling nervous, anxious, or on edge? 0 1 3   2. Not being able to stop or control worrying? 0 1 3   3. Worrying too much about different things? 1 1 3   4. Trouble relaxing? 0 1 2   5. Being so " restless that it is hard to sit still? 0 0 1   6. Becoming easily annoyed or irritable? 0 1 0   7. Feeling afraid as if something awful might happen? 0 1 0   8. If you checked off any problems, how difficult have these problems made it for you to do your work, take care of things at home, or get along with other people? 0 1 2   LIANG-7 Score 1 6 12     Assessment and Diagnosis   Status/Progress: Based on the examination today, the patient's problem(s) is/are adequately but not ideally controlled.  New problems have not been presented today.   Co-morbidities and Lack of compliance are not complicating management of the primary condition.  Number of separate conditions addressed during today's visit: 3 (mood well controlled, anxiety fair control, sleep well controlled).  Medication management: yes: Refilling a prescription.  Are referral(s) being ordered today: No.  Complexity (level) of medical decision making employed in the encounter: MODERATE.    General Impression:    ICD-10-CM ICD-9-CM   1. LIANG (generalized anxiety disorder)  F41.1 300.02   2. Moderate recurrent major depression  F33.1 296.32   3. Insomnia due to other mental disorder  F51.05 300.9    F99 327.02     Intervention/Counseling/Treatment Plan   Continue lexapro 20mg daily  Continue xanax 1mg bid prn   PDMP reviewed and demonstrated no abnormal filling patterns.   No need for PEC as pt is not an imminent danger to self or others or gravely disabled due to acute psychiatric illness  Discussed that pt should either call clinic for psychiatric crisis symptoms or present to nearest emergency room    Discussed with patient informed consent including diagnosis, risks and benefits of proposed treatment above vs. alternative treatments vs. no treatment, as well as serious and common side effects of these treatments, and the inherent unpredictability of individual responses to these treatments. The patient expresses understanding of the above and displays the  capacity to agree with this current plan. Patient also agrees that, currently, the benefits outweigh the risks and would like to pursue treatment at this time, and had no other questions.    Instructions:  Take all medications as prescribed.    Abstain from recreational drugs and alcohol.  Present to ED or call 911 for SI/HI plan or intent, psychosis, or medical emergency.    Return to Clinic: Follow up if symptoms worsen or fail to improve.  Given that provider is leaving in the near future, will send list of psychiatry providers to pt's portal account to assist with continuation of care.     Total time:   The total time for services performed on the date of the encounter (including review of prior visit notes, review of notes from other providers, review of results from laboratory/imaging studies, face-to-face time with patient, and time spent on other activities directly related to patient care): 25 minutes.    Frederic Noel MD  [facility]

## 2025-04-28 DIAGNOSIS — I73.9 PAD (PERIPHERAL ARTERY DISEASE): Primary | ICD-10-CM

## 2025-06-23 ENCOUNTER — TELEPHONE (OUTPATIENT)
Dept: NEUROLOGY | Facility: CLINIC | Age: 60
End: 2025-06-23
Payer: MEDICAID

## 2025-06-23 NOTE — TELEPHONE ENCOUNTER
[facility] needs pt to call for delivery.  LVM for patient for call [facility] to set up delivery for Lupkynis

## (undated) DEVICE — INTRO KIT MICPUNC STIFF CANN

## (undated) DEVICE — Device

## (undated) DEVICE — DEVICE TORQUE OLCOTT

## (undated) DEVICE — SHEATH PINNACLE INTRO .035 4FR

## (undated) DEVICE — GUIDEWIRE GLADIUS ES STR 300CM

## (undated) DEVICE — SHEATH 5FR 6CM

## (undated) DEVICE — APPLICATOR CHLORAPREP CLR 10.5

## (undated) DEVICE — CATH TURBO ELITE OTW 1.7MM

## (undated) DEVICE — DEVICE MYNX CONTROL 5FR 10ML

## (undated) DEVICE — OMNIPAQUE 350MG 150ML VIAL

## (undated) DEVICE — TOWEL OR DISP STRL BLUE 4/PK

## (undated) DEVICE — INDEFLATOR ENCORE M001151062

## (undated) DEVICE — WIRE GUIDE .035D 180CM

## (undated) DEVICE — INTRODUCER VASC RADPQ 5FRX10CM

## (undated) DEVICE — COVER CIV-FLEX PROBE US 58IN

## (undated) DEVICE — GUIDEWIRE STD .035X180CM ANG

## (undated) DEVICE — INTRODUCER VAS DESTINATION 5FR